# Patient Record
Sex: MALE | Employment: OTHER | URBAN - METROPOLITAN AREA
[De-identification: names, ages, dates, MRNs, and addresses within clinical notes are randomized per-mention and may not be internally consistent; named-entity substitution may affect disease eponyms.]

---

## 2018-01-30 PROBLEM — S00.83XA FOREHEAD CONTUSION: Status: ACTIVE | Noted: 2018-01-30

## 2018-01-30 PROBLEM — S63.502A WRIST SPRAIN, LEFT, INITIAL ENCOUNTER: Status: ACTIVE | Noted: 2018-01-30

## 2018-01-30 PROBLEM — R55 SYNCOPE AND COLLAPSE: Status: ACTIVE | Noted: 2018-01-30

## 2018-01-30 PROBLEM — S60.812A: Status: ACTIVE | Noted: 2018-01-30

## 2018-01-30 PROBLEM — L08.9: Status: ACTIVE | Noted: 2018-01-30

## 2018-01-30 PROBLEM — S09.90XA HEAD INJURY: Status: ACTIVE | Noted: 2018-01-30

## 2018-08-22 ENCOUNTER — APPOINTMENT (OUTPATIENT)
Dept: GENERAL RADIOLOGY | Age: 83
End: 2018-08-22
Attending: EMERGENCY MEDICINE
Payer: MEDICARE

## 2018-08-22 ENCOUNTER — HOSPITAL ENCOUNTER (EMERGENCY)
Age: 83
Discharge: HOME | End: 2018-08-22
Attending: EMERGENCY MEDICINE
Payer: MEDICARE

## 2018-08-22 VITALS
TEMPERATURE: 97.5 F | SYSTOLIC BLOOD PRESSURE: 163 MMHG | RESPIRATION RATE: 16 BRPM | OXYGEN SATURATION: 96 % | DIASTOLIC BLOOD PRESSURE: 91 MMHG | HEART RATE: 79 BPM

## 2018-08-22 DIAGNOSIS — S76.312A LEFT HAMSTRING MUSCLE STRAIN, INITIAL ENCOUNTER: Primary | ICD-10-CM

## 2018-08-22 LAB — D DIMER PPP FEU-MCNC: 0.58 UG/ML(FEU)

## 2018-08-22 PROCEDURE — 85379 FIBRIN DEGRADATION QUANT: CPT | Performed by: EMERGENCY MEDICINE

## 2018-08-22 PROCEDURE — 73502 X-RAY EXAM HIP UNI 2-3 VIEWS: CPT

## 2018-08-22 PROCEDURE — 99284 EMERGENCY DEPT VISIT MOD MDM: CPT

## 2018-08-22 RX ORDER — IBUPROFEN 800 MG/1
800 TABLET ORAL EVERY 8 HOURS
Qty: 15 TAB | Refills: 0 | Status: SHIPPED | OUTPATIENT
Start: 2018-08-22 | End: 2018-08-27

## 2018-08-22 NOTE — DISCHARGE INSTRUCTIONS
SPECIFIC PATIENT INSTRUCTIONS FROM THE PHYSICIAN WHO TREATED YOU IN THE ER TODAY:  1. Ibuprofen as prescribed until finished. 2. Return if any concerns or worsening condition(s). 3. FOLLOW UP APPOINTMENT:  Your primary doctor in 1-2 days. MyChart Activation    Thank you for requesting access to [facility]. Please follow the instructions below to securely access and download your online medical record. [facility] allows you to send messages to your doctor, view your test results, renew your prescriptions, schedule appointments, and more. How Do I Sign Up? 1. In your internet browser, go to [email or web address]. 2. Click on the First Time User? Click Here link in the Sign In box. You will see the New Member Sign Up page. 3. Enter your [facility] Access Code exactly as it appears below. You will not need to use this code after youve completed the sign-up process. If you do not sign up before the expiration date, you must request a new code. [facility] Access Code: [email or web address]  Expires: 10/14/2018  4:21 PM (This is the date your [facility] access code will )    4. Enter the last four digits of your Social Security Number (xxxx) and Date of Birth (mm/dd/yyyy) as indicated and click Submit. You will be taken to the next sign-up page. 5. Create a [facility] ID. This will be your [facility] login ID and cannot be changed, so think of one that is secure and easy to remember. 6. Create a [facility] password. You can change your password at any time. 7. Enter your Password Reset Question and Answer. This can be used at a later time if you forget your password. 8. Enter your e-mail address. You will receive e-mail notification when new information is available in [address] 9. Click Sign Up. You can now view and download portions of your medical record. 10. Click the Download Summary menu link to download a portable copy of your medical information.     Additional Information    If you have questions, please visit the Frequently Asked Questions section of the [facility] website at [email or web address] Remember, [facility] is NOT to be used for urgent needs. For medical emergencies, dial 911.

## 2018-08-22 NOTE — ED PROVIDER NOTES
[facility] EMERGENCY DEPT      4:30 PM    Date: 8/22/2018  Patient Name: Ketan Jalloh    History of Presenting Illness     Chief Complaint   Patient presents with    Leg Pain    Leg Swelling       History Provided By: Patient    Chief Complaint: Leg pain  Duration: 4 Hours  Timing:  Constant  Location: Left leg  Severity: 7 out of 10  Associated Symptoms: Left left swelling. Patient denies any other associated symptoms or complaints. 80 y.o. male with noted past medical history of HTN and hypercholesterolemia who presents to the emergency department c/o left leg pain and swelling onset 4 hours ago. Patient describes the pain and swelling as constant, located in the left leg, and moderate. He states that the pain suddenly occured while he was walking around with his walker. He never experienced this pain before. Patient has not taken any medications for this pain and swelling. Patient denies any other associated symptoms or complaints. No other complaints. Nursing notes regarding the HPI and triage nursing notes were reviewed. Prior medical records were reviewed. Current Outpatient Prescriptions   Medication Sig Dispense Refill    ibuprofen (MOTRIN) 800 mg tablet Take 1 Tab by mouth every eight (8) hours for 5 days. 15 Tab 0    cholecalciferol (VITAMIN D3) 1,000 unit tablet 2,000 Units.  simvastatin (ZOCOR) 40 mg tablet 40 mg.      lisinopril (PRINIVIL, ZESTRIL) 40 mg tablet Take 40 mg by mouth daily.   0    pantoprazole (PROTONIX) 40 mg tablet   0    tamsulosin (FLOMAX) 0.4 mg capsule take 1 capsule by mouth once daily AFTER DINNER 90 Cap 3       Past History     Past Medical History:  Past Medical History:   Diagnosis Date    Difficulty urinating     Elevated PSA     Hematuria, unspecified     Hypercholesterolemia     Hypertension     Incomplete bladder emptying     Urinary retention     UTI (urinary tract infection)        Past Surgical History:  Past Surgical History:   Procedure Laterality Date    APPENDECTOMY      EGD  2/7/2014         HX TURP  6/13/16    HX TURP         Family History:  Family History   Problem Relation Age of Onset    Cancer Father     Alzheimer Mother     Heart defect Brother        Social History:  Social History   Substance Use Topics    Smoking status: Never Smoker    Smokeless tobacco: Never Used    Alcohol use 1.2 oz/week     2 Cans of beer per week      Comment: Occasional       Allergies: Allergies   Allergen Reactions    Amlodipine Swelling    Bactrim [Sulfamethoprim Ds] Rash    Ciprofloxacin Rash    Lisinopril Other (comments)     Acute renal failure       Patient's primary care provider (as noted in EPIC):  Indio Thomas DO    Review of Systems   Constitutional: Negative for diaphoresis. HENT: Negative for congestion. Eyes: Negative for discharge. Respiratory: Negative for stridor. Cardiovascular: Negative for palpitations. Gastrointestinal: Negative for diarrhea. Genitourinary: Negative for flank pain. Musculoskeletal: Positive for myalgias. Negative for back pain. Left leg pain and swelling. Neurological: Negative for weakness. Psychiatric/Behavioral: Negative for hallucinations. All other systems reviewed and are negative. Visit Vitals    /60    Pulse 79    Temp 97.5 °F (36.4 °C)    Resp 16    SpO2 97%       PHYSICAL EXAM:    CONSTITUTIONAL: Alert, in no apparent distress; well-developed; well-nourished. HEAD:  Normocephalic, atraumatic. No Battles sign. No Raccoons eyes. EYES:  EOM's intact. Normal conjunctiva. Anicteric sclera. ENTM: Nose: no rhinorrhea; Oropharynx:  mucous membranes moist    Neck:  No JVD. No cervical vertebral bony point tenderness or step-off. RESP: Chest clear, equal breath sounds. CARDIOVASCULAR:  Regular rate and rhythm. No murmurs, rubs, or gallops. GI: Normal bowel sounds, abdomen soft and non-tender.  No masses or organomegaly. : No costo-vertebral angle tenderness. BACK:  No TLS vertebral bony point tenderness or step-off. UPPER EXT:  Normal inspection  LOWER EXT: No edema, no calf tenderness. Distal pulses intact. NEURO: Grossly normal motor and sensation. SKIN: No rashes; Normal for age and stage. PSYCH:  Alert and oriented, normal affect. Affected muscle group:  Left proximal hamstring has mild focal reproducible tenderness to palpation. No rash, lesions, bruising or bites. DIFFERENTIAL DIAGNOSES/ MEDICAL DECISION MAKING:  Contusion, sprain, hematoma, ligamentous tear/ disruption or a combination of the above. Diagnostic Study Results     Abnormal lab results from this emergency department encounter:  Labs Reviewed   D DIMER - Abnormal; Notable for the following:        Result Value    D DIMER 0.58 (*)     All other components within normal limits       Lab values for this patient within approximately the last 12 hours:  Recent Results (from the past 12 hour(s))   D DIMER    Collection Time: 08/22/18  6:15 PM   Result Value Ref Range    D DIMER 0.58 (H) <0.46 ug/ml(FEU)       Radiologist and cardiologist interpretations if available at time of this note:  No results found. Medication(s) ordered for patient during this emergency visit encounter:  Medications - No data to display    Medical Decision Making     I am the first provider for this patient. I reviewed the vital signs, available nursing notes, past medical history, past surgical history, family history and social history. Vital Signs:  Reviewed the patient's vital signs. ED COURSE:      IMPRESSION AND MEDICAL DECISION MAKING:  Based upon the patients presentation with noted HPI and PE, along with the work up done in the emergency department, I believe that the patient has muscle strain at the noted site(s). DIAGNOSIS:  Myofascial (muscle) strain.     SPECIFIC PATIENT INSTRUCTIONS FROM THE PHYSICIAN WHO TREATED YOU IN THE ER TODAY:  1. Ibuprofen as prescribed until finished. 2. Return if any concerns or worsening condition(s). 3. FOLLOW UP APPOINTMENT:  Your primary doctor in 1-2 days. Patient is improved, resting quietly and comfortably. The patient will be discharged home. The patient was reassured that these symptoms do not appear to represent a serious or life threatening condition at this time. Warning signs of worsening condition were discussed and understood by the patient. Based on patient's age, coexisting illness, exam, and the results of this ED evaluation, the decision to treat as an outpatient was made. Based on the information available at time of discharge, acute pathology requiring immediate intervention was deemed relative unlikely. While it is impossible to completely exclude the possibility of underlying serious disease or worsening of condition, I feel the relative likelihood is extremely low. I discussed this uncertainty with the patient, who understood ED evaluation and treatment and felt comfortable with the outpatient treatment plan. All questions regarding care, test results, and follow up were answered. The patient is stable and appropriate to discharge. They understand that they should return to the emergency department for any new or worsening symptoms. I stressed the importance of follow up for repeat assessment and possibly further evaluation/treatment. Coding Diagnoses     Clinical Impression:   1. Left hamstring muscle strain, initial encounter        Disposition     Disposition:  Home. Estela Guerrero M.D.   [facility] Board Certified Emergency Physician    Provider Attestation:  If a scribe was utilized in generation of this patient record, I personally performed the services described in the documentation, reviewed the documentation, as recorded by the scribe in my presence, and it accurately records the patient's history of presenting illness, review of systems, patient physical examination, and procedures performed by me as the attending physician. Denis James M.D. [facility] Board Certified Emergency Physician  8/22/2018.  4:34 PM    Scribe Attestation     Mp Rocha acting as a scribe for and in the presence of Kesha Matos MD      August 22, 2018 at 4:40 PM       Provider Attestation:      I personally performed the services described in the documentation, reviewed the documentation, as recorded by the scribe in my presence, and it accurately and completely records my words and actions.  August 22, 2018 at 4:40 PM - Kesha Matos MD

## 2018-08-22 NOTE — ED NOTES
Verbal shift change report given to Jamila Bullard RN (oncoming nurse) by Mando Soto RN (offgoing nurse). Report included the following information SBAR.

## 2018-08-27 ENCOUNTER — HOSPITAL ENCOUNTER (EMERGENCY)
Age: 83
Discharge: HOME | End: 2018-08-27
Attending: EMERGENCY MEDICINE
Payer: MEDICARE

## 2018-08-27 ENCOUNTER — APPOINTMENT (OUTPATIENT)
Dept: GENERAL RADIOLOGY | Age: 83
End: 2018-08-27
Attending: EMERGENCY MEDICINE
Payer: MEDICARE

## 2018-08-27 VITALS
OXYGEN SATURATION: 98 % | TEMPERATURE: 98.2 F | HEART RATE: 69 BPM | SYSTOLIC BLOOD PRESSURE: 169 MMHG | RESPIRATION RATE: 18 BRPM | DIASTOLIC BLOOD PRESSURE: 72 MMHG

## 2018-08-27 DIAGNOSIS — R21 RASH OF GROIN: ICD-10-CM

## 2018-08-27 DIAGNOSIS — M79.605 LEFT LEG PAIN: ICD-10-CM

## 2018-08-27 DIAGNOSIS — R53.1 GENERAL WEAKNESS: ICD-10-CM

## 2018-08-27 DIAGNOSIS — R52 TOTAL BODY PAIN: Primary | ICD-10-CM

## 2018-08-27 DIAGNOSIS — M25.551 RIGHT HIP PAIN: ICD-10-CM

## 2018-08-27 LAB
ALBUMIN SERPL-MCNC: 3 G/DL (ref 3.4–5)
ALBUMIN/GLOB SERPL: 0.7 {RATIO} (ref 0.8–1.7)
ALP SERPL-CCNC: 145 U/L (ref 45–117)
ALT SERPL-CCNC: 44 U/L (ref 16–61)
ANION GAP SERPL CALC-SCNC: 7 MMOL/L (ref 3–18)
APPEARANCE UR: CLEAR
AST SERPL-CCNC: 50 U/L (ref 15–37)
ATRIAL RATE: 61 BPM
ATRIAL RATE: 68 BPM
BILIRUB SERPL-MCNC: 0.5 MG/DL (ref 0.2–1)
BILIRUB UR QL: NEGATIVE
BUN SERPL-MCNC: 33 MG/DL (ref 7–18)
BUN/CREAT SERPL: 24 (ref 12–20)
CALCIUM SERPL-MCNC: 9 MG/DL (ref 8.5–10.1)
CALCULATED P AXIS, ECG09: 55 DEGREES
CALCULATED R AXIS, ECG10: -11 DEGREES
CALCULATED R AXIS, ECG10: 2 DEGREES
CALCULATED T AXIS, ECG11: 104 DEGREES
CALCULATED T AXIS, ECG11: 119 DEGREES
CHLORIDE SERPL-SCNC: 104 MMOL/L (ref 100–108)
CK MB CFR SERPL CALC: 2.3 % (ref 0–4)
CK MB CFR SERPL CALC: 2.5 % (ref 0–4)
CK MB SERPL-MCNC: 10.8 NG/ML (ref 5–25)
CK MB SERPL-MCNC: 9.6 NG/ML (ref 5–25)
CK SERPL-CCNC: 410 U/L (ref 39–308)
CK SERPL-CCNC: 440 U/L (ref 39–308)
CO2 SERPL-SCNC: 31 MMOL/L (ref 21–32)
COLOR UR: YELLOW
CREAT SERPL-MCNC: 1.38 MG/DL (ref 0.6–1.3)
DIAGNOSIS, 93000: NORMAL
DIAGNOSIS, 93000: NORMAL
ERYTHROCYTE [DISTWIDTH] IN BLOOD BY AUTOMATED COUNT: 14.4 % (ref 11.6–14.5)
GLOBULIN SER CALC-MCNC: 4.3 G/DL (ref 2–4)
GLUCOSE SERPL-MCNC: 102 MG/DL (ref 74–99)
GLUCOSE UR STRIP.AUTO-MCNC: NEGATIVE MG/DL
HCT VFR BLD AUTO: 40.2 % (ref 36–48)
HGB BLD-MCNC: 13.5 G/DL (ref 13–16)
HGB UR QL STRIP: NEGATIVE
KETONES UR QL STRIP.AUTO: NEGATIVE MG/DL
LEUKOCYTE ESTERASE UR QL STRIP.AUTO: NEGATIVE
MCH RBC QN AUTO: 32 PG (ref 24–34)
MCHC RBC AUTO-ENTMCNC: 33.6 G/DL (ref 31–37)
MCV RBC AUTO: 95.3 FL (ref 74–97)
NITRITE UR QL STRIP.AUTO: NEGATIVE
P-R INTERVAL, ECG05: 244 MS
PH UR STRIP: 6.5 [PH] (ref 5–8)
PLATELET # BLD AUTO: 214 K/UL (ref 135–420)
PMV BLD AUTO: 9.9 FL (ref 9.2–11.8)
POTASSIUM SERPL-SCNC: 4.2 MMOL/L (ref 3.5–5.5)
PROT SERPL-MCNC: 7.3 G/DL (ref 6.4–8.2)
PROT UR STRIP-MCNC: NEGATIVE MG/DL
Q-T INTERVAL, ECG07: 444 MS
Q-T INTERVAL, ECG07: 462 MS
QRS DURATION, ECG06: 142 MS
QRS DURATION, ECG06: 150 MS
QTC CALCULATION (BEZET), ECG08: 465 MS
QTC CALCULATION (BEZET), ECG08: 475 MS
RBC # BLD AUTO: 4.22 M/UL (ref 4.7–5.5)
SODIUM SERPL-SCNC: 142 MMOL/L (ref 136–145)
SP GR UR REFRACTOMETRY: 1.02 (ref 1–1.03)
TROPONIN I SERPL-MCNC: 0.06 NG/ML (ref 0–0.04)
TROPONIN I SERPL-MCNC: 0.07 NG/ML (ref 0–0.04)
TSH SERPL DL<=0.05 MIU/L-ACNC: 3.1 UIU/ML (ref 0.36–3.74)
UROBILINOGEN UR QL STRIP.AUTO: 1 EU/DL (ref 0.2–1)
VENTRICULAR RATE, ECG03: 61 BPM
VENTRICULAR RATE, ECG03: 69 BPM
WBC # BLD AUTO: 6.1 K/UL (ref 4.6–13.2)

## 2018-08-27 PROCEDURE — 80053 COMPREHEN METABOLIC PANEL: CPT | Performed by: EMERGENCY MEDICINE

## 2018-08-27 PROCEDURE — 74011636637 HC RX REV CODE- 636/637: Performed by: EMERGENCY MEDICINE

## 2018-08-27 PROCEDURE — 82550 ASSAY OF CK (CPK): CPT | Performed by: EMERGENCY MEDICINE

## 2018-08-27 PROCEDURE — 81003 URINALYSIS AUTO W/O SCOPE: CPT | Performed by: EMERGENCY MEDICINE

## 2018-08-27 PROCEDURE — 85027 COMPLETE CBC AUTOMATED: CPT | Performed by: EMERGENCY MEDICINE

## 2018-08-27 PROCEDURE — 74011250637 HC RX REV CODE- 250/637: Performed by: EMERGENCY MEDICINE

## 2018-08-27 PROCEDURE — 96360 HYDRATION IV INFUSION INIT: CPT

## 2018-08-27 PROCEDURE — 84443 ASSAY THYROID STIM HORMONE: CPT | Performed by: EMERGENCY MEDICINE

## 2018-08-27 PROCEDURE — 93005 ELECTROCARDIOGRAM TRACING: CPT

## 2018-08-27 PROCEDURE — 71045 X-RAY EXAM CHEST 1 VIEW: CPT

## 2018-08-27 PROCEDURE — 96361 HYDRATE IV INFUSION ADD-ON: CPT

## 2018-08-27 PROCEDURE — 74011250636 HC RX REV CODE- 250/636: Performed by: EMERGENCY MEDICINE

## 2018-08-27 PROCEDURE — 73521 X-RAY EXAM HIPS BI 2 VIEWS: CPT

## 2018-08-27 PROCEDURE — 99285 EMERGENCY DEPT VISIT HI MDM: CPT

## 2018-08-27 RX ORDER — ALLOPURINOL 300 MG/1
300 TABLET ORAL DAILY
COMMUNITY

## 2018-08-27 RX ORDER — SODIUM CHLORIDE 9 MG/ML
250 INJECTION, SOLUTION INTRAVENOUS ONCE
Status: COMPLETED | OUTPATIENT
Start: 2018-08-27 | End: 2018-08-27

## 2018-08-27 RX ORDER — ACETAMINOPHEN 325 MG/1
650 TABLET ORAL
Qty: 20 TAB | Refills: 0 | Status: SHIPPED | OUTPATIENT
Start: 2018-08-27 | End: 2019-12-16

## 2018-08-27 RX ORDER — PREDNISONE 20 MG/1
60 TABLET ORAL DAILY
Qty: 4 TAB | Refills: 0 | Status: SHIPPED | OUTPATIENT
Start: 2018-08-27 | End: 2018-08-27

## 2018-08-27 RX ORDER — PREDNISONE 20 MG/1
TABLET ORAL
Status: DISCONTINUED
Start: 2018-08-27 | End: 2018-08-27 | Stop reason: HOSPADM

## 2018-08-27 RX ORDER — CYCLOBENZAPRINE HCL 10 MG
5 TABLET ORAL
Qty: 15 TAB | Refills: 0 | Status: SHIPPED | OUTPATIENT
Start: 2018-08-27 | End: 2019-02-12

## 2018-08-27 RX ORDER — ZINC OXIDE 20 G/100G
OINTMENT TOPICAL AS NEEDED
Status: DISCONTINUED | OUTPATIENT
Start: 2018-08-27 | End: 2018-08-27 | Stop reason: HOSPADM

## 2018-08-27 RX ORDER — METOPROLOL TARTRATE 100 MG/1
100 TABLET ORAL 2 TIMES DAILY
COMMUNITY
End: 2019-02-12

## 2018-08-27 RX ORDER — ZINC OXIDE 20 G/100G
OINTMENT TOPICAL AS NEEDED
Qty: 30 G | Refills: 0 | Status: SHIPPED | OUTPATIENT
Start: 2018-08-27 | End: 2019-02-12

## 2018-08-27 RX ORDER — ACETAMINOPHEN 500 MG
1000 TABLET ORAL
Status: COMPLETED | OUTPATIENT
Start: 2018-08-27 | End: 2018-08-27

## 2018-08-27 RX ORDER — FUROSEMIDE 40 MG/1
40 TABLET ORAL DAILY
COMMUNITY
End: 2020-02-21

## 2018-08-27 RX ORDER — PREDNISONE 20 MG/1
60 TABLET ORAL
Status: COMPLETED | OUTPATIENT
Start: 2018-08-27 | End: 2018-08-27

## 2018-08-27 RX ORDER — PREDNISONE 20 MG/1
60 TABLET ORAL DAILY
Qty: 12 TAB | Refills: 0 | Status: SHIPPED | OUTPATIENT
Start: 2018-08-27 | End: 2018-08-31

## 2018-08-27 RX ORDER — CYCLOBENZAPRINE HCL 10 MG
10 TABLET ORAL
Status: COMPLETED | OUTPATIENT
Start: 2018-08-27 | End: 2018-08-27

## 2018-08-27 RX ADMIN — ACETAMINOPHEN 1000 MG: 500 TABLET, FILM COATED ORAL at 09:34

## 2018-08-27 RX ADMIN — SODIUM CHLORIDE 250 ML/HR: 900 INJECTION, SOLUTION INTRAVENOUS at 07:13

## 2018-08-27 RX ADMIN — CYCLOBENZAPRINE HYDROCHLORIDE 10 MG: 10 TABLET, FILM COATED ORAL at 09:34

## 2018-08-27 RX ADMIN — PREDNISONE 60 MG: 20 TABLET ORAL at 09:34

## 2018-08-27 RX ADMIN — ZINC OXIDE: 200 OINTMENT TOPICAL at 08:16

## 2018-08-27 NOTE — DISCHARGE INSTRUCTIONS
Hip Pain: Care Instructions  Your Care Instructions    Hip pain may be caused by many things, including overuse, a fall, or a twisting movement. Another cause of hip pain is arthritis. Your pain may increase when you stand up, walk, or squat. The pain may come and go or may be constant. Home treatment can help relieve hip pain, swelling, and stiffness. If your pain is ongoing, you may need more tests and treatment. Follow-up care is a key part of your treatment and safety. Be sure to make and go to all appointments, and call your doctor if you are having problems. It's also a good idea to know your test results and keep a list of the medicines you take. How can you care for yourself at home? · Take pain medicines exactly as directed. ¨ If the doctor gave you a prescription medicine for pain, take it as prescribed. ¨ If you are not taking a prescription pain medicine, ask your doctor if you can take an over-the-counter medicine. · Rest and protect your hip. Take a break from any activity, including standing or walking, that may cause pain. · Put ice or a cold pack against your hip for 10 to 20 minutes at a time. Try to do this every 1 to 2 hours for the next 3 days (when you are awake) or until the swelling goes down. Put a thin cloth between the ice and your skin. · Sleep on your healthy side with a pillow between your knees, or sleep on your back with pillows under your knees. · If there is no swelling, you can put moist heat, a heating pad, or a warm cloth on your hip. Do gentle stretching exercises to help keep your hip flexible. · Learn how to prevent falls. Have your vision and hearing checked regularly. Wear slippers or shoes with a nonskid sole. · Stay at a healthy weight. · Wear comfortable shoes. When should you call for help? Call 911 anytime you think you may need emergency care.  For example, call if:    · You have sudden chest pain and shortness of breath, or you cough up blood.     · You are not able to stand or walk or bear weight.     · Your buttocks, legs, or feet feel numb or tingly.     · Your leg or foot is cool or pale or changes color.     · You have severe pain.    Call your doctor now or seek immediate medical care if:    · You have signs of infection, such as:  ¨ Increased pain, swelling, warmth, or redness in the hip area. ¨ Red streaks leading from the hip area. ¨ Pus draining from the hip area. ¨ A fever.     · You have signs of a blood clot, such as:  ¨ Pain in your calf, back of the knee, thigh, or groin. ¨ Redness and swelling in your leg or groin.     · You are not able to bend, straighten, or move your leg normally.     · You have trouble urinating or having bowel movements.    Watch closely for changes in your health, and be sure to contact your doctor if:    · You do not get better as expected. Where can you learn more? Go to http://demetrius-bernard.info/. Enter C318 in the search box to learn more about \"Hip Pain: Care Instructions. \"  Current as of: November 20, 2017  Content Version: 11.7  © 8996-8865 [employer]. Care instructions adapted under license by [employer] (which disclaims liability or warranty for this information). If you have questions about a medical condition or this instruction, always ask your healthcare professional. [place] [address] any warranty or liability for your use of this information. Joint Pain: Care Instructions  Your Care Instructions    Many people have small aches and pains from overuse or injury to muscles and joints. Joint injuries often happen during sports or recreation, work tasks, or projects around the home. An overuse injury can happen when you put too much stress on a joint or when you do an activity that stresses the joint over and over, such as using the computer or rowing a boat. You can take action at home to help your muscles and joints get better.  You should feel better in 1 to 2 weeks, but it can take 3 months or more to heal completely. Follow-up care is a key part of your treatment and safety. Be sure to make and go to all appointments, and call your doctor if you are having problems. It's also a good idea to know your test results and keep a list of the medicines you take. How can you care for yourself at home? · Do not put weight on the injured joint for at least a day or two. · For the first day or two after an injury, do not take hot showers or baths, and do not use hot packs. The heat could make swelling worse. · Put ice or a cold pack on the sore joint for 10 to 20 minutes at a time. Try to do this every 1 to 2 hours for the next 3 days (when you are awake) or until the swelling goes down. Put a thin cloth between the ice and your skin. · Wrap the injury in an elastic bandage. Do not wrap it too tightly because this can cause more swelling. · Prop up the sore joint on a pillow when you ice it or anytime you sit or lie down during the next 3 days. Try to keep it above the level of your heart. This will help reduce swelling. · Take an over-the-counter pain medicine, such as acetaminophen (Tylenol), ibuprofen (Advil, Motrin), or naproxen (Aleve). Read and follow all instructions on the label. · After 1 or 2 days of rest, begin moving the joint gently. While the joint is still healing, you can begin to exercise using activities that do not strain or hurt the painful joint. When should you call for help? Call your doctor now or seek immediate medical care if:    · You have signs of infection, such as:  ¨ Increased pain, swelling, warmth, and redness. ¨ Red streaks leading from the joint. ¨ A fever.    Watch closely for changes in your health, and be sure to contact your doctor if:    · Your movement or symptoms are not getting better after 1 to 2 weeks of home treatment. Where can you learn more?   Go to http://demetrius-bernard.info/. Enter P205 in the search box to learn more about \"Joint Pain: Care Instructions. \"  Current as of: November 29, 2017  Content Version: 11.7  © 0090-5529 [employer]. Care instructions adapted under license by [employer] (which disclaims liability or warranty for this information). If you have questions about a medical condition or this instruction, always ask your healthcare professional. [address] any warranty or liability for your use of this information. Musculoskeletal Pain: Care Instructions  Your Care Instructions    Different problems with the bones, muscles, nerves, ligaments, and tendons in the body can cause pain. One or more areas of your body may ache or burn. Or they may feel tired, stiff, or sore. The medical term for this type of pain is musculoskeletal pain. It can have many different causes. Sometimes the pain is caused by an injury such as a strain or sprain. Or you might have pain from using one part of your body in the same way over and over again. This is called overuse. In some cases, the cause of the pain is another health problem such as arthritis or fibromyalgia. The doctor will examine you and ask you questions about your health to help find the cause of your pain. Blood tests or imaging tests like an X-ray may also be helpful. But sometimes doctors can't find a cause of the pain. Treatment depends on your symptoms and the cause of the pain, if known. The doctor has checked you carefully, but problems can develop later. If you notice any problems or new symptoms, get medical treatment right away. Follow-up care is a key part of your treatment and safety. Be sure to make and go to all appointments, and call your doctor if you are having problems. It's also a good idea to know your test results and keep a list of the medicines you take. How can you care for yourself at home?   · Rest until you feel better. · Do not do anything that makes the pain worse. Return to exercise gradually if you feel better and your doctor says it's okay. · Be safe with medicines. Read and follow all instructions on the label. ¨ If the doctor gave you a prescription medicine for pain, take it as prescribed. ¨ If you are not taking a prescription pain medicine, ask your doctor if you can take an over-the-counter medicine. · Put ice or a cold pack on the area for 10 to 20 minutes at a time to ease pain. Put a thin cloth between the ice and your skin. When should you call for help? Call your doctor now or seek immediate medical care if:    · You have new pain, or your pain gets worse.     · You have new symptoms such as a fever, a rash, or chills.    Watch closely for changes in your health, and be sure to contact your doctor if:    · You do not get better as expected. Where can you learn more? Go to http://demetrius-bernard.info/. Enter F562 in the search box to learn more about \"Musculoskeletal Pain: Care Instructions. \"  Current as of: October 9, 2017  Content Version: 11.7  © 1651-8672 [employer]. Care instructions adapted under license by [employer] (which disclaims liability or warranty for this information). If you have questions about a medical condition or this instruction, always ask your healthcare professional. [address] any warranty or liability for your use of this information.

## 2018-08-27 NOTE — ED PROVIDER NOTES
EMERGENCY DEPARTMENT HISTORY AND PHYSICAL EXAM    6:36 AM      Date: 8/27/2018  Patient Name: Franne Lefort    History of Presenting Illness     Chief Complaint   Patient presents with    Leg Pain    Hip Pain    Extremity Weakness         History Provided By: Patient    Chief Complaint: Pain   Duration: 4 Days  Timing:  Constant and Worsening  Location: L hip, LLE  Quality: Aching  Severity: 10 out of 10  Modifying Factors: worsened by weight bearing  Associated Symptoms: b/l LE weakness      Additional History (Context): Franne Lefort is a 80 y.o. male with hypertension, hyperlipidemia and difficulty urinating who presents per EMS with severe, worsening, aching, L hip and LLE pain x4 days. Pain is worsened by weight bearing and movement so pt has been non ambulatory for the past 3 days. Associated sx include generalized weakness in b/l LE and b/l LE myalgias. Denies HA, blurry vision, neck pain, sore throat, palpitations, recent falls. Pt recently retired, states his stamina is low. Pt walked significantly for his job. Pt with recent negative LE duplex. Pt not in PT or home health. PCP: Rob Muse DO    Current Facility-Administered Medications   Medication Dose Route Frequency Provider Last Rate Last Dose    zinc oxide 20 % ointment   Topical PRN Rex Spatz, MD        cyclobenzaprine (FLEXERIL) tablet 10 mg  10 mg Oral NOW Rex Spatz, MD        acetaminophen (TYLENOL) tablet 1,000 mg  1,000 mg Oral NOW Rex Spatz, MD        predniSONE (DELTASONE) tablet 60 mg  60 mg Oral NOW Rex Spatz, MD         Current Outpatient Prescriptions   Medication Sig Dispense Refill    furosemide (LASIX) 40 mg tablet Take 40 mg by mouth daily.  allopurinol (ZYLOPRIM) 300 mg tablet Take 300 mg by mouth daily.  metoprolol tartrate (LOPRESSOR) 100 mg IR tablet Take 100 mg by mouth two (2) times a day.       cyclobenzaprine (FLEXERIL) 10 mg tablet Take 0.5 Tabs by mouth three (3) times daily (with meals). 15 Tab 0    acetaminophen (TYLENOL) 325 mg tablet Take 2 Tabs by mouth every six (6) hours as needed for Pain. 20 Tab 0    predniSONE (DELTASONE) 20 mg tablet Take 3 Tabs by mouth daily. 4 Tab 0    ibuprofen (MOTRIN) 800 mg tablet Take 1 Tab by mouth every eight (8) hours for 5 days. 15 Tab 0    cholecalciferol (VITAMIN D3) 1,000 unit tablet 2,000 Units.  simvastatin (ZOCOR) 40 mg tablet 40 mg.  pantoprazole (PROTONIX) 40 mg tablet   0    tamsulosin (FLOMAX) 0.4 mg capsule take 1 capsule by mouth once daily AFTER DINNER 90 Cap 3    lisinopril (PRINIVIL, ZESTRIL) 40 mg tablet Take 40 mg by mouth daily. 0       Past History     Past Medical History:  Past Medical History:   Diagnosis Date    Difficulty urinating     Elevated PSA     Hematuria, unspecified     Hypercholesterolemia     Hypertension     Incomplete bladder emptying     Urinary retention     UTI (urinary tract infection)        Past Surgical History:  Past Surgical History:   Procedure Laterality Date    APPENDECTOMY      EGD  2/7/2014         HX TURP  6/13/16    HX TURP         Family History:  Family History   Problem Relation Age of Onset    Cancer Father     Alzheimer Mother     Heart defect Brother        Social History:  Social History   Substance Use Topics    Smoking status: Never Smoker    Smokeless tobacco: Never Used    Alcohol use 1.2 oz/week     2 Cans of beer per week      Comment: Occasional       Allergies: Allergies   Allergen Reactions    Amlodipine Swelling    Bactrim [Sulfamethoprim Ds] Rash    Ciprofloxacin Rash    Lisinopril Other (comments)     Acute renal failure         Review of Systems       Review of Systems   Constitutional: Negative for activity change, fatigue and fever. HENT: Negative for congestion, rhinorrhea and sore throat. Eyes: Negative for visual disturbance. Respiratory: Negative for shortness of breath.     Cardiovascular: Negative for chest pain and palpitations. Gastrointestinal: Negative for abdominal pain, blood in stool, diarrhea, nausea and vomiting. Genitourinary: Negative for dysuria and hematuria. Musculoskeletal: Positive for arthralgias (L hip) and myalgias (b/l LE). Negative for back pain. Skin: Negative for rash. Neurological: Positive for weakness (b/l LE). Negative for dizziness, light-headedness, numbness and headaches. Psychiatric/Behavioral: Negative for agitation. All other systems reviewed and are negative. Physical Exam     Visit Vitals    /72    Pulse 69    Temp 98.2 °F (36.8 °C)    Resp 18    SpO2 98%         Physical Exam   Constitutional: He is oriented to person, place, and time. He appears well-developed and well-nourished. HENT:   Head: Normocephalic and atraumatic. Eyes: Conjunctivae are normal. Pupils are equal, round, and reactive to light. Neck: Normal range of motion. Neck supple. Cardiovascular: Normal rate and regular rhythm. Pulses:       Dorsalis pedis pulses are 2+ on the right side, and 2+ on the left side. Pulmonary/Chest: Effort normal and breath sounds normal.   Lungs clear   Abdominal: Soft. Bowel sounds are normal.   Musculoskeletal: Normal range of motion. No L hip or L knee ttp  No T or L spine ttp   Lymphadenopathy:     He has no cervical adenopathy. Neurological: He is alert and oriented to person, place, and time. He has normal strength. No cranial nerve deficit or sensory deficit. Skin: Skin is warm. L forearm abrasion  Pt has fungal rash and irritation in groin but no bed sores   Psychiatric: He has a normal mood and affect. Nursing note and vitals reviewed.         Diagnostic Study Results     Labs -  Recent Results (from the past 12 hour(s))   URINALYSIS W/ RFLX MICROSCOPIC    Collection Time: 08/27/18  6:42 AM   Result Value Ref Range    Color YELLOW      Appearance CLEAR      Specific gravity 1.018 1.005 - 1.030      pH (UA) 6.5 5.0 - 8.0 Protein NEGATIVE  NEG mg/dL    Glucose NEGATIVE  NEG mg/dL    Ketone NEGATIVE  NEG mg/dL    Bilirubin NEGATIVE  NEG      Blood NEGATIVE  NEG      Urobilinogen 1.0 0.2 - 1.0 EU/dL    Nitrites NEGATIVE  NEG      Leukocyte Esterase NEGATIVE  NEG     CARDIAC PANEL,(CK, CKMB & TROPONIN)    Collection Time: 08/27/18  7:06 AM   Result Value Ref Range     (H) 39 - 308 U/L    CK - MB 10.8 (H) <3.6 ng/ml    CK-MB Index 2.5 0.0 - 4.0 %    Troponin-I, Qt. 0.06 (H) 0.0 - 0.045 NG/ML   CBC W/O DIFF    Collection Time: 08/27/18  7:06 AM   Result Value Ref Range    WBC 6.1 4.6 - 13.2 K/uL    RBC 4.22 (L) 4.70 - 5.50 M/uL    HGB 13.5 13.0 - 16.0 g/dL    HCT 40.2 36.0 - 48.0 %    MCV 95.3 74.0 - 97.0 FL    MCH 32.0 24.0 - 34.0 PG    MCHC 33.6 31.0 - 37.0 g/dL    RDW 14.4 11.6 - 14.5 %    PLATELET 998 832 - 978 K/uL    MPV 9.9 9.2 - 62.9 FL   METABOLIC PANEL, COMPREHENSIVE    Collection Time: 08/27/18  7:06 AM   Result Value Ref Range    Sodium 142 136 - 145 mmol/L    Potassium 4.2 3.5 - 5.5 mmol/L    Chloride 104 100 - 108 mmol/L    CO2 31 21 - 32 mmol/L    Anion gap 7 3.0 - 18 mmol/L    Glucose 102 (H) 74 - 99 mg/dL    BUN 33 (H) 7.0 - 18 MG/DL    Creatinine 1.38 (H) 0.6 - 1.3 MG/DL    BUN/Creatinine ratio 24 (H) 12 - 20      GFR est AA 60 (L) >60 ml/min/1.73m2    GFR est non-AA 49 (L) >60 ml/min/1.73m2    Calcium 9.0 8.5 - 10.1 MG/DL    Bilirubin, total 0.5 0.2 - 1.0 MG/DL    ALT (SGPT) 44 16 - 61 U/L    AST (SGOT) 50 (H) 15 - 37 U/L    Alk.  phosphatase 145 (H) 45 - 117 U/L    Protein, total 7.3 6.4 - 8.2 g/dL    Albumin 3.0 (L) 3.4 - 5.0 g/dL    Globulin 4.3 (H) 2.0 - 4.0 g/dL    A-G Ratio 0.7 (L) 0.8 - 1.7     TSH 3RD GENERATION    Collection Time: 08/27/18  7:06 AM   Result Value Ref Range    TSH 3.10 0.36 - 3.74 uIU/mL   EKG, 12 LEAD, INITIAL    Collection Time: 08/27/18  7:11 AM   Result Value Ref Range    Ventricular Rate 61 BPM    Atrial Rate 61 BPM    P-R Interval 244 ms    QRS Duration 142 ms    Q-T Interval 462 ms QTC Calculation (Bezet) 465 ms    Calculated P Axis 55 degrees    Calculated R Axis 2 degrees    Calculated T Axis 104 degrees    Diagnosis       Sinus rhythm with 1st degree AV block with occasional premature ventricular   complexes  Left bundle branch block  Abnormal ECG  When compared with ECG of 06-FEB-2014 20:09,  premature ventricular complexes are now present  Vent. rate has decreased BY  36 BPM  Confirmed by Lamin Russ ([id number]) on 8/27/2018 8:35:41 AM     CARDIAC PANEL,(CK, CKMB & TROPONIN)    Collection Time: 08/27/18  8:43 AM   Result Value Ref Range     (H) 39 - 308 U/L    CK - MB 9.6 (H) <3.6 ng/ml    CK-MB Index 2.3 0.0 - 4.0 %    Troponin-I, Qt. 0.07 (H) 0.0 - 0.045 NG/ML   EKG, 12 LEAD, SUBSEQUENT    Collection Time: 08/27/18  8:46 AM   Result Value Ref Range    Ventricular Rate 69 BPM    Atrial Rate 68 BPM    QRS Duration 150 ms    Q-T Interval 444 ms    QTC Calculation (Bezet) 475 ms    Calculated R Axis -11 degrees    Calculated T Axis 119 degrees    Diagnosis       Wide QRS rhythm  Left bundle branch block  Abnormal ECG  When compared with ECG of 27-AUG-2018 07:11,  Wide QRS rhythm has replaced Sinus rhythm         Radiologic Studies -   XR HIPS BI W AP PELV   Final Result      XR CHEST SNGL V   Final Result        Xr Chest Sngl V    Result Date: 8/27/2018  EXAM: Chest portable INDICATION: Sepsis COMPARISON: None. _______________ FINDINGS: AP portable chest film was performed. Lungs are clear. No effusion or pneumothorax. Heart and pulmonary vascularity are normal for AP technique. _______________     IMPRESSION: No acute cardiopulmonary disease. Xr Hips Bi W Ap Pelv    Result Date: 8/27/2018  EXAM: Bilateral hips INDICATION: Bilateral hip pain COMPARISON: Left hip series 8/22/2018 _______________ FINDINGS: AP pelvis and bilateral frog-leg views of the hips were performed. No acute fracture or dislocation. Joint spaces are well-maintained.  There are asymmetrically prominent hypertrophic changes of the lateral roof of the right acetabulum compared to the left. There are some cystic changes involving the lateral left acetabulum. Mild hypertrophic change of the right femoral head neck junction. Pelvis is intact. Mild degenerative changes lower lumbar spine. Left pelvic calcification probably represents phlebolith _______________     IMPRESSION: 1. No acute bony abnormality. Degenerative changes bilateral hips, right greater than left. Medical Decision Making   I am the first provider for this patient. I reviewed the vital signs, available nursing notes, past medical history, past surgical history, family history and social history. Vital Signs-Reviewed the patient's vital signs. Pulse Oximetry Analysis -  98% on room air (Interpretation) wnl    Cardiac Monitor:  Rate: 76    EKG: Interpreted by the EP. Time Interpreted: 5075   Rate: 61   Rhythm: Normal Sinus Rhythm     Interpretation: 1st degree AV block, QTc 465, QRS widened to 142ms, LBBB   Comparison: 1/30/2018, EKG morphology similar to January, 2nd EKG @ 08:46 LBBB, no acute ischemia or infarct other than that finding    Records Reviewed: Nursing Notes (Time of Review: 6:36 AM)    ED Course: Progress Notes, Reevaluation, and Consults:    08:22 Pt's labs reviewed, CPK is elevated, index is nml, troponin 0.06, will recheck troponin for trend, there was no abnormality in EKG. Pt does have elevated creatinine which is improved from prior visit. Slightly abnml LFTs although pt with no abd pain or ttp, urinalysis is clear with no infection, imaging is clear without acute pathology or fractures. 09:24 Re-evaluated pt, family at bedside who states pt has been evaluated by PCP for home health and they are in the process of arranging that. Starting some pain medications, muscle relaxers, and steroids. Call placed to PCP.    09:25 Consult:  Discussed care with MALISSA Cisneros (PCP).  Standard discussion; including history of patients chief complaint, available diagnostic results, and treatment course. Knows pt well, home health is arranged, pt can follow up outpatient, she is appreciative of care and pain management. Explained all findings including troponin and ok with outpatient follow up. Provider Notes (Medical Decision Making):    Pt has vitals with slightly high /75 otherwise nml. Pt with b/l hip and leg pain, acute on chronic, no DVT per pt and family recently. Pt with no focal deficits, I will check labs and UA for any obvious cause for pt's pain. Pt does need PCP follow up, PT and home health, I will communicate that with PCP once labs are resulted. Pt does not have any focal weakness, states weight bearing causes pain in hamstring, PCP knows about it, ordered duplex for LE but there was no blood clot. Pt's medications reviewed. Never been a smoker. Pt seen on 22nd for same pain, D-dimer on that date was elevated to 2.58. Do not see a duplex in [facility] but pt states it was done, no duplex in [facility]'s orders too, will call pt's PCP to confirm. Pt did have echo in January 2018 as well that shows EF 63% and no obvious abnormality on wall motion          Diagnosis     Clinical Impression:   1. Total body pain    2. Left leg pain    3. Right hip pain    4. General weakness    5. Rash of groin        Disposition: Discharge    Follow-up Information     Follow up With Details Comments [address], DO Call in 1 day If symptoms worsen [address] [address] [address]      Leora Pugh MD Call in 1 day Follow Up From Emergency Department [facility] [phone]  [phone]      [facility] EMERGENCY DEPT  If symptoms worsen [address]  [phone]           Patient's Medications   Start Taking    ACETAMINOPHEN (TYLENOL) 325 MG TABLET    Take 2 Tabs by mouth every six (6) hours as needed for Pain. CYCLOBENZAPRINE (FLEXERIL) 10 MG TABLET    Take 0.5 Tabs by mouth three (3) times daily (with meals). PREDNISONE (DELTASONE) 20 MG TABLET    Take 3 Tabs by mouth daily. Continue Taking    ALLOPURINOL (ZYLOPRIM) 300 MG TABLET    Take 300 mg by mouth daily. CHOLECALCIFEROL (VITAMIN D3) 1,000 UNIT TABLET    2,000 Units. FUROSEMIDE (LASIX) 40 MG TABLET    Take 40 mg by mouth daily. IBUPROFEN (MOTRIN) 800 MG TABLET    Take 1 Tab by mouth every eight (8) hours for 5 days. LISINOPRIL (PRINIVIL, ZESTRIL) 40 MG TABLET    Take 40 mg by mouth daily. METOPROLOL TARTRATE (LOPRESSOR) 100 MG IR TABLET    Take 100 mg by mouth two (2) times a day. PANTOPRAZOLE (PROTONIX) 40 MG TABLET        SIMVASTATIN (ZOCOR) 40 MG TABLET    40 mg.    TAMSULOSIN (FLOMAX) 0.4 MG CAPSULE    take 1 capsule by mouth once daily AFTER DINNER   These Medications have changed    No medications on file   Stop Taking    No medications on file     _______________________________    Attestations:  Scribe [address] [place] [address] acting as a scribe for and in the presence of Mary García MD      August 27, 2018 at 9:33 AM       Provider Attestation:      I personally performed the services described in the documentation, reviewed the documentation, as recorded by the scribe in my presence, and it accurately and completely records my words and actions.  August 27, 2018 at 9:33 AM - Mary García MD    _______________________________

## 2018-08-27 NOTE — PROGRESS NOTES
Spoke with patient and family at bedside patient has seen is PCP and is in process with his PCP in establishing home health services.  Information given on home health and personal care as requested

## 2018-08-27 NOTE — ED NOTES
Bedside shift change report given to Trudy Rivas RN (oncoming nurse) by Forrest Lang RN (offgoing nurse). Report included the following information SBAR.

## 2018-08-27 NOTE — ED NOTES
Bedside shift change report given to Salma Manrique (oncoming nurse) by Love Clarke (offgoing nurse). Report included the following information SBAR.

## 2018-09-10 ENCOUNTER — HOSPITAL ENCOUNTER (EMERGENCY)
Age: 83
Discharge: HOME | End: 2018-09-10
Attending: EMERGENCY MEDICINE
Payer: MEDICARE

## 2018-09-10 VITALS
HEART RATE: 79 BPM | BODY MASS INDEX: 27.28 KG/M2 | SYSTOLIC BLOOD PRESSURE: 161 MMHG | RESPIRATION RATE: 16 BRPM | WEIGHT: 180 LBS | OXYGEN SATURATION: 96 % | HEIGHT: 68 IN | DIASTOLIC BLOOD PRESSURE: 81 MMHG | TEMPERATURE: 97.8 F
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
## 2018-09-10 DIAGNOSIS — S01.312A LACERATION OF LEFT EAR, INITIAL ENCOUNTER: ICD-10-CM

## 2018-09-10 PROCEDURE — 74011250637 HC RX REV CODE- 250/637: Performed by: PHYSICIAN ASSISTANT

## 2018-09-10 PROCEDURE — 75810000293 HC SIMP/SUPERF WND  RPR

## 2018-09-10 PROCEDURE — 99283 EMERGENCY DEPT VISIT LOW MDM: CPT

## 2018-09-10 PROCEDURE — 74011000250 HC RX REV CODE- 250: Performed by: PHYSICIAN ASSISTANT

## 2018-09-10 PROCEDURE — 77030031132 HC SUT NYL COVD -A

## 2018-09-10 RX ORDER — BACITRACIN 500 UNIT/G
1 PACKET (EA) TOPICAL
Status: COMPLETED | OUTPATIENT
Start: 2018-09-10 | End: 2018-09-10

## 2018-09-10 RX ORDER — IBUPROFEN 600 MG/1
600 TABLET ORAL
Status: COMPLETED | OUTPATIENT
Start: 2018-09-10 | End: 2018-09-10

## 2018-09-10 RX ORDER — LIDOCAINE HYDROCHLORIDE AND EPINEPHRINE 10; 10 MG/ML; UG/ML
10 INJECTION, SOLUTION INFILTRATION; PERINEURAL ONCE
Status: COMPLETED | OUTPATIENT
Start: 2018-09-10 | End: 2018-09-10

## 2018-09-10 RX ORDER — HYDROCODONE BITARTRATE AND ACETAMINOPHEN 5; 325 MG/1; MG/1
1 TABLET ORAL
Qty: 12 TAB | Refills: 0 | Status: SHIPPED | OUTPATIENT
Start: 2018-09-10 | End: 2019-02-12

## 2018-09-10 RX ORDER — OXYCODONE AND ACETAMINOPHEN 5; 325 MG/1; MG/1
1 TABLET ORAL
Status: COMPLETED | OUTPATIENT
Start: 2018-09-10 | End: 2018-09-10

## 2018-09-10 RX ORDER — IBUPROFEN 800 MG/1
800 TABLET ORAL
Qty: 20 TAB | Refills: 0 | Status: SHIPPED | OUTPATIENT
Start: 2018-09-10 | End: 2018-09-17

## 2018-09-10 RX ADMIN — OXYCODONE HYDROCHLORIDE AND ACETAMINOPHEN 1 TABLET: 5; 325 TABLET ORAL at 22:17

## 2018-09-10 RX ADMIN — BACITRACIN 1 PACKET: 500 OINTMENT TOPICAL at 21:52

## 2018-09-10 RX ADMIN — LIDOCAINE HYDROCHLORIDE,EPINEPHRINE BITARTRATE 100 MG: 10; .01 INJECTION, SOLUTION INFILTRATION; PERINEURAL at 20:38

## 2018-09-10 RX ADMIN — IBUPROFEN 600 MG: 600 TABLET ORAL at 22:17

## 2018-09-11 NOTE — DISCHARGE INSTRUCTIONS
Cuts: Care Instructions  Your Care Instructions  A cut can happen anywhere on your body. Stitches, staples, skin adhesives, or pieces of tape called Steri-Strips are sometimes used to keep the edges of a cut together and help it heal. Steri-Strips can be used by themselves or with stitches or staples. Sometimes cuts are left open. If the cut went deep and through the skin, the doctor may have closed the cut in two layers. A deeper layer of stitches brings the deep part of the cut together. These stitches will dissolve and don't need to be removed. The upper layer closure, which could be stitches, staples, Steri-Strips, or adhesive, is what you see on the cut. A cut is often covered by a bandage. The doctor has checked you carefully, but problems can develop later. If you notice any problems or new symptoms, get medical treatment right away. Follow-up care is a key part of your treatment and safety. Be sure to make and go to all appointments, and call your doctor if you are having problems. It's also a good idea to know your test results and keep a list of the medicines you take. How can you care for yourself at home? If a cut is open or closed  · Prop up the sore area on a pillow anytime you sit or lie down during the next 3 days. Try to keep it above the level of your heart. This will help reduce swelling. · Keep the cut dry for the first 24 to 48 hours. After this, you can shower if your doctor okays it. Pat the cut dry. · Don't soak the cut, such as in a bathtub. Your doctor will tell you when it's safe to get the cut wet. · After the first 24 to 48 hours, clean the cut with soap and water 2 times a day unless your doctor gives you different instructions. ¨ Don't use hydrogen peroxide or alcohol, which can slow healing. ¨ You may cover the cut with a thin layer of petroleum jelly and a nonstick bandage.   ¨ If the doctor put a bandage over the cut, put on a new bandage after cleaning the cut or if the bandage gets wet or dirty. · Avoid any activity that could cause your cut to reopen. · Be safe with medicines. Read and follow all instructions on the label. ¨ If the doctor gave you a prescription medicine for pain, take it as prescribed. ¨ If you are not taking a prescription pain medicine, ask your doctor if you can take an over-the-counter medicine. If the cut is closed with stitches, staples, or Steri-Strips  · Follow the above instructions for open or closed cuts. · Do not remove the stitches or staples on your own. Your doctor will tell you when to come back to have the stitches or staples removed. · Leave Steri-Strips on until they fall off. If the cut is closed with a skin adhesive  · Follow the above instructions for open or closed cuts. · Leave the skin adhesive on your skin until it falls off on its own. This may take 5 to 10 days. · Do not scratch, rub, or pick at the adhesive. · Do not put the sticky part of a bandage directly on the adhesive. · Do not put any kind of ointment, cream, or lotion over the area. This can make the adhesive fall off too soon. Do not use hydrogen peroxide or alcohol, which can slow healing. When should you call for help? Call your doctor now or seek immediate medical care if:    · You have new pain, or your pain gets worse.     · The skin near the cut is cold or pale or changes color.     · You have tingling, weakness, or numbness near the cut.     · The cut starts to bleed, and blood soaks through the bandage. Oozing small amounts of blood is normal.     · You have trouble moving the area near the cut.     · You have symptoms of infection, such as:  ¨ Increased pain, swelling, warmth, or redness around the cut. ¨ Red streaks leading from the cut. ¨ Pus draining from the cut. ¨ A fever.    Watch closely for changes in your health, and be sure to contact your doctor if:    · The cut reopens.     · You do not get better as expected.    Where can you learn more? Go to http://demetrius-bernard.info/. Enter M735 in the search box to learn more about \"Cuts: Care Instructions. \"  Current as of: November 20, 2017  Content Version: 11.7  © 8095-4441 [employer]. Care instructions adapted under license by [employer] (which disclaims liability or warranty for this information). If you have questions about a medical condition or this instruction, always ask your healthcare professional. [address] any warranty or liability for your use of this information.

## 2018-09-11 NOTE — ED NOTES
Patient stated understanding of discharge instructions. Patient received two prescription(s) Patient told not to drive with medication. Patient was ambulatory upon discharge. Patient was in stable condition. Patient was accompanies with family member. Patient armband removed and shredded

## 2018-09-11 NOTE — ED PROVIDER NOTES
EMERGENCY DEPARTMENT HISTORY AND PHYSICAL EXAM 
 
Date: 9/10/2018 Patient Name: Brady Roque History of Presenting Illness Chief Complaint Patient presents with  Fall  Laceration History Provided By: Patient Chief Complaint: fall, ear laceration Duration: 2 Hours Timing:  Acute Location: left ear Quality: Aching Severity: 4 out of 10 Modifying Factors: none Associated Symptoms: denies any other associated signs or symptoms HPI: Brady Roque is a 80 y.o. male with a PMH of HTN, UTI, Hypercholesterolemia who presents to the ER c/o fall and left ear laceration. Patient states he was stepping backwards using his walker, when he tripped and fell down. Patient states he hit his head on the lower bed rail in his room. He denied any LOC and does not take any blood thinners. He reports his last tetanus was about 1 year prior. He denied all other symptoms or complaints. Patient states he was able to get up on his own after the fall. PCP: María Elena Adames DO 
 
Current Facility-Administered Medications Medication Dose Route Frequency Provider Last Rate Last Dose  oxyCODONE-acetaminophen (PERCOCET) 5-325 mg per tablet 1 Tab  1 Tab Oral NOW Tess Bridger, PA-C      
 ibuprofen (MOTRIN) tablet 600 mg  600 mg Oral NOW Tess Leaf, PA-C Current Outpatient Prescriptions Medication Sig Dispense Refill  
 HYDROcodone-acetaminophen (NORCO) 5-325 mg per tablet Take 1 Tab by mouth every six (6) hours as needed for Pain. Max Daily Amount: 4 Tabs. 12 Tab 0  ibuprofen (MOTRIN) 800 mg tablet Take 1 Tab by mouth every eight (8) hours as needed for Pain for up to 7 days. 20 Tab 0  
 furosemide (LASIX) 40 mg tablet Take 40 mg by mouth daily.  allopurinol (ZYLOPRIM) 300 mg tablet Take 300 mg by mouth daily.  metoprolol tartrate (LOPRESSOR) 100 mg IR tablet Take 100 mg by mouth two (2) times a day.  cyclobenzaprine (FLEXERIL) 10 mg tablet Take 0.5 Tabs by mouth three (3) times daily (with meals). 15 Tab 0  
 acetaminophen (TYLENOL) 325 mg tablet Take 2 Tabs by mouth every six (6) hours as needed for Pain. 20 Tab 0  
 zinc oxide 20 % ointment Apply  to affected area as needed for Skin Irritation. APPLY TO groin Nursing, document site in comments 30 g 0  cholecalciferol (VITAMIN D3) 1,000 unit tablet 2,000 Units.  simvastatin (ZOCOR) 40 mg tablet 40 mg.    
 lisinopril (PRINIVIL, ZESTRIL) 40 mg tablet Take 40 mg by mouth daily. 0  
 pantoprazole (PROTONIX) 40 mg tablet   0  
 tamsulosin (FLOMAX) 0.4 mg capsule take 1 capsule by mouth once daily AFTER DINNER 90 Cap 3 Past History Past Medical History: 
Past Medical History:  
Diagnosis Date  Difficulty urinating  Elevated PSA  Hematuria, unspecified  Hypercholesterolemia  Hypertension  Incomplete bladder emptying  Urinary retention  UTI (urinary tract infection) Past Surgical History: 
Past Surgical History:  
Procedure Laterality Date  APPENDECTOMY  EGD  2/7/2014  HX TURP  6/13/16  HX TURP Family History: 
Family History Problem Relation Age of Onset  Cancer Father  Alzheimer Mother  Heart defect Brother Social History: 
Social History Substance Use Topics  Smoking status: Never Smoker  Smokeless tobacco: Never Used  Alcohol use 1.2 oz/week 2 Cans of beer per week Comment: Occasional  
 
 
Allergies: Allergies Allergen Reactions  Amlodipine Swelling  Bactrim [Sulfamethoprim Ds] Rash  Ciprofloxacin Rash  Lisinopril Other (comments) Acute renal failure Review of Systems Review of Systems Constitutional: Negative for chills, fatigue and fever. HENT: Negative for sore throat. Left ear laceration Eyes: Negative. Respiratory: Negative for cough and shortness of breath. Cardiovascular: Negative for chest pain and palpitations. Gastrointestinal: Negative for abdominal pain, nausea and vomiting. Genitourinary: Negative for dysuria. Musculoskeletal: Negative. Skin: Negative. Neurological: Negative for dizziness, weakness, light-headedness and headaches. Psychiatric/Behavioral: Negative. All other systems reviewed and are negative. Physical Exam  
 
Vitals:  
 09/10/18 1921 09/10/18 2153 BP: 122/76 161/81 Pulse: 73 79 Resp: 18 16 Temp: 97.8 °F (36.6 °C) SpO2: 96% Weight: 81.6 kg (180 lb) Height: 5' 8\" (1.727 m) Physical Exam  
Constitutional: He is oriented to person, place, and time. He appears well-developed and well-nourished. No distress. HENT:  
Head: Normocephalic and atraumatic. Head is without raccoon's eyes and without Venegas's sign. Right Ear: Tympanic membrane, external ear and ear canal normal.  
Left Ear: Tympanic membrane and ear canal normal. Left ear exhibits lacerations. Ears: 
 
Nose: Nose normal.  
Mouth/Throat: Oropharynx is clear and moist.  
Eyes: Conjunctivae and EOM are normal. Pupils are equal, round, and reactive to light. No scleral icterus. Neck: Normal range of motion. Neck supple. No JVD present. No spinous process tenderness present. No tracheal deviation present. Cardiovascular: Normal rate, regular rhythm and normal heart sounds. Pulmonary/Chest: Effort normal and breath sounds normal. No respiratory distress. He has no wheezes. He has no rales. Abdominal: Soft. There is no tenderness. Musculoskeletal: Normal range of motion. Neurological: He is alert and oriented to person, place, and time. He has normal strength. Gait normal. GCS eye subscore is 4. GCS verbal subscore is 5. GCS motor subscore is 6. Skin: Skin is warm and dry. He is not diaphoretic. Psychiatric: He has a normal mood and affect. Nursing note and vitals reviewed. Diagnostic Study Results Labs - 
 No results found for this or any previous visit (from the past 12 hour(s)). Radiologic Studies - No orders to display CT Results  (Last 48 hours) None CXR Results  (Last 48 hours) None Medical Decision Making I am the first provider for this patient. I reviewed the vital signs, available nursing notes, past medical history, past surgical history, family history and social history. Vital Signs-Reviewed the patient's vital signs. Records Reviewed: Nursing Notes and Old Medical Records ED Course:  
8:50 PM 
79 y/o male brought in by his family c/o ear laceration; mechanical fall at home tonight. No LOC. UTD on tetanus. Not on any blood thinners. Discussed pt with Dr. Marca Dakin. Will plan on wound cleansing, closure and ENT follow up as needed. Bam De La Cruz PA-C 
 
10:13 PM 
Wound cleansed and closed. Discussed wound care with pt and family at bedside. Given return precautions if swelling, pain increases. All questions answered and patient in agreement with plan of care. Will plan for discharge. Bam De La Cruz PA-C Disposition: 
Discharged DISCHARGE NOTE:  
 
  Care plan outlined and precautions discussed. Patient has no new complaints, changes, or physical findings. Results of n/a were reviewed with the patient. All medications were reviewed with the patient; will d/c home with norco and motrin. All of pt's questions and concerns were addressed. Patient was instructed and agrees to follow up with pcp or ER, as well as to return to the ED upon further deterioration. Patient is ready to go home. Follow-up Information Follow up With Details Comments Contact Info [facility] EMERGENCY DEPT  If symptoms worsen [address] 
[phone] [facility] EMERGENCY DEPT In 10 days For suture removal [id number] 
[phone] Current Discharge Medication List  
  
START taking these medications Details HYDROcodone-acetaminophen (NORCO) 5-325 mg per tablet Take 1 Tab by mouth every six (6) hours as needed for Pain. Max Daily Amount: 4 Tabs. Qty: 12 Tab, Refills: 0 Associated Diagnoses: Laceration of left ear, initial encounter  
  
ibuprofen (MOTRIN) 800 mg tablet Take 1 Tab by mouth every eight (8) hours as needed for Pain for up to 7 days. Qty: 20 Tab, Refills: 0 CONTINUE these medications which have NOT CHANGED Details  
furosemide (LASIX) 40 mg tablet Take 40 mg by mouth daily. allopurinol (ZYLOPRIM) 300 mg tablet Take 300 mg by mouth daily. metoprolol tartrate (LOPRESSOR) 100 mg IR tablet Take 100 mg by mouth two (2) times a day. cyclobenzaprine (FLEXERIL) 10 mg tablet Take 0.5 Tabs by mouth three (3) times daily (with meals). Qty: 15 Tab, Refills: 0  
  
acetaminophen (TYLENOL) 325 mg tablet Take 2 Tabs by mouth every six (6) hours as needed for Pain. Qty: 20 Tab, Refills: 0  
  
zinc oxide 20 % ointment Apply  to affected area as needed for Skin Irritation. APPLY TO groin Nursing, document site in comments Qty: 30 g, Refills: 0  
  
cholecalciferol (VITAMIN D3) 1,000 unit tablet 2,000 Units. simvastatin (ZOCOR) 40 mg tablet 40 mg.  
  
lisinopril (PRINIVIL, ZESTRIL) 40 mg tablet Take 40 mg by mouth daily. Refills: 0  
  
pantoprazole (PROTONIX) 40 mg tablet Refills: 0  
  
tamsulosin (FLOMAX) 0.4 mg capsule take 1 capsule by mouth once daily AFTER DINNER Qty: 90 Cap, Refills: 3 Provider Notes (Medical Decision Making):  
 
Procedures: 
Wound Repair 
Date/Time: 9/10/2018 9:59 PM 
Performed by: PAPreparation: skin prepped with Shur-Clens Pre-procedure re-eval: Immediately prior to the procedure, the patient was reevaluated and found suitable for the planned procedure and any planned medications.  
Time out: Immediately prior to the procedure a time out was called to verify the correct patient, procedure, equipment, staff and marking as appropriate. Sandy Phillips Location details: left ear Wound length:2.6 - 7.5 cm (3 cm) Anesthesia: local infiltration Anesthesia: 
Local Anesthetic: lidocaine 1% with epinephrine Anesthetic total: 6 mL Foreign bodies: no foreign bodies Debridement: none Skin closure: 5-0 nylon Number of sutures: 8 Technique: simple Approximation: close Dressing: antibiotic ointment, gauze roll and pressure dressing Patient tolerance: Patient tolerated the procedure well with no immediate complications My total time at bedside, performing this procedure was 16-30 minutes (30). Diagnosis Clinical Impression: 1. Fall, initial encounter 2. Laceration of left ear, initial encounter

## 2018-09-23 ENCOUNTER — HOSPITAL ENCOUNTER (EMERGENCY)
Age: 83
Discharge: HOME | End: 2018-09-23
Attending: EMERGENCY MEDICINE
Payer: MEDICARE

## 2018-09-23 VITALS
SYSTOLIC BLOOD PRESSURE: 123 MMHG | DIASTOLIC BLOOD PRESSURE: 68 MMHG | HEART RATE: 59 BPM | RESPIRATION RATE: 16 BRPM | TEMPERATURE: 97.8 F | OXYGEN SATURATION: 99 %

## 2018-09-23 DIAGNOSIS — Z48.02 ENCOUNTER FOR REMOVAL OF SUTURES: Primary | ICD-10-CM

## 2018-09-23 PROCEDURE — 75810000275 HC EMERGENCY DEPT VISIT NO LEVEL OF CARE

## 2018-09-23 NOTE — DISCHARGE INSTRUCTIONS
Learning About Stitches and Staples Removal  When are stitches and staples removed? Your doctor will tell you when to have your stitches or staples removed, usually in 7 to 14 days. How long you'll be told to wait will depend on things like where the wound is located, how big and how deep the wound is, and what your general health is like. Do not remove the stitches on your own. Stitches on the face are usually removed within a week. But stitches and staples on other areas of the body, such as on the back or belly or over a joint, may need to stay in place longer, often a week or two. Be sure to follow your doctor's instructions. How are stitches and staples removed? It usually doesn't hurt when the doctor removes the stitches or staples. You may feel a tug as each stitch or staple is removed. · You will either be seated or lying down. · To remove stitches, the doctor will use scissors to cut each of the knots and then pull the threads out. · To remove staples, the doctor will use a tool to take out the staples one at a time. · The area may still feel tender after the stitches or staples are gone. But it should feel better within a few minutes or up to a few hours. What can you expect after stitches and staples are removed? Depending on the type and location of the cut, you will have a scar. Scars usually fade over time. Keep the area clean, but you won't need a bandage. When should you call for help? Call your doctor now or seek immediate medical care if :  · You have new pain, or your pain gets worse. · You have trouble moving the area near the scar. · You have symptoms of infection, such as:  ¨ Increased pain, swelling, warmth, or redness around the scar. ¨ Red streaks leading from the scar. ¨ Pus draining from the scar. ¨ A fever. Watch closely for changes in your health, and be sure to contact your doctor if:  · The scar opens. · You do not get better as expected.   Follow-up care is a key part of your treatment and safety. Be sure to make and go to all appointments, and call your doctor if you do not get better as expected. It's also a good idea to keep a list of the medicines you take. Where can you learn more? Go to http://demetrius-bernard.info/. Enter D253 in the search box to learn more about \"Learning About Stitches and Staples Removal.\"  Current as of: November 20, 2017  Content Version: 11.7  © 7413-4398 [employer]. Care instructions adapted under license by [employer] (which disclaims liability or warranty for this information). If you have questions about a medical condition or this instruction, always ask your healthcare professional. [address] any warranty or liability for your use of this information.

## 2018-09-23 NOTE — ED PROVIDER NOTES
EMERGENCY DEPARTMENT HISTORY AND PHYSICAL EXAM 
 
5:26 PM 
 
 
Date: 9/23/2018 Patient Name: Inga Maldonado History of Presenting Illness Chief Complaint Patient presents with  Suture Removal  
 
 
 
History Provided By: Patient Chief Complaint: Suture removal 
Duration:  1 week ago Timing:  Improved Location: Left ear Quality: 8 sutures Severity: N/A Modifying Factors: N/A Associated Symptoms: Denies any drainage, fever, chills, and diarrhea. denies any other associated signs or symptoms Additional History (Context):Alexander hPillips is a 80 y.o. male who presents to the emergency department with a suture removal. The patient had 8 sutures placed on his left ear after having a fall last week and hitting his head on the bed. The patient's laceration is much improved. Denies any other associated signs or symptoms. Denies any drainage, fever, chills, and diarrhea. Patient has no other complaints at this time. PCP:  Hardik Castañeda, DO 
 
 
 
Current Outpatient Prescriptions Medication Sig Dispense Refill  
 HYDROcodone-acetaminophen (NORCO) 5-325 mg per tablet Take 1 Tab by mouth every six (6) hours as needed for Pain. Max Daily Amount: 4 Tabs. 12 Tab 0  
 furosemide (LASIX) 40 mg tablet Take 40 mg by mouth daily.  allopurinol (ZYLOPRIM) 300 mg tablet Take 300 mg by mouth daily.  metoprolol tartrate (LOPRESSOR) 100 mg IR tablet Take 100 mg by mouth two (2) times a day.  cyclobenzaprine (FLEXERIL) 10 mg tablet Take 0.5 Tabs by mouth three (3) times daily (with meals). 15 Tab 0  
 acetaminophen (TYLENOL) 325 mg tablet Take 2 Tabs by mouth every six (6) hours as needed for Pain. 20 Tab 0  
 zinc oxide 20 % ointment Apply  to affected area as needed for Skin Irritation. APPLY TO groin Nursing, document site in comments 30 g 0  cholecalciferol (VITAMIN D3) 1,000 unit tablet 2,000 Units.  simvastatin (ZOCOR) 40 mg tablet 40 mg.  lisinopril (PRINIVIL, ZESTRIL) 40 mg tablet Take 40 mg by mouth daily. 0  
 pantoprazole (PROTONIX) 40 mg tablet   0  
 tamsulosin (FLOMAX) 0.4 mg capsule take 1 capsule by mouth once daily AFTER DINNER 90 Cap 3 Past History Past Medical History: 
Past Medical History:  
Diagnosis Date  Difficulty urinating  Elevated PSA  Hematuria, unspecified  Hypercholesterolemia  Hypertension  Incomplete bladder emptying  Urinary retention  UTI (urinary tract infection) Past Surgical History: 
Past Surgical History:  
Procedure Laterality Date  APPENDECTOMY  EGD  2/7/2014  HX TURP  6/13/16  HX TURP Family History: 
Family History Problem Relation Age of Onset  Cancer Father  Alzheimer Mother  Heart defect Brother Social History: 
Social History Substance Use Topics  Smoking status: Never Smoker  Smokeless tobacco: Never Used  Alcohol use 1.2 oz/week 2 Cans of beer per week Comment: Occasional  
 
 
Allergies: Allergies Allergen Reactions  Amlodipine Swelling  Bactrim [Sulfamethoprim Ds] Rash  Ciprofloxacin Rash  Lisinopril Other (comments) Acute renal failure Review of Systems Review of Systems Constitutional: Negative for chills and fever. HENT: Negative for congestion, rhinorrhea and sore throat. Eyes: Negative for visual disturbance. Respiratory: Negative for cough and shortness of breath. Cardiovascular: Negative for chest pain and palpitations. Gastrointestinal: Negative for abdominal pain, nausea and vomiting. Musculoskeletal: Negative for back pain and myalgias. Skin: Negative. Allergic/Immunologic: Negative. Neurological: Negative for headaches. All other systems reviewed and are negative. Physical Exam  
 
Visit Vitals  /68 (BP 1 Location: Right arm, BP Patient Position: At rest)  Pulse (!) 59  Temp 97.8 °F (36.6 °C)  Resp 16  SpO2 99% Physical Exam  
Constitutional: He is oriented to person, place, and time. He appears well-developed and well-nourished. No distress. HENT:  
Head: Normocephalic and atraumatic.  
8 sutures in place to healed left auricle laceration without erythema, edema, or exudate. Eyes: Conjunctivae are normal.  
Neck: Normal range of motion. Neck supple. Cardiovascular: Normal rate, regular rhythm and normal heart sounds. Pulmonary/Chest: Effort normal and breath sounds normal. No respiratory distress. He has no wheezes. He has no rales. He exhibits no tenderness. Musculoskeletal: Normal range of motion. He exhibits no edema or deformity. Neurological: He is alert and oriented to person, place, and time. Skin: Skin is warm and dry. He is not diaphoretic. Psychiatric: He has a normal mood and affect. Nursing note and vitals reviewed. Diagnostic Study Results Labs - No results found for this or any previous visit (from the past 12 hour(s)). Radiologic Studies - No results found. Medical Decision Making I am the first provider for this patient. I reviewed the vital signs, available nursing notes, past medical history, past surgical history, family history and social history. Vital Signs-Reviewed the patient's vital signs. Pulse Oximetry Analysis -  99% on room air (Interpretation) wnl Records Reviewed: Nursing Notes and Old Medical Records (Time of Review: 5:26 PM) 
 
ED Course: Progress Notes, Reevaluation, and Consults: 
 
 
Provider Notes (Medical Decision Making): Encounter for suture removal without complaints. No s/sx of infection. Sutures removed without difficulty. Will DC home with PCP follow-up as needed. At this time, patient is stable and appropriate for discharge home. Patient demonstrates understanding of current diagnoses and is in agreement with the treatment plan.   They are advised that while the likelihood of serious underlying condition is low at this point given the evaluation performed today, we cannot fully rule it out. They are advised to immediately return with any new symptoms or worsening of current condition. All questions have been answered. Patient is given educational material regarding their diagnoses, including danger symptoms and when to return to the ED. Suture/Staple Removal 
Date/Time: 9/23/2018 5:32 PM 
Performed by: Yovanny Casanova Authorized by: Soumya Hawk Consent:  
  Consent obtained:  Verbal 
  Consent given by:  Patient Alternatives discussed:  Delayed treatment Location: Location:  Head/neck Head/neck location:  Ear Ear location:  L ear Procedure details:  
  Wound appearance:  No signs of infection and good wound healing Number of sutures removed:  8 Post-procedure details:  
  Patient tolerance of procedure: Tolerated well, no immediate complications Diagnosis Clinical Impression: 1. Encounter for removal of sutures Disposition: home Follow-up Information Follow up With Details Comments Contact Info Brittany Blevins, DO Call in 2 days As needed [phone] [facility] EMERGENCY DEPT Go to As needed, If symptoms worsen [address] 
[phone] Discharge Medication List as of 9/23/2018  5:32 PM  
  
CONTINUE these medications which have NOT CHANGED Details HYDROcodone-acetaminophen (NORCO) 5-325 mg per tablet Take 1 Tab by mouth every six (6) hours as needed for Pain. Max Daily Amount: 4 Tabs., Print, Disp-12 Tab, R-0  
  
furosemide (LASIX) 40 mg tablet Take 40 mg by mouth daily. , Historical Med  
  
allopurinol (ZYLOPRIM) 300 mg tablet Take 300 mg by mouth daily. , Historical Med  
  
metoprolol tartrate (LOPRESSOR) 100 mg IR tablet Take 100 mg by mouth two (2) times a day., Historical Med  
  
 cyclobenzaprine (FLEXERIL) 10 mg tablet Take 0.5 Tabs by mouth three (3) times daily (with meals). , Print, Disp-15 Tab, R-0  
  
acetaminophen (TYLENOL) 325 mg tablet Take 2 Tabs by mouth every six (6) hours as needed for Pain., Print, Disp-20 Tab, R-0  
  
zinc oxide 20 % ointment Apply  to affected area as needed for Skin Irritation. APPLY TO groin Nursing, document site in comments, Print, Disp-30 g, R-0  
  
cholecalciferol (VITAMIN D3) 1,000 unit tablet 2,000 Units. , Historical Med  
  
simvastatin (ZOCOR) 40 mg tablet 40 mg., Historical Med  
  
lisinopril (PRINIVIL, ZESTRIL) 40 mg tablet Take 40 mg by mouth daily. , Historical Med, R-0  
  
pantoprazole (PROTONIX) 40 mg tablet Historical Med, R-0  
  
tamsulosin (FLOMAX) 0.4 mg capsule take 1 capsule by mouth once daily AFTER DINNER, Normal, Disp-90 Cap, R-3  
  
  
 
_______________________________ Scribe Attestation Darling Mast acting as a scribe for and in the presence of Lionel Charles September 23, 2018 at 6:29 PM 
    
Provider Attestation:     
I personally performed the services described in the documentation, reviewed the documentation, as recorded by the scribe in my presence, and it accurately and completely records my words and actions.  September 23, 2018 at 6:29 PM - Jimmy Brown PA-C

## 2019-12-16 ENCOUNTER — APPOINTMENT (OUTPATIENT)
Dept: GENERAL RADIOLOGY | Age: 84
End: 2019-12-16
Attending: PHYSICIAN ASSISTANT
Payer: MEDICARE

## 2019-12-16 ENCOUNTER — HOSPITAL ENCOUNTER (EMERGENCY)
Age: 84
Discharge: HOME | End: 2019-12-17
Attending: EMERGENCY MEDICINE | Admitting: EMERGENCY MEDICINE
Payer: MEDICARE

## 2019-12-16 ENCOUNTER — APPOINTMENT (OUTPATIENT)
Dept: CT IMAGING | Age: 84
End: 2019-12-16
Attending: EMERGENCY MEDICINE
Payer: MEDICARE

## 2019-12-16 VITALS
DIASTOLIC BLOOD PRESSURE: 70 MMHG | RESPIRATION RATE: 16 BRPM | BODY MASS INDEX: 22.73 KG/M2 | HEART RATE: 72 BPM | HEIGHT: 68 IN | WEIGHT: 150 LBS | SYSTOLIC BLOOD PRESSURE: 114 MMHG | TEMPERATURE: 97.3 F | OXYGEN SATURATION: 97 %

## 2019-12-16 DIAGNOSIS — K80.20 GALLSTONES: ICD-10-CM

## 2019-12-16 DIAGNOSIS — E86.0 DEHYDRATION: ICD-10-CM

## 2019-12-16 DIAGNOSIS — R79.89 ABNORMAL LIVER FUNCTION TEST: ICD-10-CM

## 2019-12-16 DIAGNOSIS — K29.00 ACUTE GASTRITIS WITHOUT HEMORRHAGE, UNSPECIFIED GASTRITIS TYPE: Primary | ICD-10-CM

## 2019-12-16 DIAGNOSIS — R63.0 DECREASED APPETITE: ICD-10-CM

## 2019-12-16 LAB
ALBUMIN SERPL-MCNC: 3.5 G/DL (ref 3.4–5)
ALBUMIN/GLOB SERPL: 1 {RATIO} (ref 0.8–1.7)
ALP SERPL-CCNC: 292 U/L (ref 45–117)
ALT SERPL-CCNC: 332 U/L (ref 16–61)
ANION GAP SERPL CALC-SCNC: 4 MMOL/L (ref 3–18)
AST SERPL-CCNC: 386 U/L (ref 10–38)
BASOPHILS # BLD: 0 K/UL (ref 0–0.1)
BASOPHILS NFR BLD: 0 % (ref 0–2)
BILIRUB SERPL-MCNC: 0.4 MG/DL (ref 0.2–1)
BUN SERPL-MCNC: 39 MG/DL (ref 7–18)
BUN/CREAT SERPL: 25 (ref 12–20)
CALCIUM SERPL-MCNC: 9.7 MG/DL (ref 8.5–10.1)
CHLORIDE SERPL-SCNC: 106 MMOL/L (ref 100–111)
CO2 SERPL-SCNC: 33 MMOL/L (ref 21–32)
CREAT SERPL-MCNC: 1.58 MG/DL (ref 0.6–1.3)
DIFFERENTIAL METHOD BLD: ABNORMAL
EOSINOPHIL # BLD: 0 K/UL (ref 0–0.4)
EOSINOPHIL NFR BLD: 0 % (ref 0–5)
ERYTHROCYTE [DISTWIDTH] IN BLOOD BY AUTOMATED COUNT: 15.1 % (ref 11.6–14.5)
GLOBULIN SER CALC-MCNC: 3.6 G/DL (ref 2–4)
GLUCOSE SERPL-MCNC: 81 MG/DL (ref 74–99)
HCT VFR BLD AUTO: 46.4 % (ref 36–48)
HGB BLD-MCNC: 15.7 G/DL (ref 13–16)
LYMPHOCYTES # BLD: 0.5 K/UL (ref 0.9–3.6)
LYMPHOCYTES NFR BLD: 12 % (ref 21–52)
MAGNESIUM SERPL-MCNC: 2.2 MG/DL (ref 1.6–2.6)
MCH RBC QN AUTO: 32.8 PG (ref 24–34)
MCHC RBC AUTO-ENTMCNC: 33.8 G/DL (ref 31–37)
MCV RBC AUTO: 96.9 FL (ref 74–97)
MONOCYTES # BLD: 0.2 K/UL (ref 0.05–1.2)
MONOCYTES NFR BLD: 4 % (ref 3–10)
NEUTS SEG # BLD: 3.5 K/UL (ref 1.8–8)
NEUTS SEG NFR BLD: 84 % (ref 40–73)
PLATELET # BLD AUTO: 125 K/UL (ref 135–420)
PMV BLD AUTO: 11.9 FL (ref 9.2–11.8)
POTASSIUM SERPL-SCNC: 4.4 MMOL/L (ref 3.5–5.5)
PROT SERPL-MCNC: 7.1 G/DL (ref 6.4–8.2)
RBC # BLD AUTO: 4.79 M/UL (ref 4.7–5.5)
SODIUM SERPL-SCNC: 143 MMOL/L (ref 136–145)
TROPONIN I SERPL-MCNC: 0.03 NG/ML (ref 0–0.04)
WBC # BLD AUTO: 4.3 K/UL (ref 4.6–13.2)

## 2019-12-16 PROCEDURE — 93005 ELECTROCARDIOGRAM TRACING: CPT

## 2019-12-16 PROCEDURE — 74011636320 HC RX REV CODE- 636/320: Performed by: EMERGENCY MEDICINE

## 2019-12-16 PROCEDURE — 74177 CT ABD & PELVIS W/CONTRAST: CPT

## 2019-12-16 PROCEDURE — 96361 HYDRATE IV INFUSION ADD-ON: CPT

## 2019-12-16 PROCEDURE — 85025 COMPLETE CBC W/AUTO DIFF WBC: CPT

## 2019-12-16 PROCEDURE — 71045 X-RAY EXAM CHEST 1 VIEW: CPT

## 2019-12-16 PROCEDURE — 80053 COMPREHEN METABOLIC PANEL: CPT

## 2019-12-16 PROCEDURE — 83735 ASSAY OF MAGNESIUM: CPT

## 2019-12-16 PROCEDURE — 74011250636 HC RX REV CODE- 250/636: Performed by: EMERGENCY MEDICINE

## 2019-12-16 PROCEDURE — C9113 INJ PANTOPRAZOLE SODIUM, VIA: HCPCS | Performed by: EMERGENCY MEDICINE

## 2019-12-16 PROCEDURE — 84484 ASSAY OF TROPONIN QUANT: CPT

## 2019-12-16 PROCEDURE — 99282 EMERGENCY DEPT VISIT SF MDM: CPT

## 2019-12-16 PROCEDURE — 74011250637 HC RX REV CODE- 250/637: Performed by: EMERGENCY MEDICINE

## 2019-12-16 PROCEDURE — 96374 THER/PROPH/DIAG INJ IV PUSH: CPT

## 2019-12-16 RX ORDER — OMEPRAZOLE 20 MG/1
20 CAPSULE, DELAYED RELEASE ORAL DAILY
Qty: 30 CAP | Refills: 3 | Status: SHIPPED | OUTPATIENT
Start: 2019-12-16 | End: 2020-05-11

## 2019-12-16 RX ORDER — ACETAMINOPHEN 500 MG
1000 TABLET ORAL
Qty: 50 TAB | Refills: 0 | Status: SHIPPED | OUTPATIENT
Start: 2019-12-16

## 2019-12-16 RX ORDER — SUCRALFATE 1 G/1
1 TABLET ORAL 4 TIMES DAILY
Qty: 20 TAB | Refills: 0 | Status: SHIPPED | OUTPATIENT
Start: 2019-12-16 | End: 2020-05-11

## 2019-12-16 RX ORDER — PANTOPRAZOLE SODIUM 40 MG/10ML
40 INJECTION, POWDER, LYOPHILIZED, FOR SOLUTION INTRAVENOUS
Status: COMPLETED | OUTPATIENT
Start: 2019-12-16 | End: 2019-12-16

## 2019-12-16 RX ORDER — SUCRALFATE 1 G/1
1 TABLET ORAL
Status: COMPLETED | OUTPATIENT
Start: 2019-12-16 | End: 2019-12-16

## 2019-12-16 RX ADMIN — SODIUM CHLORIDE 500 ML: 900 INJECTION, SOLUTION INTRAVENOUS at 18:30

## 2019-12-16 RX ADMIN — SODIUM CHLORIDE 500 ML: 900 INJECTION, SOLUTION INTRAVENOUS at 21:57

## 2019-12-16 RX ADMIN — SUCRALFATE 1 G: 1 TABLET ORAL at 21:56

## 2019-12-16 RX ADMIN — IOPAMIDOL 75 ML: 612 INJECTION, SOLUTION INTRAVENOUS at 20:14

## 2019-12-16 RX ADMIN — PANTOPRAZOLE SODIUM 40 MG: 40 INJECTION, POWDER, FOR SOLUTION INTRAVENOUS at 21:56

## 2019-12-16 NOTE — ED TRIAGE NOTES
Family states the patient's appetite has been decreased for 2 weeks, patient has generalized weakness, patient states his stomach feels full

## 2019-12-17 LAB
ATRIAL RATE: 75 BPM
CALCULATED P AXIS, ECG09: 85 DEGREES
CALCULATED R AXIS, ECG10: 36 DEGREES
CALCULATED T AXIS, ECG11: 161 DEGREES
DIAGNOSIS, 93000: NORMAL
P-R INTERVAL, ECG05: 282 MS
Q-T INTERVAL, ECG07: 444 MS
QRS DURATION, ECG06: 170 MS
QTC CALCULATION (BEZET), ECG08: 495 MS
VENTRICULAR RATE, ECG03: 75 BPM

## 2019-12-17 NOTE — ED PROVIDER NOTES
Khalida Portillo is a 80 y.o. male history of prostate cancer with complaints of decreased appetite for the last couple of weeks with early satiety when trying to eat. Patient able to tolerate fluids okay. Patient has any nausea or vomiting, diarrhea, blood in stool, urinary issues. He said no fever, sweats, chills. There is no cough. Symptoms are worse with eating. He has never had any prior issues with his abdomen before to include gastritis, peptic ulcer disease biliary disease. Is also felt more lightheaded and more fatigued walking around as well. The history is provided by the patient and a relative. Past Medical History:   Diagnosis Date    Difficulty urinating     Elevated PSA     Hematuria, unspecified     Hypercholesterolemia     Hypertension     Incomplete bladder emptying     Urinary retention     UTI (urinary tract infection)        Past Surgical History:   Procedure Laterality Date    APPENDECTOMY      EGD  2/7/2014         HX TURP  6/13/16    HX TURP           Family History:   Problem Relation Age of Onset    Cancer Father     Alzheimer Mother     Heart defect Brother        Social History     Socioeconomic History    Marital status:      Spouse name: Not on file    Number of children: Not on file    Years of education: Not on file    Highest education level: Not on file   Occupational History    Occupation:  thirty years   Social Needs    Financial resource strain: Not on file    Food insecurity:     Worry: Not on file     Inability: Not on file   [employer] needs:     Medical: Not on file     Non-medical: Not on file   Tobacco Use    Smoking status: Never Smoker    Smokeless tobacco: Never Used   Substance and Sexual Activity    Alcohol use:  Yes     Alcohol/week: 2.0 standard drinks     Types: 2 Cans of beer per week     Comment: Occasional    Drug use: No    Sexual activity: Not on file   Lifestyle    Physical activity: Days per week: Not on file     Minutes per session: Not on file    Stress: Not on file   Relationships    Social connections:     Talks on phone: Not on file     Gets together: Not on file     Attends [employer]ism service: Not on file     Active member of club or organization: Not on file     Attends meetings of clubs or organizations: Not on file     Relationship status: Not on file    Intimate partner violence:     Fear of current or ex partner: Not on file     Emotionally abused: Not on file     Physically abused: Not on file     Forced sexual activity: Not on file   Other Topics Concern    Not on file   Social History Narrative    Not on file         ALLERGIES: Amlodipine; Bactrim [sulfamethoprim ds]; Lisinopril; and Ciprofloxacin    Review of Systems   Constitutional: Positive for appetite change and fatigue. Negative for fever. HENT: Negative for sore throat and trouble swallowing. Eyes: Negative for visual disturbance. Respiratory: Negative for shortness of breath. Cardiovascular: Negative for chest pain. Gastrointestinal: Negative for abdominal pain. Genitourinary: Negative for difficulty urinating. Musculoskeletal: Positive for gait problem. Skin: Negative for rash. Allergic/Immunologic: Negative for immunocompromised state. Neurological: Positive for light-headedness. Negative for syncope. Psychiatric/Behavioral: Positive for sleep disturbance. Vitals:    12/16/19 1650   BP: 114/70   Pulse: 72   Resp: 16   Temp: 97.3 °F (36.3 °C)   SpO2: 97%   Weight: 68 kg (150 lb)   Height: 5' 8\" (1.727 m)            Physical Exam  Vitals signs and nursing note reviewed. Constitutional:       General: He is not in acute distress. Appearance: Normal appearance. He is not ill-appearing, toxic-appearing or diaphoretic. HENT:      Head: Normocephalic and atraumatic.       Right Ear: External ear normal.      Left Ear: External ear normal.      Nose: Nose normal.      Mouth/Throat: Mouth: Mucous membranes are dry. Pharynx: No oropharyngeal exudate. Eyes:      Conjunctiva/sclera: Conjunctivae normal.   Neck:      Musculoskeletal: Normal range of motion. Cardiovascular:      Rate and Rhythm: Normal rate and regular rhythm. Heart sounds: Normal heart sounds. Pulmonary:      Effort: Pulmonary effort is normal. No respiratory distress. Breath sounds: Normal breath sounds. Abdominal:      Palpations: Abdomen is soft. Tenderness: There is no tenderness. Musculoskeletal: Normal range of motion. Skin:     General: Skin is warm and dry. Capillary Refill: Capillary refill takes less than 2 seconds. Neurological:      General: No focal deficit present. Mental Status: He is alert and oriented to person, place, and time.    Psychiatric:         Behavior: Behavior normal.          MDM       Procedures  Vitals:  Patient Vitals for the past 12 hrs:   Temp Pulse Resp BP SpO2   12/16/19 1650 97.3 °F (36.3 °C) 72 16 114/70 97 %         Medications ordered:   Medications   sodium chloride 0.9 % bolus infusion 500 mL (has no administration in time range)   pantoprazole (PROTONIX) injection 40 mg (has no administration in time range)   sucralfate (CARAFATE) tablet 1 g (has no administration in time range)   sodium chloride 0.9 % bolus infusion 500 mL (0 mL IntraVENous IV Completed 12/16/19 1959)   iopamidol (ISOVUE 300) 61 % contrast injection 100 mL (75 mL IntraVENous Given 12/16/19 2014)         Lab findings:  Recent Results (from the past 12 hour(s))   CBC WITH AUTOMATED DIFF    Collection Time: 12/16/19  5:06 PM   Result Value Ref Range    WBC 4.3 (L) 4.6 - 13.2 K/uL    RBC 4.79 4.70 - 5.50 M/uL    HGB 15.7 13.0 - 16.0 g/dL    HCT 46.4 36.0 - 48.0 %    MCV 96.9 74.0 - 97.0 FL    MCH 32.8 24.0 - 34.0 PG    MCHC 33.8 31.0 - 37.0 g/dL    RDW 15.1 (H) 11.6 - 14.5 %    PLATELET 244 (L) 282 - 420 K/uL    MPV 11.9 (H) 9.2 - 11.8 FL    NEUTROPHILS 84 (H) 40 - 73 % LYMPHOCYTES 12 (L) 21 - 52 %    MONOCYTES 4 3 - 10 %    EOSINOPHILS 0 0 - 5 %    BASOPHILS 0 0 - 2 %    ABS. NEUTROPHILS 3.5 1.8 - 8.0 K/UL    ABS. LYMPHOCYTES 0.5 (L) 0.9 - 3.6 K/UL    ABS. MONOCYTES 0.2 0.05 - 1.2 K/UL    ABS. EOSINOPHILS 0.0 0.0 - 0.4 K/UL    ABS. BASOPHILS 0.0 0.0 - 0.1 K/UL    DF AUTOMATED     METABOLIC PANEL, COMPREHENSIVE    Collection Time: 12/16/19  5:06 PM   Result Value Ref Range    Sodium 143 136 - 145 mmol/L    Potassium 4.4 3.5 - 5.5 mmol/L    Chloride 106 100 - 111 mmol/L    CO2 33 (H) 21 - 32 mmol/L    Anion gap 4 3.0 - 18 mmol/L    Glucose 81 74 - 99 mg/dL    BUN 39 (H) 7.0 - 18 MG/DL    Creatinine 1.58 (H) 0.6 - 1.3 MG/DL    BUN/Creatinine ratio 25 (H) 12 - 20      GFR est AA 51 (L) >60 ml/min/1.73m2    GFR est non-AA 42 (L) >60 ml/min/1.73m2    Calcium 9.7 8.5 - 10.1 MG/DL    Bilirubin, total 0.4 0.2 - 1.0 MG/DL    ALT (SGPT) 332 (H) 16 - 61 U/L    AST (SGOT) 386 (H) 10 - 38 U/L    Alk.  phosphatase 292 (H) 45 - 117 U/L    Protein, total 7.1 6.4 - 8.2 g/dL    Albumin 3.5 3.4 - 5.0 g/dL    Globulin 3.6 2.0 - 4.0 g/dL    A-G Ratio 1.0 0.8 - 1.7     MAGNESIUM    Collection Time: 12/16/19  5:06 PM   Result Value Ref Range    Magnesium 2.2 1.6 - 2.6 mg/dL   TROPONIN I    Collection Time: 12/16/19  5:06 PM   Result Value Ref Range    Troponin-I, QT 0.03 0.0 - 0.045 NG/ML   EKG, 12 LEAD, INITIAL    Collection Time: 12/16/19  5:09 PM   Result Value Ref Range    Ventricular Rate 75 BPM    Atrial Rate 75 BPM    P-R Interval 282 ms    QRS Duration 170 ms    Q-T Interval 444 ms    QTC Calculation (Bezet) 495 ms    Calculated P Axis 85 degrees    Calculated R Axis 36 degrees    Calculated T Axis 161 degrees    Diagnosis       Sinus rhythm with 1st degree AV block  Left bundle branch block  Abnormal ECG  When compared with ECG of 27-AUG-2018 08:46,  Previous ECG has undetermined rhythm, needs review  T wave inversion now evident in Inferior leads         EKG interpretation by ED Physician:  Sinus rhythm with normal rate and left bundle branch block. There is no acute ST changes. Rate 75  QTc 495  Not significantly different    X-Ray, CT or other radiology findings or impressions:  XR CHEST PORT    (Results Pending)   CT ABD PELV W CONT    (Results Pending)   Chest x-ray with no acute process per my interpretation  CT abdomen pelvis: Gastritis/duodenitis. Chronic findings include gallstones with enlarged prostate and fatty right inguinal hernia. Diverticulosis. Progress notes, Consult notes or additional Procedure notes:   Patient with no abdominal pain on exam here. Suspect his symptoms more related to the gastritis. Will place on PPI and Carafate and recommend GI follow-up    I have discussed with patient and/or family/sig other the results, interpretation of any imaging if performed, suspected diagnosis and treatment plan to include instructions regarding the diagnoses listed to which understanding was expressed with all questions answered      Reevaluation of patient:   stable    Disposition:  Diagnosis:   1. Acute gastritis without hemorrhage, unspecified gastritis type    2. Decreased appetite    3. Dehydration    4. Gallstones    5. Abnormal liver function test        Disposition: home      Follow-up Information     Follow up With Specialties Details Why Contact Info    Osmany Li DO Family Practice Schedule an appointment as soon as possible for a visit  [address] [address] [address] Viviana Krueger MD Gastroenterology Schedule an appointment as soon as possible for a visit  [address] [place] [address]       [facility] EMERGENCY DEPT Emergency Medicine  If symptoms worsen [address]  [phone]            Patient's Medications   Start Taking    ACETAMINOPHEN (TYLENOL EXTRA STRENGTH) 500 MG TABLET    Take 2 Tabs by mouth every six (6) hours as needed for Pain. OMEPRAZOLE (PRILOSEC) 20 MG CAPSULE    Take 1 Cap by mouth daily. SUCRALFATE (CARAFATE) 1 GRAM TABLET    Take 1 Tab by mouth four (4) times daily. Continue Taking    ALLOPURINOL (ZYLOPRIM) 300 MG TABLET    Take 300 mg by mouth daily. CHOLECALCIFEROL (VITAMIN D3) 1,000 UNIT TABLET    2,000 Units. FUROSEMIDE (LASIX) 40 MG TABLET    Take 40 mg by mouth daily. PANTOPRAZOLE (PROTONIX) 40 MG TABLET        SIMVASTATIN (ZOCOR) 40 MG TABLET    40 mg.    TAMSULOSIN (FLOMAX) 0.4 MG CAPSULE    take 1 capsule by mouth once daily AFTER DINNER   These Medications have changed    No medications on file   Stop Taking    ACETAMINOPHEN (TYLENOL) 325 MG TABLET    Take 2 Tabs by mouth every six (6) hours as needed for Pain.

## 2019-12-17 NOTE — ED NOTES
Assumed care of patient who is to ED with c/o fullness in stomach and decreased appetite x 2 weeks. MD aware. PIV established in triage. 500 ml normal saline bolus complete at this time. Pt made aware of urine sample that's needed per MD orders, states he is unable to void at this time. Will continue to monitor pt.

## 2019-12-17 NOTE — DISCHARGE INSTRUCTIONS
Patient Education        Dehydration: Care Instructions  Your Care Instructions  Dehydration happens when your body loses too much fluid. This might happen when you do not drink enough water or you lose large amounts of fluids from your body because of diarrhea, vomiting, or sweating. Severe dehydration can be life-threatening. Water and minerals called electrolytes help put your body fluids back in balance. Learn the early signs of fluid loss, and drink more fluids to prevent dehydration. Follow-up care is a key part of your treatment and safety. Be sure to make and go to all appointments, and call your doctor if you are having problems. It's also a good idea to know your test results and keep a list of the medicines you take. How can you care for yourself at home? · To prevent dehydration, drink plenty of fluids, enough so that your urine is light yellow or clear like water. Choose water and other caffeine-free clear liquids until you feel better. If you have kidney, heart, or liver disease and have to limit fluids, talk with your doctor before you increase the amount of fluids you drink. · If you do not feel like eating or drinking, try taking small sips of water, sports drinks, or other rehydration drinks. · Get plenty of rest.  To prevent dehydration  · Add more fluids to your diet and daily routine, unless your doctor has told you not to. · During hot weather, drink more fluids. Drink even more fluids if you exercise a lot. Stay away from drinks with alcohol or caffeine. · Watch for the symptoms of dehydration. These include:  ? A dry, sticky mouth. ? Dark yellow urine, and not much of it. ? Dry and sunken eyes. ? Feeling very tired. · Learn what problems can lead to dehydration. These include:  ? Diarrhea, fever, and vomiting. ? Any illness with a fever, such as pneumonia or the flu. ?  Activities that cause heavy sweating, such as endurance races and heavy outdoor work in hot or humid weather. ? Alcohol or drug use or problems caused by quitting their use (withdrawal). ? Certain medicines, such as cold and allergy pills (antihistamines), diet pills (diuretics), and laxatives. ? Certain diseases, such as diabetes, cancer, and heart or kidney disease. When should you call for help? Call 911 anytime you think you may need emergency care. For example, call if:    · You passed out (lost consciousness).    Call your doctor now or seek immediate medical care if:    · You are confused and cannot think clearly.     · You are dizzy or lightheaded, or you feel like you may faint.     · You have signs of needing more fluids. You have sunken eyes and a dry mouth, and you pass only a little dark urine.     · You cannot keep fluids down.    Watch closely for changes in your health, and be sure to contact your doctor if:    · You are not making tears.     · Your skin is very dry and sags slowly back into place after you pinch it.     · Your mouth and eyes are very dry. Where can you learn more? Go to http://demetrius-bernard.info/. Enter G305 in the search box to learn more about \"Dehydration: Care Instructions. \"  Current as of: June 26, 2019  Content Version: 12.2  © 2146-1190 [employer]. Care instructions adapted under license by [employer] (which disclaims liability or warranty for this information). If you have questions about a medical condition or this instruction, always ask your healthcare professional. [place] [address] any warranty or liability for your use of this information. Patient Education        Gastritis: Care Instructions  Your Care Instructions    Gastritis is a sore and upset stomach. It happens when something irritates the stomach lining. Many things can cause it. These include an infection such as the flu or something you ate or drank. Medicines or a sore on the lining of the stomach (ulcer) also can cause it.  Your belly may bloat and ache. You may belch, vomit, and feel sick to your stomach. You should be able to relieve the problem by taking medicine. And it may help to change your diet. If gastritis lasts, your doctor may prescribe medicine. Follow-up care is a key part of your treatment and safety. Be sure to make and go to all appointments, and call your doctor if you are having problems. It's also a good idea to know your test results and keep a list of the medicines you take. How can you care for yourself at home? · If your doctor prescribed antibiotics, take them as directed. Do not stop taking them just because you feel better. You need to take the full course of antibiotics. · Be safe with medicines. If your doctor prescribed medicine to decrease stomach acid, take it as directed. Call your doctor if you think you are having a problem with your medicine. · Do not take any other medicine, including over-the-counter pain relievers, without talking to your doctor first.  · If your doctor recommends over-the-counter medicine to reduce stomach acid, such as Pepcid AC, Prilosec, Tagamet HB, or Zantac 75, follow the directions on the label. · Drink plenty of fluids (enough so that your urine is light yellow or clear like water) to prevent dehydration. Choose water and other caffeine-free clear liquids. If you have kidney, heart, or liver disease and have to limit fluids, talk with your doctor before you increase the amount of fluids you drink. · Limit how much alcohol you drink. · Avoid coffee, tea, cola drinks, chocolate, and other foods with caffeine. They increase stomach acid. When should you call for help? Call 911 anytime you think you may need emergency care.  For example, call if:    · You vomit blood or what looks like coffee grounds.     · You pass maroon or very bloody stools.    Call your doctor now or seek immediate medical care if:    · You start breathing fast and have not produced urine in the last 8 hours.     · You cannot keep fluids down.    Watch closely for changes in your health, and be sure to contact your doctor if:    · You do not get better as expected. Where can you learn more? Go to http://demetrius-bernard.info/. Enter [id number] in the search box to learn more about \"Gastritis: Care Instructions. \"  Current as of: November 7, 2018  Content Version: 12.2  © 2535-9021 [employer]. Care instructions adapted under license by [employer] (which disclaims liability or warranty for this information). If you have questions about a medical condition or this instruction, always ask your healthcare professional. [place] [address] any warranty or liability for your use of this information.

## 2019-12-17 NOTE — ED NOTES
D/C instructions provided to family.  Patient escorted out to family's car via W/C and assisted in car

## 2019-12-23 ENCOUNTER — APPOINTMENT (OUTPATIENT)
Dept: GENERAL RADIOLOGY | Age: 84
DRG: 392 | End: 2019-12-23
Attending: EMERGENCY MEDICINE
Payer: MEDICARE

## 2019-12-23 ENCOUNTER — HOSPITAL ENCOUNTER (INPATIENT)
Age: 84
LOS: 9 days | Discharge: NURSING, REHAB OR LONG-TERM CARE | DRG: 392 | End: 2020-01-02
Attending: EMERGENCY MEDICINE | Admitting: HOSPITALIST
Payer: MEDICARE

## 2019-12-23 ENCOUNTER — APPOINTMENT (OUTPATIENT)
Dept: GENERAL RADIOLOGY | Age: 84
DRG: 392 | End: 2019-12-23
Attending: HOSPITALIST
Payer: MEDICARE

## 2019-12-23 ENCOUNTER — APPOINTMENT (OUTPATIENT)
Dept: ULTRASOUND IMAGING | Age: 84
DRG: 392 | End: 2019-12-23
Attending: EMERGENCY MEDICINE
Payer: MEDICARE

## 2019-12-23 DIAGNOSIS — D49.9 NEOPLASTIC DISEASE: ICD-10-CM

## 2019-12-23 DIAGNOSIS — K83.09 CHOLANGITIS: ICD-10-CM

## 2019-12-23 DIAGNOSIS — A41.9 SEPTIC SHOCK (HCC): Primary | ICD-10-CM

## 2019-12-23 DIAGNOSIS — R65.21 SEPTIC SHOCK (HCC): Primary | ICD-10-CM

## 2019-12-23 PROBLEM — R10.9 ABDOMINAL PAIN: Status: ACTIVE | Noted: 2019-12-23

## 2019-12-23 PROBLEM — N18.30 CHRONIC RENAL FAILURE, STAGE 3 (MODERATE) (HCC): Status: ACTIVE | Noted: 2019-12-23

## 2019-12-23 PROBLEM — R74.01 TRANSAMINITIS: Status: ACTIVE | Noted: 2019-12-23

## 2019-12-23 PROBLEM — T68.XXXA HYPOTHERMIA: Status: ACTIVE | Noted: 2019-12-23

## 2019-12-23 PROBLEM — K29.70 GASTRITIS: Status: ACTIVE | Noted: 2019-12-23

## 2019-12-23 PROBLEM — R65.10 SIRS (SYSTEMIC INFLAMMATORY RESPONSE SYNDROME) (HCC): Status: ACTIVE | Noted: 2019-12-23

## 2019-12-23 LAB
ALBUMIN SERPL-MCNC: 3.1 G/DL (ref 3.4–5)
ALBUMIN/GLOB SERPL: 1 {RATIO} (ref 0.8–1.7)
ALP SERPL-CCNC: 218 U/L (ref 45–117)
ALT SERPL-CCNC: 149 U/L (ref 16–61)
ANION GAP SERPL CALC-SCNC: 7 MMOL/L (ref 3–18)
APPEARANCE UR: CLEAR
AST SERPL-CCNC: 121 U/L (ref 10–38)
ATRIAL RATE: 62 BPM
BACTERIA URNS QL MICRO: NEGATIVE /HPF
BASOPHILS # BLD: 0 K/UL (ref 0–0.1)
BASOPHILS NFR BLD: 0 % (ref 0–2)
BILIRUB SERPL-MCNC: 0.5 MG/DL (ref 0.2–1)
BILIRUB UR QL: NEGATIVE
BUN SERPL-MCNC: 45 MG/DL (ref 7–18)
BUN/CREAT SERPL: 27 (ref 12–20)
CA-I SERPL-SCNC: 1.2 MMOL/L (ref 1.12–1.32)
CALCIUM SERPL-MCNC: 9.5 MG/DL (ref 8.5–10.1)
CALCULATED P AXIS, ECG09: 82 DEGREES
CALCULATED R AXIS, ECG10: -29 DEGREES
CALCULATED T AXIS, ECG11: 109 DEGREES
CHLORIDE SERPL-SCNC: 108 MMOL/L (ref 100–111)
CO2 SERPL-SCNC: 31 MMOL/L (ref 21–32)
COLOR UR: YELLOW
CREAT SERPL-MCNC: 1.67 MG/DL (ref 0.6–1.3)
DIAGNOSIS, 93000: NORMAL
DIFFERENTIAL METHOD BLD: ABNORMAL
EOSINOPHIL # BLD: 0 K/UL (ref 0–0.4)
EOSINOPHIL NFR BLD: 0 % (ref 0–5)
EPITH CASTS URNS QL MICRO: NORMAL /LPF (ref 0–5)
ERYTHROCYTE [DISTWIDTH] IN BLOOD BY AUTOMATED COUNT: 15.2 % (ref 11.6–14.5)
GLOBULIN SER CALC-MCNC: 3.2 G/DL (ref 2–4)
GLUCOSE SERPL-MCNC: 78 MG/DL (ref 74–99)
GLUCOSE UR STRIP.AUTO-MCNC: NEGATIVE MG/DL
HCT VFR BLD AUTO: 43 % (ref 36–48)
HGB BLD-MCNC: 14.6 G/DL (ref 13–16)
HGB UR QL STRIP: ABNORMAL
HYALINE CASTS URNS QL MICRO: NORMAL /LPF (ref 0–2)
KETONES UR QL STRIP.AUTO: NEGATIVE MG/DL
LACTATE BLD-SCNC: 1.01 MMOL/L (ref 0.4–2)
LACTATE BLD-SCNC: 2.32 MMOL/L (ref 0.4–2)
LEUKOCYTE ESTERASE UR QL STRIP.AUTO: NEGATIVE
LYMPHOCYTES # BLD: 0.7 K/UL (ref 0.9–3.6)
LYMPHOCYTES NFR BLD: 16 % (ref 21–52)
MAGNESIUM SERPL-MCNC: 1.8 MG/DL (ref 1.6–2.6)
MAGNESIUM SERPL-MCNC: 1.9 MG/DL (ref 1.6–2.6)
MCH RBC QN AUTO: 32.5 PG (ref 24–34)
MCHC RBC AUTO-ENTMCNC: 34 G/DL (ref 31–37)
MCV RBC AUTO: 95.8 FL (ref 74–97)
MONOCYTES # BLD: 0.2 K/UL (ref 0.05–1.2)
MONOCYTES NFR BLD: 4 % (ref 3–10)
NEUTS SEG # BLD: 3.6 K/UL (ref 1.8–8)
NEUTS SEG NFR BLD: 80 % (ref 40–73)
NITRITE UR QL STRIP.AUTO: NEGATIVE
P-R INTERVAL, ECG05: 296 MS
PH UR STRIP: 5 [PH] (ref 5–8)
PHOSPHATE SERPL-MCNC: 3.4 MG/DL (ref 2.5–4.9)
PLATELET # BLD AUTO: 89 K/UL (ref 135–420)
PMV BLD AUTO: 12.1 FL (ref 9.2–11.8)
POTASSIUM SERPL-SCNC: 3.4 MMOL/L (ref 3.5–5.5)
POTASSIUM SERPL-SCNC: 3.4 MMOL/L (ref 3.5–5.5)
POTASSIUM SERPL-SCNC: 3.6 MMOL/L (ref 3.5–5.5)
PROT SERPL-MCNC: 6.3 G/DL (ref 6.4–8.2)
PROT UR STRIP-MCNC: NEGATIVE MG/DL
Q-T INTERVAL, ECG07: 534 MS
QRS DURATION, ECG06: 194 MS
QTC CALCULATION (BEZET), ECG08: 542 MS
RBC # BLD AUTO: 4.49 M/UL (ref 4.7–5.5)
RBC #/AREA URNS HPF: NORMAL /HPF (ref 0–5)
SODIUM SERPL-SCNC: 146 MMOL/L (ref 136–145)
SP GR UR REFRACTOMETRY: 1.01 (ref 1–1.03)
TSH SERPL DL<=0.05 MIU/L-ACNC: 5.79 UIU/ML (ref 0.36–3.74)
UROBILINOGEN UR QL STRIP.AUTO: 0.2 EU/DL (ref 0.2–1)
VENTRICULAR RATE, ECG03: 62 BPM
WBC # BLD AUTO: 4.5 K/UL (ref 4.6–13.2)
WBC URNS QL MICRO: NORMAL /HPF (ref 0–4)

## 2019-12-23 PROCEDURE — 84132 ASSAY OF SERUM POTASSIUM: CPT

## 2019-12-23 PROCEDURE — 83735 ASSAY OF MAGNESIUM: CPT

## 2019-12-23 PROCEDURE — 74011000258 HC RX REV CODE- 258: Performed by: HOSPITALIST

## 2019-12-23 PROCEDURE — 96372 THER/PROPH/DIAG INJ SC/IM: CPT

## 2019-12-23 PROCEDURE — 74011000250 HC RX REV CODE- 250: Performed by: EMERGENCY MEDICINE

## 2019-12-23 PROCEDURE — 74011250636 HC RX REV CODE- 250/636: Performed by: EMERGENCY MEDICINE

## 2019-12-23 PROCEDURE — 87086 URINE CULTURE/COLONY COUNT: CPT

## 2019-12-23 PROCEDURE — 96366 THER/PROPH/DIAG IV INF ADDON: CPT

## 2019-12-23 PROCEDURE — 99218 HC RM OBSERVATION: CPT

## 2019-12-23 PROCEDURE — 76705 ECHO EXAM OF ABDOMEN: CPT

## 2019-12-23 PROCEDURE — 77030005513 HC CATH URETH FOL11 MDII -B

## 2019-12-23 PROCEDURE — 77030031643

## 2019-12-23 PROCEDURE — 36573 INSJ PICC RS&I 5 YR+: CPT | Performed by: EMERGENCY MEDICINE

## 2019-12-23 PROCEDURE — 96365 THER/PROPH/DIAG IV INF INIT: CPT

## 2019-12-23 PROCEDURE — 74011250636 HC RX REV CODE- 250/636: Performed by: HOSPITALIST

## 2019-12-23 PROCEDURE — 74011250636 HC RX REV CODE- 250/636: Performed by: INTERNAL MEDICINE

## 2019-12-23 PROCEDURE — 83605 ASSAY OF LACTIC ACID: CPT

## 2019-12-23 PROCEDURE — 77030037878 HC DRSG MEPILEX >48IN BORD MOLN -B

## 2019-12-23 PROCEDURE — 02HV33Z INSERTION OF INFUSION DEVICE INTO SUPERIOR VENA CAVA, PERCUTANEOUS APPROACH: ICD-10-PCS | Performed by: EMERGENCY MEDICINE

## 2019-12-23 PROCEDURE — 71045 X-RAY EXAM CHEST 1 VIEW: CPT

## 2019-12-23 PROCEDURE — 96375 TX/PRO/DX INJ NEW DRUG ADDON: CPT

## 2019-12-23 PROCEDURE — 85025 COMPLETE CBC W/AUTO DIFF WBC: CPT

## 2019-12-23 PROCEDURE — 81001 URINALYSIS AUTO W/SCOPE: CPT

## 2019-12-23 PROCEDURE — 93005 ELECTROCARDIOGRAM TRACING: CPT

## 2019-12-23 PROCEDURE — 82330 ASSAY OF CALCIUM: CPT

## 2019-12-23 PROCEDURE — 77030018786 HC NDL GD F/USND BARD -B

## 2019-12-23 PROCEDURE — 51702 INSERT TEMP BLADDER CATH: CPT

## 2019-12-23 PROCEDURE — 74011250637 HC RX REV CODE- 250/637: Performed by: HOSPITALIST

## 2019-12-23 PROCEDURE — C1751 CATH, INF, PER/CENT/MIDLINE: HCPCS

## 2019-12-23 PROCEDURE — 99285 EMERGENCY DEPT VISIT HI MDM: CPT

## 2019-12-23 PROCEDURE — 77030040361 HC SLV COMPR DVT MDII -B

## 2019-12-23 PROCEDURE — 77030040922 HC BLNKT HYPOTHRM STRY -A

## 2019-12-23 PROCEDURE — 84443 ASSAY THYROID STIM HORMONE: CPT

## 2019-12-23 PROCEDURE — 77030041247 HC PROTECTOR HEEL HEELMEDIX MDII -B

## 2019-12-23 PROCEDURE — 74011000258 HC RX REV CODE- 258: Performed by: EMERGENCY MEDICINE

## 2019-12-23 PROCEDURE — 87040 BLOOD CULTURE FOR BACTERIA: CPT

## 2019-12-23 PROCEDURE — 84100 ASSAY OF PHOSPHORUS: CPT

## 2019-12-23 PROCEDURE — 96367 TX/PROPH/DG ADDL SEQ IV INF: CPT

## 2019-12-23 PROCEDURE — 77030037877 HC DRSG MEPILEX >48IN BORD MOLN -A

## 2019-12-23 RX ORDER — MAGNESIUM SULFATE 1 G/100ML
1 INJECTION INTRAVENOUS ONCE
Status: COMPLETED | OUTPATIENT
Start: 2019-12-23 | End: 2019-12-23

## 2019-12-23 RX ORDER — OXYCODONE AND ACETAMINOPHEN 5; 325 MG/1; MG/1
1 TABLET ORAL
Status: DISCONTINUED | OUTPATIENT
Start: 2019-12-23 | End: 2020-01-02 | Stop reason: HOSPADM

## 2019-12-23 RX ORDER — POTASSIUM CHLORIDE 7.45 MG/ML
10 INJECTION INTRAVENOUS
Status: COMPLETED | OUTPATIENT
Start: 2019-12-24 | End: 2019-12-24

## 2019-12-23 RX ORDER — POTASSIUM CHLORIDE 750 MG/1
40 TABLET, FILM COATED, EXTENDED RELEASE ORAL
Status: COMPLETED | OUTPATIENT
Start: 2019-12-23 | End: 2019-12-23

## 2019-12-23 RX ORDER — NOREPINEPHRINE BIT/0.9 % NACL 8 MG/250ML
.5-16 INFUSION BOTTLE (ML) INTRAVENOUS
Status: DISCONTINUED | OUTPATIENT
Start: 2019-12-23 | End: 2019-12-25

## 2019-12-23 RX ORDER — ONDANSETRON 2 MG/ML
4 INJECTION INTRAMUSCULAR; INTRAVENOUS
Status: DISCONTINUED | OUTPATIENT
Start: 2019-12-23 | End: 2020-01-02 | Stop reason: HOSPADM

## 2019-12-23 RX ORDER — PANTOPRAZOLE SODIUM 40 MG/1
40 TABLET, DELAYED RELEASE ORAL
Status: DISCONTINUED | OUTPATIENT
Start: 2019-12-23 | End: 2019-12-28

## 2019-12-23 RX ORDER — SODIUM CHLORIDE 9 MG/ML
1000 INJECTION, SOLUTION INTRAVENOUS ONCE
Status: COMPLETED | OUTPATIENT
Start: 2019-12-23 | End: 2019-12-23

## 2019-12-23 RX ORDER — SODIUM CHLORIDE 0.9 % (FLUSH) 0.9 %
10-30 SYRINGE (ML) INJECTION AS NEEDED
Status: DISCONTINUED | OUTPATIENT
Start: 2019-12-23 | End: 2020-01-02 | Stop reason: HOSPADM

## 2019-12-23 RX ORDER — SODIUM CHLORIDE 0.9 % (FLUSH) 0.9 %
20 SYRINGE (ML) INJECTION EVERY 24 HOURS
Status: DISCONTINUED | OUTPATIENT
Start: 2019-12-23 | End: 2020-01-02 | Stop reason: HOSPADM

## 2019-12-23 RX ORDER — SODIUM CHLORIDE 0.9 % (FLUSH) 0.9 %
10 SYRINGE (ML) INJECTION AS NEEDED
Status: DISCONTINUED | OUTPATIENT
Start: 2019-12-23 | End: 2020-01-02 | Stop reason: HOSPADM

## 2019-12-23 RX ORDER — DEXTROSE MONOHYDRATE AND SODIUM CHLORIDE 5; .9 G/100ML; G/100ML
75 INJECTION, SOLUTION INTRAVENOUS CONTINUOUS
Status: DISCONTINUED | OUTPATIENT
Start: 2019-12-23 | End: 2019-12-24

## 2019-12-23 RX ORDER — SODIUM CHLORIDE 0.9 % (FLUSH) 0.9 %
5-10 SYRINGE (ML) INJECTION AS NEEDED
Status: DISCONTINUED | OUTPATIENT
Start: 2019-12-23 | End: 2020-01-02 | Stop reason: HOSPADM

## 2019-12-23 RX ORDER — SODIUM CHLORIDE 0.9 % (FLUSH) 0.9 %
10-40 SYRINGE (ML) INJECTION EVERY 8 HOURS
Status: DISCONTINUED | OUTPATIENT
Start: 2019-12-23 | End: 2020-01-02 | Stop reason: HOSPADM

## 2019-12-23 RX ORDER — SODIUM CHLORIDE 0.9 % (FLUSH) 0.9 %
10 SYRINGE (ML) INJECTION EVERY 24 HOURS
Status: DISCONTINUED | OUTPATIENT
Start: 2019-12-23 | End: 2020-01-02 | Stop reason: HOSPADM

## 2019-12-23 RX ORDER — BACITRACIN 500 UNIT/G
1 PACKET (EA) TOPICAL AS NEEDED
Status: DISCONTINUED | OUTPATIENT
Start: 2019-12-23 | End: 2020-01-02 | Stop reason: HOSPADM

## 2019-12-23 RX ORDER — HEPARIN SODIUM 5000 [USP'U]/ML
5000 INJECTION, SOLUTION INTRAVENOUS; SUBCUTANEOUS EVERY 8 HOURS
Status: DISCONTINUED | OUTPATIENT
Start: 2019-12-23 | End: 2020-01-02 | Stop reason: HOSPADM

## 2019-12-23 RX ORDER — ACETAMINOPHEN 325 MG/1
650 TABLET ORAL
Status: DISCONTINUED | OUTPATIENT
Start: 2019-12-23 | End: 2020-01-02 | Stop reason: HOSPADM

## 2019-12-23 RX ADMIN — PIPERACILLIN AND TAZOBACTAM 3.38 G: 3; .375 INJECTION, POWDER, LYOPHILIZED, FOR SOLUTION INTRAVENOUS at 15:45

## 2019-12-23 RX ADMIN — DEXTROSE MONOHYDRATE AND SODIUM CHLORIDE 75 ML/HR: 5; .9 INJECTION, SOLUTION INTRAVENOUS at 15:56

## 2019-12-23 RX ADMIN — SODIUM CHLORIDE 1000 ML: 900 INJECTION, SOLUTION INTRAVENOUS at 09:01

## 2019-12-23 RX ADMIN — Medication 20 ML: at 14:10

## 2019-12-23 RX ADMIN — PIPERACILLIN AND TAZOBACTAM 3.38 G: 3; .375 INJECTION, POWDER, LYOPHILIZED, FOR SOLUTION INTRAVENOUS at 04:29

## 2019-12-23 RX ADMIN — NOREPINEPHRINE BITARTRATE 0.5 MCG/MIN: 1 INJECTION INTRAVENOUS at 09:49

## 2019-12-23 RX ADMIN — HEPARIN SODIUM 5000 UNITS: 5000 INJECTION INTRAVENOUS; SUBCUTANEOUS at 15:48

## 2019-12-23 RX ADMIN — Medication 10 ML: at 14:10

## 2019-12-23 RX ADMIN — PANTOPRAZOLE SODIUM 40 MG: 40 TABLET, DELAYED RELEASE ORAL at 15:45

## 2019-12-23 RX ADMIN — SODIUM CHLORIDE, SODIUM LACTATE, POTASSIUM CHLORIDE, AND CALCIUM CHLORIDE 1000 ML: 600; 310; 30; 20 INJECTION, SOLUTION INTRAVENOUS at 05:14

## 2019-12-23 RX ADMIN — MAGNESIUM SULFATE HEPTAHYDRATE 1 G: 1 INJECTION, SOLUTION INTRAVENOUS at 15:48

## 2019-12-23 RX ADMIN — PIPERACILLIN AND TAZOBACTAM 3.38 G: 3; .375 INJECTION, POWDER, LYOPHILIZED, FOR SOLUTION INTRAVENOUS at 22:15

## 2019-12-23 RX ADMIN — Medication 10 ML: at 22:00

## 2019-12-23 RX ADMIN — SODIUM CHLORIDE 100 MG: 900 INJECTION, SOLUTION INTRAVENOUS at 05:22

## 2019-12-23 RX ADMIN — SODIUM CHLORIDE 1000 ML: 900 INJECTION, SOLUTION INTRAVENOUS at 11:26

## 2019-12-23 RX ADMIN — DEXTROSE MONOHYDRATE AND SODIUM CHLORIDE 75 ML/HR: 5; .9 INJECTION, SOLUTION INTRAVENOUS at 08:40

## 2019-12-23 RX ADMIN — HEPARIN SODIUM 5000 UNITS: 5000 INJECTION INTRAVENOUS; SUBCUTANEOUS at 08:44

## 2019-12-23 RX ADMIN — PIPERACILLIN AND TAZOBACTAM 3.38 G: 3; .375 INJECTION, POWDER, LYOPHILIZED, FOR SOLUTION INTRAVENOUS at 10:00

## 2019-12-23 RX ADMIN — SODIUM CHLORIDE, SODIUM LACTATE, POTASSIUM CHLORIDE, AND CALCIUM CHLORIDE 1000 ML: 600; 310; 30; 20 INJECTION, SOLUTION INTRAVENOUS at 04:34

## 2019-12-23 RX ADMIN — POTASSIUM CHLORIDE 40 MEQ: 750 TABLET, FILM COATED, EXTENDED RELEASE ORAL at 15:45

## 2019-12-23 NOTE — PROGRESS NOTES
Problem: Falls - Risk of  Goal: *Absence of Falls  Description  Document Ulises Varela Fall Risk and appropriate interventions in the flowsheet. Outcome: Progressing Towards Goal  Note: Fall Risk Interventions:       Mentation Interventions: Bed/chair exit alarm, Door open when patient unattended    Medication Interventions: Evaluate medications/consider consulting pharmacy, Bed/chair exit alarm    Elimination Interventions: Bed/chair exit alarm, Call light in reach              Problem: Patient Education: Go to Patient Education Activity  Goal: Patient/Family Education  Outcome: Progressing Towards Goal     Problem: Pressure Injury - Risk of  Goal: *Prevention of pressure injury  Description  Document Alex Scale and appropriate interventions in the flowsheet.   Outcome: Progressing Towards Goal  Note: Pressure Injury Interventions:  Sensory Interventions: Assess changes in LOC, Avoid rigorous massage over bony prominences    Moisture Interventions: Apply protective barrier, creams and emollients, Absorbent underpads    Activity Interventions: Pressure redistribution bed/mattress(bed type)    Mobility Interventions: HOB 30 degrees or less, Pressure redistribution bed/mattress (bed type)    Nutrition Interventions: Document food/fluid/supplement intake    Friction and Shear Interventions: HOB 30 degrees or less, Foam dressings/transparent film/skin sealants                Problem: Patient Education: Go to Patient Education Activity  Goal: Patient/Family Education  Outcome: Progressing Towards Goal     Problem: General Medical Care Plan  Goal: *Vital signs within specified parameters  Outcome: Progressing Towards Goal  Goal: *Labs within defined limits  Outcome: Progressing Towards Goal  Goal: *Absence of infection signs and symptoms  Outcome: Progressing Towards Goal  Goal: *Optimal pain control at patient's stated goal  Outcome: Progressing Towards Goal  Goal: *Skin integrity maintained  Outcome: Progressing Towards Goal  Goal: *Fluid volume balance  Outcome: Progressing Towards Goal  Goal: *Optimize nutritional status  Outcome: Progressing Towards Goal  Goal: *Anxiety reduced or absent  Outcome: Progressing Towards Goal  Goal: *Progressive mobility and function (eg: ADL's)  Outcome: Progressing Towards Goal     Problem: Pain  Goal: *Control of Pain  Outcome: Progressing Towards Goal     Problem: Patient Education: Go to Patient Education Activity  Goal: Patient/Family Education  Outcome: Progressing Towards Goal     Problem:  Body Temperature -  Risk of, Imbalanced  Goal: *Absence of heat stress or hyperthermia signs and symptoms  Outcome: Progressing Towards Goal  Goal: *Absence of cold stress or hypothermia signs and symptoms  Outcome: Progressing Towards Goal     Problem: Patient Education: Go to Patient Education Activity  Goal: Patient/Family Education  Outcome: Progressing Towards Goal     Problem: Hypotension  Goal: *Blood pressure within specified parameters  Outcome: Progressing Towards Goal  Goal: *Fluid volume balance  Outcome: Progressing Towards Goal  Goal: *Labs within defined limits  Outcome: Progressing Towards Goal     Problem: Patient Education: Go to Patient Education Activity  Goal: Patient/Family Education  Outcome: Progressing Towards Goal

## 2019-12-23 NOTE — PROGRESS NOTES
Problem: Discharge Planning  Goal: *Discharge to safe environment  Outcome: Progressing Towards Goal   Plan is Khai Ramsey    Reason for Admission:  Hypothermia                    RRAT Score:  8                   Plan for utilizing home health:  no                        Current Advanced Directive/Advance Care Plan: none                         Transition of Care Plan:  Khai Ramsey  Met with pt and son and son's wife at bedside. Pt gave permission to speak with son. Son and wife live with pt. Ambulates with walker prior to admission. Pt needs assistance with ADLs. DMEs-walker, shower chair, toilet seat riser. Son asking about getting assistance caring pt at home. Pt only have [employer] Medicare and explained to son it will be private pay for personal care. Son states that he never applied for Medicaid. Pt income is $1700/month from [employer]. No bank savings, no other income. Spoke with Romelia Stinson from [employer] and will assist with Medicaid application. Son provided with list of personal aid agencies. Care Management Interventions  PCP Verified by CM: Yes(seen PCP in June, 2019)  Palliative Care Criteria Met (RRAT>21 & CHF Dx)?: No  Mode of Transport at Discharge: [facility]  Transition of Care Consult (CM Consult): Discharge Planning  Physical Therapy Consult: Yes  Occupational Therapy Consult: No  Speech Therapy Consult: No  Current Support Network: Other(son and wife lives with pt)  Confirm Follow Up Transport: Family  Discharge Location  Discharge Placement: Skilled nursing facility      Patient has designated __son___ to participate in his/her discharge plan and to receive any needed information. Name: Buck Olmstead  Address:  Phone number:[phone]    Patient and/or next of kin has been given and has signed the [facility] Outpatient Observation  Notification letter and all questions answered. Copy of this notice given to patient and copy placed on chart.     Patient and/or next of kin has been given the Outpatient Observation Information and Notification letter and all questions answered.

## 2019-12-23 NOTE — H&P
Internal Medicine History and Physical          Subjective     HPI: Julio Patel is a 80 y.o. male with a PMHx listed below who presented to the ED from home w/ apparent c/o generalized weakness and reported confusion. He has been to ED several times in past week for issues of post-prandial abd pain. He had CT several days ago which showed evidence of gastritis and was discharged. The sx did not resolve and he came back. He was found to be hypotensive and hypothermic which resolved with IV fluid boluses, small dosing of levophed and martell hugger. On my evaluation, he was alert and oriented and no longer having any complaints. He was no longer hypotensive or hypothermic. US of abd showed gallstones but no evidence of infection. He was admitted initially to unit bc of hypotension. PMHx:  Past Medical History:   Diagnosis Date    Difficulty urinating     Elevated PSA     Hematuria, unspecified     Hypercholesterolemia     Hypertension     Incomplete bladder emptying     Urinary retention     UTI (urinary tract infection)        PSurgHx:  Past Surgical History:   Procedure Laterality Date    APPENDECTOMY      EGD  2/7/2014         HX TURP  6/13/16    HX TURP         SocialHx:  Social History     Socioeconomic History    Marital status:      Spouse name: Not on file    Number of children: Not on file    Years of education: Not on file    Highest education level: Not on file   Occupational History    Occupation:  thirty years   Social Needs    Financial resource strain: Not on file    Food insecurity:     Worry: Not on file     Inability: Not on file   [employer] needs:     Medical: Not on file     Non-medical: Not on file   Tobacco Use    Smoking status: Never Smoker    Smokeless tobacco: Never Used   Substance and Sexual Activity    Alcohol use:  Yes     Alcohol/week: 2.0 standard drinks     Types: 2 Cans of beer per week     Comment: Occasional    Drug use: No    Sexual activity: Not on file   Lifestyle    Physical activity:     Days per week: Not on file     Minutes per session: Not on file    Stress: Not on file   Relationships    Social connections:     Talks on phone: Not on file     Gets together: Not on file     Attends Sabianist service: Not on file     Active member of club or organization: Not on file     Attends meetings of clubs or organizations: Not on file     Relationship status: Not on file    Intimate partner violence:     Fear of current or ex partner: Not on file     Emotionally abused: Not on file     Physically abused: Not on file     Forced sexual activity: Not on file   Other Topics Concern    Not on file   Social History Narrative    Not on file       Allergies: Allergies   Allergen Reactions    Amlodipine Swelling and Itching    Bactrim [Sulfamethoprim Ds] Rash    Lisinopril Other (comments)     Acute renal failure    Ciprofloxacin Rash and Itching       FamilyHx:  Family History   Problem Relation Age of Onset    Cancer Father     Alzheimer Mother     Heart defect Brother        Prior to Admission Medications   Prescriptions Last Dose Informant Patient Reported? Taking?   acetaminophen (TYLENOL EXTRA STRENGTH) 500 mg tablet 2019 at Unknown time  No Yes   Sig: Take 2 Tabs by mouth every six (6) hours as needed for Pain. allopurinol (ZYLOPRIM) 300 mg tablet 2019 at Unknown time  Yes Yes   Sig: Take 300 mg by mouth daily. cholecalciferol (VITAMIN D3) 1,000 unit tablet 2019 at Unknown time  Yes Yes   Si,000 Units. furosemide (LASIX) 40 mg tablet 2019 at Unknown time  Yes Yes   Sig: Take 40 mg by mouth daily. omeprazole (PRILOSEC) 20 mg capsule 2019 at Unknown time  No Yes   Sig: Take 1 Cap by mouth daily.    pantoprazole (PROTONIX) 40 mg tablet 2019 at Unknown time  Yes Yes   simvastatin (ZOCOR) 40 mg tablet 2019 at Unknown time  Yes Yes   Si mg.   sucralfate (CARAFATE) 1 gram tablet Not Taking at Unknown time  No No   Sig: Take 1 Tab by mouth four (4) times daily.    tamsulosin (FLOMAX) 0.4 mg capsule 12/22/2019 at Unknown time  No Yes   Sig: take 1 capsule by mouth once daily AFTER DINNER      Facility-Administered Medications: None       Review of Systems:  CONST: Fever, weight loss, fatigue or chills, decreased appetite  HEENT: Recent changes in vision, vertigo, epistaxis, dysphagia and hoarseness  CV: Chest pain, palpitations, edema and varicosities  RESP: Cough, shortness of breath, wheezing, hemoptysis, snoring and reactive airway disease  GI: Nausea, vomiting, abdominal pain, change in bowel habits, hematochezia, melena, and GERD   : Hematuria, dysuria, frequency, urgency, nocturia and stress urinary incontinence   MS: Weakness, joint pain and arthritis  ENDO: Polyuria, polydipsia, polyphagia, poor wound healing, heat intolerance, cold intolerance  LYMPH/HEME: Anemia, bruising and history of blood transfusions  INTEG: Dermatitis, abnormal moles  NEURO: Dizziness, headache, fainting, seizures and stroke  PSYCH: Anxiety and depression      Objective      Visit Vitals  /50   Pulse 79   Temp 97.1 °F (36.2 °C)   Resp 20   Ht 5' 8\" (1.727 m)   Wt 68.7 kg (151 lb 7.3 oz)   SpO2 100%   BMI 23.03 kg/m²       Physical Exam:  General Appearance: NAD, conversant  HENT: normocephalic/atraumatic, moist mucus membranes  Lungs: CTA with normal respiratory effort  Cardiovascular: RRR, no m/r/g, capillary refill < 2 sec, B/L DP/PT pulses +3/4  Abdomen: soft, non-tender, normal bowel sounds  Extremities: no cyanosis, no peripheral edema  Neuro: moves all extremities, no focal deficits  Psych: appropriate affect, alert and oriented to person, place and time    Laboratory Studies:  CMP:   Lab Results   Component Value Date/Time     (H) 12/23/2019 04:09 AM    K 3.4 (L) 12/23/2019 04:09 AM     12/23/2019 04:09 AM    CO2 31 12/23/2019 04:09 AM    AGAP 7 12/23/2019 04:09 AM    GLU 78 12/23/2019 04:09 AM    BUN 45 (H) 12/23/2019 04:09 AM    CREA 1.67 (H) 12/23/2019 04:09 AM    GFRAA 48 (L) 12/23/2019 04:09 AM    GFRNA 39 (L) 12/23/2019 04:09 AM    CA 9.5 12/23/2019 04:09 AM    ALB 3.1 (L) 12/23/2019 04:09 AM    TP 6.3 (L) 12/23/2019 04:09 AM    GLOB 3.2 12/23/2019 04:09 AM    AGRAT 1.0 12/23/2019 04:09 AM    SGOT 121 (H) 12/23/2019 04:09 AM     (H) 12/23/2019 04:09 AM     CBC:   Lab Results   Component Value Date/Time    WBC 4.5 (L) 12/23/2019 04:09 AM    HGB 14.6 12/23/2019 04:09 AM    HCT 43.0 12/23/2019 04:09 AM    PLT 89 (L) 12/23/2019 04:09 AM     All Cardiac Markers in the last 24 hours: No results found for: CPK, CK, CKMMB, CKMB, RCK3, CKMBT, CKNDX, CKND1, TELLY, TROPT, TROIQ, JOSE, TROPT, TNIPOC, BNP, BNPP    Imaging Reviewed:  [address] [place] [address]    Result Date: 12/23/2019  EXAM: Limited right upper quadrant abdominal ultrasound INDICATION: Right upper quadrant pain. Patient diagnosis of hepatitis. Altered mental status. COMPARISON: None. TECHNIQUE: Real-time sonography in multiple planes of the right upper quadrant was performed with image documentation. Grayscale, color flow Doppler imaging, and velocity spectral waveform analysis of the portal vein was performed (duplex imaging). _______________ FINDINGS: LIVER: The liver is heterogeneously increased in course in echotexture, without focal mass. Liver is normal in size, measuring 14 cm. Color flow Doppler and velocity spectral waveform analysis of the portal vein (diameter 8 mm) shows normal (hepatopedal) direction of flow. BILIARY SYSTEM: No intrahepatic biliary dilatation. Common bile duct is normal in caliber, measuring 3 mm. GALLBLADDER: Multiple gallstones within the gallbladder. No wall thickening or pericholecystic fluid. Negative sonographic Richey's sign per technologist's report. RIGHT KIDNEY: Right kidney is normal in size, measuring 9.7 cm in length. No hydronephrosis.  Simple appearing cyst in the lower pole measuring 5 mm. No visible calculi. Mild increase in cortical echogenicity. PANCREAS: Head and body are unremarkable in appearance though the tail is obscured by overlying bowel gas. IVC: Visualized portions are normal in caliber and patent. OTHER: No free intraperitoneal fluid. _______________     IMPRESSION: 1. Cholelithiasis without sonographic findings of acute cholecystitis. 2. Heterogeneously increased and coarse hepatic echotexture, in keeping with patient's given history of hepatocellular disease. No focal hepatic lesion identified. 3. Subcentimeter lower pole right renal cyst (5 mm). Preliminary report provided by on-call radiology resident. Xr Chest Port    Result Date: 12/23/2019  EXAM: CHEST RADIOGRAPH, SINGLE VIEW CLINICAL INDICATION/HISTORY: meets SIRS criteria      <Additional:  Patient states he was told he has a stomach ulcer, gallstone, dehydration and high liver function tests. Tuesday and Wednesday he became confused weakness. COMPARISON: 12/16/2019 TECHNIQUE: Portable frontal view of the chest was obtained. _______________ FINDINGS: SUPPORT DEVICES: None. HEART AND MEDIASTINUM: Cardiac silhouette is top normal in size. LUNGS AND PLEURAL SPACES: Patient is rotated to the left. No pneumothorax or definite consolidation. No pleural effusion or CHF. BONY THORAX AND SOFT TISSUES: No acute osseous abnormality. No acute change. _______________     IMPRESSION: No active cardiopulmonary disease.         Assessment/Plan     Active Hospital Problems    Diagnosis Date Noted    Abdominal pain 12/23/2019    Hypothermia 12/23/2019    Transaminitis 12/23/2019    Chronic renal failure, stage 3 (moderate) (HCC) 12/23/2019    SIRS (systemic inflammatory response syndrome) (HCC) 12/23/2019    Gastritis 12/23/2019     - At this time, no clear source for infection other than possible abd  - Will cont zosyn but likely de-escalate july if afebrile and improved  - Follow cultures  - Cont IVFs for now  - Cr at baseline  - Trend BMP  - LFTs improved from prev visit  - Trend for now  - PPI for gastritis  - Cont acceptable home medications for chronic conditions   - DVT protocol    I have personally reviewed all pertinent labs, films and EKGs that have officially resulted. I reviewed available electronic documentation outlining the initial presentation as well as the emergency room physician's encounter.     Manjinder Rob DO  Internal Medicine, Hospitalist  Pager: [phone] [facility]

## 2019-12-23 NOTE — ED NOTES
Assumed care of pt who is to ED with c/o confusion and weakness, was seen in this ED recently and given diagnosis of gallstones, liver  Dysfunction, and stomach ulcers. Pt awake alert and intermittently confused, VS obtained, hypotension and hypothermia noted, vitals recorded on flowsheet. 18 gauge PIV established in bilateral AC, blood samples including blood cultures obtained and sent to lab as ordered. Oral temp of less than 93 F noted, rectal temp of less 93 F, temp mojica placed with temp of 90 F noted upon insertion. Clear lucio urine output noted, specimen sent to lab as ordered . Pt started on 1 L of IV LR and IV ABT Zosyn at this time, will continue to monitor pt.

## 2019-12-23 NOTE — ED PROVIDER NOTES
EMERGENCY DEPARTMENT HISTORY AND PHYSICAL EXAM      Date: 12/23/2019  Patient Name: Shaq Calvillo    History of Presenting Illness     Chief Complaint   Patient presents with    Abdominal Pain       History (Context): Shaq Calvillo is a 80 y.o. gentleman with hypertension, hypercholesterolemia, urinary retention secondary to prostate cancer, who presents with subacute onset, persistent, severe generalized weakness and confusion. The patient was seen on Monday for early satiety and abdominal pain after eating. The patient was found to have a significant transaminitis and a cholestatic pattern on labs. The patient had a CT scan that demonstrated multiple choleliths but without evidence of choledocholithiasis. The patient was discharged home. Since then, the patient's symptoms have progressed. The patient does not feel well and is poorly attentive to exam.  Thus history taking is limited. On review of systems, the patient denies rashes, cough, dysuria, hematuria, neck stiffness, headache. PCP: Carly Bauer DO    Current Facility-Administered Medications   Medication Dose Route Frequency Provider Last Rate Last Dose    sodium chloride (NS) flush 5-10 mL  5-10 mL IntraVENous PRN Kayden Haddad MD        piperacillin-tazobactam (ZOSYN) 3.375 g in 0.9% sodium chloride (MBP/ADV) 100 mL MBP  3.375 g IntraVENous Q6H Kayden Haddad  mL/hr at 12/23/19 0429 3.375 g at 12/23/19 0429    lactated ringers bolus infusion 1,000 mL  1,000 mL IntraVENous ONCE Kayden Haddad MD        hydrocortisone sod succ (PF) (SOLU-CORTEF) 100 mg in 0.9% sodium chloride 50 mL IVPB  100 mg IntraVENous ONCE Kayden Haddad MD         Current Outpatient Medications   Medication Sig Dispense Refill    acetaminophen (TYLENOL EXTRA STRENGTH) 500 mg tablet Take 2 Tabs by mouth every six (6) hours as needed for Pain. 50 Tab 0    sucralfate (CARAFATE) 1 gram tablet Take 1 Tab by mouth four (4) times daily.  20 Tab 0    omeprazole (PRILOSEC) 20 mg capsule Take 1 Cap by mouth daily. 30 Cap 3    furosemide (LASIX) 40 mg tablet Take 40 mg by mouth daily.  allopurinol (ZYLOPRIM) 300 mg tablet Take 300 mg by mouth daily.  cholecalciferol (VITAMIN D3) 1,000 unit tablet 2,000 Units.  simvastatin (ZOCOR) 40 mg tablet 40 mg.  pantoprazole (PROTONIX) 40 mg tablet   0    tamsulosin (FLOMAX) 0.4 mg capsule take 1 capsule by mouth once daily AFTER DINNER 90 Cap 3       Past History     Past Medical History:  Past Medical History:   Diagnosis Date    Difficulty urinating     Elevated PSA     Hematuria, unspecified     Hypercholesterolemia     Hypertension     Incomplete bladder emptying     Urinary retention     UTI (urinary tract infection)        Past Surgical History:  Past Surgical History:   Procedure Laterality Date    APPENDECTOMY      EGD  2/7/2014         HX TURP  6/13/16    HX TURP         Family History:  Family History   Problem Relation Age of Onset    Cancer Father     Alzheimer Mother     Heart defect Brother        Social History:  Social History     Tobacco Use    Smoking status: Never Smoker    Smokeless tobacco: Never Used   Substance Use Topics    Alcohol use: Yes     Alcohol/week: 2.0 standard drinks     Types: 2 Cans of beer per week     Comment: Occasional    Drug use: No       Allergies: Allergies   Allergen Reactions    Amlodipine Swelling and Itching    Bactrim [Sulfamethoprim Ds] Rash    Lisinopril Other (comments)     Acute renal failure    Ciprofloxacin Rash and Itching       PMH, PSH, family history, social history, allergies reviewed with the patient with significant items noted above. Review of Systems   As per HPI, otherwise reviewed and negative.      Physical Exam     Vitals:    12/23/19 0415 12/23/19 0420 12/23/19 0430 12/23/19 0437   BP: 100/55  100/49    Pulse: 68 74 (!) 59 (!) 59   Resp: 10 15 9 10   Temp: (!) 90.7 °F (32.6 °C) (!) 90.9 °F (32.7 °C) (!) 90.8 °F (32.7 °C) (!) 90.7 °F (32.6 °C)   SpO2: 100% 100% 98% 98%   Weight:           Gen: Ill-appearing  HEENT: Normocephalic, sclera anicteric  Cardiovascular: Normal rate, regular rhythm, no murmurs, rubs, gallops. Pulses intact and equal distally. Pulmonary: No respiratory distress. No stridor. Clear lungs. ABD: Soft, nontender, nondistended. Neuro: Alert. Verbal but confused. Slurred speech. Full strength and sensation throughout. Psych: Difficult to assess, given the patient's mental state  : No CVA tenderness  EXT: No cyanosis or clubbing. Skin: Warm and well-perfused. Diagnostic Study Results     Labs -     Recent Results (from the past 12 hour(s))   METABOLIC PANEL, COMPREHENSIVE    Collection Time: 12/23/19  4:09 AM   Result Value Ref Range    Sodium 146 (H) 136 - 145 mmol/L    Potassium 3.4 (L) 3.5 - 5.5 mmol/L    Chloride 108 100 - 111 mmol/L    CO2 31 21 - 32 mmol/L    Anion gap 7 3.0 - 18 mmol/L    Glucose 78 74 - 99 mg/dL    BUN 45 (H) 7.0 - 18 MG/DL    Creatinine 1.67 (H) 0.6 - 1.3 MG/DL    BUN/Creatinine ratio 27 (H) 12 - 20      GFR est AA 48 (L) >60 ml/min/1.73m2    GFR est non-AA 39 (L) >60 ml/min/1.73m2    Calcium 9.5 8.5 - 10.1 MG/DL    Bilirubin, total 0.5 0.2 - 1.0 MG/DL    ALT (SGPT) 149 (H) 16 - 61 U/L    AST (SGOT) 121 (H) 10 - 38 U/L    Alk.  phosphatase 218 (H) 45 - 117 U/L    Protein, total 6.3 (L) 6.4 - 8.2 g/dL    Albumin 3.1 (L) 3.4 - 5.0 g/dL    Globulin 3.2 2.0 - 4.0 g/dL    A-G Ratio 1.0 0.8 - 1.7     CBC WITH AUTOMATED DIFF    Collection Time: 12/23/19  4:09 AM   Result Value Ref Range    WBC 4.5 (L) 4.6 - 13.2 K/uL    RBC 4.49 (L) 4.70 - 5.50 M/uL    HGB 14.6 13.0 - 16.0 g/dL    HCT 43.0 36.0 - 48.0 %    MCV 95.8 74.0 - 97.0 FL    MCH 32.5 24.0 - 34.0 PG    MCHC 34.0 31.0 - 37.0 g/dL    RDW 15.2 (H) 11.6 - 14.5 %    PLATELET 89 (L) 036 - 420 K/uL    MPV 12.1 (H) 9.2 - 11.8 FL    NEUTROPHILS 80 (H) 40 - 73 %    LYMPHOCYTES 16 (L) 21 - 52 %    MONOCYTES 4 3 - 10 %    EOSINOPHILS 0 0 - 5 %    BASOPHILS 0 0 - 2 %    ABS. NEUTROPHILS 3.6 1.8 - 8.0 K/UL    ABS. LYMPHOCYTES 0.7 (L) 0.9 - 3.6 K/UL    ABS. MONOCYTES 0.2 0.05 - 1.2 K/UL    ABS. EOSINOPHILS 0.0 0.0 - 0.4 K/UL    ABS. BASOPHILS 0.0 0.0 - 0.1 K/UL    DF AUTOMATED     URINALYSIS W/ RFLX MICROSCOPIC    Collection Time: 12/23/19  4:21 AM   Result Value Ref Range    Color YELLOW      Appearance CLEAR      Specific gravity 1.013 1.005 - 1.030      pH (UA) 5.0 5.0 - 8.0      Protein NEGATIVE  NEG mg/dL    Glucose NEGATIVE  NEG mg/dL    Ketone NEGATIVE  NEG mg/dL    Bilirubin NEGATIVE  NEG      Blood SMALL (A) NEG      Urobilinogen 0.2 0.2 - 1.0 EU/dL    Nitrites NEGATIVE  NEG      Leukocyte Esterase NEGATIVE  NEG     URINE MICROSCOPIC ONLY    Collection Time: 12/23/19  4:21 AM   Result Value Ref Range    WBC 0 to 3 0 - 4 /hpf    RBC 4 to 10 0 - 5 /hpf    Epithelial cells FEW 0 - 5 /lpf    Bacteria NEGATIVE  NEG /hpf    Hyaline cast TOO NUMEROUS TO COUNT 0 - 2 /lpf   POC LACTIC ACID    Collection Time: 12/23/19  4:22 AM   Result Value Ref Range    Lactic Acid (POC) 2.32 (HH) 0.40 - 2.00 mmol/L       Radiologic Studies -   XR CHEST PORT    (Results Pending)   US ABD LTD    (Results Pending)     CT Results  (Last 48 hours)    None        CXR Results  (Last 48 hours)    None            Medical Decision Making   I am the first provider for this patient. I reviewed the vital signs, available nursing notes, past medical history, past surgical history, family history and social history. Vital Signs-Reviewed the patient's vital signs. EKG: Interpreted by myself. Records Reviewed: Personally, on initial evaluation    MDM:   Patient presents with altered mental status, generalized weakness, and shock. Exam significant for hypotensive, hypothermic.    DDX considered: Sepsis, hypothyroidism, hypoadrenalism  DDX thought to be less likely but also considered due to high risk condition: PE, meningitis, stroke    Patient condition on initial evaluation: Severely ill     Plan:   Fluid resuscitation  Close Observation  Initiation of sepsis bundle    Orders as below:  Orders Placed This Encounter    SEPSIS BUNDLE INITIATED IN ED (REQUIRED)    CULTURE, BLOOD    CULTURE, BLOOD    CULTURE, URINE    XR CHEST PORT    US ABD LTD    LACTIC ACID    URINALYSIS W/ RFLX MICROSCOPIC    METABOLIC PANEL, COMPREHENSIVE    CBC WITH AUTOMATED DIFF    URINE MICROSCOPIC ONLY    TSH 3RD GENERATION    POC LACTIC ACID    VITAL SIGNS - PER UNIT ROUTINE    STRICT I & O    NEUROLOGIC STATUS ASSESSMENT - PER UNIT ROUTINE    SOLIS CATH, TO LEG BAG    POC LACTIC ACID    EKG, 12 LEAD, INITIAL    SALINE LOCK IV ONE TIME STAT    sodium chloride (NS) flush 5-10 mL    piperacillin-tazobactam (ZOSYN) 3.375 g in 0.9% sodium chloride (MBP/ADV) 100 mL MBP    FOLLOWED BY Linked Order Group     lactated ringers bolus infusion 1,000 mL     lactated ringers bolus infusion 1,000 mL    hydrocortisone sod succ (PF) (SOLU-CORTEF) 100 mg in 0.9% sodium chloride 50 mL IVPB       ED Course:   ED Course as of Dec 23 0647   Mon Dec 23, 2019   0522 Ultrasound being performed at this time. [DT]   6114 Reassessed the patient, the patient has a mean arterial pressure 63, with inappropriate bradycardia at 52. Patient IVC is plethoric. Continuing to fluid resuscitate considering starting pressors with additional chronotropy    [DT]   0645 Signed out to Dr. Fernanda Gaona. Patient blood pressure now normotensive with a mean arterial pressure of 70. It appears that the Solu-Cortef improved the patient's hemodynamics. [DT]      ED Course User Index  [DT] Domenico Worthy MD         Patient condition at time of disposition: Severely ill    Patient admission pending. Diagnosis     Clinical Impression:   1. Septic shock ([address]    2.  Cholangitis        Signed,  Urmila Scott MD  Emergency Physician  [facility]    As a voice dictation software was utilized to dictate this note, minor word transpositions can occur. I apologize for confusing wording and typographic errors. Please feel free to contact me for clarification.

## 2019-12-23 NOTE — PROGRESS NOTES
PT orders received and chart reviewed. Per JER Sanchez, not appropriate for functional mobility with PT evaluation at this time d/t cardiac issues. Will follow up as appropriate.

## 2019-12-23 NOTE — ROUTINE PROCESS
5 Fr triple lumen Power PICC Solo inserted in RUE basilic vein with lower  SVC tip confirmation via PCXR. Released for use, JER Coates notified. 2 ml 1% lidocaine injected SC at insertion site for local anesthetic.

## 2019-12-23 NOTE — ED NOTES
6 AM :Pt care assumed from Dr. Paulo Gillespie , ED provider. Pt complaint(s), current treatment plan, progression and available diagnostic results have been discussed thoroughly. Rounding occurred: yes  Intended Disposition: ADMIT   Pending diagnostic reports and/or labs (please list): Ultrasound result, plan for admission      US showing Cholelithiasis but no signs of cholecystitis.     7:15 AM  Consult with Dr. Rasta Perez, hospitalist, will come and evaluate the patient

## 2019-12-23 NOTE — PROGRESS NOTES
12/23/19 1158   Dysphagia Screening   Vocal Quality/Secretions (!) 1   History of Dysphagia 0   O2 Saturation 0   Alertness 0   Pre-Swallow Assessment Score 1

## 2019-12-23 NOTE — PROGRESS NOTES
12/23/19 1544   Dysphagia Screening   Vocal Quality/Secretions 0   History of Dysphagia 0   O2 Saturation 0   Alertness 0   Pre-Swallow Assessment Score 0   Purees 0   Water by Cup 0   Water by Straw 0

## 2019-12-23 NOTE — PROGRESS NOTES
completed the initial Spiritual Assessment of the patient in bed 3 of the emergency room , and offered Pastoral Care support. Patient does not have any Taoist/cultural needs that will affect patients preferences in health care. Chaplains will continue to follow and will provide pastoral care on an as needed/requested basis.     [place]  [facility] Care Department  [phone]

## 2019-12-23 NOTE — ED TRIAGE NOTES
\"here Monday and was told that he has a stomach ulcer, gallstones, dehydration and high liver function levels.  Tuesday and Wednesday he became confused weakness\"

## 2019-12-23 NOTE — CONSULTS
[facility]  Pulmonary, Critical Care, and Sleep Medicine    Name: Anita Bazan MRN: [id number]  : 1934 Hospital: [address] [place] [address]  Date: 2019       [facility] Consult                                              Consult requesting physician: Xochilt Ng DO    Reason for Consult: Sepsis    I have reviewed the flowsheet and notes. Events, vitals, medications and notes from last 24 hours reviewed. 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 XXXA     Sepsis -likely source is abdominal.  CT scan of abdomen on 2019 showed infectious/inflammatory duodenitis and gastritis, cholelithiasis without evidence of cholecystitis, enlarged prostate gland. Repeat blood cultures have been ordered today. Patient has been empirically started on Zosyn and I will continue with it. Hypotension -this has now resolved. Patient's blood pressure is stable in the ICU. He is received a liter fluid bolus so far and I will give another liter after this. Lactic acidosis -this has improved with IV fluids. Repeat lactic acid is 1  Cholelithiasis -patient's abdominal test on today shows cholelithiasis without sonographic evidence of acute cholecystitis. Continue with Zosyn  Acute kidney injury -possibly secondary to dehydration. Hydrate with IV fluids and monitor  Abnormal LFTs -these have improved from his previous numbers.   Continue to monitor  Thrombocytopenia -no overt bleeding at this time monitor    OTHER:  Glycemic Control. Glucose stabilizer per ICU protocol when on insulin drip. Maintain blood glucose 140-180. Replace electrolytes per ICU electrolyte replacement protocol  HOB >=30 degree elevation all the time. Aggressive pulmonary toileting. Incentive spirometry when appropriate. Aspiration precautions. Sepsis bundle and protocol followed. Follow serial lactic acid when >2, fluid resuscitation. Draw cultures before antibiotic. Follow cultures. Antibiotic choice. Administer antibiotic within 1 hr ICU admission and 3 hours of ED arrival. Deescalate antibiotic when appropriate. Vasopressor when appropriate with MAP goal >65 mmHg. Skin & Wound care. PT/OT eval and treat. OOB/IS when appropriate. Further recommendations will be based on the patient's response to recommended treatment and results of the investigation ordered. Quality Care: Stress ulcer prophylaxis, DVT prophylaxis, HOB elevated, Infection control all reviewed and addressed. Events and notes from last 24 hours reviewed. Care plan discussed with nursing. See my orders for detail. CC TIME: >35 min   Further management depending on test results and work up as outlined above. History of Present Illness:    Ramsey Shah has been seen and evaluated in ICU. Patient is a 80 y.o. male with PMHx significant for hypertension, hypercholesterolemia, prostate cancer, who comes to the hospital with complaints of generalized weakness and confusion. Patient has been in the ER about 3 times over the past week or so. Patient was last seen on Monday with complaints of early satiety and abdominal pain after eating. Patient had a CT scan done at that time which showed evidence of gastritis and gallstones but no cholecystitis. Patient reports these symptoms are progressed since then. Patient was found to be hypotensive and hypothermic in the ER.   Patient was given IV fluid boluses and is being admitted to the ICU because of this.  On my assessment patient complains that he cannot eat. He otherwise denies any complaints of abdominal pain nausea or vomiting. Denies any complaints of chest pains or shortness of breath. Denies any complains of dizziness lightheadedness or headaches. Review of Systems:   HEENT: No epistaxis, no nasal drainage  Respiratory: as above  Cardiovascular: no chest pain, no palpitations  Gastrointestinal: no abd pain, no vomiting, no diarrhea  Genitourinary: No urinary symptoms  Musculoskeletal: No Joint pain  Neurological: No focal weakness, no seizures, no headaches  Constitutional: No fever, no chills. Patient reports he cannot eat  Skin: no rash    Medication Reviewed      Allergies   Allergen Reactions    Amlodipine Swelling and Itching    Bactrim [Sulfamethoprim Ds] Rash    Lisinopril Other (comments)     Acute renal failure    Ciprofloxacin Rash and Itching      Past Medical History:   Diagnosis Date    Difficulty urinating     Elevated PSA     Hematuria, unspecified     Hypercholesterolemia     Hypertension     Incomplete bladder emptying     Urinary retention     UTI (urinary tract infection)       Past Surgical History:   Procedure Laterality Date    APPENDECTOMY      EGD  2/7/2014         HX TURP  6/13/16    HX TURP        Social History     Tobacco Use    Smoking status: Never Smoker    Smokeless tobacco: Never Used   Substance Use Topics    Alcohol use: Yes     Alcohol/week: 2.0 standard drinks     Types: 2 Cans of beer per week     Comment: Occasional      Family History   Problem Relation Age of Onset    Cancer Father     Alzheimer Mother     Heart defect Brother       Prior to Admission medications    Medication Sig Start Date End Date Taking? Authorizing Provider   acetaminophen (TYLENOL EXTRA STRENGTH) 500 mg tablet Take 2 Tabs by mouth every six (6) hours as needed for Pain.  12/16/19   Eligah Sicard, MD   sucralfate (CARAFATE) 1 gram tablet Take 1 Tab by mouth four (4) times daily. 19   J Luis Whitmore MD   omeprazole (PRILOSEC) 20 mg capsule Take 1 Cap by mouth daily. 19   J Luis Whitmore MD   furosemide (LASIX) 40 mg tablet Take 40 mg by mouth daily. Griselda Rangel MD   allopurinol (ZYLOPRIM) 300 mg tablet Take 300 mg by mouth daily. Griselda Rangel MD   cholecalciferol (VITAMIN D3) 1,000 unit tablet 2,000 Units.     Provider, Historical   simvastatin (ZOCOR) 40 mg tablet 40 mg.    Provider, Historical   pantoprazole (PROTONIX) 40 mg tablet  16   Provider, Historical   tamsulosin (FLOMAX) 0.4 mg capsule take 1 capsule by mouth once daily AFTER DINNER 3/12/15   Bradly Archer MD     Current Facility-Administered Medications   Medication Dose Route Frequency    piperacillin-tazobactam (ZOSYN) 3.375 g in 0.9% sodium chloride (MBP/ADV) 100 mL MBP  3.375 g IntraVENous Q6H    dextrose 5% and 0.9% NaCl infusion  75 mL/hr IntraVENous CONTINUOUS    heparin (porcine) injection 5,000 Units  5,000 Units SubCUTAneous Q8H    NOREPINephrine (LEVOPHED) 8 mg in 0.9% NS 250ml infusion  0.5-16 mcg/min IntraVENous TITRATE      Peripheral Intravenous Line:  Peripheral IV 19 Right Antecubital (Active)   Site Assessment Clean, dry, & intact 2019  4:30 AM   Phlebitis Assessment 0 2019  4:30 AM   Infiltration Assessment 0 2019  4:30 AM   Dressing Status Clean, dry, & intact 2019  4:30 AM   Dressing Type Transparent 2019  4:30 AM   Hub Color/Line Status Green 2019  4:30 AM       Peripheral IV 19 Right Hand (Active)   Site Assessment Clean, dry, & intact 2019  9:48 AM   Phlebitis Assessment 0 2019  9:48 AM   Infiltration Assessment 0 2019  9:48 AM     Objective:  Vital Signs:    Visit Vitals  /54   Pulse 80   Temp 97.1 °F (36.2 °C)   Resp 13   Wt 68 kg (150 lb)   SpO2 96%   BMI 22.81 kg/m²      O2 Device: Nasal cannula  O2 Flow Rate (L/min): 2 l/min  Temp (24hrs), Av.7 °F (34.8 °C), Min:90.7 °F (32.6 °C), Max:97.6 °F (36.4 °C)      Intake/Output:   Last shift:      No intake/output data recorded. Intake/Output Summary (Last 24 hours) at 12/23/2019 1048  Last data filed at 12/23/2019 0615  Gross per 24 hour   Intake 1100 ml   Output    Net 1100 ml     Physical Exam:   General/Neurology: Alert, Awake,   Head:   Normocephalic, without obvious abnormality  Eye:   PERRL, EOM intact, no scleral icterus, no pallor  Oral:   Mucus membranes moist  Neck:   Supple  Lung:   B/l air entry is fair  Heart:   Regular rate & rhythm. S1 S2 present. Abdomen: Soft, non tender, BS+nt  Extremities:  No pedal edema  Skin:   Dry, intact    Data:      Recent Results (from the past 24 hour(s))   METABOLIC PANEL, COMPREHENSIVE    Collection Time: 12/23/19  4:09 AM   Result Value Ref Range    Sodium 146 (H) 136 - 145 mmol/L    Potassium 3.4 (L) 3.5 - 5.5 mmol/L    Chloride 108 100 - 111 mmol/L    CO2 31 21 - 32 mmol/L    Anion gap 7 3.0 - 18 mmol/L    Glucose 78 74 - 99 mg/dL    BUN 45 (H) 7.0 - 18 MG/DL    Creatinine 1.67 (H) 0.6 - 1.3 MG/DL    BUN/Creatinine ratio 27 (H) 12 - 20      GFR est AA 48 (L) >60 ml/min/1.73m2    GFR est non-AA 39 (L) >60 ml/min/1.73m2    Calcium 9.5 8.5 - 10.1 MG/DL    Bilirubin, total 0.5 0.2 - 1.0 MG/DL    ALT (SGPT) 149 (H) 16 - 61 U/L    AST (SGOT) 121 (H) 10 - 38 U/L    Alk.  phosphatase 218 (H) 45 - 117 U/L    Protein, total 6.3 (L) 6.4 - 8.2 g/dL    Albumin 3.1 (L) 3.4 - 5.0 g/dL    Globulin 3.2 2.0 - 4.0 g/dL    A-G Ratio 1.0 0.8 - 1.7     CBC WITH AUTOMATED DIFF    Collection Time: 12/23/19  4:09 AM   Result Value Ref Range    WBC 4.5 (L) 4.6 - 13.2 K/uL    RBC 4.49 (L) 4.70 - 5.50 M/uL    HGB 14.6 13.0 - 16.0 g/dL    HCT 43.0 36.0 - 48.0 %    MCV 95.8 74.0 - 97.0 FL    MCH 32.5 24.0 - 34.0 PG    MCHC 34.0 31.0 - 37.0 g/dL    RDW 15.2 (H) 11.6 - 14.5 %    PLATELET 89 (L) 624 - 420 K/uL    MPV 12.1 (H) 9.2 - 11.8 FL    NEUTROPHILS 80 (H) 40 - 73 %    LYMPHOCYTES 16 (L) 21 - 52 %    MONOCYTES 4 3 - 10 %    EOSINOPHILS 0 0 - 5 %    BASOPHILS 0 0 - 2 %    ABS. NEUTROPHILS 3.6 1.8 - 8.0 K/UL    ABS. LYMPHOCYTES 0.7 (L) 0.9 - 3.6 K/UL    ABS. MONOCYTES 0.2 0.05 - 1.2 K/UL    ABS. EOSINOPHILS 0.0 0.0 - 0.4 K/UL    ABS.  BASOPHILS 0.0 0.0 - 0.1 K/UL    DF AUTOMATED     TSH 3RD GENERATION    Collection Time: 12/23/19  4:09 AM   Result Value Ref Range    TSH 5.79 (H) 0.36 - 3.74 uIU/mL   EKG, 12 LEAD, INITIAL    Collection Time: 12/23/19  4:17 AM   Result Value Ref Range    Ventricular Rate 62 BPM    Atrial Rate 62 BPM    P-R Interval 296 ms    QRS Duration 194 ms    Q-T Interval 534 ms    QTC Calculation (Bezet) 542 ms    Calculated P Axis 82 degrees    Calculated R Axis -29 degrees    Calculated T Axis 109 degrees    Diagnosis       Sinus rhythm with 1st degree AV block  Left bundle branch block  Abnormal ECG  When compared with ECG of 16-DEC-2019 17:09,  QRS duration has increased  T wave inversion no longer evident in Inferior leads     URINALYSIS W/ RFLX MICROSCOPIC    Collection Time: 12/23/19  4:21 AM   Result Value Ref Range    Color YELLOW      Appearance CLEAR      Specific gravity 1.013 1.005 - 1.030      pH (UA) 5.0 5.0 - 8.0      Protein NEGATIVE  NEG mg/dL    Glucose NEGATIVE  NEG mg/dL    Ketone NEGATIVE  NEG mg/dL    Bilirubin NEGATIVE  NEG      Blood SMALL (A) NEG      Urobilinogen 0.2 0.2 - 1.0 EU/dL    Nitrites NEGATIVE  NEG      Leukocyte Esterase NEGATIVE  NEG     URINE MICROSCOPIC ONLY    Collection Time: 12/23/19  4:21 AM   Result Value Ref Range    WBC 0 to 3 0 - 4 /hpf    RBC 4 to 10 0 - 5 /hpf    Epithelial cells FEW 0 - 5 /lpf    Bacteria NEGATIVE  NEG /hpf    Hyaline cast TOO NUMEROUS TO COUNT 0 - 2 /lpf   POC LACTIC ACID    Collection Time: 12/23/19  4:22 AM   Result Value Ref Range    Lactic Acid (POC) 2.32 (HH) 0.40 - 2.00 mmol/L   CULTURE, BLOOD    Collection Time: 12/23/19  4:23 AM   Result Value Ref Range    Special Requests: NO SPECIAL REQUESTS      Culture result: NO GROWTH AFTER 3 HOURS     CULTURE, BLOOD    Collection Time: 12/23/19  4:23 AM   Result Value Ref Range    Special Requests: NO SPECIAL REQUESTS      Culture result: NO GROWTH AFTER 3 HOURS     POC LACTIC ACID    Collection Time: 12/23/19  6:50 AM   Result Value Ref Range    Lactic Acid (POC) 1.01 0.40 - 2.00 mmol/L         Chemistry   Recent Labs     12/23/19  0409   GLU 78   *   K 3.4*      CO2 31   BUN 45*   CREA 1.67*   CA 9.5   AGAP 7   BUCR 27*   *   TP 6.3*   ALB 3.1*   GLOB 3.2   AGRAT 1.0       CBC w/Diff   Recent Labs     12/23/19  0409   WBC 4.5*   RBC 4.49*   HGB 14.6   HCT 43.0   PLT 89*   GRANS 80*   LYMPH 16*   EOS 0     Micro     Recent Labs     12/23/19  0423   CULT NO GROWTH AFTER 3 HOURS  NO GROWTH AFTER 3 HOURS     Recent Labs     12/23/19  0423   CULT NO GROWTH AFTER 3 HOURS  NO GROWTH AFTER 3 HOURS       CT (Most Recent)    Results from [place] encounter on 12/16/19   CT ABD PELV W CONT    Narrative EXAM: CT of the abdomen and pelvis    INDICATION: Abdominal pain, abnormal LFTs    COMPARISON: None. TECHNIQUE: Axial CT imaging of the abdomen and pelvis was performed with  intravenous contrast. Multiplanar reformats were generated. One or more dose  reduction techniques were used on this CT: automated exposure control,  adjustment of the mAs and/or kVp according to patient size, and iterative  reconstruction techniques. The specific techniques used on this CT exam have  been documented in the patient's electronic medical record. Digital Imaging and  Communications in Medicine (DICOM) format image data are available to  nonaffiliated external healthcare facilities or entities on a secure, media  free, reciprocally searchable basis with patient authorization for at least a  12-month period after this study. _______________    FINDINGS:    LOWER CHEST: Motion artifact degraded with no focal consolidation. No  significant pericardial effusion.     LIVER, BILIARY: Degraded evaluation secondary to motion. No discrete hepatic  mass or lesion. No biliary dilation. Multiple intraluminal gallstones without  discrete pericholecystic induration or fluid in this mild motion degraded study. PANCREAS: No acute process    SPLEEN: Normal.    ADRENALS: Unremarkable    KIDNEYS, URETERS, BLADDER: Partial left renal malrotation with left posterior  interpolar minimal perinephric stranding and crescent cortical low attenuation,  potentially chronic etiology. Otherwise symmetric enhancing bilateral kidneys  without hydronephrosis or suspicious enhancing renal mass. No hydroureter. Unremarkable bladder wall    GASTROINTESTINAL TRACT:     > Edematous thick-walled duodenum with adjacent periduodenal stranding along  the right lateral aspect. Edematous thick-walled stomach which is  underdistended. Small moderate-sized diverticulum projecting from the superior  aspect of the mid third segment containing air. The remaining small bowel is  nondilated with a few prominent nondilated loops in the upper pelvis, with  air-fluid levels. The colon is nondilated with scattered diverticula. No  evidence of diverticulitis    LYMPH NODES: No enlarged lymph nodes. PELVIC ORGANS: Enlarged prostate measuring 5.7 x 5.5 cm with median lobe  hypertrophy impressing upon the bladder and central defect in keeping with prior  TURP. VASCULATURE: Moderately severe diffuse aortic atherosclerotic calcification  without abdominal aortic aneurysm. Celiac artery partial rim calcified  aneurysmal dilatation measuring 1.3 cm (axial 18/coronal 42). OSSEOUS: No acute or aggressive osseous abnormalities identified. Multilevel  degenerative disc disease, spondylosis with advanced facet osteoarthropathy in  the mid to lower lumbar spine    OTHER: No free fluid or pneumoperitoneum.  Fat-containing right inguinal hernia.    _______________      Impression IMPRESSION:    1.  CT findings suggestive of infectious/inflammatory duodenitis and gastritis. Additional findings and may suggest enteritis component. 2. Cholelithiasis without evidence of cholecystitis or bile duct dilatation. 3. Enlarged prostate gland with median lobe hypertrophy impressing upon the base  of the bladder findings of prior TURP. 4. Diverticulosis without diverticulitis. 5. Small-to-moderate sized Right inguinal fat-containing hernia. Note: Preliminary report was provided by the on-call radiology resident. XR (Most Recent). CXR reviewed by me and compared with previous CXR   Results from Hospital Encounter encounter on 12/23/19   XR CHEST PORT    Narrative EXAM: CHEST RADIOGRAPH, SINGLE VIEW    CLINICAL INDICATION/HISTORY: meets SIRS criteria       <Additional:  Patient states he was told he has a stomach ulcer, gallstone,  dehydration and high liver function tests. Tuesday and Wednesday he became  confused weakness. COMPARISON: 12/16/2019    TECHNIQUE: Portable frontal view of the chest was obtained.     _______________    FINDINGS:    SUPPORT DEVICES: None. HEART AND MEDIASTINUM: Cardiac silhouette is top normal in size. LUNGS AND PLEURAL SPACES: Patient is rotated to the left. No pneumothorax or  definite consolidation. No pleural effusion or CHF. BONY THORAX AND SOFT TISSUES: No acute osseous abnormality. No acute change.    _______________      Impression IMPRESSION:    No active cardiopulmonary disease. High complexity decision making was performed during the evaluation of this patient at high risk for decompensation with multiple organ involvement     Above mentioned total time spent on reviewing the case/medical record/data/notes/EMR/patient examination/documentation/coordinating care with nurse/consultants, exclusive of procedures with complex decision making performed and > 50% time spent in face to face evaluation.     This note has been dictated using the dragon dictation system    Nai Black MD  12/23/2019

## 2019-12-23 NOTE — PROGRESS NOTES
Physical Exam  Skin:     General: Skin is cool and moist.      Capillary Refill: Capillary refill takes less than 2 seconds. Coloration: Skin is pale. Findings: Ecchymosis and erythema present. Primary Nurse Juan Manuel Valente RN and Dyllan Callahan RN performed a dual skin assessment on this patient Impairment noted- see wound doc flow sheet Alex score is 11.

## 2019-12-23 NOTE — ED NOTES
Report received from [facility]. Pt resting, alert to voice, oriented to self and place only. VSS. Pt denies pain. Family at bedside.  No needs at this time

## 2019-12-23 NOTE — PROGRESS NOTES
(1100) TRANSFER - IN REPORT:    Verbal report received from [facility] (name) on Shahram Turner  being received from ED (unit) for routine progression of care      Report consisted of patients Situation, Background, Assessment and   Recommendations(SBAR). Information from the following report(s) SBAR, Intake/Output, MAR and Recent Results was reviewed with the receiving nurse. Opportunity for questions and clarification was provided. Assessment completed upon patients arrival to unit and care assumed. Levophed was stopped at this time because patient's MAP is >65. Dr. Galindo Pulse at bedside. (1250) Patient had a rhythm change during PICC insertion. EKG completed. Patient is in 1st degree HB with LBBB. Dr. Bren Bragg paged. ([phone]) MD stated this is patient's norm. Telephone order received for PO potassium. VSS. Will continue to monitor. Bedside and Verbal shift change report given to Binta Olivares RN (oncoming nurse) by Roshni Goldsmith RN (offgoing nurse). Report included the following information SBAR, Intake/Output, MAR, Recent Results, Cardiac Rhythm 1st degree HB with LBBB and Alarm Parameters .

## 2019-12-23 NOTE — ED NOTES
TRANSFER - ED to INPATIENT REPORT:    SBAR report made available to receiving floor on this patient being transferred to [facility] ICU [phone])  for routine progression of care       Admitting diagnosis Hypothermia [T68. XXXA]    Information from the following report(s) SBAR, ED Summary, Intake/Output and Recent Results was made available to receiving floor. Lines:   Peripheral IV 12/23/19 Right Antecubital (Active)   Site Assessment Clean, dry, & intact 12/23/2019  4:30 AM   Phlebitis Assessment 0 12/23/2019  4:30 AM   Infiltration Assessment 0 12/23/2019  4:30 AM   Dressing Status Clean, dry, & intact 12/23/2019  4:30 AM   Dressing Type Transparent 12/23/2019  4:30 AM   Hub Color/Line Status Green 12/23/2019  4:30 AM        Medication list updated today    Opportunity for questions and clarification was provided.       Patient is only aware of  place and person Sepsis summary complete*  Patient is  incontinent and non-ambulatory     Valuables transported with patient     Patient transported with:   Monitor  O2 @ 2 liters  Registered Nurse  Tech    MAP (Monitor): (!) 61 =Monitored (most recent)  Vitals w/ MEWS Score (last day)     Date/Time MEWS Score Pulse Resp Temp BP Level of Consciousness SpO2    12/23/19 0905        97.6 °F (36.4 °C)          12/23/19 0904        97.6 °F (36.4 °C)          12/23/19 0903        97.6 °F (36.4 °C)          12/23/19 0902        97.6 °F (36.4 °C)          12/23/19 0901        97.6 °F (36.4 °C)          12/23/19 0900    77  14  97.6 °F (36.4 °C)  101/48    100 %    12/23/19 0845    74  14  97.3 °F (36.3 °C)  100/41    95 %    12/23/19 0830    76  13  96.8 °F (36 °C)  104/45        12/23/19 0815    72  15  96.2 °F (35.7 °C)  94/50        12/23/19 0800    71  16  95.6 °F (35.3 °C)  99/59        12/23/19 0730    72  12  (!) 94.4 °F (34.7 °C)  124/63        12/23/19 0715    64  11  (!) 93.7 °F (34.3 °C)  118/63    99 %    12/23/19 0618    68 10  (!) 91.4 °F (33 °C)      99 %    12/23/19 0617    67  9  (!) 91.4 °F (33 °C)      99 %    12/23/19 0616    68  13  (!) 91.3 °F (32.9 °C)      96 %    12/23/19 0615    66  12  (!) 91.3 °F (32.9 °C)  114/57    (!) 84 %    12/23/19 0614    66  10  (!) 91.3 °F (32.9 °C)      (!) 82 %    12/23/19 0600          104/51        12/23/19 0530    (!) 56      116/49        12/23/19 0500    (!) 53      111/51    98 %    12/23/19 0437    (!) 59  10  (!) 90.7 °F (32.6 °C)      98 %    12/23/19 0430    (!) 59  9  (!) 90.8 °F (32.7 °C)  100/49    98 %    12/23/19 0420    74  15  (!) 90.9 °F (32.7 °C)      100 %    12/23/19 0415    68  10  (!) 90.7 °F (32.6 °C)  100/55    100 %    12/23/19 04:13:25        (!) 93.5 °F (34.2 °C)    Alert      12/23/19 0411    61  11    121/59    100 %    12/23/19 0402    65  18    (!) 59/37  Alert  98 %              Septic Patients:     Lactic Acid  Lab Results   Component [facility] Date    [facility]    L[facility] 2[facility]32 ([facility] 12/23/2019    (Most recent on top)  Repeat drawn: YES Time: 0650     ALL LACTIC ACIDS GREATER THAN 2 MUST BE REPEATED POC WITHIN 4 HOURS OR PRIOR TO ADMISSION               Total Fluid Bolus initiated and documented on MAR: YES    All ordered antibiotics initiated within first 3 hours of TIME ZERO?   YES

## 2019-12-24 PROBLEM — K29.80 DUODENITIS: Status: ACTIVE | Noted: 2019-12-24

## 2019-12-24 LAB
ALBUMIN SERPL-MCNC: 2.5 G/DL (ref 3.4–5)
ALBUMIN/GLOB SERPL: 1 {RATIO} (ref 0.8–1.7)
ALP SERPL-CCNC: 179 U/L (ref 45–117)
ALT SERPL-CCNC: 102 U/L (ref 16–61)
ANION GAP SERPL CALC-SCNC: 4 MMOL/L (ref 3–18)
AST SERPL-CCNC: 88 U/L (ref 10–38)
ATRIAL RATE: 63 BPM
BACTERIA SPEC CULT: NORMAL
BASOPHILS # BLD: 0 K/UL (ref 0–0.1)
BASOPHILS NFR BLD: 0 % (ref 0–2)
BILIRUB SERPL-MCNC: 0.4 MG/DL (ref 0.2–1)
BUN SERPL-MCNC: 39 MG/DL (ref 7–18)
BUN/CREAT SERPL: 23 (ref 12–20)
CALCIUM SERPL-MCNC: 8.3 MG/DL (ref 8.5–10.1)
CALCULATED R AXIS, ECG10: -10 DEGREES
CALCULATED T AXIS, ECG11: 139 DEGREES
CHLORIDE SERPL-SCNC: 117 MMOL/L (ref 100–111)
CO2 SERPL-SCNC: 28 MMOL/L (ref 21–32)
CREAT SERPL-MCNC: 1.7 MG/DL (ref 0.6–1.3)
DIAGNOSIS, 93000: NORMAL
DIFFERENTIAL METHOD BLD: ABNORMAL
EOSINOPHIL # BLD: 0 K/UL (ref 0–0.4)
EOSINOPHIL NFR BLD: 0 % (ref 0–5)
ERYTHROCYTE [DISTWIDTH] IN BLOOD BY AUTOMATED COUNT: 15.2 % (ref 11.6–14.5)
GLOBULIN SER CALC-MCNC: 2.6 G/DL (ref 2–4)
GLUCOSE SERPL-MCNC: 83 MG/DL (ref 74–99)
HCT VFR BLD AUTO: 36.2 % (ref 36–48)
HGB BLD-MCNC: 12.3 G/DL (ref 13–16)
LYMPHOCYTES # BLD: 0.5 K/UL (ref 0.9–3.6)
LYMPHOCYTES NFR BLD: 9 % (ref 21–52)
MCH RBC QN AUTO: 31.8 PG (ref 24–34)
MCHC RBC AUTO-ENTMCNC: 34 G/DL (ref 31–37)
MCV RBC AUTO: 93.5 FL (ref 74–97)
MONOCYTES # BLD: 0.3 K/UL (ref 0.05–1.2)
MONOCYTES NFR BLD: 5 % (ref 3–10)
NEUTS SEG # BLD: 5.1 K/UL (ref 1.8–8)
NEUTS SEG NFR BLD: 86 % (ref 40–73)
P-R INTERVAL, ECG05: 212 MS
PLATELET # BLD AUTO: 90 K/UL (ref 135–420)
PMV BLD AUTO: 13.2 FL (ref 9.2–11.8)
POTASSIUM SERPL-SCNC: 3.7 MMOL/L (ref 3.5–5.5)
POTASSIUM SERPL-SCNC: 3.8 MMOL/L (ref 3.5–5.5)
POTASSIUM SERPL-SCNC: 4.1 MMOL/L (ref 3.5–5.5)
PROT SERPL-MCNC: 5.1 G/DL (ref 6.4–8.2)
Q-T INTERVAL, ECG07: 490 MS
QRS DURATION, ECG06: 180 MS
QTC CALCULATION (BEZET), ECG08: 497 MS
RBC # BLD AUTO: 3.87 M/UL (ref 4.7–5.5)
SERVICE CMNT-IMP: NORMAL
SODIUM SERPL-SCNC: 149 MMOL/L (ref 136–145)
VENTRICULAR RATE, ECG03: 62 BPM
WBC # BLD AUTO: 5.9 K/UL (ref 4.6–13.2)

## 2019-12-24 PROCEDURE — 85025 COMPLETE CBC W/AUTO DIFF WBC: CPT

## 2019-12-24 PROCEDURE — 74011000258 HC RX REV CODE- 258: Performed by: HOSPITALIST

## 2019-12-24 PROCEDURE — 65660000000 HC RM CCU STEPDOWN

## 2019-12-24 PROCEDURE — 74011250636 HC RX REV CODE- 250/636: Performed by: HOSPITALIST

## 2019-12-24 PROCEDURE — 74011250636 HC RX REV CODE- 250/636: Performed by: INTERNAL MEDICINE

## 2019-12-24 PROCEDURE — 77030040831 HC BAG URINE DRNG MDII -A

## 2019-12-24 PROCEDURE — 74011000258 HC RX REV CODE- 258: Performed by: INTERNAL MEDICINE

## 2019-12-24 PROCEDURE — 96372 THER/PROPH/DIAG INJ SC/IM: CPT

## 2019-12-24 PROCEDURE — 99218 HC RM OBSERVATION: CPT

## 2019-12-24 PROCEDURE — 84132 ASSAY OF SERUM POTASSIUM: CPT

## 2019-12-24 PROCEDURE — 77010033678 HC OXYGEN DAILY

## 2019-12-24 PROCEDURE — 96366 THER/PROPH/DIAG IV INF ADDON: CPT

## 2019-12-24 RX ORDER — DEXTROSE MONOHYDRATE AND SODIUM CHLORIDE 5; .45 G/100ML; G/100ML
75 INJECTION, SOLUTION INTRAVENOUS CONTINUOUS
Status: DISCONTINUED | OUTPATIENT
Start: 2019-12-24 | End: 2019-12-26

## 2019-12-24 RX ORDER — POTASSIUM CHLORIDE 7.45 MG/ML
10 INJECTION INTRAVENOUS ONCE
Status: COMPLETED | OUTPATIENT
Start: 2019-12-24 | End: 2019-12-24

## 2019-12-24 RX ADMIN — DEXTROSE MONOHYDRATE AND SODIUM CHLORIDE 75 ML/HR: 5; .9 INJECTION, SOLUTION INTRAVENOUS at 06:00

## 2019-12-24 RX ADMIN — Medication 10 ML: at 15:51

## 2019-12-24 RX ADMIN — POTASSIUM CHLORIDE 10 MEQ: 7.46 INJECTION, SOLUTION INTRAVENOUS at 09:53

## 2019-12-24 RX ADMIN — HEPARIN SODIUM 5000 UNITS: 5000 INJECTION INTRAVENOUS; SUBCUTANEOUS at 18:42

## 2019-12-24 RX ADMIN — PIPERACILLIN AND TAZOBACTAM 3.38 G: 3; .375 INJECTION, POWDER, LYOPHILIZED, FOR SOLUTION INTRAVENOUS at 07:52

## 2019-12-24 RX ADMIN — DEXTROSE MONOHYDRATE AND SODIUM CHLORIDE 75 ML/HR: 5; .45 INJECTION, SOLUTION INTRAVENOUS at 10:45

## 2019-12-24 RX ADMIN — POTASSIUM CHLORIDE 10 MEQ: 7.46 INJECTION, SOLUTION INTRAVENOUS at 02:30

## 2019-12-24 RX ADMIN — HEPARIN SODIUM 5000 UNITS: 5000 INJECTION INTRAVENOUS; SUBCUTANEOUS at 00:30

## 2019-12-24 RX ADMIN — Medication 20 ML: at 12:59

## 2019-12-24 RX ADMIN — SODIUM CHLORIDE, SODIUM LACTATE, POTASSIUM CHLORIDE, AND CALCIUM CHLORIDE 250 ML: 600; 310; 30; 20 INJECTION, SOLUTION INTRAVENOUS at 09:55

## 2019-12-24 RX ADMIN — HEPARIN SODIUM 5000 UNITS: 5000 INJECTION INTRAVENOUS; SUBCUTANEOUS at 09:24

## 2019-12-24 RX ADMIN — Medication 10 ML: at 21:37

## 2019-12-24 RX ADMIN — POTASSIUM CHLORIDE 10 MEQ: 7.46 INJECTION, SOLUTION INTRAVENOUS at 00:30

## 2019-12-24 RX ADMIN — Medication 10 ML: at 09:33

## 2019-12-24 RX ADMIN — POTASSIUM CHLORIDE 10 MEQ: 7.46 INJECTION, SOLUTION INTRAVENOUS at 01:30

## 2019-12-24 RX ADMIN — PIPERACILLIN AND TAZOBACTAM 3.38 G: 3; .375 INJECTION, POWDER, LYOPHILIZED, FOR SOLUTION INTRAVENOUS at 21:35

## 2019-12-24 RX ADMIN — Medication 10 ML: at 12:59

## 2019-12-24 RX ADMIN — PIPERACILLIN AND TAZOBACTAM 3.38 G: 3; .375 INJECTION, POWDER, LYOPHILIZED, FOR SOLUTION INTRAVENOUS at 15:50

## 2019-12-24 NOTE — PROGRESS NOTES
0840: Day 2 of attempting PT evaluation. Per JER Landers, not appropriate for PT evaluation at bed level or EOB at this time. Will follow up as appropriate.

## 2019-12-24 NOTE — PROGRESS NOTES
Physical Exam  Skin:     General: Skin is cool and moist.      Capillary Refill: Capillary refill takes less than 2 seconds. Coloration: Skin is pale. Findings: Ecchymosis and erythema present. Bedside shift change report was given to Alec garcia RN  ( ICU night shift nurse), barrie RN ( ICU day shift nurse). Report included the following information:  SBAR, kardex, consultations, hemodynamic monitoring, intake/output, MAR, lab results, VS trends, cardiac rhythm noted First degree AVB w/ PAC's & LBBB. Skin, neuro, respiratory status, order verification, and critical IV gtt rate verification has been dually noted and verified at the bedside with:  Corbin De Guzman RN                                       2600 ICU Nurse's Note:    2000: Pt is drowsy, arouses briefly to verbal command. He is confused and requires verbal redirection and reinforcement. Pt independently moves all extremities with noted generalized weakness. He requires assist with all ADLs and repositioning. Family members are present and attentive at the bedside. Call bell and bedside table are within reach. Interventions are ongoing. Neurological: as noted in assessment   Cardiovascular:  First degree AVB w/ PAC's & LBBB on the monitor. Pulmonary: breath sounds coarse, diminished, saturations maintained at 97% on 2 LPM via NC.  GI/: Abdomen is obese, soft, non-distended. Last BM noted 12/22/2019. Farrell catheter intact with  Kinsey colored urine output. IVF/Critical gtts: IV abx, IV maintenance fluid, Levophed gtt  Skin: as noted above, GERALDO score: 3, BERNY score: 11    0730: Bedside change of shift report given to:  Arben Ludwig RN

## 2019-12-24 NOTE — DIABETES MGMT
NUTRITIONAL ASSESSMENT GLYCEMIC CONTROL/ PLAN OF CARE     Brian Ayala           80 y.o.           12/23/2019                 1. Septic shock ([address]    2. Cholangitis       INTERVENTIONS/PLAN:   Monitor po intake, labs and weights. Will try Ensure, one bottle daily and check acceptance. ASSESSMENT:   Pt is 104% ideal weight;  BMI (calculated): 23.0 kg/m2 (normal weight classification). Although BMI is in normal range and pt denies any recent weight loss, he is at nutrition risk due to poor po intake and documented unintentional weight loss as below. Nutrition Diagnoses:   Inadequate oral food and beverage intake due to poor appetite as evidenced by zero po intake at breakfast 12/24/19. Unintentional weight loss due to inadequate energy intake as evidenced by documented 33 lb weight loss in past 10 months. SUBJECTIVE/OBJECTIVE:   Information obtained from: chart review, pt  Pt admitted with sepsis due to intraabdominal source with duodenitis, hypotension, cholelithiasis without clear evidence of cholecystitis. MARICRUZ due to dehydration and hypotension, abnormal LFT's, hypernatremia, hypokalemia. Nursing notes indicate pt with skin tears to right hand, wrists and  forearm, excoriation to bilateral groin region  12/24:  Pt reports his appetite has been poor but his weight has been stable. He denies having food allergies and does not have issues with chewing or swallowing (noted multiple missing teeth but pt still reports he can eat everything). Diet: regular - zero po intake at breakfast with pt stating he does not want to eat anything. No data found.     Medications: [x]                Reviewed   IVF:  D5 NS at 75 ml/hr (providing 306 calories/24 hours)    Most Recent POC Glucose:   Recent Labs     12/24/19  0330 12/23/19  0409   GLU 83 78         Labs:   No results found for: HBA1C, HGBE8, JDQ5MPRV  Lab Results   Component Value Date/Time    Sodium 149 (H) 12/24/2019 03:30 AM    Potassium 3.7 12/24/2019 08:24 AM    Chloride 117 (H) 12/24/2019 03:30 AM    CO2 28 12/24/2019 03:30 AM    Anion gap 4 12/24/2019 03:30 AM    Glucose 83 12/24/2019 03:30 AM    BUN 39 (H) 12/24/2019 03:30 AM    Creatinine 1.70 (H) 12/24/2019 03:30 AM    Calcium 8.3 (L) 12/24/2019 03:30 AM    Magnesium 1.9 12/23/2019 09:42 PM    Phosphorus 3.4 12/23/2019 12:52 PM    Albumin 2.5 (L) 12/24/2019 03:30 AM       Anthropometrics: IBW : 66.2 kg (146 lb), % IBW (Calculated): 103.74 %, BMI (calculated): 23  Wt Readings from Last 1 Encounters:   12/23/19 68.7 kg (151 lb 7.3 oz)      Ht Readings from Last 1 Encounters:   12/23/19 5' 8\" (1.727 m)     Last Weight Metrics:  Weight Loss Metrics 12/23/2019 12/16/2019 2/12/2019 9/10/2018 2/13/2018 1/30/2018 9/15/2017   Today's Wt 151 lb 7.3 oz 150 lb 184 lb 180 lb 185 lb 185 lb 190 lb   BMI 23.03 kg/m2 22.81 kg/m2 27.98 kg/m2 27.37 kg/m2 28.13 kg/m2 28.13 kg/m2 28.89 kg/m2       Estimated Nutrition Needs:  9267 Kcals/day, Protein (g): 82 g Fluid (ml): 1800 ml  Based on:   [x]          Actual BW    []          ABW   []            Adjusted BW         Nutrition Interventions:  None at this time  Goal:   Pt will consume > 75% meals by 12/29/19. Weight maintenance (+/- 1-2 kg) or weight gain of 1-2 lbs/week by 1/6/20.         Nutrition Monitoring and Evaluation      [x]     Monitor po intake on meal rounds  [x]     Continue inpatient monitoring and intervention  []     Other:      Nutrition Risk:  []   High     [x]  Moderate    []  Minimal/Uncompromised    Sugey Charles RD, CDE   Office:  [address] [place] [address] Pager:  [phone]

## 2019-12-24 NOTE — PROGRESS NOTES
0700am- Bedside report received from [address] [place] [address], RN  0800am- Pt is stable but confused. 1000am- Wrapped keflix onto Left AC PIV r/t pt is confused and picking at the PIV.  1200pm- Pt threw the kerlix and was attempting to pull the left PIV and bleeding noted from the site. D/C PIV. 1530pm- Called the son, Rick Hernandez, r/t pt is more confused and picking at his right nostril and bleeding everywhere noted. Attempt the clean his nares but pt is trying to swing his arm. Called Rick Hernandez and notify of his father's change condition. 1600pm-The son Rick Hernandez stayed in the room with pt.  1715pm- The son wasn't able to stay with pt. Called Dr. Melody Almonte. Ok to apply bilateral mitts on pt. The son Rick Hernandez is aware of Mitts application. 1840pm- d/c Farrell Catheter and Condom Catheter has been applied. 1900pm Bedside shift change report given to Reuben Kauffman by Trino Bond RN. Report included the following information SBAR, Kardex, Intake/Output, MAR, Accordion, Recent Results, Med Rec Status, Cardiac Rhythm NSR with BBB and Alarm Parameters .

## 2019-12-24 NOTE — PROGRESS NOTES
Spoke with pt and family at bedside. Discussed probably need for SNF at discharge. They are agreeable for pt to be matched out to [facility] area facilities and will choose from accepting facilities. State pt would not have needed funds for 6 months of long term care out of pocket if needed. Posted to [facility] facilities in [facility]. Family given star rating snf list.  Referral to [employer] given yesterday, however, son Narendra Neumann states his phone  and did not receive call for assistance with Medicaid christina. Will contact [employer] and ask to see in room today.

## 2019-12-24 NOTE — PROGRESS NOTES
Progress Note      Patient: Patricio Carrear               Sex: male          DOA: 12/23/2019       YOB: 1934      Age:  80 y.o.        LOS:  LOS: 0 days             CHIEF COMPLAINT:  Abdominal Pain      Assessment/Plan:     Principal Problem:    Abdominal pain (12/23/2019)    Active Problems:    Hypothermia (12/23/2019)      Transaminitis (12/23/2019)      Chronic renal failure, stage 3 (moderate) ([address] (12/23/2019)      SIRS (systemic inflammatory response syndrome) ([facility] (12/23/2019)      Gastritis (12/23/2019)        PLAN:  - At this time, no clear source for infection other than possible abd. CT shows duodenitis and gastritis. - Will cont zosyn but likely de-escalate july if afebrile and improved  - Wean norepi  - Follow cultures  - Cont IVFs for now  - Cr at baseline  - Trend BMP  - LFTs improved from prev visit  - Trend for now  - PPI for gastritis  - Cont acceptable home medications for chronic conditions   - DVT protocol      Subjective:     Pt said he is doing OK. He denies specific complaints. Objective:     Visit Vitals  /57   Pulse 87   Temp 97.7 °F (36.5 °C)   Resp 14   Ht 5' 8\" (1.727 m)   Wt 68.7 kg (151 lb 7.3 oz)   SpO2 99%   BMI 23.03 kg/m²        Physical Exam:  Ears: hearing is intact  Eyes: Icterus is not present  Lungs: clear to auscultation bilaterally  Heart: regular rate and rhythm, S1, S2 normal, no murmur, click, rub or gallop  Gastrointestinal: soft, non-tender.  Bowel sounds normal. No masses,  no organomegaly  Neurological:  New Focal Deficits are not present  Psychiatric:  Mood is stable    Lab/Data Reviewed:  CMP:   Lab Results   Component Value Date/Time     (H) 12/24/2019 03:30 AM    K 3.7 12/24/2019 08:24 AM     (H) 12/24/2019 03:30 AM    CO2 28 12/24/2019 03:30 AM    AGAP 4 12/24/2019 03:30 AM    GLU 83 12/24/2019 03:30 AM    BUN 39 (H) 12/24/2019 03:30 AM    CREA 1.70 (H) 12/24/2019 03:30 AM    GFRAA 47 (L) 12/24/2019 03:30 AM    GFRNA 39 (L) 12/24/2019 03:30 AM    CA 8.3 (L) 12/24/2019 03:30 AM    MG 1.9 12/23/2019 09:42 PM    ALB 2.5 (L) 12/24/2019 03:30 AM    TP 5.1 (L) 12/24/2019 03:30 AM    GLOB 2.6 12/24/2019 03:30 AM    AGRAT 1.0 12/24/2019 03:30 AM    SGOT 88 (H) 12/24/2019 03:30 AM     (H) 12/24/2019 03:30 AM     CBC:   Lab Results   Component Value Date/Time    WBC 5.9 12/24/2019 03:30 AM    HGB 12.3 (L) 12/24/2019 03:30 AM    HCT 36.2 12/24/2019 03:30 AM    PLT 90 (L) 12/24/2019 03:30 AM       Imaging Reviewed:  No results found.

## 2019-12-24 NOTE — PROGRESS NOTES
Progress Note  Pulmonary, Critical Care, and Sleep Medicine    Name: Ketan Jalloh MRN: [id number]   : 1934 Hospital: [address] [place] [address]   Date: 2019        IMPRESSION:   · Sepsis due to intraabdominal source with duodenitis, gastritis  · Hypotension due to septic and possibly hypovolemic shock, responsive to fluid resuscitation  · Cholelithiasis without clear evidence of cholecystitis  · Lactic acidosis resolved  · MARICRUZ due to dehydration and hypotension  · Abnormal LFT's with continued improvement  · Thrombocytopenia stable  · Hypernatremia   · Hypokalemia improved      PLAN:   · Continue Zosyn empirically. Good clinical response noted  · Maintenance IV fluids. Bolus balanced sloution if hypotension recurs. May use Norepinephrine for refractory hypotension  · Monitor renal indices  · Change IVF to D5 0.45  · Monitor electrolytes and replace as needed  · Trend platelets, no indication for transfusion at this time  · Prophylaxis issues addressed  · Aspiration precautions     Subjective/Interval History:   I have reviewed the flowsheet and previous days notes. Reviewed interval history. Discussed management with nursing staff. 85/F presenting to the ED with generalized weakness and confusion after frequent ED visits over the past week. Found to be hypotensive and hypothermic in the ED with lactic acidosis and brief need for Norepinephrine. 19  Pt still refusing po feeds but without specific complaints. No chest pain or shortness of breath. Mental status is not altered  No diarrhea      ROS:Pertinent items are noted in HPI. Orders reviewed including medications. Changes made if indicated. Telemetry monitor reviewed at the bedside.   Objective:   Vital Signs:    Visit Vitals  /54   Pulse 85   Temp 98.4 °F (36.9 °C)   Resp 17   Ht 5' 8\" (1.727 m)   Wt 68.7 kg (151 lb 7.3 oz)   SpO2 100%   BMI 23.03 kg/m²       O2 Device: Nasal cannula   O2 Flow Rate (L/min): 2 l/min   Temp (24hrs), Av.5 °F (36.4 °C), Min:95.7 °F (35.4 °C), Max:99 °F (37.2 °C)       Intake/Output:   Last shift:       07 - 1900  In: 100 [I.V.:100]  Out: 30 [Urine:30]  Last 3 shifts: 1901 -  07  In: 5472.8 [I.V.:5472.8]  Out: 1660 [Urine:1660]    Intake/Output Summary (Last 24 hours) at 2019 1738  Last data filed at 2019 0800  Gross per 24 hour   Intake 4472.78 ml   Output 1690 ml   Net 2782.78 ml        Physical Exam:    General:  Alert, cooperative, in no distress, appears weak   Head:  Normocephalic, without obvious abnormality, atraumatic. Eyes:  ANicteric, PERRL,   Nose: Nares normal. Mucosa normal. No drainage or sinus tenderness. Throat: Lips, mucosa, and tongue normal. NO Thrush   Neck: Supple, symmetrical, trachea midline, no adenopathy, thyroid: no enlargment/tenderness/nodules    Back:   Symmetric    Lungs:   Bilateral auscultation no rales or wheezes anteriorly   Chest wall:  No tenderness or deformity. NO intercostal retractions   Heart:  Regular rate and rhythm, S1, S2 normal, no murmur, click, rub or gallop. Abdomen:   Soft, non-tender. Bowel sounds normal. No masses,  No organomegaly. NO paradoxical motion   Extremities: normal, atraumatic, no cyanosis, trace edema   Pulses: 2+ and symmetric all extremities. Skin: Skin color, texture, turgor normal. No rashes or lesions.  NO clubbing   Lymph nodes: Cervical, supraclavicular  nodes normal.   Neurologic: Grossly nonfocal      :        Devices:             Drips:    DATA:  Labs:  Recent Labs     19  0330 19  0409   WBC 5.9 4.5*   HGB 12.3* 14.6   HCT 36.2 43.0   PLT 90* 89*     Recent Labs     19  0824 19  0330 19  2142 19  1252 19  0409   NA  --  149*  --   --  146*   K 3.7 3.8 3.4* 3.6 3.4*   CL  --  117*  --   --  108   CO2  --  28  --   --  31   GLU  --  83  --   --  78   BUN  --  39*  --   --  45*   CREA  --  1.70*  --   --  1.67*   CA  --  8.3*  --   --  9.5 MG  --   --  1.9 1.8  --    PHOS  --   --   --  3.4  --    ALB  --  2.5*  --   --  3.1*   SGOT  --  88*  --   --  121*   ALT  --  102*  --   --  149*     No results for input(s): PH, PCO2, PO2, HCO3, FIO2 in the last 72 hours. Imaging:  []        I have personally reviewed the patients radiographs and reports  []         []        No change from prior, tubes and lines in adequate position  []        Improved   []        Worsening  High complexity decision making was performed during this consultation and evaluation.  Critical care time exclusive of procedures spent managing the patient: 34     minutes    Wilner Amos MD

## 2019-12-24 NOTE — PROGRESS NOTES
Physical Exam  Skin:     General: Skin is cool and moist.      Capillary Refill: Capillary refill takes less than 2 seconds. Coloration: Skin is pale. Findings: Ecchymosis and erythema present.

## 2019-12-24 NOTE — PROGRESS NOTES
Problem: Discharge Planning  Goal: *Discharge to safe environment  Outcome: Progressing Towards Goal     Problem: Falls - Risk of  Goal: *Absence of Falls  Description  Document Carlita Flores Fall Risk and appropriate interventions in the flowsheet. Outcome: Progressing Towards Goal  Note: Fall Risk Interventions:       Mentation Interventions: Evaluate medications/consider consulting pharmacy, More frequent rounding, Room close to nurse's station, Toileting rounds    Medication Interventions: Evaluate medications/consider consulting pharmacy, Bed/chair exit alarm    Elimination Interventions: Call light in reach, Toileting schedule/hourly rounds              Problem: Patient Education: Go to Patient Education Activity  Goal: Patient/Family Education  Outcome: Progressing Towards Goal     Problem: Pressure Injury - Risk of  Goal: *Prevention of pressure injury  Description  Document Alex Scale and appropriate interventions in the flowsheet. Outcome: Progressing Towards Goal  Note: Pressure Injury Interventions:  Sensory Interventions: Assess need for specialty bed, Keep linens dry and wrinkle-free, Minimize linen layers, Pressure redistribution bed/mattress (bed type), Turn and reposition approx. every two hours (pillows and wedges if needed)    Moisture Interventions: Check for incontinence Q2 hours and as needed, Internal/External urinary devices, Moisture barrier, Minimize layers    Activity Interventions: Assess need for specialty bed    Mobility Interventions: Assess need for specialty bed, Pressure redistribution bed/mattress (bed type), HOB 30 degrees or less, Turn and reposition approx.  every two hours(pillow and wedges)    Nutrition Interventions: Document food/fluid/supplement intake    Friction and Shear Interventions: HOB 30 degrees or less, Apply protective barrier, creams and emollients, Minimize layers                Problem: Patient Education: Go to Patient Education Activity  Goal: Patient/Family Education  Outcome: Progressing Towards Goal     Problem: General Medical Care Plan  Goal: *Vital signs within specified parameters  Outcome: Progressing Towards Goal  Goal: *Labs within defined limits  Outcome: Progressing Towards Goal  Goal: *Absence of infection signs and symptoms  Outcome: Progressing Towards Goal  Goal: *Optimal pain control at patient's stated goal  Outcome: Progressing Towards Goal  Goal: *Skin integrity maintained  Outcome: Progressing Towards Goal  Goal: *Fluid volume balance  Outcome: Progressing Towards Goal  Goal: *Optimize nutritional status  Outcome: Progressing Towards Goal  Goal: *Anxiety reduced or absent  Outcome: Progressing Towards Goal  Goal: *Progressive mobility and function (eg: ADL's)  Outcome: Progressing Towards Goal     Problem: Pain  Goal: *Control of Pain  Outcome: Progressing Towards Goal     Problem: Patient Education: Go to Patient Education Activity  Goal: Patient/Family Education  Outcome: Progressing Towards Goal     Problem:  Body Temperature -  Risk of, Imbalanced  Goal: *Absence of heat stress or hyperthermia signs and symptoms  Outcome: Progressing Towards Goal  Goal: *Absence of cold stress or hypothermia signs and symptoms  Outcome: Progressing Towards Goal     Problem: Patient Education: Go to Patient Education Activity  Goal: Patient/Family Education  Outcome: Progressing Towards Goal     Problem: Hypotension  Goal: *Blood pressure within specified parameters  Outcome: Progressing Towards Goal  Goal: *Fluid volume balance  Outcome: Progressing Towards Goal  Goal: *Labs within defined limits  Outcome: Progressing Towards Goal     Problem: Patient Education: Go to Patient Education Activity  Goal: Patient/Family Education  Outcome: Progressing Towards Goal     Problem: Delirium Treatment  Goal: *Level of consciousness restored to baseline  Outcome: Progressing Towards Goal  Goal: *Level of environmental perceptions restored to baseline  Outcome: Progressing Towards Goal  Goal: *Sensory perception restored to baseline  Outcome: Progressing Towards Goal  Goal: *Emotional stability restored to baseline  Outcome: Progressing Towards Goal  Goal: *Functional assessment restored to baseline  Outcome: Progressing Towards Goal  Goal: *Absence of falls  Outcome: Progressing Towards Goal  Goal: *Will remain free of delirium, CAM Score negative  Outcome: Progressing Towards Goal  Goal: *Cognitive status will be restored to baseline  Outcome: Progressing Towards Goal  Goal: Interventions  Outcome: Progressing Towards Goal     Problem: Patient Education: Go to Patient Education Activity  Goal: Patient/Family Education  Outcome: Progressing Towards Goal

## 2019-12-25 PROCEDURE — 74011000258 HC RX REV CODE- 258: Performed by: INTERNAL MEDICINE

## 2019-12-25 PROCEDURE — 74011250637 HC RX REV CODE- 250/637: Performed by: HOSPITALIST

## 2019-12-25 PROCEDURE — 74011000258 HC RX REV CODE- 258: Performed by: HOSPITALIST

## 2019-12-25 PROCEDURE — 65660000000 HC RM CCU STEPDOWN

## 2019-12-25 PROCEDURE — 74011250636 HC RX REV CODE- 250/636: Performed by: HOSPITALIST

## 2019-12-25 RX ADMIN — PANTOPRAZOLE SODIUM 40 MG: 40 TABLET, DELAYED RELEASE ORAL at 17:41

## 2019-12-25 RX ADMIN — Medication 10 ML: at 05:28

## 2019-12-25 RX ADMIN — DEXTROSE MONOHYDRATE AND SODIUM CHLORIDE 75 ML/HR: 5; .45 INJECTION, SOLUTION INTRAVENOUS at 20:28

## 2019-12-25 RX ADMIN — Medication 10 ML: at 13:00

## 2019-12-25 RX ADMIN — Medication 20 ML: at 17:41

## 2019-12-25 RX ADMIN — HEPARIN SODIUM 5000 UNITS: 5000 INJECTION INTRAVENOUS; SUBCUTANEOUS at 17:42

## 2019-12-25 RX ADMIN — PIPERACILLIN AND TAZOBACTAM 3.38 G: 3; .375 INJECTION, POWDER, LYOPHILIZED, FOR SOLUTION INTRAVENOUS at 21:40

## 2019-12-25 RX ADMIN — HEPARIN SODIUM 5000 UNITS: 5000 INJECTION INTRAVENOUS; SUBCUTANEOUS at 11:05

## 2019-12-25 RX ADMIN — PANTOPRAZOLE SODIUM 40 MG: 40 TABLET, DELAYED RELEASE ORAL at 11:05

## 2019-12-25 RX ADMIN — PIPERACILLIN AND TAZOBACTAM 3.38 G: 3; .375 INJECTION, POWDER, LYOPHILIZED, FOR SOLUTION INTRAVENOUS at 14:11

## 2019-12-25 RX ADMIN — Medication 10 ML: at 21:41

## 2019-12-25 RX ADMIN — PIPERACILLIN AND TAZOBACTAM 3.38 G: 3; .375 INJECTION, POWDER, LYOPHILIZED, FOR SOLUTION INTRAVENOUS at 05:28

## 2019-12-25 RX ADMIN — HEPARIN SODIUM 5000 UNITS: 5000 INJECTION INTRAVENOUS; SUBCUTANEOUS at 00:55

## 2019-12-25 RX ADMIN — Medication 10 ML: at 17:41

## 2019-12-25 NOTE — PROGRESS NOTES
Progress Note      Patient: Maryjo Banks               Sex: male          DOA: 12/23/2019       YOB: 1934      Age:  80 y.o.        LOS:  LOS: 1 day             CHIEF COMPLAINT:  Abdominal Pain      Assessment/Plan:     Principal Problem:    Abdominal pain (12/23/2019)    Active Problems:    Hypothermia (12/23/2019)      Transaminitis (12/23/2019)      Chronic renal failure, stage 3 (moderate) ([facility] [address] [address] (12/23/2019)      SIRS (systemic inflammatory response syndrome) ([facility] [address] (12/23/2019)      Gastritis (12/23/2019)      Duodenitis (12/24/2019)        PLAN:  - At this time, no clear source for infection other than possible abd. CT shows duodenitis and gastritis. - Will cont zosyn but likely de-escalate july if afebrile and improved. Can likely switch to PO tomorrow and possibly dc. - Pressors weaned off  - Follow cultures: Blood cx no growth  - Cont IVFs for now  - Cr at baseline  - Trend BMP  - LFTs improved from prev visit  - Trend for now  - PPI for gastritis  - Cont acceptable home medications for chronic conditions   - DVT protocol      Subjective:     Overnight pt was confused and picking at his nostril and bleeding everywhere noted. Bilateral mitts were obtained. This morning, pt said he is doing OK. No other complaints. Objective:     Visit Vitals  /61   Pulse (!) 58   Temp 97.6 °F (36.4 °C)   Resp 18   Ht 5' 8\" (1.727 m)   Wt 68.7 kg (151 lb 7.3 oz)   SpO2 99%   BMI 23.03 kg/m²        Physical Exam:  Ears: hearing is intact  Eyes: Icterus is not present  Lungs: clear to auscultation bilaterally  Heart: regular rate and rhythm, S1, S2 normal, no murmur, click, rub or gallop  Gastrointestinal: soft, non-tender.  Bowel sounds normal. No masses,  no organomegaly  Neurological:  New Focal Deficits are not present  Psychiatric:  Mood is stable    Lab/Data Reviewed:  CMP:   Lab Results   Component Value Date/Time    K 4.1 12/24/2019 04:31 PM     CBC:   No results found for: WBC, HGB, HGBEXT, HCT, HCTEXT, PLT, PLTEXT, HGBEXT, HCTEXT, PLTEXT    Imaging Reviewed:  No results found.

## 2019-12-25 NOTE — PROGRESS NOTES
7215  -- TRANSFER - IN REPORT:    PT being received from ICU (unit) for routine progression of care      Report consisted of patients Situation, Background, Assessment and   Recommendations(SBAR). Information from the following report(s) SBAR, Procedure Summary, MAR and Recent Results was reviewed with the receiving nurse. Opportunity for questions and clarification was provided. Assessment completed upon patients arrival to unit and care assumed. Cardiac monitor applied. Bilateral soft mitten restraints in place, new order for restraints due 12/25/19 at 1700. Pt due for urine output at 0900 on 12/25/19, bladder scan and straight cath performed at 0300 12/25/19.     0700 -- Bedside, Verbal and Written shift change report given to [address] [place] [address] [place] (oncoming nurse) by Vishal He nurse). Report included the following information SBAR, Kardex, Intake/Output, MAR and Recent Results. Skin assessment completed.

## 2019-12-25 NOTE — PROGRESS NOTES
PHYSICAL THERAPY NOTE    13:17PM  New PT Order noted. Chart reviewed. Attempted to see patient. Per nurse Krupa Ramesh, hold PT for another day. Son Jaylin Chacon at bedside requesting that he be called on what time patient will be seen for therapy tomorrow so he can be present. Son's cell # [phone]      Joana Dawn.  [place]ta Sever

## 2019-12-25 NOTE — ROUTINE PROCESS
1930 Bedside and Verbal shift change  Received from Og Dutton RN (outgoing nurse), to ONLEIA Alexandre (oncoming)  Pt. Is AOX 2. IV patent and infusing well, Pt. denies  pain at this time. Report included the following information SBAR, Kardex, Procedure Summary, Intake/Output, MAR, Recent Lab Results, and  Cardiac Rhythm @ afib. Will resume care and monitor Pt. Condition. Pt. Educated on call bell when in need of help and assistance. Pt. Krishan [place] to verbalized  understanding. Pt. Head to toe Assessment Done and documented. 2030  Pt. Given face wash. Pt. Family in room. 2130  Pt. Conversing with family calm at [facility] stime. 2230  Pt. Given water, able to sip and drink water well. 2345  Pt. Given incontinent care, nahomy care, change  Pads, gown, and tried to replace condom cath. Pt. Became combative and cursing. 0000  Bladder scan done, 229ml noted. 0030  Notified Dr. Carlos Grimaldo of Pt. Bladder scan MD order to bladder scan again in 3 hours. 0200  Pt. Resting in bed, calm at this time. 0308  Pt. Bladder scan done 325ml noted. Notified Dr Carlos Grimaldo MD ordered to do straight cath x1. 
 
6795  Straight cath done and 3ml of cyu noted. 0500  Pt. Resting in bed, eyes closed, calm. 0600  TRANSFER - OUT REPORT: 
 
 report given to [facility] (name) on [facility]  being transferred to (unit) for routine progression of care Report consisted of patients Situation, Background, Assessment and  
Recommendations(SBAR). Information from the following report(s) Kardex, MAR, Accordion, Recent Results and Cardiac Rhythm Afib was reviewed with the receiving nurse. Lines: PICC Triple Lumen 84/03/91 Right;Basilic (Active) Central Line Being Utilized Yes 12/24/2019  8:00 PM  
Criteria for Appropriate Use Irritant/vesicant medication 12/24/2019  8:00 PM  
Site Assessment Clean, dry, & intact 12/24/2019  8:00 PM  
Phlebitis Assessment 0 12/24/2019  8:00 PM  
 Infiltration Assessment 0 12/24/2019  8:00 PM  
Arm Circumference (cm) 27 cm 12/23/2019 12:45 PM  
Date of Last Dressing Change 12/23/19 12/24/2019  8:00 PM  
Dressing Status Clean, dry, & intact 12/24/2019  8:00 PM  
External Catheter Length (cm) 0 centimeters 12/23/2019  4:00 PM  
Dressing Type Transparent 12/24/2019  8:00 PM  
Action Taken Open ports on tubing capped 12/24/2019  8:00 PM  
Hub Color/Line Status Infusing;Flushed 12/24/2019  8:00 PM  
Positive Blood Return (Site #1) Yes 12/24/2019  4:00 PM  
Hub Color/Line Status Infusing 12/24/2019  8:00 PM  
Positive Blood Return (Site #2) Yes 12/24/2019  4:00 PM  
Hub Color/Line Status Infusing 12/24/2019  8:00 PM  
Positive Blood Return (Site #3) Yes 12/24/2019  4:00 PM  
Alcohol Cap Used Yes 12/24/2019  4:00 PM  
  
 
Opportunity for questions and clarification was provided. 0630 Patient transported with: 
 Monitor Registered Nurse RN and [facility]. Pt. Able to tolerate transportation well.

## 2019-12-25 NOTE — ROUTINE PROCESS
Received verbal bedside report from Erich, [facility], [facility]. Pt awake, alert and oriented x 1, displays no visual signs of pain. Mitten restraints released and skin assessed, no signs of skin breakdown or impaired circulation. No signs of distress noted at this time. Call bell within reach, bed in the lowest locked position. 1310 - Pt's son at bedside. Mitten restraints released and taken off while son at bedside. Pt is calm and engaging in conversation with son. Pt is still confused but knows son, speech is clearer and is speaking in full sentences. 12 - Pt's son left and pt returned to pulling at PICC line and could not be distracted from pulling. Mittens reapplied, pt very confused but calmer once mittens were reapplied.

## 2019-12-26 PROCEDURE — 74011000258 HC RX REV CODE- 258: Performed by: INTERNAL MEDICINE

## 2019-12-26 PROCEDURE — 97162 PT EVAL MOD COMPLEX 30 MIN: CPT

## 2019-12-26 PROCEDURE — 74011250637 HC RX REV CODE- 250/637: Performed by: HOSPITALIST

## 2019-12-26 PROCEDURE — 65660000000 HC RM CCU STEPDOWN

## 2019-12-26 PROCEDURE — 74011000258 HC RX REV CODE- 258: Performed by: HOSPITALIST

## 2019-12-26 PROCEDURE — 74011250636 HC RX REV CODE- 250/636: Performed by: HOSPITALIST

## 2019-12-26 RX ADMIN — Medication 10 ML: at 06:18

## 2019-12-26 RX ADMIN — Medication 20 ML: at 13:29

## 2019-12-26 RX ADMIN — Medication 10 ML: at 23:00

## 2019-12-26 RX ADMIN — PIPERACILLIN AND TAZOBACTAM 3.38 G: 3; .375 INJECTION, POWDER, LYOPHILIZED, FOR SOLUTION INTRAVENOUS at 13:30

## 2019-12-26 RX ADMIN — PIPERACILLIN AND TAZOBACTAM 3.38 G: 3; .375 INJECTION, POWDER, LYOPHILIZED, FOR SOLUTION INTRAVENOUS at 06:18

## 2019-12-26 RX ADMIN — Medication 10 ML: at 13:35

## 2019-12-26 RX ADMIN — PANTOPRAZOLE SODIUM 40 MG: 40 TABLET, DELAYED RELEASE ORAL at 10:06

## 2019-12-26 RX ADMIN — DEXTROSE MONOHYDRATE AND SODIUM CHLORIDE 75 ML/HR: 5; .45 INJECTION, SOLUTION INTRAVENOUS at 10:10

## 2019-12-26 NOTE — PROGRESS NOTES
Problem: Discharge Planning  Goal: *Discharge to safe environment  Outcome: Progressing Towards Goal     Problem: Falls - Risk of  Goal: *Absence of Falls  Description  Document Catherene Bunting Fall Risk and appropriate interventions in the flowsheet. Outcome: Progressing Towards Goal  Note: Fall Risk Interventions:       Mentation Interventions: Adequate sleep, hydration, pain control, Bed/chair exit alarm, Door open when patient unattended, Increase mobility, More frequent rounding, Reorient patient, Room close to nurse's station, Update white board, Toileting rounds    Medication Interventions: Evaluate medications/consider consulting pharmacy    Elimination Interventions: Call light in reach, Patient to call for help with toileting needs, Toileting schedule/hourly rounds              Problem: Patient Education: Go to Patient Education Activity  Goal: Patient/Family Education  Outcome: Progressing Towards Goal     Problem: Pressure Injury - Risk of  Goal: *Prevention of pressure injury  Description  Document Alex Scale and appropriate interventions in the flowsheet.   Outcome: Progressing Towards Goal  Note: Pressure Injury Interventions:  Sensory Interventions: Assess changes in LOC    Moisture Interventions: Absorbent underpads    Activity Interventions: Pressure redistribution bed/mattress(bed type)    Mobility Interventions: Pressure redistribution bed/mattress (bed type)    Nutrition Interventions: Document food/fluid/supplement intake    Friction and Shear Interventions: Lift sheet, Transferring/repositioning devices                Problem: Patient Education: Go to Patient Education Activity  Goal: Patient/Family Education  Outcome: Progressing Towards Goal     Problem: General Medical Care Plan  Goal: *Vital signs within specified parameters  Outcome: Progressing Towards Goal  Goal: *Labs within defined limits  Outcome: Progressing Towards Goal  Goal: *Absence of infection signs and symptoms  Outcome: Progressing Towards Goal  Goal: *Optimal pain control at patient's stated goal  Outcome: Progressing Towards Goal  Goal: *Skin integrity maintained  Outcome: Progressing Towards Goal  Goal: *Fluid volume balance  Outcome: Progressing Towards Goal  Goal: *Optimize nutritional status  Outcome: Progressing Towards Goal  Goal: *Anxiety reduced or absent  Outcome: Progressing Towards Goal  Goal: *Progressive mobility and function (eg: ADL's)  Outcome: Progressing Towards Goal     Problem: Pain  Goal: *Control of Pain  Outcome: Progressing Towards Goal     Problem:  Body Temperature -  Risk of, Imbalanced  Goal: *Absence of heat stress or hyperthermia signs and symptoms  Outcome: Progressing Towards Goal  Goal: *Absence of cold stress or hypothermia signs and symptoms  Outcome: Progressing Towards Goal     Problem: Patient Education: Go to Patient Education Activity  Goal: Patient/Family Education  Outcome: Progressing Towards Goal     Problem: Hypotension  Goal: *Blood pressure within specified parameters  Outcome: Progressing Towards Goal  Goal: *Fluid volume balance  Outcome: Progressing Towards Goal  Goal: *Labs within defined limits  Outcome: Progressing Towards Goal     Problem: Patient Education: Go to Patient Education Activity  Goal: Patient/Family Education  Outcome: Progressing Towards Goal     Problem: Delirium Treatment  Goal: *Level of consciousness restored to baseline  Outcome: Progressing Towards Goal  Goal: *Level of environmental perceptions restored to baseline  Outcome: Progressing Towards Goal  Goal: *Sensory perception restored to baseline  Outcome: Progressing Towards Goal  Goal: *Emotional stability restored to baseline  Outcome: Progressing Towards Goal  Goal: *Functional assessment restored to baseline  Outcome: Progressing Towards Goal  Goal: *Absence of falls  Outcome: Progressing Towards Goal  Goal: *Will remain free of delirium, CAM Score negative  Outcome: Progressing Towards Goal  Goal: *Cognitive status will be restored to baseline  Outcome: Progressing Towards Goal  Goal: Interventions  Outcome: Progressing Towards Goal     Problem: Non-Violent Restraints  Goal: *Removal from restraints as soon as assessed to be safe  Outcome: Progressing Towards Goal  Goal: *No harm/injury to patient while restraints in use  Outcome: Progressing Towards Goal  Goal: *Patient's dignity will be maintained  Outcome: Progressing Towards Goal  Goal: *Patient Specific Goal (EDIT GOAL, INSERT TEXT)  Outcome: Progressing Towards Goal  Goal: Non-violent Restaints:Standard Interventions  Outcome: Progressing Towards Goal  Goal: Non-violent Restraints:Patient Interventions  Outcome: Progressing Towards Goal  Goal: Patient/Family Education  Outcome: Progressing Towards Goal

## 2019-12-26 NOTE — PROGRESS NOTES
1900  -- Bedside, Verbal and Written shift change report given to [address] (oncoming nurse) by Sarah reynolds). Report included the following information SBAR, Kardex, Intake/Output, MAR and Recent Results. MD paged for STAT restraint order. 1 -- MD approved STAT restraint order.      2140 -- PM medications administered, pt tolerated with ease, will continue to monitor.     0030 -- Shift reassessment, pt condition unchanged, will continue to monitor.      0330 --  Shift reassessment, pt condition unchanged, will continue to monitor.      0530 -- Hygiene care provided, bath, gown and linen changed.       0700 -- Bedside, Verbal and Written shift change report given to [address] [facility] nurse) by KARINA (offgoing nurse). Report included the following information SBAR, Kardex, Intake/Output, MAR and Recent Results. Skin assessment completed.

## 2019-12-26 NOTE — PROGRESS NOTES
Progress Note      Patient: Faisal Ahn               Sex: male          DOA: 12/23/2019       YOB: 1934      Age:  80 y.o.        LOS:  LOS: 2 days             CHIEF COMPLAINT:  Abdominal Pain      Assessment/Plan:     Principal Problem:    Abdominal pain (12/23/2019)    Active Problems:    Hypothermia (12/23/2019)      Transaminitis (12/23/2019)      Chronic renal failure, stage 3 (moderate) ([facility] [address] (12/23/2019)      SIRS (systemic inflammatory response syndrome) ([facility] [address] (12/23/2019)      Gastritis (12/23/2019)      Duodenitis (12/24/2019)        PLAN:  - At this time, no clear source for infection other than possible abd. CT shows duodenitis and gastritis. - D/c zosyn and observe off antibx  - Cont diet  - D/c IVFs  - PT rec SNF  - PPI for gastritis  - Cont acceptable home medications for chronic conditions   - DVT protocol      Subjective:     No major events overnight other than tugging at lines. No other complaints. Objective:     Visit Vitals  /59 (BP 1 Location: Left arm, BP Patient Position: At rest)   Pulse (!) 50   Temp 97.2 °F (36.2 °C)   Resp 16   Ht 5' 8\" (1.727 m)   Wt 68.7 kg (151 lb 7.3 oz)   SpO2 99%   BMI 23.03 kg/m²        Physical Exam:  Ears: hearing is intact  Eyes: Icterus is not present  Lungs: clear to auscultation bilaterally  Heart: regular rate and rhythm, S1, S2 normal, no murmur, click, rub or gallop  Gastrointestinal: soft, non-tender. Bowel sounds normal. No masses,  no organomegaly  Neurological:  New Focal Deficits are not present  Psychiatric:  Mood is stable    Lab/Data Reviewed:  CMP:   No results found for: NA, K, CL, CO2, AGAP, GLU, BUN, CREA, GFRAA, GFRNA, CA, MG, PHOS, ALB, TBIL, TP, ALB, GLOB, AGRAT, SGOT, ALT, GPT  CBC:   No results found for: WBC, HGB, HGBEXT, HCT, HCTEXT, PLT, PLTEXT, HGBEXT, HCTEXT, PLTEXT    Imaging Reviewed:  No results found.

## 2019-12-26 NOTE — ROUTINE PROCESS
Received verbal bedside report from Harpreet Silva [facility], [facility] care. Pt asleep, displays no visual signs of pain, restraints checked, skin evaluated, no injury or breakdown noted. No signs of distress noted at this time. Call bell within reach, bed in the lowest locked position. 1850 - PICC line removed intact, site clean with no edema or erythema. Transparent occlusive dressing over gauze applied after site clotted, no breakthrough bleeding noted. 1945 - Bedside and Verbal shift change report given to Bandar Toney RN (oncoming nurse) by Rita Sanders RN (offgoing nurse). Report included the following information SBAR, Kardex, Intake/Output, MAR, Accordion and Recent Results.

## 2019-12-26 NOTE — PROGRESS NOTES
INTERIM UPDATE - 1921 EST on 12/25/2019    Nursing Staff calls to report that Patient has resumed pulling at his PICC line and is not currently able to be reoriented even momentarily. Plan:  Won, as this has worked for this Patient previously. Will continue until Patient is able to be reoriented or this poses a safety hazard to the Patient greater than the hazard that is avoided by employing these non-violent restraints.

## 2019-12-26 NOTE — PROGRESS NOTES
PT evaluation/treatment completed. Pt performed supine to sit moderate/maximum assistance x2. Sit to stands moderate assistance x2. Gait laterally along bed with RW moderate assistance/maximum assistance x2 for safety. Pt has a posterior lean in standing and decreased stepping. Pt is unsteady and has decreased safety. Pt ended therapy supine in bed with all needs met. Recommending SNF at d/c. Full note to follow.      Thank you for this referral,  Sarmad Amador, PT, DPT

## 2019-12-27 PROCEDURE — 77030021352 HC CBL LD SYS DISP COVD -B

## 2019-12-27 PROCEDURE — 92526 ORAL FUNCTION THERAPY: CPT

## 2019-12-27 PROCEDURE — 74011250636 HC RX REV CODE- 250/636: Performed by: HOSPITALIST

## 2019-12-27 PROCEDURE — 77030037877 HC DRSG MEPILEX >48IN BORD MOLN -A

## 2019-12-27 PROCEDURE — 92610 EVALUATE SWALLOWING FUNCTION: CPT

## 2019-12-27 PROCEDURE — 77030022017 HC DRSG HEMO QCLOT ZMED -A

## 2019-12-27 PROCEDURE — 74011250637 HC RX REV CODE- 250/637: Performed by: HOSPITALIST

## 2019-12-27 PROCEDURE — 65660000000 HC RM CCU STEPDOWN

## 2019-12-27 RX ADMIN — Medication 10 ML: at 23:09

## 2019-12-27 RX ADMIN — PANTOPRAZOLE SODIUM 40 MG: 40 TABLET, DELAYED RELEASE ORAL at 17:04

## 2019-12-27 RX ADMIN — Medication 10 ML: at 06:01

## 2019-12-27 RX ADMIN — HEPARIN SODIUM 5000 UNITS: 5000 INJECTION INTRAVENOUS; SUBCUTANEOUS at 01:22

## 2019-12-27 NOTE — ROUTINE PROCESS
1930: Assumed care. Awake. Alert. Confused. Quietly resting in bed. Denies any pain or discomfort at this time. Call light within reach. NO PIV line. 2300: Placed new PIV line to Rt upper arm gauge #20.     0122: Sleeping. Due med given. 0507: No change from previous assessment. 5071: Slept on & off thru night. Needs attended. Kept warm & dry.    0715: Bedside and Verbal shift change report given to [address] [place] [address] (oncoming nurse) by me (offgoing nurse). Report included the following information SBAR, Kardex, Intake/Output, MAR, Recent Results and Cardiac Rhythm SR w/ PVC's, BBB & 1AVB.

## 2019-12-27 NOTE — PROGRESS NOTES
NUTRITION FOLLOW-UP/PLAN OF CARE    RECOMMENDATIONS:   1. Speech consult  2. Encouraged supplement compliance - will change to ensure enlive chocolate  3. Tray set up/assist PRN    GOALS:   1. Ongoing: PO intake meets >75% of protein/calorie needs by 12/30/19  2. Ongoing: Maintain weight (+/- 1-2 lb) by 1/3/20    ASSESSMENT:   Wt status is classified as normal per Body mass index is 24.5 kg/m². Pt is at nutrition risk due to poor PO intake and weight loss PTA. Diagnosis: abd pain, hypothermia, transaminitis, renal failure, SIRS, gastritis, duodenitis. 12/24/19: Na 149. Nutrition recommendations listed. RD to follow. Previous Nutrition Diagnoses:   Remains appropriate: Inadequate oral food and beverage intake due to poor appetite as evidenced by zero po intake at breakfast 12/24/20. Remains appropriate: Unintentional weight loss due to inadequate energy intake as evidenced by documented 33 lb weight loss in past 10 months. SUBJECTIVE/OBJECTIVE:   (12/27/19) Pt sitting up in bed during time of assessment. Pt with some confusion during visit. Pt states he has a good appetite but per reports pt continues to have poor PO intakes at meals, 0-25%. Pt denies current n/v/d/c. Per I/Os BM 12/26. Pt reports he is not always drinking his supplement - encouragement increased intake, obtained flavor preference. Pt stating current issues swallowing, will place speech consult. Encouraged intake. Rec: tray set up, encourage/assist at meals PRN. Will continue to monitor intake, weight, labs. Per previous RD notes:  Pt admitted with Sepsis due to intraabdominal source with duodenitis, hypotension, cholelithiasis without clear evidence of cholecystitis. MARICRUZ due to dehydration and hypotension, abnormal LFT's, hypernatremia, hypokalemia.   Nursing noted indicate pt with skin tears to right hand, writs and  forearm, excoriation to bilateral groin region  12/24:  Pt reports his appetite has been poor but his weight has been stable. He denies having food allergies and does not have issues with chewing or swallowing (noted multiple missing teeth but pt still reports he can eat everything). Information Obtained From:   [x] Chart Review  [x] Patient  [] Family/Caregiver  [] Nurse/Physician   [] Patient Rounds/Interdisciplinary Meeting    Diet: regular ensure daily   Patient Vitals for the past 100 hrs:   % Diet Eaten   12/27/19 1012 25 %   12/26/19 1008 0 %     Medications: [x] Reviewed  Noted: heparin, protonix, PRN: zofran, oxycodone  Encounter Diagnoses     ICD-10-CM ICD-9-CM   1. Septic shock (HCC) A41.9 038.9    R65.21 785.52     995.92   2.  Cholangitis K83.09 576.1     Past Medical History:   Diagnosis Date    Difficulty urinating     Elevated PSA     Hematuria, unspecified     Hypercholesterolemia     Hypertension     Incomplete bladder emptying     Urinary retention     UTI (urinary tract infection)      Labs:    Lab Results   Component Value Date/Time    Sodium 149 (H) 12/24/2019 03:30 AM    Potassium 4.1 12/24/2019 04:31 PM    Chloride 117 (H) 12/24/2019 03:30 AM    CO2 28 12/24/2019 03:30 AM    Anion gap 4 12/24/2019 03:30 AM    Glucose 83 12/24/2019 03:30 AM    BUN 39 (H) 12/24/2019 03:30 AM    Creatinine 1.70 (H) 12/24/2019 03:30 AM    Calcium 8.3 (L) 12/24/2019 03:30 AM    Magnesium 1.9 12/23/2019 09:42 PM    Phosphorus 3.4 12/23/2019 12:52 PM    Albumin 2.5 (L) 12/24/2019 03:30 AM     Anthropometrics: BMI (calculated): 24.5   Last 3 Recorded Weights in this Encounter    12/23/19 0413 12/23/19 1100 12/27/19 0605   Weight: 68 kg (150 lb) 68.7 kg (151 lb 7.3 oz) 73.1 kg (161 lb 2.5 oz)      Ht Readings from Last 1 Encounters:   12/23/19 5' 8\" (1.727 m)     Documented Weight History:  Weight Metrics 12/27/2019 12/16/2019 2/12/2019 9/10/2018 2/13/2018 1/30/2018 9/15/2017   Weight 161 lb 2.5 oz 150 lb 184 lb 180 lb 185 lb 185 lb 190 lb   BMI 24.5 kg/m2 22.81 kg/m2 27.98 kg/m2 27.37 kg/m2 28.13 kg/m2 28.13 kg/m2 28.89 kg/m2     [x]  Weight Loss PTA  []  Weight Gain  []  Weight Stable   []  New wt n/a on record     Estimated Nutrition Needs:   2883 Kcals/day  Protein (g): 82 g    Nutrition Problems Identified:   [x] Suboptimal PO intake   [] Food Allergies  [x] Difficulty chewing/swallowing/poor dentition  [] Constipation/Diarrhea   [] Nausea/Vomiting   [] None  [] Other:     Plan:   [] Therapeutic Diet  []  Obtained/adjusted food preferences/tolerances and/or snacks options   [x]  Supplements added   [] Occupational therapy following for feeding techniques  []  HS snack added   []  Modify diet texture   []  Modify diet for food allergies   []  Educate patient   []  Assist with menu selection   [x]  Monitor PO intake on meal rounds   [x]  Continue inpatient monitoring and intervention   [x]  Participated in discharge planning/Interdisciplinary rounds/Team meetings   []  Other:     Education Needs:   [x] Not appropriate for teaching at this time due to: confusion   [] Identified and addressed    Nutrition Monitoring and Evaluation:   [x] Continue inpatient monitoring and interventions    [] Other:     Doylene Begun

## 2019-12-27 NOTE — PROGRESS NOTES
Problem: Dysphagia (Adult)  Goal: *Acute Goals and Plan of Care (Insert Text)  Description  Dysphagia Present:   yes     Aspiration:  possible    Recommendations:  Diet: puree, nectar thick liquids  Meds: crushed in puree  Aspiration Precautions  Oral Care TID    Goals:  Patient will:  1. Tolerate PO trials with 0 s/s overt distress in 4/5 trials  2. Utilize compensatory swallow strategies/maneuvers (decrease bite/sip, size/rate, alt. liq/sol) with min cues in 4/5 trials  3. Perform oral-motor/laryngeal exercises to increase oropharyngeal swallow function with min cues  4. Complete an objective swallow study (i.e., MBSS) to assess swallow integrity, r/o aspiration, and determine of safest LRD, min A     Outcome: Progressing Towards Goal      SPEECH LANGUAGE PATHOLOGY BEDSIDE SWALLOW   EVALUATION & TREATMENT     Patient: Kiara Harrington (89 y.o. male)  Date: 12/27/2019  Primary Diagnosis: Hypothermia [T68. XXXA]  Duodenitis [K29.80]  Gastritis [K29.70]        Precautions: aspiration  Fall  PLOF: per H&P    ASSESSMENT :  Based on the objective data described below, the patient presents with mild-moderate oropharyngeal dysphagia in the setting of AMS. Patient hallucinating per RN, nonsensical speech appreciated by SLP. Patient oriented to self only. Patient provided thin liquids with delayed swallow to palpation, however functional hyolaryngeal elevation to palpation. Patient with immediate coughing following thin liquids, likely due to untimely swallow and aspiration/penetration with premature spillage. Nectar thick liquids patient with improved oral control resulting in iproved timeliness of swallow; 0 s/sx of aspiration. Puree trials patient with posterior propulsion, swallow delay (note patient talking with bolus in pharyx prior to swallow initiated), eventual functional hyolaryngeal elevation with no s/x of aspiration. At this time, SLP recommending puree with nectar thick liquids, decrease distractions. TREATMENT :  Skilled therapy initiated; Educated pt on aspiration precautions and importance of compensatory swallow techniques to decrease aspiration risk (decrease rate of intake & sip/bite size, upright @HOB for all po intake and ~30 minutes after po); verbalized comprehension, however REQUIRES REINFORCEMENT. SLP to follow as indicated. Patient will benefit from skilled intervention to address the above impairments. Patient's rehabilitation potential is considered to be Fair  Factors which may influence rehabilitation potential include:   []            None noted  [x]            Mental ability/status  []            Medical condition  []            Home/family situation and support systems  []            Safety awareness  []            Pain tolerance/management  []            Other:      PLAN :  Recommendations and Planned Interventions:  Puree, nectar thick liquids  Frequency/Duration: Patient will be followed by speech-language pathology 1-2 times per day/4-7 days per week to address goals. Discharge Recommendations: Home Health     SUBJECTIVE:   Patient stated I want out of this office.     OBJECTIVE:     Past Medical History:   Diagnosis Date    Difficulty urinating     Elevated PSA     Hematuria, unspecified     Hypercholesterolemia     Hypertension     Incomplete bladder emptying     Urinary retention     UTI (urinary tract infection)      Past Surgical History:   Procedure Laterality Date    APPENDECTOMY      EGD  2/7/2014         HX TURP  6/13/16    HX TURP       Home Situation:   Home Situation  Home Environment: Private residence  One/Two Story Residence: One story  Living Alone: No  Support Systems: Family member(s), Friends \ neighbors  Patient Expects to be Discharged to[de-identified] Private residence  Current DME Used/Available at Home: Belita [place]al[place] rolling    Diet prior to admission: unknown  Current Diet:  puree, nectar thick liquids     Cognitive and Communication Status:  Neurologic State: Alert, Confused  Orientation Level: Oriented to person  Cognition: Decreased command following        Safety/Judgement: Decreased awareness of environment, Decreased awareness of need for assistance, Decreased insight into deficits  Oral Assessment:  Oral Assessment  Labial: Decreased rate;Decreased seal  Dentition: Limited  Oral Hygiene: fair  Lingual: Decreased rate;Decreased strength  Velum: No impairment  Mandible: No impairment  P.O. Trials:  Patient Position: HOB70  Vocal quality prior to P.O.: No impairment  Consistency Presented: Thin liquid;Pudding;Nectar thick liquid  How Presented: SLP-fed/presented;Straw;Successive swallows;Spoon  How Much: 16  Bolus Acceptance: No impairment  Bolus Formation/Control: Impaired  Type of Impairment: Premature spillage  Propulsion: Discoordination;Delayed (# of seconds)  Oral Residue: None  Initiation of Swallow: Delayed (# of seconds)  Laryngeal Elevation: Functional  Aspiration Signs/Symptoms: Weak cough  Pharyngeal Phase Characteristics: Suspected pharyngeal residue;Poor endurance  Effective Modifications: Small sips and bites  Cues for Modifications: Maximal       Oral Phase Severity: Mild-moderate  Pharyngeal Phase Severity : Mild-moderate    PAIN:  Pain level pre-treatment: 0/10   Pain level post-treatment: 0/10   Pain Intervention(s): Medication (see MAR); Rest, Ice, Repositioning  Response to intervention: Nurse notified, See doc flow    After treatment:   []            Patient left in no apparent distress sitting up in chair  [x]            Patient left in no apparent distress in bed  []            Call bell left within reach  [x]            Nursing notified  []            Family present  []            Caregiver present  []            Bed alarm activated    COMMUNICATION/EDUCATION:   [x]            Aspiration precautions; swallow safety; compensatory techniques. []            Patient/family have participated as able in goal setting and plan of care.   [] Patient/family agree to work toward stated goals and plan of care. []            Patient understands intent and goals of therapy; neutral about participation. [x]            Patient unable to participate in goal setting/plan of care; educ ongoing with interdisciplinary staff  [x]         Posted safety precautions in patient's room.     Thank you for this referral.  Verneice Leaver  Time Calculation: 23 mins  Evaluation Time: 15 minutes   Treatment Time: 8 minutes

## 2019-12-27 NOTE — PROGRESS NOTES
Spoke with pts son Akhil Contreras. He is not happy with accepting facilities in [place]. He wants me to post to "[employer]. He states he has done [facility]. Posted in edc to "[employer] [employer]. Told him likely will dc on Monday if auth obtained once have accepting facility. First choice is [facility].

## 2019-12-27 NOTE — CDMP QUERY
The patient was admitted with abdominal pain. After study, if known,  could you please clarify the etiology of patient's abd pain: 
 
=> abdominal pain due to gastritis 
=> abdominal pain due to duodenitis 
=> abdominal pain due to gastritis and duodenitis 
=> abdominal pain due to: please specify 
=> other explanation of clinical findings 
=> unable to determine The medical record reflects the following; 
 
----> Risk factors: 80 yr old male with c/o abdominal pain 
 
----> Clinical indicators:  
    * 12-23-19 H&P:  \". Loletta Nunnery Loletta Nunnery Active Hospital Problems: Abdominal pain. Loletta Nunnery Loletta Nunnery Loletta Nunnery Gastritis. Loletta Nunnery Loletta Nunnery Loletta Nunnery \" 
    * 12-24-19 Hospitalist PN:  \". Loletta Nunnery Loletta Nunnery Assessment/Plan: Abdominal pain. ...CT shows duodenitis and gastritis. Loletta Nunnery Loletta Nunnery \" 
 
----> Treatment: zosyn IV (now d/c'd); imaging; PPI for gastritis Thank you for your time, 
Cassidy Tran, [facility] 
[phone]

## 2019-12-27 NOTE — PROGRESS NOTES
conducted a Follow up consultation and Spiritual Assessment for Brissa Estrella, who is a 80 y. o.,male. The  provided the following Interventions:  Continued the relationship of care and support. Listened empathically. Offered prayer and assurance of continued prayer on patients behalf. Chart reviewed. The following outcomes were achieved:  Patient expressed gratitude for pastoral care visit. Assessment:  There are no further spiritual or Hindu issues which require Spiritual Care Services interventions at this time. Plan:  Chaplains will continue to follow and will provide pastoral care on an as needed/requested basis.  recommends bedside caregivers page  on duty if patient shows signs of acute spiritual or emotional distress.      [address] [facility]   Staff [address] [facility]   [phone]

## 2019-12-27 NOTE — ROUTINE PROCESS
Received verbal bedside report from [facility], RN, assumed care. Pt awake, alert and oriented x 2, denies pain,, No signs of distress noted at this time. Call bell within reach, bed in the lowest locked position.

## 2019-12-27 NOTE — PROGRESS NOTES
Attempted PT, pt adamantly refused. Attempted to educate pt on role of PT, pt states he is on the chain gang and they will not let him participate. Will continue to follow.  Brynn Stacy, PTA

## 2019-12-27 NOTE — PROGRESS NOTES
Progress Note      Patient: Chen Smyth               Sex: male          DOA: 12/23/2019       YOB: 1934      Age:  80 y.o.        LOS:  LOS: 3 days             CHIEF COMPLAINT:  Abdominal Pain      Assessment/Plan:     Principal Problem:    Hypothermia (12/23/2019)    Active Problems:    Abdominal pain (12/23/2019)      Transaminitis (12/23/2019)      Chronic renal failure, stage 3 (moderate) ([address] (12/23/2019)      SIRS (systemic inflammatory response syndrome) ([facility] [address] (12/23/2019)      Gastritis (12/23/2019)      Duodenitis (12/24/2019)    abdominal pain due to gastritis    PLAN:  - At this time, no clear source for infection other than possible abd. CT shows duodenitis and gastritis. - Cont to observe off antibx  - Cont diet  - PT rec SNF  - PPI for gastritis  - Clinically observe rash, may be drug related  - Cont acceptable home medications for chronic conditions   - DVT protocol      Subjective:     No major events overnight. No complaints by patient although pleasantly confused. Objective:     Visit Vitals  /70   Pulse (!) 101   Temp 97.9 °F (36.6 °C)   Resp 18   Ht 5' 8\" (1.727 m)   Wt 73.1 kg (161 lb 2.5 oz)   SpO2 96%   BMI 24.50 kg/m²        Physical Exam:  Ears: hearing is intact  Eyes: Icterus is not present  Lungs: clear to auscultation bilaterally  Heart: regular rate and rhythm, S1, S2 normal, no murmur, click, rub or gallop  Gastrointestinal: soft, non-tender. Bowel sounds normal. No masses,  no organomegaly  Skin: Morbilliform rash left torso/back    Lab/Data Reviewed:  CMP:   No results found for: NA, K, CL, CO2, AGAP, GLU, BUN, CREA, GFRAA, GFRNA, CA, MG, PHOS, ALB, TBIL, TP, ALB, GLOB, AGRAT, SGOT, ALT, GPT  CBC:   No results found for: WBC, HGB, HGBEXT, HCT, HCTEXT, PLT, PLTEXT, HGBEXT, HCTEXT, PLTEXT    Imaging Reviewed:  No results found.

## 2019-12-27 NOTE — PROGRESS NOTES
Problem: Mobility Impaired (Adult and Pediatric)  Goal: *Acute Goals and Plan of Care (Insert Text)  Description  Physical Therapy Goals  Initiated 12/26/2019 and to be accomplished within 7 day(s)  1. Patient will move from supine to sit and sit to supine , scoot up and down and roll side to side in bed with minimal assistance. 2.  Patient will transfer from bed to chair and chair to bed with minimal assistance using the least restrictive device. 3.  Patient will perform sit to stand with minimal assistance. 4.  Patient will ambulate with minimal assistance for 25-50 feet with the least restrictive device. Prior Level of Function:   Patient was modified independence for all mobility including gait using RW. Patient lives with family in a single story home. Pt is a poor historian. If able, son would like to be present during sessions per chart Aries Montgomery [phone]). Outcome: Progressing Towards Goal   PHYSICAL THERAPY EVALUATION    Patient: Enio Calderon (11 y.o. male)  Date: 12/26/2019  Primary Diagnosis: Hypothermia [T68. XXXA]  Duodenitis [K29.80]  Gastritis [K29.70]        Precautions:   Fall    ASSESSMENT :  PT orders received and patient cleared by EMR review to participate with therapy. Patient is a 80 y.o. male admitted to the hospital due to general weakness and confusion. Patient consents to PT evaluation and treatment. Pt is confused but cooperative with therapy today. Pt performed supine to sit moderate/maximum assistance x2. Sit to stands moderate assistance x2. Gait laterally along bed with RW moderate assistance/maximum assistance x2 for safety. Pt has a posterior lean in standing and decreased stepping. Pt is unsteady and has decreased safety. Pt ended therapy supine in bed with all needs met. Patient will benefit from skilled intervention to address the above impairments.   Patient's rehabilitation potential is considered to be Fair  Factors which may influence rehabilitation potential include:    []         None noted  []         Mental ability/status  [x]         Medical condition  []         Home/family situation and support systems  [x]         Safety awareness  []         Pain tolerance/management  []         Other:      PLAN :  Recommendations and Planned Interventions:    [x]           Bed Mobility Training             [x]    Neuromuscular Re-Education  [x]           Transfer Training                   []    Orthotic/Prosthetic Training  [x]           Gait Training                          [x]    Modalities  [x]           Therapeutic Exercises           [x]    Edema Management/Control  [x]           Therapeutic Activities            [x]    Family Training/Education  [x]           Patient Education  []           Other (comment):    Frequency/Duration: Patient will be followed by physical therapy 1-2 times per day/4-7 days per week to address goals. Discharge Recommendations: [facility]  Further Equipment Recommendations for Discharge: N/A     SUBJECTIVE:   Patient [place]talking about his family).     OBJECTIVE DATA SUMMARY:     Past Medical History:   Diagnosis Date    Difficulty urinating     Elevated PSA     Hematuria, unspecified     Hypercholesterolemia     Hypertension     Incomplete bladder emptying     Urinary retention     UTI (urinary tract infection)      Past Surgical History:   Procedure Laterality Date    APPENDECTOMY      EGD  2/7/2014         HX TURP  6/13/16    HX TURP       Barriers to Learning/Limitations: yes;  altered mental status (i.e.Sedation, Confusion)  Compensate with: Visual Cues, Verbal Cues, and Tactile Cues  Home Situation:  Home Situation  Home Environment: Private residence  One/Two Story Residence: One story  Living Alone: No  Support Systems: Family member(s), Friends \ neighbors  Patient Expects to be Discharged to[de-identified] Private residence  Current DME Used/Available at Home: Marilu Rivera rolling  Critical Behavior:  Neurologic State: Confused; Alert  Orientation Level: Oriented to person  Cognition: Follows commands;Decreased attention/concentration  Safety/Judgement: Fall prevention  Psychosocial  Patient Behaviors: Calm;Confused; Cooperative                 B LE Strength:    Strength: Generally decreased, functional              B LE Tone & Sensation:   Tone: Normal          Sensation: Intact           B LE Range Of Motion:  AROM: Generally decreased, functional                 Posture:  Posture (WDL): Exceptions to WDL  Posture Assessment: Forward head  Functional Mobility:  Bed Mobility:     Supine to Sit: Moderate assistance;Assist x2  Sit to Supine: Maximum assistance;Assist x2     Transfers:  Sit to Stand: Moderate assistance;Assist x2                           Balance:   Sitting: Impaired; With support  Sitting - Static: Fair (occasional)  Sitting - Dynamic: Fair (occasional); Poor (constant support)  Standing: Impaired; With support  Standing - Static: Fair  Standing - Dynamic : Fair;Poor     Ambulation/Gait Trainin feet  Ambulation - Level of Assistance: Moderate assistance;Maximum assistance;Assist x2  Gait Abnormalities: Decreased step clearance;Shuffling gait  Base of Support: Center of gravity altered  Speed/Rena: Slow  Step Length: Right shortened;Left shortened       Therapeutic Exercises:   Reviewed and performed ankle pumps to increase blood flow and circulation. Pain:  No pain noted before, during, or at end of session. Activity Tolerance:   Fair-  Please refer to the flowsheet for vital signs taken during this treatment.     After treatment:   []         Patient left in no apparent distress sitting up in chair  [x]         Patient left in no apparent distress in bed  [x]         Call bell left within reach  [x]   Personal items in reach   []         Nursing notified   []         Caregiver present  []         Bed/chair alarm activated  []         SCDs applied    COMMUNICATION/EDUCATION:   [x]         Role of Physical Therapy in the acute care setting. [x]         Fall prevention education was provided and the patient/caregiver indicated understanding. [x]         Patient/family have participated as able in goal setting and plan of care. [x]         Patient/family agree to work toward stated goals and plan of care. []         Patient understands intent and goals of therapy, but is neutral about his/her participation. []         Patient is unable to participate in goal setting/plan of care: ongoing with therapy staff. [x]         Out of bed with nursing assistance 3-5 times a day. []         Other:     Thank you for this referral.  Arben Amador, PT, DPT   Time Calculation: 14 mins      Eval Complexity: History: MEDIUM  Complexity : 1-2 comorbidities / personal factors will impact the outcome/ POC Exam:HIGH Complexity : 4+ Standardized tests and measures addressing body structure, function, activity limitation and / or participation in recreation  Presentation: MEDIUM Complexity : Evolving with changing characteristics  Clinical Decision Making:Medium Complexity    Overall Complexity:MEDIUM

## 2019-12-28 ENCOUNTER — APPOINTMENT (OUTPATIENT)
Dept: GENERAL RADIOLOGY | Age: 84
DRG: 392 | End: 2019-12-28
Attending: HOSPITALIST
Payer: MEDICARE

## 2019-12-28 ENCOUNTER — APPOINTMENT (OUTPATIENT)
Dept: NON INVASIVE DIAGNOSTICS | Age: 84
DRG: 392 | End: 2019-12-28
Attending: INTERNAL MEDICINE
Payer: MEDICARE

## 2019-12-28 PROBLEM — I47.20 SUSTAINED VENTRICULAR TACHYCARDIA: Status: ACTIVE | Noted: 2019-12-28

## 2019-12-28 LAB
ANION GAP SERPL CALC-SCNC: 7 MMOL/L (ref 3–18)
ATRIAL RATE: 300 BPM
ATRIAL RATE: 58 BPM
AV VELOCITY RATIO: 0.59
BUN SERPL-MCNC: 29 MG/DL (ref 7–18)
BUN/CREAT SERPL: 19 (ref 12–20)
CALCIUM SERPL-MCNC: 8.8 MG/DL (ref 8.5–10.1)
CALCULATED P AXIS, ECG09: 12 DEGREES
CALCULATED R AXIS, ECG10: -17 DEGREES
CALCULATED R AXIS, ECG10: -4 DEGREES
CALCULATED T AXIS, ECG11: 158 DEGREES
CALCULATED T AXIS, ECG11: 174 DEGREES
CHLORIDE SERPL-SCNC: 122 MMOL/L (ref 100–111)
CO2 SERPL-SCNC: 25 MMOL/L (ref 21–32)
CREAT SERPL-MCNC: 1.52 MG/DL (ref 0.6–1.3)
DIAGNOSIS, 93000: NORMAL
DIAGNOSIS, 93000: NORMAL
ECHO AO ROOT DIAM: 3.71 CM
ECHO AV PEAK GRADIENT: 10.9 MMHG
ECHO AV PEAK VELOCITY: 165.11 CM/S
ECHO EST RA PRESSURE: 8 MMHG
ECHO IVC SNIFF: 2.69 CM
ECHO LA MAJOR AXIS: 4.58 CM
ECHO LA TO AORTIC ROOT RATIO: 1.23
ECHO LV EDV A4C: 122.7 ML
ECHO LV EDV INDEX A4C: 65.8 ML/M2
ECHO LV EDV TEICHHOLZ: 0.61 ML
ECHO LV ESV A4C: 82.5 ML
ECHO LV ESV INDEX A4C: 44.3 ML/M2
ECHO LV ESV TEICHHOLZ: 0.5 ML
ECHO LV INTERNAL DIMENSION DIASTOLIC: 4.75 CM (ref 4.2–5.9)
ECHO LV INTERNAL DIMENSION SYSTOLIC: 4.37 CM
ECHO LV IVSD: 0.87 CM (ref 0.6–1)
ECHO LV MASS 2D: 191.6 G (ref 88–224)
ECHO LV MASS INDEX 2D: 102.8 G/M2 (ref 49–115)
ECHO LV POSTERIOR WALL DIASTOLIC: 1.11 CM (ref 0.6–1)
ECHO LVOT PEAK GRADIENT: 3.8 MMHG
ECHO LVOT PEAK VELOCITY: 97.73 CM/S
ECHO LVOT VTI: 22.24 CM
ECHO MV A VELOCITY: 99.18 CM/S
ECHO MV AREA PHT: 4 CM2
ECHO MV E DECELERATION TIME (DT): 188.9 MS
ECHO MV E VELOCITY: 88.75 CM/S
ECHO MV E/A RATIO: 0.89
ECHO MV PRESSURE HALF TIME (PHT): 54.8 MS
ECHO MV REGURGITANT RADIUS PISA: 0.91 CM
ECHO PULMONARY ARTERY SYSTOLIC PRESSURE (PASP): 46.2 MMHG
ECHO RA MINOR AXIS: 4.24 CM
ECHO RIGHT VENTRICULAR SYSTOLIC PRESSURE (RVSP): 46.2 MMHG
ECHO TV REGURGITANT MAX VELOCITY: 309.11 CM/S
ECHO TV REGURGITANT PEAK GRADIENT: 38.2 MMHG
GLUCOSE SERPL-MCNC: 126 MG/DL (ref 74–99)
LVFS 2D: 8.04 %
LVOT MG: 1.79 MMHG
LVOT MV: 0.6 CM/S
LVSV (MOD SINGLE 4C): 21.45 ML
LVSV (TEICH): 10.01 ML
MAGNESIUM SERPL-MCNC: 2.2 MG/DL (ref 1.6–2.6)
MV DEC SLOPE: 4.7
P-R INTERVAL, ECG05: 304 MS
POTASSIUM SERPL-SCNC: 3.3 MMOL/L (ref 3.5–5.5)
Q-T INTERVAL, ECG07: 398 MS
Q-T INTERVAL, ECG07: 418 MS
QRS DURATION, ECG06: 138 MS
QRS DURATION, ECG06: 150 MS
QTC CALCULATION (BEZET), ECG08: 390 MS
QTC CALCULATION (BEZET), ECG08: 410 MS
SODIUM SERPL-SCNC: 154 MMOL/L (ref 136–145)
VENTRICULAR RATE, ECG03: 58 BPM
VENTRICULAR RATE, ECG03: 58 BPM

## 2019-12-28 PROCEDURE — 77030040831 HC BAG URINE DRNG MDII -A

## 2019-12-28 PROCEDURE — 93005 ELECTROCARDIOGRAM TRACING: CPT

## 2019-12-28 PROCEDURE — 83735 ASSAY OF MAGNESIUM: CPT

## 2019-12-28 PROCEDURE — 93306 TTE W/DOPPLER COMPLETE: CPT

## 2019-12-28 PROCEDURE — 77030018798 HC PMP KT ENTRL FED COVD -A

## 2019-12-28 PROCEDURE — 74011000250 HC RX REV CODE- 250: Performed by: HOSPITALIST

## 2019-12-28 PROCEDURE — 65610000006 HC RM INTENSIVE CARE

## 2019-12-28 PROCEDURE — 71045 X-RAY EXAM CHEST 1 VIEW: CPT

## 2019-12-28 PROCEDURE — 80048 BASIC METABOLIC PNL TOTAL CA: CPT

## 2019-12-28 PROCEDURE — C9113 INJ PANTOPRAZOLE SODIUM, VIA: HCPCS | Performed by: HOSPITALIST

## 2019-12-28 PROCEDURE — 74011250636 HC RX REV CODE- 250/636: Performed by: PHYSICIAN ASSISTANT

## 2019-12-28 PROCEDURE — 74011250636 HC RX REV CODE- 250/636: Performed by: HOSPITALIST

## 2019-12-28 PROCEDURE — 36415 COLL VENOUS BLD VENIPUNCTURE: CPT

## 2019-12-28 RX ORDER — DEXTROSE, SODIUM CHLORIDE, AND POTASSIUM CHLORIDE 5; .45; .3 G/100ML; G/100ML; G/100ML
INJECTION INTRAVENOUS CONTINUOUS
Status: DISCONTINUED | OUTPATIENT
Start: 2019-12-28 | End: 2019-12-30

## 2019-12-28 RX ORDER — POTASSIUM CHLORIDE 7.45 MG/ML
10 INJECTION INTRAVENOUS
Status: COMPLETED | OUTPATIENT
Start: 2019-12-28 | End: 2019-12-28

## 2019-12-28 RX ORDER — SODIUM CHLORIDE 9 MG/ML
500 INJECTION, SOLUTION INTRAVENOUS ONCE
Status: COMPLETED | OUTPATIENT
Start: 2019-12-28 | End: 2019-12-28

## 2019-12-28 RX ORDER — AMIODARONE HYDROCHLORIDE 150 MG/3ML
300 INJECTION, SOLUTION INTRAVENOUS ONCE
Status: COMPLETED | OUTPATIENT
Start: 2019-12-28 | End: 2019-12-28

## 2019-12-28 RX ORDER — DIPHENHYDRAMINE HYDROCHLORIDE 50 MG/ML
12.5 INJECTION, SOLUTION INTRAMUSCULAR; INTRAVENOUS ONCE
Status: COMPLETED | OUTPATIENT
Start: 2019-12-28 | End: 2019-12-28

## 2019-12-28 RX ADMIN — Medication 10 ML: at 21:14

## 2019-12-28 RX ADMIN — HEPARIN SODIUM 5000 UNITS: 5000 INJECTION INTRAVENOUS; SUBCUTANEOUS at 00:28

## 2019-12-28 RX ADMIN — Medication 10 ML: at 06:37

## 2019-12-28 RX ADMIN — SODIUM CHLORIDE 40 MG: 9 INJECTION, SOLUTION INTRAMUSCULAR; INTRAVENOUS; SUBCUTANEOUS at 21:06

## 2019-12-28 RX ADMIN — DIPHENHYDRAMINE HYDROCHLORIDE 12.5 MG: 50 INJECTION, SOLUTION INTRAMUSCULAR; INTRAVENOUS at 19:26

## 2019-12-28 RX ADMIN — DEXTROSE MONOHYDRATE, SODIUM CHLORIDE, AND POTASSIUM CHLORIDE 75 ML/HR: 50; 4.5; 2.98 INJECTION, SOLUTION INTRAVENOUS at 09:47

## 2019-12-28 RX ADMIN — POTASSIUM CHLORIDE 10 MEQ: 7.46 INJECTION, SOLUTION INTRAVENOUS at 14:46

## 2019-12-28 RX ADMIN — Medication 20 ML: at 13:22

## 2019-12-28 RX ADMIN — SODIUM CHLORIDE 500 ML: 900 INJECTION, SOLUTION INTRAVENOUS at 09:28

## 2019-12-28 RX ADMIN — AMIODARONE HYDROCHLORIDE 300 MG: 50 INJECTION, SOLUTION INTRAVENOUS at 09:15

## 2019-12-28 RX ADMIN — POTASSIUM CHLORIDE 10 MEQ: 7.46 INJECTION, SOLUTION INTRAVENOUS at 16:15

## 2019-12-28 RX ADMIN — POTASSIUM CHLORIDE 10 MEQ: 7.46 INJECTION, SOLUTION INTRAVENOUS at 17:43

## 2019-12-28 RX ADMIN — Medication 10 ML: at 13:22

## 2019-12-28 RX ADMIN — AMIODARONE HYDROCHLORIDE 1 MG/MIN: 1.8 INJECTION, SOLUTION INTRAVENOUS at 09:28

## 2019-12-28 RX ADMIN — HEPARIN SODIUM 5000 UNITS: 5000 INJECTION INTRAVENOUS; SUBCUTANEOUS at 17:42

## 2019-12-28 NOTE — PROGRESS NOTES
Assumed care of patient . HOB elevated. No c/o pain at this time. Call light within reach. Patient is confused. 0230: Patient pulled his condom catheter off, incontinent episode of yellow urine of moderate amount was noted. Patient has some excoriation on his sac.     0024: Condom cathter replaced, and patient received incontinet care. 0615:  Condom cathter replaced, and patient received incontinet care.

## 2019-12-28 NOTE — PROGRESS NOTES
Holding PT today due to change in status and transfer to ICU. Will follow up as appropriate for re-evaluation.

## 2019-12-28 NOTE — PROGRESS NOTES
Progress Note      Patient: Huan Maciel               Sex: male          DOA: 12/23/2019       YOB: 1934      Age:  80 y.o.        LOS:  LOS: 4 days             CHIEF COMPLAINT:  Abdominal Pain      Assessment/Plan:     Principal Problem:    Hypothermia (12/23/2019)    Active Problems:    Abdominal pain (12/23/2019)      Transaminitis (12/23/2019)      Chronic renal failure, stage 3 (moderate) ([address] (12/23/2019)      SIRS (systemic inflammatory response syndrome) ([address] (12/23/2019)      Gastritis (12/23/2019)      Duodenitis (12/24/2019)      Sustained ventricular tachycardia ([address] (12/28/2019)    abdominal pain due to gastritis    PLAN:  - Sustained VTach, witnessed, became unresponsive but with pulse, HR in 190s. Converted back to sinus on his own although close to sync cardio given encephalopathy and hypotension. Amio bolus given and drip started but on hold 2/2 bradycardia  - Cardiology consulted  - Replete lytes  - EKG  - CXR  - Cont to observe off antibx  - Cont diet  - PPI for gastritis  - Rash improving  - Cont acceptable home medications for chronic conditions   - DVT protocol      Subjective:     No major events overnight. No complaints initially, after rounds, pt went into sustained VTach w/ pulse. Eventually converted on his own and offered no complaints afterwards    Objective:     Visit Vitals  /49   Pulse (!) 53   Temp 97.6 °F (36.4 °C)   Resp 12   Ht 5' 8\" (1.727 m)   Wt 73.1 kg (161 lb 2.5 oz)   SpO2 100%   BMI 24.50 kg/m²        Physical Exam:  Ears: hearing is intact  Eyes: Icterus is not present  Lungs: clear to auscultation bilaterally  Heart: regular rate and rhythm, S1, S2 normal, no murmur, click, rub or gallop  Gastrointestinal: soft, non-tender.  Bowel sounds normal. No masses,  no organomegaly  Skin: Morbilliform rash left torso/back    Lab/Data Reviewed:  CMP:   Lab Results   Component Value Date/Time     (H) 12/28/2019 09:05 AM    K 3.3 (L) 12/28/2019 09:05 AM     (H) 12/28/2019 09:05 AM    CO2 25 12/28/2019 09:05 AM    AGAP 7 12/28/2019 09:05 AM     (H) 12/28/2019 09:05 AM    BUN 29 (H) 12/28/2019 09:05 AM    CREA 1.52 (H) 12/28/2019 09:05 AM    GFRAA 53 (L) 12/28/2019 09:05 AM    GFRNA 44 (L) 12/28/2019 09:05 AM    CA 8.8 12/28/2019 09:05 AM    MG 2.2 12/28/2019 09:05 AM     CBC:   No results found for: WBC, HGB, HGBEXT, HCT, HCTEXT, PLT, PLTEXT, HGBEXT, HCTEXT, PLTEXT    Imaging Reviewed:  No results found.

## 2019-12-28 NOTE — CONSULTS
[facility] - Consult Note    Consultation request by Dr. Collette Jordan for VT  Date of  Admission: 12/23/2019  4:06 AM   Primary Care Physician:  Lay Desai MD     Assessment:     -Wide-complex tachycardia in setting of LBBB, possible aberrancy, no symptoms  -h/o chronic LBBB w/1st AV block  -Echo 12/28/19 with EF 35-40% declined from January 2018  -Recent hypotension/hypothermia  -Gastritis/duodenitis by CT 12/16/19  -h/o GERD on PPI as outpatient  -HLD on statin  -h/o HTN as outpatient  -Chronic renal disease, stage II/III  -Thrombocytopenia   -Decreased mobility    No primary cardiologist     Plan:     Patient resting quietly in ICU upon my review. EF noted to be 35-40%, no evidence CHF or ongoing ischemia. No associated symptoms with tachyarrhythmia by report. High concern for SVT with aberrancy with underlying LBBB. Plan amiodarone, hold if heart rate < 50 bpm.  Poor candidate for cath, patient admitted with hypothermia and hypotension of unclear etiology, workup ongoing. Will follow closely with you. History of Present Illness: This is a 80 y.o. male admitted for Hypothermia [T68. Francisco Horde; Duodenitis [K29.80]; Gastritis [K29.70]. Patient complains of:    Admitted 12/24/19 with generalized weakness and confusion. Came to ER multiple times prior to abdominal pain. Most recent presentation patient was hypotensive and hypothermic. Developed WCT this morning, asked to evaluate.     Cardiac risk factors: age, gender    Review of Symptoms:  Except as stated above include:  Constitutional:  Negative  Ears, nose, throat:  Negative  Respiratory:  negative  Cardiovascular:  negative  Gastrointestinal: negative  Genitourinary:  negative  Musculoskeletal:  Negative  Neurological:  Negative  Dermatological:  Negative  Hematological: Negative  Endocrinological: Negative  Allergy: Negative  Psychological:  Negative     Past Medical History:     Past Medical History:   Diagnosis Date  Difficulty urinating     Elevated PSA     Hematuria, unspecified     Hypercholesterolemia     Hypertension     Incomplete bladder emptying     Urinary retention     UTI (urinary tract infection)          Social History:     Social History     Socioeconomic History    Marital status:      Spouse name: Not on file    Number of children: Not on file    Years of education: Not on file    Highest education level: Not on file   Occupational History    Occupation:  thirty years   Tobacco Use    Smoking status: Never Smoker    Smokeless tobacco: Never Used   Substance and Sexual Activity    Alcohol use: Yes     Alcohol/week: 2.0 standard drinks     Types: 2 Cans of beer per week     Comment: Occasional    Drug use: No        Family History:     Family History   Problem Relation Age of Onset    Cancer Father     Alzheimer Mother     Heart defect Brother         Medications:      Allergies   Allergen Reactions    Amlodipine Swelling and Itching    Bactrim [Sulfamethoprim Ds] Rash    Lisinopril Other (comments)     Acute renal failure    Ciprofloxacin Rash and Itching        Current Facility-Administered Medications   Medication Dose Route Frequency    amiodarone (CORDARONE) injection 300 mg  300 mg IntraVENous ONCE    amiodarone (NEXTERONE) 360 mg in dextrose 200 mL (1.8 mg/mL) infusion  1 mg/min IntraVENous TITRATE    0.9% sodium chloride infusion 500 mL  500 mL IntraVENous ONCE    sodium chloride (NS) flush 5-10 mL  5-10 mL IntraVENous PRN    acetaminophen (TYLENOL) tablet 650 mg  650 mg Oral Q4H PRN    oxyCODONE-acetaminophen (PERCOCET) 5-325 mg per tablet 1 Tab  1 Tab Oral Q4H PRN    ondansetron (ZOFRAN) injection 4 mg  4 mg IntraVENous Q4H PRN    heparin (porcine) injection 5,000 Units  5,000 Units SubCUTAneous Q8H    pantoprazole (PROTONIX) tablet 40 mg  40 mg Oral ACB&D    influenza vaccine 2019-20 (6 mos+)(PF) (FLUARIX/FLULAVAL/FLUZONE QUAD) injection 0.5 mL  0.5 mL IntraMUSCular PRIOR TO DISCHARGE    bacitracin 500 unit/gram packet 1 Packet  1 Packet Topical PRN    sodium chloride (NS) flush 10-30 mL  10-30 mL InterCATHeter PRN    sodium chloride (NS) flush 10 mL  10 mL InterCATHeter Q24H    sodium chloride (NS) flush 10 mL  10 mL InterCATHeter PRN    sodium chloride (NS) flush 10-40 mL  10-40 mL InterCATHeter Q8H    sodium chloride (NS) flush 20 mL  20 mL InterCATHeter Q24H         Physical Exam:     Visit Vitals  /67 (BP 1 Location: Left arm, BP Patient Position: At rest)   Pulse 73   Temp 97.6 °F (36.4 °C)   Resp 18   Ht 5' 8\" (1.727 m)   Wt 73.1 kg (161 lb 2.5 oz)   SpO2 97%   BMI 24.50 kg/m²     BP Readings from Last 3 Encounters:   12/28/19 145/67   12/16/19 114/70   02/12/19 124/80     Pulse Readings from Last 3 Encounters:   12/28/19 73   12/16/19 72   09/23/18 (!) 59     Wt Readings from Last 3 Encounters:   12/27/19 73.1 kg (161 lb 2.5 oz)   12/16/19 68 kg (150 lb)   02/12/19 83.5 kg (184 lb)       General:  Alert, confused  Neck:  nontender  Lungs:  clear to auscultation bilaterally  Heart:  regular rate and rhythm, S1, S2 normal, no murmur, click, rub or gallop  Abdomen:  abdomen is soft without significant tenderness, masses, organomegaly or guarding  Extremities:  extremities normal, atraumatic, no cyanosis or edema  Skin: Warm and dry. no hyperpigmentation, vitiligo, or suspicious lesions  Neuro: alert, oriented x3, affect appropriate, no focal neurological deficits, moves all extremities well, no involuntary movements, reflexes at knee and ankle intact  Psych: non focal     Data Review:     No results for input(s): WBC, HGB, HCT, PLT, HGBEXT, HCTEXT, PLTEXT in the last 72 hours. No results for input(s): NA, K, CL, CO2, GLU, BUN, CREA, CA, MG, PHOS, ALB, TBIL, SGOT, ALT, INR, INREXT in the last 72 hours.     No lab exists for component:  BNP    Results for orders placed or performed during the hospital encounter of 12/23/19   EKG, 12 LEAD, INITIAL   Result Value Ref Range    Ventricular Rate 62 BPM    Atrial Rate 63 BPM    P-R Interval 212 ms    QRS Duration 180 ms    Q-T Interval 490 ms    QTC Calculation (Bezet) 497 ms    Calculated R Axis -10 degrees    Calculated T Axis 139 degrees    Diagnosis       Sinus rhythm with 1st degree AV block with premature atrial complexes with   aberrant conduction  Left bundle branch block  Abnormal ECG  When compared with ECG of 23-DEC-2019 04:17,  aberrant conduction is now present  Confirmed by Joselin Chavez ([id number]) on 12/24/2019 8:16:17 AM         All Cardiac Markers in the last 24 hours:  No results found for: CPK, CK, CKMMB, CKMB, RCK3, CKMBT, CKNDX, CKND1, TELLY, TROPT, TROIQ, JOSE, TROPT, TNIPOC, BNP, BNPP    Last Lipid:  No results found for: CHOL, CHOLX, CHLST, CHOLV, HDL, HDLP, LDL, LDLC, DLDLP, TGLX, TRIGL, TRIGP, CHHD, CHHDX    Signed By: Renata Recinos MD     December 28, 2019

## 2019-12-28 NOTE — PROGRESS NOTES
0932: Report received at bedside from [place], [address]. Pt transferred into [facility]. Pt sinus michoacano on the monitor, normotensive, on nasal canula. Dual skin check performed. 1900: Bedside and Verbal shift change report given to Wolf [address] [place] [address] (oncoming nurse) by Alpa Mireles   (offgoing nurse). Report included the following information SBAR, Kardex, Intake/Output, MAR and Cardiac Rhythm sinus michoacano, BBB.

## 2019-12-28 NOTE — ROUTINE PROCESS
1913: Assumed care. HOB elevated. NO sob on RA. Denies any pain or discomfort at this time. Call light within reach. 2100: Sleeping.     2309: Incontinence care provided. Needs attended. 0000:Bedside and Verbal shift change report given to [facility] RN (oncoming nurse) by me (offgoing nurse). Report included the following information SBAR, Kardex, Intake/Output, MAR, Recent Results and Cardiac Rhythm SR w/ 1AVB & PVC's.

## 2019-12-28 NOTE — PROGRESS NOTES
Problem: Discharge Planning  Goal: *Discharge to safe environment  Outcome: Progressing Towards Goal     Problem: Falls - Risk of  Goal: *Absence of Falls  Description  Document Fei Madden Fall Risk and appropriate interventions in the flowsheet. Outcome: Progressing Towards Goal  Note: Fall Risk Interventions:       Mentation Interventions: Adequate sleep, hydration, pain control, Bed/chair exit alarm, Door open when patient unattended, Room close to nurse's station    Medication Interventions: Bed/chair exit alarm, Patient to call before getting OOB    Elimination Interventions: Bed/chair exit alarm, Call light in reach    History of Falls Interventions: Bed/chair exit alarm, Door open when patient unattended, Room close to nurse's station         Problem: Patient Education: Go to Patient Education Activity  Goal: Patient/Family Education  Outcome: Progressing Towards Goal     Problem: Pressure Injury - Risk of  Goal: *Prevention of pressure injury  Description  Document Alex Scale and appropriate interventions in the flowsheet.   Outcome: Progressing Towards Goal  Note: Pressure Injury Interventions:  Sensory Interventions: Assess need for specialty bed, Keep linens dry and wrinkle-free, Minimize linen layers, Use 30-degree side-lying position    Moisture Interventions: Absorbent underpads, Internal/External urinary devices    Activity Interventions: Pressure redistribution bed/mattress(bed type), PT/OT evaluation    Mobility Interventions: HOB 30 degrees or less, Pressure redistribution bed/mattress (bed type), PT/OT evaluation    Nutrition Interventions: Document food/fluid/supplement intake, Offer support with meals,snacks and hydration    Friction and Shear Interventions: HOB 30 degrees or less, Minimize layers                Problem: Patient Education: Go to Patient Education Activity  Goal: Patient/Family Education  Outcome: Progressing Towards Goal     Problem: General Medical Care Plan  Goal: *Vital signs within specified parameters  Outcome: Progressing Towards Goal  Goal: *Labs within defined limits  Outcome: Progressing Towards Goal  Goal: *Absence of infection signs and symptoms  Outcome: Progressing Towards Goal  Goal: *Optimal pain control at patient's stated goal  Outcome: Progressing Towards Goal  Goal: *Skin integrity maintained  Outcome: Progressing Towards Goal  Goal: *Fluid volume balance  Outcome: Progressing Towards Goal  Goal: *Optimize nutritional status  Outcome: Progressing Towards Goal  Goal: *Anxiety reduced or absent  Outcome: Progressing Towards Goal  Goal: *Progressive mobility and function (eg: ADL's)  Outcome: Progressing Towards Goal     Problem: Pain  Goal: *Control of Pain  Outcome: Progressing Towards Goal     Problem: Patient Education: Go to Patient Education Activity  Goal: Patient/Family Education  Outcome: Progressing Towards Goal     Problem:  Body Temperature -  Risk of, Imbalanced  Goal: *Absence of heat stress or hyperthermia signs and symptoms  Outcome: Progressing Towards Goal  Goal: *Absence of cold stress or hypothermia signs and symptoms  Outcome: Progressing Towards Goal     Problem: Patient Education: Go to Patient Education Activity  Goal: Patient/Family Education  Outcome: Progressing Towards Goal     Problem: Hypotension  Goal: *Blood pressure within specified parameters  Outcome: Progressing Towards Goal  Goal: *Fluid volume balance  Outcome: Progressing Towards Goal  Goal: *Labs within defined limits  Outcome: Progressing Towards Goal     Problem: Patient Education: Go to Patient Education Activity  Goal: Patient/Family Education  Outcome: Progressing Towards Goal     Problem: Delirium Treatment  Goal: *Level of consciousness restored to baseline  Outcome: Progressing Towards Goal  Goal: *Level of environmental perceptions restored to baseline  Outcome: Progressing Towards Goal  Goal: *Sensory perception restored to baseline  Outcome: Progressing Towards Goal  Goal: *Emotional stability restored to baseline  Outcome: Progressing Towards Goal  Goal: *Functional assessment restored to baseline  Outcome: Progressing Towards Goal  Goal: *Absence of falls  Outcome: Progressing Towards Goal  Goal: *Will remain free of delirium, CAM Score negative  Outcome: Progressing Towards Goal  Goal: *Cognitive status will be restored to baseline  Outcome: Progressing Towards Goal  Goal: Interventions  Outcome: Progressing Towards Goal     Problem: Patient Education: Go to Patient Education Activity  Goal: Patient/Family Education  Outcome: Progressing Towards Goal     Problem: Nutrition Deficit  Goal: *Adequate nutrition  Outcome: Progressing Towards Goal     Problem: Non-Violent Restraints  Goal: *Removal from restraints as soon as assessed to be safe  Outcome: Progressing Towards Goal  Goal: *No harm/injury to patient while restraints in use  Outcome: Progressing Towards Goal  Goal: *Patient's dignity will be maintained  Outcome: Progressing Towards Goal  Goal: *Patient Specific Goal (EDIT GOAL, INSERT TEXT)  Outcome: Progressing Towards Goal  Goal: Non-violent Restaints:Standard Interventions  Outcome: Progressing Towards Goal  Goal: Non-violent Restraints:Patient Interventions  Outcome: Progressing Towards Goal  Goal: Patient/Family Education  Outcome: Progressing Towards Goal     Problem: Patient Education: Go to Patient Education Activity  Goal: Patient/Family Education  Outcome: Progressing Towards Goal     Problem: Patient Education: Go to Patient Education Activity  Goal: Patient/Family Education  Outcome: Progressing Towards Goal

## 2019-12-28 NOTE — PROGRESS NOTES
UAI/95/96/97 completed in [facility] website for nursing facility.  Pt would need active Medicaid to utilize personal care or LTC through [facility]

## 2019-12-29 LAB
ANION GAP SERPL CALC-SCNC: 3 MMOL/L (ref 3–18)
BACTERIA SPEC CULT: NORMAL
BACTERIA SPEC CULT: NORMAL
BASOPHILS # BLD: 0 K/UL (ref 0–0.1)
BASOPHILS NFR BLD: 0 % (ref 0–2)
BUN SERPL-MCNC: 26 MG/DL (ref 7–18)
BUN/CREAT SERPL: 19 (ref 12–20)
CALCIUM SERPL-MCNC: 8.6 MG/DL (ref 8.5–10.1)
CHLORIDE SERPL-SCNC: 123 MMOL/L (ref 100–111)
CO2 SERPL-SCNC: 25 MMOL/L (ref 21–32)
CREAT SERPL-MCNC: 1.37 MG/DL (ref 0.6–1.3)
DIFFERENTIAL METHOD BLD: ABNORMAL
EOSINOPHIL # BLD: 0.3 K/UL (ref 0–0.4)
EOSINOPHIL NFR BLD: 3 % (ref 0–5)
ERYTHROCYTE [DISTWIDTH] IN BLOOD BY AUTOMATED COUNT: 16.3 % (ref 11.6–14.5)
GLUCOSE SERPL-MCNC: 102 MG/DL (ref 74–99)
HCT VFR BLD AUTO: 41.9 % (ref 36–48)
HGB BLD-MCNC: 13.8 G/DL (ref 13–16)
LYMPHOCYTES # BLD: 0.7 K/UL (ref 0.9–3.6)
LYMPHOCYTES NFR BLD: 7 % (ref 21–52)
MCH RBC QN AUTO: 32.3 PG (ref 24–34)
MCHC RBC AUTO-ENTMCNC: 32.9 G/DL (ref 31–37)
MCV RBC AUTO: 98.1 FL (ref 74–97)
MONOCYTES # BLD: 0.3 K/UL (ref 0.05–1.2)
MONOCYTES NFR BLD: 3 % (ref 3–10)
NEUTS SEG # BLD: 8.8 K/UL (ref 1.8–8)
NEUTS SEG NFR BLD: 87 % (ref 40–73)
PLATELET # BLD AUTO: 90 K/UL (ref 135–420)
PMV BLD AUTO: 11.7 FL (ref 9.2–11.8)
POTASSIUM SERPL-SCNC: 4.3 MMOL/L (ref 3.5–5.5)
RBC # BLD AUTO: 4.27 M/UL (ref 4.7–5.5)
SERVICE CMNT-IMP: NORMAL
SERVICE CMNT-IMP: NORMAL
SODIUM SERPL-SCNC: 151 MMOL/L (ref 136–145)
WBC # BLD AUTO: 10.1 K/UL (ref 4.6–13.2)

## 2019-12-29 PROCEDURE — C9113 INJ PANTOPRAZOLE SODIUM, VIA: HCPCS | Performed by: HOSPITALIST

## 2019-12-29 PROCEDURE — 77010033678 HC OXYGEN DAILY

## 2019-12-29 PROCEDURE — 80048 BASIC METABOLIC PNL TOTAL CA: CPT

## 2019-12-29 PROCEDURE — 97164 PT RE-EVAL EST PLAN CARE: CPT | Performed by: PHYSICAL THERAPIST

## 2019-12-29 PROCEDURE — 97110 THERAPEUTIC EXERCISES: CPT | Performed by: PHYSICAL THERAPIST

## 2019-12-29 PROCEDURE — 36415 COLL VENOUS BLD VENIPUNCTURE: CPT

## 2019-12-29 PROCEDURE — 74011250637 HC RX REV CODE- 250/637: Performed by: INTERNAL MEDICINE

## 2019-12-29 PROCEDURE — 85025 COMPLETE CBC W/AUTO DIFF WBC: CPT

## 2019-12-29 PROCEDURE — 74011250636 HC RX REV CODE- 250/636: Performed by: HOSPITALIST

## 2019-12-29 PROCEDURE — 65660000000 HC RM CCU STEPDOWN

## 2019-12-29 PROCEDURE — 77030037877 HC DRSG MEPILEX >48IN BORD MOLN -A

## 2019-12-29 RX ORDER — SODIUM CHLORIDE 9 MG/ML
250 INJECTION, SOLUTION INTRAVENOUS ONCE
Status: COMPLETED | OUTPATIENT
Start: 2019-12-29 | End: 2019-12-29

## 2019-12-29 RX ORDER — AMIODARONE HYDROCHLORIDE 200 MG/1
200 TABLET ORAL 2 TIMES DAILY
Status: DISCONTINUED | OUTPATIENT
Start: 2019-12-29 | End: 2020-01-02 | Stop reason: HOSPADM

## 2019-12-29 RX ADMIN — DEXTROSE MONOHYDRATE, SODIUM CHLORIDE, AND POTASSIUM CHLORIDE: 50; 4.5; 2.98 INJECTION, SOLUTION INTRAVENOUS at 03:23

## 2019-12-29 RX ADMIN — SODIUM CHLORIDE 40 MG: 9 INJECTION, SOLUTION INTRAMUSCULAR; INTRAVENOUS; SUBCUTANEOUS at 08:05

## 2019-12-29 RX ADMIN — Medication 10 ML: at 17:27

## 2019-12-29 RX ADMIN — SODIUM CHLORIDE 250 ML: 900 INJECTION, SOLUTION INTRAVENOUS at 16:00

## 2019-12-29 RX ADMIN — Medication 10 ML: at 22:00

## 2019-12-29 RX ADMIN — AMIODARONE HYDROCHLORIDE 200 MG: 200 TABLET ORAL at 17:29

## 2019-12-29 RX ADMIN — HEPARIN SODIUM 5000 UNITS: 5000 INJECTION INTRAVENOUS; SUBCUTANEOUS at 17:27

## 2019-12-29 RX ADMIN — SODIUM CHLORIDE 250 ML: 900 INJECTION, SOLUTION INTRAVENOUS at 15:00

## 2019-12-29 RX ADMIN — HEPARIN SODIUM 5000 UNITS: 5000 INJECTION INTRAVENOUS; SUBCUTANEOUS at 10:31

## 2019-12-29 RX ADMIN — Medication 10 ML: at 06:36

## 2019-12-29 RX ADMIN — SODIUM CHLORIDE 40 MG: 9 INJECTION, SOLUTION INTRAMUSCULAR; INTRAVENOUS; SUBCUTANEOUS at 21:48

## 2019-12-29 RX ADMIN — Medication 10 ML: at 17:28

## 2019-12-29 RX ADMIN — HEPARIN SODIUM 5000 UNITS: 5000 INJECTION INTRAVENOUS; SUBCUTANEOUS at 00:34

## 2019-12-29 RX ADMIN — DEXTROSE MONOHYDRATE, SODIUM CHLORIDE, AND POTASSIUM CHLORIDE: 50; 4.5; 2.98 INJECTION, SOLUTION INTRAVENOUS at 17:26

## 2019-12-29 NOTE — PROGRESS NOTES
1915: Pt report received from [facility]. Pt in bed, nasal cannual 4L, sinus michoacano with BBB. Amio gtt 0.5. Condom cath present. Pt is resting calmly and quietly at this time. 2000: Pt assessment complete. 2009: Paged cardiology for sustained heart rate less than 50.  2011: Updated ROBERT Banks on pt heart rate below 50. New orders received to stop Amio gtt at this time until pt can be seen by cardiology in the morning. May turn back on if pt has reoccurrence of tachy dysrhythmia's. 2018: Amio gtt stopped. 0000: Assessment complete. Pt is more confused and paranoid. Pt does not believe this is [facility]. Pt is demanding proof that this is a hospital and to speak with the doctor. 0015: Pt is refusing to have blood drawn for daily labs as pt is confused and paranoid, does not believe he is at a hospital.  0400: Pt assessment complete. 0500: Pt still confused and paranoid. Continues to refuse labs drawn. 3607: Bedside and Verbal shift change report given to [facility] (oncoming nurse) by Gen Ballard RN   (offgoing nurse). Report included the following information SBAR, Kardex, Intake/Output, MAR, Recent Results, Cardiac Rhythm 1st Degree AV with BBB and Alarm Parameters .

## 2019-12-29 NOTE — PROGRESS NOTES
Problem: Discharge Planning  Goal: *Discharge to safe environment  Outcome: Progressing Towards Goal     Problem: Falls - Risk of  Goal: *Absence of Falls  Description  Document Jose Ro Fall Risk and appropriate interventions in the flowsheet. Outcome: Progressing Towards Goal  Note: Fall Risk Interventions:       Mentation Interventions: Adequate sleep, hydration, pain control, Bed/chair exit alarm, Door open when patient unattended, Evaluate medications/consider consulting pharmacy, Reorient patient, Room close to nurse's station, Toileting rounds, Update white board    Medication Interventions: Bed/chair exit alarm, Evaluate medications/consider consulting pharmacy    Elimination Interventions: Bed/chair exit alarm, Call light in reach, Patient to call for help with toileting needs, Toileting schedule/hourly rounds    History of Falls Interventions: Bed/chair exit alarm, Door open when patient unattended, Evaluate medications/consider consulting pharmacy, Room close to nurse's station         Problem: Patient Education: Go to Patient Education Activity  Goal: Patient/Family Education  Outcome: Progressing Towards Goal     Problem: Pressure Injury - Risk of  Goal: *Prevention of pressure injury  Description  Document Alex Scale and appropriate interventions in the flowsheet. Outcome: Progressing Towards Goal  Note: Pressure Injury Interventions:  Sensory Interventions: Assess need for specialty bed, Float heels, Keep linens dry and wrinkle-free, Minimize linen layers, Pressure redistribution bed/mattress (bed type), Turn and reposition approx.  every two hours (pillows and wedges if needed)    Moisture Interventions: Absorbent underpads, Apply protective barrier, creams and emollients, Check for incontinence Q2 hours and as needed, Internal/External urinary devices, Minimize layers    Activity Interventions: Pressure redistribution bed/mattress(bed type)    Mobility Interventions: Pressure redistribution bed/mattress (bed type), PT/OT evaluation, Turn and reposition approx. every two hours(pillow and wedges)    Nutrition Interventions: Document food/fluid/supplement intake, Discuss nutritional consult with provider    Friction and Shear Interventions: Foam dressings/transparent film/skin sealants, Lift sheet, Minimize layers                Problem: Patient Education: Go to Patient Education Activity  Goal: Patient/Family Education  Outcome: Progressing Towards Goal     Problem: General Medical Care Plan  Goal: *Vital signs within specified parameters  Outcome: Progressing Towards Goal  Goal: *Labs within defined limits  Outcome: Progressing Towards Goal  Goal: *Absence of infection signs and symptoms  Outcome: Progressing Towards Goal  Goal: *Optimal pain control at patient's stated goal  Outcome: Progressing Towards Goal  Goal: *Skin integrity maintained  Outcome: Progressing Towards Goal  Goal: *Fluid volume balance  Outcome: Progressing Towards Goal  Goal: *Optimize nutritional status  Outcome: Progressing Towards Goal  Goal: *Anxiety reduced or absent  Outcome: Progressing Towards Goal  Goal: *Progressive mobility and function (eg: ADL's)  Outcome: Progressing Towards Goal     Problem: Pain  Goal: *Control of Pain  Outcome: Progressing Towards Goal     Problem: Patient Education: Go to Patient Education Activity  Goal: Patient/Family Education  Outcome: Progressing Towards Goal     Problem:  Body Temperature -  Risk of, Imbalanced  Goal: *Absence of heat stress or hyperthermia signs and symptoms  Outcome: Progressing Towards Goal  Goal: *Absence of cold stress or hypothermia signs and symptoms  Outcome: Progressing Towards Goal     Problem: Patient Education: Go to Patient Education Activity  Goal: Patient/Family Education  Outcome: Progressing Towards Goal     Problem: Hypotension  Goal: *Blood pressure within specified parameters  Outcome: Progressing Towards Goal  Goal: *Fluid volume balance  Outcome: Progressing Towards Goal  Goal: *Labs within defined limits  Outcome: Progressing Towards Goal     Problem: Patient Education: Go to Patient Education Activity  Goal: Patient/Family Education  Outcome: Progressing Towards Goal     Problem: Delirium Treatment  Goal: *Level of consciousness restored to baseline  Outcome: Progressing Towards Goal  Goal: *Level of environmental perceptions restored to baseline  Outcome: Progressing Towards Goal  Goal: *Sensory perception restored to baseline  Outcome: Progressing Towards Goal  Goal: *Emotional stability restored to baseline  Outcome: Progressing Towards Goal  Goal: *Functional assessment restored to baseline  Outcome: Progressing Towards Goal  Goal: *Absence of falls  Outcome: Progressing Towards Goal  Goal: *Will remain free of delirium, CAM Score negative  Outcome: Progressing Towards Goal  Goal: *Cognitive status will be restored to baseline  Outcome: Progressing Towards Goal  Goal: Interventions  Outcome: Progressing Towards Goal     Problem: Patient Education: Go to Patient Education Activity  Goal: Patient/Family Education  Outcome: Progressing Towards Goal     Problem: Nutrition Deficit  Goal: *Adequate nutrition  Outcome: Progressing Towards Goal     Problem: Non-Violent Restraints  Goal: *Removal from restraints as soon as assessed to be safe  Outcome: Progressing Towards Goal  Goal: *No harm/injury to patient while restraints in use  Outcome: Progressing Towards Goal  Goal: *Patient's dignity will be maintained  Outcome: Progressing Towards Goal  Goal: *Patient Specific Goal (EDIT GOAL, INSERT TEXT)  Outcome: Progressing Towards Goal  Goal: Non-violent Restaints:Standard Interventions  Outcome: Progressing Towards Goal  Goal: Non-violent Restraints:Patient Interventions  Outcome: Progressing Towards Goal  Goal: Patient/Family Education  Outcome: Progressing Towards Goal     Problem: Patient Education: Go to Patient Education Activity  Goal: Patient/Family Education  Outcome: Progressing Towards Goal     Problem: Patient Education: Go to Patient Education Activity  Goal: Patient/Family Education  Outcome: Progressing Towards Goal

## 2019-12-29 NOTE — PROGRESS NOTES
Rapid response called by Tele-monitor Sherren Laud, due to sustained VTach. Pt became unresponsive but with a pulse upon palpation. -190. Responding to the rapid was Dr Jerson Barragan, ICU RN Deyanira Gray, 3S RNs Yoly Chatterjee, and Fidel. Nursing supervisor Brian Lubin. Defibrillator pads attached with pt converting to sinus and back to VTach twice. EKG completed. Non-rebreather at 15 liters applied with SpO2s at 100%    BP @ 0902 - 90/49  BP @ 0906 - 92/53  BP @ 0912 - 94/50     Amiodarone bolus given by Brianda Pritchard RN.  Sustained Sinus rhythm enough to allow monitored transfer to ICU.      0930 - Bedside Verbal Report given to JER Servin

## 2019-12-29 NOTE — ROUTINE PROCESS
Received verbal bedside report from [place], [facility], [facility] care. Pt asleep, displays no visual signs of pain, No signs of distress noted at this time. Call bell within reach, bed in the lowest locked position. 0965 - Pt more awake, alert and oriented today. Oriented to self and situation. Actively eating breakfast, requesting particular foods, speaking appropriately.

## 2019-12-29 NOTE — PROGRESS NOTES
Problem: Discharge Planning  Goal: *Discharge to safe environment  Outcome: Progressing Towards Goal     Problem: Falls - Risk of  Goal: *Absence of Falls  Description  Document Efren Quezada Fall Risk and appropriate interventions in the flowsheet. Outcome: Progressing Towards Goal  Note: Fall Risk Interventions:       Mentation Interventions: Adequate sleep, hydration, pain control, Bed/chair exit alarm, Door open when patient unattended    Medication Interventions: Bed/chair exit alarm    Elimination Interventions: Call light in reach, Bed/chair exit alarm    History of Falls Interventions: Door open when patient unattended, Bed/chair exit alarm         Problem: Pressure Injury - Risk of  Goal: *Prevention of pressure injury  Description  Document Alex Scale and appropriate interventions in the flowsheet.   Outcome: Progressing Towards Goal  Note: Pressure Injury Interventions:  Sensory Interventions: Assess changes in LOC, Float heels, Keep linens dry and wrinkle-free    Moisture Interventions: Check for incontinence Q2 hours and as needed    Activity Interventions: Pressure redistribution bed/mattress(bed type)    Mobility Interventions: HOB 30 degrees or less    Nutrition Interventions: Document food/fluid/supplement intake    Friction and Shear Interventions: HOB 30 degrees or less, Minimize layers                Problem: General Medical Care Plan  Goal: *Vital signs within specified parameters  Outcome: Progressing Towards Goal  Goal: *Labs within defined limits  Outcome: Progressing Towards Goal  Goal: *Absence of infection signs and symptoms  Outcome: Progressing Towards Goal  Goal: *Optimal pain control at patient's stated goal  Outcome: Progressing Towards Goal  Goal: *Skin integrity maintained  Outcome: Progressing Towards Goal  Goal: *Fluid volume balance  Outcome: Progressing Towards Goal  Goal: *Optimize nutritional status  Outcome: Progressing Towards Goal  Goal: *Anxiety reduced or absent  Outcome: Progressing Towards Goal  Goal: *Progressive mobility and function (eg: ADL's)  Outcome: Progressing Towards Goal     Problem: Pain  Goal: *Control of Pain  Outcome: Progressing Towards Goal     Problem:  Body Temperature -  Risk of, Imbalanced  Goal: *Absence of heat stress or hyperthermia signs and symptoms  Outcome: Progressing Towards Goal  Goal: *Absence of cold stress or hypothermia signs and symptoms  Outcome: Progressing Towards Goal     Problem: Hypotension  Goal: *Blood pressure within specified parameters  Outcome: Progressing Towards Goal  Goal: *Fluid volume balance  Outcome: Progressing Towards Goal  Goal: *Labs within defined limits  Outcome: Progressing Towards Goal     Problem: Delirium Treatment  Goal: *Level of consciousness restored to baseline  Outcome: Progressing Towards Goal  Goal: *Level of environmental perceptions restored to baseline  Outcome: Progressing Towards Goal  Goal: *Sensory perception restored to baseline  Outcome: Progressing Towards Goal  Goal: *Emotional stability restored to baseline  Outcome: Progressing Towards Goal  Goal: *Functional assessment restored to baseline  Outcome: Progressing Towards Goal  Goal: *Absence of falls  Outcome: Progressing Towards Goal  Goal: *Will remain free of delirium, CAM Score negative  Outcome: Progressing Towards Goal  Goal: *Cognitive status will be restored to baseline  Outcome: Progressing Towards Goal  Goal: Interventions  Outcome: Progressing Towards Goal     Problem: Non-Violent Restraints  Goal: *Removal from restraints as soon as assessed to be safe  Outcome: Progressing Towards Goal  Goal: *No harm/injury to patient while restraints in use  Outcome: Progressing Towards Goal  Goal: *Patient's dignity will be maintained  Outcome: Progressing Towards Goal  Goal: *Patient Specific Goal (EDIT GOAL, INSERT TEXT)  Outcome: Progressing Towards Goal  Goal: Non-violent Restaints:Standard Interventions  Outcome: Progressing Towards Goal  Goal: Non-violent Restraints:Patient Interventions  Outcome: Progressing Towards Goal  Goal: Patient/Family Education  Outcome: Progressing Towards Goal     Problem: Nutrition Deficit  Goal: *Adequate nutrition  Outcome: Progressing Towards Goal

## 2019-12-29 NOTE — PROGRESS NOTES
[facility] - Progress Note  Admit Date: 12/23/2019    Assessment:     -Wide-complex tachycardia in setting of LBBB, possible aberrancy, no symptoms, PVC's  -h/o chronic LBBB w/1st AV block  -Bradycardia limiting treatment with amiodarone/AV blockers  -Echo 12/28/19 with EF 35-40% declined from January 2018  -Recent hypotension/hypothermia  -Gastritis/duodenitis by CT 12/16/19  -h/o GERD on PPI as outpatient  -HLD on statin  -h/o HTN as outpatient  -Chronic renal disease, stage II/III  -Thrombocytopenia   -Decreased mobility     No primary cardiologist    Plan:     Amiodarone stopped due to bradycardia last night, rhythm stable. No AV blockers due to bradycardia. Decline in EF noted, however, given comorbidities I would treat conservatively. Start oral amiodarone today. Try to keep K > 4.0, replacing. Subjective:     Bradycardia overnight, amiodarone stopped.     Objective:      Patient Vitals for the past 8 hrs:   Temp Pulse Resp BP SpO2   12/29/19 0814 97.9 °F (36.6 °C)       12/29/19 0800 97.9 °F (36.6 °C) 64 16 128/57 100 %   12/29/19 0700  (!) 51 12 111/43 100 %   12/29/19 0600  66 16 121/58 100 %   12/29/19 0500  66 13 122/63 100 %   12/29/19 0400 98.8 °F (37.1 °C) 61 15 121/83 100 %   12/29/19 0300  (!) 58 17 109/52 100 %   12/29/19 0200  (!) 54 15 120/43 100 %   12/29/19 0100  66 15 118/82 100 %         Patient Vitals for the past 96 hrs:   Weight   12/28/19 2000 76 kg (167 lb 8.8 oz)   12/28/19 1034 73 kg (161 lb)   12/27/19 0605 73.1 kg (161 lb 2.5 oz)                    Intake/Output Summary (Last 24 hours) at 12/29/2019 0851  Last data filed at 12/29/2019 0800  Gross per 24 hour   Intake 2440.37 ml   Output 150 ml   Net 2290.37 ml       Physical Exam:  General:  confused  Neck:  nontender  Lungs:  decreased  Heart:  regular rate and rhythm, S1, S2 normal, no murmur, click, rub or gallop  Abdomen:  abdomen is soft without significant tenderness, masses, organomegaly or guarding  Extremities:  extremities normal, atraumatic, no cyanosis or edema    Data Review:     Labs: Results:       Chemistry Recent Labs     12/28/19  0905   *   *   K 3.3*   *   CO2 25   BUN 29*   CREA 1.52*   CA 8.8   MG 2.2   AGAP 7   BUCR 19      CBC w/Diff Recent Labs     12/29/19  0800   WBC 10.1   RBC 4.27*   HGB 13.8   HCT 41.9   PLT 90*   GRANS 87*   LYMPH 7*   EOS 3      Cardiac Enzymes No results found for: CPK, CK, CKMMB, CKMB, RCK3, CKMBT, CKNDX, CKND1, TELLY, TROPT, TROIQ, JOSE, TROPT, TNIPOC, BNP, BNPP   Coagulation No results for input(s): PTP, INR, APTT, INREXT in the last 72 hours. Lipid Panel No results found for: CHOL, CHOLPOCT, CHOLX, CHLST, CHOLV, [id number], HDL, HDLP, LDL, LDLC, DLDLP, 118563, VLDLC, VLDL, TGLX, TRIGL, TRIGP, TGLPOCT, CHHD, CHHDX   BNP No results found for: BNP, BNPP, XBNPT   Liver Enzymes No results for input(s): TP, ALB, TBIL, AP, SGOT, GPT in the last 72 hours.     No lab exists for component: DBIL   Digoxin    Thyroid Studies Lab Results   Component Value Date/Time    TSH 5.79 (H) 12/23/2019 04:09 AM          Signed By: Sandy Ron MD     December 29, 2019

## 2019-12-29 NOTE — PROGRESS NOTES
0700am- Bedside shift report received from [facility]  0900am Pt is stable. 1137am- Pt is resting. 1245pm-SB in the high 40's. Pt is asleep. Dr. Anita Cantor is aware and notified. 1317pm-  is aware and notified of Amio IV gtt has been turned off since 2400am. Will start Amio po dose. Ok to transfer pt to [facility] as per Dr. Anita Cantor. 1448pm- MAP has been in the 50's. Pt is asymptomatic. Admin 250cc of . 9NS IV bolus and then rpt 250cc.0f.9NS IV bolus in 30 min as per MD.   1530pm- Rechk BP-after . 9ns IV bolus- 105/43.  1600pm- Rpt .9 NS IV 250cc IV bolus. 1630pm-MAP has improved to 58-59.  1815pm- Clarification of PO Amio dose. Ok to give Amio dose as long as HR is above 50 as per ROBERT Belcher.  1900pm Bedside shift change report given to Ernestina De Los Santos RN by David Braga RN. MAP has been 57-59. Called Dr. Jesse Gomez. MD aware and he will come and see the pt. Report included the following information SBAR, Kardex, Intake/Output, MAR, Recent Results, Med Rec Status, Cardiac Rhythm First Degree AV Block with BBB and Alarm Parameters .

## 2019-12-29 NOTE — PROGRESS NOTES
Problem: Mobility Impaired (Adult and Pediatric)  Goal: *Acute Goals and Plan of Care (Insert Text)  Description  Physical Therapy Goals:  Goals reviewed and remain appropriate 12/29/2019- to be accomplished in 7 days:  1. Patient will move from supine to sit and sit to supine , scoot up and down and roll side to side in bed with minimal assistance. 2.  Patient will transfer from bed to chair and chair to bed with minimal assistance using the least restrictive device. 3.  Patient will perform sit to stand with minimal assistance. 4.  Patient will ambulate with minimal assistance for 25-50 feet with the least restrictive device. Prior Level of Function:   Patient was modified independence for all mobility including gait using RW. Patient lives with family in a single story home. Pt is a poor historian. If able, son would like to be present during sessions per chart Marck King [phone]). Outcome: Progressing Towards Goal     PHYSICAL THERAPY RE-EVALUATION    Patient: Leonela Talley (78 y.o. male)  Date: 12/29/2019  Primary Diagnosis: Hypothermia [T68. XXXA]  Duodenitis [K29.80]  Gastritis [K29.70]        Precautions:  Fall      ASSESSMENT :  Based on the objective data described below, the patient presents with recent transfer to ICU on 12/28/19 due to [facility] and becoming unresponsive. Upon re-evaluation he was found with reduced respiration rate 53 bpm and decreased /33 therefore, bed level re-evaluation per nursing only. He was mod A for B rolling. He tolerated supine therex of B LE with increased time 1 set of 10 to include supine heel slides, quad/glut sets, ankle pumps, and hip abduction. As patients medical condition improves and he is able to tolerate sitting and supine functional mobility and gait will progress appropriately. Patient will benefit from skilled intervention to address the above impairments.   Patient's rehabilitation potential is considered to be Good  Factors which may influence rehabilitation potential include:   []         None noted  []         Mental ability/status  [x]         Medical condition  []         Home/family situation and support systems  [x]         Safety awareness  []         Pain tolerance/management  []         Other:      PLAN :  Recommendations and Planned Interventions:   [x]           Bed Mobility Training             [x]    Neuromuscular Re-Education  [x]           Transfer Training                   []    Orthotic/Prosthetic Training  [x]           Gait Training                          []    Modalities  [x]           Therapeutic Exercises           []    Edema Management/Control  [x]           Therapeutic Activities            [x]    Family Training/Education  [x]           Patient Education  []           Other (comment):    Frequency/Duration: Patient will be followed by physical therapy 3-5 times a week to address goals. Discharge Recommendations: Khai Ramsey  Further Equipment Recommendations for Discharge: N/A     SUBJECTIVE:   Patient stated I am feeling better. Can I have some ice my mouth is a little dry?     OBJECTIVE DATA SUMMARY:   Hospital course since last seen and reason for re-evaluation: Pt due for re-evaluation due to change in medical status as he was transferred to ICU on 12/28/19 due to [place] and becoming unresponsive.   Past Medical History:   Diagnosis Date    Difficulty urinating     Elevated PSA     Hematuria, unspecified     Hypercholesterolemia     Hypertension     Incomplete bladder emptying     Urinary retention     UTI (urinary tract infection)      Past Surgical History:   Procedure Laterality Date    APPENDECTOMY      EGD  2/7/2014         HX TURP  6/13/16    HX TURP       Barriers to Learning/Limitations: None  Compensate with: N/A  Home Situation:   Home Situation  Home Environment: Private residence  One/Two Story Residence: One story  Living Alone: No  Support Systems: Family member(s), Friends \ neighbors  Patient Expects to be Discharged to[de-identified] Private residence  Current DME Used/Available at Home: Connee Simmering, rolling  Critical Behavior:  Neurologic State: Alert;Drowsy;Sleeping  Orientation Level: Oriented X4  Cognition: Follows commands     Psychosocial  Patient Behaviors: Calm; Cooperative;Lethargic  Family  Behaviors: Calm; Cooperative  Visitor Behaviors: Calm; Cooperative  Needs Expressed: Emotional  Purposeful Interaction: Yes  Pt Identified Daily Priority: Clinical issues (comment)  [facility] Process: Attend basic human needs;Create healing environment  Caring Interventions: Reassure; Therapeutic modalities  Reassure: Informing; Therapeutic listening;Quiet presence  Therapeutic Modalities: Intentional therapeutic touch  Other Caring Modalities: hourly rounds  Skin Condition/Temp: Warm  Family  Behaviors: Calm; Cooperative  Skin Integrity: Intact  Skin Integumentary  Skin Color: Appropriate for ethnicity  Skin Condition/Temp: Warm  Skin Integrity: Intact  Turgor: Non-tenting  Hair Growth: Absent     Strength:    Reduced but functional     Tone & Sensation:   Tone: Normal   Sensation: Intact   Range Of Motion:  AROM: Generally decreased, functional    Functional Mobility:  Bed Mobility:  Rolling: Moderate assistance     Transfers: unable to assess due to bed level assessment secondary to low respiration rate 53 bpm per nursing   Balance:  unable to assess due to bed level assessment secondary to low respiration rate 53 bpm per nursing     Wheelchair Mobility:  unable to assess due to bed level assessment secondary to low respiration rate 53 bpm per nursing     Ambulation/Gait Training: unable to assess due to bed level assessment secondary to low respiration rate 53 bpm per nursing     Therapeutic Exercises: Ankle pumps, heel slides, glut/quad sets, supine hip abduction 1 set of 10 with increased time.      Pain:  Pain level pre-treatment: 0/10   Pain level post-treatment: 0/10       Activity Tolerance:   Fair, limited by current medical condition  Please refer to the flowsheet for vital signs taken during this treatment. After treatment:   []         Patient left in no apparent distress sitting up in chair  [x]         Patient left in no apparent distress in bed  [x]         Call bell left within reach  []         Nursing notified  []         Caregiver present  []         Bed alarm activated  []         SCDs applied    COMMUNICATION/EDUCATION:   [x]         Role of Physical Therapy in the acute care setting. []         Fall prevention education was provided and the patient/caregiver indicated understanding. [x]         Patient/family have participated as able in goal setting and plan of care. [x]         Patient/family agree to work toward stated goals and plan of care. []         Patient understands intent and goals of therapy, but is neutral about his/her participation. []         Patient is unable to participate in goal setting/plan of care: ongoing with therapy staff.  []         Other:     Thank you for this referral.  Cielo Adams, DPT   Time Calculation: 26 mins

## 2019-12-29 NOTE — PROGRESS NOTES
Progress Note      Patient: Lana Crocker               Sex: male          DOA: 12/23/2019       YOB: 1934      Age:  80 y.o.        LOS:  LOS: 5 days             CHIEF COMPLAINT:  Abdominal Pain      Assessment/Plan:     Principal Problem:    Hypothermia (12/23/2019)    Active Problems:    Abdominal pain (12/23/2019)      Transaminitis (12/23/2019)      Chronic renal failure, stage 3 (moderate) ([address] (12/23/2019)      SIRS (systemic inflammatory response syndrome) ([address] (12/23/2019)      Gastritis (12/23/2019)      Duodenitis (12/24/2019)      Sustained ventricular tachycardia ([address] (12/28/2019)    abdominal pain due to gastritis    PLAN:  - Cont amio drip  - Unlikely to transition to oral amio per cardio  - Appreciate cardio  - Replete lytes  - Cont to observe off antibx  - Cont diet  - PPI for gastritis  - Cont 1/2 NS for hypernatremia  - OK to downgrade  - Cont acceptable home medications for chronic conditions   - DVT protocol      Subjective:     No major events overnight. No further cardiac events. On amio drip. No complaints    Objective:     Visit Vitals  /59   Pulse 63   Temp 97.5 °F (36.4 °C)   Resp 18   Ht 5' 8\" (1.727 m)   Wt 76 kg (167 lb 8.8 oz)   SpO2 100%   BMI 25.48 kg/m²        Physical Exam:  Ears: hearing is intact  Eyes: Icterus is not present  Lungs: clear to auscultation bilaterally  Heart: regular rate and rhythm, S1, S2 normal, no murmur, click, rub or gallop  Gastrointestinal: soft, non-tender.  Bowel sounds normal. No masses,  no organomegaly  Skin: Morbilliform rash left torso/back    Lab/Data Reviewed:  CMP:   Lab Results   Component Value Date/Time     (H) 12/29/2019 08:00 AM    K 4.3 12/29/2019 08:00 AM     (H) 12/29/2019 08:00 AM    CO2 25 12/29/2019 08:00 AM    AGAP 3 12/29/2019 08:00 AM     (H) 12/29/2019 08:00 AM    BUN 26 (H) 12/29/2019 08:00 AM    CREA 1.37 (H) 12/29/2019 08:00 AM    GFRAA 60 (L) 12/29/2019 08:00 AM    GFRNA 49 (L) 12/29/2019 08:00 AM    CA 8.6 12/29/2019 08:00 AM     CBC:   Lab Results   Component Value Date/Time    WBC 10.1 12/29/2019 08:00 AM    HGB 13.8 12/29/2019 08:00 AM    HCT 41.9 12/29/2019 08:00 AM    PLT 90 (L) 12/29/2019 08:00 AM       Imaging Reviewed:  No results found.

## 2019-12-30 ENCOUNTER — APPOINTMENT (OUTPATIENT)
Dept: VASCULAR SURGERY | Age: 84
DRG: 392 | End: 2019-12-30
Attending: HOSPITALIST
Payer: MEDICARE

## 2019-12-30 LAB
ANION GAP SERPL CALC-SCNC: 5 MMOL/L (ref 3–18)
BUN SERPL-MCNC: 27 MG/DL (ref 7–18)
BUN/CREAT SERPL: 23 (ref 12–20)
CALCIUM SERPL-MCNC: 7.9 MG/DL (ref 8.5–10.1)
CHLORIDE SERPL-SCNC: 126 MMOL/L (ref 100–111)
CO2 SERPL-SCNC: 21 MMOL/L (ref 21–32)
CREAT SERPL-MCNC: 1.2 MG/DL (ref 0.6–1.3)
GLUCOSE SERPL-MCNC: 95 MG/DL (ref 74–99)
POTASSIUM SERPL-SCNC: 4.7 MMOL/L (ref 3.5–5.5)
SODIUM SERPL-SCNC: 152 MMOL/L (ref 136–145)

## 2019-12-30 PROCEDURE — 77030040361 HC SLV COMPR DVT MDII -B

## 2019-12-30 PROCEDURE — C9113 INJ PANTOPRAZOLE SODIUM, VIA: HCPCS | Performed by: HOSPITALIST

## 2019-12-30 PROCEDURE — 74011000250 HC RX REV CODE- 250: Performed by: HOSPITALIST

## 2019-12-30 PROCEDURE — 93971 EXTREMITY STUDY: CPT

## 2019-12-30 PROCEDURE — 36415 COLL VENOUS BLD VENIPUNCTURE: CPT

## 2019-12-30 PROCEDURE — 65660000000 HC RM CCU STEPDOWN

## 2019-12-30 PROCEDURE — 92526 ORAL FUNCTION THERAPY: CPT

## 2019-12-30 PROCEDURE — 97530 THERAPEUTIC ACTIVITIES: CPT

## 2019-12-30 PROCEDURE — 97110 THERAPEUTIC EXERCISES: CPT

## 2019-12-30 PROCEDURE — 74011250636 HC RX REV CODE- 250/636: Performed by: HOSPITALIST

## 2019-12-30 PROCEDURE — 74011250637 HC RX REV CODE- 250/637: Performed by: PHYSICIAN ASSISTANT

## 2019-12-30 PROCEDURE — 80048 BASIC METABOLIC PNL TOTAL CA: CPT

## 2019-12-30 RX ORDER — DEXTROSE MONOHYDRATE 50 MG/ML
75 INJECTION, SOLUTION INTRAVENOUS CONTINUOUS
Status: DISPENSED | OUTPATIENT
Start: 2019-12-30 | End: 2019-12-31

## 2019-12-30 RX ADMIN — AMIODARONE HYDROCHLORIDE 200 MG: 200 TABLET ORAL at 09:35

## 2019-12-30 RX ADMIN — AMIODARONE HYDROCHLORIDE 200 MG: 200 TABLET ORAL at 17:05

## 2019-12-30 RX ADMIN — Medication 10 ML: at 14:00

## 2019-12-30 RX ADMIN — Medication 10 ML: at 06:35

## 2019-12-30 RX ADMIN — Medication 10 ML: at 13:00

## 2019-12-30 RX ADMIN — HEPARIN SODIUM 5000 UNITS: 5000 INJECTION INTRAVENOUS; SUBCUTANEOUS at 17:05

## 2019-12-30 RX ADMIN — DEXTROSE MONOHYDRATE 75 ML/HR: 5 INJECTION, SOLUTION INTRAVENOUS at 17:04

## 2019-12-30 RX ADMIN — SODIUM CHLORIDE 40 MG: 9 INJECTION, SOLUTION INTRAMUSCULAR; INTRAVENOUS; SUBCUTANEOUS at 09:35

## 2019-12-30 RX ADMIN — Medication 20 ML: at 13:00

## 2019-12-30 RX ADMIN — HEPARIN SODIUM 5000 UNITS: 5000 INJECTION INTRAVENOUS; SUBCUTANEOUS at 09:35

## 2019-12-30 RX ADMIN — SODIUM CHLORIDE 40 MG: 9 INJECTION, SOLUTION INTRAMUSCULAR; INTRAVENOUS; SUBCUTANEOUS at 21:28

## 2019-12-30 RX ADMIN — DEXTROSE MONOHYDRATE, SODIUM CHLORIDE, AND POTASSIUM CHLORIDE: 50; 4.5; 2.98 INJECTION, SOLUTION INTRAVENOUS at 06:52

## 2019-12-30 RX ADMIN — Medication 10 ML: at 23:46

## 2019-12-30 NOTE — PROGRESS NOTES
Problem: Dysphagia (Adult)  Goal: *Acute Goals and Plan of Care (Insert Text)  Description  Dysphagia Present:   yes     Aspiration:  possible    Recommendations:  Diet: mech soft ground, nectar thick liquids  Meds: crushed in puree  Aspiration Precautions  Oral Care TID    Goals:  Patient will:  1. Tolerate PO trials with 0 s/s overt distress in 4/5 trials  2. Utilize compensatory swallow strategies/maneuvers (decrease bite/sip, size/rate, alt. liq/sol) with min cues in 4/5 trials  3. Perform oral-motor/laryngeal exercises to increase oropharyngeal swallow function with min cues  4. Complete an objective swallow study (i.e., MBSS) to assess swallow integrity, r/o aspiration, and determine of safest LRD, min A      Outcome: Progressing Towards Goal     SPEECH LANGUAGE PATHOLOGY DYSPHAGIA TREATMENT    Patient: Enio Calderon (89 y.o. male)  Date: 12/30/2019  Diagnosis: Hypothermia [T68. XXXA]  Duodenitis [K29.80]  Gastritis [K29.70]   Hypothermia       Precautions:  aspiration, Fall  PLOF: per H&P     ASSESSMENT:  Patient seen for swallowing therapy this day per request of RN as patient is upset about his diet of puree and nectar thick liquids. SLP trialed thin liquids with immediate s/sx of aspiration of coughing and throat clearing in 5/5 trials. Patient with nectar thick lqiuids with coughing in 1/2 trials, suspect first cough was due to thin trial vs aspiration of nectar. Patient with solid trial with mildly incoordinated mastication d/t dentition, however tolerated with no s/sx of aspiration and full oral clearance. At this time, SLP recommends mech soft ground, nectar thick liquids. Patient educated on importance of safe swallowing guidelines (small bites/sips, decreased rate, alt solids/liquids, HOB >60 for all PO intake); verbalized and demonstrated comprehension.       Progression toward goals:  []         Improving appropriately and progressing toward goals  [x]         Improving slowly and progressing toward goals  []         Not making progress toward goals and plan of care will be adjusted     PLAN:  Recommendations and Planned Interventions:  Mech soft ground, nectar thick liquids  Patient continues to benefit from skilled intervention to address the above impairments. Continue treatment per established plan of care. Discharge Recommendations:  Skilled Nursing Facility     SUBJECTIVE:   Patient stated I want to try it again. OBJECTIVE:   Cognitive and Communication Status:  Neurologic State: Alert  Orientation Level: Oriented to person, Oriented to place  Cognition: Follows commands  Perception: Appears intact  Perseveration: No perseveration noted  Safety/Judgement: Decreased awareness of environment, Decreased awareness of need for assistance, Decreased insight into deficits  Dysphagia Treatment:  Oral Assessment:  Oral Assessment  Labial: No impairment  Dentition: Limited  Oral Hygiene: fair  Lingual: Decreased rate  Velum: No impairment  Mandible: No impairment  P.O. Trials:   Patient Position: HOB60   Vocal quality prior to P.O.: No impairment   Consistency Presented: Thin liquid, Nectar thick liquid, Solid   How Presented: SLP-fed/presented, Self-fed/presented, Cup/sip, Straw   How Much: 14   Bolus Acceptance: No impairment   Bolus Formation/Control: Impaired   Type of Impairment: Delayed   Propulsion: Discoordination   Oral Residue: None   Initiation of Swallow: Delayed (# of seconds)   Laryngeal Elevation: Decreased   Aspiration Signs/Symptoms: Weak cough, Strong cough   Pharyngeal Phase Characteristics: Poor endurance   Effective Modifications: None   Cues for Modifications: Maximal         Oral Phase Severity: Mild   Pharyngeal Phase Severity : Mild               PAIN:  Pain level pre-treatment: 0/10   Pain level post-treatment: 0/10   Pain Intervention(s): Medication (see MAR);  Rest, Ice, Repositioning   Response to intervention: Nurse notified, See doc flow    After treatment:   [] Patient left in no apparent distress sitting up in chair  [x]              Patient left in no apparent distress in bed  []              Call bell left within reach  [x]              Nursing notified  [x]              Family present  []              Caregiver present  []              Bed alarm activated      COMMUNICATION/EDUCATION:   [x] Aspiration precautions; swallow safety; compensatory techniques  []        Patient unable to participate in education; education ongoing with staff  []  Posted safety precautions in patient's room.   [] Oral-motor/laryngeal strengthening exercises      Evelina Hall [facility] SLP  Time Calculation: 20 mins

## 2019-12-30 NOTE — PROGRESS NOTES
Problem: Mobility Impaired (Adult and Pediatric)  Goal: *Acute Goals and Plan of Care (Insert Text)  Description  Physical Therapy Goals:  Goals reviewed and remain appropriate 12/29/2019- to be accomplished in 7 days:  1. Patient will move from supine to sit and sit to supine , scoot up and down and roll side to side in bed with minimal assistance. 2.  Patient will transfer from bed to chair and chair to bed with minimal assistance using the least restrictive device. 3.  Patient will perform sit to stand with minimal assistance. 4.  Patient will ambulate with minimal assistance for 25-50 feet with the least restrictive device. Prior Level of Function:   Patient was modified independence for all mobility including gait using RW. Patient lives with family in a single story home. Pt is a poor historian. If able, son would like to be present during sessions per chart Galen Handler [phone]). Outcome: Progressing Towards Goal   PHYSICAL THERAPY TREATMENT    Patient: Anita Bazan (14 y.o. male)  Date: 12/30/2019  Diagnosis: Hypothermia [T68. XXXA]  Duodenitis [K29.80]  Gastritis [K29.70]   Hypothermia       Precautions: Fall  PLOF: see above    ASSESSMENT:  Pt demonstrated increased functional mobility and was cleared by nursing to begin out of bed activity. Pt performed supine LE exercises with assistance prior to initiating mobility. Pt transferred supine to sit with mod/max A and demonstrated fair static sitting balance at the edge of the bed. Pt stood with mod A and sidestepped 1 foot along the edge of the bed with mod A. Pt returned to supine in bed with mod A to raise LEs into the bed. Pt was left with needs in reach and family present.   Progression toward goals:   []      Improving appropriately and progressing toward goals  [x]      Improving slowly and progressing toward goals  []      Not making progress toward goals and plan of care will be adjusted     PLAN:  Patient continues to benefit from skilled intervention to address the above impairments. Continue treatment per established plan of care. Discharge Recommendations:  Khai Ramsey  Further Equipment Recommendations for Discharge:  N/A     SUBJECTIVE:   Patient stated Did I do good today?     OBJECTIVE DATA SUMMARY:   Critical Behavior:  Neurologic State: Alert  Orientation Level: Oriented to person, Oriented to place  Cognition: Follows commands  Safety/Judgement: Decreased awareness of environment, Decreased awareness of need for assistance, Decreased insight into deficits  Functional Mobility Training:  Bed Mobility:  Rolling: Minimum assistance; Moderate assistance  Supine to Sit: Moderate assistance;Maximum assistance  Sit to Supine: Moderate assistance      Transfers:  Sit to Stand: Moderate assistance  Stand to Sit: Moderate assistance(to control descent)     Balance:  Sitting - Static: Fair (occasional)  Standing: With support  Standing - Static: Poor  Standing - Dynamic : Poor   Ambulation/Gait Training:  Distance (ft): 1 Feet (ft)(sidestepping along the edge of the bed)  Assistive Device: (PT assisting from the front)  Ambulation - Level of Assistance: Moderate assistance  Gait Abnormalities: Shuffling gait     Therapeutic Exercises:         EXERCISE   Sets   Reps   Active Active Assist   Passive Self ROM   Comments   Ankle Pumps 1 10  [x] [] [] []    Quad Sets/Glut Sets 1 10  [x] [] [] [] Hold for 5 secs   Hamstring Sets   [] [] [] []    Short Arc Quads   [] [] [] []    Heel Slides 1 10 [] [x] [] []    Straight Leg Raises   [] [] [] []    Hip ABD/Add 1 10 [] [x] [] []    Long Arc Quads   [] [] [] []    Seated Marching   [] [] [] []    Standing Marching   [] [] [] []       [] [] [] []        Pain:  Pain level pre-treatment: 0/10  Pain level post-treatment: 0/10   Pain Intervention(s): N/A    Activity Tolerance:   Poor+  Please refer to the flowsheet for vital signs taken during this treatment.   After treatment: [] Patient left in no apparent distress sitting up in chair  [x] Patient left in no apparent distress in bed  [x] Call bell left within reach  [x] Nursing notified  [x] Caregiver present  [] Bed alarm activated  [x] SCDs applied      COMMUNICATION/EDUCATION:   [x]         Role of Physical Therapy in the acute care setting. [x]         Fall prevention education was provided and the patient/caregiver indicated understanding. [x]         Patient/family have participated as able in working toward goals and plan of care. [x]         Patient/family agree to work toward stated goals and plan of care. []         Patient understands intent and goals of therapy, but is neutral about his/her participation.   []         Patient is unable to participate in stated goals/plan of care: ongoing with therapy staff.  []         Other:        Priyank Saravia, PT   Time Calculation: 23 mins

## 2019-12-30 NOTE — PROGRESS NOTES
Problem: Discharge Planning  Goal: *Discharge to safe environment  Outcome: Progressing Towards Goal     Problem: Falls - Risk of  Goal: *Absence of Falls  Description  Document Catherene Bunting Fall Risk and appropriate interventions in the flowsheet. Outcome: Progressing Towards Goal  Note: Fall Risk Interventions:       Mentation Interventions: Update white board, Adequate sleep, hydration, pain control    Medication Interventions: Evaluate medications/consider consulting pharmacy    Elimination Interventions: Toileting schedule/hourly rounds    History of Falls Interventions: Evaluate medications/consider consulting pharmacy         Problem: Patient Education: Go to Patient Education Activity  Goal: Patient/Family Education  Outcome: Progressing Towards Goal     Problem: Pressure Injury - Risk of  Goal: *Prevention of pressure injury  Description  Document Alex Scale and appropriate interventions in the flowsheet. Outcome: Progressing Towards Goal  Note:   Pressure Injury Interventions:  Sensory Interventions: Assess need for specialty bed, Assess changes in LOC. Moisture Interventions: Apply protective barrier, creams and emollients, Check for incontinence Q2 hours and as needed    Activity Interventions: Assess need for specialty bed    Mobility Interventions: HOB 30 degrees or less, Pressure redistribution bed/mattress (bed type), Turn and reposition approx.  every two hours(pillow and wedges)    Nutrition Interventions: Discuss nutritional consult with provider    Friction and Shear Interventions: Apply protective barrier, creams and emollients, Lift sheet                Problem: Patient Education: Go to Patient Education Activity  Goal: Patient/Family Education  Outcome: Progressing Towards Goal     Problem: General Medical Care Plan  Goal: *Vital signs within specified parameters  Outcome: Progressing Towards Goal  Goal: *Labs within defined limits  Outcome: Progressing Towards Goal  Goal: *Absence of infection signs and symptoms  Outcome: Progressing Towards Goal  Goal: *Optimal pain control at patient's stated goal  Outcome: Progressing Towards Goal  Goal: *Skin integrity maintained  Outcome: Progressing Towards Goal  Goal: *Fluid volume balance  Outcome: Progressing Towards Goal  Goal: *Optimize nutritional status  Outcome: Progressing Towards Goal  Goal: *Anxiety reduced or absent  Outcome: Progressing Towards Goal  Goal: *Progressive mobility and function (eg: ADL's)  Outcome: Progressing Towards Goal     Problem: Pain  Goal: *Control of Pain  Outcome: Progressing Towards Goal     Problem: Patient Education: Go to Patient Education Activity  Goal: Patient/Family Education  Outcome: Progressing Towards Goal     Problem:  Body Temperature -  Risk of, Imbalanced  Goal: *Absence of heat stress or hyperthermia signs and symptoms  Outcome: Progressing Towards Goal  Goal: *Absence of cold stress or hypothermia signs and symptoms  Outcome: Progressing Towards Goal     Problem: Patient Education: Go to Patient Education Activity  Goal: Patient/Family Education  Outcome: Progressing Towards Goal     Problem: Hypotension  Goal: *Blood pressure within specified parameters  Outcome: Progressing Towards Goal  Goal: *Fluid volume balance  Outcome: Progressing Towards Goal  Goal: *Labs within defined limits  Outcome: Progressing Towards Goal     Problem: Patient Education: Go to Patient Education Activity  Goal: Patient/Family Education  Outcome: Progressing Towards Goal     Problem: Delirium Treatment  Goal: *Level of consciousness restored to baseline  Outcome: Progressing Towards Goal  Goal: *Level of environmental perceptions restored to baseline  Outcome: Progressing Towards Goal  Goal: *Sensory perception restored to baseline  Outcome: Progressing Towards Goal  Goal: *Emotional stability restored to baseline  Outcome: Progressing Towards Goal  Goal: *Functional assessment restored to baseline  Outcome: Progressing Towards Goal  Goal: *Absence of falls  Outcome: Progressing Towards Goal  Goal: *Will remain free of delirium, CAM Score negative  Outcome: Progressing Towards Goal  Goal: *Cognitive status will be restored to baseline  Outcome: Progressing Towards Goal  Goal: Interventions  Outcome: Progressing Towards Goal     Problem: Patient Education: Go to Patient Education Activity  Goal: Patient/Family Education  Outcome: Progressing Towards Goal     Problem: Nutrition Deficit  Goal: *Adequate nutrition  Outcome: Progressing Towards Goal     Problem: Patient Education: Go to Patient Education Activity  Goal: Patient/Family Education  Outcome: Progressing Towards Goal     Problem: Patient Education: Go to Patient Education Activity  Goal: Patient/Family Education  Outcome: Progressing Towards Goal

## 2019-12-30 NOTE — DIABETES MGMT
NUTRITION AND  GLYCEMIC CONTROL FOLLOW UP / PLAN OF CARE     Leonela Talley           80 y.o.           12/23/2019                 1. Septic shock ([address]    2. Cholangitis       INTERVENTIONS/PLAN:   1. Monitor po intake, labs and weights. 2. Ensure Enlive, one bottle daily. 3. Tray set up/assist prn    ASSESSMENT:   Pt is 104% ideal weight;  BMI (calculated): 23.0 kg/m2 (normal weight classification). Although BMI is in normal range and pt denies any recent weight loss, he is at nutrition risk due to poor po intake and documented unintentional weight loss as below. Na - 152. Nutrition Diagnoses:   Inadequate oral food and beverage intake due to poor appetite as evidenced by po intake at breakfast < 100 calories  12/30/19. Unintentional weight loss due to inadequate energy intake as evidenced by documented 33 lb weight loss in past 10 months. SUBJECTIVE/OBJECTIVE:   Information obtained from: chart review, pt  Pt admitted with Sepsis due to intraabdominal source with duodenitis, hypotension, cholelithiasis without clear evidence of cholecystitis. MARICRUZ due to dehydration and hypotension, abnormal LFT's, hypernatremia, hypokalemia. Nursing notes indicate pt with skin tears to right hand, wrists and  forearm, excoriation to bilateral groin region  12/24:  Pt reports his appetite has been poor but his weight has been stable. He denies having food allergies and does not have issues with chewing or swallowing (noted multiple missing teeth but pt still reports he can eat everything). 12/30:  Pt states his mouth is dry, he does not like either pureed foods or thickened liquids and did not eat breakfast this morning.     Diet: orders written for mechanical soft, ground meats (received pureed diet/NTL at breakfast today)  Patient Vitals for the past 100 hrs:   % Diet Eaten   12/28/19 0846 75 %   12/27/19 1012 25 %       Medications: [x]                Reviewed   IVF:  D5 at 75 ml/hr (providing 306 calories/24 hours)    Most Recent POC Glucose:   Recent Labs     12/30/19  0130 12/29/19  0800 12/28/19  0905   GLU 95 102* 126*         Labs:   No results found for: HBA1C, HGBE8, FAD0OLLE, MEI2AVET  Lab Results   Component Value Date/Time    Sodium 152 (H) 12/30/2019 01:30 AM    Potassium 4.7 12/30/2019 01:30 AM    Chloride 126 (H) 12/30/2019 01:30 AM    CO2 21 12/30/2019 01:30 AM    Anion gap 5 12/30/2019 01:30 AM    Glucose 95 12/30/2019 01:30 AM    BUN 27 (H) 12/30/2019 01:30 AM    Creatinine 1.20 12/30/2019 01:30 AM    Calcium 7.9 (L) 12/30/2019 01:30 AM    Magnesium 2.2 12/28/2019 09:05 AM    Phosphorus 3.4 12/23/2019 12:52 PM    Albumin 2.5 (L) 12/24/2019 03:30 AM       Anthropometrics: IBW : 66.2 kg (146 lb), % IBW (Calculated): 103.74 %, BMI (calculated): 25.5  Wt Readings from Last 1 Encounters:   12/28/19 76 kg (167 lb 8.8 oz)     Last 3 Recorded Weights in this Encounter    12/27/19 0605 12/28/19 1034 12/28/19 2000   Weight: 73.1 kg (161 lb 2.5 oz) 73 kg (161 lb) 76 kg (167 lb 8.8 oz)      Ht Readings from Last 1 Encounters:   12/28/19 5' 8\" (1.727 m)     Last Weight Metrics:  Weight Loss Metrics 12/28/2019 12/16/2019 2/12/2019 9/10/2018 2/13/2018 1/30/2018 9/15/2017   Today's Wt 167 lb 8.8 oz 150 lb 184 lb 180 lb 185 lb 185 lb 190 lb   BMI 25.48 kg/m2 22.81 kg/m2 27.98 kg/m2 27.37 kg/m2 28.13 kg/m2 28.13 kg/m2 28.89 kg/m2       Estimated Nutrition Needs:  5983 Kcals/day, Protein (g): 82 g Fluid (ml): 1800 ml  Based on:   [x]          Actual BW    []          ABW   []            Adjusted BW         Nutrition Interventions:  Ensure Enlive every dinner  Goal:   Pt will consume > 75% meals by 12/29/19.  12/30/19- not met  Weight maintenance (+/- 1-2 kg) or weight gain of 1-2 lbs/week by 1/6/20.         Nutrition Monitoring and Evaluation      [x]     Monitor po intake on meal rounds  [x]     Continue inpatient monitoring and intervention  []     Other:      Nutrition Risk:  [x]   High     [x]  Moderate    [] Minimal/Uncompromised    Kobe Solis, [address] [place] [address], E   Office:  [facility] Pager:  [phone]

## 2019-12-30 NOTE — PROGRESS NOTES
Problem: Discharge Planning  Goal: *Discharge to safe environment  Outcome: Progressing Towards Goal     Problem: Patient Education: Go to Patient Education Activity  Goal: Patient/Family Education  Outcome: Progressing Towards Goal     Problem: Pressure Injury - Risk of  Goal: *Prevention of pressure injury  Description  Document Alex Scale and appropriate interventions in the flowsheet. Outcome: Progressing Towards Goal  Note: Pressure Injury Interventions:  Sensory Interventions: Keep linens dry and wrinkle-free, Minimize linen layers, Pressure redistribution bed/mattress (bed type), Turn and reposition approx. every two hours (pillows and wedges if needed)    Moisture Interventions: Internal/External urinary devices    Activity Interventions: Pressure redistribution bed/mattress(bed type)    Mobility Interventions: Float heels, HOB 30 degrees or less, Turn and reposition approx.  every two hours(pillow and wedges)    Nutrition Interventions: Document food/fluid/supplement intake    Friction and Shear Interventions: Foam dressings/transparent film/skin sealants, Apply protective barrier, creams and emollients, Minimize layers, Transferring/repositioning devices                Problem: Patient Education: Go to Patient Education Activity  Goal: Patient/Family Education  Outcome: Progressing Towards Goal     Problem: General Medical Care Plan  Goal: *Vital signs within specified parameters  Outcome: Progressing Towards Goal  Goal: *Labs within defined limits  Outcome: Progressing Towards Goal  Goal: *Absence of infection signs and symptoms  Outcome: Progressing Towards Goal  Goal: *Optimal pain control at patient's stated goal  Outcome: Progressing Towards Goal  Goal: *Skin integrity maintained  Outcome: Progressing Towards Goal  Goal: *Fluid volume balance  Outcome: Progressing Towards Goal  Goal: *Optimize nutritional status  Outcome: Progressing Towards Goal  Goal: *Anxiety reduced or absent  Outcome: Progressing Towards Goal  Goal: *Progressive mobility and function (eg: ADL's)  Outcome: Progressing Towards Goal     Problem: Pain  Goal: *Control of Pain  Outcome: Progressing Towards Goal     Problem: Patient Education: Go to Patient Education Activity  Goal: Patient/Family Education  Outcome: Progressing Towards Goal     Problem:  Body Temperature -  Risk of, Imbalanced  Goal: *Absence of heat stress or hyperthermia signs and symptoms  Outcome: Progressing Towards Goal  Goal: *Absence of cold stress or hypothermia signs and symptoms  Outcome: Progressing Towards Goal     Problem: Patient Education: Go to Patient Education Activity  Goal: Patient/Family Education  Outcome: Progressing Towards Goal     Problem: Hypotension  Goal: *Blood pressure within specified parameters  Outcome: Progressing Towards Goal  Goal: *Fluid volume balance  Outcome: Progressing Towards Goal  Goal: *Labs within defined limits  Outcome: Progressing Towards Goal     Problem: Patient Education: Go to Patient Education Activity  Goal: Patient/Family Education  Outcome: Progressing Towards Goal     Problem: Delirium Treatment  Goal: *Level of consciousness restored to baseline  Outcome: Progressing Towards Goal  Goal: *Level of environmental perceptions restored to baseline  Outcome: Progressing Towards Goal  Goal: *Sensory perception restored to baseline  Outcome: Progressing Towards Goal  Goal: *Emotional stability restored to baseline  Outcome: Progressing Towards Goal  Goal: *Functional assessment restored to baseline  Outcome: Progressing Towards Goal  Goal: *Absence of falls  Outcome: Progressing Towards Goal  Goal: *Will remain free of delirium, CAM Score negative  Outcome: Progressing Towards Goal  Goal: *Cognitive status will be restored to baseline  Outcome: Progressing Towards Goal  Goal: Interventions  Outcome: Progressing Towards Goal     Problem: Patient Education: Go to Patient Education Activity  Goal: Patient/Family Education  Outcome: Progressing Towards Goal     Problem: Non-Violent Restraints  Goal: *Removal from restraints as soon as assessed to be safe  Outcome: Resolved/Met  Goal: *No harm/injury to patient while restraints in use  Outcome: Resolved/Met  Goal: *Patient's dignity will be maintained  Outcome: Resolved/Met  Goal: *Patient Specific Goal (EDIT GOAL, INSERT TEXT)  Outcome: Resolved/Met  Goal: Non-violent Restaints:Standard Interventions  Outcome: Resolved/Met  Goal: Non-violent Restraints:Patient Interventions  Outcome: Resolved/Met  Goal: Patient/Family Education  Outcome: Resolved/Met     Problem: Nutrition Deficit  Goal: *Adequate nutrition  Outcome: Progressing Towards Goal

## 2019-12-30 NOTE — PROGRESS NOTES
[facility] - Progress Note  Admit Date: 12/23/2019  Patient seen and examined independently. Further treatment of cardiomyopathy presently limited by patient's marginal blood pressure. He is in sinus rhythm today. His main complaint is that of the food. Agree with assessment and plan as noted below. Kisha Day MD  Assessment:     Hospital Problems  Date Reviewed: 2/12/2019          Codes Class Noted POA    Sustained ventricular tachycardia ([facility] ICD-10-CM: I47.2  ICD-9-CM: 427.1  12/28/2019 Unknown        Duodenitis ICD-10-CM: K29.80  ICD-9-CM: 535.60  12/24/2019 Unknown        Abdominal pain ICD-10-CM: R10.9  ICD-9-CM: 789.00  12/23/2019 Unknown        * (Principal) Hypothermia ICD-10-CM: T68. XXXA  ICD-9-CM: 991.6  12/23/2019 Unknown        Transaminitis ICD-10-CM: R74.0  ICD-9-CM: 790.4  12/23/2019 Unknown        Chronic renal failure, stage 3 (moderate) (HCC) ICD-10-CM: N18.3  ICD-9-CM: 585.3  12/23/2019 Unknown        SIRS (systemic inflammatory response syndrome) ([facility] ICD-10-CM: R65.10  ICD-9-CM: 995.90  12/23/2019 Unknown        Gastritis ICD-10-CM: K29.70  ICD-9-CM: 535.50  12/23/2019 Unknown            -Wide-complex tachycardia in setting of LBBB, possible aberrancy, no symptoms  -h/o chronic LBBB w/1st AV block  -Echo 12/28/19 with EF 35-40% declined from January 2018  -Recent hypotension/hypothermia  -Gastritis/duodenitis by CT 12/16/19  -h/o GERD on PPI as outpatient  -HLD on statin  -h/o HTN as outpatient  -Chronic renal disease, stage II/III  -Thrombocytopenia   -Decreased mobility  -Dysphagia     No primary cardiologist      Plan:      - Patient is in sinus rhythm today, PO Amiodarone started yesterday  - Conservative management for cardiomyopathy given co-morbidities, will consider starting ace-I/ARB if BP allows. Hypotensive readings noted over the last 24 hours    Subjective:     Discontent over diet of thick liquids and pureed foods.     Objective:      Patient Vitals for the past 8 hrs:   Temp Pulse Resp BP SpO2   12/30/19 0900  68 18  100 %   12/30/19 0800 98.8 °F (37.1 °C) (!) 56 12 116/47 100 %   12/30/19 0700  62 10  99 %   12/30/19 0400 98.1 °F (36.7 °C) (!) 56 15 117/48 100 %         Patient Vitals for the past 96 hrs:   Weight   12/28/19 2000 167 lb 8.8 oz (76 kg)   12/28/19 1034 161 lb (73 kg)   12/27/19 0605 161 lb 2.5 oz (73.1 kg)                    Intake/Output Summary (Last 24 hours) at 12/30/2019 1040  Last data filed at 12/30/2019 1741  Gross per 24 hour   Intake 2225 ml   Output    Net 2225 ml       Physical Exam:  General:  alert, cooperative, no distress  Neck:  nontender, no JVD  Lungs:  clear to auscultation bilaterally  Heart:  regular rate and rhythm, S1, S2 normal, no murmur, click, rub or gallop  Abdomen:  abdomen is soft without significant tenderness, masses, organomegaly or guarding  Extremities:  extremities normal, atraumatic, no cyanosis or edema    Data Review:     Labs: Results:       Chemistry Recent Labs     12/30/19  0130 12/29/19  0800 12/28/19  0905   GLU 95 102* 126*   * 151* 154*   K 4.7 4.3 3.3*   * 123* 122*   CO2 21 25 25   BUN 27* 26* 29*   CREA 1.20 1.37* 1.52*   CA 7.9* 8.6 8.8   MG  --   --  2.2   AGAP 5 3 7   BUCR 23* 19 19      CBC w/Diff Recent Labs     12/29/19  0800   WBC 10.1   RBC 4.27*   HGB 13.8   HCT 41.9   PLT 90*   GRANS 87*   LYMPH 7*   EOS 3      Cardiac Enzymes No results found for: CPK, CK, CKMMB, CKMB, RCK3, CKMBT, CKNDX, CKND1, TELLY, TROPT, TROIQ, JOSE, TROPT, TNIPOC, BNP, BNPP   Coagulation No results for input(s): PTP, INR, APTT, INREXT in the last 72 hours. Lipid Panel No results found for: CHOL, CHOLPOCT, CHOLX, CHLST, CHOLV, 552360, HDL, HDLP, LDL, LDLC, DLDLP, 554406, VLDLC, VLDL, TGLX, TRIGL, TRIGP, TGLPOCT, CHHD, CHHDX   BNP No results found for: BNP, BNPP, XBNPT   Liver Enzymes No results for input(s): TP, ALB, TBIL, AP, SGOT, GPT in the last 72 hours.     No lab exists for component: DBIL Digoxin    Thyroid Studies Lab Results   Component Value Date/Time    TSH 5.79 (H) 12/23/2019 04:09 AM          Signed By: Inga Ricci     December 30, 2019

## 2019-12-30 NOTE — PROGRESS NOTES
1819 -- Transfer report from [facility], ICU RN given to [facility], [facility].

## 2019-12-30 NOTE — PROGRESS NOTES
Notified ROBERT Card of swollen right arm where potassium IVF had been infusing and d/c PIV and elevated arm. Encouraging PO intake today.  Family at bedside

## 2019-12-30 NOTE — PROGRESS NOTES
Problem: Discharge Planning  Goal: *Discharge to safe environment  Outcome: Progressing Towards Goal   Plan is Khai Ramsey    Chart reviewed. Pt matched out to facilities in [place]. Has two accepting facilities. Andreina Barclay RN to speak with pt this evening re: facility choice. Case management following.

## 2019-12-30 NOTE — PROGRESS NOTES
1930- assumed care of pt in bed sleeping, easily arousable. Day shift RN concerned about MAP<60, spoke with MD, orders received. 46- MD here to see pt  2000- assessment done. Denies pain. MD wrote orders about placed pt in trendelenburg and checking BP. Pt had visitor at that time so Dr. Pablo Franoc said to hold off until visitor left. 2030- visitor left, MAP>60. MD made aware and said to hold off on placing pt in trendelenburg until MAP<60.  2210- MAP>60. Pt incont of urine. Partial bath given and bed pad changed. 4615- reassessment done. Pt has heparin SQ due but platelets 90. Dr Pablo Franco informed and order given to hold heparin. 0200- pt incont of urine, partial bath given and zinc cream applied to excoriated nahomy area. Bed pad changed. 0400- reassessment done. IVF changed to IV site in rt upper arm. Rt lower arm IV site leaking but has good blood return. 0600- pt cleaned for episode of bowel and bladder incont. Zinc cream applied and bed pad changed. 0730- Bedside and Verbal shift change report given to [address] [place] [address] (oncoming nurse) by Carlito Iniguez RN   (offgoing nurse). Report included the following information SBAR, Kardex, Intake/Output, MAR, Recent Results and Cardiac Rhythm NSR with 1st degree AVB and BBB.

## 2019-12-30 NOTE — PROGRESS NOTES
12/30/19 I talked with pt in his room and let him know [facility] and [facility], both in [employer] as he had requested, tenatively accepted pt. He was tired. I asked if [facility] could speak [facility] to let them know in case they wanted to check them out, he said I could call his son Clarke Cage. I did, had to leave a voice message for him, letting him know the two facilities. I also left copy of snf list with the two facilities highlighted on the pt's window. Sonja Perez.  Siomara Garcia, BSN  [facility]  [phone], Pager [phone]  [email or web address]

## 2019-12-30 NOTE — PROGRESS NOTES
Progress Note      Patient: Ame Bhatti               Sex: male          DOA: 12/23/2019       YOB: 1934      Age:  80 y.o.        LOS:  LOS: 6 days             CHIEF COMPLAINT:  Abdominal Pain      Assessment/Plan:     Principal Problem:    Hypothermia (12/23/2019)    Active Problems:    Abdominal pain (12/23/2019)      Transaminitis (12/23/2019)      Chronic renal failure, stage 3 (moderate) ([address] (12/23/2019)      SIRS (systemic inflammatory response syndrome) ([address] (12/23/2019)      Gastritis (12/23/2019)      Duodenitis (12/24/2019)      Sustained ventricular tachycardia ([address] (12/28/2019)    abdominal pain due to gastritis    PLAN:  - Cont PO amio  - Appreciate cardio  - Replete lytes PRN  - Cont to observe off antibx  - Cont diet  - PPI for gastritis  - Switch to D5W NS for hypernatremia  - Trend BMP  - Check duplex RUE r/o DVT  - OK to downgrade to tele  - Cont acceptable home medications for chronic conditions   - DVT protocol      Subjective:     No major events overnight. Remains in sinus rhythm. No complaints today by patient    Objective:     Visit Vitals  /47 (BP Patient Position: At rest)   Pulse 68   Temp 98.8 °F (37.1 °C)   Resp 18   Ht 5' 8\" (1.727 m)   Wt 76 kg (167 lb 8.8 oz)   SpO2 100%   BMI 25.48 kg/m²        Physical Exam:  Ears: hearing is intact  Eyes: Icterus is not present  Lungs: clear to auscultation bilaterally  Heart: regular rate and rhythm, S1, S2 normal, no murmur, click, rub or gallop  Gastrointestinal: soft, non-tender.  Bowel sounds normal. No masses,  no organomegaly  Neuro: No focal deficits, moves all ext  Ext: Edema RUE w/ mild erythema    Lab/Data Reviewed:  CMP:   Lab Results   Component Value Date/Time     (H) 12/30/2019 01:30 AM    K 4.7 12/30/2019 01:30 AM     (H) 12/30/2019 01:30 AM    CO2 21 12/30/2019 01:30 AM    AGAP 5 12/30/2019 01:30 AM    GLU 95 12/30/2019 01:30 AM    BUN 27 (H) 12/30/2019 01:30 AM    CREA 1.20 12/30/2019 01:30 AM    GFRAA >60 12/30/2019 01:30 AM    GFRNA 58 (L) 12/30/2019 01:30 AM    CA 7.9 (L) 12/30/2019 01:30 AM     CBC:   No results found for: WBC, HGB, HGBEXT, HCT, HCTEXT, PLT, PLTEXT, HGBEXT, HCTEXT, PLTEXT    Imaging Reviewed:  No results found.

## 2019-12-31 LAB
ANION GAP SERPL CALC-SCNC: 2 MMOL/L (ref 3–18)
BUN SERPL-MCNC: 25 MG/DL (ref 7–18)
BUN/CREAT SERPL: 21 (ref 12–20)
CALCIUM SERPL-MCNC: 8.5 MG/DL (ref 8.5–10.1)
CHLORIDE SERPL-SCNC: 121 MMOL/L (ref 100–111)
CO2 SERPL-SCNC: 26 MMOL/L (ref 21–32)
CREAT SERPL-MCNC: 1.17 MG/DL (ref 0.6–1.3)
GLUCOSE SERPL-MCNC: 86 MG/DL (ref 74–99)
POTASSIUM SERPL-SCNC: 4.2 MMOL/L (ref 3.5–5.5)
SODIUM SERPL-SCNC: 149 MMOL/L (ref 136–145)

## 2019-12-31 PROCEDURE — 97535 SELF CARE MNGMENT TRAINING: CPT

## 2019-12-31 PROCEDURE — 74011250637 HC RX REV CODE- 250/637: Performed by: PHYSICIAN ASSISTANT

## 2019-12-31 PROCEDURE — 77030037877 HC DRSG MEPILEX >48IN BORD MOLN -A

## 2019-12-31 PROCEDURE — 97168 OT RE-EVAL EST PLAN CARE: CPT

## 2019-12-31 PROCEDURE — 74011250636 HC RX REV CODE- 250/636: Performed by: HOSPITALIST

## 2019-12-31 PROCEDURE — 36415 COLL VENOUS BLD VENIPUNCTURE: CPT

## 2019-12-31 PROCEDURE — 80048 BASIC METABOLIC PNL TOTAL CA: CPT

## 2019-12-31 PROCEDURE — C9113 INJ PANTOPRAZOLE SODIUM, VIA: HCPCS | Performed by: HOSPITALIST

## 2019-12-31 PROCEDURE — 97166 OT EVAL MOD COMPLEX 45 MIN: CPT

## 2019-12-31 PROCEDURE — 65660000000 HC RM CCU STEPDOWN

## 2019-12-31 PROCEDURE — 74011000250 HC RX REV CODE- 250: Performed by: HOSPITALIST

## 2019-12-31 RX ADMIN — Medication 10 ML: at 06:00

## 2019-12-31 RX ADMIN — AMIODARONE HYDROCHLORIDE 200 MG: 200 TABLET ORAL at 17:29

## 2019-12-31 RX ADMIN — Medication 10 ML: at 13:00

## 2019-12-31 RX ADMIN — HEPARIN SODIUM 5000 UNITS: 5000 INJECTION INTRAVENOUS; SUBCUTANEOUS at 17:28

## 2019-12-31 RX ADMIN — Medication 10 ML: at 21:28

## 2019-12-31 RX ADMIN — SODIUM CHLORIDE 40 MG: 9 INJECTION, SOLUTION INTRAMUSCULAR; INTRAVENOUS; SUBCUTANEOUS at 09:06

## 2019-12-31 RX ADMIN — HEPARIN SODIUM 5000 UNITS: 5000 INJECTION INTRAVENOUS; SUBCUTANEOUS at 09:05

## 2019-12-31 RX ADMIN — SODIUM CHLORIDE 40 MG: 9 INJECTION, SOLUTION INTRAMUSCULAR; INTRAVENOUS; SUBCUTANEOUS at 21:27

## 2019-12-31 RX ADMIN — Medication 10 ML: at 14:00

## 2019-12-31 RX ADMIN — Medication 20 ML: at 17:30

## 2019-12-31 RX ADMIN — AMIODARONE HYDROCHLORIDE 200 MG: 200 TABLET ORAL at 09:05

## 2019-12-31 NOTE — PROGRESS NOTES
Problem: Self Care Deficits Care Plan (Adult)  Goal: *Acute Goals and Plan of Care (Insert Text)  Description  Occupational Therapy Goals  Initiated 12/31/2019 within 7 day(s). 1.  Patient will perform self-feeding with supervision/set-up utilizing adaptive equipment and compensatory techniques as needed. 2.  Patient will perform upper body dressing with minimal assistance/contact guard assist.  3.  Patient will perform bathing with minimal assistance/contact guard assist.  4.  Patient will perform bedside commode transfers with moderate assistance x 2 using LRAD. 5.  Patient will perform all aspects of toileting with moderate assistance using bed side commode and RW . 6.  Patient will participate in upper extremity therapeutic exercise/activities with supervision/set-up for 10 minutes. 7.  Patient will utilize energy conservation techniques during functional activities with minimal verbal cues. Outcome: Progressing Towards Goal   OCCUPATIONAL THERAPY EVALUATION    Patient: Anita Bazan (35 y.o. male)  Date: 12/31/2019  Primary Diagnosis: Hypothermia [T68. XXXA]  Duodenitis [K29.80]  Gastritis [K29.70]        Precautions:  Fall, Skin  PLOF: Patient reports Mod I w/ ADLs, walk in shower with shower seat and grab bars, mod I w/ IADLs I.e. med mgmt, Mod I functional mobility w RW, Dep & provided assist from family for meal prep and home making tasks. ASSESSMENT :  Telemetry reports HR WNL in 60s & nursing approval to proceed with OT evaluation, Based on the objective data described below, the patient presents with impaired strength, decreased AROM of BUEs, LUE edema with FM & GM deficits, Max A roll R <-> L for toilet hygiene d/t incontinence B & B, nursing applied condom catheter. Set up and SBA thickened liquid cup - nursing notified of pt need for assist w/ self feeding and beverage retrieval and dry erase communication board updated, nursing notified pt requesting ice chips.  Max A doff and don hospital [place] bardales face for thoroughness. Patient will benefit from skilled intervention to address the above impairments. Patient's rehabilitation potential is considered to be Good  Factors which may influence rehabilitation potential include:   []             None noted  []             Mental ability/status  [x]             Medical condition  []             Home/family situation and support systems  []             Safety awareness  []             Pain tolerance/management  []             Other:      PLAN :  Recommendations and Planned Interventions:   [x]               Self Care Training                  [x]      Therapeutic Activities  [x]               Functional Mobility Training   []      Cognitive Retraining  [x]               Therapeutic Exercises           []      Endurance Activities  [x]               Balance Training                    [x]      Neuromuscular Re-Education  [x]               Visual/Perceptual Training     [x]      Home Safety Training  [x]               Patient Education                   [x]      Family Training/Education  []               Other (comment):    Frequency/Duration: Patient will be followed by occupational therapy 3 - 5 times a week to address goals. Discharge Recommendations: [facility]  Further Equipment Recommendations for Discharge: bedside commode, rolling walker, and wheelchair 18 inch     SUBJECTIVE:   Patient stated I'm seeing double in my right eye.    Nursing notified and gross visual assessment WFL  OBJECTIVE DATA SUMMARY:     Past Medical History:   Diagnosis Date    Difficulty urinating     Elevated PSA     Hematuria, unspecified     Hypercholesterolemia     Hypertension     Incomplete bladder emptying     Urinary retention     UTI (urinary tract infection)      Past Surgical History:   Procedure Laterality Date    APPENDECTOMY      EGD  2/7/2014         HX TURP  6/13/16    HX TURP       Barriers to Learning/Limitations: yes;  sensory deficits-vision/hearing/speech  Compensate with: visual, verbal, tactile, kinesthetic cues/model    Home Situation:   Home Situation  Home Environment: Private residence  One/Two Story Residence: One story  Living Alone: No  Support Systems: Family member(s)(son & dtr in law)  Patient Expects to be Discharged to[de-identified] Private residence  Current DME Used/Available at Home: Walker, rolling, [address] chair, Raised toilet seat, Grab bars  Tub or Shower Type: Shower  [x]  Right hand dominant   []  Left hand dominant    Cognitive/Behavioral Status:  Neurologic State: Alert  Orientation Level: Oriented to person;Oriented to place;Oriented to time  Cognition: Follows commands  Safety/Judgement: Fall prevention    Skin: BUEs bruising - refer to nursing notes for complete details  Edema: BUEs L > R    Vision/Perceptual:      Diplopia: Yes(pt reports (R) eye - nursing notified )         Corrective Lenses: Reading glasses    Coordination: BUE  Coordination: Generally decreased, functional(R WFL, L impaired)  Fine Motor Skills-Upper: Left Impaired;Right Intact    Gross Motor Skills-Upper: Left Impaired;Right Intact    Strength: BUE  Strength: Generally decreased, functional(3+/5 at end range)    Tone & Sensation: BUE  Tone: Normal  Sensation: Intact    Range of Motion: BUE  AROM: Generally decreased, functional(shoulder flex R 90*,L 85*, elbow flex R intact, L impaired )  PROM: Generally decreased, functional(shoulder flex R WFL, L impaired 110* pt reports chronic)    Functional Mobility and Transfers for ADLs:  Bed Mobility: Max A rolling R ,-> L     Transfers: Toilet Transfer : Other (comment)(Not assessed d/t incontinence B/B and pt report of fatigue)  ADL Assessment:   Feeding: Setup;Stand-by assistance    Oral Facial Hygiene/Grooming: Stand-by assistance;Setup    Bathing: Maximum assistance    Upper Body Dressing: Maximum assistance    Lower Body Dressing: Total assistance    Toileting:  Total assistance    Cognitive Retraining  Safety/Judgement: Fall prevention    Pain:  Pain level pre-treatment: 0/10   Pain level post-treatment: 0/10   Activity Tolerance:   poor  Please refer to the flowsheet for vital signs taken during this treatment. After treatment:   [] Patient left in no apparent distress sitting up in chair  [x] Patient left in no apparent distress in bed  [x] Call bell left within reach  [x] Nursing notified  [x] Caregiver present  [] Bed alarm activated    COMMUNICATION/EDUCATION:   [x] Role of Occupational Therapy in the acute care setting  [] Home safety education was provided and the patient/caregiver indicated understanding. [x] Patient/family have participated as able in goal setting and plan of care. [x] Patient/family agree to work toward stated goals and plan of care. [] Patient understands intent and goals of therapy, but is neutral about his/her participation. [] Patient is unable to participate in goal setting and plan of care. Thank you for this referral.  Shasha Osullivan  Time Calculation: 36 mins    Eval Complexity: History: MEDIUM Complexity : Expanded review of history including physical, cognitive and psychosocial  history ; Examination: MEDIUM Complexity : 3-5 performance deficits relating to physical, cognitive , or psychosocial skils that result in activity limitations and / or participation restrictions; Decision Making:MEDIUM Complexity : Patient may present with comorbidities that affect occupational performnce.  Miniml to moderate modification of tasks or assistance (eg, physical or verbal ) with assesment(s) is necessary to enable patient to complete evaluation

## 2019-12-31 NOTE — PROGRESS NOTES
Progress Note      Patient: Franne Lefort               Sex: male          DOA: 12/23/2019       YOB: 1934      Age:  80 y.o.        LOS:  LOS: 7 days             CHIEF COMPLAINT:  Abdominal Pain      Assessment/Plan:     Principal Problem:    Hypothermia (12/23/2019)    Active Problems:    Abdominal pain (12/23/2019)      Transaminitis (12/23/2019)      Chronic renal failure, stage 3 (moderate) ([address] (12/23/2019)      SIRS (systemic inflammatory response syndrome) ([address] (12/23/2019)      Gastritis (12/23/2019)      Duodenitis (12/24/2019)      Sustained ventricular tachycardia ([address] (12/28/2019)    abdominal pain due to gastritis    PLAN:  - Cont PO amio  - Appreciate cardio, officially signed off  - Replete lytes PRN  - Cont to observe off antibx  - Cont diet  - PPI for gastritis  - Switch to D5W NS for hypernatremia  - Trend BMP  - Duplex showing superficial thrombophlebitis R cephalic/basilic veins  - Warm compress/elevate  - Case mgmt working on dispo  - Cont acceptable home medications for chronic conditions   - DVT protocol      Subjective:     No major events overnight. Doing well w/o complaints    Objective:     Visit Vitals  /75   Pulse 80   Temp 97.9 °F (36.6 °C)   Resp 16   Ht 5' 8\" (1.727 m)   Wt 76 kg (167 lb 8.8 oz)   SpO2 99%   BMI 25.48 kg/m²        Physical Exam:  Ears: hearing is intact  Eyes: Icterus is not present  Lungs: clear to auscultation bilaterally  Heart: regular rate and rhythm, S1, S2 normal, no murmur, click, rub or gallop  Gastrointestinal: soft, non-tender.  Bowel sounds normal. No masses,  no organomegaly  Neuro: No focal deficits, moves all ext  Ext: Edema RUE w/ mild erythema (improved)    Lab/Data Reviewed:  CMP:   Lab Results   Component Value Date/Time     (H) 12/31/2019 05:20 AM    K 4.2 12/31/2019 05:20 AM     (H) 12/31/2019 05:20 AM    CO2 26 12/31/2019 05:20 AM    AGAP 2 (L) 12/31/2019 05:20 AM    GLU 86 12/31/2019 05:20 AM    BUN 25 (H) 12/31/2019 05:20 AM    CREA 1.17 12/31/2019 05:20 AM    GFRAA >60 12/31/2019 05:20 AM    GFRNA 59 (L) 12/31/2019 05:20 AM    CA 8.5 12/31/2019 05:20 AM     CBC:   No results found for: WBC, HGB, HGBEXT, HCT, HCTEXT, PLT, PLTEXT, HGBEXT, HCTEXT, PLTEXT    Imaging Reviewed:  No results found.

## 2019-12-31 NOTE — PROGRESS NOTES
Bedside shift change report given to this RN (oncoming nurse) by Joelle Spear RN (offgoing nurse). Report included the following information SBAR, Kardex and Cardiac Rhythm SR,SB.

## 2019-12-31 NOTE — PROGRESS NOTES
Bedside report received from [address] [place] [address]    3068: pt resting in bed, alert and oriented to self, place and time, disoriented to situation, On room air, denies pain, shift assessment completed, bed locked and low position, call bell within reach    2100: incontinence care provided, reposition pt in bed comfortably    2128: protonix 40 mg IV administered    0050: sleeping, no sign of distress, no change from previous assessment    0450[de-identified] pt resting in bed, voiced no complain,  No change from previous assessment    0600: incontinence care provided, call bell within reach    Bedside and Verbal shift change report given to Doctor Kendrick Be (oncoming nurse) by Ebony Torres RN (offgoing nurse). Report included the following information SBAR, Kardex, ED Summary, Intake/Output, MAR, Recent Results and Cardiac Rhythm TRINA ANDERSON on tele # [phone].

## 2019-12-31 NOTE — PROGRESS NOTES
Problem: Discharge Planning  Goal: *Discharge to safe environment  Outcome: Progressing Towards Goal   Plan is [facility]    Has tentative accepting facilities, [facility], [facility], [facility] and [facility]. patient will need Joyce Hernandez, will also need OT notes to submit for auth once facility decision is made. Patient needs to continue to work with PT as well. Pascual Green called to say patient's family has toured the facility and will come in today and let us know decision. They have one private room. Spoke with family and they would like [address] [place] [address] SNF and would be taking him home post rehab. Joyce Hernandez started reference # [id number].  Clinical faxed still need OT note     Faxed OT note to [facility]

## 2019-12-31 NOTE — PROGRESS NOTES
Patient maintaining sinus rhythm. He is on Amiodarone 200 mg twice daily, this will need to be transitioned to Amiodarone 200 mg once daily on 01/05/2020. Please call cardiology with any questions as needed. Agree with above plan. No further cardiac evaluation planned at this time. Will sign off.   Carlos Fontana MD

## 2019-12-31 NOTE — PROGRESS NOTES
Problem: Falls - Risk of  Goal: *Absence of Falls  Description  Document Peggy Celeste Fall Risk and appropriate interventions in the flowsheet. Outcome: Progressing Towards Goal  Note: Fall Risk Interventions:       Mentation Interventions: Update white board, Adequate sleep, hydration, pain control    Medication Interventions: Evaluate medications/consider consulting pharmacy    Elimination Interventions: Toileting schedule/hourly rounds    History of Falls Interventions: Evaluate medications/consider consulting pharmacy         Problem: Patient Education: Go to Patient Education Activity  Goal: Patient/Family Education  Outcome: Progressing Towards Goal     Problem: Pressure Injury - Risk of  Goal: *Prevention of pressure injury  Description  Document Alex Scale and appropriate interventions in the flowsheet. Outcome: Progressing Towards Goal  Note: Pressure Injury Interventions:  Sensory Interventions: Assess need for specialty bed, Assess changes in LOC    Moisture Interventions: Apply protective barrier, creams and emollients, Check for incontinence Q2 hours and as needed    Activity Interventions: Assess need for specialty bed    Mobility Interventions: HOB 30 degrees or less, Pressure redistribution bed/mattress (bed type), Turn and reposition approx.  every two hours(pillow and wedges)    Nutrition Interventions: Discuss nutritional consult with provider    Friction and Shear Interventions: Apply protective barrier, creams and emollients, Lift sheet                Problem: Patient Education: Go to Patient Education Activity  Goal: Patient/Family Education  Outcome: Progressing Towards Goal     Problem: General Medical Care Plan  Goal: *Vital signs within specified parameters  Outcome: Progressing Towards Goal  Goal: *Labs within defined limits  Outcome: Progressing Towards Goal  Goal: *Absence of infection signs and symptoms  Outcome: Progressing Towards Goal  Goal: *Optimal pain control at patient's stated goal  Outcome: Progressing Towards Goal  Goal: *Skin integrity maintained  Outcome: Progressing Towards Goal  Goal: *Fluid volume balance  Outcome: Progressing Towards Goal  Goal: *Optimize nutritional status  Outcome: Progressing Towards Goal  Goal: *Anxiety reduced or absent  Outcome: Progressing Towards Goal  Goal: *Progressive mobility and function (eg: ADL's)  Outcome: Progressing Towards Goal

## 2019-12-31 NOTE — PROGRESS NOTES
Transferred patient to [facility] on telemetry to room 3032 to RN, Marilu Naranjo. Son notified of patient transfer. Patient stable and resting comfortably in new room.

## 2019-12-31 NOTE — PROGRESS NOTES
attempted to complete a follow up visit with patient  and a Spiritual assessment of patient but found the patient resting peacefully at this time and unable to communicate. . Chaplains will continue to follow and will provide pastoral care on an as needed/requested basis    Chaplain Hernandez Hoffmann   Board Certified [address] [place] [address]   [phone]

## 2020-01-01 LAB
ERYTHROCYTE [DISTWIDTH] IN BLOOD BY AUTOMATED COUNT: 15.8 % (ref 11.6–14.5)
HCT VFR BLD AUTO: 38.3 % (ref 36–48)
HGB BLD-MCNC: 13 G/DL (ref 13–16)
MCH RBC QN AUTO: 32.6 PG (ref 24–34)
MCHC RBC AUTO-ENTMCNC: 33.9 G/DL (ref 31–37)
MCV RBC AUTO: 96 FL (ref 74–97)
PLATELET # BLD AUTO: 168 K/UL (ref 135–420)
PMV BLD AUTO: 11.3 FL (ref 9.2–11.8)
RBC # BLD AUTO: 3.99 M/UL (ref 4.7–5.5)
WBC # BLD AUTO: 7.7 K/UL (ref 4.6–13.2)

## 2020-01-01 PROCEDURE — 85027 COMPLETE CBC AUTOMATED: CPT

## 2020-01-01 PROCEDURE — C9113 INJ PANTOPRAZOLE SODIUM, VIA: HCPCS | Performed by: HOSPITALIST

## 2020-01-01 PROCEDURE — 74011250636 HC RX REV CODE- 250/636: Performed by: HOSPITALIST

## 2020-01-01 PROCEDURE — 65660000000 HC RM CCU STEPDOWN

## 2020-01-01 PROCEDURE — 74011250637 HC RX REV CODE- 250/637: Performed by: PHYSICIAN ASSISTANT

## 2020-01-01 PROCEDURE — 97530 THERAPEUTIC ACTIVITIES: CPT

## 2020-01-01 PROCEDURE — 36415 COLL VENOUS BLD VENIPUNCTURE: CPT

## 2020-01-01 PROCEDURE — 74011000250 HC RX REV CODE- 250: Performed by: HOSPITALIST

## 2020-01-01 RX ADMIN — Medication 10 ML: at 16:36

## 2020-01-01 RX ADMIN — SODIUM CHLORIDE 40 MG: 9 INJECTION, SOLUTION INTRAMUSCULAR; INTRAVENOUS; SUBCUTANEOUS at 22:05

## 2020-01-01 RX ADMIN — AMIODARONE HYDROCHLORIDE 200 MG: 200 TABLET ORAL at 08:40

## 2020-01-01 RX ADMIN — AMIODARONE HYDROCHLORIDE 200 MG: 200 TABLET ORAL at 17:54

## 2020-01-01 RX ADMIN — Medication 10 ML: at 22:05

## 2020-01-01 RX ADMIN — HEPARIN SODIUM 5000 UNITS: 5000 INJECTION INTRAVENOUS; SUBCUTANEOUS at 08:44

## 2020-01-01 RX ADMIN — HEPARIN SODIUM 5000 UNITS: 5000 INJECTION INTRAVENOUS; SUBCUTANEOUS at 17:53

## 2020-01-01 RX ADMIN — HEPARIN SODIUM 5000 UNITS: 5000 INJECTION INTRAVENOUS; SUBCUTANEOUS at 00:24

## 2020-01-01 RX ADMIN — SODIUM CHLORIDE 40 MG: 9 INJECTION, SOLUTION INTRAMUSCULAR; INTRAVENOUS; SUBCUTANEOUS at 08:36

## 2020-01-01 RX ADMIN — Medication 20 ML: at 16:36

## 2020-01-01 RX ADMIN — Medication 10 ML: at 06:16

## 2020-01-01 NOTE — PROGRESS NOTES
Problem: Mobility Impaired (Adult and Pediatric)  Goal: *Acute Goals and Plan of Care (Insert Text)  Description  Physical Therapy Goals:  Goals reviewed and remain appropriate 12/29/2019- to be accomplished in 7 days:  1. Patient will move from supine to sit and sit to supine , scoot up and down and roll side to side in bed with minimal assistance. 2.  Patient will transfer from bed to chair and chair to bed with minimal assistance using the least restrictive device. 3.  Patient will perform sit to stand with minimal assistance. 4.  Patient will ambulate with minimal assistance for 25-50 feet with the least restrictive device. Prior Level of Function:   Patient was modified independence for all mobility including gait using RW. Patient lives with family in a single story home. Pt is a poor historian. If able, son would like to be present during sessions per chart Stephan Akins [phone]). Outcome: Progressing Towards Goal   PHYSICAL THERAPY TREATMENT    Patient: Colin Garduno (29 y.o. male)  Date: 1/1/2020  Diagnosis: Hypothermia [T68. XXXA]  Duodenitis [K29.80]  Gastritis [K29.70]   Hypothermia       Precautions: Fall, Skin  PLOF: mod I    ASSESSMENT:  Pt agreeable to PT, pt pushing posteriorly in seated, required mod a to maintain seated balance with frequent verbal and tactile cues for posture correction and weight shift. Stood X 2 with max a to achieve and maintain standing balance, verbal and tactile cues to weight shift over feet, heavily leaning posteriorly. Progression toward goals:   [x]      Improving appropriately and progressing toward goals  []      Improving slowly and progressing toward goals  []      Not making progress toward goals and plan of care will be adjusted     PLAN:  Patient continues to benefit from skilled intervention to address the above impairments. Continue treatment per established plan of care.   Discharge Recommendations:  Khai Mason Equipment Recommendations for Discharge:  TBD at next level of care     SUBJECTIVE:   Patient stated I guess its work to get back like before.     OBJECTIVE DATA SUMMARY:   Critical Behavior:  Neurologic State: Alert  Orientation Level: Disoriented to situation, Disoriented to time, Oriented to person, Oriented to place  Cognition: Follows commands  Safety/Judgement: Fall prevention  Functional Mobility Training:  Bed Mobility:  Rolling: Moderate assistance  Supine to Sit: Maximum assistance  Sit to Supine: Maximum assistance  Transfers:  Sit to Stand: Maximum assistance  Stand to Sit: Maximum assistance  Balance:  Sitting: Impaired  Sitting - Static: Fair (occasional)  Sitting - Dynamic: Poor (constant support)  Standing: With support  Standing - Static: Poor  Standing - Dynamic : Poor     Pain:  Pain level pre-treatment: 0/10  Pain level post-treatment: 0/10   Pain Intervention(s): n/a      Activity Tolerance:   fair  Please refer to the flowsheet for vital signs taken during this treatment. After treatment:   [] Patient left in no apparent distress sitting up in chair  [x] Patient left in no apparent distress in bed  [x] Call bell left within reach  [] Nursing notified  [] Caregiver present  [] Bed alarm activated  [] SCDs applied      COMMUNICATION/EDUCATION:   []           []         Fall prevention education was provided and the patient/caregiver indicated understanding. []         Patient/family have participated as able in working toward goals and plan of care. []         Patient/family agree to work toward stated goals and plan of care. []         Patient understands intent and goals of therapy, but is neutral about his/her participation. []         Patient is unable to participate in stated goals/plan of care: ongoing with therapy staff. [x]         Role of Physical Therapy in the acute care setting.         Trinh Mariscal PTA   Time Calculation: 25 mins

## 2020-01-01 NOTE — PROGRESS NOTES
0719: Bedside shift change report given to Brian RANDLE  (oncoming nurse) by Tyrell Cole RN  (offgoing nurse). Report included the following information SBAR, Kardex, Procedure Summary, Intake/Output, MAR and Cardiac Rhythm SR.     0758: Spoke with MD about the patient's platelet count. He stated okay to give heparin, but place a repeat CBC on patient. 0830: Assessment and medications administered. 1045: Hourly Rounding Patient resting quietly in bed. Call light in reach and bed in lowest position. 1130:  Hourly Rounding Patient resting quietly in bed. Call light in reach and bed in lowest position. Patient requesting for more to drink. Called down to dietary to get more fluids for patient. 1210: Assisted patient with water intake. Oral Care provided for patient. [id number]: Patient is in bed eating lunch with family present. Reassessment complete. 1330: 5 P's met with patient. 1415: Patient resting quietly. 5 P's met with patient. 1545: Incontinence Care done on patient. Call light in reach and bed locked. Patient sat up 45 degrees due to patient wanting to drink liquids. 1630: Reassessment done. 1755: Medications Given. 1845: Incontinence Care done for patient, new gown applied. Call light in reach and bed in lowest position. 1920: Bedside shift change report given to [address] [place] [address] (oncoming nurse) by Serena Bain RN (offgoing nurse).  Report included the following information SBAR, Kardex, Procedure Summary, Intake/Output, MAR and Cardiac Rhythm SR.

## 2020-01-01 NOTE — PROGRESS NOTES
Progress Note      Patient: Shaq Calvillo               Sex: male          DOA: 12/23/2019       YOB: 1934      Age:  80 y.o.        LOS:  LOS: 8 days             CHIEF COMPLAINT:  Hypothermia, weakness    Subjective:     Patient awake and happy  No distress    Objective:      Visit Vitals  /70 (BP 1 Location: Left arm, BP Patient Position: At rest)   Pulse 63   Temp 97.8 °F (36.6 °C)   Resp 15   Ht 5' 8\" (1.727 m)   Wt 76 kg (167 lb 8.8 oz)   SpO2 97%   BMI 25.48 kg/m²       Physical Exam:  Gen:  No distress, no complaint  Lungs:  Clear bilaterally, no wheeze or rhonchi  Heart:  Regular rate and rhythm, no murmurs or gallops  Abdomen:  Soft, non-tender, normal bowel sounds        Lab/Data Reviewed:    CBC:   Lab Results   Component Value Date/Time    WBC 7.7 01/01/2020 08:10 AM    HGB 13.0 01/01/2020 08:10 AM    HCT 38.3 01/01/2020 08:10 AM     01/01/2020 08:10 AM           Assessment/Plan     Principal Problem:    Hypothermia (12/23/2019)    Active Problems:    Abdominal pain (12/23/2019)      Transaminitis (12/23/2019)      Chronic renal failure, stage 3 (moderate) (HCC) (12/23/2019)      SIRS (systemic inflammatory response syndrome) ([address] (12/23/2019)      Gastritis (12/23/2019)      Duodenitis (12/24/2019)      Sustained ventricular tachycardia ([address] (12/28/2019)        Plan: Mobilize  Monitor abdominal pain  Follow renal function  Working toward disposition.

## 2020-01-01 NOTE — PROGRESS NOTES
Problem: Discharge Planning  Goal: *Discharge to safe environment  Outcome: Progressing Towards Goal     Problem: Falls - Risk of  Goal: *Absence of Falls  Description  Document Nikole Velazquez Fall Risk and appropriate interventions in the flowsheet. Outcome: Progressing Towards Goal  Note: Fall Risk Interventions:       Mentation Interventions: Bed/chair exit alarm, Door open when patient unattended, Room close to nurse's station, More frequent rounding    Medication Interventions: Bed/chair exit alarm, Patient to call before getting OOB, Teach patient to arise slowly    Elimination Interventions: Bed/chair exit alarm, Call light in reach    History of Falls Interventions: Bed/chair exit alarm, Room close to nurse's station         Problem: Patient Education: Go to Patient Education Activity  Goal: Patient/Family Education  Outcome: Progressing Towards Goal     Problem: Pressure Injury - Risk of  Goal: *Prevention of pressure injury  Description  Document Alex Scale and appropriate interventions in the flowsheet. Outcome: Progressing Towards Goal  Note: Pressure Injury Interventions:  Sensory Interventions: Assess changes in LOC, Avoid rigorous massage over bony prominences, Keep linens dry and wrinkle-free, Pad between skin to skin    Moisture Interventions: Absorbent underpads, Check for incontinence Q2 hours and as needed, Contain wound drainage    Activity Interventions: Pressure redistribution bed/mattress(bed type)    Mobility Interventions: HOB 30 degrees or less, Pressure redistribution bed/mattress (bed type), Turn and reposition approx.  every two hours(pillow and wedges)    Nutrition Interventions: Document food/fluid/supplement intake    Friction and Shear Interventions: Apply protective barrier, creams and emollients, Foam dressings/transparent film/skin sealants                Problem: Patient Education: Go to Patient Education Activity  Goal: Patient/Family Education  Outcome: Progressing Towards Goal Problem: General Medical Care Plan  Goal: *Vital signs within specified parameters  Outcome: Progressing Towards Goal  Goal: *Labs within defined limits  Outcome: Progressing Towards Goal  Goal: *Absence of infection signs and symptoms  Outcome: Progressing Towards Goal  Goal: *Optimal pain control at patient's stated goal  Outcome: Progressing Towards Goal  Goal: *Skin integrity maintained  Outcome: Progressing Towards Goal  Goal: *Fluid volume balance  Outcome: Progressing Towards Goal  Goal: *Optimize nutritional status  Outcome: Progressing Towards Goal  Goal: *Anxiety reduced or absent  Outcome: Progressing Towards Goal  Goal: *Progressive mobility and function (eg: ADL's)  Outcome: Progressing Towards Goal     Problem: Pain  Goal: *Control of Pain  Outcome: Progressing Towards Goal     Problem: Patient Education: Go to Patient Education Activity  Goal: Patient/Family Education  Outcome: Progressing Towards Goal     Problem:  Body Temperature -  Risk of, Imbalanced  Goal: *Absence of heat stress or hyperthermia signs and symptoms  Outcome: Progressing Towards Goal  Goal: *Absence of cold stress or hypothermia signs and symptoms  Outcome: Progressing Towards Goal     Problem: Patient Education: Go to Patient Education Activity  Goal: Patient/Family Education  Outcome: Progressing Towards Goal     Problem: Hypotension  Goal: *Blood pressure within specified parameters  Outcome: Progressing Towards Goal  Goal: *Fluid volume balance  Outcome: Progressing Towards Goal  Goal: *Labs within defined limits  Outcome: Progressing Towards Goal     Problem: Patient Education: Go to Patient Education Activity  Goal: Patient/Family Education  Outcome: Progressing Towards Goal     Problem: Delirium Treatment  Goal: *Level of consciousness restored to baseline  Outcome: Progressing Towards Goal  Goal: *Level of environmental perceptions restored to baseline  Outcome: Progressing Towards Goal  Goal: *Sensory perception restored to baseline  Outcome: Progressing Towards Goal  Goal: *Emotional stability restored to baseline  Outcome: Progressing Towards Goal  Goal: *Functional assessment restored to baseline  Outcome: Progressing Towards Goal  Goal: *Absence of falls  Outcome: Progressing Towards Goal  Goal: *Will remain free of delirium, CAM Score negative  Outcome: Progressing Towards Goal  Goal: *Cognitive status will be restored to baseline  Outcome: Progressing Towards Goal  Goal: Interventions  Outcome: Progressing Towards Goal     Problem: Patient Education: Go to Patient Education Activity  Goal: Patient/Family Education  Outcome: Progressing Towards Goal     Problem: Nutrition Deficit  Goal: *Adequate nutrition  Outcome: Progressing Towards Goal     Problem: Patient Education: Go to Patient Education Activity  Goal: Patient/Family Education  Outcome: Progressing Towards Goal     Problem: Patient Education: Go to Patient Education Activity  Goal: Patient/Family Education  Outcome: Progressing Towards Goal     Problem: Patient Education: Go to Patient Education Activity  Goal: Patient/Family Education  Outcome: Progressing Towards Goal

## 2020-01-01 NOTE — PROGRESS NOTES
2000 Assumed care of pt after bedside report with pt lying in bed with HOB elevated 30 degrees. Monitor denotes NSR-ST. AAO x2 Pt oriented to person, place. COLUNGA x 4 but slow and very weak. Lungs diminished in bases. Pt on RA. ABd distended, obese, soft. Pt incontinent of urine and stool. Skin is fragile, reddened, weeping with gauze dressings on bilateral forearms past elbows. Pt also noted to have reddened skin on back, and upper arms. 2100 Pt incontinent o medium soft, loose brownish stool. Pt cleansed, bed pads changed. Mepilex to sacrum, coccyx area for protection. 01/01/2020 0030 Heparin SQ given as ordered. Pt noted to have weeping through bilateral arm gauze dressings. Gown changed. Pt not incontinent of urine or stool at present. 0130 Bilateral arm dressing changed. Pt' s skin is very friable and reddened under dressings. Pt noted to have 1 1/2 in skin tear under mepilex on rt FA. Mepilex changed on bilateral FA. And ABD pads and gauze dressings rewrapped. Pt tolerated without difficulty. Pt incontinent of urine. Pt cleansed and bed pads changed. 0600 Pt status unchanged. Resting at intervals. Denies complaints.

## 2020-01-02 VITALS
TEMPERATURE: 98 F | BODY MASS INDEX: 25.39 KG/M2 | DIASTOLIC BLOOD PRESSURE: 66 MMHG | SYSTOLIC BLOOD PRESSURE: 122 MMHG | WEIGHT: 167.55 LBS | RESPIRATION RATE: 15 BRPM | HEIGHT: 68 IN | OXYGEN SATURATION: 96 % | HEART RATE: 75 BPM

## 2020-01-02 PROCEDURE — 74011000250 HC RX REV CODE- 250: Performed by: HOSPITALIST

## 2020-01-02 PROCEDURE — 92526 ORAL FUNCTION THERAPY: CPT

## 2020-01-02 PROCEDURE — 97530 THERAPEUTIC ACTIVITIES: CPT

## 2020-01-02 PROCEDURE — 74011250637 HC RX REV CODE- 250/637: Performed by: PHYSICIAN ASSISTANT

## 2020-01-02 PROCEDURE — 97535 SELF CARE MNGMENT TRAINING: CPT

## 2020-01-02 PROCEDURE — 74011250636 HC RX REV CODE- 250/636: Performed by: HOSPITALIST

## 2020-01-02 PROCEDURE — C9113 INJ PANTOPRAZOLE SODIUM, VIA: HCPCS | Performed by: HOSPITALIST

## 2020-01-02 RX ORDER — AMIODARONE HYDROCHLORIDE 200 MG/1
200 TABLET ORAL 2 TIMES DAILY
Qty: 6 TAB | Refills: 0 | Status: SHIPPED | OUTPATIENT
Start: 2020-01-02 | End: 2020-01-04

## 2020-01-02 RX ORDER — OXYCODONE AND ACETAMINOPHEN 5; 325 MG/1; MG/1
1 TABLET ORAL
Qty: 5 TAB | Refills: 0 | Status: SHIPPED | OUTPATIENT
Start: 2020-01-02 | End: 2020-01-05

## 2020-01-02 RX ORDER — AMIODARONE HYDROCHLORIDE 200 MG/1
200 TABLET ORAL 2 TIMES DAILY
Qty: 30 TAB | Refills: 0 | Status: SHIPPED
Start: 2020-01-02 | End: 2020-01-02

## 2020-01-02 RX ORDER — AMIODARONE HYDROCHLORIDE 200 MG/1
200 TABLET ORAL DAILY
Qty: 30 TAB | Refills: 0 | Status: SHIPPED
Start: 2020-01-05 | End: 2020-02-21

## 2020-01-02 RX ADMIN — AMIODARONE HYDROCHLORIDE 200 MG: 200 TABLET ORAL at 08:41

## 2020-01-02 RX ADMIN — Medication 10 ML: at 05:58

## 2020-01-02 RX ADMIN — Medication 10 ML: at 14:54

## 2020-01-02 RX ADMIN — SODIUM CHLORIDE 40 MG: 9 INJECTION, SOLUTION INTRAMUSCULAR; INTRAVENOUS; SUBCUTANEOUS at 08:41

## 2020-01-02 RX ADMIN — HEPARIN SODIUM 5000 UNITS: 5000 INJECTION INTRAVENOUS; SUBCUTANEOUS at 14:53

## 2020-01-02 RX ADMIN — HEPARIN SODIUM 5000 UNITS: 5000 INJECTION INTRAVENOUS; SUBCUTANEOUS at 05:57

## 2020-01-02 RX ADMIN — Medication 20 ML: at 14:54

## 2020-01-02 NOTE — PROGRESS NOTES
Problem: Discharge Planning  Goal: *Discharge to safe environment  Outcome: Resolved/Met     Problem: Falls - Risk of  Goal: *Absence of Falls  Description  Document Peggy Celeste Fall Risk and appropriate interventions in the flowsheet. Outcome: Resolved/Met  Note: Fall Risk Interventions:       Mentation Interventions: Bed/chair exit alarm, Door open when patient unattended, Family/sitter at bedside, More frequent rounding, Room close to nurse's station    Medication Interventions: Patient to call before getting OOB, Teach patient to arise slowly    Elimination Interventions: Call light in reach, Toileting schedule/hourly rounds, Toilet paper/wipes in reach    History of Falls Interventions: Door open when patient unattended, Room close to nurse's station         Problem: Patient Education: Go to Patient Education Activity  Goal: Patient/Family Education  Outcome: Resolved/Met     Problem: Pressure Injury - Risk of  Goal: *Prevention of pressure injury  Description  Document Alex Scale and appropriate interventions in the flowsheet. Outcome: Resolved/Met  Note: Pressure Injury Interventions:  Sensory Interventions: Assess changes in LOC, Keep linens dry and wrinkle-free, Pad between skin to skin    Moisture Interventions: Absorbent underpads, Check for incontinence Q2 hours and as needed, Moisture barrier, Contain wound drainage    Activity Interventions: Pressure redistribution bed/mattress(bed type)    Mobility Interventions: HOB 30 degrees or less, Turn and reposition approx.  every two hours(pillow and wedges), Pressure redistribution bed/mattress (bed type)    Nutrition Interventions: Document food/fluid/supplement intake    Friction and Shear Interventions: Foam dressings/transparent film/skin sealants, Lift team/patient mobility team, Transferring/repositioning devices, Apply protective barrier, creams and emollients                Problem: Patient Education: Go to Patient Education Activity  Goal: Patient/Family Education  Outcome: Resolved/Met     Problem: General Medical Care Plan  Goal: *Vital signs within specified parameters  Outcome: Resolved/Met  Goal: *Labs within defined limits  Outcome: Resolved/Met  Goal: *Absence of infection signs and symptoms  Outcome: Resolved/Met  Goal: *Optimal pain control at patient's stated goal  Outcome: Resolved/Met  Goal: *Skin integrity maintained  Outcome: Resolved/Met  Goal: *Fluid volume balance  Outcome: Resolved/Met  Goal: *Optimize nutritional status  Outcome: Resolved/Met  Goal: *Anxiety reduced or absent  Outcome: Resolved/Met  Goal: *Progressive mobility and function (eg: ADL's)  Outcome: Resolved/Met     Problem: Pain  Goal: *Control of Pain  Outcome: Resolved/Met     Problem: Patient Education: Go to Patient Education Activity  Goal: Patient/Family Education  Outcome: Resolved/Met     Problem: Nutrition Deficit  Goal: *Adequate nutrition  Outcome: Resolved/Met     Problem: Patient Education: Go to Patient Education Activity  Goal: Patient/Family Education  Outcome: Resolved/Met     Problem: Patient Education: Go to Patient Education Activity  Goal: Patient/Family Education  Outcome: Resolved/Met     Problem: Patient Education: Go to Patient Education Activity  Goal: Patient/Family Education  Outcome: Resolved/Met

## 2020-01-02 NOTE — PROGRESS NOTES
Problem: Dysphagia (Adult)  Goal: *Acute Goals and Plan of Care (Insert Text)  Description  Dysphagia Present:   yes     Aspiration:  possible    Recommendations:  Diet: mech soft ground, nectar thick liquids  Meds: crushed in puree  Aspiration Precautions  Oral Care TID    Goals:  Patient will:  1. Tolerate PO trials with 0 s/s overt distress in 4/5 trials  2. Utilize compensatory swallow strategies/maneuvers (decrease bite/sip, size/rate, alt. liq/sol) with min cues in 4/5 trials  3. Perform oral-motor/laryngeal exercises to increase oropharyngeal swallow function with min cues  4. Complete an objective swallow study (i.e., MBSS) to assess swallow integrity, r/o aspiration, and determine of safest LRD, min A      Outcome: Progressing Towards Goal     SPEECH LANGUAGE PATHOLOGY DYSPHAGIA TREATMENT & DISCHARGE    Patient: Huan Maciel (89 y.o. male)  Date: 1/2/2020  Diagnosis: Hypothermia [T68. XXXA]  Duodenitis [K29.80]  Gastritis [K29.70]   Hypothermia       Precautions: aspiration Fall, Skin    ASSESSMENT:  Patient seen for follow up this am with soft solid/nectar-thick liquid therapeutic snack. No aspiration s/s with current diet. SLP trialed thin liquids with immediate s/sx of aspiration of coughing and throat clearing. SLP recommends mech soft ground, nectar thick liquids. Patient re-educated on importance of safe swallowing guidelines (small bites/sips, decreased rate, alt solids/liquids, HOB >60 for all PO intake); verbalized and demonstrated comprehension. Pt to be d/c'd to SNF today; will d/c pt from [address] [place] [address] with recommendations for follow up upon admit to SNF. Progression toward goals:  [x]         Improving appropriately - goals met/approximated  []         Not making progress/Not appropriate - will d/c POC     PLAN:  Recommendations and Planned Interventions:  Maximum therapeutic gains met; safest, least restrictive diet achieved. D/C ST intervention at this time.    Discharge Recommendations:  Skilled Nursing Facility     SUBJECTIVE:   Patient stated [place]. OBJECTIVE:   Cognitive and Communication Status:  Neurologic State: Alert  Orientation Level: Disoriented to time  Cognition: Follows commands  Perception: Appears intact  Perseveration: No perseveration noted  Safety/Judgement: Fall prevention  Dysphagia Treatment:  Oral Assessment:  Oral Assessment  Labial: No impairment  Dentition: Limited  Oral Hygiene: fair  Lingual: Decreased rate  Velum: No impairment  Mandible: No impairment  P.O. Trials:   Patient Position: HOB60   Vocal quality prior to P.O.: No impairment   Consistency Presented: Thin liquid, Nectar thick liquid, Solid   How Presented: SLP-fed/presented, Self-fed/presented, Cup/sip, Straw   How Much: 14   Bolus Acceptance: No impairment   Bolus Formation/Control: Impaired   Type of Impairment: Delayed   Propulsion: Discoordination   Oral Residue: None   Initiation of Swallow: Delayed (# of seconds)   Laryngeal Elevation: Decreased   Aspiration Signs/Symptoms: Weak cough, Strong cough   Pharyngeal Phase Characteristics: Poor endurance   Effective Modifications: None   Cues for Modifications: Maximal         Oral Phase Severity: Mild   Pharyngeal Phase Severity : Mild   PAIN:  Pain level pre-treatment: 0/10   Pain level post-treatment: 0/10     After treatment:   []              Patient left in no apparent distress sitting up in chair  [x]              Patient left in no apparent distress in bed  [x]              Call bell left within reach  [x]              Nursing notified  []              Caregiver present  []              Bed alarm activated      COMMUNICATION/EDUCATION:   [x] Aspiration precautions; swallow safety; compensatory techniques  []        Patient unable to participate in education; education ongoing with staff  []  Posted safety precautions in patient's room.   [] Oral-motor/laryngeal strengthening exercises    Thank you for this referral,  Papa Ann Melissa Felton, SLP  Time Calculation: 16 mins

## 2020-01-02 NOTE — PROGRESS NOTES
NUTRITION FOLLOW-UP/PLAN OF CARE    RECOMMENDATIONS:   1. Mech altered, ground meats  2. Encouraged supplement compliance - will change to ensure enlive chocolate TID  3. Tray set up/assist     GOALS:   1. Ongoing/not met: PO intake meets >75% of protein/calorie needs by 1/6/19  2. Ongoing: Maintain weight (+/- 1-2 lb) by 1/9/20    ASSESSMENT:   Wt status is classified as normal per Body mass index is 25.48 kg/m². Pt is at nutrition risk due to continued poor PO intake and weight loss PTA. Diagnosis: abd pain, hypothermia, transaminitis, renal failure, SIRS, gastritis, duodenitis. 12/31/19: Na 149. Pending authorization for SNF per CM. Nutrition recommendations listed. RD to follow. Previous Nutrition Diagnoses:   Remains appropriate: Inadequate oral food and beverage intake due to poor appetite as evidenced by zero po intake at breakfast 12/24/20. Remains appropriate: Unintentional weight loss due to inadequate energy intake as evidenced by documented 33 lb weight loss in past 10 months. SUBJECTIVE/OBJECTIVE:   (1/2/20) Pt resting in bed during time of assessment. Pt states appetite is \"fine\", per reports pt with poor PO intake. Per Brian RN, ate 0% of dinner last night and 10% of breakfast today. Brian also states pt dislikes the food, pt denied further food prefences at this time. Family is bringing some outside items with good acceptance. Pt states he really enjoys ensure - will sent TID due to now with NTL. Denies n/v at this time. Pt with BM today. Per wound LDA pt with hand/wrist/arm wounds. CBW: 12/28/19 ~167.5 lb, showing gain? Placed order for weight to be obtained today. Encouraged intake of food/supplements/increased fluid intake. Rec: tray set up, encourage/assist at meals. Will continue to monitor intake, weight, labs, skin integrity.      Below in bold per previous RD notes:  12/31: Diet:  mechanical soft, ground meats, nectar thick liquids and Ensure Enlive once/day  D/w SLP  Poor po intake noted. Pt is not fond of thickened liquids but thinks he may like thickened po supplements  Will adjust current po supplement to [place] thick [place] [employer] Breakfast TID (noted pt likes chocolate) and check acceptance. 12/30:  Pt states his mouth is dry, he does not like either pureed foods or thickened liquids and did not eat breakfast this morning.    (12/27/19) Pt sitting up in bed during time of assessment. Pt with some confusion during visit. Pt states he has a good appetite but per reports pt continues to have poor PO intakes at meals, 0-25%. Pt denies current n/v/d/c. Per I/Os BM 12/26. Pt reports he is not always drinking his supplement - encouragement increased intake, obtained flavor preference. Pt stating current issues swallowing, will place speech consult. Encouraged intake. Rec: tray set up, encourage/assist at meals PRN. Will continue to monitor intake, weight, labs. Pt admitted with Sepsis due to intraabdominal source with duodenitis, hypotension, cholelithiasis without clear evidence of cholecystitis. MARICRUZ due to dehydration and hypotension, abnormal LFT's, hypernatremia, hypokalemia. Nursing noted indicate pt with skin tears to right hand, writs and  forearm, excoriation to bilateral groin region  12/24:  Pt reports his appetite has been poor but his weight has been stable. He denies having food allergies and does not have issues with chewing or swallowing (noted multiple missing teeth but pt still reports he can eat everything). Information Obtained From:   [x] Chart Review  [x] Patient  [] Family/Caregiver  [x] Nurse/Physician   [] Patient Rounds/Interdisciplinary Meeting    Diet: Mech altered, ground meats (seen by SLP 12/30), CIB at dinner  Patient Vitals for the past 100 hrs:   % Diet Eaten   01/02/20 0911 10 %   01/01/20 1745 25 %     Medications: [x] Reviewed  Noted: cordarone, heparin, protonix, bacitracin  Encounter Diagnoses     ICD-10-CM ICD-9-CM   1.  Septic shock ([facility] A41.9 038.9    R65.21 785.52     995.92   2. Cholangitis K83.09 576.1     Past Medical History:   Diagnosis Date    Difficulty urinating     Elevated PSA     Hematuria, unspecified     Hypercholesterolemia     Hypertension     Incomplete bladder emptying     Urinary retention     UTI (urinary tract infection)      Labs:    Lab Results   Component Value Date/Time    Sodium 149 (H) 12/31/2019 05:20 AM    Potassium 4.2 12/31/2019 05:20 AM    Chloride 121 (H) 12/31/2019 05:20 AM    CO2 26 12/31/2019 05:20 AM    Anion gap 2 (L) 12/31/2019 05:20 AM    Glucose 86 12/31/2019 05:20 AM    BUN 25 (H) 12/31/2019 05:20 AM    Creatinine 1.17 12/31/2019 05:20 AM    Calcium 8.5 12/31/2019 05:20 AM    Magnesium 2.2 12/28/2019 09:05 AM    Phosphorus 3.4 12/23/2019 12:52 PM    Albumin 2.5 (L) 12/24/2019 03:30 AM     Anthropometrics: BMI (calculated): 25.5   Last 3 Recorded Weights in this Encounter    12/27/19 0605 12/28/19 1034 12/28/19 2000   Weight: 73.1 kg (161 lb 2.5 oz) 73 kg (161 lb) 76 kg (167 lb 8.8 oz)      Ht Readings from Last 1 Encounters:   12/28/19 5' 8\" (1.727 m)     Documented Weight History:  Weight Metrics 12/28/2019 12/16/2019 2/12/2019 9/10/2018 2/13/2018 1/30/2018 9/15/2017   Weight 167 lb 8.8 oz 150 lb 184 lb 180 lb 185 lb 185 lb 190 lb   BMI 25.48 kg/m2 22.81 kg/m2 27.98 kg/m2 27.37 kg/m2 28.13 kg/m2 28.13 kg/m2 28.89 kg/m2     [x]  Weight Loss PTA  [x]  Weight Gain ?   []  Weight Stable   []  New wt n/a on record     Estimated Nutrition Needs:   9946 Kcals/day  Protein (g): 82 g    Nutrition Problems Identified:   [x] Suboptimal PO intake   [] Food Allergies  [x] Difficulty chewing/swallowing/poor dentition  [] Constipation/Diarrhea   [] Nausea/Vomiting   [] None  [] Other:     Plan:   [] Therapeutic Diet  []  Obtained/adjusted food preferences/tolerances and/or snacks options   [x]  Supplements added   [] Occupational therapy following for feeding techniques  []  HS snack added   [x]  Modify diet texture   []  Modify diet for food allergies   []  Educate patient   []  Assist with menu selection   [x]  Monitor PO intake on meal rounds   [x]  Continue inpatient monitoring and intervention   [x]  Participated in discharge planning/Interdisciplinary rounds/Team meetings   []  Other:     Education Needs:   [x] Not appropriate for teaching at this time due to: confusion   [] Identified and addressed    Nutrition Monitoring and Evaluation:   [x] Continue inpatient monitoring and interventions    [] Other:     Mahnaz Cordero

## 2020-01-02 NOTE — PROGRESS NOTES
1/1/2020  20:00- Assessment completed- see flow sheet. Patient has no c/o pain/discomfort at this time. Gauze dressings to both arms clean, dry and intact. Skin is reddened to both arms. 22:30- Patient incontinent of urine- patient reported his bed was \"wet\" and needed to be changed- incontinence care given and bed linen changed. Call light in reach-continue to monitor. Hourly rounding. 1/2/2020 00:10=  No change in condition-continue to monitor. Call light in reach. Hourly rounding. 04:00- NO change in condition-continue to monitor. Call light in reach. Hourly rounding. 07:15- Report given to oncoming nurse, JER Torres.

## 2020-01-02 NOTE — PROGRESS NOTES
Problem: Discharge Planning  Goal: *Discharge to safe environment  Outcome: Resolved /met   Plan is [address] still pending per [facility] at this time for [facility] carlos manuel    Have obtained auth from [facility], [place] # [phone] will call facility with the rest of auth information. [facility] [address] [facility], [address] Phone [phone] Fax [phone]       Care Management Interventions  PCP Verified by CM: Yes  Palliative Care Criteria Met (RRAT>21 & CHF Dx)?: No  Mode of Transport at Discharge: BLS  Transition of Care Consult (CM Consult): SNF  Partner SNF: Yes  Physical Therapy Consult: Yes  Occupational Therapy Consult: No  Speech Therapy Consult: No  Current Support Network: Other(son and wife lives with pt)  Confirm Follow Up Transport: Family  The Plan for Transition of Care is Related to the Following Treatment Goals : SNF([facility])  The Patient and/or Patient Representative was Provided with a Choice of Provider and Agrees with the Discharge Plan?: Yes  Name of the Patient Representative Who was Provided with a Choice of Provider and Agrees with the Discharge Plan: Ramses Gilliland [phone]   Freedom of Choice List was Provided with Basic Dialogue that Supports the Patient's Individualized Plan of Care/Goals, Treatment Preferences and Shares the Quality Data Associated with the Providers?: Yes  Discharge Location  Discharge Placement: Skilled nursing facility([facility])     . Communication to Patient/Family:  Met with patient and family and they are agreeable to the transition plan. The Plan for Transition of Care is related to the following treatment goals: SNF    The Patient and/or patient representative was provided with a choice of provider and agrees  with the discharge plan.       Yes [x] No []    A Freedom of choice list was provided with basic dialogue that supports the patient's individualized plan of care/goals and shares the quality data associated with the providers. Yes [x] No []    SNF/Rehab Transition:  [facility] and meets criteria for admission. Patient will transported by Pablo Mckenna and expected to leave at today. Communication to SNF/Rehab:  Bedside RN,, has been notified to update the transition plan to the facility and call report (phone number). Discharge information has been updated on the AVS. And communicated to facility via [employer], or CC link. Discharge instructions to be fax'd to facility at [facility] #). uploaded [address] Please include all hard scripts for controlled substances, med rec and dc summary, and AVS in packet. Reviewed and confirmed with facility, It[place] and facility representative. , can manage the patient care needs for the following:     Venice Bryant with (X) only those applicable:  Medication:  []Medications are available at the facility  []IV Antibiotics    []Controlled Substance  hard copies available sent. []Weekly Labs    Equipment:  []CPAP/BiPAP  []Wound Vacuum  []Farrell or Urinary Device  []PICC/Central Line  []Nebulizer  []Ventilator    Treatment:  []Isolation (for MRSA, VRE, etc.)  []Surgical Drain Management  []Tracheostomy Care  []Dressing Changes  []Dialysis with transportation  []PEG Care  []Oxygen  []Daily Weights for Heart Failure    Dietary:  []Any diet limitations  []Tube Feedings   []Total Parenteral Management (TPN)    Financial Resources:  []Medicaid Application Completed    []UAI Completed and copy given to pt/family  and copy given to pt/family  []A screening has previously been completed. []Level II Completed    [] Private pay individual who will not become   financially eligible for Medicaid within 6 months from admission to a [address] [place] [address] facility. [] Individual refused to have screening conducted.      []Medicaid Application Completed    []The screening denied because it was determined individual did not need/did not qualify for nursing facility level of care. [] Out of state residents seeking direct admission to a [address] facility. [] Individuals who are inpatients of an out of state hospital, or in state or out of state veterans/ hospital and seek direct admission to a [address] facility  [] Individuals who are pateints or residents of a state owned/operated facility that is licensed by [employer] of [employer] ([facility]) and seek direct admission to [address] [place] [address]  [] A screening not required for enrollment in [facility] services as set out in [address] [place] [address] 30-  [] [facility] staff shall perform screenings of the [facility] clients. Advanced Care Plan:  []Surrogate Decision Maker of Care  []POA  []Communicated Code Status and copy sent. Other:       PCS completed, to upload d/c summary in [place] [address]. Packet at nurses station. Will arrange transport.  at  4 pm need D/c summary    Spoke with Son and he will come to hospital or met patient at facility. D/C Summary uploaded.

## 2020-01-02 NOTE — PROGRESS NOTES
Problem: Discharge Planning  Goal: *Discharge to safe environment  1/2/2020 0808 by Lyubov Gomez RN  Outcome: Progressing Towards Goal  1/2/2020 0808 by Lyubov Gomez RN  Outcome: Progressing Towards Goal     Problem: Falls - Risk of  Goal: *Absence of Falls  Description  Document Marivel Bernal Fall Risk and appropriate interventions in the flowsheet. 1/2/2020 0808 by Lyubov Gomez RN  Outcome: Progressing Towards Goal  Note: Fall Risk Interventions:       Mentation Interventions: Bed/chair exit alarm, Door open when patient unattended, Family/sitter at bedside, More frequent rounding, Room close to nurse's station    Medication Interventions: Patient to call before getting OOB, Teach patient to arise slowly    Elimination Interventions: Call light in reach, Toileting schedule/hourly rounds, Toilet paper/wipes in reach    History of Falls Interventions: Door open when patient unattended, Room close to nurse's station      1/2/2020 0808 by Lyubov Gomez RN  Outcome: Progressing Towards Goal  Note: Fall Risk Interventions:       Mentation Interventions: Bed/chair exit alarm, Door open when patient unattended, Family/sitter at bedside, More frequent rounding, Room close to nurse's station    Medication Interventions: Patient to call before getting OOB, Teach patient to arise slowly    Elimination Interventions: Call light in reach, Toileting schedule/hourly rounds, Toilet paper/wipes in reach    History of Falls Interventions: Door open when patient unattended, Room close to nurse's station         Problem: Patient Education: Go to Patient Education Activity  Goal: Patient/Family Education  1/2/2020 0808 by Lyubov Gomez RN  Outcome: Progressing Towards Goal  1/2/2020 0808 by Lyubov Gomez RN  Outcome: Progressing Towards Goal     Problem: Pressure Injury - Risk of  Goal: *Prevention of pressure injury  Description  Document Alex Scale and appropriate interventions in the flowsheet.   1/2/2020 6068 by Silvio Peña Yg Watson RN  Outcome: Progressing Towards Goal  Note: Pressure Injury Interventions:  Sensory Interventions: Assess changes in LOC, Keep linens dry and wrinkle-free, Pad between skin to skin    Moisture Interventions: Absorbent underpads, Check for incontinence Q2 hours and as needed, Moisture barrier, Contain wound drainage    Activity Interventions: Pressure redistribution bed/mattress(bed type)    Mobility Interventions: HOB 30 degrees or less, Turn and reposition approx. every two hours(pillow and wedges), Pressure redistribution bed/mattress (bed type)    Nutrition Interventions: Document food/fluid/supplement intake    Friction and Shear Interventions: Foam dressings/transparent film/skin sealants, Lift team/patient mobility team, Transferring/repositioning devices, Apply protective barrier, creams and emollients             1/2/2020 0808 by Pily Hansen RN  Outcome: Progressing Towards Goal  Note: Pressure Injury Interventions:  Sensory Interventions: Assess changes in LOC, Keep linens dry and wrinkle-free, Pad between skin to skin    Moisture Interventions: Absorbent underpads, Check for incontinence Q2 hours and as needed, Moisture barrier, Contain wound drainage    Activity Interventions: Pressure redistribution bed/mattress(bed type)    Mobility Interventions: HOB 30 degrees or less, Turn and reposition approx.  every two hours(pillow and wedges), Pressure redistribution bed/mattress (bed type)    Nutrition Interventions: Document food/fluid/supplement intake    Friction and Shear Interventions: Foam dressings/transparent film/skin sealants, Lift team/patient mobility team, Transferring/repositioning devices, Apply protective barrier, creams and emollients                Problem: Patient Education: Go to Patient Education Activity  Goal: Patient/Family Education  1/2/2020 0808 by Pily Hansen RN  Outcome: Progressing Towards Goal  1/2/2020 0808 by iPly Hansen RN  Outcome: Progressing Towards Goal     Problem: General Medical Care Plan  Goal: *Vital signs within specified parameters  1/2/2020 0808 by Clayton Bustos, RN  Outcome: Progressing Towards Goal  1/2/2020 0808 by Clayton Bustos, RN  Outcome: Progressing Towards Goal  Goal: *Labs within defined limits  1/2/2020 0808 by Clayton Bustos, RN  Outcome: Progressing Towards Goal  1/2/2020 0808 by Clayton Bustos, RN  Outcome: Progressing Towards Goal  Goal: *Absence of infection signs and symptoms  1/2/2020 0808 by Clayton Bustos, RN  Outcome: Progressing Towards Goal  1/2/2020 0808 by Clayton Bustos, RN  Outcome: Progressing Towards Goal  Goal: *Optimal pain control at patient's stated goal  1/2/2020 0808 by Clayton Bustos, RN  Outcome: Progressing Towards Goal  1/2/2020 0808 by Clayton Bustos RN  Outcome: Progressing Towards Goal  Goal: *Skin integrity maintained  1/2/2020 0808 by Clayton Bustos, RN  Outcome: Progressing Towards Goal  1/2/2020 0808 by Clayton Bustos, RN  Outcome: Progressing Towards Goal  Goal: *Fluid volume balance  1/2/2020 0808 by Clayton Bustos, RN  Outcome: Progressing Towards Goal  1/2/2020 0808 by Clayton Bustos RN  Outcome: Progressing Towards Goal  Goal: *Optimize nutritional status  1/2/2020 0808 by Clayton Bustos, RN  Outcome: Progressing Towards Goal  1/2/2020 0808 by Clayton Bustos RN  Outcome: Progressing Towards Goal  Goal: *Anxiety reduced or absent  1/2/2020 0808 by Clayton Bustos, RN  Outcome: Progressing Towards Goal  1/2/2020 0808 by Clayton Bustos, RN  Outcome: Progressing Towards Goal  Goal: *Progressive mobility and function (eg: ADL's)  1/2/2020 0808 by Clayton Bustos, RN  Outcome: Progressing Towards Goal  1/2/2020 0808 by Clayton Bustos, RN  Outcome: Progressing Towards Goal     Problem: Pain  Goal: *Control of Pain  1/2/2020 0808 by Clayton Bustos, RN  Outcome: Progressing Towards Goal  1/2/2020 0808 by Clayton Bustos RN  Outcome: Progressing Towards Goal     Problem: Patient Education: Go to Patient Education Activity  Goal: Patient/Family Education  1/2/2020 0343 by Tita Rock RN  Outcome: Progressing Towards Goal  1/2/2020 9954 by Tita Rock RN  Outcome: Progressing Towards Goal     Problem:  Body Temperature -  Risk of, Imbalanced  Goal: *Absence of heat stress or hyperthermia signs and symptoms  1/2/2020 0808 by Tita Rock RN  Outcome: Resolved/Met  1/2/2020 0808 by Tita Rock RN  Outcome: Progressing Towards Goal  Goal: *Absence of cold stress or hypothermia signs and symptoms  1/2/2020 0808 by Tita Rock RN  Outcome: Resolved/Met  1/2/2020 0808 by Tita Rock RN  Outcome: Progressing Towards Goal     Problem: Patient Education: Go to Patient Education Activity  Goal: Patient/Family Education  1/2/2020 8387 by Tita Rock RN  Outcome: Resolved/Met  1/2/2020 0808 by Tita Rock RN  Outcome: Progressing Towards Goal     Problem: Hypotension  Goal: *Blood pressure within specified parameters  Outcome: Resolved/Met  Goal: *Fluid volume balance  Outcome: Resolved/Met  Goal: *Labs within defined limits  Outcome: Resolved/Met     Problem: Patient Education: Go to Patient Education Activity  Goal: Patient/Family Education  Outcome: Resolved/Met     Problem: Delirium Treatment  Goal: *Level of consciousness restored to baseline  Outcome: Resolved/Met  Goal: *Level of environmental perceptions restored to baseline  Outcome: Resolved/Met  Goal: *Sensory perception restored to baseline  Outcome: Resolved/Met  Goal: *Emotional stability restored to baseline  Outcome: Resolved/Met  Goal: *Functional assessment restored to baseline  Outcome: Resolved/Met  Goal: *Absence of falls  Outcome: Resolved/Met  Goal: *Will remain free of delirium, CAM Score negative  Outcome: Resolved/Met  Goal: *Cognitive status will be restored to baseline  Outcome: Resolved/Met  Goal: Interventions  Outcome: Resolved/Met     Problem: Patient Education: Go to Patient Education Activity  Goal: Patient/Family Education  Outcome: Resolved/Met     Problem: Patient Education: Go to Patient Education Activity  Goal: Patient/Family Education  Outcome: Progressing Towards Goal     Problem: Patient Education: Go to Patient Education Activity  Goal: Patient/Family Education  Outcome: Progressing Towards Goal

## 2020-01-02 NOTE — PROGRESS NOTES
0720: Bedside shift change report given to Brian RANDLE  (oncoming nurse) by Negin Henning  (offgoing nurse). Report included the following information SBAR, Kardex, Procedure Summary, Intake/Output, MAR and Cardiac Rhythm SR. Patient resting in bed quietly. Patient states no complaints of pain. 6044: Assessment done on patient. .    4074: Medications given to patient. 0940: Patient resting ink bed quietly. 1010: Incontinence Care provided to patient. 1150: MD at bedside. 1215: Reassessment Completed. 1305: New dressings applied to patient's arms. Noted two skin tears and reddened and edematous. 1320: Spoke with son about the patient, with patient's permission. Clarified patient's Flu Vaccine. 1345: Spoke with MD about patients arms, he stated to elevate and wrap patient's arms. 1500: Bandages reapplied to patient. Medications given. 1515: Called Report to Haylee Frye LPN.     1934: Incontinence Care provided to patient. Family at bedside. 1645: Transport to  patient. IV taken out and Telemetry discontinued. Discharge paperwork with transport care.

## 2020-01-02 NOTE — PROGRESS NOTES
Problem: Self Care Deficits Care Plan (Adult)  Goal: *Acute Goals and Plan of Care (Insert Text)  Description  Occupational Therapy Goals  Initiated 12/31/2019 within 7 day(s). 1.  Patient will perform self-feeding with supervision/set-up utilizing adaptive equipment and compensatory techniques as needed. 2.  Patient will perform upper body dressing with minimal assistance/contact guard assist.  3.  Patient will perform bathing with minimal assistance/contact guard assist.  4.  Patient will perform bedside commode transfers with moderate assistance x 2 using LRAD. 5.  Patient will perform all aspects of toileting with moderate assistance using bed side commode and RW . 6.  Patient will participate in upper extremity therapeutic exercise/activities with supervision/set-up for 10 minutes. 7.  Patient will utilize energy conservation techniques during functional activities with minimal verbal cues. Outcome: Progressing Towards Goal   OCCUPATIONAL THERAPY TREATMENT    Patient: Chen Smyth (89 y.o. male)  Date: 1/2/2020  Diagnosis: Hypothermia [T68. XXXA]  Duodenitis [K29.80]  Gastritis [K29.70]   Hypothermia       Precautions: Fall, Skin  PLOF: Independent w/BADLs    Chart, occupational therapy assessment, plan of care, and goals were reviewed. ASSESSMENT:  Pt soiled upon entry. Attempted functional transfer training to standing for nahomy-care. Pt unable to maintain static stand for hygiene. Pt returned to supine for toileting ADL at bed level, requiring Max Assist. Pt requires assist for thoroughness w/simple ADL grooming tasks at bed level. Pt requires increase time and tactile cues for motor processing w/all tasks.  Pt left w/all needs within reach  Progression toward goals:  []          Improving appropriately and progressing toward goals  [x]          Improving slowly and progressing toward goals  []          Not making progress toward goals and plan of care will be adjusted     PLAN:  Patient continues to benefit from skilled intervention to address the above impairments. Continue treatment per established plan of care. Discharge Recommendations:  Skilled Nursing Facility/LTC  Further Equipment Recommendations for Discharge:  TBD by next level of care     SUBJECTIVE:   Patient stated The girl said that she was going to give me a good bath before I left.     OBJECTIVE DATA SUMMARY:   Cognitive/Behavioral Status:  Neurologic State: Alert  Orientation Level: Disoriented to time  Cognition: Follows commands  Safety/Judgement: Fall prevention    Functional Mobility and Transfers for ADLs:   Bed Mobility:  Rolling: Maximum assistance  Supine to Sit: Additional time;Maximum assistance  Sit to Supine: Maximum assistance   Transfers:  Sit to Stand: Maximum assistance(w/RW from elevated surface)  Balance:  Sitting: Impaired  Sitting - Static: Fair (occasional)  Sitting - Dynamic: Poor (constant support)  Standing: Impaired; With support  Standing - Static: Poor  Standing - Dynamic : Poor    ADL Intervention:  Grooming  Washing Face: Maximum assistance  Washing Hands: Maximum assistance    Toileting  Toileting Assistance: Maximum assistance(at bed level)  Bowel Hygiene: Maximum assistance  Clothing Management: Maximum assistance    Pain:  Pain level pre-treatment: 0/10   Pain level post-treatment: 0/10    Activity Tolerance:    Poor    Please refer to the flowsheet for vital signs taken during this treatment. After treatment:   []  Patient left in no apparent distress sitting up in chair  [x]  Patient left in no apparent distress in bed  [x]  Call bell left within reach  [x]  Nursing notified  []  Caregiver present  []  Bed alarm activated    COMMUNICATION/EDUCATION:   [] Role of Occupational Therapy in the acute care setting  [] Home safety education was provided and the patient/caregiver indicated understanding. [] Patient/family have participated as able in working towards goals and plan of care.   [x] Patient/family agree to work toward stated goals and plan of care. [] Patient understands intent and goals of therapy, but is neutral about his/her participation. [] Patient is unable to participate in goal setting and plan of care.       Thank you for this referral.  WILLIAM Pang  Time Calculation: 27 mins

## 2020-01-02 NOTE — DISCHARGE INSTRUCTIONS
Patient Education        Gastritis: Care Instructions  Your Care Instructions    Gastritis is a sore and upset stomach. It happens when something irritates the stomach lining. Many things can cause it. These include an infection such as the flu or something you ate or drank. Medicines or a sore on the lining of the stomach (ulcer) also can cause it. Your belly may bloat and ache. You may belch, vomit, and feel sick to your stomach. You should be able to relieve the problem by taking medicine. And it may help to change your diet. If gastritis lasts, your doctor may prescribe medicine. Follow-up care is a key part of your treatment and safety. Be sure to make and go to all appointments, and call your doctor if you are having problems. It's also a good idea to know your test results and keep a list of the medicines you take. How can you care for yourself at home? · If your doctor prescribed antibiotics, take them as directed. Do not stop taking them just because you feel better. You need to take the full course of antibiotics. · Be safe with medicines. If your doctor prescribed medicine to decrease stomach acid, take it as directed. Call your doctor if you think you are having a problem with your medicine. · Do not take any other medicine, including over-the-counter pain relievers, without talking to your doctor first.  · If your doctor recommends over-the-counter medicine to reduce stomach acid, such as Pepcid AC, Prilosec, Tagamet HB, or Zantac 75, follow the directions on the label. · Drink plenty of fluids (enough so that your urine is light yellow or clear like water) to prevent dehydration. Choose water and other caffeine-free clear liquids. If you have kidney, heart, or liver disease and have to limit fluids, talk with your doctor before you increase the amount of fluids you drink. · Limit how much alcohol you drink. · Avoid coffee, tea, cola drinks, chocolate, and other foods with caffeine.  They increase stomach acid. When should you call for help? Call 911 anytime you think you may need emergency care. For example, call if:    · You vomit blood or what looks like coffee grounds.     · You pass maroon or very bloody stools.    Call your doctor now or seek immediate medical care if:    · You start breathing fast and have not produced urine in the last 8 hours.     · You cannot keep fluids down.    Watch closely for changes in your health, and be sure to contact your doctor if:    · You do not get better as expected. Where can you learn more? Go to http://demetriusInCrowdbernard.info/. Enter [id number] in the search box to learn more about \"Gastritis: Care Instructions. \"  Current as of: November 7, 2018  Content Version: 12.2  © 6805-7126 [employer]. Care instructions adapted under license by [employer] (which disclaims liability or warranty for this information). If you have questions about a medical condition or this instruction, always ask your healthcare professional. [place] [address] any warranty or liability for your use of this information. Patient Education        Hypothermia: Care Instructions  Your Care Instructions  Hypothermia means that your body loses heat faster than it can make heat. You can get it if you spend time in cold air, water, wind, or rain. Most healthy people with mild to moderate hypothermia fully recover. And they don't have lasting problems. But babies and older or sick adults may be more at risk for hypothermia. This is because their bodies do not control temperature as well. Make sure to follow your doctor's instructions for how to get better. It's also important to learn how to protect yourself from hypothermia in the future. Follow-up care is a key part of your treatment and safety. Be sure to make and go to all appointments, and call your doctor if you are having problems.  It's also a good idea to know your test results and keep a list of the medicines you take. How can you care for yourself at home? · Take your medicines exactly as prescribed. Call your doctor if you think you are having a problem with your medicine. · To prevent dehydration, drink plenty of fluids, enough so that your urine is light yellow or clear like water. Choose water and other caffeine-free clear liquids until you feel better. If you have kidney, heart, or liver disease and have to limit fluids, talk with your doctor before you increase the amount of fluids you drink. · Get a lot of rest at home, and stay warm. To prevent hypothermia  · Avoid illegal drugs and too much alcohol. They can make you more likely to get hypothermia. · Cover your head, hands, and feet in cold or wet weather. · Try not to sweat a lot if you are out in the cold. · Stay as dry as possible. · Wear layers of loose-fitting clothing. · Pack a kit in your car that has items you will need to stay warm. It may include fire-starting kits and a cigarette lighter, extra clothing, drinking water, and food. You also can bring a sleeping bag. Two people can warm up more easily by sharing the bag. If you see symptoms in someone who has been in cold weather, keep the person warm and dry and get help quickly. Symptoms include shivering, cold and pale skin, and slurred speech. When should you call for help? Call your doctor now or seek immediate medical care if:    · You are confused or have trouble thinking.     · You are shivering and cannot stop.     · You have signs of needing more fluids. You have sunken eyes and a dry mouth, and you pass only a little dark urine.    Watch closely for changes in your health, and be sure to contact your doctor if:    · You do not get better as expected. Where can you learn more? Go to http://demetrius-bernard.info/. Enter W455 in the search box to learn more about \"Hypothermia: Care Instructions. \"  Current as of: June 26, 2019  Content Version: 12.2  © 3903-3362 [employer]. Care instructions adapted under license by [employer] (which disclaims liability or warranty for this information). If you have questions about a medical condition or this instruction, always ask your healthcare professional. [address] any warranty or liability for your use of this information. Patient Education        Chronic Kidney Disease: Care Instructions  Your Care Instructions    Chronic kidney disease happens when your kidneys don't work as well as they should. Your kidneys have a few important jobs. They remove waste from your blood. This waste leaves your body in your urine. They also balance your body's fluids and chemicals. When your kidneys don't work well, extra waste and fluid can build up. This can poison the body and sometimes cause death. The most common causes of this disease are diabetes and high blood pressure. In some cases, the disease develops in 2 to 3 months. But it usually develops over many years. If you take medicine and make healthy changes to your lifestyle, you may be able to prevent the disease from getting worse. But if your kidney damage gets worse, you may need dialysis or a kidney transplant. Dialysis uses a machine to filter waste from the blood. A transplant is surgery to give you a healthy kidney from another person. Follow-up care is a key part of your treatment and safety. Be sure to make and go to all appointments, and call your doctor if you are having problems. It's also a good idea to know your test results and keep a list of the medicines you take. How can you care for yourself at home?   Treatments and appointments    · Be safe with medicines. Take your medicines exactly as prescribed. Call your doctor if you have any problems with your medicine. You also may take medicine to control your blood pressure or to treat diabetes.  Many people who have diabetes take blood pressure medicine.     · If you have diabetes, do your best to keep your blood sugar in your target range. You may do this by eating healthy food and exercising. You may also take medicines.     · Go to your dialysis appointments if you have this treatment.     · Do not take ibuprofen (Advil, Motrin), naproxen (Aleve), or similar medicines, unless your doctor tells you to. These may make the disease worse.     · Do not take any vitamins, over-the-counter medicines, or herbal products without talking to your doctor first.     · Do not smoke or use other tobacco products. Smoking can reduce blood flow to the kidneys. If you need help quitting, talk to your doctor about stop-smoking programs and medicines. These can increase your chances of quitting for good.     · Do not drink alcohol or use illegal drugs.     · Talk to your doctor about an exercise plan. Exercise helps lower your blood pressure. It also makes you feel better.     · If you have an advance directive, let your doctor know. It may include a living will and a durable power of  for health care. If you don't have one, you may want to prepare one. It lets your doctor and loved ones know your health care wishes if you become unable to speak for yourself. Diet    · Talk to a registered dietitian. He or she can help you make a meal plan that is right for you. Most people with kidney disease need to limit salt (sodium), fluids, and protein. Some also have to limit potassium and phosphorus.     · You may have to give up many foods you like. But try to focus on the fact that this will help you stay healthy for as long as possible.     · If you have a hard time eating enough, talk to your doctor or dietitian about ways to add calories to your diet.     · Your diet may change as your disease changes. See your doctor for regular testing. And work with a dietitian to change your diet as needed. When should you call for help?   Call 911 anytime you think you may need emergency care. For example, call if:    · You passed out (lost consciousness).    Call your doctor now or seek immediate medical care if:    · You have less urine than normal or no urine.     · You have trouble urinating or can urinate only very small amounts.     · You are confused or have trouble thinking clearly.     · You feel weaker or more tired than usual.     · You are very thirsty, lightheaded, or dizzy.     · You have nausea and vomiting.     · You have new swelling of your arms or feet, or your swelling is worse.     · You have blood in your urine.     · You have new or worse trouble breathing.    Watch closely for changes in your health, and be sure to contact your doctor if:    · You have any problems with your medicine or other treatment. Where can you learn more? Go to http://demetrius-bernard.info/. Enter N276 in the search box to learn more about \"Chronic Kidney Disease: Care Instructions. \"  Current as of: October 31, 2018  Content Version: 12.2  © 4848-4241 [employer]. Care instructions adapted under license by [employer] (which disclaims liability or warranty for this information). If you have questions about a medical condition or this instruction, always ask your healthcare professional. [address] any warranty or liability for your use of this information. Patient Education        Learning About Sepsis  What is sepsis? Sepsis is an intense reaction to an infection. It can cause deadly damage to the body and lead to dangerously low blood pressure. You may have inflammation across large areas of your body. It can damage tissue and even go deep into your organs. Sepsis can develop very quickly. It requires immediate care in a hospital.  Infections that can lead to sepsis include:  · A skin infection such as from a cut. · A lung infection like pneumonia. · A kidney infection.   · A gut infection such as E. coli. Symptoms can include low blood pressure, breathing problems, fast heartbeat, and confusion. Other symptoms include fever or low body temperature, chills, cool clammy skin, skin rashes, and shaking. Sepsis can cause problems in many organs. People with sepsis might need to be treated in an intensive care unit (ICU) for several days or weeks. An ICU is a part of the hospital where very sick people get care. Septic shock is sepsis that causes extremely low blood pressure, which limits blood flow to the body. It can cause organ failure and death. How is sepsis treated? Doctors will treat the person with antibiotics. They will try to find the infection that led to sepsis. Machines will track the person's vital signs, including temperature, blood pressure, breathing rate, and pulse rate. The person will get fluids through an IV. He or she may also get strong medicine. This can help raise the blood pressure. If a person with sepsis is very sick, equipment in the ICU can support many body systems. That includes breathing, circulation, fluids, and help for organs like the kidneys and heart. If the person needs help breathing, a ventilator may be used. What can you expect when someone has sepsis? The person may start new treatments while still in the hospital. Different doctors may help with different symptoms. If a person needs to be treated in the intensive care unit (ICU), the ICU staff will do everything they can to treat all of the problems sepsis causes, including the infection. The ICU can be scary and confusing for patients and their families, friends, and supporters. But it's designed to keep your loved one comfortable and safe and to provide the best medical care. Expect a long recovery after the person leaves the ICU. If you need it, ask for support from friends and family. Where can you learn more? Go to http://demetrius-bernard.info/.   Enter X652 in the search box to learn more about \"Learning About Sepsis. \"  Current as of: June 26, 2019  Content Version: 12.2  © 6378-4052 [employer]. Care instructions adapted under license by [employer] (which disclaims liability or warranty for this information). If you have questions about a medical condition or this instruction, always ask your healthcare professional. [address] any warranty or liability for your use of this information. DISCHARGE SUMMARY from Nurse    PATIENT INSTRUCTIONS:    After general anesthesia or intravenous sedation, for 24 hours or while taking prescription Narcotics:  · Limit your activities  · Do not drive and operate hazardous machinery  · Do not make important personal or business decisions  · Do  not drink alcoholic beverages  · If you have not urinated within 8 hours after discharge, please contact your surgeon on call. Report the following to your surgeon:  · Excessive pain, swelling, redness or odor of or around the surgical area  · Temperature over 100.5  · Nausea and vomiting lasting longer than 4 hours or if unable to take medications  · Any signs of decreased circulation or nerve impairment to extremity: change in color, persistent  numbness, tingling, coldness or increase pain  · Any questions    What to do at Home:  Recommended activity: Activity as tolerated. If you experience any of the following symptoms Chest Pain, Shortness of Breath, Dizziness, Slurred Speech, nausea and vomiting , please follow up with Primary Care Provider or go to the nearest Emergency Department. *  Please give a list of your current medications to your Primary Care Provider. *  Please update this list whenever your medications are discontinued, doses are      changed, or new medications (including over-the-counter products) are added. *  Please carry medication information at all times in case of emergency situations.     These are general instructions for a healthy lifestyle:    No smoking/ No tobacco products/ Avoid exposure to second hand smoke  Surgeon General's Warning:  Quitting smoking now greatly reduces serious risk to your health. Obesity, smoking, and sedentary lifestyle greatly increases your risk for illness    A healthy diet, regular physical exercise & weight monitoring are important for maintaining a healthy lifestyle    You may be retaining fluid if you have a history of heart failure or if you experience any of the following symptoms:  Weight gain of 3 pounds or more overnight or 5 pounds in a week, increased swelling in our hands or feet or shortness of breath while lying flat in bed.   Please call your doctor as soon as you notice any of these symptoms; do not wait until your next office visit.      ___________________________________________________________________________________________________________________________________

## 2020-01-02 NOTE — DISCHARGE SUMMARY
Discharge Summary    Patient: Patricio Carrera               Sex: male          DOA: 12/23/2019         YOB: 1934      Age:  80 y.o.        LOS:  LOS: 9 days                Admit Date: 12/23/2019    Discharge Date: 1/2/2020    Admission Diagnoses: Hypothermia [T68. XXXA]  Duodenitis [K29.80]  Gastritis [K29.70]    Discharge Diagnoses:    Problem List as of 1/2/2020 Date Reviewed: 2/12/2019          Codes Class Noted - Resolved    Sustained ventricular tachycardia ([facility] [address] ICD-10-CM: I47.2  ICD-9-CM: 427.1  12/28/2019 - Present        Duodenitis ICD-10-CM: K29.80  ICD-9-CM: 535.60  12/24/2019 - Present        Abdominal pain ICD-10-CM: R10.9  ICD-9-CM: 789.00  12/23/2019 - Present        * (Principal) Hypothermia ICD-10-CM: T68. Crissie Pel  ICD-9-CM: 991.6  12/23/2019 - Present        Transaminitis ICD-10-CM: R74.0  ICD-9-CM: 790.4  12/23/2019 - Present        Chronic renal failure, stage 3 (moderate) (HCC) ICD-10-CM: N18.3  ICD-9-CM: 585.3  12/23/2019 - Present        SIRS (systemic inflammatory response syndrome) (HCC) ICD-10-CM: R65.10  ICD-9-CM: 995.90  12/23/2019 - Present        Gastritis ICD-10-CM: K29.70  ICD-9-CM: 535.50  12/23/2019 - Present        Syncope and collapse ICD-10-CM: R55  ICD-9-CM: 780.2  1/30/2018 - Present        Head injury ICD-10-CM: S09. 90XA  ICD-9-CM: 959.01  1/30/2018 - Present        Forehead contusion ICD-10-CM: E91.64UK  ICD-9-CM: 920  1/30/2018 - Present        Wrist abrasion, infected, left, initial encounter ICD-10-CM: S14.212M, L08.9  ICD-9-CM: 913.1  1/30/2018 - Present        Wrist sprain, left, initial encounter ICD-10-CM: S63.502A  ICD-9-CM: 842.00  1/30/2018 - Present        Acute renal failure (HCC) ICD-10-CM: N17.9  ICD-9-CM: 584.9  2/6/2014 - Present        Elevated PSA ICD-10-CM: R97.20  ICD-9-CM: 790.93  2/6/2014 - Present        Urinary retention ICD-10-CM: R33.9  ICD-9-CM: 788.20  2/6/2014 - Present        Guaiac + stool ICD-10-CM: R19.5  ICD-9-CM: 792.1 2/6/2014 - Present        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 He had hypothermia and weakness in the ER and he was admitted for ongoing management. Mr Vince Sanchez was treated empirically with antibiotics. At this point there is no convincing evidence for infection and his antibiotics have been discontinued. His temperature has normalized. He has continued with PT. He has a history of atrial fibrillation. Cardiology was consulted regarding his Amiodarone dose. He is currently in Sinus Rhythm. His Amiodarone should be decreased to 200 mg daily (from 200 mg BID) on January 5th. At this point he is appropriate to transfer back. Consults: Cardiology   Consulting Provider:  Antonio Lay MD    Significant Diagnostic Studies:     Discharge Medications:     Current Discharge Medication List      START taking these medications    Details   oxyCODONE-acetaminophen (PERCOCET) 5-325 mg per tablet Take 1 Tab by mouth every four (4) hours as needed for Pain for up to 3 days. Max Daily Amount: 6 Tabs. Qty: 5 Tab, Refills: 0    Associated Diagnoses: Neoplastic disease      !! amiodarone (CORDARONE) 200 mg tablet Take 1 Tab by mouth two (2) times a day for 2 days. Qty: 6 Tab, Refills: 0      !! amiodarone (CORDARONE) 200 mg tablet Take 1 Tab by mouth daily. Qty: 30 Tab, Refills: 0       !! - Potential duplicate medications found. Please discuss with provider. CONTINUE these medications which have NOT CHANGED    Details   acetaminophen (TYLENOL EXTRA STRENGTH) 500 mg tablet Take 2 Tabs by mouth every six (6) hours as needed for Pain. Qty: 50 Tab, Refills: 0      omeprazole (PRILOSEC) 20 mg capsule Take 1 Cap by mouth daily.   Qty: 30 Cap, Refills: 3      furosemide (LASIX) 40 mg tablet Take 40 mg by mouth daily. allopurinol (ZYLOPRIM) 300 mg tablet Take 300 mg by mouth daily. cholecalciferol (VITAMIN D3) 1,000 unit tablet 2,000 Units. simvastatin (ZOCOR) 40 mg tablet 40 mg.      pantoprazole (PROTONIX) 40 mg tablet Refills: 0      tamsulosin (FLOMAX) 0.4 mg capsule take 1 capsule by mouth once daily AFTER DINNER  Qty: 90 Cap, Refills: 3      sucralfate (CARAFATE) 1 gram tablet Take 1 Tab by mouth four (4) times daily.   Qty: 20 Tab, Refills: 0             Activity: Activity as tolerated    Diet: Regular Diet    Wound Care: None needed    Follow-up: Follow up with staff MD on arrival.    Total Discharge time 35 minutes

## 2020-01-06 NOTE — PROGRESS NOTES
1/6/2020 1155-- [id number] signed by MD. Patient discharged to [facility]. Called and spoke to Mya Sahu and made her aware that faxed [facility] to [phone] with confirmation receipt. Copies made for chart and CM File.     Amna Gil RN    Outcomes Manager  [phone]  [phone]

## 2020-02-19 ENCOUNTER — HOSPITAL ENCOUNTER (OUTPATIENT)
Age: 85
Setting detail: OBSERVATION
Discharge: NURSING, REHAB OR LONG-TERM CARE | End: 2020-02-21
Attending: EMERGENCY MEDICINE | Admitting: HOSPITALIST
Payer: MEDICARE

## 2020-02-19 DIAGNOSIS — N30.00 ACUTE CYSTITIS WITHOUT HEMATURIA: ICD-10-CM

## 2020-02-19 DIAGNOSIS — N17.9 ACUTE RENAL FAILURE, UNSPECIFIED ACUTE RENAL FAILURE TYPE (HCC): Primary | ICD-10-CM

## 2020-02-19 DIAGNOSIS — R53.83 FATIGUE, UNSPECIFIED TYPE: ICD-10-CM

## 2020-02-19 PROBLEM — N39.0 UTI (URINARY TRACT INFECTION): Status: ACTIVE | Noted: 2020-02-19

## 2020-02-19 PROBLEM — I95.9 HYPOTENSION: Status: ACTIVE | Noted: 2020-02-19

## 2020-02-19 PROBLEM — R00.1 BRADYCARDIA: Status: ACTIVE | Noted: 2020-02-19

## 2020-02-19 LAB
ALBUMIN SERPL-MCNC: 3 G/DL (ref 3.4–5)
ALBUMIN/GLOB SERPL: 0.7 {RATIO} (ref 0.8–1.7)
ALP SERPL-CCNC: 136 U/L (ref 45–117)
ALT SERPL-CCNC: 47 U/L (ref 16–61)
ANION GAP SERPL CALC-SCNC: 5 MMOL/L (ref 3–18)
ANION GAP SERPL CALC-SCNC: 7 MMOL/L (ref 3–18)
APPEARANCE UR: CLEAR
AST SERPL-CCNC: 45 U/L (ref 10–38)
BACTERIA URNS QL MICRO: ABNORMAL /HPF
BASOPHILS # BLD: 0 K/UL (ref 0–0.1)
BASOPHILS NFR BLD: 0 % (ref 0–2)
BILIRUB SERPL-MCNC: 0.4 MG/DL (ref 0.2–1)
BILIRUB UR QL: NEGATIVE
BUN SERPL-MCNC: 56 MG/DL (ref 7–18)
BUN SERPL-MCNC: 57 MG/DL (ref 7–18)
BUN/CREAT SERPL: 29 (ref 12–20)
BUN/CREAT SERPL: 34 (ref 12–20)
CALCIUM SERPL-MCNC: 8.7 MG/DL (ref 8.5–10.1)
CALCIUM SERPL-MCNC: 9.6 MG/DL (ref 8.5–10.1)
CHLORIDE SERPL-SCNC: 106 MMOL/L (ref 100–111)
CHLORIDE SERPL-SCNC: 112 MMOL/L (ref 100–111)
CK MB CFR SERPL CALC: 15.5 % (ref 0–4)
CK MB SERPL-MCNC: 9.9 NG/ML (ref 5–25)
CK SERPL-CCNC: 64 U/L (ref 39–308)
CO2 SERPL-SCNC: 29 MMOL/L (ref 21–32)
CO2 SERPL-SCNC: 33 MMOL/L (ref 21–32)
COLOR UR: YELLOW
CREAT SERPL-MCNC: 1.69 MG/DL (ref 0.6–1.3)
CREAT SERPL-MCNC: 1.9 MG/DL (ref 0.6–1.3)
DIFFERENTIAL METHOD BLD: ABNORMAL
EOSINOPHIL # BLD: 0 K/UL (ref 0–0.4)
EOSINOPHIL NFR BLD: 1 % (ref 0–5)
EPITH CASTS URNS QL MICRO: ABNORMAL /LPF (ref 0–5)
ERYTHROCYTE [DISTWIDTH] IN BLOOD BY AUTOMATED COUNT: 18.1 % (ref 11.6–14.5)
GLOBULIN SER CALC-MCNC: 4.2 G/DL (ref 2–4)
GLUCOSE SERPL-MCNC: 77 MG/DL (ref 74–99)
GLUCOSE SERPL-MCNC: 84 MG/DL (ref 74–99)
GLUCOSE UR STRIP.AUTO-MCNC: NEGATIVE MG/DL
HCT VFR BLD AUTO: 35.5 % (ref 36–48)
HGB BLD-MCNC: 11.8 G/DL (ref 13–16)
HGB UR QL STRIP: NEGATIVE
KETONES UR QL STRIP.AUTO: NEGATIVE MG/DL
LEUKOCYTE ESTERASE UR QL STRIP.AUTO: ABNORMAL
LYMPHOCYTES # BLD: 0.9 K/UL (ref 0.9–3.6)
LYMPHOCYTES NFR BLD: 17 % (ref 21–52)
MCH RBC QN AUTO: 32.9 PG (ref 24–34)
MCHC RBC AUTO-ENTMCNC: 33.2 G/DL (ref 31–37)
MCV RBC AUTO: 98.9 FL (ref 74–97)
MONOCYTES # BLD: 0.2 K/UL (ref 0.05–1.2)
MONOCYTES NFR BLD: 4 % (ref 3–10)
NEUTS SEG # BLD: 4 K/UL (ref 1.8–8)
NEUTS SEG NFR BLD: 78 % (ref 40–73)
NITRITE UR QL STRIP.AUTO: POSITIVE
PH UR STRIP: 6 [PH] (ref 5–8)
PLATELET # BLD AUTO: 113 K/UL (ref 135–420)
PMV BLD AUTO: 11.7 FL (ref 9.2–11.8)
POTASSIUM SERPL-SCNC: 4 MMOL/L (ref 3.5–5.5)
POTASSIUM SERPL-SCNC: 4.1 MMOL/L (ref 3.5–5.5)
PROT SERPL-MCNC: 7.2 G/DL (ref 6.4–8.2)
PROT UR STRIP-MCNC: NEGATIVE MG/DL
RBC # BLD AUTO: 3.59 M/UL (ref 4.7–5.5)
RBC #/AREA URNS HPF: ABNORMAL /HPF (ref 0–5)
SODIUM SERPL-SCNC: 144 MMOL/L (ref 136–145)
SODIUM SERPL-SCNC: 148 MMOL/L (ref 136–145)
SP GR UR REFRACTOMETRY: 1.01 (ref 1–1.03)
T3FREE SERPL-MCNC: 1.6 PG/ML (ref 2.18–3.98)
TROPONIN I SERPL-MCNC: 0.03 NG/ML (ref 0–0.04)
TSH SERPL DL<=0.05 MIU/L-ACNC: 8.16 UIU/ML (ref 0.36–3.74)
UROBILINOGEN UR QL STRIP.AUTO: 0.2 EU/DL (ref 0.2–1)
WBC # BLD AUTO: 5 K/UL (ref 4.6–13.2)
WBC URNS QL MICRO: ABNORMAL /HPF (ref 0–4)

## 2020-02-19 PROCEDURE — 93005 ELECTROCARDIOGRAM TRACING: CPT

## 2020-02-19 PROCEDURE — 74011250636 HC RX REV CODE- 250/636: Performed by: EMERGENCY MEDICINE

## 2020-02-19 PROCEDURE — 82550 ASSAY OF CK (CPK): CPT

## 2020-02-19 PROCEDURE — 74011000258 HC RX REV CODE- 258: Performed by: EMERGENCY MEDICINE

## 2020-02-19 PROCEDURE — 96365 THER/PROPH/DIAG IV INF INIT: CPT

## 2020-02-19 PROCEDURE — 74011250636 HC RX REV CODE- 250/636: Performed by: INTERNAL MEDICINE

## 2020-02-19 PROCEDURE — 99218 HC RM OBSERVATION: CPT

## 2020-02-19 PROCEDURE — 87077 CULTURE AEROBIC IDENTIFY: CPT

## 2020-02-19 PROCEDURE — 87186 SC STD MICRODIL/AGAR DIL: CPT

## 2020-02-19 PROCEDURE — 84443 ASSAY THYROID STIM HORMONE: CPT

## 2020-02-19 PROCEDURE — 80053 COMPREHEN METABOLIC PANEL: CPT

## 2020-02-19 PROCEDURE — 84481 FREE ASSAY (FT-3): CPT

## 2020-02-19 PROCEDURE — 99285 EMERGENCY DEPT VISIT HI MDM: CPT

## 2020-02-19 PROCEDURE — 74011000258 HC RX REV CODE- 258: Performed by: HOSPITALIST

## 2020-02-19 PROCEDURE — 96375 TX/PRO/DX INJ NEW DRUG ADDON: CPT

## 2020-02-19 PROCEDURE — 85025 COMPLETE CBC W/AUTO DIFF WBC: CPT

## 2020-02-19 PROCEDURE — 87086 URINE CULTURE/COLONY COUNT: CPT

## 2020-02-19 PROCEDURE — 81001 URINALYSIS AUTO W/SCOPE: CPT

## 2020-02-19 PROCEDURE — 84436 ASSAY OF TOTAL THYROXINE: CPT

## 2020-02-19 RX ORDER — AMIODARONE HYDROCHLORIDE 200 MG/1
100 TABLET ORAL DAILY
Status: DISCONTINUED | OUTPATIENT
Start: 2020-02-20 | End: 2020-02-19

## 2020-02-19 RX ORDER — ATROPINE SULFATE 0.1 MG/ML
1 INJECTION INTRAVENOUS
Status: DISCONTINUED | OUTPATIENT
Start: 2020-02-19 | End: 2020-02-21 | Stop reason: HOSPADM

## 2020-02-19 RX ORDER — DEXTROSE MONOHYDRATE AND SODIUM CHLORIDE 5; .45 G/100ML; G/100ML
75 INJECTION, SOLUTION INTRAVENOUS CONTINUOUS
Status: DISPENSED | OUTPATIENT
Start: 2020-02-19 | End: 2020-02-20

## 2020-02-19 RX ORDER — SODIUM CHLORIDE 9 MG/ML
75 INJECTION, SOLUTION INTRAVENOUS CONTINUOUS
Status: DISCONTINUED | OUTPATIENT
Start: 2020-02-19 | End: 2020-02-19

## 2020-02-19 RX ADMIN — SODIUM CHLORIDE 500 ML: 900 INJECTION, SOLUTION INTRAVENOUS at 07:46

## 2020-02-19 RX ADMIN — DEXTROSE MONOHYDRATE AND SODIUM CHLORIDE 75 ML/HR: 5; .45 INJECTION, SOLUTION INTRAVENOUS at 12:55

## 2020-02-19 RX ADMIN — CEFTRIAXONE 1 G: 1 INJECTION, POWDER, FOR SOLUTION INTRAMUSCULAR; INTRAVENOUS at 09:29

## 2020-02-19 RX ADMIN — SODIUM CHLORIDE 500 ML: 900 INJECTION, SOLUTION INTRAVENOUS at 23:32

## 2020-02-19 RX ADMIN — SODIUM CHLORIDE 500 ML: 900 INJECTION, SOLUTION INTRAVENOUS at 06:40

## 2020-02-19 NOTE — PROGRESS NOTES
Problem: Discharge Planning  Goal: *Discharge to safe environment  Outcome: Progressing Towards Goal   [facility] vs [facility]

## 2020-02-19 NOTE — H&P
Internal Medicine History and Physical          Subjective     HPI: Tony Hines is a 80 y.o. male with a PMHx of SVT, Urinary retention who presented to the ED with acute onset weakness and fatigue. The patient was discharged early Jan to rehab from here after admission for SVT. He completed rehab and doing well at home until last week when he started declining. His symptoms included fatigue and loss of appetite. He admits to globus sensation as well. He states he's been very thirsty and increase urinary frequency. He denies any fever or chills. He denies CP or SOB. In the ED, he was found to be in [place] and hypotensive. He was given IV fluids but still very weak. UA consistent with UTI and he was given IV rocephin. On telemetry, he was bradycardic into low 40s. He is on amiodarone for wide complex tachycardia in setting of LBBB. The patient's baseline BP is marginal and usually runs low 100s per EMR.      PMHx:  Past Medical History:   Diagnosis Date    Difficulty urinating     Elevated PSA     Hematuria, unspecified     Hypercholesterolemia     Hypertension     Incomplete bladder emptying     Urinary retention     UTI (urinary tract infection)        PSurgHx:  Past Surgical History:   Procedure Laterality Date    APPENDECTOMY      EGD  2/7/2014         HX TURP  6/13/16    HX TURP         SocialHx:  Social History     Socioeconomic History    Marital status:      Spouse name: Not on file    Number of children: Not on file    Years of education: Not on file    Highest education level: Not on file   Occupational History    Occupation:  thirty years   Social Needs    Financial resource strain: Not on file    Food insecurity:     Worry: Not on file     Inability: Not on file   [employer] needs:     Medical: Not on file     Non-medical: Not on file   Tobacco Use    Smoking status: Never Smoker    Smokeless tobacco: Never Used   Substance and Sexual Activity    Alcohol use: Yes     Alcohol/week: 2.0 standard drinks     Types: 2 Cans of beer per week     Comment: Occasional    Drug use: No    Sexual activity: Not on file   Lifestyle    Physical activity:     Days per week: Not on file     Minutes per session: Not on file    Stress: Not on file   Relationships    Social connections:     Talks on phone: Not on file     Gets together: Not on file     Attends Baptism service: Not on file     Active member of club or organization: Not on file     Attends meetings of clubs or organizations: Not on file     Relationship status: Not on file    Intimate partner violence:     Fear of current or ex partner: Not on file     Emotionally abused: Not on file     Physically abused: Not on file     Forced sexual activity: Not on file   Other Topics Concern    Not on file   Social History Narrative    Not on file       Allergies: Allergies   Allergen Reactions    Amlodipine Swelling and Itching    Bactrim [Sulfamethoprim Ds] Rash    Lisinopril Other (comments)     Acute renal failure    Ciprofloxacin Rash and Itching       FamilyHx:  Family History   Problem Relation Age of Onset    Cancer Father     Alzheimer Mother     Heart defect Brother        Prior to Admission Medications   Prescriptions Last Dose Informant Patient Reported? Taking?   acetaminophen (TYLENOL EXTRA STRENGTH) 500 mg tablet   No No   Sig: Take 2 Tabs by mouth every six (6) hours as needed for Pain. allopurinol (ZYLOPRIM) 300 mg tablet   Yes No   Sig: Take 300 mg by mouth daily. amiodarone (CORDARONE) 200 mg tablet   No No   Sig: Take 1 Tab by mouth daily. cholecalciferol (VITAMIN D3) 1,000 unit tablet   Yes No   Si,000 Units. furosemide (LASIX) 40 mg tablet   Yes No   Sig: Take 40 mg by mouth daily. omeprazole (PRILOSEC) 20 mg capsule   No No   Sig: Take 1 Cap by mouth daily. pantoprazole (PROTONIX) 40 mg tablet   Yes No   simvastatin (ZOCOR) 40 mg tablet   Yes No   Si mg. sucralfate (CARAFATE) 1 gram tablet   No No   Sig: Take 1 Tab by mouth four (4) times daily.    tamsulosin (FLOMAX) 0.4 mg capsule   No No   Sig: take 1 capsule by mouth once daily AFTER DINNER      Facility-Administered Medications: None       Review of Systems:  CONST: Fever, weight loss, fatigue or chills  HEENT: Recent changes in vision, vertigo, epistaxis, dysphagia and hoarseness  CV: Chest pain, palpitations, edema and varicosities  RESP: Cough, shortness of breath, wheezing, hemoptysis, snoring and reactive airway disease  GI: Nausea, vomiting, abdominal pain, change in bowel habits, hematochezia, melena, and GERD   : Hematuria, dysuria, frequency, urgency, nocturia and stress urinary incontinence   MS: Weakness, joint pain and arthritis  ENDO: Polyuria, polydipsia, polyphagia, poor wound healing, heat intolerance, cold intolerance  LYMPH/HEME: Anemia, bruising and history of blood transfusions  INTEG: Dermatitis, abnormal moles  NEURO: Dizziness, headache, fainting, seizures and stroke  PSYCH: Anxiety and depression      Objective      Visit Vitals  BP (!) 87/37   Pulse (!) 46   Temp 97.5 °F (36.4 °C)   Resp 9   Ht 5' 7\" (1.702 m)   Wt 72.6 kg (160 lb)   SpO2 99%   BMI 25.06 kg/m²       Physical Exam:  General Appearance: NAD, conversant  HENT: normocephalic/atraumatic, moist mucus membranes  Lungs: CTA with normal respiratory effort  Cardiovascular: Bradycardia w/ RR, no m/r/g  Abdomen: soft, non-tender, normal bowel sounds  Extremities: no cyanosis, no peripheral edema  Neuro: moves all extremities, no focal deficits  Psych: appropriate affect, alert and oriented to person, place and time    Laboratory Studies:  CMP:   Lab Results   Component Value Date/Time     (H) 02/19/2020 09:28 AM    K 4.1 02/19/2020 09:28 AM     (H) 02/19/2020 09:28 AM    CO2 29 02/19/2020 09:28 AM    AGAP 7 02/19/2020 09:28 AM    GLU 84 02/19/2020 09:28 AM    BUN 57 (H) 02/19/2020 09:28 AM    CREA 1.69 (H) 02/19/2020 09:28 AM    GFRAA 47 (L) 02/19/2020 09:28 AM    GFRNA 39 (L) 02/19/2020 09:28 AM    CA 8.7 02/19/2020 09:28 AM    ALB 3.0 (L) 02/19/2020 05:18 AM    TP 7.2 02/19/2020 05:18 AM    GLOB 4.2 (H) 02/19/2020 05:18 AM    AGRAT 0.7 (L) 02/19/2020 05:18 AM    SGOT 45 (H) 02/19/2020 05:18 AM    ALT 47 02/19/2020 05:18 AM     CBC:   Lab Results   Component Value Date/Time    WBC 5.0 02/19/2020 05:18 AM    HGB 11.8 (L) 02/19/2020 05:18 AM    HCT 35.5 (L) 02/19/2020 05:18 AM     (L) 02/19/2020 05:18 AM     All Cardiac Markers in the last 24 hours:   Lab Results   Component Value Date/Time    CPK 64 02/19/2020 05:18 AM    CKMB 9.9 (H) 02/19/2020 05:18 AM    CKND1 15.5 (H) 02/19/2020 05:18 AM    TROIQ 0.03 02/19/2020 05:18 AM       Imaging Reviewed:  No results found. Assessment/Plan     Active Hospital Problems    Diagnosis Date Noted    UTI (urinary tract infection) 02/19/2020    Acute renal failure (ARF) (HCC) 02/19/2020    Bradycardia 02/19/2020     - Cont rocephin  - Urine cult  - Trend CBC  - Tele monitor for bradycardia  - On amiodarone at home 200mg, will half dose for now as wonder if some of this weakness could be as a result of [place] in low 40s/hypotension as a result of amio  - Consult cardiology  - Cont IVFs  - Hold outpatient lasix  - Trend BMP   - Cont acceptable home medications for chronic conditions   - DVT protocol    I have personally reviewed all pertinent labs, films and EKGs that have officially resulted. I reviewed available electronic documentation outlining the initial presentation as well as the emergency room physician's encounter.     Jana Blank DO  Internal Medicine, Hospitalist  Pager: [phone] [facility]

## 2020-02-19 NOTE — ED NOTES
Pt and son are aware of pending admit. Pt's son is leaving to get something to eat and do some errands. Will return later. Pt was provided extra meal tray from breakfast, and will order lunch tray as well. Pt instructed on use of call bell for reiteration. Pt v/u.

## 2020-02-19 NOTE — ED NOTES
Discussed with provider that pt's BP is borderline hypotensive. IVF still infusing. Will complete hanging liter of NS, then repeat BMP.

## 2020-02-19 NOTE — ED NOTES
Pt given po fluids and updated on current POC. Will repeat labs once second bolus complete. Pt and family v/u.

## 2020-02-19 NOTE — ED PROVIDER NOTES
EMERGENCY DEPARTMENT HISTORY AND PHYSICAL EXAM    10:28 AM      Date: 2/19/2020  Patient Name: Jeaneth Dinh    History of Presenting Illness     Chief Complaint   Patient presents with    Fatigue         History Provided By: Patient      Additional History (Context): Jeaneth Dinh is a 80 y.o. male who presents with fatigue. Patient is accompanied by his son who provides most the history. He states that the past week the patient has been declining and having increasing fatigue where he is requiring assistance. The patient has not been eating or drinking very much per the son. The patient does live with him and his wife, the wife does monitor him on a daily basis. The patient states that food looks good but had he has no appetite. He denies any fevers, chills, chest pain, shortness of breath, abdominal pain, nausea, vomiting. He denies any dysuria hematuria. Patient was admitted to the hospital and then went through rehab for 1 month, he was doing significantly better. The patient has been home from rehab for approximately 3 weeks. The son states that the first 2 weeks he was doing very well and now has been declining. They have not had in-home rehab because the patient was doing so well that they had canceled it. No new medications. PCP: Justin Salamanca MD      Current Outpatient Medications   Medication Sig Dispense Refill    amiodarone (CORDARONE) 200 mg tablet Take 1 Tab by mouth daily. 30 Tab 0    acetaminophen (TYLENOL EXTRA STRENGTH) 500 mg tablet Take 2 Tabs by mouth every six (6) hours as needed for Pain. 50 Tab 0    sucralfate (CARAFATE) 1 gram tablet Take 1 Tab by mouth four (4) times daily. 20 Tab 0    omeprazole (PRILOSEC) 20 mg capsule Take 1 Cap by mouth daily. 30 Cap 3    furosemide (LASIX) 40 mg tablet Take 40 mg by mouth daily.  allopurinol (ZYLOPRIM) 300 mg tablet Take 300 mg by mouth daily.       cholecalciferol (VITAMIN D3) 1,000 unit tablet 2,000 Units.      simvastatin (ZOCOR) 40 mg tablet 40 mg.  pantoprazole (PROTONIX) 40 mg tablet   0    tamsulosin (FLOMAX) 0.4 mg capsule take 1 capsule by mouth once daily AFTER DINNER 90 Cap 3       Past History     Past Medical History:  Past Medical History:   Diagnosis Date    Difficulty urinating     Elevated PSA     Hematuria, unspecified     Hypercholesterolemia     Hypertension     Incomplete bladder emptying     Urinary retention     UTI (urinary tract infection)        Past Surgical History:  Past Surgical History:   Procedure Laterality Date    APPENDECTOMY      EGD  2/7/2014         HX TURP  6/13/16    HX TURP         Family History:  Family History   Problem Relation Age of Onset    Cancer Father     Alzheimer Mother     Heart defect Brother        Social History:  Social History     Tobacco Use    Smoking status: Never Smoker    Smokeless tobacco: Never Used   Substance Use Topics    Alcohol use: Yes     Alcohol/week: 2.0 standard drinks     Types: 2 Cans of beer per week     Comment: Occasional    Drug use: No       Allergies: Allergies   Allergen Reactions    Amlodipine Swelling and Itching    Bactrim [Sulfamethoprim Ds] Rash    Lisinopril Other (comments)     Acute renal failure    Ciprofloxacin Rash and Itching         Review of Systems       Review of Systems   Constitutional: Positive for appetite change and fatigue. Negative for chills and fever. HENT: Negative for congestion, rhinorrhea, sinus pressure, sinus pain, sneezing and sore throat. Eyes: Negative for photophobia and visual disturbance. Respiratory: Negative for cough, shortness of breath and wheezing. Cardiovascular: Negative for chest pain and palpitations. Gastrointestinal: Negative for abdominal pain, constipation, diarrhea, nausea and vomiting. Genitourinary: Negative for dysuria, frequency and hematuria. Musculoskeletal: Negative for back pain, neck pain and neck stiffness.    Skin: Negative for rash and wound. Neurological: Negative for dizziness, light-headedness and headaches. Psychiatric/Behavioral: Negative for agitation, behavioral problems and confusion. Physical Exam     Visit Vitals  /40   Pulse (!) 51   Temp 97.5 °F (36.4 °C)   Resp 12   Ht 5' 7\" (1.702 m)   Wt 72.6 kg (160 lb)   SpO2 100%   BMI 25.06 kg/m²       Physical Exam  Constitutional:       General: He is not in acute distress. Appearance: He is not toxic-appearing. HENT:      Head: Normocephalic and atraumatic. Nose: No congestion or rhinorrhea. Mouth/Throat:      Mouth: Mucous membranes are moist.   Eyes:      General: No scleral icterus. Neck:      Musculoskeletal: Normal range of motion. No neck rigidity. Cardiovascular:      Rate and Rhythm: Bradycardia present. Heart sounds: No murmur. No gallop. Pulmonary:      Effort: Pulmonary effort is normal. No respiratory distress. Breath sounds: No wheezing. Comments: Clear breath sounds in all lung fields. No acute respiratory distress. Pulse ox 100% on room air. Abdominal:      General: There is no distension. Palpations: Abdomen is soft. Tenderness: There is no abdominal tenderness. Musculoskeletal: Normal range of motion. General: No swelling or deformity. Lymphadenopathy:      Cervical: No cervical adenopathy. Skin:     General: Skin is warm. Capillary Refill: Capillary refill takes less than 2 seconds. Coloration: Skin is not jaundiced. Findings: Abrasion present. No bruising. Comments: Patient has abrasions that are superficial to the left elbow and hand. Normal range of motion of the extremity. Neurological:      Mental Status: He is alert and oriented to person, place, and time. Cranial Nerves: No cranial nerve deficit. Motor: No weakness. Psychiatric:         Mood and Affect: Mood normal.         Thought Content:  Thought content normal. Diagnostic Study Results     Labs -  Recent Results (from the past 12 hour(s))   CBC WITH AUTOMATED DIFF    Collection Time: 02/19/20  5:18 AM   Result Value Ref Range    WBC 5.0 4.6 - 13.2 K/uL    RBC 3.59 (L) 4.70 - 5.50 M/uL    HGB 11.8 (L) 13.0 - 16.0 g/dL    HCT 35.5 (L) 36.0 - 48.0 %    MCV 98.9 (H) 74.0 - 97.0 FL    MCH 32.9 24.0 - 34.0 PG    MCHC 33.2 31.0 - 37.0 g/dL    RDW 18.1 (H) 11.6 - 14.5 %    PLATELET 993 (L) 870 - 420 K/uL    MPV 11.7 9.2 - 11.8 FL    NEUTROPHILS 78 (H) 40 - 73 %    LYMPHOCYTES 17 (L) 21 - 52 %    MONOCYTES 4 3 - 10 %    EOSINOPHILS 1 0 - 5 %    BASOPHILS 0 0 - 2 %    ABS. NEUTROPHILS 4.0 1.8 - 8.0 K/UL    ABS. LYMPHOCYTES 0.9 0.9 - 3.6 K/UL    ABS. MONOCYTES 0.2 0.05 - 1.2 K/UL    ABS. EOSINOPHILS 0.0 0.0 - 0.4 K/UL    ABS. BASOPHILS 0.0 0.0 - 0.1 K/UL    DF AUTOMATED     METABOLIC PANEL, COMPREHENSIVE    Collection Time: 02/19/20  5:18 AM   Result Value Ref Range    Sodium 144 136 - 145 mmol/L    Potassium 4.0 3.5 - 5.5 mmol/L    Chloride 106 100 - 111 mmol/L    CO2 33 (H) 21 - 32 mmol/L    Anion gap 5 3.0 - 18 mmol/L    Glucose 77 74 - 99 mg/dL    BUN 56 (H) 7.0 - 18 MG/DL    Creatinine 1.90 (H) 0.6 - 1.3 MG/DL    BUN/Creatinine ratio 29 (H) 12 - 20      GFR est AA 41 (L) >60 ml/min/1.73m2    GFR est non-AA 34 (L) >60 ml/min/1.73m2    Calcium 9.6 8.5 - 10.1 MG/DL    Bilirubin, total 0.4 0.2 - 1.0 MG/DL    ALT (SGPT) 47 16 - 61 U/L    AST (SGOT) 45 (H) 10 - 38 U/L    Alk.  phosphatase 136 (H) 45 - 117 U/L    Protein, total 7.2 6.4 - 8.2 g/dL    Albumin 3.0 (L) 3.4 - 5.0 g/dL    Globulin 4.2 (H) 2.0 - 4.0 g/dL    A-G Ratio 0.7 (L) 0.8 - 1.7     TSH 3RD GENERATION    Collection Time: 02/19/20  5:18 AM   Result Value Ref Range    TSH 8.16 (H) 0.36 - 3.74 uIU/mL   CARDIAC PANEL,(CK, CKMB & TROPONIN)    Collection Time: 02/19/20  5:18 AM   Result Value Ref Range    CK 64 39 - 308 U/L    CK - MB 9.9 (H) <3.6 ng/ml    CK-MB Index 15.5 (H) 0.0 - 4.0 %    Troponin-I, QT 0.03 0.0 - 0.045 NG/ML   EKG, 12 LEAD, INITIAL    Collection Time: 02/19/20  5:23 AM   Result Value Ref Range    Ventricular Rate 52 BPM    Atrial Rate 52 BPM    QRS Duration 194 ms    Q-T Interval 568 ms    QTC Calculation (Bezet) 528 ms    Calculated R Axis -14 degrees    Calculated T Axis 100 degrees    Diagnosis       Undetermined rhythm  Left bundle branch block  Abnormal ECG  When compared with ECG of 28-DEC-2019 09:02,  Current undetermined rhythm precludes rhythm comparison, needs review  QRS duration has increased  T wave inversion less evident in Anterolateral leads  QT has lengthened     URINALYSIS W/ RFLX MICROSCOPIC    Collection Time: 02/19/20  7:00 AM   Result Value Ref Range    Color YELLOW      Appearance CLEAR      Specific gravity 1.012 1.005 - 1.030      pH (UA) 6.0 5.0 - 8.0      Protein NEGATIVE  NEG mg/dL    Glucose NEGATIVE  NEG mg/dL    Ketone NEGATIVE  NEG mg/dL    Bilirubin NEGATIVE  NEG      Blood NEGATIVE  NEG      Urobilinogen 0.2 0.2 - 1.0 EU/dL    Nitrites POSITIVE (A) NEG      Leukocyte Esterase MODERATE (A) NEG     URINE MICROSCOPIC ONLY    Collection Time: 02/19/20  7:00 AM   Result Value Ref Range    WBC TOO NUMEROUS TO COUNT 0 - 4 /hpf    RBC 0 to 3 0 - 5 /hpf    Epithelial cells FEW 0 - 5 /lpf    Bacteria 4+ (A) NEG /hpf   METABOLIC PANEL, BASIC    Collection Time: 02/19/20  9:28 AM   Result Value Ref Range    Sodium 148 (H) 136 - 145 mmol/L    Potassium 4.1 3.5 - 5.5 mmol/L    Chloride 112 (H) 100 - 111 mmol/L    CO2 29 21 - 32 mmol/L    Anion gap 7 3.0 - 18 mmol/L    Glucose 84 74 - 99 mg/dL    BUN 57 (H) 7.0 - 18 MG/DL    Creatinine 1.69 (H) 0.6 - 1.3 MG/DL    BUN/Creatinine ratio 34 (H) 12 - 20      GFR est AA 47 (L) >60 ml/min/1.73m2    GFR est non-AA 39 (L) >60 ml/min/1.73m2    Calcium 8.7 8.5 - 10.1 MG/DL       Radiologic Studies -   No orders to display         Medical Decision Making   I am the first provider for this patient.     I reviewed the vital signs, available nursing notes, past medical history, past surgical history, family history and social history. Vital Signs-Reviewed the patient's vital signs. EKG: Sinus rhythm, left bundle branch block. Heart rate 52. No acute ischemic changes from comparison EKG on 12/28/2019    Records Reviewed: Nursing Notes (Time of Review: 10:28 AM)    ED Course: Progress Notes, Reevaluation, and Consults:  Patient was reevaluated, laboratory results are pending at this time. When I arrived the patient already was seen by the nighttime physician and had placed orders. The patient will get IV fluids and then we will repeat a BMP. The son states that the patient does live at home with him and he is under constant observation. The patient has not had much of an appetite and is getting more weak. Patient was given IV fluids and a repeat BMP shows mildly decreasing creatinine but elevated BUN. He does have a urinary tract infection he was given Rocephin. I had a discussion with the son, the patient is very weak to try and attempt to get out of bed today. I will call for potential admission. Time: 10:53. Spoke with , hospitalist.  Discussed case. He will admit the patient. Provider Notes (Medical Decision Making):   MDM  Number of Diagnoses or Management Options  Acute cystitis without hematuria:   Acute renal failure, unspecified acute renal failure type [facility]): Fatigue, unspecified type:   Diagnosis management comments: Patient is an 25-year-old male who presented with a chief complaint of fatigue and decreased appetite. The patient has been slowly declining for the past week. He was found to have a urinary tract infection and acute renal failure. Despite fluid administration he had only minimal improvement in his creatinine. The patient will be admitted for further evaluation and possible rehab placement. The patient normally runs lower blood pressures, he is afebrile.   He does not meet any criteria at this time for sepsis. Critical Care Time: 0      Diagnosis     Clinical Impression:   1. Acute renal failure, unspecified acute renal failure type ([address]    2. Acute cystitis without hematuria    3. Fatigue, unspecified type        Disposition: Admit    Follow-up Information    None          Patient's Medications   Start Taking    No medications on file   Continue Taking    ACETAMINOPHEN (TYLENOL EXTRA STRENGTH) 500 MG TABLET    Take 2 Tabs by mouth every six (6) hours as needed for Pain. ALLOPURINOL (ZYLOPRIM) 300 MG TABLET    Take 300 mg by mouth daily. AMIODARONE (CORDARONE) 200 MG TABLET    Take 1 Tab by mouth daily. CHOLECALCIFEROL (VITAMIN D3) 1,000 UNIT TABLET    2,000 Units. FUROSEMIDE (LASIX) 40 MG TABLET    Take 40 mg by mouth daily. OMEPRAZOLE (PRILOSEC) 20 MG CAPSULE    Take 1 Cap by mouth daily. PANTOPRAZOLE (PROTONIX) 40 MG TABLET        SIMVASTATIN (ZOCOR) 40 MG TABLET    40 mg. SUCRALFATE (CARAFATE) 1 GRAM TABLET    Take 1 Tab by mouth four (4) times daily. TAMSULOSIN (FLOMAX) 0.4 MG CAPSULE    take 1 capsule by mouth once daily AFTER DINNER   These Medications have changed    No medications on file   Stop Taking    No medications on file     _______________________________    Please note that this dictation was completed with [employer], the computer voice recognition software. Quite often unanticipated grammatical, syntax, homophones, and other interpretive errors are inadvertently transcribed by the computer software. Please disregard these errors. Please excuse any errors that have escaped final proofreading.

## 2020-02-19 NOTE — ED NOTES
Pt resting comfortably on stretcher. Pt's clothing noted to have quite a bit of food debris, so gown was changed. Pt provided war blankets and placed in a position of comfort. SB-40's, but no acute changes to VS. Call bell within reach.

## 2020-02-19 NOTE — ED NOTES
Pt finished eating, and was able to consume aprox 75 percent of meal. Pt has no c/o at this time. Still awaiting bed assignment.

## 2020-02-19 NOTE — ED NOTES
Spoke with Dr. Blake Pac. 12 lead EKG is in progress, and will update if anything other than what was discussed. He says that cardiology has been consulted for pt. Verbal order given to d/c amiodarone dose for tomorrow.

## 2020-02-19 NOTE — ED NOTES
Pt resting quietly with eyes closed, resp even and unlabored. VSS. Pt's son stopped by to check in on pt. Gave update on pt status, still awaiting room assignment. He says he will come back tomorrow to check in. Call if any needs arise in mean time.

## 2020-02-19 NOTE — PROGRESS NOTES
completed the initial Spiritual Assessment of the patient in bed 9 of the emergency room , and offered Pastoral Care support to him. Found patient enjoying a meal.   Patient does not have any Muslim/cultural needs that will affect patients preferences in health care. Chaplains will continue to follow and will provide pastoral care on an as needed/requested basis.     [facility] Care Department  [phone]

## 2020-02-19 NOTE — ED NOTES
Pt appears to have change in heart rhythm. Possible SB with marked first degree AVB vs second degree mobitz 1. HR 39-47, and BP remains stable. Pt has no c/o at this time. Will repeat EKG and page attending to update.

## 2020-02-19 NOTE — PROGRESS NOTES
Discharge/Transition Planning    Problem: Discharge Planning  Goal: *Discharge to safe environment  Outcome: Progressing Towards Goal   SNF vs [facility]        Patient and/or next of kin has been given and has signed the [facility] Outpatient Observation  Notification letter and all questions answered. Copy of this notice given to patient and copy placed on chart. Patient and/or next of kin has been given the Outpatient Observation Information and Notification letter and all questions answered. Patient has been provided a SNF list with CMS star ratings to discuss with family    Reason for Admission:   UTI (urinary tract infection;Acute renal failure ; Bradycardia; Hypotension                   RUR Score:          Not listed           Plan for utilizing home health:      Vs SNF                    Current Advanced Directive/Advance Care Plan: has but on file                         Transition of Care Plan:                    Interviewed patient. Verified demographics listed on face sheet with patient; all information correct. Pt with [facility] Medicare Patient stated their PCP is  Dr Kathy Oconnor and last appt 6-7 mo ago. Educated on better follow up. Patient lives with son and daughter in law. Patient's NOK is. Patient needs assist with ADLs prior to admission. Recent fall. DME prior to admission: walker, shower chair, some sort of ramp on front that pulls away. Discharge plan : pt states he is willing to go to SNF and will talk to son and daughter in law when they come back. Left list. Samara [facility] to [facility] last time as they do not like [address] [place] [address]      Patient has designated _son and daughter in law_______________________ to participate in his/her discharge plan and to receive any needed information.    [facility] [address] [place] [address],[address], [address] Other Relative [phone]            Care Management Interventions  PCP Verified by CM: Yes(Dr Thomas Figueroa)  Mode of Transport at Discharge: Self(family)  Transition of Care Consult (CM Consult): Discharge Planning  Current Support Network: Relative's Home  Confirm Follow Up Transport: Family  Discharge Location  Discharge Placement: Home(vs SNF)

## 2020-02-19 NOTE — ED NOTES
Pt given meal tray. Assisted to upright position, and is feeding himself at this time. VSS. Will cont to monitor.

## 2020-02-19 NOTE — CONSULTS
[facility] - Consult Note    Date of  Admission: 2/19/2020  5:04 AM   Primary Care Physician:  Yamini Nesbitt MD      I saw, evaluated, interviewed and examined the patient personally. I agree with the findings and plan of care as documented below with PA-C note  Presented with weakness and lethargy. Finding of bradycardia noted. EKG confirmed sinus bradycardia with first-degree AV block and left bundle branch block  Would discontinue amiodarone. Avoid any AV adele blocking agent  Continue telemetry monitoring  Patient with likely hypothyroidism with TSH of 8.16. Will defer to primary team to consider thyroid supplement if needed  Hemodynamically stable for now. Katrina Crisostomo MD        Assessment:     - Presented with weakness, lethargy, bradycardia  - Recent hospitalization and discharge 01/03/20 to rehab facility.   - UTI  - h/o chronic LBBB w/1st AV block  - Hypothyroidism TSH 8.16   -Echo 12/28/19 with EF 35-40% declined from January 2018  -h/o GERD on PPI as outpatient  -HLD  -Chronic renal disease     Plan:     - Will discontinue Amiodarone with bradycardia, lethargy and elevated TSH. Check free T3/T4  - Agree with holding Lasix  - Urine culture pending, treatment of UTI per primary team  - No need to repeat echo as it was completed last admission     History of Present Illness: This is a 80 y.o. male admitted for UTI (urinary tract infection) [N39.0]  Acute renal failure (ARF) ([facility] [address] [N17.9]. Patient complains of:  Weakness     This is an 80year old with the above outlined past medical history seen in consult for bradycardia. He reports weakness and lethargy x2 weeks after discharge from rehab facility. No chest pain. He fell at home yesterday using his walker and felt lightheaded but denies syncope. No chest pain. No shortness of breath. He is taking Amiodarone after his last hospitalization.        Cardiac risk factors: dyslipidemia, male gender, hypertension      Review of Symptoms:  Except as stated above include:  Constitutional:  negative  Respiratory:  negative  Cardiovascular:  Per HPI  Gastrointestinal: negative  Genitourinary:  negative  Musculoskeletal:  Negative  Neurological:  Negative  Dermatological:  Negative  Endocrinological: Negative  Psychological:  Negative    A comprehensive review of systems was negative except for that written in the HPI. Past Medical History:     Past Medical History:   Diagnosis Date    Difficulty urinating     Elevated PSA     Hematuria, unspecified     Hypercholesterolemia     Hypertension     Incomplete bladder emptying     Urinary retention     UTI (urinary tract infection)          Social History:     Social History     Socioeconomic History    Marital status:      Spouse name: Not on file    Number of children: Not on file    Years of education: Not on file    Highest education level: Not on file   Occupational History    Occupation:  thirty years   Tobacco Use    Smoking status: Never Smoker    Smokeless tobacco: Never Used   Substance and Sexual Activity    Alcohol use: Yes     Alcohol/week: 2.0 standard drinks     Types: 2 Cans of beer per week     Comment: Occasional    Drug use: No        Family History:     Family History   Problem Relation Age of Onset    Cancer Father     Alzheimer Mother     Heart defect Brother         Medications:      Allergies   Allergen Reactions    Amlodipine Swelling and Itching    Bactrim [Sulfamethoprim Ds] Rash    Lisinopril Other (comments)     Acute renal failure    Ciprofloxacin Rash and Itching        Current Facility-Administered Medications   Medication Dose Route Frequency    dextrose 5 % - 0.45% NaCl infusion  75 mL/hr IntraVENous CONTINUOUS    [START ON 2/20/2020] amiodarone (CORDARONE) tablet 100 mg  100 mg Oral DAILY     Current Outpatient Medications   Medication Sig    amiodarone (CORDARONE) 200 mg tablet Take 1 Tab by mouth daily.  acetaminophen (TYLENOL EXTRA STRENGTH) 500 mg tablet Take 2 Tabs by mouth every six (6) hours as needed for Pain.  sucralfate (CARAFATE) 1 gram tablet Take 1 Tab by mouth four (4) times daily.  omeprazole (PRILOSEC) 20 mg capsule Take 1 Cap by mouth daily.  furosemide (LASIX) 40 mg tablet Take 40 mg by mouth daily.  allopurinol (ZYLOPRIM) 300 mg tablet Take 300 mg by mouth daily.  cholecalciferol (VITAMIN D3) 1,000 unit tablet 2,000 Units.  simvastatin (ZOCOR) 40 mg tablet 40 mg.  pantoprazole (PROTONIX) 40 mg tablet     tamsulosin (FLOMAX) 0.4 mg capsule take 1 capsule by mouth once daily AFTER DINNER         Physical Exam:     Visit Vitals  BP 93/44   Pulse (!) 46   Temp 97.5 °F (36.4 °C)   Resp 9   Ht 5' 7\" (1.702 m)   Wt 160 lb (72.6 kg)   SpO2 100%   BMI 25.06 kg/m²     BP Readings from Last 3 Encounters:   02/19/20 93/44   01/02/20 122/66   12/16/19 114/70     Pulse Readings from Last 3 Encounters:   02/19/20 (!) 46   01/02/20 75   12/16/19 72     Wt Readings from Last 3 Encounters:   02/19/20 160 lb (72.6 kg)   12/28/19 167 lb 8.8 oz (76 kg)   12/16/19 150 lb (68 kg)       General:  alert, cooperative, no distress, skin is dry and pale  Neck:  nontender, no JVD  Lungs:  clear to auscultation bilaterally  Heart:  bradycardic rate and regular rhythm, S1, S2 normal, no murmur, click, rub or gallop  Abdomen:  abdomen is soft without significant tenderness, masses, organomegaly or guarding  Extremities:  extremities normal, atraumatic, no cyanosis or edema  Skin: Warm and dry.  no hyperpigmentation, vitiligo, or suspicious lesions  Neuro: alert, oriented x3, affect appropriate  Psych: non focal     Data Review:     Recent Labs     02/19/20 0518   WBC 5.0   HGB 11.8*   HCT 35.5*   *     Recent Labs     02/19/20  0928 02/19/20 0518   * 144   K 4.1 4.0   * 106   CO2 29 33*   GLU 84 77   BUN 57* 56*   CREA 1.69* 1.90*   CA 8.7 9.6   ALB  --  3.0* SGOT  --  45*   ALT  --  47       Results for orders placed or performed during the hospital encounter of 02/19/20   EKG, 12 LEAD, INITIAL   Result Value Ref Range    Ventricular Rate 52 BPM    Atrial Rate 52 BPM    QRS Duration 194 ms    Q-T Interval 568 ms    QTC Calculation (Bezet) 528 ms    Calculated R Axis -14 degrees    Calculated T Axis 100 degrees    Diagnosis       Undetermined rhythm  Left bundle branch block  Abnormal ECG  When compared with ECG of 28-DEC-2019 09:02,  Current undetermined rhythm precludes rhythm comparison, needs review  QRS duration has increased  T wave inversion less evident in Anterolateral leads  QT has lengthened         All Cardiac Markers in the last 24 hours:    Lab Results   Component Value Date/Time    CPK 64 02/19/2020 05:18 AM    CKMB 9.9 (H) 02/19/2020 05:18 AM    CKND1 15.5 (H) 02/19/2020 05:18 AM    TROIQ 0.03 02/19/2020 05:18 AM       Last Lipid:  No results found for: CHOL, CHOLX, CHLST, CHOLV, HDL, HDLP, LDL, LDLC, DLDLP, TGLX, TRIGL, TRIGP, CHHD, CHHDX    Signed By: Yair Sharpe     February 19, 2020

## 2020-02-19 NOTE — ED TRIAGE NOTES
Patient here by EMS. Patient states generalized malaise for the last two weeks, increasingly worse since being discharged from nursing home three weeks ago.   Patient denies anything specific at this time, states his son woke him up to go to work and he told him to call 911 \"because I just can't be alone anymore\",

## 2020-02-19 NOTE — ED NOTES
Son at bedside at this time, states they have had difficulty making follow up appointments and that the home health nurse and care got canceled and he is not sure why.

## 2020-02-19 NOTE — ED NOTES
Pt ate aprox 75% of meal. Tray was cleared. Pt placed in a position of comfort, and he says he will try to nap now. Call bell within reach. Will cont to monitor.

## 2020-02-20 PROBLEM — I42.9 CARDIOMYOPATHY (HCC): Status: ACTIVE | Noted: 2020-02-20

## 2020-02-20 PROBLEM — E03.9 HYPOTHYROIDISM: Status: ACTIVE | Noted: 2020-02-20

## 2020-02-20 PROBLEM — I51.89 GRADE II DIASTOLIC DYSFUNCTION: Status: ACTIVE | Noted: 2020-02-20

## 2020-02-20 LAB
ANION GAP SERPL CALC-SCNC: 5 MMOL/L (ref 3–18)
ATRIAL RATE: 44 BPM
ATRIAL RATE: 46 BPM
ATRIAL RATE: 52 BPM
BUN SERPL-MCNC: 57 MG/DL (ref 7–18)
BUN/CREAT SERPL: 37 (ref 12–20)
CALCIUM SERPL-MCNC: 8.4 MG/DL (ref 8.5–10.1)
CALCULATED P AXIS, ECG09: 63 DEGREES
CALCULATED R AXIS, ECG10: -14 DEGREES
CALCULATED R AXIS, ECG10: 10 DEGREES
CALCULATED R AXIS, ECG10: 22 DEGREES
CALCULATED T AXIS, ECG11: 100 DEGREES
CALCULATED T AXIS, ECG11: 134 DEGREES
CALCULATED T AXIS, ECG11: 140 DEGREES
CHLORIDE SERPL-SCNC: 112 MMOL/L (ref 100–111)
CO2 SERPL-SCNC: 27 MMOL/L (ref 21–32)
CREAT SERPL-MCNC: 1.53 MG/DL (ref 0.6–1.3)
DIAGNOSIS, 93000: NORMAL
GLUCOSE SERPL-MCNC: 73 MG/DL (ref 74–99)
LACTATE SERPL-SCNC: 0.9 MMOL/L (ref 0.4–2)
P-R INTERVAL, ECG05: 384 MS
P-R INTERVAL, ECG05: 392 MS
POTASSIUM SERPL-SCNC: 3.9 MMOL/L (ref 3.5–5.5)
Q-T INTERVAL, ECG07: 552 MS
Q-T INTERVAL, ECG07: 562 MS
Q-T INTERVAL, ECG07: 568 MS
QRS DURATION, ECG06: 186 MS
QRS DURATION, ECG06: 188 MS
QRS DURATION, ECG06: 194 MS
QTC CALCULATION (BEZET), ECG08: 480 MS
QTC CALCULATION (BEZET), ECG08: 483 MS
QTC CALCULATION (BEZET), ECG08: 528 MS
SODIUM SERPL-SCNC: 144 MMOL/L (ref 136–145)
T4 FREE SERPL-MCNC: 1 NG/DL (ref 0.7–1.5)
T4 SERPL-MCNC: 11.5 UG/DL (ref 4.5–12.1)
VENTRICULAR RATE, ECG03: 44 BPM
VENTRICULAR RATE, ECG03: 46 BPM
VENTRICULAR RATE, ECG03: 52 BPM

## 2020-02-20 PROCEDURE — 96366 THER/PROPH/DIAG IV INF ADDON: CPT

## 2020-02-20 PROCEDURE — 74011250637 HC RX REV CODE- 250/637: Performed by: HOSPITALIST

## 2020-02-20 PROCEDURE — 84439 ASSAY OF FREE THYROXINE: CPT

## 2020-02-20 PROCEDURE — 92526 ORAL FUNCTION THERAPY: CPT

## 2020-02-20 PROCEDURE — 92610 EVALUATE SWALLOWING FUNCTION: CPT

## 2020-02-20 PROCEDURE — 80048 BASIC METABOLIC PNL TOTAL CA: CPT

## 2020-02-20 PROCEDURE — 99218 HC RM OBSERVATION: CPT

## 2020-02-20 PROCEDURE — 97162 PT EVAL MOD COMPLEX 30 MIN: CPT

## 2020-02-20 PROCEDURE — 97166 OT EVAL MOD COMPLEX 45 MIN: CPT

## 2020-02-20 PROCEDURE — 74011250636 HC RX REV CODE- 250/636: Performed by: HOSPITALIST

## 2020-02-20 PROCEDURE — 87040 BLOOD CULTURE FOR BACTERIA: CPT

## 2020-02-20 PROCEDURE — 36415 COLL VENOUS BLD VENIPUNCTURE: CPT

## 2020-02-20 PROCEDURE — 97530 THERAPEUTIC ACTIVITIES: CPT

## 2020-02-20 PROCEDURE — 83605 ASSAY OF LACTIC ACID: CPT

## 2020-02-20 PROCEDURE — 74011000258 HC RX REV CODE- 258: Performed by: HOSPITALIST

## 2020-02-20 RX ORDER — LEVOTHYROXINE SODIUM 25 UG/1
25 TABLET ORAL
Status: DISCONTINUED | OUTPATIENT
Start: 2020-02-21 | End: 2020-02-21 | Stop reason: HOSPADM

## 2020-02-20 RX ORDER — ALLOPURINOL 100 MG/1
300 TABLET ORAL DAILY
Status: DISCONTINUED | OUTPATIENT
Start: 2020-02-21 | End: 2020-02-21 | Stop reason: HOSPADM

## 2020-02-20 RX ORDER — TAMSULOSIN HYDROCHLORIDE 0.4 MG/1
0.4 CAPSULE ORAL DAILY
Status: DISCONTINUED | OUTPATIENT
Start: 2020-02-21 | End: 2020-02-21 | Stop reason: HOSPADM

## 2020-02-20 RX ORDER — SUCRALFATE 1 G/1
1 TABLET ORAL 4 TIMES DAILY
Status: DISCONTINUED | OUTPATIENT
Start: 2020-02-20 | End: 2020-02-21 | Stop reason: HOSPADM

## 2020-02-20 RX ORDER — ATORVASTATIN CALCIUM 10 MG/1
20 TABLET, FILM COATED ORAL
Status: DISCONTINUED | OUTPATIENT
Start: 2020-02-20 | End: 2020-02-21 | Stop reason: HOSPADM

## 2020-02-20 RX ADMIN — SUCRALFATE 1 G: 1 TABLET ORAL at 22:20

## 2020-02-20 RX ADMIN — CEFTRIAXONE 1 G: 1 INJECTION, POWDER, FOR SOLUTION INTRAMUSCULAR; INTRAVENOUS at 09:38

## 2020-02-20 RX ADMIN — ATORVASTATIN CALCIUM 20 MG: 10 TABLET, FILM COATED ORAL at 22:20

## 2020-02-20 NOTE — PROGRESS NOTES
[facility] -progress note    Date of  Admission: 2/19/2020  5:04 AM   Primary Care Physician:  Jose Antonio Caraballo MD    Subjective; feeling better, no specific complaints            Assessment:     - Presented with weakness, lethargy, bradycardia  - Recent hospitalization and discharge 01/03/20 to rehab facility.   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 male admitted for UTI (urinary tract infection) [N39.0]  Acute renal failure (ARF) ([facility] [address] [N17.9]. Patient complains of:  Weakness     This is an 80year old with the above outlined past medical history seen in consult for bradycardia. He reports weakness and lethargy x2 weeks after discharge from rehab facility. No chest pain. He fell at home yesterday using his walker and felt lightheaded but denies syncope. No chest pain. No shortness of breath. He is taking Amiodarone after his last hospitalization. Cardiac risk factors: dyslipidemia, male gender, hypertension      Review of Symptoms:  Except as stated above include:  Constitutional:  negative  Respiratory:  negative  Cardiovascular:  Per HPI  Gastrointestinal: negative  Genitourinary:  negative  Musculoskeletal:  Negative  Neurological:  Negative  Dermatological:  Negative  Endocrinological: Negative  Psychological:  Negative    A comprehensive review of systems was negative except for that written in the HPI.      Past Medical History:     Past Medical History:   Diagnosis Date    Difficulty urinating     Elevated PSA     Hematuria, unspecified  Hypercholesterolemia     Hypertension     Incomplete bladder emptying     Urinary retention     UTI (urinary tract infection)           Medications: Allergies   Allergen Reactions    Amlodipine Swelling and Itching    Bactrim [Sulfamethoprim Ds] Rash    Lisinopril Other (comments)     Acute renal failure    Ciprofloxacin Rash and Itching        Current Facility-Administered Medications   Medication Dose Route Frequency    cefTRIAXone (ROCEPHIN) 1 g in 0.9% sodium chloride (MBP/ADV) 50 mL MBP  1 g IntraVENous Q24H    atropine injection 1 mg  1 mg IntraVENous Q10MIN PRN         Physical Exam:     Visit Vitals  /56 (BP 1 Location: Left arm, BP Patient Position: At rest)   Pulse (!) 52   Temp 97.8 °F (36.6 °C)   Resp 14   Ht 5' 7\" (1.702 m)   Wt 72.6 kg (160 lb)   SpO2 98%   BMI 25.06 kg/m²     BP Readings from Last 3 Encounters:   02/20/20 101/56   01/02/20 122/66   12/16/19 114/70     Pulse Readings from Last 3 Encounters:   02/20/20 (!) 52   01/02/20 75   12/16/19 72     Wt Readings from Last 3 Encounters:   02/19/20 72.6 kg (160 lb)   12/28/19 76 kg (167 lb 8.8 oz)   12/16/19 68 kg (150 lb)       General:  alert, cooperative, no distress, skin is dry and pale  Neck:  nontender, no JVD  Lungs:  clear to auscultation bilaterally  Heart:  bradycardic rate and regular rhythm, S1, S2 normal, no murmur, click, rub or gallop  Abdomen:  abdomen is soft without significant tenderness, masses, organomegaly or guarding  Extremities:  extremities normal, atraumatic, no cyanosis or edema  Skin: Warm and dry.  no hyperpigmentation, vitiligo, or suspicious lesions  Neuro: alert, oriented x3, affect appropriate  Psych: non focal     Data Review:     Recent Labs     02/19/20  0518   WBC 5.0   HGB 11.8*   HCT 35.5*   *     Recent Labs     02/20/20  0355 02/19/20  0928 02/19/20  0518    148* 144   K 3.9 4.1 4.0   * 112* 106   CO2 27 29 33*   GLU 73* 84 77   BUN 57* 57* 56*   CREA 1.53* 1.69* 1.90*   CA 8.4* 8.7 9.6   ALB  --   --  3.0*   SGOT  --   --  45*   ALT  --   --  47       Results for orders placed or performed during the hospital encounter of 02/19/20   EKG, 12 LEAD, INITIAL   Result Value Ref Range    Ventricular Rate 52 BPM    Atrial Rate 52 BPM    QRS Duration 194 ms    Q-T Interval 568 ms    QTC Calculation (Bezet) 528 ms    Calculated R Axis -14 degrees    Calculated T Axis 100 degrees    Diagnosis       Likely sinus bradycardia with first degree AV block  Left bundle branch block  Abnormal ECG  When compared with ECG of 28-DEC-2019 09:02,  Current undetermined rhythm precludes rhythm comparison, needs review  QRS duration has increased  T wave inversion less evident in Anterolateral leads  QT has lengthened  Confirmed by Arianna Tapia ([id number]) on 2/20/2020 8:16:08 AM         All Cardiac Markers in the last 24 hours:    No results found for: CPK, CK, CKMMB, CKMB, RCK3, CKMBT, CKNDX, CKND1, TELLY, TROPT, TROIQ, JOSE, TROPT, TNIPOC, BNP, BNPP    Last Lipid:  No results found for: CHOL, CHOLX, CHLST, CHOLV, HDL, HDLP, LDL, LDLC, DLDLP, TGLX, TRIGL, TRIGP, CHHD, CHHDX    Signed By: Anita Milligan MD     February 20, 2020

## 2020-02-20 NOTE — PROGRESS NOTES
Dr Hinojosa Patella informed of pauses of 2.11 seconds. Patient still bradycardic, but asymptomatic. Patient old bandages taken off of skin tears on left wrist and elbow, replaced with mepalex. Condom cath placed due to incontinence and groin skin redness. Barrier cream also applied to groin area.

## 2020-02-20 NOTE — PROGRESS NOTES
Problem: Mobility Impaired (Adult and Pediatric)  Goal: *Acute Goals and Plan of Care (Insert Text)  Description  Physical Therapy Goals  Initiated 2/20/2020 and to be accomplished within 7 day(s)  1. Patient will move from supine to sit and sit to supine  in bed with modified independence. 2.  Patient will transfer from bed to chair and chair to bed with modified independence using the least restrictive device. 3.  Patient will perform sit to stand with modified independence. 4.  Patient will ambulate with supervision/set-up for 50 feet with the least restrictive device. Outcome: Progressing Towards Goal   PHYSICAL THERAPY EVALUATION    Patient: Enio Calderon (89 y.o. male)  Date: 2/20/2020  Primary Diagnosis: UTI (urinary tract infection) [N39.0]  Acute renal failure (ARF) ([facility] [address] [N17.9]  Precautions: Fall  PLOF: Mod I with ww and wheelchair. ASSESSMENT :  Pt was supine in bed at the start of the session and willing to participate in therapy. Max assist x2 for supine to sit at EOB. Sitting balance at EOB. 2 trials of max assist x2 for sit to stand transfer with walker. Cued to use UE to push up from bed. Required max assist x2 for standing balance for 20 seconds each trial. Max assist x2 for stand to sit at EOB. Max assist x2 for sit to supine in bed. Pt remained supine in bed at the end of the session. Pt had decreased command following and poor safety awareness. Education provided on bed mobility, transfers, ADLs, balance, amb, safety, exercise, role of PT, plan of care, cognition, skin integrity, vitals as indicated. Educated on need for RN assistance with mobility; verbalized understanding. Call bell in reach. Patient will benefit from skilled intervention to address the above impairments.   Patient's rehabilitation potential is considered to be Good  Factors which may influence rehabilitation potential include:   []         None noted  []         Mental ability/status  [x] Medical condition  [x]         Home/family situation and support systems  [x]         Safety awareness  []         Pain tolerance/management  []         Other:      PLAN :  Recommendations and Planned Interventions:   [x]           Bed Mobility Training             [x]    Neuromuscular Re-Education  [x]           Transfer Training                   []    Orthotic/Prosthetic Training  [x]           Gait Training                          []    Modalities  [x]           Therapeutic Exercises           []    Edema Management/Control  [x]           Therapeutic Activities            [x]    Family Training/Education  [x]           Patient Education  []           Other (comment):    Frequency/Duration: Patient will be followed by physical therapy 3-5 times a week to address goals. Discharge Recommendations: [facility]  Further Equipment Recommendations for Discharge: rolling walker (has)      SUBJECTIVE:   Patient stated Nahomy Bradford I couldn't do more, I'm weak.     OBJECTIVE DATA SUMMARY:     Past Medical History:   Diagnosis Date    Difficulty urinating     Elevated PSA     Hematuria, unspecified     Hypercholesterolemia     Hypertension     Incomplete bladder emptying     Urinary retention     UTI (urinary tract infection)      Past Surgical History:   Procedure Laterality Date    APPENDECTOMY      EGD  2/7/2014         HX TURP  6/13/16    HX TURP       Barriers to Learning/Limitations: yes;  sensory deficits-vision/hearing/speech  Compensate with: Visual Cues, Verbal Cues, Tactile Cues and Kinesthetic Cues    Home Situation:  Home Situation  Home Environment: Private residence  # Steps to Enter: 0  Wheelchair Ramp: Yes  One/Two Story Residence: One story  Living Alone: No  Support Systems: Family member(s)  Patient Expects to be Discharged to[de-identified] Private residence  Current DME Used/Available at Home: Wheelchair, Walker, rolling    Critical Behavior:  Neurologic State: Alert  Orientation Level: Oriented to person  Cognition: Follows commands  Safety/Judgement: Fall prevention  Psychosocial  Patient Behaviors: Cooperative    Strength:    Manual Muscle Testing (LE)         R     L    Hip Flexion:   4/5  4/5  Knee EXT:   4/5  3/5  Knee FLEX:   4/5  3+/5  Ankle DF:   4/5  4/5  _________________________________________________   Range Of Motion:  RLE and LLE   WFL Bilaterally   Functional Mobility:  Bed Mobility:  Rolling: Moderate assistance  Supine to Sit: Maximum assistance;Assist x2  Sit to Supine: Maximum assistance;Assist x2  Scooting: Maximum assistance;Assist x2  Transfers:  Sit to Stand: Maximum assistance;Assist x2  Stand to Sit: Maximum assistance;Assist x2  Balance:   Sitting: Impaired  Sitting - Static: Fair (occasional)  Sitting - Dynamic: Fair (occasional)  Standing: Impaired  Standing - Static: Poor;Constant support  Neuro Re-education:  Sitting balance at EOB  Pain:  Pain level pre-treatment: 0/10   Pain level post-treatment: 0/10     Activity Tolerance:   Fair    After treatment:   []         Patient left in no apparent distress sitting up in chair  [x]         Patient left in no apparent distress in bed  [x]         Call bell left within reach  [x]         Nursing notified  []         Caregiver present  []         Bed alarm activated  []         SCDs applied    COMMUNICATION/EDUCATION:   [x]         Role of physical therapy and plan of care in the acute care setting. [x]         Fall prevention education was provided and the patient/caregiver indicated understanding. [x]         Patient/family have participated as able in goal setting and plan of care. []         Patient/family agree to work toward stated goals and plan of care. []         Patient understands intent and goals of therapy, but is neutral about his/her participation. []         Patient is unable to participate in goal setting/plan of care: ongoing with therapy staff.     Thank you for this referral.  Nael Clark, SPT    Time Calculation: 28 mins    Eval Complexity: History: MEDIUM  Complexity : 1-2 comorbidities / personal factors will impact the outcome/ POC Exam:MEDIUM Complexity : 3 Standardized tests and measures addressing body structure, function, activity limitation and / or participation in recreation  Presentation: MEDIUM Complexity : Evolving with changing characteristics  Clinical Decision Making:Medium Complexity    Clinical judgement; ROM, MMT, functional mobility Overall Complexity:MEDIUM

## 2020-02-20 NOTE — PROGRESS NOTES
Spoke with pt, he agrees to snf he wants to go to [facility] in [place]. Posted in edc,will need ins auth. Pt still in observation, he does not need inpt stay with [facility]. [facility] accepted pt we will need to start auth when ready. [facility] ([facility]) Provider list has been given to the patient and/or patient representative. Patient and/or patient representative has signed the [place] of Choice selecting _________________________ as their preference facility and a copy given. Both SNF Provider list and Freedom of Choice have been placed on the chart.

## 2020-02-20 NOTE — PROGRESS NOTES
Internal Medicine Progress Note    Patient's Name: David Wisdom  Admit Date: 2/19/2020  Length of Stay: 0      Assessment/Plan     Active Hospital Problems    Diagnosis Date Noted    Cardiomyopathy ([address] 02/20/2020     LVEF 35-40% by previous TTE.  Grade II diastolic dysfunction 55/75/3803     Per previous TTE.  Hypothyroidism 02/20/2020    UTI (urinary tract infection) 02/19/2020    Acute renal failure (ARF) (HCC) 02/19/2020    Bradycardia 02/19/2020    Hypotension 02/19/2020     - Afebrile, no leukocytosis  - Cont IV rocephin  - Cultures pending  - Amiodarone stopped  - Appreciate cardio  - Cr improving slowly  - Cont IV fluids  - Trend BMP  - Will start low dose synthroid for hypothyroidism  - PT/OT  - Cont acceptable home medications for chronic conditions   - DVT protocol    I have personally reviewed all pertinent labs and films that have officially resulted over the last 24 hours. I have personally checked for all pending labs that are awaiting final results.     Subjective     Pt s/e @ bedside  No major events overnight  Doing OK  Still quite weak  Denies CP or SOB    Objective     Visit Vitals  /56 (BP 1 Location: Left arm, BP Patient Position: At rest)   Pulse (!) 52   Temp 97.8 °F (36.6 °C)   Resp 14   Ht 5' 7\" (1.702 m)   Wt 72.6 kg (160 lb)   SpO2 98%   BMI 25.06 kg/m²       Physical Exam:  General Appearance: NAD, conversant  Lungs: CTA with normal respiratory effort  CV: RRR, no m/r/g  Abdomen: soft, non-tender, normal bowel sounds  Extremities: no cyanosis, no peripheral edema  Neuro: No focal deficits, motor/sensory intact    Lab/Data Reviewed:  CMP:   Lab Results   Component Value Date/Time     02/20/2020 03:55 AM    K 3.9 02/20/2020 03:55 AM     (H) 02/20/2020 03:55 AM    CO2 27 02/20/2020 03:55 AM    AGAP 5 02/20/2020 03:55 AM    GLU 73 (L) 02/20/2020 03:55 AM    BUN 57 (H) 02/20/2020 03:55 AM    CREA 1.53 (H) 02/20/2020 03:55 AM    GFRAA 53 (L) 02/20/2020 03:55 AM    GFRNA 43 (L) 02/20/2020 03:55 AM    CA 8.4 (L) 02/20/2020 03:55 AM     CBC: No results found for: WBC, HGB, HGBEXT, HCT, HCTEXT, PLT, PLTEXT, HGBEXT, HCTEXT, PLTEXT    Imaging Reviewed:  No results found.     Medications Reviewed:  Current Facility-Administered Medications   Medication Dose Route Frequency    cefTRIAXone (ROCEPHIN) 1 g in 0.9% sodium chloride (MBP/ADV) 50 mL MBP  1 g IntraVENous Q24H    atropine injection 1 mg  1 mg IntraVENous Q10MIN PRN           Derl Kory, DO  Internal Medicine, Hospitalist  Pager: [facility]

## 2020-02-20 NOTE — PROGRESS NOTES
Problem: Self Care Deficits Care Plan (Adult)  Goal: *Acute Goals and Plan of Care (Insert Text)  Description  Occupational Therapy Goals  Initiated 2/20/2020 within 7 day(s). 1.  Patient will perform grooming tasks at EOB with supervision for balance. 2.  Patient will perform lower body dressing at EOB with minimal assistance and minimal verbal cues for safety. 3.  Patient will perform functional task at EOB for 8 minutes with fair sitting balance and supervision for safety in prep for ADLs at EOB. 4.  Patient will perform toilet transfers with moderate assistance . 5. Patient will perform all aspects of toileting with maximal assistance. 6.  Patient will participate in upper extremity therapeutic exercise/activities with supervision/set-up for 8 minutes to maintain/increase BUE strength for functional transfers & ADLs. 7.  Patient will utilize energy conservation techniques during functional activities with minimal verbal cues. Outcome: Progressing Towards Goal     OCCUPATIONAL THERAPY EVALUATION    Patient: Tony Hines (72 y.o. male)  Date: 2/20/2020  Primary Diagnosis: UTI (urinary tract infection) [N39.0]  Acute renal failure (ARF) ([facility] [N17.9]        Precautions:  Fall  PLOF: Pt reports independence with ADLs. Has RW for mobility. ASSESSMENT :  Based on the objective data described below, the patient presents with impairments with regard to bed mobility, functional transfers & ADLs. Pt reports no pain pre/post session. Full AROM of BUEs, generally decreased strength. Max A x1 to roll L & R with additional time. Attempted to sit at EOB with max A x1, unable to advance pt to EOB. Pt significantly weak & unable to provide much effort despite rest breaks & encouragement. Pt educated on importance of completing BUE/BLE TherEx at bed level t/o the day; pt needs reinforcement & encouragement. Recommend SNF upon d/c.  Pt educated on role of OT and POC; recommend continued therapy during acute care stay. All needs within reach. Patient will benefit from skilled intervention to address the above impairments. Patient's rehabilitation potential is considered to be Fair  Factors which may influence rehabilitation potential include:   []             None noted  []             Mental ability/status  [x]             Medical condition  []             Home/family situation and support systems  []             Safety awareness  []             Pain tolerance/management  []             Other:      PLAN :  Recommendations and Planned Interventions:   [x]               Self Care Training                  [x]      Therapeutic Activities  [x]               Functional Mobility Training   []      Cognitive Retraining  [x]               Therapeutic Exercises           [x]      Endurance Activities  [x]               Balance Training                    []      Neuromuscular Re-Education  []               Visual/Perceptual Training     [x]      Home Safety Training  [x]               Patient Education                   [x]      Family Training/Education  []               Other (comment):    Frequency/Duration: Patient will be followed by occupational therapy 3-5 times a week to address goals. Discharge Recommendations: Khai Ramsey  Further Equipment Recommendations for Discharge: TBD at next level of care     SUBJECTIVE:   Patient stated I'm very weak.     OBJECTIVE DATA SUMMARY:     Past Medical History:   Diagnosis Date    Difficulty urinating     Elevated PSA     Hematuria, unspecified     Hypercholesterolemia     Hypertension     Incomplete bladder emptying     Urinary retention     UTI (urinary tract infection)      Past Surgical History:   Procedure Laterality Date    APPENDECTOMY      EGD  2/7/2014         HX TURP  6/13/16    HX TURP       Barriers to Learning/Limitations: None  Compensate with: visual, verbal, tactile, kinesthetic cues/model    Home Situation:   Home Situation  Home Environment: Private residence  # Steps to Enter: 0  Wheelchair Ramp: Yes  One/Two Story Residence: One story  Living Alone: No  Support Systems: Family member(s)  Patient Expects to be Discharged to[de-identified] Private residence  Current DME Used/Available at Home: Wheelchair, Walker, rolling, Grab bars, Shower chair  Tub or Shower Type: Shower(has grab bars & shower chair)  [x]  Right hand dominant   []  Left hand dominant    Cognitive/Behavioral Status:  Neurologic State: Alert  Orientation Level: Oriented to place;Oriented to person  Cognition: Follows commands  Safety/Judgement: Insight into deficits    Skin: Intact (BUEs)  Edema: None noted (BUEs)    Vision/Perceptual:    Acuity: Within Defined Limits      Coordination: BUE  Coordination: Within functional limits  Fine Motor Skills-Upper: Left Intact; Right Intact    Gross Motor Skills-Upper: Right Intact; Left Intact    Balance:  Sitting: (unable to assess due to 1 person assist)  Sitting - Static: Fair (occasional)  Sitting - Dynamic: Fair (occasional)  Standing: Impaired  Standing - Static: Poor;Constant support    Strength: BUE  Strength: Generally decreased, functional    Range of Motion: BUE  AROM: Within functional limits    Functional Mobility and Transfers for ADLs:  Bed Mobility:  Rolling: Maximum assistance;Assist x1  Supine to Sit: Maximum assistance;Assist x1(unable to achieve full sitting at EOB)  Sit to Supine: Maximum assistance;Assist x2  Scooting: Maximum assistance;Assist x2    Transfers:  Sit to Stand: (unable to assess due to 1 person assist)  Stand to Sit: Maximum assistance;Assist x2    ADL Assessment:   Feeding: Setup  Oral Facial Hygiene/Grooming: Setup  Bathing: Maximum assistance  Upper Body Dressing: Moderate assistance  Lower Body Dressing: Maximum assistance  Toileting:  Total assistance(condom catheter)    Cognitive Retraining  Safety/Judgement: Insight into deficits    Pain:  Pain level pre-treatment: 0/10   Pain level post-treatment: 0/10     Activity Tolerance: Fair  Please refer to the flowsheet for vital signs taken during this treatment. After treatment:   [] Patient left in no apparent distress sitting up in chair  [x] Patient left in no apparent distress in bed  [x] Call bell left within reach  [x] Nursing notified  [] Caregiver present  [] Bed alarm activated    COMMUNICATION/EDUCATION:   [x] Role of Occupational Therapy in the acute care setting  [x] Home safety education was provided and the patient/caregiver indicated understanding. [x] Patient/family have participated as able in goal setting and plan of care. [] Patient/family agree to work toward stated goals and plan of care. [] Patient understands intent and goals of therapy, but is neutral about his/her participation. [] Patient is unable to participate in goal setting and plan of care. Thank you for this referral.  Lesia Batista MS OTR/L  Time Calculation: 10 mins    Eval Complexity: History: MEDIUM Complexity : Expanded review of history including physical, cognitive and psychosocial  history ; Examination: MEDIUM Complexity : 3-5 performance deficits relating to physical, cognitive , or psychosocial skils that result in activity limitations and / or participation restrictions; Decision Making:MEDIUM Complexity : Patient may present with comorbidities that affect occupational performnce.  Miniml to moderate modification of tasks or assistance (eg, physical or verbal ) with assesment(s) is necessary to enable patient to complete evaluation

## 2020-02-20 NOTE — ED NOTES
TRANSFER - OUT REPORT:    Verbal report given to Kellen Ortega RN (name) on Allyssa Stager  being transferred to 2020(unit) for routine progression of care       Report consisted of patients Situation, Background, Assessment and   Recommendations(SBAR). Information from the following report(s) SBAR was reviewed with the receiving nurse. Lines:   Peripheral IV 02/19/20 Right Antecubital (Active)   Site Assessment Clean, dry, & intact 2/19/2020  5:21 AM   Phlebitis Assessment 0 2/19/2020  5:21 AM   Infiltration Assessment 0 2/19/2020  5:21 AM   Dressing Status Clean, dry, & intact 2/19/2020  5:21 AM        Opportunity for questions and clarification was provided.       Patient transported with:   Monitor

## 2020-02-20 NOTE — PROGRESS NOTES
INTERIM UPDATE - 6145 EST on 2/19/2020    Nursing Staff called to report that Patient's Heart Rate was in the 30s and his Blood Pressure was measured at 95/36 mm Hg (MAP ~56). Review of Echocardiogram shows Cardiomyopathy with LVEF 35-40%, Grade II Diastolic Dysfunction, and Moderate Mitral Valve Regurgitation (per TTE on 12/28/2019). Physical Examination:  Patient is conversant and alert  Oropharynx somewhat dry  Lungs Clear to Auscultation Bilaterally without rales or rhonchi  Minimal pitting edema to Bilateral Mid-tibia    Plan:  IV bolus 500 mL NS to improve Blood Pressure. PRN IV Atropine for Symptomatic Bradycardia or Heart Rate sustaining in Sinus Bradycardia rate <35 bpm.        INTERIM UPDATE - 0038 EST on 2/20/2020    Nursing Staff reports that last Blood Pressure was approximately 101/50 mm Hg (per my calculation MAP 67). Heart Rate has not sustained in Low 30s, per Nursing Staff. Plan:  No intervention at this time. INTERIM UPDATE - 0251 EST on 2/20/2020    Nursing Staff reports that Pauses are being seen on Telemetry. Plan:  Printed Telemetry strips with pauses and placed in chart. Consider consulting Cardiological services in AM; however, Patient is noted to have low T3. Checked FT4, as Patient's Bradycardia may be due to New Onset Hypothyroidism.

## 2020-02-20 NOTE — PROGRESS NOTES
EPAS Search    Assessment Tracking Number: [id number] Status: Successfully Processed    Assessment Date: 12/28/2019    Yenny Chandler Information:   Screener's Name: Tee Gibobns  NPI: [id number] Provider Name: [facility] Tee Gibbons      [facility]  indicates that patient had applied for Medicaid but was not anticipated within 180 days. Medicaid services were authorized if patient should qualify for Medicaid. [employer] is in media section of the chart.     Zeinab Saavedra RN    Outcomes Manager  [phone]  [phone]

## 2020-02-20 NOTE — PROGRESS NOTES
Problem: Falls - Risk of  Goal: *Absence of Falls  Description  Document Carlita Flores Fall Risk and appropriate interventions in the flowsheet. Outcome: Progressing Towards Goal  Note: Fall Risk Interventions:  Mobility Interventions: Communicate number of staff needed for ambulation/transfer, Patient to call before getting OOB    Mentation Interventions: Door open when patient unattended    Medication Interventions: Patient to call before getting OOB, Teach patient to arise slowly    Elimination Interventions: Call light in reach    History of Falls Interventions: Door open when patient unattended         Problem: Pressure Injury - Risk of  Goal: *Prevention of pressure injury  Description  Document Alex Scale and appropriate interventions in the flowsheet.   Outcome: Progressing Towards Goal  Note: Pressure Injury Interventions:  Sensory Interventions: Assess changes in LOC    Moisture Interventions: Absorbent underpads    Activity Interventions: Increase time out of bed    Mobility Interventions: Pressure redistribution bed/mattress (bed type)    Nutrition Interventions: Document food/fluid/supplement intake

## 2020-02-21 ENCOUNTER — APPOINTMENT (OUTPATIENT)
Dept: GENERAL RADIOLOGY | Age: 85
End: 2020-02-21
Attending: HOSPITALIST
Payer: MEDICARE

## 2020-02-21 VITALS
SYSTOLIC BLOOD PRESSURE: 110 MMHG | OXYGEN SATURATION: 92 % | HEART RATE: 66 BPM | BODY MASS INDEX: 25.11 KG/M2 | TEMPERATURE: 98.5 F | WEIGHT: 160 LBS | DIASTOLIC BLOOD PRESSURE: 52 MMHG | HEIGHT: 67 IN | RESPIRATION RATE: 16 BRPM

## 2020-02-21 PROCEDURE — 97535 SELF CARE MNGMENT TRAINING: CPT

## 2020-02-21 PROCEDURE — 74011250637 HC RX REV CODE- 250/637: Performed by: HOSPITALIST

## 2020-02-21 PROCEDURE — 92611 MOTION FLUOROSCOPY/SWALLOW: CPT

## 2020-02-21 PROCEDURE — 74230 X-RAY XM SWLNG FUNCJ C+: CPT

## 2020-02-21 PROCEDURE — 77030040829 HC CATH EXT URINE MDII -B

## 2020-02-21 PROCEDURE — 74011250636 HC RX REV CODE- 250/636: Performed by: HOSPITALIST

## 2020-02-21 PROCEDURE — 92526 ORAL FUNCTION THERAPY: CPT

## 2020-02-21 PROCEDURE — 74011000258 HC RX REV CODE- 258: Performed by: HOSPITALIST

## 2020-02-21 PROCEDURE — 99218 HC RM OBSERVATION: CPT

## 2020-02-21 PROCEDURE — 74011000255 HC RX REV CODE- 255: Performed by: HOSPITALIST

## 2020-02-21 PROCEDURE — 96366 THER/PROPH/DIAG IV INF ADDON: CPT

## 2020-02-21 RX ORDER — CEPHALEXIN 500 MG/1
500 CAPSULE ORAL 4 TIMES DAILY
Qty: 12 CAP | Refills: 0 | Status: SHIPPED
Start: 2020-02-21 | End: 2020-02-24

## 2020-02-21 RX ORDER — SIMVASTATIN 40 MG/1
40 TABLET, FILM COATED ORAL
Qty: 30 TAB | Refills: 0 | Status: SHIPPED
Start: 2020-02-21

## 2020-02-21 RX ORDER — ASPIRIN 81 MG/1
81 TABLET ORAL DAILY
Qty: 30 TAB | Refills: 0 | Status: SHIPPED
Start: 2020-02-21

## 2020-02-21 RX ORDER — LEVOTHYROXINE SODIUM 25 UG/1
25 TABLET ORAL
Qty: 30 TAB | Refills: 0 | Status: SHIPPED
Start: 2020-02-22 | End: 2020-04-09

## 2020-02-21 RX ORDER — GUAIFENESIN 100 MG/5ML
81 LIQUID (ML) ORAL DAILY
Status: DISCONTINUED | OUTPATIENT
Start: 2020-02-22 | End: 2020-02-21

## 2020-02-21 RX ORDER — MELATONIN
2000 DAILY
Qty: 30 TAB | Refills: 0 | Status: SHIPPED
Start: 2020-02-21

## 2020-02-21 RX ADMIN — SUCRALFATE 1 G: 1 TABLET ORAL at 13:57

## 2020-02-21 RX ADMIN — BARIUM SULFATE: 0.81 POWDER, FOR SUSPENSION ORAL at 11:58

## 2020-02-21 RX ADMIN — SUCRALFATE 1 G: 1 TABLET ORAL at 17:26

## 2020-02-21 RX ADMIN — TAMSULOSIN HYDROCHLORIDE 0.4 MG: 0.4 CAPSULE ORAL at 10:28

## 2020-02-21 RX ADMIN — LEVOTHYROXINE SODIUM 25 MCG: 0.03 TABLET ORAL at 06:40

## 2020-02-21 RX ADMIN — SUCRALFATE 1 G: 1 TABLET ORAL at 10:28

## 2020-02-21 RX ADMIN — ALLOPURINOL 300 MG: 100 TABLET ORAL at 10:27

## 2020-02-21 RX ADMIN — BARIUM SULFATE 15 ML: 400 PASTE ORAL at 11:58

## 2020-02-21 RX ADMIN — CEFTRIAXONE 1 G: 1 INJECTION, POWDER, FOR SOLUTION INTRAMUSCULAR; INTRAVENOUS at 10:27

## 2020-02-21 RX ADMIN — BARIUM SULFATE 15 ML: 400 SUSPENSION ORAL at 11:58

## 2020-02-21 NOTE — DISCHARGE SUMMARY
Internal Medicine Discharge Summary        Patient: Brissa Estrella    YOB: 1934    Age:  80 y.o. Admit Date: 2/19/2020    Discharge Date: 2/21/2020    LOS:  LOS: 0 days     Discharge To: SNF    Consults: Cardiology    Admission Diagnoses: UTI (urinary tract infection) [N39.0]  Acute renal failure (ARF) ([facility] [address] [N17.9]    Discharge Diagnoses:    Problem List as of 2/21/2020 Date Reviewed: 2/12/2019          Codes Class Noted - Resolved    Cardiomyopathy ([facility] [address] ICD-10-CM: I42.9  ICD-9-CM: 425.4  2/20/2020 - Present    Overview Signed 2/20/2020 12:40 AM by Ailyn KRAUSE DO     LVEF 35-40% by previous TTE. Grade II diastolic dysfunction [id number]: I51.9  ICD-9-CM: 429.9  2/20/2020 - Present    Overview Signed 2/20/2020 12:41 AM by Johanna Gutierres DO     Per previous TTE. Hypothyroidism ICD-10-CM: E03.9  ICD-9-CM: 244.9  2/20/2020 - Present        * (Principal) UTI (urinary tract infection) ICD-10-CM: N39.0  ICD-9-CM: 599.0  2/19/2020 - Present        Acute renal failure (ARF) (HCC) ICD-10-CM: N17.9  ICD-9-CM: 584.9  2/19/2020 - Present        Bradycardia ICD-10-CM: R00.1  ICD-9-CM: 427.89  2/19/2020 - Present        Hypotension ICD-10-CM: I95.9  ICD-9-CM: 458.9  2/19/2020 - Present        Sustained ventricular tachycardia ([facility] [address] ICD-10-CM: I47.2  ICD-9-CM: 427.1  12/28/2019 - Present        Duodenitis ICD-10-CM: K29.80  ICD-9-CM: 535.60  12/24/2019 - Present        Abdominal pain ICD-10-CM: R10.9  ICD-9-CM: 789.00  12/23/2019 - Present        Hypothermia ICD-10-CM: T68. iMsbah Egan  ICD-9-CM: 991.6  12/23/2019 - Present        Transaminitis ICD-10-CM: R74.0  ICD-9-CM: 790.4  12/23/2019 - Present        Chronic renal failure, stage 3 (moderate) (HCC) ICD-10-CM: N18.3  ICD-9-CM: 585.3  12/23/2019 - Present        SIRS (systemic inflammatory response syndrome) (HCC) ICD-10-CM: R65.10  ICD-9-CM: 995.90  12/23/2019 - Present        Gastritis ICD-10-CM: K29.70  ICD-9-CM: 535.50  12/23/2019 - Present        Syncope and collapse ICD-10-CM: R55  ICD-9-CM: 780.2  1/30/2018 - Present        Head injury ICD-10-CM: S09. 90XA  ICD-9-CM: 959.01  1/30/2018 - Present        Forehead contusion ICD-10-CM: V50.90DL  ICD-9-CM: 920  1/30/2018 - Present        Wrist abrasion, infected, left, initial encounter ICD-10-CM: Z87.659O, L08.9  ICD-9-CM: 913.1  1/30/2018 - Present        Wrist sprain, left, initial encounter ICD-10-CM: S63.502A  ICD-9-CM: 842.00  1/30/2018 - Present        Acute renal failure (HCC) ICD-10-CM: N17.9  ICD-9-CM: 584.9  2/6/2014 - Present        Elevated PSA ICD-10-CM: R97.20  ICD-9-CM: 790.93  2/6/2014 - Present        Urinary retention ICD-10-CM: R33.9  ICD-9-CM: 788.20  2/6/2014 - Present        Guaiac + stool ICD-10-CM: R19.5  ICD-9-CM: 792.1  2/6/2014 - Present        Neoplastic disease ICD-10-CM: D49.9  ICD-9-CM: 239.9  1/2/2013 - Present              Discharge Condition:  Improved    Procedures: None         HPI: Faisal Ahn is a 80 y.o. male with a PMHx of SVT, Urinary retention who presented to the ED with acute onset weakness and fatigue. The patient was discharged early Jan to rehab from here after admission for SVT. He completed rehab and doing well at home until last week when he started declining. His symptoms included fatigue and loss of appetite. He admits to globus sensation as well. He states he's been very thirsty and increase urinary frequency. He denies any fever or chills. He denies CP or SOB. In the ED, he was found to be in [place] and hypotensive. He was given IV fluids but still very weak. UA consistent with UTI and he was given IV rocephin. On telemetry, he was bradycardic into low 40s. He is on amiodarone for wide complex tachycardia in setting of LBBB. The patient's baseline BP is marginal and usually runs low 100s per [facility] Course:    UTI - No sepsis.  Treated with IV rocephin with transition to keflex at discharge to complete 6 days of therapy. Urine culture with GNR, suspect ecoli. Bradycardia - Dropped to as low as the 30s during admission. Amiodarone stopped after consultation with cardiology. No more AV blocking agents recommended. Recommended ASA daily for stroke PPx. Hypothyroidism - TSH 8.16 with low T3 1.6. Low dose 25mcg synthroid started. Should have TSH rechecked in outpatient setting in 3 months. Acute on chronic renal failure stage III - Improved with IV fluids. Trending down at time of discharge. Held lasix at discharge. Recheck BMP in week. The rest of the patient's chronic conditions were managed appropriately during their admission. They were medically stable at the time of discharge.     Visit Vitals  /50 (BP 1 Location: Left arm, BP Patient Position: At rest)   Pulse 64   Temp 97.9 °F (36.6 °C)   Resp 16   Ht 5' 7\" (1.702 m)   Wt 72.6 kg (160 lb)   SpO2 98%   BMI 25.06 kg/m²       Physical Exam at Discharge:  General Appearance: NAD, conversant  HENT: normocephalic/atraumatic, moist mucus membranes  Lungs: CTA with normal respiratory effort  CV: RRR, no m/r/g  Abdomen: soft, non-tender, normal bowel sounds  Extremities: no cyanosis, no peripheral edema  Neuro: moves all extremities, no focal deficits  Psych: appropriate affect, alert and oriented to person, place and time    Labs Prior to Discharge:  Labs: Results:       Chemistry Recent Labs     02/20/20  0355 02/19/20  0928 02/19/20  0518   GLU 73* 84 77    148* 144   K 3.9 4.1 4.0   * 112* 106   CO2 27 29 33*   BUN 57* 57* 56*   CREA 1.53* 1.69* 1.90*   CA 8.4* 8.7 9.6   AGAP 5 7 5   BUCR 37* 34* 29*   AP  --   --  136*   TP  --   --  7.2   ALB  --   --  3.0*   GLOB  --   --  4.2*   AGRAT  --   --  0.7*      CBC w/Diff Recent Labs     02/19/20  0518   WBC 5.0   RBC 3.59*   HGB 11.8*   HCT 35.5*   *   GRANS 78*   LYMPH 17*   EOS 1      Cardiac Enzymes Recent Labs     02/19/20  0518   CPK 64   CKND1 15.5*      Coagulation No results for input(s): PTP, INR, APTT, INREXT in the last 72 hours. Lipid Panel No results found for: CHOL, CHOLPOCT, CHOLX, CHLST, CHOLV, 190482, HDL, HDLP, LDL, LDLC, DLDLP, 050200, VLDLC, VLDL, TGLX, TRIGL, TRIGP, TGLPOCT, CHHD, CHHDX   BNP No results for input(s): BNPP in the last 72 hours. Liver Enzymes Recent Labs     02/19/20 0518   TP 7.2   ALB 3.0*   *   SGOT 45*      Thyroid Studies Lab Results   Component Value Date/Time    T4, Total 11.5 02/19/2020 09:28 AM    TSH 8.16 (H) 02/19/2020 05:18 AM            Significant Imaging:  Xr Swallow Func Video    Result Date: 2/21/2020  Modified barium swallow HISTORY: Cough. Dysphagia. Feeding difficulties COMPARISON: None. FINDINGS: With the patient in the seated lateral position, fluoroscopy was performed. Thin, nectar, pudding and solid consistency substances were given to the patient. There is aspiration with delayed cough with thin consistency taken with straw. With all substances there is swallowing delay with premature spillage. Thin consistency with cup head residual the vallecula as stated nectar consistency  . Fluoro time: 2.3 minutes Fluoro images: 0 images] Radiation dose (reference air kerma): 31.9 mGy     Impression: Aspiration with delayed cough. Please see speech pathology notes for complete details. Discharge Medications:     Current Discharge Medication List      START taking these medications    Details   levothyroxine (SYNTHROID) 25 mcg tablet Take 1 Tab by mouth every morning. Qty: 30 Tab, Refills: 0      cephALEXin (KEFLEX) 500 mg capsule Take 1 Cap by mouth four (4) times daily for 3 days. Qty: 12 Cap, Refills: 0         CONTINUE these medications which have CHANGED    Details   cholecalciferol (VITAMIN D3) (1000 Units /25 mcg) tablet Take 2 Tabs by mouth daily. Qty: 30 Tab, Refills: 0      simvastatin (ZOCOR) 40 mg tablet Take 1 Tab by mouth nightly.   Qty: 30 Tab, Refills: 0         CONTINUE these medications which have NOT CHANGED    Details   acetaminophen (TYLENOL EXTRA STRENGTH) 500 mg tablet Take 2 Tabs by mouth every six (6) hours as needed for Pain. Qty: 50 Tab, Refills: 0      sucralfate (CARAFATE) 1 gram tablet Take 1 Tab by mouth four (4) times daily. Qty: 20 Tab, Refills: 0      omeprazole (PRILOSEC) 20 mg capsule Take 1 Cap by mouth daily. Qty: 30 Cap, Refills: 3      allopurinol (ZYLOPRIM) 300 mg tablet Take 300 mg by mouth daily. pantoprazole (PROTONIX) 40 mg tablet Refills: 0      tamsulosin (FLOMAX) 0.4 mg capsule take 1 capsule by mouth once daily AFTER DINNER  Qty: 90 Cap, Refills: 3         STOP taking these medications       amiodarone (CORDARONE) 200 mg tablet Comments:   Reason for Stopping:         furosemide (LASIX) 40 mg tablet Comments:   Reason for Stopping:               Activity: PT/OT Eval and Treat    Diet: Resume previous diet    Wound Care: None needed    Follow-up:   Please follow up with your PCP within 7 days to discuss your recent hospitalization. Patient to arrange.   TSH in 3 months  BMP next week         Total time spent including time spent on final examination and discharge discussion, discharge documentation and records reviewed and medication reconciliation: > 30 minutes    Jake Benitez DO  Internal Medicine, Hospitalist  Pager: [phone]

## 2020-02-21 NOTE — PROGRESS NOTES
Speech Therapy Note:    MBS completed with aspiration on thin liquids. Pt safest for mech-soft/nectar-thick liquid diet. Full report to follow.     Ofilia Moritz, M.S., [address] [facility]   Ph: [phone]

## 2020-02-21 NOTE — PROGRESS NOTES
Problem: Dysphagia (Adult)  Goal: *Acute Goals and Plan of Care (Insert Text)  Description  Recommendations:  Diet: mech-soft/nectar  Meds: per pt preferene  Aspiration Precautions  Oral Care TID  Other: MBS next am    Goals:  Patient will:  1. Tolerate diet and/or diet upgrade without overt s/sx of aspiration under SLP supervision  2. Utilize compensatory swallow strategies of small bite/sip, alternate liquid/solid with min cues   3. Perform oral-motor and laryngeal elevation exercises 10 reps/each to increase oropharyngeal swallow function with min cues  4. Complete an objective swallow study (i.e., MBSS) to assess swallow integrity, r/o aspiration, and determine of safest LRD, min A - completed 2/21/20          Outcome: Progressing Towards Goal    SPEECH PATHOLOGY MODIFIED BARIUM SWALLOW STUDY & TREATMENT    Patient: Faisal Ahn (34 y.o. male)  Date: 2/21/2020  Primary Diagnosis: UTI (urinary tract infection) [N39.0]  Acute renal failure (ARF) (HCC) [N17.9]        Precautions: aspiration  Fall  PLOF: regular/thin    ASSESSMENT :  MBS completed with aspiration (delayed cough) with thin liquids via cup and straw; pt unable to effectively perform chin tuck for adequate airway protection. No aspiration on nectar, pudding, and cracker. Deficits exhibited: decreased lingual strength as evidenced by ineffective bolus prep and transit with solid; and decreased hyolaryngeal excursion resulting in airway compromise and residuals in valleculae. Pt able to clear residuals with cues for multiple swallows. Pt presents with moderate oropharyngeal dysphagia, as evidenced above, which places pt at risk for aspiration. At this time, pt safest for mechanical-soft/nectar-thick liquid diet; meds one at a time with nectar-thick wash. SLP utilized video of study for visual feedback, education, and recommendations for pt; verbalized comprehension. Results d/w RD.       Treatment:  Skilled therapy initiated: Educated pt on MBS results, aspiration precautions, and importance of compensatory swallow techniques to decrease aspiration risk (decrease rate of intake & sip/bite size, upright @HOB for all po intake and ~30 minutes after po); verbalized comprehension. SLP to follow as indicated. Patient will benefit from skilled intervention to address the above impairments. Patient's rehabilitation potential is considered to be Fair  Factors which may influence rehabilitation potential include:   []              None noted  []              Mental ability/status  [x]              Medical condition  []              Home/family situation and support systems  []              Safety awareness  []              Pain tolerance/management  []              Other:      PLAN :  Recommendations and Planned Interventions:  mechanical-soft/nectar-thick liquid diet; meds one at a time with nectar-thick wash  Frequency/Duration: Patient will be followed by speech-language pathology 1-2 times per day/4-7 days per week to address goals. Discharge Recommendations: Skilled Nursing Facility     SUBJECTIVE:   Patient stated Laura Henning, thank you.     OBJECTIVE:     Past Medical History:   Diagnosis Date    Difficulty urinating     Elevated PSA     Hematuria, unspecified     Hypercholesterolemia     Hypertension     Incomplete bladder emptying     Urinary retention     UTI (urinary tract infection)      Past Surgical History:   Procedure Laterality Date    APPENDECTOMY      EGD  2/7/2014         HX TURP  6/13/16    HX TURP       Home Situation:   Home Situation  Home Environment: Private residence  # Steps to Enter: 0  Wheelchair Ramp: Yes  One/Two Story Residence: One story  Living Alone: No  Support Systems: Family member(s)  Patient Expects to be Discharged to[de-identified] Private residence  Current DME Used/Available at Home: Wheelchair, Walker, rolling, Grab bars, Shower chair  Tub or Shower Type: Shower(has grab bars & shower chair)  Diet prior to admission: regular/thin  Current Diet:  mechanical-soft/nectar-thick liquid diet; meds one at a time with nectar-thick wash   Radiologist:     HRRA  Seated 90*     8-point Penetration-Aspiration Scale: Score 7    PAIN:  Pain level pre-treatment: 0/10   Pain level post-treatment: 0/10       COMMUNICATION/EDUCATION:   [x]  Patient educated regarding MBS results and diet recommendations. [x]  Patient/family have participated as able in goal setting and plan of care. [x]  Patient/family agree to work toward stated goals and plan of care. []  Patient understands intent and goals of therapy, but is neutral about his/her participation. []  Patient is unable to participate in goal setting and plan of care.     Thank you for this referral.  Fernie Sierra, SLP  Time Calculation: 29 mins  MBS Time: 20 minutes  Treatment Time: 9 minutes

## 2020-02-21 NOTE — PROGRESS NOTES
Assumed care of patient from \[facility]\"". Patient awake in bed. A & O x2, denies pain. Call bell and frequently used items within reach. HOB elevated and locked at lowest position. 1012-Telemetry called patient was having 1st AV block, BBB, PVC, and PAC, HR 38. Assisted patient he was asleep. Dr. Dulce Maria Jimenez paged. [address] with Dr. Dulce Maria Jimenez made aware of patient's condition. No intervention and will continue to monitor.

## 2020-02-21 NOTE — PROGRESS NOTES
Problem: Dysphagia (Adult)  Goal: *Acute Goals and Plan of Care (Insert Text)  Description  Recommendations:  Diet: regular/nectar  Meds: per pt preferene  Aspiration Precautions  Oral Care TID  Other: MBS next am    Goals:  Patient will:  1. Tolerate diet and/or diet upgrade without overt s/sx of aspiration under SLP supervision  2. Utilize compensatory swallow strategies of small bite/sip, alternate liquid/solid with min cues   3. Perform oral-motor and laryngeal elevation exercises 10 reps/each to increase oropharyngeal swallow function with min cues  4. Complete an objective swallow study (i.e., MBSS) to assess swallow integrity, r/o aspiration, and determine of safest LRD, min A         Outcome: Progressing Towards Goal       SPEECH LANGUAGE PATHOLOGY BEDSIDE SWALLOW   EVALUATION & TREATMENT     Patient: Brian Ayala (19 y.o. male)  Date: 2/20/2020  Primary Diagnosis: UTI (urinary tract infection) [N39.0]  Acute renal failure (ARF) (HCC) [N17.9]        Precautions: aspiration   Fall  PLOF: regular/thin    ASSESSMENT :  Based on the objective data described below, the patient presents with mild-moderate oropharyngeal dysphagia in the setting of general debility. Pt reports intermittent coughing with po intake. Oral-motor exam revealed structures grossly intact for mastication and deglutition. Pt accepted thin liquid trials via cup, straw, and spoon with immediate and delayed cough, throat clears, and wet vocal quality. Aspiration s/s resolved with nectar-thick liquids. Pudding and solids accepted without incident. At this time, pt safe for regular solid/nectar-tihck liquid diet. MBS recommended next am to objectively assess oropharyngeal swallow integrity, rule-out aspiration, and determine safest, least restrictive diet. D/w RN.     TREATMENT :  Skilled therapy initiated; Educated pt on aspiration precautions and importance of compensatory swallow techniques to decrease aspiration risk (decrease rate of intake & sip/bite size, upright @HOB for all po intake and ~30 minutes after po); verbalized comprehension. SLP to follow as indicated. Patient will benefit from skilled intervention to address the above impairments. Patient's rehabilitation potential is considered to be Fair  Factors which may influence rehabilitation potential include:   []            None noted  [x]            Mental ability/status  [x]            Medical condition  []            Home/family situation and support systems  []            Safety awareness  []            Pain tolerance/management  []            Other:      PLAN :  Recommendations and Planned Interventions:  Regular/nectar-thick   Frequency/Duration: Patient will be followed by speech-language pathology 1-2 times per day/4-7 days per week to address goals. Discharge Recommendations: Skilled Nursing Facility     SUBJECTIVE:   Patient stated Zen austin .     OBJECTIVE:     Past Medical History:   Diagnosis Date    Difficulty urinating     Elevated PSA     Hematuria, unspecified     Hypercholesterolemia     Hypertension     Incomplete bladder emptying     Urinary retention     UTI (urinary tract infection)      Past Surgical History:   Procedure Laterality Date    APPENDECTOMY      EGD  2/7/2014         HX TURP  6/13/16    HX TURP       Home Situation:   Home Situation  Home Environment: Private residence  # Steps to Enter: 0  Wheelchair Ramp: Yes  One/Two Story Residence: One story  Living Alone: No  Support Systems: Family member(s)  Patient Expects to be Discharged to[de-identified] Private residence  Current DME Used/Available at Home: Wheelchair, Walker, rolling, Grab bars, Shower chair  Tub or Shower Type: Shower(has grab bars & shower chair)    Diet prior to admission: regular/thin  Current Diet:  regular/nectar      Cognitive and Communication Status:  Neurologic State: Alert  Orientation Level: Oriented to person, Oriented to place, Disoriented to situation, Disoriented to time  Cognition: Follows commands  Perception: Appears intact  Perseveration: No perseveration noted  Safety/Judgement: Insight into deficits  Oral Assessment:  Oral Assessment  Labial: No impairment  Dentition: Limited  Oral Hygiene: Fair  Lingual: Decreased rate  Velum: No impairment  Mandible: No impairment  P.O. Trials:  Patient Position: HOB 60   Vocal quality prior to P.O.: Low volume  Consistency Presented: Thin liquid; Nectar thick liquid;Puree; Solid  How Presented: SLP-fed/presented;Self-fed/presented;Straw     Bolus Acceptance: No impairment  Bolus Formation/Control: No impairment     Propulsion: No impairment  Oral Residue: None  Initiation of Swallow: Delayed (# of seconds)  Laryngeal Elevation: Decreased  Aspiration Signs/Symptoms: Weak cough;Clear throat; Change vocal quality  Pharyngeal Phase Characteristics: Altered vocal quality;Poor endurance  Effective Modifications: None  Cues for Modifications: Minimal-moderate       Oral Phase Severity: Mild  Pharyngeal Phase Severity : Moderate    PAIN:  Pain level pre-treatment: 0/10   Pain level post-treatment: 0/10     After treatment:   []            Patient left in no apparent distress sitting up in chair  [x]            Patient left in no apparent distress in bed  [x]            Call bell left within reach  [x]            Nursing notified  []            Family present  []            Caregiver present  []            Bed alarm activated    COMMUNICATION/EDUCATION:   [x]            Aspiration precautions; swallow safety; compensatory techniques. [x]            Patient/family have participated as able in goal setting and plan of care. [x]            Patient/family agree to work toward stated goals and plan of care. []            Patient understands intent and goals of therapy; neutral about participation.   []            Patient unable to participate in goal setting/plan of care; educ ongoing with interdisciplinary staff  []         Posted safety precautions in patient's room.     Thank you for this referral.  CAMRON García  Time Calculation: 27 mins  Evaluation Time: 18 minutes   Treatment Time: 9 minutes

## 2020-02-21 NOTE — PROGRESS NOTES
Problem: Falls - Risk of  Goal: *Absence of Falls  Description  Document Blaire Hassan Fall Risk and appropriate interventions in the flowsheet. Outcome: Progressing Towards Goal  Note: Fall Risk Interventions:  Mobility Interventions: Communicate number of staff needed for ambulation/transfer    Mentation Interventions: Door open when patient unattended, Reorient patient    Medication Interventions: Patient to call before getting OOB, Teach patient to arise slowly    Elimination Interventions: Toileting schedule/hourly rounds, Patient to call for help with toileting needs, Call light in reach    History of Falls Interventions: Door open when patient unattended         Problem: Patient Education: Go to Patient Education Activity  Goal: Patient/Family Education  Outcome: Progressing Towards Goal     Problem: Discharge Planning  Goal: *Discharge to safe environment  Outcome: Progressing Towards Goal     Problem: Pressure Injury - Risk of  Goal: *Prevention of pressure injury  Description  Document Alex Scale and appropriate interventions in the flowsheet.   Outcome: Progressing Towards Goal  Note: Pressure Injury Interventions:  Sensory Interventions: Assess changes in LOC, Minimize linen layers    Moisture Interventions: Internal/External urinary devices, Absorbent underpads    Activity Interventions: Pressure redistribution bed/mattress(bed type)    Mobility Interventions: HOB 30 degrees or less, Pressure redistribution bed/mattress (bed type)    Nutrition Interventions: Document food/fluid/supplement intake                     Problem: Patient Education: Go to Patient Education Activity  Goal: Patient/Family Education  Outcome: Progressing Towards Goal     Problem: Patient Education: Go to Patient Education Activity  Goal: Patient/Family Education  Outcome: Progressing Towards Goal

## 2020-02-21 NOTE — PROGRESS NOTES
NUTRITION INITIAL ASSESSMENT/PLAN OF CARE      RECOMMENDATIONS:   1. Cardiac Mech Soft Diet with NTL (2)  2. Added Ensure Enlive at dinner (chocolate and cold)  3. Monitor labs, weight and PO intake  4. Tray Set Up/ Feeding Assist and document PO intake     GOALS:   1. PO intake meets >75% of protein/calorie needs by 2/26      ASSESSMENT:   Wt status is classified as overweight per Body mass index is 25.06 kg/m². Variable PO intake. Labs noted. Pt w/ elevated BUN/Cr; GFR (43). Nutrition recommendations listed. RD to follow. Nutrition Diagnoses:   Problems chewing/swallowing related to moderate oropharyngeal dysphagia  as evidenced by MBS results and SLP ordered texture modified diet. SUBJECTIVE/OBJECTIVE:    Pt admitted for UTI with ARF. SLP following: s/p MBS on (2/21) patient presents with moderate oropharyngeal dysphagia with recommendations for mech soft solids and nectar thick liquids. Pt seen in room this afternoon at lunch with CNA assisting with tray set up. Pt reports appetite mcrae been good overall and that he consumes 3 meals per day at home with an Ensure sometimes. NKFA and weight has been stable since last admission in Dec 2019. Encouraged good PO intake to meet nutritional needs and to implement preferences with dietary. Will continue to monitor. Information Obtained from:    [x] Chart Review   [x] Patient   [] Family/Caregiver   [x] Nurse/Physician/CNA   [] Interdisciplinary Meeting/Rounds      Diet: Cardiac Diet with NTL (2)  Medications: [x] Reviewed  Zyloprim, Lipitor, Carafate, Synthroid    Allergies: [x] Reviewed   Encounter Diagnoses     ICD-10-CM ICD-9-CM   1. Acute renal failure, unspecified acute renal failure type ([facility] [address] N17.9 584.9   2. Acute cystitis without hematuria N30.00 595.0   3.  Fatigue, unspecified type R53.83 780.79     Past Medical History:   Diagnosis Date    Difficulty urinating     Elevated PSA     Hematuria, unspecified     Hypercholesterolemia     Hypertension     Incomplete bladder emptying     Urinary retention     UTI (urinary tract infection)       Labs:    Lab Results   Component Value Date/Time    Sodium 144 02/20/2020 03:55 AM    Potassium 3.9 02/20/2020 03:55 AM    Chloride 112 (H) 02/20/2020 03:55 AM    CO2 27 02/20/2020 03:55 AM    Anion gap 5 02/20/2020 03:55 AM    Glucose 73 (L) 02/20/2020 03:55 AM    BUN 57 (H) 02/20/2020 03:55 AM    Creatinine 1.53 (H) 02/20/2020 03:55 AM    Calcium 8.4 (L) 02/20/2020 03:55 AM    Magnesium 2.2 12/28/2019 09:05 AM    Phosphorus 3.4 12/23/2019 12:52 PM    Albumin 3.0 (L) 02/19/2020 05:18 AM     No results found for: GLU, GLUPOC, GLUCPOC    Anthropometrics: BMI (calculated): 25.1  Last 3 Recorded Weights in this Encounter    02/19/20 0506   Weight: 72.6 kg (160 lb)      Ht Readings from Last 1 Encounters:   02/19/20 5' 7\" (1.702 m)       Documented Weight History:  Weight Metrics 2/19/2020 12/28/2019 12/16/2019 2/12/2019 9/10/2018 2/13/2018 1/30/2018   Weight 160 lb 167 lb 8.8 oz 150 lb 184 lb 180 lb 185 lb 185 lb   BMI 25.06 kg/m2 25.48 kg/m2 22.81 kg/m2 27.98 kg/m2 27.37 kg/m2 28.13 kg/m2 28.13 kg/m2     During last admission:        No data found. IBW: 148 lb %IBW: 108% UBW: 180-185 lb last year  [] Weight Loss [] Weight Gain [] Weight Stable    Estimated Nutrition Needs: [x] MSJ x 1.2-1.3  [] Other:  Calories: 6704-1235 kcal Based on:   [x] Actual BW    Protein:   73-87 g Based on:   [x] Actual BW x 1.0-1.2 gm/kg   Fluid:       1 mL/kcal      [x] No Cultural, Shinto or ethnic dietary need identified.     [] Cultural, Shinto and ethnic food preferences identified and addressed     Wt Status:  [] Normal (18.6 - 24.9) [] Underweight (<18.5) [x] Overweight (25 - 29.9) [] Mild Obesity (30 - 34.9)  [] Moderate Obesity (35 - 39.9) [] Morbid Obesity (40+)       Nutrition Problems Identified:   [] Suboptimal PO intake   [] Food Allergies  [x] Difficulty chewing/swallowing/poor dentition  [] Constipation/Diarrhea   [] Nausea/Vomiting   [] None  [] Other:     Plan:   [x] Therapeutic Diet  []  Obtained/adjusted food preferences/tolerances and/or snacks options   [x]  Supplements added   [] Occupational therapy following for feeding techniques  []  HS snack added   [x]  Modify diet texture   []  Modify diet for food allergies   []  Educate patient   []  Assist with menu selection   [x]  Monitor PO intake on meal rounds   [x]  Continue inpatient monitoring and intervention   [x]  Participated in discharge planning/Interdisciplinary rounds/Team meetings   []  Other:     Education Needs:   [] Not appropriate for teaching at this time due to:   [x] Identified and addressed    Nutrition Monitoring and Evaluation:  [x] Continue ongoing monitoring and intervention  [] Other    Micheal Humphreys

## 2020-02-21 NOTE — PROGRESS NOTES
Bedside report received from [address] [place] [address]    2107: pt resting in bed, alert and oriented to self and place, disoriented to situation and time    2220: Due medication given, tolerated well    0105: V/S checked, no distress noted, no changed from previous assessment    0432: bathed, bed pad, gown and linen changed    0500: sleeping, no changed from previous assessment    Bedside and Verbal shift change report given to [address] [place] [address] Road (oncoming nurse) by Donna Richey RN (offgoing nurse). Report included the following information SBAR, Kardex, ED Summary, Intake/Output, MAR, Recent Results and Cardiac Rhythm Vidya Holding, 1' degree AVB with BBB.

## 2020-02-21 NOTE — PROGRESS NOTES
Bedside shift report received from [facility] with sbar  Dr. Kinsey Loera in to see patient   in to see patient  Suction placed at bedside

## 2020-02-21 NOTE — PROGRESS NOTES
Problem: Self Care Deficits Care Plan (Adult)  Goal: *Acute Goals and Plan of Care (Insert Text)  Description  Occupational Therapy Goals  Initiated 2/20/2020 within 7 day(s). 1.  Patient will perform grooming tasks at EOB with supervision for balance. 2.  Patient will perform lower body dressing at EOB with minimal assistance and minimal verbal cues for safety. 3.  Patient will perform functional task at EOB for 8 minutes with fair sitting balance and supervision for safety in prep for ADLs at EOB. 4.  Patient will perform toilet transfers with moderate assistance . 5. Patient will perform all aspects of toileting with maximal assistance. 6.  Patient will participate in upper extremity therapeutic exercise/activities with supervision/set-up for 8 minutes to maintain/increase BUE strength for functional transfers & ADLs. 7.  Patient will utilize energy conservation techniques during functional activities with minimal verbal cues. Outcome: Progressing Towards Goal   OCCUPATIONAL THERAPY TREATMENT    Patient: Maryjo Banks (41 y.o. male)  Date: 2/21/2020  Diagnosis: UTI (urinary tract infection) [N39.0]  Acute renal failure (ARF) (HCC) [N17.9]   UTI (urinary tract infection)       Precautions: Fall  PLOF: (I) w/ ADls, uses rollator    Chart, occupational therapy assessment, plan of care, and goals were reviewed. ASSESSMENT:  Patient presents lying in bed, head of bed elevated, noted R lateral lean with pt report of difficulty for po & fluid intake d/t poor positioning. Patient agreeable to self feeding and grooming tasks seated edge of bed, Max A supine to sit edge of bed, Fair-sitting balance, occasional assist to correct R lateral lean to sitting upright and midline, Max A scooting to edge of bed. Patient requiring additional time for self feeding and beverage retrieval w/ SBA, occasional LOB towards R side, pt able to self correct with RUE support.  Improvement with balance noted during oral hygiene and washing face w/ less episodes of LOB. Max A x 2 sit to supine and Max A x 2 scoot up towards head of bed for optimal bed positioning. Patient reports good comfort, call bell within reach. Progression toward goals:  []          Improving appropriately and progressing toward goals  [x]          Improving slowly and progressing toward goals  []          Not making progress toward goals and plan of care will be adjusted     PLAN:  Patient continues to benefit from skilled intervention to address the above impairments. Continue treatment per established plan of care. [address] Recommendations:  [address]  Further Equipment Recommendations for Discharge:  bedside commode, hospital bed, rolling walker, and wheelchair 18 inch     SUBJECTIVE:   Patient stated I feel pretty good today.     OBJECTIVE DATA SUMMARY:   Cognitive/Behavioral Status:  Neurologic State: Alert  Orientation Level: Oriented to person, Oriented to place  Cognition: Follows commands  Safety/Judgement: Insight into deficits    Functional Mobility and Transfers for ADLs:   Bed Mobility:     Supine to Sit: Maximum assistance;Assist x1  Sit to Supine: Maximum assistance;Assist x2  Scooting: Maximum assistance;Assist x1    Balance:  Sitting: Impaired;High guard  Sitting - Static: Fair (occasional)(-)  Sitting - Dynamic: Not tested    Pain:  Pain level pre-treatment: 0/10     Activity Tolerance:    poor  Please refer to the flowsheet for vital signs taken during this treatment. After treatment:   []  Patient left in no apparent distress sitting up in chair  [x]  Patient left in no apparent distress in bed  [x]  Call bell left within reach  [x]  Nursing notified  []  Caregiver present  []  Bed alarm activated    COMMUNICATION/EDUCATION:   [x] Role of Occupational Therapy in the acute care setting  [x] Home safety education was provided and the patient/caregiver indicated understanding.   [x] Patient/family have participated as able in working towards goals and plan of care. [x] Patient/family agree to work toward stated goals and plan of care. [] Patient understands intent and goals of therapy, but is neutral about his/her participation. [] Patient is unable to participate in goal setting and plan of care.       Thank you for this referral.  Samanta Sanchez  Time Calculation: 37 mins

## 2020-02-21 NOTE — PROGRESS NOTES
[facility]  -  Progress Note      Patient: Juan Jose Barrett MRN: [id number]  SSN: [id number]    YOB: 1934  Age: 80 y.o. Sex: male      Admit Date: 2/19/2020      I saw, evaluated, interviewed and examined the patient personally. I agree with the findings and plan of care as documented below with PARICARDA note  Bradycardia and weakness in a patient with possible hypothyroidism. Currently on Synthroid supplement  Asymptomatic and hemodynamically stable  Would recommend aspirin for stroke prophylaxis unless contraindicated  Avoid any AV adele blocking agent  No further cardiac work-up is planned at this time. We will be available as needed. Please call with question    Morena Ramos MD       Assessment:     - Presented with weakness, lethargy, bradycardia  - Recent hospitalization and discharge 01/03/20 to rehab facility.   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  -Synthroid per primary team.  -Antibiotics/treatment of UTI per primary team.  -No further recommendations from cardiac standpoint. Subjective:     No new complaints.       Objective:      Patient Vitals for the past 8 hrs:   Temp Pulse Resp BP SpO2   02/21/20 1134 97.9 °F (36.6 °C) 64 16 103/50 98 %   02/21/20 0825 98.4 °F (36.9 °C) (!) 59 16 98/48 98 %         Patient Vitals for the past 96 hrs:   Weight   02/19/20 0506 160 lb (72.6 kg)         Intake/Output Summary (Last 24 hours) at 2/21/2020 1255  Last data filed at 2/20/2020 1800  Gross per 24 hour   Intake    Output 500 ml   Net -500 ml       Physical Exam:  General:  alert, cooperative, no distress, appears stated age  Neck:  supple  Lungs:  clear to auscultation to anterolateral lung fields  Heart:  Regular rate and rhythm  Abdomen:  abdomen is soft without significant tenderness, masses, organomegaly or guarding  Extremities:  Atraumatic, no edema     Data Review:     Labs: Results:       Chemistry Recent Labs     02/20/20  0355 02/19/20 0928 02/19/20 0518   GLU 73* 84 77    148* 144   K 3.9 4.1 4.0   * 112* 106   CO2 27 29 33*   BUN 57* 57* 56*   CREA 1.53* 1.69* 1.90*   CA 8.4* 8.7 9.6   AGAP 5 7 5   BUCR 37* 34* 29*   AP  --   --  136*   TP  --   --  7.2   ALB  --   --  3.0*   GLOB  --   --  4.2*   AGRAT  --   --  0.7*      CBC w/Diff Recent Labs     02/19/20 0518   WBC 5.0   RBC 3.59*   HGB 11.8*   HCT 35.5*   *   GRANS 78*   LYMPH 17*   EOS 1      Liver Enzymes Recent Labs     02/19/20 0518   TP 7.2   ALB 3.0*   *   SGOT 45*      Thyroid Studies Lab Results   Component Value Date/Time    T4, Total 11.5 02/19/2020 09:28 AM    TSH 8.16 (H) 02/19/2020 05:18 AM

## 2020-02-21 NOTE — PROGRESS NOTES
Discharge/Transition Planning    0900: Spoke with Dr Angela Cervantes and pt ready for discharge. Left message in [place] for Itzel at [facility] to confirm   0920: No response in [place]; [facility] states no male bed  2360: Messaged other facilities in [place] to see which ones have beds available  0935: [facility] and [address] [place] [address] and spoke with Kosta Montgomery, they will review referral  [phone]: Received call from [facility] and CM informed they can accept and CM to start auth  1050: Called pt son, Nabil Garcia and notified [facility] cannot accept and there is bed at [facility] andpt wanted CM to talk to son to decide. Son agreeable. 1100: Completed auth referral in [place] and faxed in clinicals to Savanna Rodas to start auth process. Referance # [id number]  6266: AIN[facility] with son and daughter in law in room. Daughter in law stays at home and cares for pt when home. [facility] (SNF) Provider list has been given to the patient and/or patient representative. Patient and/or patient representative has signed the [place] of Choice selecting ____[facility]_____________________ as their preference facility and a copy given. Both SNF Provider list and [place] of Choice have been placed on the chart. 1400: [facility] and they did not receive the clinicals. Refaxed  1430: Auth obtained. #[id number]. Notified Dr Angela Cervantes. [facility] would like 4pm transport. Completed PCS form and faxed to [facility] with facesheet. Called [facility] and 1st available is 1830. Called [facility] and they said as long as he is there before 9pm , they are okay. RN to call report to   [facility] [phone] ext [phone]  Called son and received voicemail and left voicemail.    Uploaded [facility]AI from previous admission to [place] and copy to transport envelope  Notified JER Friend and packet on unit  Care Management Interventions  PCP Verified by CM: Yes(Dr Ganga Vital)  Mode of Transport at Discharge: Self(family)  Transition of Care Consult (CM Consult): Discharge Planning, SNF  Physical Therapy Consult: Yes  Occupational Therapy Consult: Yes  Current Support Network: Relative's Home  Confirm Follow Up Transport: Family  The Plan for Transition of Care is Related to the Following Treatment Goals :  Increase strength and Mobility  The Patient and/or Patient Representative was Provided with a Choice of Provider and Agrees with the Discharge Plan?: Yes  Name of the Patient Representative Who was Provided with a Choice of Provider and Agrees with the Discharge Plan: Patient; son, Buck [place] and Daughter in law, [place] of Choice List was Provided with Basic Dialogue that Supports the Patient's Individualized Plan of Care/Goals, Treatment Preferences and Shares the Quality Data Associated with the Providers?: Yes  The [employer] Information Provided?: No  Discharge Location  Discharge Placement: Skilled nursing facility([facility])

## 2020-02-21 NOTE — PROGRESS NOTES
Bedside and Verbal shift change report given to Mark Aguirre RN (oncoming nurse) by Tricia Lobato RN (offgoing nurse). Report included the following information SBAR, Kardex, Intake/Output, MAR and Recent Results.

## 2020-02-22 LAB
BACTERIA SPEC CULT: ABNORMAL
SERVICE CMNT-IMP: ABNORMAL

## 2020-02-22 NOTE — PROGRESS NOTES
2/21/2020 patient just now taken by transport to go to [facility]. Sonja Perez.  HEAVENLY PrinceN  [facility]  [phone], Pager [phone]  [email or web address]

## 2020-02-26 LAB
BACTERIA SPEC CULT: NORMAL
SERVICE CMNT-IMP: NORMAL

## 2020-04-06 ENCOUNTER — APPOINTMENT (OUTPATIENT)
Dept: CT IMAGING | Age: 85
DRG: 194 | End: 2020-04-06
Attending: EMERGENCY MEDICINE
Payer: MEDICARE

## 2020-04-06 ENCOUNTER — HOSPITAL ENCOUNTER (INPATIENT)
Age: 85
LOS: 3 days | Discharge: HOME HEALTH | DRG: 194 | End: 2020-04-09
Attending: EMERGENCY MEDICINE | Admitting: HOSPITALIST
Payer: MEDICARE

## 2020-04-06 ENCOUNTER — APPOINTMENT (OUTPATIENT)
Dept: GENERAL RADIOLOGY | Age: 85
DRG: 194 | End: 2020-04-06
Attending: EMERGENCY MEDICINE
Payer: MEDICARE

## 2020-04-06 DIAGNOSIS — R06.02 SHORTNESS OF BREATH: ICD-10-CM

## 2020-04-06 DIAGNOSIS — R53.83 FATIGUE, UNSPECIFIED TYPE: ICD-10-CM

## 2020-04-06 DIAGNOSIS — R91.8 LUNG INFILTRATE: ICD-10-CM

## 2020-04-06 DIAGNOSIS — J90 PLEURAL EFFUSION: ICD-10-CM

## 2020-04-06 DIAGNOSIS — J18.9 PNEUMONIA OF RIGHT LOWER LOBE DUE TO INFECTIOUS ORGANISM: ICD-10-CM

## 2020-04-06 DIAGNOSIS — R07.9 ACUTE CHEST PAIN: Primary | ICD-10-CM

## 2020-04-06 PROBLEM — I50.32 CHRONIC DIASTOLIC CHF (CONGESTIVE HEART FAILURE) (HCC): Status: ACTIVE | Noted: 2020-04-06

## 2020-04-06 PROBLEM — I50.22 SYSTOLIC CHF, CHRONIC (HCC): Status: ACTIVE | Noted: 2020-04-06

## 2020-04-06 LAB
ALBUMIN SERPL-MCNC: 3.1 G/DL (ref 3.4–5)
ALBUMIN/GLOB SERPL: 0.9 {RATIO} (ref 0.8–1.7)
ALP SERPL-CCNC: 167 U/L (ref 45–117)
ALT SERPL-CCNC: 58 U/L (ref 16–61)
ANION GAP SERPL CALC-SCNC: 5 MMOL/L (ref 3–18)
APPEARANCE UR: CLEAR
AST SERPL-CCNC: 60 U/L (ref 10–38)
BACTERIA URNS QL MICRO: ABNORMAL /HPF
BILIRUB SERPL-MCNC: 0.3 MG/DL (ref 0.2–1)
BILIRUB UR QL: NEGATIVE
BNP SERPL-MCNC: 915 PG/ML (ref 0–1800)
BUN SERPL-MCNC: 44 MG/DL (ref 7–18)
BUN/CREAT SERPL: 33 (ref 12–20)
CALCIUM SERPL-MCNC: 9.3 MG/DL (ref 8.5–10.1)
CHLORIDE SERPL-SCNC: 113 MMOL/L (ref 100–111)
CK MB CFR SERPL CALC: 16.8 % (ref 0–4)
CK MB CFR SERPL CALC: 17.2 % (ref 0–4)
CK MB SERPL-MCNC: 7.9 NG/ML (ref 5–25)
CK MB SERPL-MCNC: 9.1 NG/ML (ref 5–25)
CK SERPL-CCNC: 47 U/L (ref 39–308)
CK SERPL-CCNC: 53 U/L (ref 39–308)
CO2 SERPL-SCNC: 29 MMOL/L (ref 21–32)
COLOR UR: YELLOW
CREAT SERPL-MCNC: 1.33 MG/DL (ref 0.6–1.3)
EPITH CASTS URNS QL MICRO: ABNORMAL /LPF (ref 0–5)
ERYTHROCYTE [DISTWIDTH] IN BLOOD BY AUTOMATED COUNT: 15.9 % (ref 11.6–14.5)
GLOBULIN SER CALC-MCNC: 3.5 G/DL (ref 2–4)
GLUCOSE SERPL-MCNC: 69 MG/DL (ref 74–99)
GLUCOSE UR STRIP.AUTO-MCNC: NEGATIVE MG/DL
HCT VFR BLD AUTO: 34.5 % (ref 36–48)
HGB BLD-MCNC: 11.7 G/DL (ref 13–16)
HGB UR QL STRIP: NEGATIVE
KETONES UR QL STRIP.AUTO: NEGATIVE MG/DL
LEUKOCYTE ESTERASE UR QL STRIP.AUTO: ABNORMAL
MAGNESIUM SERPL-MCNC: 1.8 MG/DL (ref 1.6–2.6)
MCH RBC QN AUTO: 33.5 PG (ref 24–34)
MCHC RBC AUTO-ENTMCNC: 33.9 G/DL (ref 31–37)
MCV RBC AUTO: 98.9 FL (ref 74–97)
NITRITE UR QL STRIP.AUTO: NEGATIVE
PH UR STRIP: 5.5 [PH] (ref 5–8)
PLATELET # BLD AUTO: 145 K/UL (ref 135–420)
PMV BLD AUTO: 11.3 FL (ref 9.2–11.8)
POTASSIUM SERPL-SCNC: 4.2 MMOL/L (ref 3.5–5.5)
PROT SERPL-MCNC: 6.6 G/DL (ref 6.4–8.2)
PROT UR STRIP-MCNC: NEGATIVE MG/DL
RBC # BLD AUTO: 3.49 M/UL (ref 4.7–5.5)
RBC #/AREA URNS HPF: ABNORMAL /HPF (ref 0–5)
SODIUM SERPL-SCNC: 147 MMOL/L (ref 136–145)
SP GR UR REFRACTOMETRY: 1.01 (ref 1–1.03)
TROPONIN I SERPL-MCNC: 0.02 NG/ML (ref 0–0.04)
TROPONIN I SERPL-MCNC: 0.02 NG/ML (ref 0–0.04)
TSH SERPL DL<=0.05 MIU/L-ACNC: 7.65 UIU/ML (ref 0.36–3.74)
UROBILINOGEN UR QL STRIP.AUTO: 0.2 EU/DL (ref 0.2–1)
WBC # BLD AUTO: 5.7 K/UL (ref 4.6–13.2)
WBC URNS QL MICRO: ABNORMAL /HPF (ref 0–4)

## 2020-04-06 PROCEDURE — 87040 BLOOD CULTURE FOR BACTERIA: CPT

## 2020-04-06 PROCEDURE — 85027 COMPLETE CBC AUTOMATED: CPT

## 2020-04-06 PROCEDURE — 84443 ASSAY THYROID STIM HORMONE: CPT

## 2020-04-06 PROCEDURE — 81001 URINALYSIS AUTO W/SCOPE: CPT

## 2020-04-06 PROCEDURE — 87086 URINE CULTURE/COLONY COUNT: CPT

## 2020-04-06 PROCEDURE — 93005 ELECTROCARDIOGRAM TRACING: CPT

## 2020-04-06 PROCEDURE — 74011000258 HC RX REV CODE- 258: Performed by: EMERGENCY MEDICINE

## 2020-04-06 PROCEDURE — 71275 CT ANGIOGRAPHY CHEST: CPT

## 2020-04-06 PROCEDURE — 77010033678 HC OXYGEN DAILY

## 2020-04-06 PROCEDURE — 82550 ASSAY OF CK (CPK): CPT

## 2020-04-06 PROCEDURE — 83735 ASSAY OF MAGNESIUM: CPT

## 2020-04-06 PROCEDURE — 99285 EMERGENCY DEPT VISIT HI MDM: CPT

## 2020-04-06 PROCEDURE — 71045 X-RAY EXAM CHEST 1 VIEW: CPT

## 2020-04-06 PROCEDURE — 80053 COMPREHEN METABOLIC PANEL: CPT

## 2020-04-06 PROCEDURE — 65660000000 HC RM CCU STEPDOWN

## 2020-04-06 PROCEDURE — 83880 ASSAY OF NATRIURETIC PEPTIDE: CPT

## 2020-04-06 PROCEDURE — 74011636320 HC RX REV CODE- 636/320: Performed by: EMERGENCY MEDICINE

## 2020-04-06 PROCEDURE — 74011250636 HC RX REV CODE- 250/636: Performed by: EMERGENCY MEDICINE

## 2020-04-06 RX ORDER — SODIUM CHLORIDE 9 MG/ML
100 INJECTION, SOLUTION INTRAVENOUS ONCE
Status: COMPLETED | OUTPATIENT
Start: 2020-04-06 | End: 2020-04-06

## 2020-04-06 RX ADMIN — CEFTRIAXONE 2 G: 2 INJECTION, POWDER, FOR SOLUTION INTRAMUSCULAR; INTRAVENOUS at 17:03

## 2020-04-06 RX ADMIN — SODIUM CHLORIDE 500 ML: 900 INJECTION, SOLUTION INTRAVENOUS at 12:42

## 2020-04-06 RX ADMIN — SODIUM CHLORIDE 97 ML: 900 INJECTION, SOLUTION INTRAVENOUS at 13:17

## 2020-04-06 RX ADMIN — AZITHROMYCIN MONOHYDRATE 500 MG: 500 INJECTION, POWDER, LYOPHILIZED, FOR SOLUTION INTRAVENOUS at 16:47

## 2020-04-06 RX ADMIN — IOPAMIDOL 80 ML: 755 INJECTION, SOLUTION INTRAVENOUS at 13:20

## 2020-04-06 RX ADMIN — SODIUM CHLORIDE 500 ML: 900 INJECTION, SOLUTION INTRAVENOUS at 07:25

## 2020-04-06 NOTE — ED NOTES
Pt transported off of floor by Dyllan FELDER-EMT for admission to [facility] in no distress

## 2020-04-06 NOTE — PROGRESS NOTES
Pt arrived to the unit. Appears to be in stable condition. NO acute distress noted. Pt admitted to tele Running SR with 1 degree av block. Had a 2.5 second pause while resting. Page attending Dr. Dianna Sawant to notify him. Waiting on return phone call. Will continue to monitor.

## 2020-04-06 NOTE — ED PROVIDER NOTES
Date: 4/6/2020  Patient Name: Soila Doshi    History of Presenting Illness     Chief Complaint   Patient presents with    Chest Pain    Fatigue     History Provided By: Patient    HPI/Chief Complaint: (Context):who presents with chief complaint of chest pain that is been going on for several weeks on and off lasting all day no chest pain currently  Patient said he has had enough to get checked out. There is no current exertional symptoms no shortness of breath with it  Patient said he feels very fatigued and weak he cannot eat much and does not drink much water either and feels dehydrated there is no focal neurological deficit whole body is weak  There is no headache no blurry vision no fever no cough congestion upper respiratory symptoms  Patient lives with his son and daughter-in-law who takes care of him  Patient has been taking his medications regularly  Patient is no abdominal pain no back pain no black or bloody stool no bowel bladder incontinence. Patient symptoms of chest pain is substernal intermittent lasting all day no acute alleviating or exacerbating factors going on for several weeks.   -  Patient MAXIMUS arrival to  Chief complaint chest pain and fatigue  Patient vitals are stable in the emergency department pulse ox 100% room air  Patient has complaint of chest pain, atrial fibrillation history has been feeling fatigue no appetite  Patient's pain scale is 3 out of 10  Patient is allergies to amlodipine Bactrim lisinopril ciprofloxacin is appreciated  Home medication include Tylenol haloperidol Synthroid Prilosec Carafate  Patient's medical history includes hypercholesterolemia hypertension UTI elevated PSA urinary retention  Surgical history is for appendectomy TURP procedure  Social history is no smoking occasional alcohol use  Patient's chart review shows patient has history of admission in February for acute renal failure and septic shock prior to that  Patient's prior visit reviewed patient had history of duodenitis history of ventricular tachycardia, no abdominal pain and hypothermia acute renal failure urinary retention  Patient's visit on February 2020, admission was for UTI and acute renal failure    PCP: Linnette Mendez MD    Current Facility-Administered Medications   Medication Dose Route Frequency Provider Last Rate Last Dose    cefTRIAXone (ROCEPHIN) 2 g in 0.9% sodium chloride (MBP/ADV) 50 mL MBP  2 g IntraVENous Q24H Rhea Castorena MD        azithromycin (ZITHROMAX) 500 mg in 0.9% sodium chloride 250 mL IVPB  500 mg IntraVENous Q24H Rhea Castorena MD         Current Outpatient Medications   Medication Sig Dispense Refill    cholecalciferol (VITAMIN D3) (1000 Units /25 mcg) tablet Take 2 Tabs by mouth daily. 30 Tab 0    levothyroxine (SYNTHROID) 25 mcg tablet Take 1 Tab by mouth every morning. 30 Tab 0    simvastatin (ZOCOR) 40 mg tablet Take 1 Tab by mouth nightly. 30 Tab 0    aspirin delayed-release 81 mg tablet Take 1 Tab by mouth daily. 30 Tab 0    acetaminophen (TYLENOL EXTRA STRENGTH) 500 mg tablet Take 2 Tabs by mouth every six (6) hours as needed for Pain. 50 Tab 0    sucralfate (CARAFATE) 1 gram tablet Take 1 Tab by mouth four (4) times daily. 20 Tab 0    omeprazole (PRILOSEC) 20 mg capsule Take 1 Cap by mouth daily. 30 Cap 3    allopurinol (ZYLOPRIM) 300 mg tablet Take 300 mg by mouth daily.       pantoprazole (PROTONIX) 40 mg tablet   0    tamsulosin (FLOMAX) 0.4 mg capsule take 1 capsule by mouth once daily AFTER DINNER 90 Cap 3       Past History     Past Medical History:  Past Medical History:   Diagnosis Date    Difficulty urinating     Elevated PSA     Hematuria, unspecified     Hypercholesterolemia     Hypertension     Incomplete bladder emptying     Urinary retention     UTI (urinary tract infection)        Past Surgical History:  Past Surgical History:   Procedure Laterality Date    APPENDECTOMY      EGD  2/7/2014         HX TURP 6/13/16    HX TURP         Family History:  Family History   Problem Relation Age of Onset    Cancer Father     Alzheimer Mother     Heart defect Brother        Social History:  Social History     Tobacco Use    Smoking status: Never Smoker    Smokeless tobacco: Never Used   Substance Use Topics    Alcohol use: Yes     Alcohol/week: 2.0 standard drinks     Types: 2 Cans of beer per week     Comment: Occasional    Drug use: No       Allergies: Allergies   Allergen Reactions    Amlodipine Swelling and Itching    Bactrim [Sulfamethoprim Ds] Rash    Lisinopril Other (comments)     Acute renal failure    Ciprofloxacin Rash and Itching         Review of Systems   Review of Systems   Constitutional: Positive for fatigue. Negative for activity change and fever. HENT: Negative for congestion and rhinorrhea. Eyes: Negative for visual disturbance. Respiratory: Positive for chest tightness. Negative for shortness of breath. Cardiovascular: Negative for chest pain and palpitations. Gastrointestinal: Negative for abdominal pain, diarrhea, nausea and vomiting. Genitourinary: Negative for dysuria and hematuria. Musculoskeletal: Positive for myalgias. Negative for back pain. Skin: Negative for rash. Neurological: Negative for dizziness, weakness and light-headedness. Psychiatric/Behavioral: Negative for agitation. All other systems reviewed and are negative. Physical Exam     Physical Exam  Vitals signs and nursing note reviewed. Constitutional:       Appearance: He is well-developed. HENT:      Head: Normocephalic and atraumatic. Eyes:      Extraocular Movements: Extraocular movements intact. Conjunctiva/sclera: Conjunctivae normal.      Pupils: Pupils are equal, round, and reactive to light. Neck:      Musculoskeletal: Normal range of motion and neck supple. Cardiovascular:      Rate and Rhythm: Normal rate and regular rhythm. Heart sounds: Normal heart sounds. Pulmonary:      Effort: Pulmonary effort is normal.      Breath sounds: Normal breath sounds. Chest:      Chest wall: No mass, deformity, tenderness, crepitus or edema. There is no dullness to percussion. Abdominal:      General: Bowel sounds are normal.      Palpations: Abdomen is soft. Musculoskeletal: Normal range of motion. Lymphadenopathy:      Cervical: No cervical adenopathy. Skin:     General: Skin is warm. Capillary Refill: Capillary refill takes less than 2 seconds. Neurological:      General: No focal deficit present. Mental Status: He is alert. Psychiatric:         Mood and Affect: Mood normal.         Medical Decision Making   I am the first provider for this patient. I reviewed the vital signs, available nursing notes, past medical history, past surgical history, family history and social history. Provider Notes (Medical Decision Making): Patient presents emergency department chest pain constant intermittent lasting all day when it occurs for several weeks  No inciting events no exertional symptoms  No shortness of breath no history of PE or DVT patient does not have chest pain today currently  Patient also has fatigue denies weakness no vomiting or diarrhea no fever no upper respiratory symptoms  Rule out pneumonia and UTI  Also check electrolytes including magnesium to rule out any imbalance caused by it or any acute renal failure as patient has history of renal insufficiency. I will hydrate patient very gently    Vital Signs-Reviewed the patient's vital signs. Pulse Oximetry Analysis -pulse ox 100%, room air    Cardiac Monitor:  Rate/Rhythm: 62, sinus tach, normal    EKG: Interpreted by the EP.     Patient's EKG today done at 6:21 AM, 62 heart rate, patient's rate first-degree AV block, QRS is widened to 184 ms, QTC is wide, leftward axis appreciated  Left bundle branch block is appreciated on this EKG  This EKG is compared with done by the EMS and similar morphology is present on that EKG  Today's EKG is compared to February 19, 2020 has similar morphology and blood per breast block, first-degree AV block, QTC widening is appreciated on that EKG as well. No significant changes appreciated on today's EKG      Vitals:    04/06/20 1337 04/06/20 1338 04/06/20 1400 04/06/20 1500   BP: 118/61  111/59 123/61   Pulse:       Resp:       Temp:       SpO2:  99% 99% 98%   Weight:       Height:           Records Reviewed: Nursing Notes    ED Course:   Patient reevaluated now, 11:57 AM  Patient continues to have left-sided chest pain  I added a CT chest and a second troponin for the patient  Patient's EKG was nonspecific in the first evaluation  Discussed this information with the CT tech as well  --------  1600 patient remained stable in the emergency department  My concern for this patient is a patient has left-sided effusion  Patient also has right-sided basal infiltrate and right-sided pleural effusion  Patient is coming in for chest pain  Patient will get antibiotics in the ER but my concern with a 80year-old with fatigue and worsening of symptoms is the patient will need IV antibiotics and concern for failed outpatient therapy exist on this patient. For Hospitalized Patients:    1. Hospitalization Decision Time:  The decision to hospitalize the patient was made by Dr. Lázaro Hemphill  He will continue to manage physician [facility] Yuval [facility] as well.   I explained to them the patient is with pneumonia at the right base and having chest pain and 2 troponins have been negative my concern is outpatient for therapy agrees with the plan and disposition  4:16 PM  ----------    Diagnostic Study Results     Orders Placed This Encounter    CULTURE, URINE     Standing Status:   Standing     Number of Occurrences:   1     Order Specific Question:   Reason for Culture     Answer:   Pelvic pain    CULTURE, BLOOD     Standing Status:   Standing     Number of Occurrences:   1    CULTURE, BLOOD     Standing Status:   Standing     Number of Occurrences:   1    XR CHEST PORT     Standing Status:   Standing     Number of Occurrences:   1     Order Specific Question:   Reason for Exam     Answer:   chest pain    CTA CHEST W OR W WO CONT     Standing Status:   Standing     Number of Occurrences:   1     Order Specific Question:   Transport     Answer:   Stretcher [5]     Order Specific Question:   Reason for Exam     Answer:   cp constant, PE evaluation     Order Specific Question:   Does patient have history of Renal Disease? Answer:   No    CBC W/O DIFF     Standing Status:   Standing     Number of Occurrences:   1    METABOLIC PANEL, COMPREHENSIVE     Standing Status:   Standing     Number of Occurrences:   1    NT-PRO BNP     Standing Status:   Standing     Number of Occurrences:   1    CARDIAC PANEL,(CK, CKMB & TROPONIN)     Standing Status:   Standing     Number of Occurrences:   1    TSH 3RD GENERATION     Standing Status:   Standing     Number of Occurrences:   1    URINALYSIS W/ RFLX MICROSCOPIC     Standing Status:   Standing     Number of Occurrences:   1    MAGNESIUM     Standing Status:   Standing     Number of Occurrences:   1    URINE MICROSCOPIC ONLY     Standing Status:   Standing     Number of Occurrences:   1    CARDIAC PANEL,(CK, CKMB & TROPONIN)     Standing Status:   Standing     Number of Occurrences:   1    CARDIAC MONITORING     Standing Status:   Standing     Number of Occurrences:   1     Order Specific Question:   Type:      Answer:   Bedside    EKG, 12 LEAD, INITIAL     Standing Status:   Standing     Number of Occurrences:   1     Order Specific Question:   Reason for Exam:     Answer:   cp    EKG, 12 LEAD, SUBSEQUENT     Standing Status:   Standing     Number of Occurrences:   1     Order Specific Question:   Reason for Exam:     Answer:   cp    sodium chloride 0.9 % bolus infusion 500 mL    sodium chloride 0.9 % bolus infusion 500 mL    iopamidoL (ISOVUE-370) 76 % injection 100 mL    0.9% sodium chloride infusion 100 mL    cefTRIAXone (ROCEPHIN) 2 g in 0.9% sodium chloride (MBP/ADV) 50 mL MBP     First dose Stat     Order Specific Question:   Antibiotic Indications     Answer:   Pneumonia (CAP)    azithromycin (ZITHROMAX) 500 mg in 0.9% sodium chloride 250 mL IVPB     Order Specific Question:   Antibiotic Indications     Answer:   Pneumonia (CAP)    IP CONSULT TO HOSPITALIST     Standing Status:   Standing     Number of Occurrences:   1     Order Specific Question:   Reason for Consult: Answer:   PNEUMONIA     Order Specific Question:   Did you call or speak to the consulting provider? Answer:   No     Order Specific Question:   Consult To     Answer:   manage pt with pneumonia/pleural effusion     Order Specific Question:   Schedule When? Answer:   TODAY       Labs -     Recent Results (from the past 12 hour(s))   CBC W/O DIFF    Collection Time: 04/06/20  6:32 AM   Result Value Ref Range    WBC 5.7 4.6 - 13.2 K/uL    RBC 3.49 (L) 4.70 - 5.50 M/uL    HGB 11.7 (L) 13.0 - 16.0 g/dL    HCT 34.5 (L) 36.0 - 48.0 %    MCV 98.9 (H) 74.0 - 97.0 FL    MCH 33.5 24.0 - 34.0 PG    MCHC 33.9 31.0 - 37.0 g/dL    RDW 15.9 (H) 11.6 - 14.5 %    PLATELET 457 100 - 465 K/uL    MPV 11.3 9.2 - 80.1 FL   METABOLIC PANEL, COMPREHENSIVE    Collection Time: 04/06/20  6:32 AM   Result Value Ref Range    Sodium 147 (H) 136 - 145 mmol/L    Potassium 4.2 3.5 - 5.5 mmol/L    Chloride 113 (H) 100 - 111 mmol/L    CO2 29 21 - 32 mmol/L    Anion gap 5 3.0 - 18 mmol/L    Glucose 69 (L) 74 - 99 mg/dL    BUN 44 (H) 7.0 - 18 MG/DL    Creatinine 1.33 (H) 0.6 - 1.3 MG/DL    BUN/Creatinine ratio 33 (H) 12 - 20      GFR est AA >60 >60 ml/min/1.73m2    GFR est non-AA 51 (L) >60 ml/min/1.73m2    Calcium 9.3 8.5 - 10.1 MG/DL    Bilirubin, total 0.3 0.2 - 1.0 MG/DL    ALT (SGPT) 58 16 - 61 U/L    AST (SGOT) 60 (H) 10 - 38 U/L    Alk.  phosphatase 167 (H) 45 - 117 U/L    Protein, total 6.6 6.4 - 8.2 g/dL    Albumin 3.1 (L) 3.4 - 5.0 g/dL    Globulin 3.5 2.0 - 4.0 g/dL    A-G Ratio 0.9 0.8 - 1.7     NT-PRO BNP    Collection Time: 04/06/20  6:32 AM   Result Value Ref Range    NT pro- 0 - 1,800 PG/ML   CARDIAC PANEL,(CK, CKMB & TROPONIN)    Collection Time: 04/06/20  6:32 AM   Result Value Ref Range    CK 53 39 - 308 U/L    CK - MB 9.1 (H) <3.6 ng/ml    CK-MB Index 17.2 (H) 0.0 - 4.0 %    Troponin-I, QT 0.02 0.0 - 0.045 NG/ML   TSH 3RD GENERATION    Collection Time: 04/06/20  6:32 AM   Result Value Ref Range    TSH 7.65 (H) 0.36 - 3.74 uIU/mL   MAGNESIUM    Collection Time: 04/06/20  6:32 AM   Result Value Ref Range    Magnesium 1.8 1.6 - 2.6 mg/dL   URINALYSIS W/ RFLX MICROSCOPIC    Collection Time: 04/06/20  7:30 AM   Result Value Ref Range    Color YELLOW      Appearance CLEAR      Specific gravity 1.014 1.005 - 1.030      pH (UA) 5.5 5.0 - 8.0      Protein NEGATIVE  NEG mg/dL    Glucose NEGATIVE  NEG mg/dL    Ketone NEGATIVE  NEG mg/dL    Bilirubin NEGATIVE  NEG      Blood NEGATIVE  NEG      Urobilinogen 0.2 0.2 - 1.0 EU/dL    Nitrites NEGATIVE  NEG      Leukocyte Esterase TRACE (A) NEG     URINE MICROSCOPIC ONLY    Collection Time: 04/06/20  7:30 AM   Result Value Ref Range    WBC 3 to 5 0 - 4 /hpf    RBC 0 to 1 0 - 5 /hpf    Epithelial cells FEW 0 - 5 /lpf    Bacteria FEW (A) NEG /hpf   CARDIAC PANEL,(CK, CKMB & TROPONIN)    Collection Time: 04/06/20 12:20 PM   Result Value Ref Range    CK 47 39 - 308 U/L    CK - MB 7.9 (H) <3.6 ng/ml    CK-MB Index 16.8 (H) 0.0 - 4.0 %    Troponin-I, QT 0.02 0.0 - 0.045 NG/ML   EKG, 12 LEAD, SUBSEQUENT    Collection Time: 04/06/20 12:27 PM   Result Value Ref Range    Ventricular Rate 55 BPM    Atrial Rate 55 BPM    P-R Interval 304 ms    QRS Duration 176 ms    Q-T Interval 522 ms    QTC Calculation (Bezet) 499 ms    Calculated R Axis 10 degrees    Calculated T Axis 113 degrees    Diagnosis       Sinus bradycardia with 1st degree AV block with occasional premature   ventricular complexes  Left bundle branch block  Abnormal ECG  When compared with ECG of 06-APR-2020 06:12,  No significant change was found         Radiologic Studies -   CTA CHEST W OR W WO CONT   Final Result   IMPRESSION:      1. No evidence of pulmonary embolism. 2.  Patchy right lung base consolidation. Small right and trace left pleural   effusions. XR CHEST PORT   Final Result   IMPRESSION:      No acute cardiopulmonary abnormality. CT Results  (Last 48 hours)               04/06/20 1321  CTA CHEST W OR W WO CONT Final result    Impression:  IMPRESSION:       1. No evidence of pulmonary embolism. 2.  Patchy right lung base consolidation. Small right and trace left pleural   effusions. Narrative:  EXAM: CTA Chest       INDICATION: Shortness of breath. Possible PE. Chest pain. COMPARISON: 12/16/2019       TECHNIQUE: Axial CT imaging from the thoracic inlet through the diaphragm with   intravenous contrast utilizing CTA study for pulmonary artery evaluation. Coronal and sagittal MIP reformations were generated at a separate workstation. _______________       FINDINGS:       EXAM QUALITY: Overall exam quality is adequate. Pulmonary arterial enhancement   is adequate. Respiratory motion artifact is present, limiting evaluation. PULMONARY ARTERIES: No convincing evidence of pulmonary embolism. MEDIASTINUM: Normal heart size. No evidence of right heart strain. Aorta is   unremarkable. No pericardial effusion. LYMPH NODES: No enlarged mediastinal or hilar nodes by size criteria. AIRWAY: Unremarkable. LUNGS: Right lung base patchy consolidation. PLEURA: Small right and trace left pleural effusions. Trevor Ra UPPER ABDOMEN: Visualized upper abdomen is unremarkable. .       OTHER: No acute or aggressive osseous abnormalities identified.        _______________               CXR Results  (Last 50 hours)               04/06/20 0632  XR CHEST PORT Final result    Impression:  IMPRESSION:       No acute cardiopulmonary abnormality. Narrative:  EXAM: XR CHEST PORT       CLINICAL INDICATION/HISTORY: chest pain   -Additional: None       COMPARISON: 12/28/2019       TECHNIQUE: Portable frontal view of the chest       _______________       FINDINGS:       SUPPORT DEVICES: None. HEART AND MEDIASTINUM: Cardiomediastinal silhouette within normal limits. LUNGS AND PLEURAL SPACES: No dense consolidation, large effusion or   pneumothorax.       _______________               Discharge     Clinical Impression:   1. Acute chest pain    2. Lung infiltrate    3. Pleural effusion    4. Shortness of breath    5.  Fatigue, unspecified type      Disposition:  admit    It should be noted that I will be the provider of record for this patient  Swapna Davidson MD      Follow-up Information    None         Current Discharge Medication List

## 2020-04-06 NOTE — ED NOTES
Jessie Bello, daughter in law [phone]. Son jazz's work # [phone]  Pt lives with son and daughter in law.

## 2020-04-06 NOTE — ED TRIAGE NOTES
Pt is complaining of chest pain. Hx of a fib. He states he has been feeling fatigued with no appetite.

## 2020-04-06 NOTE — ED NOTES
TRANSFER - ED to INPATIENT REPORT:      SBAR report made available to receiving floor on this patient being transferred to [address] [place] [address] ([facility])  for routine progression of care       Admitting diagnosis Pneumonia [J18.9]    Information from the following report(s) SBAR, ED Summary and MAR was made available to receiving floor. Lines:   Peripheral IV 04/06/20 Left Antecubital (Active)   Site Assessment Clean, dry, & intact 4/6/2020  6:34 AM   Phlebitis Assessment 0 4/6/2020  6:34 AM   Infiltration Assessment 0 4/6/2020  6:34 AM   Dressing Status Clean, dry, & intact 4/6/2020  6:34 AM       Peripheral IV 04/06/20 Right Antecubital (Active)   Site Assessment Clean, dry, & intact 4/6/2020  4:27 PM   Phlebitis Assessment 0 4/6/2020  4:27 PM   Infiltration Assessment 0 4/6/2020  4:27 PM   Dressing Status Clean, dry, & intact 4/6/2020  4:27 PM   Dressing Type Transparent 4/6/2020  4:27 PM   Hub Color/Line Status Flushed 4/6/2020  4:27 PM       Peripheral IV 04/06/20 Right Hand (Active)   Site Assessment Clean, dry, & intact 4/6/2020  4:41 PM   Phlebitis Assessment 0 4/6/2020  4:41 PM   Infiltration Assessment 0 4/6/2020  4:41 PM   Dressing Status Clean, dry, & intact 4/6/2020  4:41 PM   Dressing Type Transparent 4/6/2020  4:41 PM   Hub Color/Line Status Pink;Patent; Flushed 4/6/2020  4:41 PM        Patient is oriented to time, place, person and situation      Valuables transported with patient       MAP (Monitor): 69 =Monitored (most recent)  Vitals w/ MEWS Score (last day)     Date/Time MEWS Score Pulse Resp Temp BP Level of Consciousness SpO2    04/06/20 16:53:47  0  (!) 58  13  98 °F (36.7 °C)  126/52  Alert  100 %    04/06/20 1630          126/52    99 %    04/06/20 1600          127/63    99 %    04/06/20 1500          123/61    98 %    04/06/20 1400          111/59    99 %    04/06/20 1338              99 %    04/06/20 1337          118/61        04/06/20 1245    (!) 56  11        99 % 04/06/20 1230    (!) 54  10    133/62    99 %    04/06/20 1200    (!) 59  12    132/66    98 %    04/06/20 1145    (!) 57          99 %    04/06/20 1130          126/61    99 %    04/06/20 1100          125/63    99 %    04/06/20 1030          120/63    99 %    04/06/20 1000    (!) 53  12    128/63    99 %    04/06/20 0930          127/68    99 %    04/06/20 0900          123/64    99 %    04/06/20 0830    (!) 54  14    125/71    99 %    04/06/20 0800          123/59    99 %    04/06/20 0730    66  15    101/62    99 %    04/06/20 07:19:10              100 %    04/06/20 0700    (!) 55      116/64    99 %    04/06/20 0628  1  61  18  98.4 °F (36.9 °C)  103/59  Alert  100 %

## 2020-04-07 ENCOUNTER — APPOINTMENT (OUTPATIENT)
Dept: GENERAL RADIOLOGY | Age: 85
DRG: 194 | End: 2020-04-07
Attending: INTERNAL MEDICINE
Payer: MEDICARE

## 2020-04-07 PROBLEM — R07.89 OTHER CHEST PAIN: Status: ACTIVE | Noted: 2020-04-07

## 2020-04-07 PROBLEM — I50.42 CHRONIC COMBINED SYSTOLIC AND DIASTOLIC CONGESTIVE HEART FAILURE (HCC): Status: ACTIVE | Noted: 2020-04-07

## 2020-04-07 LAB
ANION GAP SERPL CALC-SCNC: 4 MMOL/L (ref 3–18)
ATRIAL RATE: 53 BPM
ATRIAL RATE: 55 BPM
ATRIAL RATE: 56 BPM
ATRIAL RATE: 62 BPM
BACTERIA SPEC CULT: NORMAL
BUN SERPL-MCNC: 37 MG/DL (ref 7–18)
BUN/CREAT SERPL: 28 (ref 12–20)
CALCIUM SERPL-MCNC: 8.9 MG/DL (ref 8.5–10.1)
CALCULATED P AXIS, ECG09: 25 DEGREES
CALCULATED R AXIS, ECG10: -18 DEGREES
CALCULATED R AXIS, ECG10: 10 DEGREES
CALCULATED R AXIS, ECG10: 26 DEGREES
CALCULATED R AXIS, ECG10: 32 DEGREES
CALCULATED T AXIS, ECG11: 113 DEGREES
CALCULATED T AXIS, ECG11: 115 DEGREES
CALCULATED T AXIS, ECG11: 136 DEGREES
CALCULATED T AXIS, ECG11: 137 DEGREES
CHLORIDE SERPL-SCNC: 115 MMOL/L (ref 100–111)
CK MB CFR SERPL CALC: 9.3 % (ref 0–4)
CK MB SERPL-MCNC: 11.2 NG/ML (ref 5–25)
CK SERPL-CCNC: 121 U/L (ref 39–308)
CO2 SERPL-SCNC: 28 MMOL/L (ref 21–32)
CREAT SERPL-MCNC: 1.32 MG/DL (ref 0.6–1.3)
DIAGNOSIS, 93000: NORMAL
GLUCOSE SERPL-MCNC: 102 MG/DL (ref 74–99)
MAGNESIUM SERPL-MCNC: 1.7 MG/DL (ref 1.6–2.6)
P-R INTERVAL, ECG05: 248 MS
P-R INTERVAL, ECG05: 274 MS
P-R INTERVAL, ECG05: 304 MS
POTASSIUM SERPL-SCNC: 4.3 MMOL/L (ref 3.5–5.5)
Q-T INTERVAL, ECG07: 500 MS
Q-T INTERVAL, ECG07: 504 MS
Q-T INTERVAL, ECG07: 522 MS
Q-T INTERVAL, ECG07: 522 MS
QRS DURATION, ECG06: 146 MS
QRS DURATION, ECG06: 176 MS
QRS DURATION, ECG06: 180 MS
QRS DURATION, ECG06: 184 MS
QTC CALCULATION (BEZET), ECG08: 472 MS
QTC CALCULATION (BEZET), ECG08: 499 MS
QTC CALCULATION (BEZET), ECG08: 499 MS
QTC CALCULATION (BEZET), ECG08: 507 MS
SERVICE CMNT-IMP: NORMAL
SODIUM SERPL-SCNC: 147 MMOL/L (ref 136–145)
T3FREE SERPL-MCNC: 1.6 PG/ML (ref 2.18–3.98)
T4 FREE SERPL-MCNC: 0.9 NG/DL (ref 0.7–1.5)
TROPONIN I SERPL-MCNC: 0.03 NG/ML (ref 0–0.04)
VENTRICULAR RATE, ECG03: 53 BPM
VENTRICULAR RATE, ECG03: 55 BPM
VENTRICULAR RATE, ECG03: 55 BPM
VENTRICULAR RATE, ECG03: 62 BPM

## 2020-04-07 PROCEDURE — 36415 COLL VENOUS BLD VENIPUNCTURE: CPT

## 2020-04-07 PROCEDURE — 74011250636 HC RX REV CODE- 250/636: Performed by: HOSPITALIST

## 2020-04-07 PROCEDURE — 84439 ASSAY OF FREE THYROXINE: CPT

## 2020-04-07 PROCEDURE — 77030040829 HC CATH EXT URINE MDII -B

## 2020-04-07 PROCEDURE — 93005 ELECTROCARDIOGRAM TRACING: CPT

## 2020-04-07 PROCEDURE — 77010033678 HC OXYGEN DAILY

## 2020-04-07 PROCEDURE — 82550 ASSAY OF CK (CPK): CPT

## 2020-04-07 PROCEDURE — 83735 ASSAY OF MAGNESIUM: CPT

## 2020-04-07 PROCEDURE — 92610 EVALUATE SWALLOWING FUNCTION: CPT

## 2020-04-07 PROCEDURE — 92526 ORAL FUNCTION THERAPY: CPT

## 2020-04-07 PROCEDURE — 74011000258 HC RX REV CODE- 258: Performed by: HOSPITALIST

## 2020-04-07 PROCEDURE — 65660000000 HC RM CCU STEPDOWN

## 2020-04-07 PROCEDURE — 84481 FREE ASSAY (FT-3): CPT

## 2020-04-07 PROCEDURE — 77030040831 HC BAG URINE DRNG MDII -A

## 2020-04-07 PROCEDURE — 77030040922 HC BLNKT HYPOTHRM STRY -A

## 2020-04-07 PROCEDURE — 74011250637 HC RX REV CODE- 250/637: Performed by: HOSPITALIST

## 2020-04-07 PROCEDURE — 80048 BASIC METABOLIC PNL TOTAL CA: CPT

## 2020-04-07 RX ORDER — PANTOPRAZOLE SODIUM 40 MG/1
40 TABLET, DELAYED RELEASE ORAL
Status: DISCONTINUED | OUTPATIENT
Start: 2020-04-07 | End: 2020-04-09 | Stop reason: HOSPADM

## 2020-04-07 RX ORDER — LEVOTHYROXINE SODIUM 25 UG/1
50 TABLET ORAL
Status: DISCONTINUED | OUTPATIENT
Start: 2020-04-08 | End: 2020-04-09 | Stop reason: HOSPADM

## 2020-04-07 RX ORDER — NALOXONE HYDROCHLORIDE 0.4 MG/ML
0.4 INJECTION, SOLUTION INTRAMUSCULAR; INTRAVENOUS; SUBCUTANEOUS AS NEEDED
Status: DISCONTINUED | OUTPATIENT
Start: 2020-04-07 | End: 2020-04-09 | Stop reason: HOSPADM

## 2020-04-07 RX ORDER — ATORVASTATIN CALCIUM 20 MG/1
20 TABLET, FILM COATED ORAL DAILY
Status: DISCONTINUED | OUTPATIENT
Start: 2020-04-07 | End: 2020-04-09 | Stop reason: HOSPADM

## 2020-04-07 RX ORDER — MORPHINE SULFATE 2 MG/ML
1 INJECTION, SOLUTION INTRAMUSCULAR; INTRAVENOUS ONCE
Status: COMPLETED | OUTPATIENT
Start: 2020-04-07 | End: 2020-04-07

## 2020-04-07 RX ORDER — ALLOPURINOL 100 MG/1
300 TABLET ORAL DAILY
Status: DISCONTINUED | OUTPATIENT
Start: 2020-04-07 | End: 2020-04-09 | Stop reason: HOSPADM

## 2020-04-07 RX ORDER — MORPHINE SULFATE 2 MG/ML
1 INJECTION, SOLUTION INTRAMUSCULAR; INTRAVENOUS
Status: DISCONTINUED | OUTPATIENT
Start: 2020-04-07 | End: 2020-04-09 | Stop reason: HOSPADM

## 2020-04-07 RX ORDER — TAMSULOSIN HYDROCHLORIDE 0.4 MG/1
0.4 CAPSULE ORAL DAILY
Status: DISCONTINUED | OUTPATIENT
Start: 2020-04-07 | End: 2020-04-09 | Stop reason: HOSPADM

## 2020-04-07 RX ORDER — LEVOTHYROXINE SODIUM 25 UG/1
25 TABLET ORAL
Status: DISCONTINUED | OUTPATIENT
Start: 2020-04-07 | End: 2020-04-07

## 2020-04-07 RX ORDER — ASPIRIN 81 MG/1
81 TABLET ORAL DAILY
Status: DISCONTINUED | OUTPATIENT
Start: 2020-04-07 | End: 2020-04-09 | Stop reason: HOSPADM

## 2020-04-07 RX ORDER — PANTOPRAZOLE SODIUM 40 MG/1
40 TABLET, DELAYED RELEASE ORAL
Status: DISCONTINUED | OUTPATIENT
Start: 2020-04-07 | End: 2020-04-07

## 2020-04-07 RX ADMIN — LEVOTHYROXINE SODIUM 25 MCG: 0.03 TABLET ORAL at 05:46

## 2020-04-07 RX ADMIN — CEFTRIAXONE 2 G: 2 INJECTION, POWDER, FOR SOLUTION INTRAMUSCULAR; INTRAVENOUS at 16:54

## 2020-04-07 RX ADMIN — ASPIRIN 81 MG: 81 TABLET, COATED ORAL at 08:51

## 2020-04-07 RX ADMIN — ATORVASTATIN CALCIUM 20 MG: 20 TABLET, FILM COATED ORAL at 08:51

## 2020-04-07 RX ADMIN — ALLOPURINOL 300 MG: 100 TABLET ORAL at 08:51

## 2020-04-07 RX ADMIN — AZITHROMYCIN MONOHYDRATE 500 MG: 500 INJECTION, POWDER, LYOPHILIZED, FOR SOLUTION INTRAVENOUS at 17:02

## 2020-04-07 RX ADMIN — PANTOPRAZOLE SODIUM 40 MG: 40 TABLET, DELAYED RELEASE ORAL at 08:51

## 2020-04-07 RX ADMIN — MORPHINE SULFATE 1 MG: 2 INJECTION, SOLUTION INTRAMUSCULAR; INTRAVENOUS at 01:07

## 2020-04-07 RX ADMIN — TAMSULOSIN HYDROCHLORIDE 0.4 MG: 0.4 CAPSULE ORAL at 08:51

## 2020-04-07 NOTE — PROGRESS NOTES
0725= Bedside and Verbal shift change report given to Song Ulrich (oncoming nurse) by Daryn Bland RN (offgoing nurse). Report included the following information Kardex, Intake/Output, MAR and Recent Results.

## 2020-04-07 NOTE — PROGRESS NOTES
2000= Received report and assumed care from  Pt AAOX4, Pt awake watching TV. Denies pain , No resp distress noted. PIV all intact and flushing,  Mews score low (3) .   B/C hrt  rate in the 40's, Hrt rate drops occasionally to the 30's,  Pt's in 1st degree AVB . Will bring to on call Dr's attention . 2100= Pt watching T.V  2200= Dr. Rashi Lane made of pt's condition and that the pt is asmyptomic.    0000= Pt C/O pain states that he feels pressure to his  chest , Pt states his pain comes and goes , when asked again , he says he's had pain all day. VS taken and 2 liters oxygen applied,  is on the floor and was notified. 0040= Rapid response called. Ekg done and labs taken. 0100= Morphine1 mg IVP given. .  0130=Pt pain level went from a 7 and now a 3. Pt states he feels better. VS done . 0136= Pt in the bed resting quietly easily awakened. Will continue to monitor. 0300= No change noted , Pt in bed with eyes closed easily awakened. 0500= No change noted. Will continue to monitor .

## 2020-04-07 NOTE — H&P
History and Physical    Patient: Nahomy Domínguez               Sex: male          DOA: 4/6/2020       YOB: 1934      Age:  80 y.o.        LOS:  LOS: 0 days        HPI:     Nahomy Domínguez is a 80 y.o. male who has been having SOB at home. Overall his symptoms have been present for about 10 days. He does not have fever. He does not have productive cough. He does not have chest pain. In the ER he is found to have pneumonia. He also has bradycardia. He will be admitted for ongoing management. 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 GI:  No nausea or vomitting. No diarrhea  :  No hematuria or dysuria  Skin:  No rashes or moles  Neuro:  No seizures or syncope  Hematological:  No bruising or bleeding  Endocrine:  No diabetes or thyroid disease    Physical Exam:      Visit Vitals  /57 (BP 1 Location: Left arm, BP Patient Position: At rest)   Pulse (!) 47   Temp (!) 94.5 °F (34.7 °C)   Resp 14   Ht 5' 7\" (1.702 m)   Wt 72.6 kg (160 lb)   SpO2 99%   BMI 25.06 kg/m²       Physical Exam:    Gen:  No distress, alert  HEENT:  Normal cephalic atraumatic, extra-occular movements are intact. Neck:  Supple, No JVD  Lungs:  Clear bilaterally, no wheeze, no rales, normal effort  Heart:  Regular Rate and Rhythm, normal S1 and S2, no edema  Abdomen:  Soft, non tender, normal bowel sounds, no guarding.   Extremities: Well perfused, no cyanosis or edema  Neurological:  Awake and alert, CN's are intact, normal strength throughout extremities  Skin:  No rashes or moles  Mental Status:  Normal thought process, does not appear anxious    Laboratory Studies:    BMP:   Lab Results   Component Value Date/Time     (H) 04/06/2020 06:32 AM    K 4.2 04/06/2020 06:32 AM     (H) 04/06/2020 06:32 AM    CO2 29 04/06/2020 06:32 AM    AGAP 5 04/06/2020 06:32 AM    GLU 69 (L) 04/06/2020 06:32 AM    BUN 44 (H) 04/06/2020 06:32 AM    CREA 1.33 (H) 04/06/2020 06:32 AM    GFRAA >60 04/06/2020 06:32 AM    GFRNA 51 (L) 04/06/2020 06:32 AM     CBC:   Lab Results   Component Value Date/Time    WBC 5.7 04/06/2020 06:32 AM    HGB 11.7 (L) 04/06/2020 06:32 AM    HCT 34.5 (L) 04/06/2020 06:32 AM     04/06/2020 06:32 AM     All Cardiac Markers in the last 24 hours:   Lab Results   Component Value Date/Time    CPK 47 04/06/2020 12:20 PM    CPK 53 04/06/2020 06:32 AM    CKMB 7.9 (H) 04/06/2020 12:20 PM    CKMB 9.1 (H) 04/06/2020 06:32 AM    CKND1 16.8 (H) 04/06/2020 12:20 PM    CKND1 17.2 (H) 04/06/2020 06:32 AM    TROIQ 0.02 04/06/2020 12:20 PM    TROIQ 0.02 04/06/2020 06:32 AM       Assessment/Plan     Principal Problem:    Pneumonia (4/6/2020)    Active Problems:    Bradycardia (2/19/2020)      Hypothyroidism (9/69/6370)      Systolic CHF, chronic (HCC) (4/6/2020)      Chronic diastolic CHF (congestive heart failure) ([facility] [address] (4/6/2020)        PLAN:    Continue with antibiotics  Patient is not symptomatically in CHF currently  Follow HR, may need further workup  Check TSH  Overall, expect short hospitalization.

## 2020-04-07 NOTE — PROGRESS NOTES
SPEECH LANGUAGE PATHOLOGY BEDSIDE SWALLOW   EVALUATION & TREATMENT     Patient: Martha Clark (39 y.o. male)  Date: 4/7/2020  Primary Diagnosis: Pneumonia [J18.9]        Precautions: aspiration precautions     PLOF: Per H&P    ASSESSMENT :  Based on the objective data described below, the patient presents with mild oral and suspected moderate pharyngeal dysphagia. Clinical beside swallow eval/tx completed per MD orders. Pt A&Ox4 loosely. Pt reports some difficulty with swallowing over the past 2 months, but unable to further explain. Pt has hx of dysphagia (2/2020 aspirating thin liquids)     Per chart review, he has also had a decline with po intake. Speech/voice was clear, but low in vocal intensity. Oral mech examination revealed: poor/limited natural dentition, reduced labial seal, and decreased lingual strength. Pt observed with thin liquids +/- straw via single sips and successive swallows, which elicited slight change in VQ and delayed throat clearing; no improvement achieved with single cup sips. He accepted Nectar/mildly thick liquids via cup sip with delayed swallow initiation and per palpation/observation laryngeal elevation was assessed to be weak. Mr. Humberto Young warranted extended time for oral prep and exhibit tongue pumping prior to swallowing puree; however, he did achieve thorough oral clearance. Labored mastication and prolonged  oral prep observed with regular solids, however he displayed adequate bolus control with no overt s/sx aspiration. SLP recommends a dental soft solid diet with Nectar/mildly thick diet NO STRAWS with general safe swallow precautions. Meds whole, one at a time in pudding. Schedule MBS  Next day/when stable Pt educated with regard to s/sx aspiration, aspiration risk, diet recs, positioning, and role of SLP. Pt would benefit from a MBS to further assess pharyngeal swallow, and to r/o silent aspiration.    TREATMENT :  Skilled therapy initiated; Educated pt on aspiration precautions and importance of compensatory swallow techniques to decrease aspiration risk (decrease rate of intake & sip/bite size, upright @HOB for all po intake and ~30 minutes after po); verbalized comprehension. SLP to follow as indicated. Patient will benefit from skilled intervention to address the above impairments. Patient's rehabilitation potential is considered to be Good  Factors which may influence rehabilitation potential include:  []            None noted  [x]            Mental ability/status  [x]            Medical condition  [x]            Home/family situation and support systems  [x]            Safety awareness  []            Pain tolerance/management  []            Other:      PLAN :  Recommendations and Planned Interventions:  Dental soft solids with Nectar/ mildly thick liquids   NO STRAWS  Aspiration precautions  HOB >45 during po intake, remain >30 for 30-45 minutes after po   Small bites/sips; alternate liquid/solid with slow feeding rate   Oral care TID  Meds whole, 1 at a time   MBS to further assess oropharyngeal swallow fxn  Frequency/Duration: Patient will be followed by speech-language pathology 1-2 times per day/4-7 days per week to address goals. Discharge Recommendations: Home Health, Inpatient Rehab, and Skilled Nursing Facility     SUBJECTIVE:   Patient stated I've had that stuff before. I like it in my juices, but not my water. (referring to thickener).     OBJECTIVE:     Past Medical History:   Diagnosis Date    Difficulty urinating     Elevated PSA     Hematuria, unspecified     Hypercholesterolemia     Hypertension     Incomplete bladder emptying     Urinary retention     UTI (urinary tract infection)      Past Surgical History:   Procedure Laterality Date    APPENDECTOMY      EGD  2/7/2014         HX TURP  6/13/16    HX TURP       Home Situation:   Home Situation  Home Environment: Private residence  One/Two Story Residence: One story  Living Alone: No  Support Systems: Family member(s)  Patient Expects to be Discharged to[de-identified] Private residence  Current DME Used/Available at Home: None    Diet prior to admission: Unknown  Current Diet:  Dental soft solids with Nectar/ mildly thick liquids (NO STRAWS)    Cognitive and Communication Status:  Neurologic State: Alert     Cognition: Follows commands           Oral Assessment:  Oral Assessment  Labial: Decreased seal  Dentition: Limited;Poor  Oral Hygiene: fair  Lingual: Decreased strength  P.O. Trials:  Patient Position: 60*  Vocal quality prior to P.O.: Low volume; Wet  Consistency Presented: Nectar thick liquid;Puree; Solid; Thin liquid  How Presented: SLP-fed/presented;Cup/sip;Straw     Bolus Acceptance: Impaired  Bolus Formation/Control: Impaired  Type of Impairment: Delayed;Lip closure;Mastication  Propulsion: Delayed (# of seconds); Discoordination; Tongue pumping  Oral Residue: Less than 10% of bolus  Initiation of Swallow: Delayed (# of seconds)  Laryngeal Elevation: Weak  Aspiration Signs/Symptoms: Change vocal quality;Delayed cough/throat clear  Pharyngeal Phase Characteristics: Altered vocal quality; Easily fatigued ;Multiple swallows; Poor endurance  Effective Modifications: Alternate liquids/solids;Cup/sip;Small sips and bites; Other (comment)  Cues for Modifications: Minimal-moderate  Comments: Hx of aspiration on thin liquids    Oral Phase Severity: Mild  Pharyngeal Phase Severity : Moderate    PAIN:  Pain level pre-treatment: 0/10   Pain level post-treatment: 0/10   Pain Intervention(s): Medication (see MAR);  Rest, Ice, Repositioning  Response to intervention: Nurse notified, See doc flow; pt receptive and cooperative    After treatment:   []            Patient left in no apparent distress sitting up in chair  [x]            Patient left in no apparent distress in bed  [x]            Call bell left within reach  [x]            Nursing notified  []            Family present  []            Caregiver present  []            Bed alarm activated    COMMUNICATION/EDUCATION:   [x]            Aspiration precautions; swallow safety; compensatory techniques. [x]            Patient/family have participated as able in goal setting and plan of care. []            Patient/family agree to work toward stated goals and plan of care. []            Patient understands intent and goals of therapy; neutral about participation. []            Patient unable to participate in goal setting/plan of care; educ ongoing with interdisciplinary staff  [x]         Posted safety precautions in patient's room.     Thank you for this referral.  CAMRON Richardson  Time Calculation: 27 mins  Evaluation Time: 10 minutes   Treatment Time: 17 minutes

## 2020-04-07 NOTE — PROGRESS NOTES
HR now down in 30-40's  Looks likely alternating sinus michoacano with AIVR. Consult cardiology, Елена obrien- step down.

## 2020-04-07 NOTE — PROGRESS NOTES
Problem: Discharge Planning  Goal: *Discharge to safe environment  Outcome: Progressing Towards Goal  Plan is home     Reason for Admission:  Pneumonia, Bradycardia               RUR Score:      22%    PCP: First and Last name: Dr Barbie Beltre   Name of Practice:   Are you a current patient: Yes/No:   Approximate date of last visit:  2 weeks ago             Resources/supports as identified by patient/family:                   Top Challenges facing patient (as identified by patient/family and CM): Finances/Medication cost?   [place]? Son Rashel Contreras to drive              Support system or lack thereof? Lives with Son and daughter in law                     Living arrangements? Self-care/ADLs/Cognition? Patient is able to get around with walker and wc. Lives with son and daughter in law, they help care for patient. He has 4 wheeled walker, 2 wheeled walker, shower chair and ramp. Son says it is harder now to help pt get dressed  And go into and out of car to see MD.           Current Advanced Directive/Advance Care Plan:  Son remembers once when his dad was doing poorly, a MD discussed goals of care with son. But son and patient  unsure if has a written one. Patient does not have a code status on chart- discussed with Dr Jere Betancur. .  Plan for utilizing home health:    Patient was active with [facility] for PT. Will obtain order for PT, Skilled Nurse for disease management and medication education. [facility] [address] Provider list has been given to the patient and/or patient representative. Patient and/or patient representative has signed the [place] of Choice selecting ___[facility]______as their preference agency and a copy given. Both Home Health Provider list and Freedom of Choice have been placed on the chart.   Demographic sheet, H/P, Progress Note and Cardiolgy Consult faxed to [facility] at [phone]. Transition of Care Plan:   Chart reviewed and pt verified demographics. He lives with son and DIL and they help with ADL if needed. He had last been here Feb 2020 and went to rehab at that time. Pt said I could speak with his family, I called and spoke to his son. I discussed that pt may be discharged in a day or two, I was not sure. Son would like pt to continue with PT and to start with Skilled RN for home health. Plan is home with home health. Patient has designated _son _______________________ to participate in his/her discharge plan and to receive any needed information. Name: Ronald Ceballos  Address:  Phone number:[phone]/ work [phone]    Patient has designated __Daughter in law____________________ to participate in his/her discharge plan and to receive any needed information. Name: Lamine Faby  Address:  Phone number: [phone]    Care Management Interventions  PCP Verified by CM: Yes  Palliative Care Criteria Met (RRAT>21 & CHF Dx)?: No  Mode of Transport at Discharge:  Other (see comment)(Son Kristan Anguiano)  Transition of Care Consult (CM Consult): Discharge Planning, [address]: No  Reason Outside [place]: Patient already serviced by other home care/hospice agency  Discharge Durable Medical Equipment: No  Speech Therapy Consult: Yes  Current Support Network: Relative's Home(He lives with his son and daughter in law)  Confirm Follow Up Transport: Family  The Plan for Transition of Care is Related to the Following Treatment Goals : patient is stable to go home  The Patient and/or Patient Representative was Provided with a Choice of Provider and Agrees with the Discharge Plan?: Yes  Name of the Patient Representative Who was Provided with a Choice of Provider and Agrees with the Discharge Plan: Gricelda Colin  [place] of Choice List was Provided with Basic Dialogue that Supports the Patient's Individualized Plan of Care/Goals, Treatment Preferences and Shares the Quality Data Associated with the Providers?: Yes   Resource Information Provided?: No  Discharge Location  Discharge Placement: Home with home health     If pt is to be discharged tomorrow- 4/8/2020, son will not be abaleto pick him up until after 4 pm.     LEO Rashid  [facility]  [phone], Pager [phone]  [email or web address]

## 2020-04-07 NOTE — PROGRESS NOTES
0715-  Bedside and Verbal shift change report given to Rajiv Zaldivar RN (oncoming nurse) by Damien Kerr RN (offgoing nurse). Report included the following information SBAR, Kardex, Intake/Output, MAR and Recent Results. 1156-  Report called to Brianda James RN (ICU). Patient being transferred due to patient low temperature and low heart rate. 36- Report received from Brianda James [facility] (ICU) as patient being transferred back to this unit ([facility]). [id number]- Patient resting in bed. No complaints of chest pain or SOB. 1915-  Bedside and Verbal shift change report given to Maura De Dios RN (oncoming nurse) by Rajiv Zaldivar RN (offgoing nurse). Report included the following information SBAR, Kardex, Intake/Output, MAR and Recent Results.

## 2020-04-07 NOTE — PROGRESS NOTES
Pharmacy Monitoring Note: UALGB-22 Conservation Interventions    Pharmacy has completed the following interventions for the patient:  Azithromycin duration limitations per treatment guidelines: 500mg IV x 3 days OR 500mg IV ONCE then 250mg IV x 4 days    Please contact pharmacy with questions. Emi Portillo.  Shira Robertson, Karla  [facility] Clinical Pharmacy Coordinator  [phone]

## 2020-04-07 NOTE — PROGRESS NOTES
Responded to RRT for chest pain. Patient has had the same pain all day. He says it has been present overall for perhaps a couple of weeks. He does not have SOB  He appears comfortable. EKG unchanged. He has two normal troponins. Will check another. Pain control.   PPI

## 2020-04-07 NOTE — PROGRESS NOTES
Physical Exam  Skin:     General: Skin is warm and dry. Capillary Refill: Capillary refill takes less than 2 seconds. Primary Nurse Moe Ryder RN and Ruby Gonzalez RN performed a dual skin assessment on this patient Impairment noted- see wound doc flow sheet Alex score is 13.

## 2020-04-07 NOTE — PROGRESS NOTES
conducted an initial consultation and Spiritual Assessment for Olga Carranza, who is a 80 y. o.,male. Patients Primary Language is: [place]. According to the patients EMR [employer] Affiliation is: [facility].     The reason the Patient came to the hospital is:   Patient Active Problem List    Diagnosis Date Noted    Pneumonia 35/42/7322    Systolic CHF, chronic ([address] 04/06/2020    Chronic diastolic CHF (congestive heart failure) ([address] 04/06/2020    Cardiomyopathy ([address] 02/20/2020    Grade II diastolic dysfunction 40/35/4685    Hypothyroidism 02/20/2020    UTI (urinary tract infection) 02/19/2020    Acute renal failure (ARF) ([address] 02/19/2020    Bradycardia 02/19/2020    Hypotension 02/19/2020    Sustained ventricular tachycardia ([address] 12/28/2019    Duodenitis 12/24/2019    Abdominal pain 12/23/2019    Hypothermia 12/23/2019    Transaminitis 12/23/2019    Chronic renal failure, stage 3 (moderate) (HCC) 12/23/2019    SIRS (systemic inflammatory response syndrome) ([address] 12/23/2019    Gastritis 12/23/2019    Syncope and collapse 01/30/2018    Head injury 01/30/2018    Forehead contusion 01/30/2018    Wrist abrasion, infected, left, initial encounter 01/30/2018    Wrist sprain, left, initial encounter 01/30/2018    Acute renal failure ([address] 02/06/2014    Elevated PSA 02/06/2014    Urinary retention 02/06/2014    Guaiac + stool 02/06/2014    Neoplastic disease 01/02/2013        The  provided the following Interventions:  Initiated a relationship of care and support with patient in room 3006. Hien Elaine Listened empathically as patient talked about his breakfast coming late and how he was feeling now. Provided information about Spiritual Care Services. Offered prayer and assurance of continued prayers on patients behalf. The following outcomes were achieved:  Patient shared limited information about his medical narrative and spiritual journey/beliefs.   Patient processed feeling about current hospitalization. Patient expressed gratitude for pastoral care visit. Assessment:  Patient does not have any Sikh/cultural needs that will affect patients preferences in health care. There are no further spiritual or Sikh issues which require Spiritual Care Services interventions at this time. Plan:  Chaplains will continue to follow and will provide pastoral care on an as needed/requested basis    . [facility]   Spiritual Care   [phone]

## 2020-04-07 NOTE — PROGRESS NOTES
(1155) TRANSFER - IN REPORT:    Verbal report received from Deep Leal (name) on Harika Guzmán  being received from  (unit) for change in patient condition(bradycardic and hypothermia)      Report consisted of patients Situation, Background, Assessment and   Recommendations(SBAR). Information from the following report(s) SBAR, Intake/Output and Cardiac Rhythm sinus bradycardia with BBB was reviewed with the receiving nurse. Opportunity for questions and clarification was provided. Assessment completed upon patients arrival to unit and care assumed. (1210) Patient arrived to the unit. HR 60 and rectal temp 97.5.     (1230) Spoke with Dr. Paulino Hearn about patient's status. He is good with transferring patient back to telemetry once cardiology sees patient.     [phone] with Barium. Patient is on the schedule for 0730 tomorrow morning for scan.

## 2020-04-07 NOTE — CONSULTS
CARDIOLOGY CONSULT    Patient: Aren Tran  MR #: [id number]      Reason for consult: Bradycardia    Assessment/Plan:     Hospital Problems  Date Reviewed: 4/6/2020          Codes Class Noted POA    Chronic combined systolic and diastolic congestive heart failure ([address] stable at present. O2 saturation 98% on room air flat in bed. Echocardiogram done on 12/28/19 demonstrated ejection fraction 35 to 40% with grade 2 diastolic dysfunction and moderate mitral regurgitation. BNP this admission 915 ICD-10-CM: I50.42  ICD-9-CM: 428.42, 428.0  4/7/2020 Unknown        Other chest pain, atypical as it occurs at rest  and has been going on for at least 2 weeks with troponins of 0.02, 0.02, and 0.03 this admission ICD-10-CM: R07.89  ICD-9-CM: 786.59  4/7/2020 Unknown        * (Principal) Pneumonia ICD-10-CM: J18.9  ICD-9-CM: 930  4/6/2020 Unknown        Hypothyroidism, patient started on low-dose Synthroid, hopefully heart rate may respond ICD-10-CM: E03.9  ICD-9-CM: 244.9  2/20/2020 Yes        Bradycardia, patient previously seen for similar findings. He has a sinus mechanism with first-degree AV block and a left bundle branch block. He has had no  dizziness, near syncope or syncope, i.e. he is asymptomatic. Would continue telemetry another 24 to 48 hours. ICD-10-CM: R00.1  ICD-9-CM: 427.89  2/19/2020 Yes              History of Present Illness  Aren Tran is a 80 y.o. hypertensive man seen in mid February of this year for generalized weakness and MARICRUZ. He was admitted on this occasion on 4/6/2020 with a 2-week history of  chest discomfort. The discomfort was across the lower left and right precordium. It was described as a tightness. He had tried nothing for relief. He could not recall any provocating factors. There was no radiation of the discomfort. There were no associated symptoms including shortness of breath, nausea and vomiting or diaphoresis.   3 serum troponins were obtained all of which were normal.  Electrocardiogram which showed the previously noted left bundle branch block and first-degree AV block. He was on telemetry floor and because of his profound bradycardia was transferred to the intensive care unit. At the present time, the patient feels fine and is wondering when he will be going home. He has had heart rates in the 30s but appears to be asymptomatic. He has been noted to have a mildly elevated TSH and has been started on low-dose Synthroid. He  also had some hypothermia. After the mid February hospitalization, he went to a rehab facility at [facility]. He believes he did well with rehab. He is able to negotiate his house with a wheelchair and some type of 2 wheeled walker. Past Medical History  Past Medical History:   Diagnosis Date    Difficulty urinating     Elevated PSA     Hematuria, unspecified     Hypercholesterolemia     Hypertension     Incomplete bladder emptying     Urinary retention     UTI (urinary tract infection)        Past Surgical History  Social History     Socioeconomic History    Marital status:      Spouse name: Not on file    Number of children: Not on file    Years of education: Not on file    Highest education level: Not on file   Occupational History    Occupation:  thirty years   Social Needs    Financial resource strain: Not on file    Food insecurity     Worry: Not on file     Inability: Not on file   [employer] needs     Medical: Not on file     Non-medical: Not on file   Tobacco Use    Smoking status: Never Smoker    Smokeless tobacco: Never Used   Substance and Sexual Activity    Alcohol use:  Yes     Alcohol/week: 2.0 standard drinks     Types: 2 Cans of beer per week     Comment: Occasional    Drug use: No    Sexual activity: Not on file   Lifestyle    Physical activity     Days per week: Not on file     Minutes per session: Not on file    Stress: Not on file   Relationships    Social connections     Talks on phone: Not on file     Gets together: Not on file     Attends Yazidi service: Not on file     Active member of club or organization: Not on file     Attends meetings of clubs or organizations: Not on file     Relationship status: Not on file    Intimate partner violence     Fear of current or ex partner: Not on file     Emotionally abused: Not on file     Physically abused: Not on file     Forced sexual activity: Not on file   Other Topics Concern    Not on file   Social History Narrative    Not on file         Meds  Current Facility-Administered Medications   Medication Dose Route Frequency    allopurinoL (ZYLOPRIM) tablet 300 mg  300 mg Oral DAILY    aspirin delayed-release tablet 81 mg  81 mg Oral DAILY    atorvastatin (LIPITOR) tablet 20 mg  20 mg Oral DAILY    tamsulosin (FLOMAX) capsule 0.4 mg  0.4 mg Oral DAILY    morphine injection 1 mg  1 mg IntraVENous Q4H PRN    pantoprazole (PROTONIX) tablet 40 mg  40 mg Oral ACB    naloxone (NARCAN) injection 0.4 mg  0.4 mg IntraVENous PRN    [START ON 4/8/2020] levothyroxine (SYNTHROID) tablet 50 mcg  50 mcg Oral 6am    barium sulfate (VARIBAR PUDDING) 40 % (w/v), 30% (w/w) contrast oral paste 15 mL  15 mL Oral RAD ONCE    cefTRIAXone (ROCEPHIN) 2 g in 0.9% sodium chloride (MBP/ADV) 50 mL MBP  2 g IntraVENous Q24H    azithromycin (ZITHROMAX) 500 mg in 0.9% sodium chloride 250 mL IVPB  500 mg IntraVENous Q24H         Allergies  Allergies   Allergen Reactions    Amlodipine Swelling and Itching    Bactrim [Sulfamethoprim Ds] Rash    Lisinopril Other (comments)     Acute renal failure    Ciprofloxacin Rash and Itching       Social History  Social History     Socioeconomic History    Marital status:      Spouse name: Not on file    Number of children: Not on file    Years of education: Not on file    Highest education level: Not on file   Occupational History    Occupation:  thirty years   Social Needs    Financial resource strain: Not on file    Food insecurity     Worry: Not on file     Inability: Not on file    Transportation needs     Medical: Not on file     Non-medical: Not on file   Tobacco Use    Smoking status: Never Smoker    Smokeless tobacco: Never Used   Substance and Sexual Activity    Alcohol use: Yes     Alcohol/week: 2.0 standard drinks     Types: 2 Cans of beer per week     Comment: Occasional    Drug use: No    Sexual activity: Not on file   Lifestyle    Physical activity     Days per week: Not on file     Minutes per session: Not on file    Stress: Not on file   Relationships    Social connections     Talks on phone: Not on file     Gets together: Not on file     Attends Restoration service: Not on file     Active member of club or organization: Not on file     Attends meetings of clubs or organizations: Not on file     Relationship status: Not on file    Intimate partner violence     Fear of current or ex partner: Not on file     Emotionally abused: Not on file     Physically abused: Not on file     Forced sexual activity: Not on file   Other Topics Concern    Not on file   Social History Narrative    Not on file       Family History     Family History   Problem Relation Age of Onset    Cancer Father     Alzheimer Mother     Heart defect Brother          Review of systems    General: No fever, chills. HEENT: Denies frequent headaches, cataracts, sinus issues. Pulmonary: Denies cough, wheezing. Cardiac: See HPI  GI: No nausea, vomiting, frequent diarrhea or constipation. : Denies dysuria, hematuria, nocturia. Neuro: No seizures, tremor or notable weakness. Musculoskeletal: Has had some generalized weakness. Heme: Denies easy bruising or bleeding. Psych: Denies history of depression or anxiety issues.       Physical Exam  Visit Vitals  /51   Pulse (!) 54   Temp 97.5 °F (36.4 °C)   Resp 12   Ht 5' 7\" (1.702 m)   Wt 79.5 kg (175 lb 3.2 oz)   SpO2 98%   BMI 27.44 kg/m²       Nahomy Domínguez is who is alert, oriented and in no acute distress. Head is normocephalic and atraumatic. . Trachea appears midline with no noted thyromegaly. Carotids are full without definite bruits. There is no JVD. Chest is clear to auscultation bilaterally. Cardiac auscultation reveals regular rate and rhythm without significant murmur. Abdomen is soft and nontender. Extremities are without significant edema. Dorsalis pedis and posterior tibial pulses are  palpable. . Skin is warm and dry. Diagnostic Tests EKG: Sinus bradycardia. First-degree AV block. Left bundle branch block. Labs:   Recent Labs     04/06/20  0632   WBC 5.7   HGB 11.7*   HCT 34.5*        Recent Labs     04/07/20  1411 04/06/20  0632   * 147*   K 4.3 4.2   * 113*   CO2 28 29   * 69*   BUN 37* 44*   CREA 1.32* 1.33*   CA 8.9 9.3   MG 1.7 1.8   ALB  --  3.1*   SGOT  --  60*   ALT  --  58       Recent Labs     04/07/20  0045 04/06/20  1220 04/06/20  0632   TROIQ 0.03 0.02 0.02    47 53   CKMB 11.2* 7.9* 9.1*     Last Lipid:  No results found for: CHOL, CHOLX, CHLST, CHOLV, HDL, HDLP, LDL, LDLC, DLDLP, TGLX, TRIGL, TRIGP, CHHD, CHHDX        We appreciate the opportunity to see Nahomy Domínguez with you. We will follow along.     Lucho Wylie MD  4/7/2020

## 2020-04-07 NOTE — PROGRESS NOTES
Medicine Progress Note    Patient: Fernie Robles   Age:  80 y.o.  DOA: 4/6/2020   Admit Dx / CC: Pneumonia [J18.9]  LOS:  LOS: 1 day     Assessment/Plan   Principal Problem:    Pneumonia (4/6/2020)    Active Problems:    Bradycardia (2/19/2020)      Hypothyroidism (5/22/1968)      Systolic CHF, chronic ([facility] [address] (4/6/2020)      Chronic diastolic CHF (congestive heart failure) ([facility] [address] (4/6/2020)        Additional Plan notes     R sided CAP   - continue azithro and rocephin   - speech consult    Bradycardia, hypothermia    - likely from hypothyroid---start 50 mcg daily   - less likely infection----treat as above    - warm blankets, his hypothermia is not making him feel bad, I would not be too aggressive IE martell obrien unless his bradycardia worsens. I will increase the synthroid dose just a bit. Once it takes affect I suspect this will resolve. Chronic CHF      DISPO     Anticipated Date of Discharge: 1-2 days  Anticipated Disposition (home, SNF) : home    Subjective:   Patient seen and examined. NAD, want to eat. No Chills    Objective:     Visit Vitals  /72 (BP 1 Location: Left arm, BP Patient Position: At rest)   Pulse 60   Temp (!) 94.5 °F (34.7 °C)   Resp 16   Ht 5' 7\" (1.702 m)   Wt 72.6 kg (160 lb)   SpO2 98%   BMI 25.06 kg/m²       Physical Exam:  General appearance: alert, cooperative, no distress, appears stated age  Head: Normocephalic, without obvious abnormality, atraumatic  Neck: supple, trachea midline  Lungs: clear to auscultation bilaterally  Heart: regular rate and rhythm, S1, S2 normal, no murmur, click, rub or gallop  Abdomen: soft, non-tender. Bowel sounds normal. No masses,  no organomegaly  Extremities: extremities normal, atraumatic, no cyanosis or edema  Skin: Skin color, texture, turgor normal. No rashes or lesions  Neurologic: Grossly normal    Intake and Output:  Current Shift:  No intake/output data recorded.   Last three shifts:  04/05 1901 - 04/07 0700  In: 1000 [I.V.:1000]  Out: 1000 [Urine:1000]    Lab/Data Reviewed:  CMP: No results found for: NA, K, CL, CO2, AGAP, GLU, BUN, CREA, GFRAA, GFRNA, CA, MG, PHOS, ALB, TBIL, TP, ALB, GLOB, AGRAT, SGOT, ALT, GPT  CBC: No results found for: WBC, HGB, HGBEXT, HCT, HCTEXT, PLT, PLTEXT, HGBEXT, HCTEXT, PLTEXT  All Cardiac Markers in the last 24 hours:   Lab Results   Component Value Date/Time     04/07/2020 12:45 AM    CPK 47 04/06/2020 12:20 PM    CKMB 11.2 (H) 04/07/2020 12:45 AM    CKMB 7.9 (H) 04/06/2020 12:20 PM    CKND1 9.3 (H) 04/07/2020 12:45 AM    CKND1 16.8 (H) 04/06/2020 12:20 PM    TROIQ 0.03 04/07/2020 12:45 AM    TROIQ 0.02 04/06/2020 12:20 PM       Medications Reviewed:  Current Facility-Administered Medications   Medication Dose Route Frequency    allopurinoL (ZYLOPRIM) tablet 300 mg  300 mg Oral DAILY    aspirin delayed-release tablet 81 mg  81 mg Oral DAILY    atorvastatin (LIPITOR) tablet 20 mg  20 mg Oral DAILY    tamsulosin (FLOMAX) capsule 0.4 mg  0.4 mg Oral DAILY    morphine injection 1 mg  1 mg IntraVENous Q4H PRN    pantoprazole (PROTONIX) tablet 40 mg  40 mg Oral ACB    naloxone (NARCAN) injection 0.4 mg  0.4 mg IntraVENous PRN    [START ON 4/8/2020] levothyroxine (SYNTHROID) tablet 50 mcg  50 mcg Oral 6am    cefTRIAXone (ROCEPHIN) 2 g in 0.9% sodium chloride (MBP/ADV) 50 mL MBP  2 g IntraVENous Q24H    azithromycin (ZITHROMAX) 500 mg in 0.9% sodium chloride 250 mL IVPB  500 mg IntraVENous Q24H       Bernard Ball MD    April 7, 2020

## 2020-04-07 NOTE — ROUTINE PROCESS
In chart to assess MEWS. Saw pt for RRT earlier for chest pain. EKG unchanged and troponin neg. Will cont to monitor.

## 2020-04-08 ENCOUNTER — APPOINTMENT (OUTPATIENT)
Dept: GENERAL RADIOLOGY | Age: 85
DRG: 194 | End: 2020-04-08
Attending: INTERNAL MEDICINE
Payer: MEDICARE

## 2020-04-08 ENCOUNTER — APPOINTMENT (OUTPATIENT)
Dept: NON INVASIVE DIAGNOSTICS | Age: 85
DRG: 194 | End: 2020-04-08
Attending: INTERNAL MEDICINE
Payer: MEDICARE

## 2020-04-08 PROBLEM — R13.10 DYSPHAGIA: Status: ACTIVE | Noted: 2020-04-08

## 2020-04-08 LAB
ECHO AO ROOT DIAM: 3.07 CM
ECHO EST RA PRESSURE: 3 MMHG
ECHO LV EDV A2C: 115.4 ML
ECHO LV EDV A4C: 95.8 ML
ECHO LV EDV BP: 110.6 ML (ref 67–155)
ECHO LV EDV INDEX A4C: 50.1 ML/M2
ECHO LV EDV INDEX BP: 57.9 ML/M2
ECHO LV EDV NDEX A2C: 60.4 ML/M2
ECHO LV EDV TEICHHOLZ: 78.67 ML
ECHO LV EJECTION FRACTION A2C: 49 %
ECHO LV EJECTION FRACTION A4C: 42 %
ECHO LV EJECTION FRACTION BIPLANE: 43.3 % (ref 55–100)
ECHO LV ESV A2C: 59.3 ML
ECHO LV ESV A4C: 55.9 ML
ECHO LV ESV BP: 62.7 ML (ref 22–58)
ECHO LV ESV INDEX A2C: 31 ML/M2
ECHO LV ESV INDEX A4C: 29.3 ML/M2
ECHO LV ESV INDEX BP: 32.8 ML/M2
ECHO LV ESV TEICHHOLZ: 66.16 ML
ECHO LV INTERNAL DIMENSION DIASTOLIC: 5.58 CM (ref 4.2–5.9)
ECHO LV INTERNAL DIMENSION SYSTOLIC: 5.18 CM
ECHO LV IVSD: 1.13 CM (ref 0.6–1)
ECHO LV MASS 2D: 276.2 G (ref 88–224)
ECHO LV MASS INDEX 2D: 144.6 G/M2 (ref 49–115)
ECHO LV POSTERIOR WALL DIASTOLIC: 0.96 CM (ref 0.6–1)
ECHO PULMONARY ARTERY SYSTOLIC PRESSURE (PASP): 24.7 MMHG
ECHO RIGHT VENTRICULAR SYSTOLIC PRESSURE (RVSP): 24.7 MMHG
ECHO TV REGURGITANT MAX VELOCITY: 233.17 CM/S
ECHO TV REGURGITANT PEAK GRADIENT: 21.7 MMHG
LVFS 2D: 7.23 %
LVSV (MOD BI): 24.71 ML
LVSV (MOD SINGLE 4C): 20.6 ML
LVSV (MOD SINGLE): 28.99 ML
LVSV (TEICH): 12.51 ML

## 2020-04-08 PROCEDURE — 74011250637 HC RX REV CODE- 250/637: Performed by: HOSPITALIST

## 2020-04-08 PROCEDURE — C8923 2D TTE W OR W/O FOL W/CON,CO: HCPCS

## 2020-04-08 PROCEDURE — 74011250636 HC RX REV CODE- 250/636: Performed by: HOSPITALIST

## 2020-04-08 PROCEDURE — 92526 ORAL FUNCTION THERAPY: CPT

## 2020-04-08 PROCEDURE — 74011000255 HC RX REV CODE- 255: Performed by: HOSPITALIST

## 2020-04-08 PROCEDURE — 74011000250 HC RX REV CODE- 250: Performed by: HOSPITALIST

## 2020-04-08 PROCEDURE — 74011000258 HC RX REV CODE- 258: Performed by: HOSPITALIST

## 2020-04-08 PROCEDURE — 74230 X-RAY XM SWLNG FUNCJ C+: CPT

## 2020-04-08 PROCEDURE — 74011250637 HC RX REV CODE- 250/637: Performed by: PHYSICIAN ASSISTANT

## 2020-04-08 PROCEDURE — 92611 MOTION FLUOROSCOPY/SWALLOW: CPT

## 2020-04-08 PROCEDURE — 77030040829 HC CATH EXT URINE MDII -B

## 2020-04-08 PROCEDURE — 65660000000 HC RM CCU STEPDOWN

## 2020-04-08 RX ORDER — AZITHROMYCIN 250 MG/1
500 TABLET, FILM COATED ORAL EVERY 24 HOURS
Status: DISCONTINUED | OUTPATIENT
Start: 2020-04-08 | End: 2020-04-09 | Stop reason: HOSPADM

## 2020-04-08 RX ADMIN — ATORVASTATIN CALCIUM 20 MG: 20 TABLET, FILM COATED ORAL at 08:25

## 2020-04-08 RX ADMIN — PERFLUTREN 1 ML: 6.52 INJECTION, SUSPENSION INTRAVENOUS at 14:24

## 2020-04-08 RX ADMIN — LEVOTHYROXINE SODIUM 50 MCG: 0.03 TABLET ORAL at 05:53

## 2020-04-08 RX ADMIN — ASPIRIN 81 MG: 81 TABLET, COATED ORAL at 08:25

## 2020-04-08 RX ADMIN — AZITHROMYCIN 500 MG: 250 TABLET, FILM COATED ORAL at 16:12

## 2020-04-08 RX ADMIN — BARIUM SULFATE 15 ML: 400 SUSPENSION ORAL at 10:54

## 2020-04-08 RX ADMIN — CEFTRIAXONE 2 G: 2 INJECTION, POWDER, FOR SOLUTION INTRAMUSCULAR; INTRAVENOUS at 16:08

## 2020-04-08 RX ADMIN — BARIUM SULFATE 15 ML: 400 PASTE ORAL at 10:54

## 2020-04-08 RX ADMIN — ALLOPURINOL 300 MG: 100 TABLET ORAL at 08:24

## 2020-04-08 RX ADMIN — BARIUM SULFATE 30 ML: 0.81 POWDER, FOR SUSPENSION ORAL at 10:53

## 2020-04-08 RX ADMIN — PANTOPRAZOLE SODIUM 40 MG: 40 TABLET, DELAYED RELEASE ORAL at 08:25

## 2020-04-08 RX ADMIN — BARIUM SULFATE 700 MG: 700 TABLET ORAL at 10:58

## 2020-04-08 RX ADMIN — TAMSULOSIN HYDROCHLORIDE 0.4 MG: 0.4 CAPSULE ORAL at 08:25

## 2020-04-08 NOTE — PROGRESS NOTES
Speech Therapy Note:    MBS completed with SILENT aspiration on thin liquids. No improvement with chin tuck. At this time, pt safest for soft solid/nectar-thick liquid diet. Full report to follow.     Yehuda Natarajan M.S., [address] [facility]   Ph: [phone]

## 2020-04-08 NOTE — PROGRESS NOTES
Internal Medicine Progress Note    Patient's Name: Shanthi Velazco Date: 4/6/2020  Length of Stay: 2      Assessment/Plan     Principal Problem:    Right lower lobe pneumonia ([address] (4/6/2020)    Active Problems:    Bradycardia (2/19/2020)      Hypothyroidism (2/20/2020)      Chronic combined systolic and diastolic congestive heart failure ([address] (4/7/2020)      Other chest pain (4/7/2020)      Dysphagia (4/8/2020)      RLL PNA  - IV abx  - stable on RA  - speech therapy - MBS - aspiration with thin liquids    Bradycardia  - monitor on tele  - possible related to hypothyroid  - appreciate cards    Hypothyroid  - TSH elevated  - synthroid increased    CHF  - stable  - monitor for s/sx    - Cont acceptable home medications for chronic conditions   - DVT protocol    I have personally reviewed all pertinent labs and films that have officially resulted over the last 24 hours. I have personally checked for all pending labs that are awaiting final results. Anticipated discharge: 1 day    HPI     Per H&P, Jeanette Ballesteros is a 80 y.o. male who has been having SOB at home. Overall his symptoms have been present for about 10 days. He does not have fever. He does not have productive cough. He does not have chest pain. In the ER he is found to have pneumonia. He also has bradycardia. He will be admitted for ongoing management. [facility] Course     He was started on IV abx. Speech therapy consulted to monitor for aspiration. Cardiology consulted for bradycardia - recommended continued monitoring. TSH was found to be elevated which could explain bradycardia and borderline hypothermia. Synthroid increased. Subjective     Pt s/e @ bedside. No major events overnight. Pt offers no complaints this AM. Only reports coughing when drinking thin liquids. Denies CP or SOB. Denies abd pain, nvd.     Objective     Visit Vitals  /61 (BP 1 Location: Right arm, BP Patient Position: At rest)   Pulse 64   Temp 96.3 °F (35.7 °C)   Resp 16   Ht 5' 7\" (1.702 m)   Wt 79.5 kg (175 lb 3.2 oz)   SpO2 98%   BMI 27.44 kg/m²       Physical Exam:  General Appearance: NAD, conversant  HENT: normocephalic/atraumatic, moist mucus membranes  Neck: No JVD, supple  Lungs: CTA with normal respiratory effort  CV: RRR, no m/r/g  Abdomen: soft, non-tender, normal bowel sounds  Extremities: no cyanosis, no peripheral edema  Neuro: No focal deficits, motor/sensory intact  Skin: Normal color, intact      Intake and Output:  Current Shift:  No intake/output data recorded. Last three shifts:  04/06 1901 - 04/08 0700  In: 300 [I.V.:300]  Out: 925 [Urine:925]    Lab/Data Reviewed:  BMP:   Lab Results   Component Value Date/Time     (H) 04/07/2020 02:11 PM    K 4.3 04/07/2020 02:11 PM     (H) 04/07/2020 02:11 PM    CO2 28 04/07/2020 02:11 PM    AGAP 4 04/07/2020 02:11 PM     (H) 04/07/2020 02:11 PM    BUN 37 (H) 04/07/2020 02:11 PM    CREA 1.32 (H) 04/07/2020 02:11 PM    GFRAA >60 04/07/2020 02:11 PM    GFRNA 52 (L) 04/07/2020 02:11 PM     CBC: No results found for: WBC, HGB, HGBEXT, HCT, HCTEXT, PLT, PLTEXT, HGBEXT, HCTEXT, PLTEXT    Imaging Reviewed:  [facility] Video    Result Date: 4/8/2020  Modified Barium Swallow History: Feeding difficulties, Dysphagia Technique: The procedure was performed with the speech pathologist. Different consistencies of barium tinged products were given to swallow during real time fluoroscopic observation. Findings: With thin consistency via cup, pt demonstrated []swallow delay, premature spillage and silent aspiration. Via straw there was penetration to the cords without aspiration. . There was significant residua in the vallecula and pyriform sinus. With nectar consistency via cup, and pudding consistency, pt demonstrated [swallow delay and premature spillage without penetration or aspiration]. There was significant residua in the vallecula and pyriform sinus.   With []solids, pt demonstrated swallow delay and premature spillage without penetration or aspiration[]. No significant residua in the vallecula and pyriform sinus. With pill , pt demonstrated [normal swallowing]. No significant residua in the vallecula and pyriform sinus. Fluoroscopic time: 2.6 minutes Fluoroscopic dose (reference air kerma): 14.27 mGy     Impression: Abnormal modified swallow study as described. Please see full speech pathologist report to follow for discussion of findings and detailed recommendations. Videotape is available for review.       Medications Reviewed:  Current Facility-Administered Medications   Medication Dose Route Frequency    azithromycin (ZITHROMAX) 500 mg in 0.9% sodium chloride 250 mL IVPB  500 mg IntraVENous Q24H    allopurinoL (ZYLOPRIM) tablet 300 mg  300 mg Oral DAILY    aspirin delayed-release tablet 81 mg  81 mg Oral DAILY    atorvastatin (LIPITOR) tablet 20 mg  20 mg Oral DAILY    tamsulosin (FLOMAX) capsule 0.4 mg  0.4 mg Oral DAILY    morphine injection 1 mg  1 mg IntraVENous Q4H PRN    pantoprazole (PROTONIX) tablet 40 mg  40 mg Oral ACB    naloxone (NARCAN) injection 0.4 mg  0.4 mg IntraVENous PRN    levothyroxine (SYNTHROID) tablet 50 mcg  50 mcg Oral 6am    cefTRIAXone (ROCEPHIN) 2 g in 0.9% sodium chloride (MBP/ADV) 50 mL MBP  2 g IntraVENous Q24H         [facility]  Office:  [phone]  Pager: [phone]

## 2020-04-08 NOTE — PROGRESS NOTES
[facility]  -  Progress Note      Patient: Nahomy Domínguez MRN: [id number]  SSN: [id number]    YOB: 1934  Age: 80 y.o. Sex: male      Admit Date: 4/6/2020      I saw, evaluated, interviewed and examined the patient personally. I agree with the findings and plan of care as documented below with PAKelseyC note  No obvious evidence of heart failure on exam.  Appears compensated  Echo pending  Heart rate has improved. Continue telemetry monitoring    Jackie Braga MD       Assessment:     -RLL pneumonia  -Chronic HFrEF.  on admission. Echo 12/2019 with EF 69-52%, grade 2 diastolic dysfunction, moderate mitral regurgitation. -Asymptomatic sinus bradycardia with first degree AV block and LBBB. -Hypothyroidism, TSH 7.65 with FT3 1.6 this admission. Plan:     -Will check limited Echocaradiogram.  -No significant telemetry events overnight.  -Continue treatment of underlying hypothyroidism per primary team.    Subjective:     No new complaints. Denies shortness of breath, chest pain.     Objective:      Patient Vitals for the past 8 hrs:   Temp Pulse Resp BP SpO2   04/08/20 0834   16 145/61 98 %   04/08/20 0400 96.3 °F (35.7 °C) 64 16 117/64 99 %         Patient Vitals for the past 96 hrs:   Weight   04/07/20 1502 175 lb 3.2 oz (79.5 kg)   04/06/20 0628 160 lb (72.6 kg)         Intake/Output Summary (Last 24 hours) at 4/8/2020 1112  Last data filed at 4/8/2020 0555  Gross per 24 hour   Intake 300 ml   Output 175 ml   Net 125 ml       Physical Exam:  General:  alert, cooperative, no distress, appears stated age  Neck:  supple  Lungs:  clear to auscultation bilaterally to anterolateral lung fields  Heart:  Regular rate and rhythm  Abdomen:  abdomen is soft without significant tenderness, masses, organomegaly or guarding  Extremities:  Atraumatic, no edema     Data Review:     Labs: Results:       Chemistry Recent Labs     04/07/20  1411 04/06/20  0632   * 69*   NA 147* 147*   K 4.3 4.2   * 113*   CO2 28 29   BUN 37* 44*   CREA 1.32* 1.33*   CA 8.9 9.3   MG 1.7 1.8   AGAP 4 5   BUCR 28* 33*   AP  --  167*   TP  --  6.6   ALB  --  3.1*   GLOB  --  3.5   AGRAT  --  0.9      CBC w/Diff Recent Labs     04/06/20  0632   WBC 5.7   RBC 3.49*   HGB 11.7*   HCT 34.5*         Liver Enzymes Recent Labs     04/06/20  0632   TP 6.6   ALB 3.1*   *   SGOT 60*      Thyroid Studies Lab Results   Component Value Date/Time    T4, Total 11.5 02/19/2020 09:28 AM    TSH 7.65 (H) 04/06/2020 06:32 AM

## 2020-04-08 NOTE — PROGRESS NOTES
NUTRITION INITIAL ASSESSMENT/PLAN OF CARE      RECOMMENDATIONS:   1. Cardiac Soft Solids with NTL (2) added Ensure Enlive daily  (must be cold) to supplement energy needs  2. Monitor labs, weight and document all PO intake  3. Encourage PO intake     GOALS:   1. PO intake meets >75% of protein/calorie needs by 4/13  2. Weight Maintenance (+/- 1-2 lb) by 4/15      ASSESSMENT:   Wt status is classified as normal per Body mass index is 23.71 kg/m². Labs noted. Pt w/ hypernatremia, elevated BUN/Cr; GFR (52). Nutrition recommendations listed. RD to follow. Nutrition Diagnoses:   Unintentional weight loss related to decreased energy intake as evidenced by a ~8.6 lb or 5.4% loss over the past 1-2 months and Pt recall. Problems chewing/swallowing related to dysphagia as evidenced by MBS results and SLP orderd texture modified diet. SUBJECTIVE/OBJECTIVE:   Pt admitted for pneumonia. SLP following: s/p MBS on (4/8) with recommendations for soft solid/nectar-thick liquid diet; no straws. Pt seen in room resting after lunch; observed ~65% of meal consumed. Reports appetite has been getting better recently and he consumes 2 meals per day with a snack. NKFA and noted problems with chewing/swallowing in the past. Confirmed weight loss starting a couple of months ago but unsure as to how much. Per documented weight records Pt was 160 lb in Jan 2020 and current weight from bed scale during visit 151.4 lb; equating to ~8.6 lb or 5.4% loss over the past 1-2 months. Pt stated he was previously consuming Ensure from time to time and considering starting again. Placed orders for Ensure Enlive daily. Provided menu, encouraged to implement preferences with dietary and increase intake as tolerated. Will continue to monitor.      Information Obtained from:    [x] Chart Review   [x] Patient   [] Family/Caregiver   [] Nurse/Physician   [] Interdisciplinary Meeting/Rounds      Diet:Cardiac Soft Solids with NTL (2) Medications: [x] Reviewed  Zyloprim, Lipitor, Zithromax, Rocephin, Synthroid, Protonix,     Allergies: [x] Reviewed   Encounter Diagnoses     ICD-10-CM ICD-9-CM   1. Acute chest pain R07.9 786.50   2. Lung infiltrate R91.8 793.19   3. Pleural effusion J90 511.9   4. Shortness of breath R06.02 786.05   5. Fatigue, unspecified type R53.83 780.79     Past Medical History:   Diagnosis Date    Difficulty urinating     Elevated PSA     Hematuria, unspecified     Hypercholesterolemia     Hypertension     Incomplete bladder emptying     Urinary retention     UTI (urinary tract infection)       Labs:    Lab Results   Component Value Date/Time    Sodium 147 (H) 04/07/2020 02:11 PM    Potassium 4.3 04/07/2020 02:11 PM    Chloride 115 (H) 04/07/2020 02:11 PM    CO2 28 04/07/2020 02:11 PM    Anion gap 4 04/07/2020 02:11 PM    Glucose 102 (H) 04/07/2020 02:11 PM    BUN 37 (H) 04/07/2020 02:11 PM    Creatinine 1.32 (H) 04/07/2020 02:11 PM    Calcium 8.9 04/07/2020 02:11 PM    Magnesium 1.7 04/07/2020 02:11 PM    Phosphorus 3.4 12/23/2019 12:52 PM    Albumin 3.1 (L) 04/06/2020 06:32 AM     Lab Results   Component Value Date/Time     (H) 04/07/2020 02:11 PM     No results found for: CHOL, CHOLPOCT, CHOLX, CHLST, CHOLV, HDL, HDLPOC, HDLP, LDL, LDLCPOC, LDLC, DLDLP, VLDLC, VLDL, TGLX, TRIGL, TRIGP, TGLPOCT, CHHD, CHHDX  Anthropometrics: BMI (calculated): 23.7  Last 3 Recorded Weights in this Encounter    04/07/20 1502 04/08/20 1240 04/08/20 1615   Weight: 79.5 kg (175 lb 3.2 oz) 79.4 kg (175 lb) 68.7 kg (151 lb 6.4 oz)      Ht Readings from Last 1 Encounters:   04/08/20 5' 7\" (1.702 m)       Documented Weight History:  Weight Metrics 4/8/2020 2/19/2020 12/28/2019 12/16/2019 2/12/2019 9/10/2018 2/13/2018   Weight 151 lb 6.4 oz 160 lb 167 lb 8.8 oz 150 lb 184 lb 180 lb 185 lb   BMI 23.71 kg/m2 25.06 kg/m2 25.48 kg/m2 22.81 kg/m2 27.98 kg/m2 27.37 kg/m2 28.13 kg/m2       No data found.        IBW: 148 lb %IBW: 102% UBW: ~160 lb   [x] Weight Loss PTA  [] Weight Gain [] Weight Stable    Estimated Nutrition Needs: [x] MSJ x 1.3 [x] Other: 30 kcal/kg  Calories: 8815-3613 kcal Based on:   [x] Actual BW    Protein:   69-82 g Based on:   [x] Actual BW x 1.0-1.2 gm/kg   Fluid:       1 mL/kcal     [x] No Cultural, Christianity or ethnic dietary need identified.     [] Cultural, Christianity and ethnic food preferences identified and addressed     Wt Status:  [x] Normal (18.6 - 24.9) [] Underweight (<18.5) [] Overweight (25 - 29.9) [] Mild Obesity (30 - 34.9)  [] Moderate Obesity (35 - 39.9) [] Morbid Obesity (40+)       Nutrition Problems Identified:   [x] Suboptimal PO intake (variable)  [] Food Allergies  [x] Difficulty chewing/swallowing/poor dentition  [] Constipation/Diarrhea   [] Nausea/Vomiting   [] None  [x] Other: Weight loss PTA    Plan:   [x] Therapeutic Diet  [x]  Obtained/adjusted food preferences/tolerances and/or snacks options   [x]  Supplements added   [x] SLP following for feeding techniques  []  HS snack added   [x]  Modify diet texture   []  Modify diet for food allergies   []  Educate patient   []  Assist with menu selection   [x]  Monitor PO intake on meal rounds   [x]  Continue inpatient monitoring and intervention   [x]  Participated in discharge planning/Interdisciplinary rounds/Team meetings   []  Other:     Education Needs:   [] Not appropriate for teaching at this time due to:   [x] Identified and addressed    Nutrition Monitoring and Evaluation:  [x] Continue ongoing monitoring and intervention  [] Other    Bernabe Esteban

## 2020-04-08 NOTE — ROUTINE PROCESS
Received verbal bedside report from Ángela White RN, assumed care. Pt awake, alert and oriented x 4, denies pain,  No signs of distress noted at this time. Call bell within reach, bed in the lowest locked position. 1040 - Pt off unit for barium swallow test 
 
1130 - Pt returned from barium swallow. Orders to remain with soft/puree and nectar thick liquids due to silent aspirating. Bedside and Verbal shift change report given to Jame Bartlett RN (oncoming nurse) by Fatuma Basilio RN (offgoing nurse). Report included the following information SBAR, Kardex, MAR, Accordion and Recent Results.

## 2020-04-09 VITALS
BODY MASS INDEX: 23.76 KG/M2 | RESPIRATION RATE: 19 BRPM | HEIGHT: 67 IN | HEART RATE: 65 BPM | TEMPERATURE: 97.3 F | WEIGHT: 151.4 LBS | OXYGEN SATURATION: 98 % | DIASTOLIC BLOOD PRESSURE: 70 MMHG | SYSTOLIC BLOOD PRESSURE: 110 MMHG

## 2020-04-09 LAB
ANION GAP SERPL CALC-SCNC: 5 MMOL/L (ref 3–18)
BUN SERPL-MCNC: 36 MG/DL (ref 7–18)
BUN/CREAT SERPL: 23 (ref 12–20)
CALCIUM SERPL-MCNC: 8.8 MG/DL (ref 8.5–10.1)
CHLORIDE SERPL-SCNC: 112 MMOL/L (ref 100–111)
CO2 SERPL-SCNC: 26 MMOL/L (ref 21–32)
CREAT SERPL-MCNC: 1.58 MG/DL (ref 0.6–1.3)
ERYTHROCYTE [DISTWIDTH] IN BLOOD BY AUTOMATED COUNT: 16 % (ref 11.6–14.5)
GLUCOSE BLD STRIP.AUTO-MCNC: 101 MG/DL (ref 70–110)
GLUCOSE BLD STRIP.AUTO-MCNC: 173 MG/DL (ref 70–110)
GLUCOSE BLD STRIP.AUTO-MCNC: 70 MG/DL (ref 70–110)
GLUCOSE SERPL-MCNC: 64 MG/DL (ref 74–99)
HCT VFR BLD AUTO: 37.5 % (ref 36–48)
HGB BLD-MCNC: 12.5 G/DL (ref 13–16)
MCH RBC QN AUTO: 33.2 PG (ref 24–34)
MCHC RBC AUTO-ENTMCNC: 33.3 G/DL (ref 31–37)
MCV RBC AUTO: 99.5 FL (ref 74–97)
PLATELET # BLD AUTO: 147 K/UL (ref 135–420)
PMV BLD AUTO: 12.3 FL (ref 9.2–11.8)
POTASSIUM SERPL-SCNC: 5 MMOL/L (ref 3.5–5.5)
RBC # BLD AUTO: 3.77 M/UL (ref 4.7–5.5)
SODIUM SERPL-SCNC: 143 MMOL/L (ref 136–145)
WBC # BLD AUTO: 10.5 K/UL (ref 4.6–13.2)

## 2020-04-09 PROCEDURE — 97535 SELF CARE MNGMENT TRAINING: CPT

## 2020-04-09 PROCEDURE — 74011250637 HC RX REV CODE- 250/637: Performed by: HOSPITALIST

## 2020-04-09 PROCEDURE — 74011250636 HC RX REV CODE- 250/636: Performed by: HOSPITALIST

## 2020-04-09 PROCEDURE — 74011000258 HC RX REV CODE- 258: Performed by: HOSPITALIST

## 2020-04-09 PROCEDURE — 85027 COMPLETE CBC AUTOMATED: CPT

## 2020-04-09 PROCEDURE — 97166 OT EVAL MOD COMPLEX 45 MIN: CPT

## 2020-04-09 PROCEDURE — 82962 GLUCOSE BLOOD TEST: CPT

## 2020-04-09 PROCEDURE — 92526 ORAL FUNCTION THERAPY: CPT

## 2020-04-09 PROCEDURE — 80048 BASIC METABOLIC PNL TOTAL CA: CPT

## 2020-04-09 PROCEDURE — 97162 PT EVAL MOD COMPLEX 30 MIN: CPT

## 2020-04-09 PROCEDURE — 97530 THERAPEUTIC ACTIVITIES: CPT

## 2020-04-09 PROCEDURE — 36415 COLL VENOUS BLD VENIPUNCTURE: CPT

## 2020-04-09 RX ORDER — AZITHROMYCIN 500 MG/1
500 TABLET, FILM COATED ORAL DAILY
Qty: 5 TAB | Refills: 0 | Status: SHIPPED | OUTPATIENT
Start: 2020-04-09 | End: 2020-04-14

## 2020-04-09 RX ORDER — LEVOTHYROXINE SODIUM 50 UG/1
50 TABLET ORAL
Qty: 30 TAB | Refills: 0 | Status: ON HOLD | OUTPATIENT
Start: 2020-04-10 | End: 2020-11-19 | Stop reason: SDUPTHER

## 2020-04-09 RX ORDER — CEFPODOXIME PROXETIL 200 MG/1
200 TABLET, FILM COATED ORAL 2 TIMES DAILY
Qty: 10 TAB | Refills: 0 | Status: SHIPPED | OUTPATIENT
Start: 2020-04-09 | End: 2020-04-14

## 2020-04-09 RX ADMIN — CEFTRIAXONE 2 G: 2 INJECTION, POWDER, FOR SOLUTION INTRAMUSCULAR; INTRAVENOUS at 15:27

## 2020-04-09 RX ADMIN — AZITHROMYCIN 500 MG: 250 TABLET, FILM COATED ORAL at 15:28

## 2020-04-09 RX ADMIN — ASPIRIN 81 MG: 81 TABLET, COATED ORAL at 09:59

## 2020-04-09 RX ADMIN — TAMSULOSIN HYDROCHLORIDE 0.4 MG: 0.4 CAPSULE ORAL at 09:58

## 2020-04-09 RX ADMIN — ATORVASTATIN CALCIUM 20 MG: 20 TABLET, FILM COATED ORAL at 09:58

## 2020-04-09 RX ADMIN — LEVOTHYROXINE SODIUM 50 MCG: 0.03 TABLET ORAL at 06:09

## 2020-04-09 RX ADMIN — PANTOPRAZOLE SODIUM 40 MG: 40 TABLET, DELAYED RELEASE ORAL at 09:58

## 2020-04-09 RX ADMIN — ALLOPURINOL 300 MG: 100 TABLET ORAL at 09:59

## 2020-04-09 NOTE — PROGRESS NOTES
Transportation at Discharge:  4/9/2020    Transport Company/Representative:  [address] / [address]  Transportation Phone number: [phone]  Method of Transport: Stretcher / BLS    Estimated pick-up time:  4:00p  Destination:  Home Address  [address] [place] [id number]    Insurance Info: [facility]  Authorization: No auth required     Requesting Outcomes Manager:  Mariama Cifuentes, Care- ext [phone]

## 2020-04-09 NOTE — PROGRESS NOTES
Problem: Dysphagia (Adult)  Goal: *Acute Goals and Plan of Care (Insert Text)  Description: Patient will:  1. Tolerate PO trials with 0 s/s overt distress in 4/5 trials  2. Utilize compensatory swallow strategies/maneuvers (decrease bite/sip, size/rate, alt. liq/sol) with min cues in 4/5 trials  3. Perform oral-motor/laryngeal exercises to increase oropharyngeal swallow function with min cues  4. Complete an objective swallow study (i.e., MBSS) to assess swallow integrity, r/o aspiration, and determine of safest LRD, min A    Rec:     Soft solids/ Nectar thick liquids   NO STRAWS  Aspiration precautions  HOB >45 during po intake, remain >30 for 30-45 minutes after po   Small bites/sips; alternate liquid/solid with slow feeding rate   Oral care TID  Meds per whole in pudding        4/9/2020 1559 by Talia Jhaveri, SLP  Outcome: Progressing Towards Goal  4/9/2020 1529 by Talia Jhaveri, SLP  Outcome: Progressing Towards [facility]    Patient: Earline Mccabe (55 y.o. male)  Date: 4/9/2020  Diagnosis: Pneumonia [J18.9]   Right lower lobe pneumonia ([address]       Precautions: aspiration Fall     ASSESSMENT:  Follow up this afternoon with soft/nectar-thick liquid snack. Pt able to self feed. Exhibited: prolonged oral bolus prep and transit; delayed swallow initiation and per palpation/observation laryngeal elevation was assessed to be weak. Min cues for pt to increase rotary mastication and double effortful swallows. No overt s/sx aspiration. SLP recommending continue dental soft solid diet with Nectar-thick liquids. SLP will continue to follow as indicated.     Progression toward goals:  []         Improving appropriately and progressing toward goals  [x]         Improving slowly and progressing toward goals  []         Not making progress toward goals and plan of care will be adjusted     PLAN:  Recommendations and Planned Interventions:  Patient continues to benefit from skilled intervention to address the above impairments. Continue treatment per established plan of care. Discharge Recommendations:  Home Health     SUBJECTIVE:   Patient stated Kathy Wagner, thank you. OBJECTIVE:   Cognitive and Communication Status:  Neurologic State: Alert  Orientation Level: Oriented X4  Cognition: Follows commands  Perception: Appears intact  Perseveration: No perseveration noted  Safety/Judgement: Fall prevention  Dysphagia Treatment:  Oral Assessment:  Oral Assessment  Labial: Decreased seal  Dentition: Limited, Poor  Oral Hygiene: fair  Lingual: Decreased strength  P.O. Trials:    See above       PAIN:  Pain level pre-treatment: 0/10   Pain level post-treatment: 0/10     After treatment:   []              Patient left in no apparent distress sitting up in chair  [x]              Patient left in no apparent distress in bed  [x]              Call bell left within reach  [x]              Nursing notified  []              Family present  []              Caregiver present  []              Bed alarm activated      COMMUNICATION/EDUCATION:   [x] Aspiration precautions; swallow safety; compensatory techniques  []        Patient unable to participate in education; education ongoing with staff  []  Posted safety precautions in patient's room.   [] Oral-motor/laryngeal strengthening exercises      CAMRON Parkinson  Time Calculation: 14 mins

## 2020-04-09 NOTE — PROGRESS NOTES
Assumed pt care from Itzel Magallanes, [facility]. Pt in bed, alert and oriented x 4. Not in any form of distress. Denies pain. Frequent use items and call bell within reach. Verbalized understanding to call for assistance. Bed locked in lowest position.

## 2020-04-09 NOTE — PROGRESS NOTES
[facility]  -  Progress Note      Patient: Michael Mosley MRN: [id number]  SSN: [id number]    YOB: 1934  Age: 80 y.o. Sex: male      Admit Date: 4/6/2020  Patient seen and examined independently. He reports he is feeling better and is inquiring when he will go home. He has not ambulated as of yet. No chest pain since admission. Would like to see him back in the office next week to best decide on therapy for his cardiomyopathy. Blood pressures somewhat marginal at this point. Agree with assessment and plan as noted below. Shayy Oleary MD  Assessment:     -RLL pneumonia  -Chronic HFrEF.  on admission. Echo 12/2019 with EF 11-66%, grade 2 diastolic dysfunction, moderate mitral regurgitation. Repeat Echo this admission 4/8/2020 with EF 40%, dilated LA + RA, trace mitral regurgitation  -Asymptomatic sinus bradycardia with first degree AV block and LBBB, HR improved. -Hypothyroidism, TSH 7.65 with FT3 1.6 this admission. Plan:     -No BB due to presentation with bradycardia.  -Will hold off on ACEi/ARB/ARNI due to renal function, soft BP at times.    -Pt should follow-up with Dr. Renata Parish late next week. -Dispo planning per primary team.    Subjective:     Reports constipation. Denies cardiac complaints such as chest pain/discomfort, shortness of breath, palpitations.     Objective:      Patient Vitals for the past 8 hrs:   Temp Pulse Resp BP SpO2   04/09/20 0708 97.4 °F (36.3 °C) 75 18 117/66 97 %   04/09/20 0400 97.4 °F (36.3 °C) 68 17 108/57 (!) 83 %         Patient Vitals for the past 96 hrs:   Weight   04/08/20 1615 151 lb 6.4 oz (68.7 kg)   04/08/20 1240 175 lb (79.4 kg)   04/07/20 1502 175 lb 3.2 oz (79.5 kg)   04/06/20 0628 160 lb (72.6 kg)         Intake/Output Summary (Last 24 hours) at 4/9/2020 0808  Last data filed at 4/9/2020 7224  Gross per 24 hour   Intake 600 ml   Output 160 ml   Net 440 ml       Physical Exam:  General:  alert, cooperative, no distress, appears stated age  Neck:  supple  Lungs:  clear to auscultation bilaterally to anterolateral lung fields  Heart:  Regular rate and rhythm  Abdomen:  abdomen is soft without significant tenderness, masses, organomegaly or guarding  Extremities:  Atraumatic, no edema     Data Review:     Labs: Results:       Chemistry Recent Labs     04/09/20  0340 04/07/20  1411   GLU 64* 102*    147*   K 5.0 4.3   * 115*   CO2 26 28   BUN 36* 37*   CREA 1.58* 1.32*   CA 8.8 8.9   MG  --  1.7   AGAP 5 4   BUCR 23* 28*      CBC w/Diff Recent Labs     04/09/20  0340   WBC 10.5   RBC 3.77*   HGB 12.5*   HCT 37.5         Thyroid Studies Lab Results   Component Value Date/Time    T4, Total 11.5 02/19/2020 09:28 AM    TSH 7.65 (H) 04/06/2020 06:32 AM

## 2020-04-09 NOTE — PROGRESS NOTES
Problem: Pain  Goal: *Control of Pain  Outcome: Progressing Towards Goal     Problem: Patient Education: Go to Patient Education Activity  Goal: Patient/Family Education  Outcome: Progressing Towards Goal     Problem: Falls - Risk of  Goal: *Absence of Falls  Description: Document Walter Cedillo Fall Risk and appropriate interventions in the flowsheet.   Outcome: Progressing Towards Goal  Note: Fall Risk Interventions:  Mobility Interventions: Patient to call before getting OOB         Medication Interventions: Patient to call before getting OOB    Elimination Interventions: Call light in reach, Patient to call for help with toileting needs              Problem: Patient Education: Go to Patient Education Activity  Goal: Patient/Family Education  Outcome: Progressing Towards Goal     Problem: Patient Education: Go to Patient Education Activity  Goal: Patient/Family Education  Outcome: Progressing Towards Goal

## 2020-04-09 NOTE — CDMP QUERY
Pt admitted with Pneumonia. Pt noted to have elevated bun and creatinine with decreased GFR. If possible, please document in the progress notes and d/c summary if you are evaluating and / or treating any of the following: ? Acute kidney failure ? Acute kidney injury ? Chronic kidney disease (please specify stage) ? Other type of acute kidney failure  
? Other cause (please specify) ? Unable to determine ? Unknown The medical record reflects the following: 
  Risk Factors: 79 yo male with PMH acute renal failure Clinical Indicators: Per ED MD note  Patient's chart review shows patient has history of admission in February for acute renal failure and septic shock 4/7  Bun 34  Cr 1.32 GFR  52 
4/8  Bun 36  Cr 1.58 GFR  42 Treatment: NS Fluid bouses x 2, serial chemistry labs Below shows the five stages of CKD and GFR for each stage: 
? Stage 1 with normal or high GFR (GFR > 90 mL/min) ? Stage 2 Mild CKD (GFR = 60-89 mL/min) ? Stage 3A Moderate CKD (GFR = 45-59 mL/min) ? Stage 3B Moderate CKD (GFR = 30-44 mL/min) ? Stage 4 Severe CKD (GFR = 15-29 mL/min) Thank you for your time, 
Cale Li RN [facility] [phone]

## 2020-04-09 NOTE — DISCHARGE SUMMARY
2 [facility]  Hospitalist Division    Discharge Summary    Patient: Earline Mccabe MRN: [id number]  CSN: [id number]    YOB: 1934  Age: 80 y.o. Sex: male    DOA: 4/6/2020 LOS:  LOS: 3 days   Discharge Date:      Admission Diagnoses: Pneumonia [J18.9]    Discharge Diagnoses:  Principal Problem:    Right lower lobe pneumonia ([facility] [address] (4/6/2020)    Active Problems:    Bradycardia (2/19/2020)      Hypothyroidism (2/20/2020)      Chronic combined systolic and diastolic congestive heart failure ([facility] [address] (4/7/2020)      Other chest pain (4/7/2020)      Dysphagia (4/8/2020)        Discharge Condition: Stable    Discharge To: Home    Consults: Cardiology    HPI: Per H&P, \"Alexander Ann is a 80 y. o. male who has been having SOB at home. Overall his symptoms have been present for about 10 days.  He does not have fever.  He does not have productive cough.  He does not have chest pain.   In the ER he is found to have pneumonia.  He also has bradycardia.  He will be admitted for ongoing management. [facility] Course: He was started on IV abx. Speech therapy consulted to monitor for aspiration - MBS with silent aspiration with thin liquids. ST recs soft solid/nectar-thick liquid diet. Cardiology consulted for bradycardia - recommended continued monitoring. Echo done, results below. TSH was found to be elevated which could explain bradycardia and borderline hypothermia. Synthroid increased. VS have remained stable >24 hours. O2 stable on RA.        Physical Exam:  General appearance: alert, cooperative, no distress, appears stated age  Head: Normocephalic, without obvious abnormality, atraumatic  Lungs: clear to auscultation bilaterally  Heart: regular rate and rhythm  Extremities: no cyanosis or edema  Skin: Skin color, texture normal. No rashes or lesions  Neurologic: no focal deficits, motor/sensory intact    Significant Diagnostic Studies:     BMP:   Lab Results   Component Value Date/Time     04/09/2020 03:40 AM    K 5.0 04/09/2020 03:40 AM     (H) 04/09/2020 03:40 AM    CO2 26 04/09/2020 03:40 AM    AGAP 5 04/09/2020 03:40 AM    GLU 64 (L) 04/09/2020 03:40 AM    BUN 36 (H) 04/09/2020 03:40 AM    CREA 1.58 (H) 04/09/2020 03:40 AM    GFRAA 51 (L) 04/09/2020 03:40 AM    GFRNA 42 (L) 04/09/2020 03:40 AM       Echo 4/8/20  Interpretation Summary     Result status: Final result   · Image quality for this study was technically difficult. · Contrast used: DEFINITY. · Normal cavity size and wall thickness. Moderate systolic function. Calculated left ventricular ejection fraction is 40%. Visually measured ejection fraction. Abnormal left ventricular septal motion. · Dilated left atrium. · Dilated right atrium. · Trace mitral valve regurgitation is present. · Pulmonary arterial systolic pressure is 25 mmHg. · Trivial pericardial effusion adjacent to right ventricle. Comparison Study Information     Prior Study     There is a prior study available for comparison. Prior study date: 12/28/2019. As compared to the previous study, there are no significant changes. Echo Findings     Left Ventricle Normal cavity size and wall thickness. Moderate systolic dysfunction. The calculated ejection fraction is 40%. Visually measured ejection fraction. Abnormal left ventricular septal motion. Wall Scoring The left ventricular wall motion is globally hypokinetic. Left Atrium Dilated left atrium. Right Ventricle Normal cavity size and global systolic function. Right Atrium Dilated right atrium. Aortic Valve Aortic valve not assessed. Mitral Valve Trace regurgitation. Tricuspid Valve Trace regurgitation. Pulmonic Valve The pulmonic valve was not assessed. Aorta Normal aortic root. Pulmonary Artery Pulmonary arterial systolic pressure (PASP) is 25 mmHg. Pulmonary hypertension not suggested by Doppler findings.    IVC/Hepatic Veins Inferior vena cava not assessed. Pericardium Trivial pericardial effusion noted. Effusion is located adjacent to the right ventricle. Xr Swallow Func Video    Result Date: 4/8/2020  Modified Barium Swallow History: Feeding difficulties, Dysphagia Technique: The procedure was performed with the speech pathologist. Different consistencies of barium tinged products were given to swallow during real time fluoroscopic observation. Findings: With thin consistency via cup, pt demonstrated []swallow delay, premature spillage and silent aspiration. Via straw there was penetration to the cords without aspiration. . There was significant residua in the vallecula and pyriform sinus. With nectar consistency via cup, and pudding consistency, pt demonstrated [swallow delay and premature spillage without penetration or aspiration]. There was significant residua in the vallecula and pyriform sinus. With []solids, pt demonstrated swallow delay and premature spillage without penetration or aspiration[]. No significant residua in the vallecula and pyriform sinus. With pill , pt demonstrated [normal swallowing]. No significant residua in the vallecula and pyriform sinus. Fluoroscopic time: 2.6 minutes Fluoroscopic dose (reference air kerma): 14.27 mGy     Impression: Abnormal modified swallow study as described. Please see full speech pathologist report to follow for discussion of findings and detailed recommendations. Videotape is available for review. Cta Chest W Or W Wo Cont    Addendum Date: 4/6/2020    Addendum: One or more dose reduction techniques were used on this CT: automated exposure control, adjustment of the mAs and/or kVp according to patient size, and iterative reconstruction techniques. The specific techniques used on this CT exam have been documented in the patient's electronic medical record.  Digital Imaging and Communications in Medicine (DICOM) format image data are available to nonaffiliated external healthcare facilities or entities on a secure, media free, reciprocally searchable basis with patient authorization for at least a 12-month period after this study. Result Date: 4/6/2020  EXAM: CTA Chest INDICATION: Shortness of breath. Possible PE. Chest pain. COMPARISON: 12/16/2019 TECHNIQUE: Axial CT imaging from the thoracic inlet through the diaphragm with intravenous contrast utilizing CTA study for pulmonary artery evaluation. Coronal and sagittal MIP reformations were generated at a separate workstation. _______________ FINDINGS: EXAM QUALITY: Overall exam quality is adequate. Pulmonary arterial enhancement is adequate. Respiratory motion artifact is present, limiting evaluation. PULMONARY ARTERIES: No convincing evidence of pulmonary embolism. MEDIASTINUM: Normal heart size. No evidence of right heart strain. Aorta is unremarkable. No pericardial effusion. LYMPH NODES: No enlarged mediastinal or hilar nodes by size criteria. AIRWAY: Unremarkable. LUNGS: Right lung base patchy consolidation. PLEURA: Small right and trace left pleural effusions. Chris Salter UPPER ABDOMEN: Visualized upper abdomen is unremarkable. . OTHER: No acute or aggressive osseous abnormalities identified. _______________     IMPRESSION: 1. No evidence of pulmonary embolism. 2.  Patchy right lung base consolidation. Small right and trace left pleural effusions. Xr Chest Port    Result Date: 4/6/2020  EXAM: XR CHEST PORT CLINICAL INDICATION/HISTORY: chest pain -Additional: None COMPARISON: 12/28/2019 TECHNIQUE: Portable frontal view of the chest _______________ FINDINGS: SUPPORT DEVICES: None. HEART AND MEDIASTINUM: Cardiomediastinal silhouette within normal limits. LUNGS AND PLEURAL SPACES: No dense consolidation, large effusion or pneumothorax. _______________     IMPRESSION: No acute cardiopulmonary abnormality.       Procedures: None    Discharge Medications:     Current Discharge Medication List      START taking these medications    Details azithromycin (ZITHROMAX) 500 mg tab Take 1 Tab by mouth daily for 5 days. Qty: 5 Tab, Refills: 0    Associated Diagnoses: Pneumonia of right lower lobe due to infectious organism ([address]      cefpodoxime (VANTIN) 200 mg tablet Take 1 Tab by mouth two (2) times a day for 5 days. Qty: 10 Tab, Refills: 0         CONTINUE these medications which have CHANGED    Details   levothyroxine (SYNTHROID) 50 mcg tablet Take 1 Tab by mouth every morning. Qty: 30 Tab, Refills: 0         CONTINUE these medications which have NOT CHANGED    Details   cholecalciferol (VITAMIN D3) (1000 Units /25 mcg) tablet Take 2 Tabs by mouth daily. Qty: 30 Tab, Refills: 0      simvastatin (ZOCOR) 40 mg tablet Take 1 Tab by mouth nightly. Qty: 30 Tab, Refills: 0      aspirin delayed-release 81 mg tablet Take 1 Tab by mouth daily. Qty: 30 Tab, Refills: 0      acetaminophen (TYLENOL EXTRA STRENGTH) 500 mg tablet Take 2 Tabs by mouth every six (6) hours as needed for Pain. Qty: 50 Tab, Refills: 0      sucralfate (CARAFATE) 1 gram tablet Take 1 Tab by mouth four (4) times daily. Qty: 20 Tab, Refills: 0      omeprazole (PRILOSEC) 20 mg capsule Take 1 Cap by mouth daily. Qty: 30 Cap, Refills: 3      allopurinol (ZYLOPRIM) 300 mg tablet Take 300 mg by mouth daily.       pantoprazole (PROTONIX) 40 mg tablet Refills: 0      tamsulosin (FLOMAX) 0.4 mg capsule take 1 capsule by mouth once daily AFTER DINNER  Qty: 90 Cap, Refills: 3             Activity: PT/OT per Home Health    Diet: soft solid/nectar-thick liquid diet    Wound Care: None needed    Follow-up: 1 week with PCP and cardiology    Discharge time: >35 minutes    [facility]  Office:  [phone]  Pager: [phone]      4/9/2020, 11:52 AM

## 2020-04-09 NOTE — PROGRESS NOTES
Problem: Dysphagia (Adult)  Goal: *Acute Goals and Plan of Care (Insert Text)  Description: Patient will:  1. Tolerate PO trials with 0 s/s overt distress in 4/5 trials  2. Utilize compensatory swallow strategies/maneuvers (decrease bite/sip, size/rate, alt. liq/sol) with min cues in 4/5 trials  3. Perform oral-motor/laryngeal exercises to increase oropharyngeal swallow function with min cues  4. Complete an objective swallow study (i.e., MBSS) to assess swallow integrity, r/o aspiration, and determine of safest LRD, min A    Rec:     Soft solids/ Nectar thick liquids   NO STRAWS  Aspiration precautions  HOB >45 during po intake, remain >30 for 30-45 minutes after po   Small bites/sips; alternate liquid/solid with slow feeding rate   Oral care TID  Meds per whole in pudding        Outcome: Progressing Towards Goal     SPEECH PATHOLOGY MODIFIED BARIUM SWALLOW STUDY & TREATMENT    Patient: Chana Oleary (55 y.o. male)  Date: 4/9/2020  Primary Diagnosis: Pneumonia [J18.9]        Precautions: aspiration  Fall    ASSESSMENT :  MBS completed with SILENT aspiration on thin liquids via cup sips; VF penetration with thin liquids + straw. Chin tuck attempted for airway protection; unsuccessful. No aspiration on nectar-thick liquids via cup sips, pudding, cracker, or 13 mm Ba+ tablet with nectar-liquid wash. Across all study trials, pt exhibited labored oral bolus prep and transit with premature spillage into valleculae and pyriforms, with swallow delay ~2 seconds. Decreased hyolaryngeal excursion resulting in airway compromise and residuals in valleculae and pyriforms. Pt able to clear residuals with verbal cues for double, effortful swallows. Pt presents with moderately-severe oropharyngeal dysphagia, as evidenced above, which places pt at risk for aspiration. At this time, safest for soft solid, nectar-thick liquid diet.  SLP utilized video of study for visual feedback and recommendations for pt; verbalized comprehension. Treatment:  Skilled therapy initiated: Educated pt on MBS results, aspiration precautions, and importance of compensatory swallow techniques to decrease aspiration risk (decrease rate of intake & sip/bite size, upright @HOB for all po intake and ~30 minutes after po); verbalized comprehension. SLP to follow as indicated. Patient will benefit from skilled intervention to address the above impairments. Patient's rehabilitation potential is considered to be Fair  Factors which may influence rehabilitation potential include:   []              None noted  [x]              Mental ability/status  [x]              Medical condition  []              Home/family situation and support systems  []              Safety awareness  []              Pain tolerance/management  []              Other:      PLAN :  Recommendations and Planned Interventions:  Soft/nectar  Frequency/Duration: Patient will be followed by speech-language pathology 1-2 times per day/4-7 days per week to address goals. Discharge Recommendations: Home Health     SUBJECTIVE:   Patient stated Gary Zambrano.     OBJECTIVE:     Past Medical History:   Diagnosis Date    Difficulty urinating     Elevated PSA     Hematuria, unspecified     Hypercholesterolemia     Hypertension     Incomplete bladder emptying     Urinary retention     UTI (urinary tract infection)      Past Surgical History:   Procedure Laterality Date    APPENDECTOMY      EGD  2/7/2014         HX TURP  6/13/16    HX TURP       Home Situation:   Home Situation  Home Environment: Private residence  # Steps to Enter: 2  Rails to Enter: No  One/Two Story Residence: Two story, live on 1st floor  Living Alone: No  Support Systems: Child(darryl)  Patient Expects to be Discharged to[de-identified] Private residence  Current DME Used/Available at Home: Commode, bedside, Walker, rollator, Walker, rolling  Tub or Shower Type: Shower  Diet prior to admission: regular/thin  Current Diet:  soft/nectar Radiologist:  Marley Santos           8-point Penetration-Aspiration Scale: Score8    PAIN:  Pain level pre-treatment: 0/10   Pain level post-treatment: 0/10       COMMUNICATION/EDUCATION:   [x]  Patient educated regarding MBS results and diet recommendations. [x]  Patient/family have participated as able in goal setting and plan of care. [x]  Patient/family agree to work toward stated goals and plan of care. []  Patient understands intent and goals of therapy, but is neutral about his/her participation. []  Patient is unable to participate in goal setting and plan of care.     Thank you for this referral.  Valentin Chacon, SLP  Time Calculation: 30 mins  MBS Time: 20 minutes  Treatment Time: 10 minutes

## 2020-04-09 NOTE — PROGRESS NOTES
Problem: Mobility Impaired (Adult and Pediatric)  Goal: *Acute Goals and Plan of Care (Insert Text)  Description: Physical Therapy Goals  Initiated 4/9/2020 and to be accomplished within 7 day(s)  1. Patient will move from supine to sit and sit to supine in bed with minimal assistance/contact guard assist in order to facilitate eating meals in sitting. 2.  Patient will transfer from bed to chair and chair to bed with minimal assistance/contact guard assist using the least restrictive device in order to facilitate participation in sitting ADLs. 3.  Patient will perform sit to stand with moderate assistance  in order to prepare for standing ADLs. 4.  Patient will ambulate with moderate assistance for 10 feet with the least restrictive device in order to facilitate improved ease with ambulation to the restroom. 5.  Patient will ascend/descend 2 stairs with handrail(s) with moderate assistance in order to prepare patient to safely enter residence. Outcome: Progressing Towards Goal   PHYSICAL THERAPY EVALUATION    Patient: Chana Oleary (28 y.o. male)  Date: 4/9/2020  Primary Diagnosis: Pneumonia [J18.9]        Precautions:      PLOF: Mod I with Rollator    ASSESSMENT :  Patient is a 80 y.o. male who presents to Physical Therapy with diagnosis of Pneumonia [J18.9]. Patient is supine in bed and agrees to participate in PT services. Upon objective evaluation, patient demonstrated full MAYRA LE AROM, MAYRA LE strength WNLs and intact light touch sensation, and decreased sitting static and dynamic balance. Static/dynamic sitting balance is fair and requires CGA-Min A and cuing in order to maintain upright position. Patient sat EOB ~ 5 min during MMT/sensation testing and while don/doff clean gown and required max cuing for use of bed rail and MAYRA UEs/core to maintain upright position. Poor standing tolerance and safety and required Mod A x2 while standing.  Patient requires between moderate assistance  and minimal assistance/contact guard assist for bed mobility, transfers and ambulation. Patient can benefit from skilled PT interventions to decrease r/o falls and improve MAYRA LE strength, increase walking/standing tolerance, and improve body mechanics mechanics with bed mobility and transfers to facilitate ADL's & overall functional status/quality of life. Patient will benefit from skilled intervention to address the above impairments. Patient's rehabilitation potential is considered to be Fair  Factors which may influence rehabilitation potential include:   []         None noted  []         Mental ability/status  [x]         Medical condition  []         Home/family situation and support systems  []         Safety awareness  []         Pain tolerance/management  []         Other:      PLAN :  Recommendations and Planned Interventions:   [x]           Bed Mobility Training             [x]    Neuromuscular Re-Education  [x]           Transfer Training                   []    Orthotic/Prosthetic Training  [x]           Gait Training                          []    Modalities  [x]           Therapeutic Exercises           []    Edema Management/Control  [x]           Therapeutic Activities            [x]    Family Training/Education  [x]           Patient Education  []           Other (comment):    Frequency/Duration: Patient will be followed by physical therapy 3-5x per week to address goals. Discharge Recommendations: Home Health  Further Equipment Recommendations for Discharge: walker     SUBJECTIVE:   Patient stated If I lay here ill just go to sleep.     OBJECTIVE DATA SUMMARY:     Past Medical History:   Diagnosis Date    Difficulty urinating     Elevated PSA     Hematuria, unspecified     Hypercholesterolemia     Hypertension     Incomplete bladder emptying     Urinary retention     UTI (urinary tract infection)      Past Surgical History:   Procedure Laterality Date    APPENDECTOMY      EGD  2/7/2014 HX TURP  6/13/16    HX TURP       Barriers to Learning/Limitations: None  Compensate with: N/A  Home Situation:  Home Situation  Home Environment: Private residence  # Steps to Enter: 2  Rails to Enter: No  One/Two Story Residence: Two story, live on 1st floor  Living Alone: No  Support Systems: Child(darryl)  Patient Expects to be Discharged to[de-identified] Private residence  Current DME Used/Available at Home: Commode, bedside, Marisabel Bloch, rollator, Wheelchair  Tub or Shower Type: Shower  Critical Behavior:  Neurologic State: Alert; Appropriate for age  Orientation Level: Oriented X4  Cognition: Follows commands  Safety/Judgement: Fall prevention  Psychosocial  Purposeful Interaction: Yes  Pt Identified Daily Priority: Clinical issues (comment)    Strength:    Strength: Within functional limits(R/L Psoas 5-/5-, quad 4+/5-, ham 4/4+, ant tib 5-/5-)    Tone & Sensation:   Sensation: Intact    Range Of Motion:  AROM: Within functional limits    Functional Mobility:  Bed Mobility:   Supine to Sit: Moderate assistance  Sit to Supine: Minimum assistance;Assist x2  Scooting: Moderate assistance  Transfers:  Sit to Stand: Moderate assistance;Assist x2  Stand to Sit: Moderate assistance;Assist x2    Balance:   Sitting: Impaired  Sitting - Static: Fair (occasional)  Sitting - Dynamic: Poor (constant support)  Standing: Impaired  Standing - Static: Poor  Ambulation/Gait Training:    Pain:  Pain level pre-treatment: 0/10   Pain level post-treatment: 0/10   Pain Intervention(s) : Medication (see MAR); Rest, Ice, Repositioning  Response to intervention: Nurse notified, See doc flow    Activity Tolerance: Good - willingness to participate in functional mobility without reports of chest pain or SOB    Please refer to the flowsheet for vital signs taken during this treatment.   After treatment:   []         Patient left in no apparent distress sitting up in chair  [x]         Patient left in no apparent distress in bed  [x]         Call baxter left within reach  [x]         Nursing notified - Dyan Novoa   []         Caregiver present  []         Bed alarm activated  []         SCDs applied    COMMUNICATION/EDUCATION:   [x]         Role of Physical Therapy in the acute care setting. [x]         Fall prevention education was provided and the patient/caregiver indicated understanding. [x]         Patient/family have participated as able in goal setting and plan of care. [x]         Patient/family agree to work toward stated goals and plan of care. [x]         Patient understands intent and goals of therapy, but is neutral about his/her participation. []         Patient is unable to participate in goal setting/plan of care: ongoing with therapy staff.  []         Other:     Thank you for this referral.  Patricia Koehler   Time Calculation: 25 mins

## 2020-04-09 NOTE — DISCHARGE INSTRUCTIONS
DISCHARGE SUMMARY from Nurse    PATIENT INSTRUCTIONS:    After general anesthesia or intravenous sedation, for 24 hours or while taking prescription Narcotics:  · Limit your activities  · Do not drive and operate hazardous machinery  · Do not make important personal or business decisions  · Do  not drink alcoholic beverages  · If you have not urinated within 8 hours after discharge, please contact your surgeon on call. Report the following to your surgeon:  · Excessive pain, swelling, redness or odor of or around the surgical area  · Temperature over 100.5  · Nausea and vomiting lasting longer than 4 hours or if unable to take medications  · Any signs of decreased circulation or nerve impairment to extremity: change in color, persistent  numbness, tingling, coldness or increase pain  · Any questions    What to do at Home:    Diet: soft solids and nectar thick liquids    Recommended activity: Activity as tolerated    If you experience any of the following symptoms fever, chills, worsening shortness of breath, or palipitations, please follow up with primary care physician/cardiologist.    *  Please give a list of your current medications to your Primary Care Provider. *  Please update this list whenever your medications are discontinued, doses are      changed, or new medications (including over-the-counter products) are added. *  Please carry medication information at all times in case of emergency situations. These are general instructions for a healthy lifestyle:    No smoking/ No tobacco products/ Avoid exposure to second hand smoke  Surgeon General's Warning:  Quitting smoking now greatly reduces serious risk to your health.     Obesity, smoking, and sedentary lifestyle greatly increases your risk for illness    A healthy diet, regular physical exercise & weight monitoring are important for maintaining a healthy lifestyle    You may be retaining fluid if you have a history of heart failure or if you experience any of the following symptoms:  Weight gain of 3 pounds or more overnight or 5 pounds in a week, increased swelling in our hands or feet or shortness of breath while lying flat in bed. Please call your doctor as soon as you notice any of these symptoms; do not wait until your next office visit. The discharge information has been reviewed with the patient. The patient verbalized understanding. Discharge medications reviewed with the patient and appropriate educational materials and side effects teaching were provided. Patient armband removed and shredded.

## 2020-04-09 NOTE — ROUTINE PROCESS
0730  -- Bedside, Verbal and Written shift change report received by Penny Cardoso (oncoming nurse) by Trevin Padilla nurse). Allergy band placed on pt's wrist. Report included the following information SBAR, Kardex, Intake/Output, MAR and Recent Results. 1000  -- AM  medications administered, pt tolerated with ease, will continue to monitor. Assessment completed, call bell within reach. 1200-- Shift reassessment, pt condition unchanged, will continue to monitor. 1600 --  Shift reassessment, pt condition unchanged, will continue to monitor. Discharged home via ambulance.

## 2020-04-09 NOTE — PROGRESS NOTES
Discharge instructions provided to pt on behalf of primary nurse [facility]. Written prescriptions were faxed to pt preferred [address] per pt request. Telemetry monitor and peripheral iv removed. Plan for medical transport to home. ETA 1600.

## 2020-04-09 NOTE — PROGRESS NOTES
Problem: Self Care Deficits Care Plan (Adult)  Goal: *Acute Goals and Plan of Care (Insert Text)  Description: Occupational Therapy Goals  Initiated 4/9/2020 within 7 day(s). 1.  Patient will perform grooming with modified independence. 2.  Patient will perform bathing with modified independence. 3.  Patient will perform upper body dressing and lower body dressing with modified independence. 4.  Patient will perform bedside commode transfers with modified independence using RW. 5.  Patient will perform all aspects of toileting with modified independence. 6.  Patient will participate in upper extremity therapeutic exercise/activities with modified independence for 10 minutes. 7.  Patient will utilize energy conservation techniques during functional activities with verbal cues. Prior Level of Function: Mod I w/ ADLs and functional mobility using Rollator   Outcome: Progressing Towards Goal   OCCUPATIONAL THERAPY EVALUATION    Patient: Olga Carranza (52 y.o. male)  Date: 4/9/2020  Primary Diagnosis: Pneumonia [J18.9]        Precautions:   Fall  PLOF: see above    ASSESSMENT :  Nursing/Aidee cleared for pt to participate in OT evaluation and tx session. Based on the objective data described below, the patient presents with decreased strength RUE MMT 3+/5, RUE AROM WFL, LUE impaired chronic AROM shoulder flexion 85 degrees, PROM 90 degrees and elbow WFL, LUE MMT shoulder 2-/5, elbow 3+/5, poor functional activity tolerance , Balance: sitting static fair, sitting dynamic poor, standing static fair-, standing dynamic fair-, poor safety awareness and bowel incontinence, which is inhibiting ability to perform ADLs and functional transfers independently. Based upon clinical judgement, patient requires assist with functional mobility e.g. Mod A with bed mobility for supine to sit and Min A sit to supine, bedside commode transfer:  Mod A using RW with verbal cues for safety awareness, UB and LB bathing with Max A, UB and LB dress with Max A, toileting with dep, grooming with Min A and self feeding with set up. Patient educated on role of OT and POC; patient verbalized understanding. Pt. Instructed on safety awareness with importance to utilize call bell to request assist with functional transfers to prevent falls, patient verbalized understanding and provided accurate demonstration. Dry erase communication board updated for accuracy w/ carryover for bedside commode transfers: using RW and 1 person assist-nursing notified. Patient will benefit from skilled intervention to address the above impairments. Patient's rehabilitation potential is considered to be Good  Factors which may influence rehabilitation potential include:   []             None noted  []             Mental ability/status  []             Medical condition  []             Home/family situation and support systems  [x]             Safety awareness  []             Pain tolerance/management  []             Other:      PLAN :  Recommendations and Planned Interventions:   [x]               Self Care Training                  [x]      Therapeutic Activities  [x]               Functional Mobility Training   []      Cognitive Retraining  [x]               Therapeutic Exercises           []      Endurance Activities  [x]               Balance Training                    [x]      Neuromuscular Re-Education  []               Visual/Perceptual Training     [x]      Home Safety Training  [x]               Patient Education                   [x]      Family Training/Education  []               Other (comment):    Frequency/Duration: Patient will be followed by occupational therapy 3 - 5 times a week to address goals. Discharge Recommendations: Skilled Nursing Facility  Further Equipment Recommendations for Discharge: bedside commode, rolling walker, and wheelchair      SUBJECTIVE:   Patient stated My son helps me if I need it.     OBJECTIVE DATA SUMMARY: Past Medical History:   Diagnosis Date    Difficulty urinating     Elevated PSA     Hematuria, unspecified     Hypercholesterolemia     Hypertension     Incomplete bladder emptying     Urinary retention     UTI (urinary tract infection)      Past Surgical History:   Procedure Laterality Date    APPENDECTOMY      EGD  2/7/2014         HX TURP  6/13/16    HX TURP       Barriers to Learning/Limitations: None  Compensate with: visual, verbal, tactile, kinesthetic cues/model    Home Situation:   Home Situation  Home Environment: Private residence  # Steps to Enter: 2  Rails to Enter: No  One/Two Story Residence: Two story, live on 1st floor  Living Alone: No  Support Systems: Child(darryl)  Patient Expects to be Discharged to[de-identified] Private residence  Current DME Used/Available at Home: Commode, bedside, Walker, rollator, Walker, rolling  Tub or Shower Type: Shower  [x]  Right hand dominant   []  Left hand dominant    Cognitive/Behavioral Status:  Neurologic State: Alert  Orientation Level: Oriented X4  Cognition: Follows commands  Safety/Judgement: Fall prevention    Skin: appears intact    Edema: none noted      Vision/Perceptual:  appears intact    Coordination: BUE  Coordination: Within functional limits(BUEs)  Fine Motor Skills-Upper: Left Intact; Right Intact    Gross Motor Skills-Upper: Left Intact; Right Intact    Balance:  Sitting: Impaired  Sitting - Static: Fair (occasional)  Sitting - Dynamic: Poor (constant support)  Standing: Impaired  Standing - Static: Fair(-)  Standing - Dynamic : Fair(-)    Strength: BUE  Strength: (RUE 3+/5, LUE shoulder 2-/5, elbow 3+/5)    Tone & Sensation: BUE    Tone: Normal(BUEs)  Sensation: Intact    Range of Motion: BUE    AROM: (RUE intact, LUE impaired 85 degrees, elbow WFL)  PROM: (LUE shoulder flexion 90 degrees)    Functional Mobility and Transfers for ADLs:  Bed Mobility:     Supine to Sit: Moderate assistance  Sit to Supine: Minimum assistance  Scooting:  Moderate assistance  Transfers:  Sit to Stand: Moderate assistance  Stand to Sit: Moderate assistance     Toilet Transfer : Moderate assistance(bedside commode)     ADL Assessment:   Feeding: Setup    Oral Facial Hygiene/Grooming: Minimum assistance    Bathing: Maximum assistance    Upper Body Dressing: Maximum assistance    Lower Body Dressing: Total assistance    Toileting: Total assistance    Cognitive Retraining  Safety/Judgement: Fall prevention    Pain:  Pain level pre-treatment: 0/10   Pain level post-treatment: 0/10   Pain Intervention(s): Medication (see MAR); Rest, Ice, Repositioning   Response to intervention: Nurse notified, See doc flow    Activity Tolerance:   poor  Please refer to the flowsheet for vital signs taken during this treatment. After treatment:   [] Patient left in no apparent distress sitting up in chair  [x] Patient left in no apparent distress in bed  [x] Call bell left within reach  [x] Nursing notified  [] Caregiver present  [] Bed alarm activated    COMMUNICATION/EDUCATION:   [x] Role of Occupational Therapy in the acute care setting  [x] Home safety education was provided and the patient/caregiver indicated understanding. [x] Patient/family have participated as able in goal setting and plan of care. [x] Patient/family agree to work toward stated goals and plan of care. [] Patient understands intent and goals of therapy, but is neutral about his/her participation. [] Patient is unable to participate in goal setting and plan of care. Thank you for this referral.  Ky Llamas  Time Calculation: 36 mins    Eval Complexity: History: MEDIUM Complexity : Expanded review of history including physical, cognitive and psychosocial  history ; Examination: MEDIUM Complexity : 3-5 performance deficits relating to physical, cognitive , or psychosocial skils that result in activity limitations and / or participation restrictions;    Decision Making:MEDIUM Complexity : Patient may present with comorbidities that affect occupational performnce.  Miniml to moderate modification of tasks or assistance (eg, physical or verbal ) with assesment(s) is necessary to enable patient to complete evaluation

## 2020-04-10 ENCOUNTER — PATIENT OUTREACH (OUTPATIENT)
Dept: CASE MANAGEMENT | Age: 85
End: 2020-04-10

## 2020-04-12 LAB
BACTERIA SPEC CULT: NORMAL
BACTERIA SPEC CULT: NORMAL
SERVICE CMNT-IMP: NORMAL
SERVICE CMNT-IMP: NORMAL

## 2020-04-13 ENCOUNTER — PATIENT OUTREACH (OUTPATIENT)
Dept: CASE MANAGEMENT | Age: 85
End: 2020-04-13

## 2020-04-13 NOTE — PROGRESS NOTES
COVID-19 Screening Initial Follow-up Note    Patient contacted regarding COVID-19  risk. Care Transition Nurse/ Ambulatory Care Manager contacted the family by telephone to perform post discharge assessment. Verified name and  with family as identifiers. Provided introduction to self, and explanation of the CTN/ACM role, and reason for call due to risk factors for infection and/or exposure to COVID-19. Symptoms reviewed with family who verbalized the following symptoms: no new/worsening symptoms       Due to no new or worsening symptoms encounter was not routed to provider for escalation. Patient has following risk factors of: age, recent hospital admission, pneumonia. CTN/ACM reviewed discharge instructions, medical action plan and red flags such as increased shortness of breath, increasing fever and signs of decompensation with family who verbalized understanding. Discussed exposure protocols and quarantine with CDC Guidelines What to do if you are sick with coronavirus disease 2019 Family who was given an opportunity for questions and concerns. The family agrees to contact the [facility] exposure line [phone], [facility] department [facility]  ([phone]) and PCP office for questions related to their healthcare. CTN/ACM provided contact information for future reference. Reviewed and educated family on any new and changed medications related to discharge diagnosis. Patient/family/caregiver given information for [employer] and agrees to enroll yes  Patient's preferred e-mail:  [email or web address]  Based on Loop alert triggers, patient will be contacted by nurse care manager for worsening symptoms.     Plan for follow-up call in 14 days based on severity of symptoms and risk factors

## 2020-04-21 ENCOUNTER — HOSPITAL ENCOUNTER (INPATIENT)
Age: 85
LOS: 20 days | Discharge: NURSING, REHAB OR LONG-TERM CARE | DRG: 981 | End: 2020-05-11
Attending: EMERGENCY MEDICINE | Admitting: INTERNAL MEDICINE
Payer: MEDICARE

## 2020-04-21 ENCOUNTER — APPOINTMENT (OUTPATIENT)
Dept: GENERAL RADIOLOGY | Age: 85
DRG: 981 | End: 2020-04-21
Attending: EMERGENCY MEDICINE
Payer: MEDICARE

## 2020-04-21 ENCOUNTER — APPOINTMENT (OUTPATIENT)
Dept: ULTRASOUND IMAGING | Age: 85
DRG: 981 | End: 2020-04-21
Attending: EMERGENCY MEDICINE
Payer: MEDICARE

## 2020-04-21 DIAGNOSIS — T68.XXXA HYPOTHERMIA, INITIAL ENCOUNTER: Primary | ICD-10-CM

## 2020-04-21 DIAGNOSIS — N39.0 URINARY TRACT INFECTION WITH HEMATURIA, SITE UNSPECIFIED: ICD-10-CM

## 2020-04-21 DIAGNOSIS — R06.89 HYPERCARBIA: ICD-10-CM

## 2020-04-21 DIAGNOSIS — I45.9 HEART BLOCK: ICD-10-CM

## 2020-04-21 DIAGNOSIS — R31.9 URINARY TRACT INFECTION WITH HEMATURIA, SITE UNSPECIFIED: ICD-10-CM

## 2020-04-21 DIAGNOSIS — R07.9 ACUTE CHEST PAIN: ICD-10-CM

## 2020-04-21 DIAGNOSIS — Z87.09 HX OF ASPIRATION PNEUMONITIS: ICD-10-CM

## 2020-04-21 DIAGNOSIS — D64.9 ANEMIA, UNSPECIFIED TYPE: ICD-10-CM

## 2020-04-21 DIAGNOSIS — R53.1 GENERAL WEAKNESS: ICD-10-CM

## 2020-04-21 DIAGNOSIS — E16.2 HYPOGLYCEMIA: ICD-10-CM

## 2020-04-21 PROBLEM — I10 HTN (HYPERTENSION): Status: ACTIVE | Noted: 2020-04-21

## 2020-04-21 PROBLEM — I44.7 LEFT BUNDLE BRANCH BLOCK: Status: ACTIVE | Noted: 2020-04-21

## 2020-04-21 PROBLEM — I49.5 SINOATRIAL NODE DYSFUNCTION (HCC): Status: ACTIVE | Noted: 2020-04-21

## 2020-04-21 LAB
ALBUMIN SERPL-MCNC: 2.9 G/DL (ref 3.4–5)
ALBUMIN/GLOB SERPL: 0.8 {RATIO} (ref 0.8–1.7)
ALP SERPL-CCNC: 312 U/L (ref 45–117)
ALT SERPL-CCNC: 121 U/L (ref 16–61)
ANION GAP SERPL CALC-SCNC: 2 MMOL/L (ref 3–18)
ANION GAP SERPL CALC-SCNC: 5 MMOL/L (ref 3–18)
APPEARANCE UR: CLEAR
AST SERPL-CCNC: 113 U/L (ref 10–38)
ATRIAL RATE: 56 BPM
BACTERIA URNS QL MICRO: ABNORMAL /HPF
BASOPHILS # BLD: 0 K/UL (ref 0–0.1)
BASOPHILS NFR BLD: 0 % (ref 0–2)
BILIRUB SERPL-MCNC: 0.3 MG/DL (ref 0.2–1)
BILIRUB UR QL: NEGATIVE
BNP SERPL-MCNC: 1301 PG/ML (ref 0–1800)
BUN SERPL-MCNC: 38 MG/DL (ref 7–18)
BUN SERPL-MCNC: 45 MG/DL (ref 7–18)
BUN/CREAT SERPL: 26 (ref 12–20)
BUN/CREAT SERPL: 29 (ref 12–20)
CALCIUM SERPL-MCNC: 8.1 MG/DL (ref 8.5–10.1)
CALCIUM SERPL-MCNC: 9.3 MG/DL (ref 8.5–10.1)
CALCULATED P AXIS, ECG09: 32 DEGREES
CALCULATED R AXIS, ECG10: -13 DEGREES
CALCULATED T AXIS, ECG11: 121 DEGREES
CHLORIDE SERPL-SCNC: 109 MMOL/L (ref 100–111)
CHLORIDE SERPL-SCNC: 116 MMOL/L (ref 100–111)
CK MB CFR SERPL CALC: 12.2 % (ref 0–4)
CK MB SERPL-MCNC: 7.3 NG/ML (ref 5–25)
CK SERPL-CCNC: 60 U/L (ref 39–308)
CO2 SERPL-SCNC: 27 MMOL/L (ref 21–32)
CO2 SERPL-SCNC: 34 MMOL/L (ref 21–32)
COLOR UR: YELLOW
CREAT SERPL-MCNC: 1.49 MG/DL (ref 0.6–1.3)
CREAT SERPL-MCNC: 1.55 MG/DL (ref 0.6–1.3)
DIAGNOSIS, 93000: NORMAL
DIFFERENTIAL METHOD BLD: ABNORMAL
EOSINOPHIL # BLD: 0.1 K/UL (ref 0–0.4)
EOSINOPHIL NFR BLD: 2 % (ref 0–5)
EPITH CASTS URNS QL MICRO: ABNORMAL /LPF (ref 0–5)
ERYTHROCYTE [DISTWIDTH] IN BLOOD BY AUTOMATED COUNT: 15.2 % (ref 11.6–14.5)
GLOBULIN SER CALC-MCNC: 3.8 G/DL (ref 2–4)
GLUCOSE BLD STRIP.AUTO-MCNC: 167 MG/DL (ref 70–110)
GLUCOSE BLD STRIP.AUTO-MCNC: 97 MG/DL (ref 70–110)
GLUCOSE SERPL-MCNC: 54 MG/DL (ref 74–99)
GLUCOSE SERPL-MCNC: 92 MG/DL (ref 74–99)
GLUCOSE UR STRIP.AUTO-MCNC: NEGATIVE MG/DL
HCT VFR BLD AUTO: 31.4 % (ref 36–48)
HGB BLD-MCNC: 10.5 G/DL (ref 13–16)
HGB UR QL STRIP: ABNORMAL
HYALINE CASTS URNS QL MICRO: ABNORMAL /LPF (ref 0–2)
KETONES UR QL STRIP.AUTO: NEGATIVE MG/DL
LACTATE SERPL-SCNC: 1 MMOL/L (ref 0.4–2)
LEUKOCYTE ESTERASE UR QL STRIP.AUTO: ABNORMAL
LYMPHOCYTES # BLD: 1.2 K/UL (ref 0.9–3.6)
LYMPHOCYTES NFR BLD: 19 % (ref 21–52)
MAGNESIUM SERPL-MCNC: 2 MG/DL (ref 1.6–2.6)
MCH RBC QN AUTO: 33 PG (ref 24–34)
MCHC RBC AUTO-ENTMCNC: 33.4 G/DL (ref 31–37)
MCV RBC AUTO: 98.7 FL (ref 74–97)
MONOCYTES # BLD: 0.3 K/UL (ref 0.05–1.2)
MONOCYTES NFR BLD: 4 % (ref 3–10)
MUCOUS THREADS URNS QL MICRO: ABNORMAL /LPF
NEUTS SEG # BLD: 4.9 K/UL (ref 1.8–8)
NEUTS SEG NFR BLD: 75 % (ref 40–73)
NITRITE UR QL STRIP.AUTO: NEGATIVE
P-R INTERVAL, ECG05: 290 MS
PH UR STRIP: 5 [PH] (ref 5–8)
PLATELET # BLD AUTO: 160 K/UL (ref 135–420)
PMV BLD AUTO: 11.1 FL (ref 9.2–11.8)
POTASSIUM SERPL-SCNC: 3.4 MMOL/L (ref 3.5–5.5)
POTASSIUM SERPL-SCNC: 3.6 MMOL/L (ref 3.5–5.5)
PROT SERPL-MCNC: 6.7 G/DL (ref 6.4–8.2)
PROT UR STRIP-MCNC: NEGATIVE MG/DL
Q-T INTERVAL, ECG07: 556 MS
QRS DURATION, ECG06: 168 MS
QTC CALCULATION (BEZET), ECG08: 536 MS
RBC # BLD AUTO: 3.18 M/UL (ref 4.7–5.5)
RBC #/AREA URNS HPF: ABNORMAL /HPF (ref 0–5)
SODIUM SERPL-SCNC: 145 MMOL/L (ref 136–145)
SODIUM SERPL-SCNC: 148 MMOL/L (ref 136–145)
SP GR UR REFRACTOMETRY: 1.02 (ref 1–1.03)
T4 FREE SERPL-MCNC: 1.1 NG/DL (ref 0.7–1.5)
TROPONIN I SERPL-MCNC: 0.03 NG/ML (ref 0–0.04)
TROPONIN I SERPL-MCNC: 0.04 NG/ML (ref 0–0.04)
TROPONIN I SERPL-MCNC: 0.05 NG/ML (ref 0–0.04)
TSH SERPL DL<=0.05 MIU/L-ACNC: 4.64 UIU/ML (ref 0.36–3.74)
UROBILINOGEN UR QL STRIP.AUTO: 0.2 EU/DL (ref 0.2–1)
VENTRICULAR RATE, ECG03: 56 BPM
WBC # BLD AUTO: 6.5 K/UL (ref 4.6–13.2)
WBC URNS QL MICRO: ABNORMAL /HPF (ref 0–4)

## 2020-04-21 PROCEDURE — 74011250637 HC RX REV CODE- 250/637: Performed by: EMERGENCY MEDICINE

## 2020-04-21 PROCEDURE — 74011250636 HC RX REV CODE- 250/636: Performed by: INTERNAL MEDICINE

## 2020-04-21 PROCEDURE — 76705 ECHO EXAM OF ABDOMEN: CPT

## 2020-04-21 PROCEDURE — 74011000250 HC RX REV CODE- 250

## 2020-04-21 PROCEDURE — 74011000250 HC RX REV CODE- 250: Performed by: INTERNAL MEDICINE

## 2020-04-21 PROCEDURE — 83605 ASSAY OF LACTIC ACID: CPT

## 2020-04-21 PROCEDURE — 74011250637 HC RX REV CODE- 250/637: Performed by: HOSPITALIST

## 2020-04-21 PROCEDURE — 96374 THER/PROPH/DIAG INJ IV PUSH: CPT

## 2020-04-21 PROCEDURE — 82533 TOTAL CORTISOL: CPT

## 2020-04-21 PROCEDURE — 74011250636 HC RX REV CODE- 250/636: Performed by: EMERGENCY MEDICINE

## 2020-04-21 PROCEDURE — 71045 X-RAY EXAM CHEST 1 VIEW: CPT

## 2020-04-21 PROCEDURE — 02HV33Z INSERTION OF INFUSION DEVICE INTO SUPERIOR VENA CAVA, PERCUTANEOUS APPROACH: ICD-10-PCS | Performed by: EMERGENCY MEDICINE

## 2020-04-21 PROCEDURE — 65610000006 HC RM INTENSIVE CARE

## 2020-04-21 PROCEDURE — 99285 EMERGENCY DEPT VISIT HI MDM: CPT

## 2020-04-21 PROCEDURE — 75810000455 HC PLCMT CENT VENOUS CATH LVL 2 5182

## 2020-04-21 PROCEDURE — 74011000258 HC RX REV CODE- 258: Performed by: INTERNAL MEDICINE

## 2020-04-21 PROCEDURE — 51702 INSERT TEMP BLADDER CATH: CPT

## 2020-04-21 PROCEDURE — 93005 ELECTROCARDIOGRAM TRACING: CPT

## 2020-04-21 PROCEDURE — 82962 GLUCOSE BLOOD TEST: CPT

## 2020-04-21 PROCEDURE — 84443 ASSAY THYROID STIM HORMONE: CPT

## 2020-04-21 PROCEDURE — C9113 INJ PANTOPRAZOLE SODIUM, VIA: HCPCS | Performed by: INTERNAL MEDICINE

## 2020-04-21 PROCEDURE — 83880 ASSAY OF NATRIURETIC PEPTIDE: CPT

## 2020-04-21 PROCEDURE — 96365 THER/PROPH/DIAG IV INF INIT: CPT

## 2020-04-21 PROCEDURE — 80053 COMPREHEN METABOLIC PANEL: CPT

## 2020-04-21 PROCEDURE — 87040 BLOOD CULTURE FOR BACTERIA: CPT

## 2020-04-21 PROCEDURE — 74011000258 HC RX REV CODE- 258: Performed by: EMERGENCY MEDICINE

## 2020-04-21 PROCEDURE — 74011250637 HC RX REV CODE- 250/637: Performed by: INTERNAL MEDICINE

## 2020-04-21 PROCEDURE — 85025 COMPLETE CBC W/AUTO DIFF WBC: CPT

## 2020-04-21 PROCEDURE — 83735 ASSAY OF MAGNESIUM: CPT

## 2020-04-21 PROCEDURE — C9113 INJ PANTOPRAZOLE SODIUM, VIA: HCPCS | Performed by: EMERGENCY MEDICINE

## 2020-04-21 PROCEDURE — 96375 TX/PRO/DX INJ NEW DRUG ADDON: CPT

## 2020-04-21 PROCEDURE — 84439 ASSAY OF FREE THYROXINE: CPT

## 2020-04-21 PROCEDURE — 82550 ASSAY OF CK (CPK): CPT

## 2020-04-21 PROCEDURE — 74011000250 HC RX REV CODE- 250: Performed by: EMERGENCY MEDICINE

## 2020-04-21 PROCEDURE — 81001 URINALYSIS AUTO W/SCOPE: CPT

## 2020-04-21 RX ORDER — ASPIRIN 325 MG
325 TABLET ORAL
Status: COMPLETED | OUTPATIENT
Start: 2020-04-21 | End: 2020-04-21

## 2020-04-21 RX ORDER — LIDOCAINE 4 G/100G
1 PATCH TOPICAL EVERY 24 HOURS
Status: DISCONTINUED | OUTPATIENT
Start: 2020-04-21 | End: 2020-04-25

## 2020-04-21 RX ORDER — CEFTRIAXONE 1 G/1
1 INJECTION, POWDER, FOR SOLUTION INTRAMUSCULAR; INTRAVENOUS ONCE
Status: DISCONTINUED | OUTPATIENT
Start: 2020-04-21 | End: 2020-04-21

## 2020-04-21 RX ORDER — SODIUM CHLORIDE 0.9 % (FLUSH) 0.9 %
5-10 SYRINGE (ML) INJECTION AS NEEDED
Status: DISCONTINUED | OUTPATIENT
Start: 2020-04-21 | End: 2020-05-11 | Stop reason: HOSPADM

## 2020-04-21 RX ORDER — POTASSIUM CHLORIDE 20 MEQ/1
40 TABLET, EXTENDED RELEASE ORAL ONCE
Status: COMPLETED | OUTPATIENT
Start: 2020-04-21 | End: 2020-04-21

## 2020-04-21 RX ORDER — NOREPINEPHRINE BIT/0.9 % NACL 8 MG/250ML
INFUSION BOTTLE (ML) INTRAVENOUS
Status: COMPLETED
Start: 2020-04-21 | End: 2020-04-21

## 2020-04-21 RX ORDER — MAG HYDROX/ALUMINUM HYD/SIMETH 200-200-20
30 SUSPENSION, ORAL (FINAL DOSE FORM) ORAL
Status: COMPLETED | OUTPATIENT
Start: 2020-04-21 | End: 2020-04-21

## 2020-04-21 RX ORDER — ASPIRIN 81 MG/1
81 TABLET ORAL DAILY
Status: DISCONTINUED | OUTPATIENT
Start: 2020-04-22 | End: 2020-05-04

## 2020-04-21 RX ORDER — NOREPINEPHRINE BIT/0.9 % NACL 8 MG/250ML
.5-3 INFUSION BOTTLE (ML) INTRAVENOUS
Status: DISCONTINUED | OUTPATIENT
Start: 2020-04-21 | End: 2020-04-27

## 2020-04-21 RX ORDER — HEPARIN SODIUM 5000 [USP'U]/ML
5000 INJECTION, SOLUTION INTRAVENOUS; SUBCUTANEOUS EVERY 8 HOURS
Status: DISCONTINUED | OUTPATIENT
Start: 2020-04-21 | End: 2020-05-10

## 2020-04-21 RX ORDER — ACETAMINOPHEN 500 MG
1000 TABLET ORAL
Status: COMPLETED | OUTPATIENT
Start: 2020-04-21 | End: 2020-04-21

## 2020-04-21 RX ORDER — HYDROCORTISONE SODIUM SUCCINATE 100 MG/2ML
100 INJECTION, POWDER, FOR SOLUTION INTRAMUSCULAR; INTRAVENOUS ONCE
Status: COMPLETED | OUTPATIENT
Start: 2020-04-21 | End: 2020-04-21

## 2020-04-21 RX ORDER — LEVOTHYROXINE SODIUM 25 UG/1
50 TABLET ORAL
Status: DISCONTINUED | OUTPATIENT
Start: 2020-04-22 | End: 2020-04-22

## 2020-04-21 RX ORDER — SUCRALFATE 1 G/1
1 TABLET ORAL 4 TIMES DAILY
Status: DISCONTINUED | OUTPATIENT
Start: 2020-04-21 | End: 2020-05-06

## 2020-04-21 RX ORDER — MELATONIN
2000 DAILY
Status: DISCONTINUED | OUTPATIENT
Start: 2020-04-22 | End: 2020-05-11 | Stop reason: HOSPADM

## 2020-04-21 RX ORDER — TAMSULOSIN HYDROCHLORIDE 0.4 MG/1
0.4 CAPSULE ORAL DAILY
Status: DISCONTINUED | OUTPATIENT
Start: 2020-04-22 | End: 2020-05-11 | Stop reason: HOSPADM

## 2020-04-21 RX ORDER — SODIUM CHLORIDE 9 MG/ML
150 INJECTION, SOLUTION INTRAVENOUS CONTINUOUS
Status: DISPENSED | OUTPATIENT
Start: 2020-04-21 | End: 2020-04-21

## 2020-04-21 RX ORDER — MAGNESIUM SULFATE HEPTAHYDRATE 40 MG/ML
2 INJECTION, SOLUTION INTRAVENOUS
Status: COMPLETED | OUTPATIENT
Start: 2020-04-21 | End: 2020-04-21

## 2020-04-21 RX ORDER — POTASSIUM CHLORIDE 7.45 MG/ML
10 INJECTION INTRAVENOUS
Status: COMPLETED | OUTPATIENT
Start: 2020-04-21 | End: 2020-04-21

## 2020-04-21 RX ORDER — ATORVASTATIN CALCIUM 20 MG/1
40 TABLET, FILM COATED ORAL
Status: DISCONTINUED | OUTPATIENT
Start: 2020-04-21 | End: 2020-05-11 | Stop reason: HOSPADM

## 2020-04-21 RX ORDER — PANTOPRAZOLE SODIUM 40 MG/10ML
40 INJECTION, POWDER, LYOPHILIZED, FOR SOLUTION INTRAVENOUS
Status: COMPLETED | OUTPATIENT
Start: 2020-04-21 | End: 2020-04-21

## 2020-04-21 RX ADMIN — HYDROCORTISONE SODIUM SUCCINATE 100 MG: 100 INJECTION, POWDER, FOR SOLUTION INTRAMUSCULAR; INTRAVENOUS at 09:25

## 2020-04-21 RX ADMIN — POTASSIUM CHLORIDE 10 MEQ: 10 INJECTION, SOLUTION INTRAVENOUS at 10:19

## 2020-04-21 RX ADMIN — ASPIRIN 325 MG ORAL TABLET 325 MG: 325 PILL ORAL at 06:42

## 2020-04-21 RX ADMIN — PANTOPRAZOLE SODIUM 40 MG: 40 INJECTION, POWDER, FOR SOLUTION INTRAVENOUS at 07:26

## 2020-04-21 RX ADMIN — NOREPINEPHRINE BITARTRATE 4 MCG/MIN: 1 INJECTION INTRAVENOUS at 09:00

## 2020-04-21 RX ADMIN — HEPARIN SODIUM 5000 UNITS: 5000 INJECTION INTRAVENOUS; SUBCUTANEOUS at 16:44

## 2020-04-21 RX ADMIN — SODIUM CHLORIDE 500 ML: 900 INJECTION, SOLUTION INTRAVENOUS at 08:09

## 2020-04-21 RX ADMIN — ACETAMINOPHEN 1000 MG: 500 TABLET, FILM COATED ORAL at 07:31

## 2020-04-21 RX ADMIN — POTASSIUM CHLORIDE 40 MEQ: 1500 TABLET, EXTENDED RELEASE ORAL at 21:58

## 2020-04-21 RX ADMIN — ATORVASTATIN CALCIUM 40 MG: 20 TABLET, FILM COATED ORAL at 21:58

## 2020-04-21 RX ADMIN — ALUMINUM HYDROXIDE, MAGNESIUM HYDROXIDE, AND SIMETHICONE 30 ML: 200; 200; 20 SUSPENSION ORAL at 07:31

## 2020-04-21 RX ADMIN — DEXTROSE MONOHYDRATE 250 ML: 100 INJECTION, SOLUTION INTRAVENOUS at 07:22

## 2020-04-21 RX ADMIN — MAGNESIUM SULFATE IN WATER 2 G: 40 INJECTION, SOLUTION INTRAVENOUS at 11:03

## 2020-04-21 RX ADMIN — CEFTRIAXONE 1 G: 1 INJECTION, POWDER, FOR SOLUTION INTRAMUSCULAR; INTRAVENOUS at 10:19

## 2020-04-21 RX ADMIN — SODIUM CHLORIDE 150 ML/HR: 900 INJECTION, SOLUTION INTRAVENOUS at 08:08

## 2020-04-21 RX ADMIN — SUCRALFATE 1 G: 1 TABLET ORAL at 22:07

## 2020-04-21 RX ADMIN — SODIUM CHLORIDE 40 ML: 900 INJECTION, SOLUTION INTRAVENOUS at 08:09

## 2020-04-21 RX ADMIN — PIPERACILLIN SODIUM AND TAZOBACTAM SODIUM 4.5 G: 4; .5 INJECTION, POWDER, LYOPHILIZED, FOR SOLUTION INTRAVENOUS at 20:14

## 2020-04-21 RX ADMIN — SODIUM CHLORIDE 40 MG: 9 INJECTION, SOLUTION INTRAMUSCULAR; INTRAVENOUS; SUBCUTANEOUS at 20:16

## 2020-04-21 RX ADMIN — VANCOMYCIN HYDROCHLORIDE 1750 MG: 100 INJECTION, POWDER, LYOPHILIZED, FOR SOLUTION INTRAVENOUS at 17:00

## 2020-04-21 RX ADMIN — PIPERACILLIN SODIUM AND TAZOBACTAM SODIUM 4.5 G: 4; .5 INJECTION, POWDER, LYOPHILIZED, FOR SOLUTION INTRAVENOUS at 16:44

## 2020-04-21 RX ADMIN — SODIUM CHLORIDE 500 ML: 900 INJECTION, SOLUTION INTRAVENOUS at 08:08

## 2020-04-21 RX ADMIN — SODIUM CHLORIDE 1000 ML: 900 INJECTION, SOLUTION INTRAVENOUS at 08:08

## 2020-04-21 NOTE — ED TRIAGE NOTES
Patient to the ED via EMS with c/c of mid-sternal chest pain. Pt states that this started during the night, unsure of what time. States that it feels more like a pressure than a pain. Patient bradycardic on arrival.  EMS unable to obtain temp, on arrival patient with temp of 92.5. Patient placed on Елена hugger. MD aware at this time.

## 2020-04-21 NOTE — CONSULTS
[address]    Name:  Yolanda Membreno  MR#:   [id number]  :  1934  ACCOUNT #:  [de-identified]  DATE OF SERVICE:  2020    CRITICAL CARE MEDICINE CONSULTATION    REASON FOR CONSULTATION:  Chest pain with EKG changes and hypotension. HISTORY OF PRESENT ILLNESS:  The patient is an 31-year-old gentleman who presents complaining of chest pain for 3-4 hours prior to coming to the emergency room and also having a similar episode several weeks ago. He presents with hypotension and hypothermia. His EKG initially showed 60-cycle interference with what appeared to be a sinus rhythm, however, that changed over several hours to a wide-based, multifocal bradycardia. He is normally bradycardic at home. He was rewarmed and low-dose Levophed was started which was successful in bringing his blood pressure up. He also had several electrolytes replenished with potassium and magnesium. When I am seeing him, he is awake, alert, and oriented in the emergency room, warm, and normotension. He does have an unusual multifocal, wide-based, complex rhythm. Cardiology has seen this rhythm and will see him later in the day. He is currently stable. He denies cough or fever, night sweats, or rigors. He has had an echocardiogram which is essentially unchanged from earlier in the month showing a 35% to 40% ejection fraction. He has currently no complaints and would like to go home. His laboratory data shows a white blood cell count of 6.5, H and H of 10 and 31 with a platelet count 484. His electrolytes show mild hypokalemia and hypoglycemia for which he is being treated already. His BUN and creatinine are stable at 45 and 1.5. His magnesium was 2.   His cardiac enzymes show a CPK M-band index of 12.2 and an M-band of 7.2, and his troponin and BNP are normal.    MEDICATIONS:  His prior to admission medications include Synthroid 50 mcg daily, vitamin D3, Zocor 40 mg daily, 81 mg aspirin, Tylenol 2 tablets p.r.n., Carafate 1 g 4 tablets daily, Prilosec 20 mg daily, allopurinol 300 mg daily, Protonix 40 mg daily, and Flomax 0.4 mg daily. PHYSICAL EXAMINATION:  GENERAL:  He is awake, alert, and oriented x3, in no apparent distress. HEENT:  Normocephalic, atraumatic. His pupils are equal.  Sclerae are anicteric. Nose and throat are clear. He has a small, chronic-appearing, cystic lesion just below his right bottom lip. CHEST:  His chest is clear bilaterally. HEART:  Irregular rate and rhythm as described above. ABDOMEN:  Soft, nontender, without rebound or guarding or hernia. He has a well-healed appendectomy scar in his right lower quadrant. EXTREMITIES:  Warm and dry without edema. NEUROLOGIC:  Intact. His EKG is as above. His chest x-ray is without infiltrates. IMPRESSION:  The patient is a gentleman with chest pain and positive CK-MB labs with unusual wide-based rhythm, initially presenting hypotensive, all this would seem to indicate myocardial event of some form. I will leave it to Cardiology to comment on that, and I would recommend weaning his pressors at this point and will happily follow him with you.       Fatuma Rader MD [id number]  D:  04/21/2020 14:15  T:  04/21/2020 16:45  JOB #:  [id number]

## 2020-04-21 NOTE — ED PROVIDER NOTES
Date: 4/21/2020  Patient Name: Shalom Novoa    History of Presenting Illness     Chief Complaint   Patient presents with    Chest Pain     History Provided By: Patient    HPI/Chief Complaint: (Context):who presents with patient with substernal symptoms pressure tightness no radiation constant approximately 6 to 7 hours. Not exertional while at rest  No shortness of breath no cough congestion no fever no vomiting no diarrhea no abdominal pain no focal arm or leg weakness no black or bloody stool. Patient symptoms are similar to the symptoms 3 weeks ago the patient represented to the emergency department for. Patient has no exposure to coronavirus patient has not traveled. Patient has no calf pain no unilateral swelling in the leg that he reports.   Patient is awake alert oriented x3 and answering questions appropriately.  ===  Patient's triage note is reviewed  Patient's allergies to amlodipine Bactrim lisinopril ciprofloxacin appreciated  Home medication include Tylenol Synthroid Protonix Carafate Flomax  Patient's medical history includes hypertension urinary retention  Patient social history includes no smoking occasional alcohol no drugs  Patient surgical history is for EGD appendectomy and TURP  Patient's chart review was done patient's prior visit was on 4/6/2020 for chest pain diagnosis then was for hematuria hypertension incomplete bladder emptying  Heart failure as well patient's echocardiogram from 4/8/2020 with normal cavity EF of 04% moderate systolic function patient is 4/8/2020 barium swallow shows spillage and silent aspiration impression was moderate modified swallowing study and reviewing the notes from cardiology 4/9/2020 no beta-blocker due to bradycardia, hold off any ACE  I reviewed his initial consultation of 4/7/2020 no specific recommendation but appears that all the chief complaints including heart failure and chest pain and hypothyroidism and bradycardia were to be followed but no particular interventions were done at that point.  =====    PCP: Windy Burt MD    Current Facility-Administered Medications   Medication Dose Route Frequency Provider Last Rate Last Dose    lidocaine 4 % patch 1 Patch  1 Patch TransDERmal Q24H Shaggy Arreola MD   1 Patch at 04/21/20 0731    sodium chloride (NS) flush 5-10 mL  5-10 mL IntraVENous PRN Shaggy Arreola MD        NOREPINephrine (LEVOPHED) 8 mg in 0.9% NS 250ml infusion  0.5-16 mcg/min IntraVENous TITRATE Shaggy Arreola MD 15 mL/hr at 04/21/20 1054 8 mcg/min at 04/21/20 1054     Current Outpatient Medications   Medication Sig Dispense Refill    levothyroxine (SYNTHROID) 50 mcg tablet Take 1 Tab by mouth every morning. 30 Tab 0    cholecalciferol (VITAMIN D3) (1000 Units /25 mcg) tablet Take 2 Tabs by mouth daily. 30 Tab 0    simvastatin (ZOCOR) 40 mg tablet Take 1 Tab by mouth nightly. 30 Tab 0    aspirin delayed-release 81 mg tablet Take 1 Tab by mouth daily. 30 Tab 0    acetaminophen (TYLENOL EXTRA STRENGTH) 500 mg tablet Take 2 Tabs by mouth every six (6) hours as needed for Pain. 50 Tab 0    sucralfate (CARAFATE) 1 gram tablet Take 1 Tab by mouth four (4) times daily. 20 Tab 0    omeprazole (PRILOSEC) 20 mg capsule Take 1 Cap by mouth daily. 30 Cap 3    allopurinol (ZYLOPRIM) 300 mg tablet Take 300 mg by mouth daily.       pantoprazole (PROTONIX) 40 mg tablet   0    tamsulosin (FLOMAX) 0.4 mg capsule take 1 capsule by mouth once daily AFTER DINNER 90 Cap 3       Past History     Past Medical History:  Past Medical History:   Diagnosis Date    Difficulty urinating     Elevated PSA     Hematuria, unspecified     Hypercholesterolemia     Hypertension     Incomplete bladder emptying     Urinary retention     UTI (urinary tract infection)        Past Surgical History:  Past Surgical History:   Procedure Laterality Date    APPENDECTOMY      EGD  2/7/2014         HX TURP  6/13/16    HX TURP         Family History:  Family History   Problem Relation Age of Onset    Cancer Father     Alzheimer Mother     Heart defect Brother        Social History:  Social History     Tobacco Use    Smoking status: Never Smoker    Smokeless tobacco: Never Used   Substance Use Topics    Alcohol use: Yes     Alcohol/week: 2.0 standard drinks     Types: 2 Cans of beer per week     Comment: Occasional    Drug use: No       Allergies: Allergies   Allergen Reactions    Amlodipine Swelling and Itching    Bactrim [Sulfamethoprim Ds] Rash    Lisinopril Other (comments)     Acute renal failure    Ciprofloxacin Rash and Itching         Review of Systems   Review of Systems   Constitutional: Positive for fatigue. Negative for activity change and fever. HENT: Negative for congestion and rhinorrhea. Eyes: Negative for visual disturbance. Respiratory: Negative for shortness of breath. Cardiovascular: Positive for chest pain. Negative for palpitations. Gastrointestinal: Negative for abdominal pain, diarrhea, nausea and vomiting. Genitourinary: Negative for dysuria and hematuria. Musculoskeletal: Negative for back pain. Skin: Negative for rash. Neurological: Negative for dizziness, weakness and light-headedness. Psychiatric/Behavioral: Negative for agitation. All other systems reviewed and are negative. Physical Exam     Physical Exam  Constitutional:       Appearance: He is well-developed. HENT:      Head: Normocephalic and atraumatic. Mouth/Throat:      Mouth: Mucous membranes are moist.   Eyes:      Conjunctiva/sclera: Conjunctivae normal.      Pupils: Pupils are equal, round, and reactive to light. Neck:      Musculoskeletal: Normal range of motion and neck supple. Cardiovascular:      Rate and Rhythm: Regular rhythm. Bradycardia present. Pulmonary:      Effort: Pulmonary effort is normal.      Breath sounds: Normal breath sounds.    Abdominal:      General: Bowel sounds are normal. Palpations: Abdomen is soft. Musculoskeletal: Normal range of motion. Lymphadenopathy:      Cervical: No cervical adenopathy. Skin:     General: Skin is warm. Capillary Refill: Capillary refill takes less than 2 seconds. Neurological:      General: No focal deficit present. Mental Status: He is alert. Medical Decision Making   I am the first provider for this patient. I reviewed the vital signs, available nursing notes, past medical history, past surgical history, family history and social history. Provider Notes (Medical Decision Making): Patient with substernal chest pain 6 to 7 hours  History of silent aspiration, EF of 40%, cardiology note from 3 weeks ago for 2 outpatient monitoring  P I will check patient's EKG troponin BNP chest x-ray  Rule out any pneumonia any acute signs of heart failure myocardial infarction EKG is unchanged from prior  Patient remained stable in the emergency department I will start symptomatic relief in the emergency department as well for the patient there is no exposure to coronavirus no cough no significant fevers documented no hypoxia no shortness of breath    Vital Signs-Reviewed the patient's vital signs. Pulse Oximetry Analysis -100% room air, normal    Cardiac Monitor:  Rate/Rhythm:Sinus bradycardia heart rate 53. EKG: Interpreted by the EP. Time of the EKG 6:24 AM, 56, sinus bradycardia, first-degree AV block, QTC is widened to 536, leftward axis,    left bundle branch block is appreciated. PVC appreciated. Morning patient's EKG today is compared with April 7 of 2020 53 heart rate similar intervals first-degree AV block and also similar morphology. --  Patient is prior creatinine similar to today  Patient's bicarb is elevated to 34  Patient's hemoglobin 10.5 is slightly below prior labs that are documented here. I discussed this information with the patient's son Ian Robin who called as well  Patient lives at home with them.   No acute changes recently has been having some history of aspiration has speech therapist coming to the house as well. Patient is a poor compliance with sticking with his regimented diet  He expressed concern for pneumonia from aspiration as this happened last time as well. Vitals:    04/21/20 1058 04/21/20 1059 04/21/20 1100 04/21/20 1115   BP:   128/52 105/47   Pulse: 91 66 67 71   Resp:   14 20   Temp:   97.1 °F (36.2 °C) 97.2 °F (36.2 °C)   SpO2:   96% 97%   Weight:           Records Reviewed: Nursing Notes    ED Course:   ED Course as of Apr 21 1227   Tue Apr 21, 2020   0750 CO2(!): 34 [AS]   0750 Potassium(!): 3.4 [AS]   0750 Glucose(!!): 54 [AS]   0750 Creatinine(!): 1.55 [AS]   0750 AST(!): 113 [AS]   0750 Alk. phosphatase(!): 312 [AS]   0750 Patient's glucose of 54 is appreciated, slightly low potassium, elevated CO2, hemoglobin at 10.5 is appreciated which is different from prior. Patient's abnormal LFTs are appreciated   HGB(!): 10.5 [AS]   0751 XR CHEST PORT [AS]   3942 Patient's chest x-ray with no acute process    [AS]      ED Course User Index  [AS] Laury Poon MD     ---  For Hospitalized Patients:    1. Hospitalization Decision Time:  The decision to hospitalize the patient was made by Dr. James Galo  Date/Time: 4/21/2020 9:53 AM  Performed by: Laury Poon MD  Authorized by: Laury Poon MD     Consent:     Consent obtained:  Written    Consent given by:  Patient    Risks discussed:  Arterial puncture, bleeding, infection, incorrect placement, nerve damage and pneumothorax    Alternatives discussed:  Referral, observation, alternative treatment, delayed treatment and no treatment  Pre-procedure details:     Skin preparation:  2% chlorhexidine  Anesthesia (see MAR for exact dosages):      Anesthesia method:  Local infiltration    Local anesthetic:  Lidocaine 1% w/o epi  Procedure details:     Location:  R internal jugular    Site selection rationale:  Less complication from IJ with a subclavian. Patient position:  Trendelenburg    Procedural supplies:  Triple lumen    Catheter size:  7.5 Fr    Landmarks identified: yes      Ultrasound guidance: yes      Number of attempts:  1    Successful placement: yes    Post-procedure details:     Post-procedure:  Dressing applied and line sutured    Assessment:  Blood return through all ports, free fluid flow, no pneumothorax on x-ray and placement verified by x-ray    Patient tolerance of procedure: Tolerated well, no immediate complications            Diagnostic Study Results     Orders Placed This Encounter    SEPSIS BUNDLE INITIATED IN ED (REQUIRED)     Standing Status:   Standing     Number of Occurrences:   1    CENTRAL VENOUS LINE INSERTION     This order was created via procedure documentation     Standing Status:   Standing     Number of Occurrences:   1    CULTURE, BLOOD     Standing Status:   Standing     Number of Occurrences:   1    CULTURE, BLOOD     Standing Status:   Standing     Number of Occurrences:   1    XR CHEST PORT     Standing Status:   Standing     Number of Occurrences:   1     Order Specific Question:   Reason for Exam     Answer:   cp    US GALLBLADDER     Standing Status:   Standing     Number of Occurrences:   1     Order Specific Question:   Transport     Answer:   Stretcher [5]     Order Specific Question:   Reason for Exam     Answer:   pain and hx of gall stones.  today elevated LFT's    XR CHEST PORT     Standing Status:   Standing     Number of Occurrences:   1     Order Specific Question:   Reason for Exam     Answer:   post line placement    CBC WITH AUTOMATED DIFF     Standing Status:   Standing     Number of Occurrences:   1    METABOLIC PANEL, COMPREHENSIVE     Standing Status:   Standing     Number of Occurrences:   1    NT-PRO BNP     Standing Status:   Standing     Number of Occurrences:   1    T4, FREE     Standing Status:   Standing     Number of Occurrences:   1    TSH 3RD GENERATION Standing Status:   Standing     Number of Occurrences:   1    URINALYSIS W/ RFLX MICROSCOPIC     Standing Status:   Standing     Number of Occurrences:   1    LACTIC ACID, PLASMA     Standing Status:   Standing     Number of Occurrences:   1    LACTIC ACID     2 hours after initial If initial Lactate is > 1.7     Standing Status:   Standing     Number of Occurrences:   1    CORTISOL     Standing Status:   Standing     Number of Occurrences:   1    URINE MICROSCOPIC ONLY     Standing Status:   Standing     Number of Occurrences:   1    MAGNESIUM     Standing Status:   Standing     Number of Occurrences:   1    CARDIAC PANEL,(CK, CKMB & TROPONIN)     Standing Status:   Standing     Number of Occurrences:   1    CARDIAC MONITORING     Standing Status:   Standing     Number of Occurrences:   1     Order Specific Question:   Type: Answer:   Bedside    STRAIGHT CATHETER (NURSING) ONE TIME STAT     Standing Status:   Standing     Number of Occurrences:   1    VITAL SIGNS - PER UNIT ROUTINE     PER UNIT ROUTINE     Standing Status:   Standing     Number of Occurrences:   1    STRICT I & O     Standing Status:   Standing     Number of Occurrences:   1    NEUROLOGIC STATUS ASSESSMENT - PER UNIT ROUTINE     PER UNIT ROUTINE     Standing Status:   Standing     Number of Occurrences:   1    IP CONSULT TO INTENSIVIST     Standing Status:   Standing     Number of Occurrences:   1     Order Specific Question:   Reason for Consult: Answer:   hypotension/hypothermic     Order Specific Question:   Did you call or speak to the consulting provider? Answer:   No     Order Specific Question:   Consult To     Answer:   manage     Order Specific Question:   Schedule When?      Answer:   TODAY    GLUCOSE, POC     Standing Status:   Standing     Number of Occurrences:   1    EKG, 12 LEAD, INITIAL     Standing Status:   Standing     Number of Occurrences:   1     Order Specific Question:   Reason for Exam: Answer:   chest pain    EKG, 12 LEAD, INITIAL     Standing Status:   Standing     Number of Occurrences:   1     Order Specific Question:   Reason for Exam:     Answer:   Pain    SALINE LOCK IV ONE TIME STAT     Standing Status:   Standing     Number of Occurrences:   1    DEBBIE NOLBERTOER     Standing Status:   Standing     Number of Occurrences:   1    aspirin tablet 325 mg    alum-mag hydroxide-simeth (MYLANTA) oral suspension 30 mL    pantoprazole (PROTONIX) injection 40 mg     Order Specific Question:   PPI INDICATION     Answer:   Symptomatic GERD    acetaminophen (TYLENOL) tablet 1,000 mg    lidocaine 4 % patch 1 Patch    0.9% sodium chloride infusion    dextrose 10 % infusion     Puneet Webb   : cabinet override    dextrose 10 % infusion 250 mL    sodium chloride 0.9 % bolus infusion 500 mL    sodium chloride (NS) flush 5-10 mL    FOLLOWED BY Linked Order Group     sodium chloride 0.9 % bolus infusion 1,000 mL      This panel was generated from single order of a weight-based dose of 30 mL/kg, Target volume of 2040 mL (30 mL/kg × 68 kg (Weight as of Tue Apr 21, 2020 0803) = 2,040 mL), Rate of 999 mL/hr or as specified by the physician.  sodium chloride 0.9 % bolus infusion 500 mL      This panel was generated from single order of a weight-based dose of 30 mL/kg, Target volume of 2040 mL (30 mL/kg × 68 kg (Weight as of Tue Apr 21, 2020 0803) = 2,040 mL), Rate of 999 mL/hr or as specified by the physician.  sodium chloride 0.9 % bolus infusion 40 mL      This panel was generated from single order of a weight-based dose of 30 mL/kg, Target volume of 2040 mL (30 mL/kg × 68 kg (Weight as of Tue Apr 21, 2020 0803) = 2,040 mL), Rate of 999 mL/hr or as specified by the physician.      Norepinephrine Bitartrate (LEVOPHED) 8 mg/250 mL (32 mcg/mL) in NS 0.9% 250 ml infusion     Ruby Chin   : cabinet override    NOREPINephrine (LEVOPHED) 8 mg in 0.9% NS 250ml infusion     Order Specific Question:   Titrate Infusion? Answer:   Yes     Order Specific Question:   Initial Infusion Rate: Answer:   4 mcg/min     Order Specific Question:   Titrate Every 2-5 Minutes In Increments of: Answer:   1 mcg/min     Order Specific Question:   Goal of Therapy is: Answer:   MAP greater than 65 mmHg     Order Specific Question:   Contact Physician for: Answer:   Patient is not achieving goal and is at max rate    hydrocortisone Sod Succ (PF) (SOLU-CORTEF) injection 100 mg    DISCONTD: cefTRIAXone (ROCEPHIN) injection 1 g     Order Specific Question:   Antibiotic Indications     Answer:   Urinary Tract Infection    cefTRIAXone (ROCEPHIN) 1 g in 0.9% sodium chloride (MBP/ADV) 50 mL MBP     Order Specific Question:   Antibiotic Indications     Answer:   Urinary Tract Infection    potassium chloride 10 mEq in 100 ml IVPB    magnesium sulfate 2 g/50 ml IVPB (premix or compounded)    IP CONSULT TO CARDIOLOGY     Standing Status:   Standing     Number of Occurrences:   1     Order Specific Question:   Reason for Consult: Answer:   chest pain     Order Specific Question:   Did you call or speak to the consulting provider? Answer:   No     Order Specific Question:   Consult To     Answer:   cards    INITIAL PHYSICIAN ORDER: INPATIENT Intensive Care; 9. Other (further clarification in H&P documentation)     Standing Status:   Standing     Number of Occurrences:   1     Order Specific Question:   Status: Answer:   INPATIENT [101]     Order Specific Question:   Type of Bed     Answer:   Intensive Care [6]     Order Specific Question:   Inpatient Hospitalization Certified Necessary for the Following Reasons     Answer:   9.  Other (further clarification in H&P documentation)     Order Specific Question:   Admitting Diagnosis     Answer:   Hypothermia [204617]     Order Specific Question:   Admitting Physician     Answer:   Shubham Mueller     Order Specific Question:   Attending Physician     Answer:   Shalom Herrera     Order Specific Question:   Estimated Length of Stay     Answer:   3-4 Midnights     Order Specific Question:   Discharge Plan:     Answer:   Maur[facility] 64 -     Recent Results (from the past 12 hour(s))   EKG, 12 LEAD, INITIAL    Collection Time: 04/21/20  6:24 AM   Result Value Ref Range    Ventricular Rate 56 BPM    Atrial Rate 56 BPM    P-R Interval 290 ms    QRS Duration 168 ms    Q-T Interval 556 ms    QTC Calculation (Bezet) 536 ms    Calculated P Axis 32 degrees    Calculated R Axis -13 degrees    Calculated T Axis 121 degrees    Diagnosis       Sinus bradycardia with 1st degree AV block with occasional and consecutive   premature ventricular complexes and fusion complexes with ventricular  escape complexes  Left bundle branch block  Abnormal ECG  When compared with ECG of 07-APR-2020 12:00,  fusion complexes are now present  premature ventricular complexes are now present  QT has lengthened  Confirmed by Stew Cortes ([id number]) on 4/21/2020 9:02:23 AM     CBC WITH AUTOMATED DIFF    Collection Time: 04/21/20  6:36 AM   Result Value Ref Range    WBC 6.5 4.6 - 13.2 K/uL    RBC 3.18 (L) 4.70 - 5.50 M/uL    HGB 10.5 (L) 13.0 - 16.0 g/dL    HCT 31.4 (L) 36.0 - 48.0 %    MCV 98.7 (H) 74.0 - 97.0 FL    MCH 33.0 24.0 - 34.0 PG    MCHC 33.4 31.0 - 37.0 g/dL    RDW 15.2 (H) 11.6 - 14.5 %    PLATELET 576 788 - 463 K/uL    MPV 11.1 9.2 - 11.8 FL    NEUTROPHILS 75 (H) 40 - 73 %    LYMPHOCYTES 19 (L) 21 - 52 %    MONOCYTES 4 3 - 10 %    EOSINOPHILS 2 0 - 5 %    BASOPHILS 0 0 - 2 %    ABS. NEUTROPHILS 4.9 1.8 - 8.0 K/UL    ABS. LYMPHOCYTES 1.2 0.9 - 3.6 K/UL    ABS. MONOCYTES 0.3 0.05 - 1.2 K/UL    ABS. EOSINOPHILS 0.1 0.0 - 0.4 K/UL    ABS.  BASOPHILS 0.0 0.0 - 0.1 K/UL    DF AUTOMATED     METABOLIC PANEL, COMPREHENSIVE    Collection Time: 04/21/20  6:36 AM   Result Value Ref Range    Sodium 145 136 - 145 mmol/L    Potassium 3.4 (L) 3.5 - 5.5 mmol/L Chloride 109 100 - 111 mmol/L    CO2 34 (H) 21 - 32 mmol/L    Anion gap 2 (L) 3.0 - 18 mmol/L    Glucose 54 (LL) 74 - 99 mg/dL    BUN 45 (H) 7.0 - 18 MG/DL    Creatinine 1.55 (H) 0.6 - 1.3 MG/DL    BUN/Creatinine ratio 29 (H) 12 - 20      GFR est AA 52 (L) >60 ml/min/1.73m2    GFR est non-AA 43 (L) >60 ml/min/1.73m2    Calcium 9.3 8.5 - 10.1 MG/DL    Bilirubin, total 0.3 0.2 - 1.0 MG/DL    ALT (SGPT) 121 (H) 16 - 61 U/L    AST (SGOT) 113 (H) 10 - 38 U/L    Alk.  phosphatase 312 (H) 45 - 117 U/L    Protein, total 6.7 6.4 - 8.2 g/dL    Albumin 2.9 (L) 3.4 - 5.0 g/dL    Globulin 3.8 2.0 - 4.0 g/dL    A-G Ratio 0.8 0.8 - 1.7     NT-PRO BNP    Collection Time: 04/21/20  6:36 AM   Result Value Ref Range    NT pro-BNP 1,301 0 - 1,800 PG/ML   T4, FREE    Collection Time: 04/21/20  6:36 AM   Result Value Ref Range    T4, Free 1.1 0.7 - 1.5 NG/DL   TSH 3RD GENERATION    Collection Time: 04/21/20  6:36 AM   Result Value Ref Range    TSH 4.64 (H) 0.36 - 3.74 uIU/mL   GLUCOSE, POC    Collection Time: 04/21/20  8:07 AM   Result Value Ref Range    Glucose (POC) 167 (H) 70 - 110 mg/dL   LACTIC ACID    Collection Time: 04/21/20  8:15 AM   Result Value Ref Range    Lactic acid 1.0 0.4 - 2.0 MMOL/L   URINALYSIS W/ RFLX MICROSCOPIC    Collection Time: 04/21/20  9:00 AM   Result Value Ref Range    Color YELLOW      Appearance CLEAR      Specific gravity 1.017 1.005 - 1.030      pH (UA) 5.0 5.0 - 8.0      Protein Negative NEG mg/dL    Glucose Negative NEG mg/dL    Ketone Negative NEG mg/dL    Bilirubin Negative NEG      Blood LARGE (A) NEG      Urobilinogen 0.2 0.2 - 1.0 EU/dL    Nitrites Negative NEG      Leukocyte Esterase SMALL (A) NEG     URINE MICROSCOPIC ONLY    Collection Time: 04/21/20  9:00 AM   Result Value Ref Range    WBC 4 to 10 0 - 4 /hpf    RBC 36 to 50 0 - 5 /hpf    Epithelial cells 1+ 0 - 5 /lpf    Bacteria 1+ (A) NEG /hpf    Mucus 2+ (A) NEG /lpf    Hyaline cast 21 to 35 0 - 2 /lpf   EKG, 12 LEAD, INITIAL    Collection Time: 04/21/20 10:41 AM   Result Value Ref Range    Ventricular Rate 75 BPM    QRS Duration 164 ms    Q-T Interval 470 ms    QTC Calculation (Bezet) 524 ms    Calculated R Axis -64 degrees    Calculated T Axis 113 degrees    Diagnosis       Wide QRS rhythm with occasional premature ventricular complexes  Left axis deviation  Right bundle branch block  Inferior infarct , age undetermined  T wave abnormality, consider lateral ischemia  Abnormal ECG  When compared with ECG of 21-APR-2020 06:24,  Wide QRS rhythm has replaced Sinus rhythm     MAGNESIUM    Collection Time: 04/21/20 11:05 AM   Result Value Ref Range    Magnesium 2.0 1.6 - 2.6 mg/dL   CARDIAC PANEL,(CK, CKMB & TROPONIN)    Collection Time: 04/21/20 11:05 AM   Result Value Ref Range    CK 60 39 - 308 U/L    CK - MB 7.3 (H) <3.6 ng/ml    CK-MB Index 12.2 (H) 0.0 - 4.0 %    Troponin-I, QT 0.03 0.0 - 0.045 NG/ML       Radiologic Studies -   XR CHEST PORT   Final Result   IMPRESSION:      Interval placement right IJV central venous catheter with tip in the distal SVC. No pneumothorax. US GALLBLADDER   Final Result   IMPRESSION:      Limited study. Cholelithiasis. Mildly heterogeneous hepatic echotexture. XR CHEST PORT   Final Result   IMPRESSION:      No active cardiopulmonary disease. CT Results  (Last 48 hours)    None        CXR Results  (Last 48 hours)               04/21/20 1040  XR CHEST PORT Final result    Impression:  IMPRESSION:       Interval placement right IJV central venous catheter with tip in the distal SVC. No pneumothorax. Narrative:  EXAM: XR CHEST PORT       CLINICAL INDICATION/HISTORY: post line placement   -Additional: None       COMPARISON: Earlier the same day       TECHNIQUE: Portable frontal view of the chest       _______________       FINDINGS:       SUPPORT DEVICES: Right IJV central venous catheter with tip in the distal SVC.        HEART AND MEDIASTINUM: Cardiomediastinal silhouette within normal limits. LUNGS AND PLEURAL SPACES: Hypoinflated lungs. No dense consolidation, large   effusion or pneumothorax.       _______________           04/21/20 0703  XR CHEST PORT Final result    Impression:  IMPRESSION:       No active cardiopulmonary disease. Narrative:  EXAM: XR CHEST PORT       CLINICAL INDICATION/HISTORY: Chest pain     > Additional: None. COMPARISON: April 6, 2020       TECHNIQUE: Portable chest       _______________       FINDINGS:       SUPPORT DEVICES: None. HEART AND MEDIASTINUM: Normal size and contour. Normal pulmonary vasculature. LUNGS AND PLEURAL SPACES: The lungs are well expanded and clear. No focal   consolidation, effusion, or pneumothorax. BONY THORAX AND SOFT TISSUES: No acute osseous abnormality. _______________               Prior LFTs from April and February suggest patient has elevated number but today's labs are slightly worse than prior. Patient's bilirubin today is 0.3. Patient is denying any abdominal pain in the emergency department today. Patient with December 23, 2019 right upper quadrant ultrasound with no biliary dilatation multiple stones within the gallbladder impression cholelithiasis without cholecystitis    Patient tolerated the central line without any difficulty was placed without any complication  ---  2:87 AM  I will check patient's x-ray  Patient will be admitted for hypothermia and leukocytosis and generalized weakness and chest pain  All discussed information with the hospitalist.  Patient remained stable and blood pressure is improving on the pressors in the emergency department.  --------  Discussed patient's information with cardiology Dr. Emile Mejia as is a wide-complex tachycardia that started to occur intermittently  EKG showed 73 heart rate, wide-complex tachycardia.   I told the central line 1 cm sterilely as my concern was too close to the heart  That did not change the rhythm I started patient magnesium as it relates patient potassium is low and  Dr. Marga Hernandez will see the patient in the emergency department  We discussed considering amiodarone but he recommended this point did not start this patient is stable in the emergency department.  ---------      Discharge     Clinical Impression:   1. Hypothermia, initial encounter    2. Hypoglycemia    3. General weakness    4. Acute chest pain    5. Hx of aspiration pneumonitis    6. Hypercarbia    7. Anemia, unspecified type    8.  Urinary tract infection with hematuria, site unspecified        Disposition:  Admit    It should be noted that I will be the provider of record for this patient  Lizeth Gutierrez MD      Follow-up Information    None         Current Discharge Medication List

## 2020-04-21 NOTE — PROGRESS NOTES
Critical care medicine  Asked to see this 44-year-old gentleman who presents complaining of chest pain for 3 to 4 hours similar to episode 2 weeks ago. Presents with hypothermia and hypotension. His initial EKG was difficult to read secondary to 60 cycle interference but did appear to be sinus in nature. He did have a repeat EKG later in his emergency room stay that shows multifocal wide-complex bradycardia. He was treated for his hyportension with low-dose levophed. At the time I am seeing him he is awake, normotensive and warm. His heart rate is is in the high 60s to low 70s but shows multifocal wide-complex beats. He denies chest pain or shortness of breath. He denies cough, or fever. I have reviewed his admission from earlier in the month as well as his chest x-rays which are essentially unchanged. His echocardiogram global hypokinesis and ejection fraction of approximately 35 to 45% on both of his echoes. His laboratory data show a white blood cell count of 6.5 and H&H of 10 and 31 and a platelet count of 890. His electrolytes show a hypokalemia and hypoglycemia. His renal function is stable with a BUN and creatinine of 45 and 1.5. His magnesium is 2.0. His cardiac enzymes show an elevated CK-MB index at 12.2 and a CK MB of 7.2. His troponin is normal his BNP is normal as well. Physical exam is he is awake alert and oriented x3 and is conversive. HEENT is normocephalic atraumatic his pupils are equal sclerae anicteric his nose and throat are clear. His chest is clear bilaterally his heart has an irregular rate and rhythm. His abdomen is soft and nontender. He has a well healed appendectomy scar in his right lower quadrant. His extremities are warm and dry without edema neurologically seems intact. My impression of Mr. Dominic Johnston is that he is presenting in a similar manner as last month with chest pain without shortness of breath or cough and EKG changes with elevated CK-MB.   This would speak to a myocardial event. Cardiology is going to see him later in the day. I believe he can wean off of his Levophed and would defer cardiac treatment to cardiology.   Following

## 2020-04-21 NOTE — PROGRESS NOTES
Pharmacy Dosing Services: Vancomycin    Indication: Bloodstream Infection    Day of therapy: 0 / X    Other Antimicrobials (Include dose, start day & day of therapy):    Piperacillin/Tazobactam 4.5g x1 then 4.5g KVNG (start ; pharmacy consulted for Zosyn dosing as well)  Current Antimicrobial Therapy (168h ago, onward)      Ordered     Start Stop    20 1632  VANCOMYCIN INFORMATION NOTE  Other,   ONCE      20 1600 20 0359    20 1632  vancomycin (VANCOCIN) 1,000 mg in 0.9% sodium chloride (MBP/ADV) 250 mL adv  1,000 mg,   IntraVENous,   EVERY 24 HOURS      20 1700 --    20 1543  piperacillin-tazobactam (ZOSYN) 4.5 g in 0.9% sodium chloride (MBP/ADV) 100 mL MBP ###EXTENDED 4-HOUR INFUSION###  4.5 g,   IntraVENous,   EVERY 8 HOURS      20 2000 --    20 1632  VANCOMYCIN INFORMATION NOTE 1 Each  1 Each,   Other,   RX DOSING/MONITORING      20 1628 --    20 1524  vancomycin (VANCOCIN) 1,750 mg in 0.9% sodium chloride 500 mL IVPB  1,750 mg,   IntraVENous,   ONCE      20 1600 20 0359    20 1543  piperacillin-tazobactam (ZOSYN) 4.5 g in 0.9% sodium chloride (MBP/ADV) 100 mL MBP  4.5 g,   IntraVENous,   ONCE      20 1600 20 0359              Loading dose (date given): 1750 mg  @1700  Current Maintenance dose: 1000 mg IV every 24 hours    Goal Vancomycin Level: Vancomycin Trough: 15 - 20 mcg/mL (most infections)    Vancomycin Level (if drawn): No results for input(s): Nel Fleming in the last 72 hours. Pt Weight Weight: 68 kg (150 lb)   Temperature Temp: 97 °F (36.1 °C)   Temp (72hrs), Av.8 °F (33.2 °C), Min:34.7 °F (1.5 °C), Max:97.2 °F (36.2 °C)     HR Pulse (Heart Rate): 85   BP BP: 114/57     Recent Labs     20  0636   CREA 1.55*   BUN 45*   WBC 6.5     Estimated Creatinine Clearance Estimated Creatinine Clearance: 32.6 mL/min (A) (based on SCr of 1.55 mg/dL (H)).   Estimated Creatinine Clearance (using IBW): 32.6 mL/min      CAPD, Hemodialysis or Renal Replacement Therapy: N/A  Renal function stable? (unstable defined as SCr increase of 0.5 mg/dL or > 50% increase from baseline, whichever is greater) (Y/N): y    Significant Cultures:   Results       Procedure Component Value Units Date/Time    CULTURE, BLOOD [[id number]] Collected:  04/21/20 0815    Order Status:  Completed Specimen:  Blood Updated:  04/21/20 0836    CULTURE, BLOOD [[id number]] Collected:  04/21/20 0800    Order Status:  Completed Specimen:  Blood Updated:  04/21/20 0836              Regimen assessment: new start  Maintenance dose: 1000 mg IV every 24 hours  Next scheduled level: 4/23 at 1630 early trough prior to 3rd dose       Pharmacy will follow daily and adjust medications as appropriate for renal function and/or serum levels.     Thank you,  Mehul Mancera  Clinical Pharmacist  [phone]

## 2020-04-21 NOTE — H&P
Internal Medicine History and Physical  Note           NAME:  Aren Tran   :   1934   MRN:  [id number]     PCP:  Fawad Morales MD     Date/Time:  2020 10:02 AM      I hereby certify this patient for admission based upon medical necessity as noted below:        Assessment / plan :        Principal Problem:    Hypothermia (2019)    Active Problems:    Chronic renal failure, stage 3 (moderate) ([address] (2019)      SIRS (systemic inflammatory response syndrome) ([address] (2019)      Bradycardia (2020)      Hypotension (2020)      Cardiomyopathy ([address] (2020)      Overview: LVEF 35-40% by previous TTE. Grade II diastolic dysfunction ()      Overview: Per previous TTE. Hypothyroidism (2020)      Dysphagia (2020)      Hypoglycemia (2020)      HTN (hypertension) (2020)       Sepsis work-up started in the emergency room. Follow cultures. Supportive care. Pressors as needed. Central line placed. Serial labs as ordered. Intensivist, cardiologist consulted. Serial troponin. EKG as needed. Others per ICU protocol. Broad spectrum   Thyroid function test.   Monitor blood sugar and treat accordingly  Warming blankets. Check core temperature. Home medications when appropriate. Monitor Renal function and other labs as indicated. Avoid nephrotoxins , iv Contrast, NSAID. Renally dosing medications. Monitor urine out put. -DVT prophylaxis : Heparin.   - Code Status : Full    -Other chronic medical problems as per past history. Further management depend on the course of the case and expanded data base. DISPO - pt to be admitted at this time for reasons addressed above, continued hospitalization for ongoing assessment and treatment indicated        Subjective:     CHIEF COMPLAINT: Chest pain    HISTORY OF PRESENT ILLNESS:     Mr. Deepthi Aparicio is a 80 y.o.    male who is admitted for hypotension and hypothermia. Mr. Lala Hernandez with past medical history as noted below , presented to the Emergency Department today  complaining of above. Triage and ER notes noted. Patient recently discharged from this hospital for aspiration pneumonia . He report to me he had chest pain started at midnight. This morning his son called EMS because of the chest pain. On upon inquiring he stated the pain is achy and at my waist line and not the chest and all crossover. In the ER his temperature was 92 and blood pressure was very low, started on IV fluid then Levophed started with the placement of central line. Chest x-ray done and urine analysis was negative. He is given hydrocortisone dose and check his thyroid function was better than the last time as a started on replacement in the recent admission here. He normally weak and walks with a walker, he stops driving. He denied really any other symptoms no headache no cough no phlegm no fever no urinary or bowel movement problem no nausea no vomiting no tummy pains no other pains. Also in the ER developed wide-complex QRS on the monitor and given IV magnesium and cardiology consulted, Dr. Cher Rendon will see him. Past Medical History:   Diagnosis Date    Difficulty urinating     Elevated PSA     Hematuria, unspecified     Hypercholesterolemia     Hypertension     Incomplete bladder emptying     Urinary retention     UTI (urinary tract infection)         Past Surgical History:   Procedure Laterality Date    APPENDECTOMY      EGD  2/7/2014         HX TURP  6/13/16    HX TURP         Social History     Tobacco Use    Smoking status: Never Smoker    Smokeless tobacco: Never Used   Substance Use Topics    Alcohol use:  Yes     Alcohol/week: 2.0 standard drinks     Types: 2 Cans of beer per week     Comment: Occasional        Family History   Problem Relation Age of Onset    Cancer Father     Alzheimer Mother     Heart defect Brother Allergies   Allergen Reactions    Amlodipine Swelling and Itching    Bactrim [Sulfamethoprim Ds] Rash    Lisinopril Other (comments)     Acute renal failure    Ciprofloxacin Rash and Itching        Prior to Admission medications    Medication Sig Start Date End Date Taking? Authorizing Provider   levothyroxine (SYNTHROID) 50 mcg tablet Take 1 Tab by mouth every morning. 4/10/20   Candie Gonzalez PA   cholecalciferol (VITAMIN D3) (1000 Units /25 mcg) tablet Take 2 Tabs by mouth daily. 2/21/20   Rhetta , DO   simvastatin (ZOCOR) 40 mg tablet Take 1 Tab by mouth nightly. 2/21/20   Rhetta , DO   aspirin delayed-release 81 mg tablet Take 1 Tab by mouth daily. 2/21/20   Rhetta , DO   acetaminophen (TYLENOL EXTRA STRENGTH) 500 mg tablet Take 2 Tabs by mouth every six (6) hours as needed for Pain. 12/16/19   Roula Myrick MD   sucralfate (CARAFATE) 1 gram tablet Take 1 Tab by mouth four (4) times daily. 12/16/19   Roula Myrick MD   omeprazole (PRILOSEC) 20 mg capsule Take 1 Cap by mouth daily. 12/16/19   Roula Myrick MD   allopurinol (ZYLOPRIM) 300 mg tablet Take 300 mg by mouth daily.     Other, MD Griselda   pantoprazole (PROTONIX) 40 mg tablet  2/22/16   Provider, Historical   tamsulosin (FLOMAX) 0.4 mg capsule take 1 capsule by mouth once daily AFTER DINNER 3/12/15   Ximena Gr MD       Review of Systems:  (bold if positive, otherwise negative), apart from what mentioned in HPI  Apart from above patient deny any other significant complain  Gen:  Weight gain, weight loss, fever, chills, fatigue  Eyes:  Visual changes, pain, conjunctivitis  ENT:  Sore throat, rhinorrhea, decreased hearing  CVS:  Palpitations, chest pain, dizziness, syncope, edema, PND  Pulm:  Cough, dyspnea, sputum, hemoptysis, wheezing  GI:  Abdominal pain, nausea, emesis, diarrhea, constipation, GERD, melena  :  Hematuria, incontinence, nocturia, dysuria, discharge  MS:  Pain, weakness, swelling, arthritis  Skin:  Rash, erythema, abscess, wound, moles  Endo:  Heat intolerance, cold intolerance, weight gain, polyuria  Hem:  Enlarged nodes, bruising, bleeding, night sweats  Renal:  Edema, change in urine, flank pain  Neuro:  Numbness, tingling, weakness, seizure, headache, tremors       VITALS:    Vital signs reviewed; most recent are:    Visit Vitals  /58   Pulse 74   Temp 97.1 °F (36.2 °C)   Resp 23   Wt 68 kg (150 lb)   SpO2 94%   BMI 23.49 kg/m²     SpO2 Readings from Last 6 Encounters:   04/21/20 94%   04/09/20 98%   02/21/20 92%   01/02/20 96%   12/16/19 97%   09/23/18 99%        No intake or output data in the 24 hours ending 04/21/20 1259     Physical Exam:     Gen: Ill looking but looks very comfortable in no acute distress. Appear stated age, Well-developed   HEENT:  Head atraumatic, normocephalic ,  moist mucous membranes. Neck:  Trachea midline , No apparent JVD, Supple, without masses, thyroid non-tender  Resp:  No accessory muscle use,Bilateral BS present, clear breath sounds without wheezes rales or rhonchi  Card:  normal S1, S2 without Gallop . Mild lower leg peripheral edema. Abd:  Soft, non-tender, non-distended, bowel sounds are present . Shaaron Money Musc:  No cyanosis or clubbing. Skin:  No rashes or ulcers, skin turgor is good. Neuro:  Cranial nerves are grossly intact, no clear area of focal motor weakness, follows commands appropriately. Psych:  Good insight, oriented to person, place and time, alert. Labs:     All Cardiac Markers in the last 24 hours:   Lab Results   Component Value Date/Time    CPK 60 04/21/2020 11:05 AM    CKMB 7.3 (H) 04/21/2020 11:05 AM    CKND1 12.2 (H) 04/21/2020 11:05 AM    TROIQ 0.03 04/21/2020 11:05 AM       Recent Labs     04/21/20  0636   WBC 6.5   HGB 10.5*   HCT 31.4*        Recent Labs     04/21/20  1105 04/21/20  0636   NA  --  145   K  --  3.4*   CL  --  109   CO2  --  34*   GLU  --  54*   BUN  --  45*   CREA  --  1.55* CA  --  9.3   MG 2.0  --    ALB  --  2.9*   TBILI  --  0.3   SGOT  --  113*   ALT  --  121*     Lab Results   Component Value Date/Time    Glucose (POC) 167 (H) 04/21/2020 08:07 AM    Glucose (POC) 173 (H) 04/09/2020 11:38 AM    Glucose (POC) 96 10/10/2014 09:28 PM    Glucose (POC) 186 (H) 10/10/2014 03:48 PM     No results for input(s): PH, PCO2, PO2, HCO3, FIO2 in the last 72 hours. No results for input(s): INR, INREXT, INREXT in the last 72 hours. Xr Chest Port    Result Date: 4/21/2020  IMPRESSION: Interval placement right IJV central venous catheter with tip in the distal SVC. No pneumothorax. Xr Chest Port    Result Date: 4/21/2020  IMPRESSION: No active cardiopulmonary disease. Us Gallbladder    Result Date: 4/21/2020  IMPRESSION: Limited study. Cholelithiasis. Mildly heterogeneous hepatic echotexture. Please refer to radiology reports. Telemetry reviewed:   normal sinus rhythm    Risk of deterioration: high      Total time spent in the care of this patient: 79 [address] [facility] discussed with: Patient, Consultant/Specialist, >50% of time spent in counseling and coordination of care and ER MD/staff      Discussed:  Care Plan       I have personally reviewed all pertinent labs, films and EKGs that have officially resulted. I reviewed available pertaining electronic documentation outlining the initial presentation as well as the emergency room physician's encounter. This document in whole or part of it has been produced using voice recognition software. Unrecognized errors in transcription may be present.     Attending Physician: Jace Dumont MD

## 2020-04-21 NOTE — PROGRESS NOTES
Problem: Discharge Planning  Goal: *Discharge to safe environment  Outcome: Progressing Towards Goal    Home possible hh    Care Management Interventions  PCP Verified by CM: Yes(Iron Rodriguez)  Transition of Care Consult (CM Consult): Discharge Planning  Current Support Network: (lives with son)  Confirm Follow Up Transport: Family  Discharge Location  Discharge Placement: Home     Reason for Readmission:    Hypothermia, chest pain          RUR Score/Risk Level:  Patient in ED, score not available        PCP:  Becca Newman        Are you a current patient: Yes   Approximate date of last visit: patient stated that he saw him a month a ago    Recently hospital admission, 4/6/2020 to 4/9/2020    Is a Care Conference indicated:  Not at this time      Did you attend your follow up appointment (s): If not, why not: Patient stated that he saw his MD a month ago. Patient contacted by Patient Out reach 4/13 2020          Resources/supports as identified by patient/family:   Patient stated that he lives with his son       Top Challenges facing patient (as identified by patient/family and CM): Finances/Medication cost?    Has [employer]      Family  Support system or lack thereof? Son       Living arrangements?    lives with son  Self-care/ADLs/Cognition? Alert and oriented at the time of interview, stated that he uses a walker and has a w/c        Current Advanced Directive/Advance Care Plan:  Patient unsure if he has one and at the moment not interested in completing one. Further discussions about ACP can be had during admission if patient decides to complete one. Plan for utilizing home health: If indicated/ patient had [facility] prior to admit. Transition of Care Plan:    Based on readmission, the patient's previous Plan of Care   has been evaluated and/or modified.  The current Transition of Care Plan is:   He verified his address, insurance [facility] Medicare) and PCP as correct on the facesheet. He stated the phone # on face sheet is inactive and that he does not have a phone at the moment. He stated that he lives with his son and has a walker and a w/c ( stated he has a two vitale and a 4 vitale). Family assists with needs ADL's as needed. MIKI valdez is Son Elvis Creed [phone]. Plan at this time is to return home with family. Patient will benefit from PT/OT when appropriate. Used medical transport home last admit. CM to continue to follow for needs.

## 2020-04-21 NOTE — PROGRESS NOTES
Pharmacy Service: Antibiotic Monitoring Zosyn    Day of therapy: 0 of X (start date: )    Indication: Bloodstream Infection  Current regimen:Piperacillin/Tazobactam (Zosyn) and Vancomycin    Visit Vitals  /57   Pulse 85   Temp 97 °F (36.1 °C)   Resp 20   Wt 68 kg (150 lb)   SpO2 96%   BMI 23.49 kg/m²     Temp (24hrs), Av.8 °F (33.2 °C), Min:34.7 °F (1.5 °C), Max:97.2 °F (36.2 °C)      Labs:     Recent Labs     20  0636   WBC 6.5   CREA 1.55*        Estimated Creatinine Clearance Estimated Creatinine Clearance: 32.6 mL/min (A) (based on SCr of 1.55 mg/dL (H)). Estimated Creatinine Clearance (using IBW): 32.6 mL/min       Cultures:  Results       Procedure Component Value Units Date/Time    CULTURE, BLOOD [[id number]] Collected:  20 0815    Order Status:  Completed Specimen:  Blood Updated:  20    CULTURE, BLOOD [[id number]] Collected:  20 0800    Order Status:  Completed Specimen:  Blood Updated:  20            Impression/Plan:  -New start. Pharmacy consulted by Dr. Ann Goldberg for both vancomycin and zosyn dosing.  -Renal stable. Latest scr in line with previous admission Scr labs.        Thank you,  Opal Hoffman [phone]

## 2020-04-21 NOTE — CONSULTS
CARDIOLOGY CONSULT    Patient: Jordy Ross  MR #: [id number]      Reason for consult: Chest discomfort, arrhythmia    Assessment/Plan:     Hospital Problems  Date Reviewed: 4/6/2020          Codes Class Noted POA    Hypoglycemia ICD-10-CM: E16.2  ICD-9-CM: 251.2  4/21/2020 Unknown        HTN (hypertension) ICD-10-CM: I10  ICD-9-CM: 401.9  4/21/2020 Unknown        Left bundle branch block, patient appears to have further widening of his bundle branch configuration. He is known to have a long first-degree AV block and markedly reduced P wave amplitude. His rhythm has been noteworthy for frequent ectopics and short runs of NSVT. How this relates to his current hypothermia, thyroid dysfunction and possible ischemia is uncertain. Plan is for a Patrici Marten to be done tomorrow to exclude significant ischemia. If the patient does not appear to have any evidence for sepsis or significant ongoing ischemia, he would likely be a candidate for permanent pacing. ICD-10-CM: I44.7  ICD-9-CM: 426.3  4/21/2020 Unknown        Sinoatrial node dysfunction ([address] ICD-10-CM: I49.5  ICD-9-CM: 427.81  4/21/2020 Unknown        Dysphagia ICD-10-CM: R13.10  ICD-9-CM: 787.20  4/8/2020 Yes        Cardiomyopathy ([address] ICD-10-CM: I42.9  ICD-9-CM: 425.4  2/20/2020 Yes    Overview Signed 2/20/2020 12:40 AM by Fernie KRAUSE DO     LVEF 35-40% by previous TTE. Grade II diastolic dysfunction [id number]: I51.9  ICD-9-CM: 429.9  2/20/2020 Yes    Overview Signed 2/20/2020 12:41 AM by Mikayla Myles, DO     Per previous TTE. Hypothyroidism ICD-10-CM: E03.9  ICD-9-CM: 244.9  2/20/2020 Yes        Bradycardia ICD-10-CM: R00.1  ICD-9-CM: 427.89  2/19/2020 Yes        Hypotension ICD-10-CM: I95.9  ICD-9-CM: 458.9  2/19/2020 Yes        * (Principal) Hypothermia ICD-10-CM: T68. Yovanny Joeholland  ICD-9-CM: 991.6  12/23/2019 Unknown        Chronic renal failure, stage 3 (moderate) ([facility] ICD-10-CM: N18.3  ICD-9-CM: 585.3  12/23/2019 Yes SIRS (systemic inflammatory response syndrome) (HCC) ICD-10-CM: R65.10  ICD-9-CM: 995.90  12/23/2019 Yes              History of Present Illness  Joe Rivera is a 80 y.o. man that presented to the [facility] ED early this morning with complaints of chest tightness. The symptoms that the patient was experiencing were similar to a earlier admission to this hospital several weeks ago. At that time he was found to have an elevated TSH. He was also having episodic hypothermia. He was started on thyroid replacement therapy at a low dose of 50 mcg daily. He had some episodic severe bradycardia and so his beta-blocker was discontinued. He seemed to improved with those measures and was discharged for follow-up. On presentation to the ED, the patient was hypotensive. He did not respond well to intravenous fluid resuscitation. He was started on Levophed by Dr. Lety Woods in the ED. His initial serum troponin was 0.03. TSH remains elevated at 4.64 compared to the 7.65 on 4/6/2020 when the patient was started on Synthroid. At present, patient denies chest discomfort.    .    Past Medical History  Past Medical History:   Diagnosis Date    Difficulty urinating     Elevated PSA     Hematuria, unspecified     Hypercholesterolemia     Hypertension     Incomplete bladder emptying     Urinary retention     UTI (urinary tract infection)        Past Surgical History  Social History     Socioeconomic History    Marital status:      Spouse name: Not on file    Number of children: Not on file    Years of education: Not on file    Highest education level: Not on file   Occupational History    Occupation:  thirty years   Social Needs    Financial resource strain: Not on file    Food insecurity     Worry: Not on file     Inability: Not on file   [employer] needs     Medical: Not on file     Non-medical: Not on file   Tobacco Use    Smoking status: Never Smoker    Smokeless tobacco: Never Used   Substance and Sexual Activity    Alcohol use:  Yes     Alcohol/week: 2.0 standard drinks     Types: 2 Cans of beer per week     Comment: Occasional    Drug use: No    Sexual activity: Not on file   Lifestyle    Physical activity     Days per week: Not on file     Minutes per session: Not on file    Stress: Not on file   Relationships    Social connections     Talks on phone: Not on file     Gets together: Not on file     Attends Congregation service: Not on file     Active member of club or organization: Not on file     Attends meetings of clubs or organizations: Not on file     Relationship status: Not on file    Intimate partner violence     Fear of current or ex partner: Not on file     Emotionally abused: Not on file     Physically abused: Not on file     Forced sexual activity: Not on file   Other Topics Concern    Not on file   Social History Narrative    Not on file         Meds  Current Facility-Administered Medications   Medication Dose Route Frequency    lidocaine 4 % patch 1 Patch  1 Patch TransDERmal Q24H    sodium chloride (NS) flush 5-10 mL  5-10 mL IntraVENous PRN    NOREPINephrine (LEVOPHED) 8 mg in 0.9% NS 250ml infusion  0.5-16 mcg/min IntraVENous TITRATE    [START ON 4/22/2020] aspirin delayed-release tablet 81 mg  81 mg Oral DAILY    [START ON 4/22/2020] cholecalciferol (VITAMIN D3) (1000 Units /25 mcg) tablet 2 Tab  2,000 Units Oral DAILY    [START ON 4/22/2020] levothyroxine (SYNTHROID) tablet 50 mcg  50 mcg Oral 6am    pantoprazole (PROTONIX) 40 mg in 0.9% sodium chloride 10 mL injection  40 mg IntraVENous Q12H    atorvastatin (LIPITOR) tablet 40 mg  40 mg Oral QHS    sucralfate (CARAFATE) tablet 1 g  1 g Oral QID    [START ON 4/22/2020] tamsulosin (FLOMAX) capsule 0.4 mg  0.4 mg Oral DAILY    heparin (porcine) injection 5,000 Units  5,000 Units SubCUTAneous Q8H    vancomycin (VANCOCIN) 1,750 mg in 0.9% sodium chloride 500 mL IVPB  1,750 mg IntraVENous ONCE    piperacillin-tazobactam (ZOSYN) 4.5 g in 0.9% sodium chloride (MBP/ADV) 100 mL MBP  4.5 g IntraVENous ONCE    Followed by    piperacillin-tazobactam (ZOSYN) 4.5 g in 0.9% sodium chloride (MBP/ADV) 100 mL MBP ###EXTENDED 4-HOUR INFUSION###  4.5 g IntraVENous Q8H         Allergies  Allergies   Allergen Reactions    Amlodipine Swelling and Itching    Bactrim [Sulfamethoprim Ds] Rash    Lisinopril Other (comments)     Acute renal failure    Ciprofloxacin Rash and Itching       Social History  Social History     Socioeconomic History    Marital status:      Spouse name: Not on file    Number of children: Not on file    Years of education: Not on file    Highest education level: Not on file   Occupational History    Occupation:  thirty years   Social Needs    Financial resource strain: Not on file    Food insecurity     Worry: Not on file     Inability: Not on file   [employer] needs     Medical: Not on file     Non-medical: Not on file   Tobacco Use    Smoking status: Never Smoker    Smokeless tobacco: Never Used   Substance and Sexual Activity    Alcohol use:  Yes     Alcohol/week: 2.0 standard drinks     Types: 2 Cans of beer per week     Comment: Occasional    Drug use: No    Sexual activity: Not on file   Lifestyle    Physical activity     Days per week: Not on file     Minutes per session: Not on file    Stress: Not on file   Relationships    Social connections     Talks on phone: Not on file     Gets together: Not on file     Attends [employer] service: Not on file     Active member of club or organization: Not on file     Attends meetings of clubs or organizations: Not on file     Relationship status: Not on file    Intimate partner violence     Fear of current or ex partner: Not on file     Emotionally abused: Not on file     Physically abused: Not on file     Forced sexual activity: Not on file   Other Topics Concern    Not on file   Social History Narrative    Not on file       Family History     Family History   Problem Relation Age of Onset    Cancer Father     Alzheimer Mother     Heart defect Brother          Review of systems    Unobtainable at this time      Physical Exam  Visit Vitals  /57   Pulse 85   Temp 97 °F (36.1 °C)   Resp 20   Wt 68 kg (150 lb)   SpO2 96%   BMI 23.49 kg/m²       Rylee Yee is who is asleep but awakens easily . Head is normocephalic and atraumatic. Trachea appears midline with no noted thyromegaly. Carotids are full without definite bruits. There is no JVD. Chest is clear to auscultation bilaterally. Cardiac auscultation reveals regular rate and rhythm with ectopic beats without significant murmur. Abdomen is soft and nontender. Extremities are without significant edema. Dorsalis pedis and posterior tibial pulses are  palpable. . Skin is warm and dry. Diagnostic Tests  Labs:   Recent Labs     04/21/20  0636   WBC 6.5   HGB 10.5*   HCT 31.4*        Recent Labs     04/21/20  1105 04/21/20  0636   NA  --  145   K  --  3.4*   CL  --  109   CO2  --  34*   GLU  --  54*   BUN  --  45*   CREA  --  1.55*   CA  --  9.3   MG 2.0  --    ALB  --  2.9*   SGOT  --  113*   ALT  --  121*       Recent Labs     04/21/20  1105   TROIQ 0.03   CPK 60   CKMB 7.3*     Last Lipid:  No results found for: CHOL, CHOLX, CHLST, CHOLV, HDL, HDLP, LDL, LDLC, DLDLP, TGLX, TRIGL, TRIGP, CHHD, CHHDX        We appreciate the opportunity to see Rylee Yee with you. We will follow along.     Lupe Santana MD  4/21/2020

## 2020-04-21 NOTE — PROGRESS NOTES
attempted to  complete the initial Spiritual Assessment of the patient in bed 5 of the emergency room  and offer Pastoral Care, support but found patient currently not in a condition where he could talk with me.  will follow once patient arrives to the floor. Patient does not have any Holiness/cultural needs that will affect patients preferences in health care. Chaplains will continue to follow and will provide pastoral care on an as needed/requested basis.     [facility] Care Department  [phone]

## 2020-04-21 NOTE — Clinical Note
TRANSFER - OUT REPORT:     Verbal report given to: Charlie Sanchez RN. Report consisted of patient's Situation, Background, Assessment and   Recommendations(SBAR). Opportunity for questions and clarification was provided. Patient transported with a Cardiac Cath Tech / Patient Care Tech, Monitor and Oxygen. Oxygen used for patient = nasal cannula, @ 2 - 6 Liters. Patient transported to: 3010.

## 2020-04-21 NOTE — ED NOTES
Central line pulled back 1cm by MD. KCl moved from central line to PIV 18g R A/C    Pt remains in irregular rhythm- HR-60s

## 2020-04-22 ENCOUNTER — APPOINTMENT (OUTPATIENT)
Dept: NON INVASIVE DIAGNOSTICS | Age: 85
DRG: 981 | End: 2020-04-22
Attending: INTERNAL MEDICINE
Payer: MEDICARE

## 2020-04-22 ENCOUNTER — PATIENT OUTREACH (OUTPATIENT)
Dept: CASE MANAGEMENT | Age: 85
End: 2020-04-22

## 2020-04-22 ENCOUNTER — HOSPITAL ENCOUNTER (OUTPATIENT)
Dept: NUCLEAR MEDICINE | Age: 85
Discharge: HOME | DRG: 981 | End: 2020-04-22
Attending: INTERNAL MEDICINE
Payer: MEDICARE

## 2020-04-22 LAB
ALBUMIN SERPL-MCNC: 2.5 G/DL (ref 3.4–5)
ALBUMIN/GLOB SERPL: 0.8 {RATIO} (ref 0.8–1.7)
ALP SERPL-CCNC: 306 U/L (ref 45–117)
ALT SERPL-CCNC: 91 U/L (ref 16–61)
ANION GAP SERPL CALC-SCNC: 6 MMOL/L (ref 3–18)
AST SERPL-CCNC: 79 U/L (ref 10–38)
BASOPHILS # BLD: 0 K/UL (ref 0–0.1)
BASOPHILS NFR BLD: 0 % (ref 0–2)
BILIRUB DIRECT SERPL-MCNC: 0.1 MG/DL (ref 0–0.2)
BILIRUB SERPL-MCNC: 0.3 MG/DL (ref 0.2–1)
BUN SERPL-MCNC: 37 MG/DL (ref 7–18)
BUN/CREAT SERPL: 23 (ref 12–20)
CALCIUM SERPL-MCNC: 8.1 MG/DL (ref 8.5–10.1)
CALCULATED R AXIS, ECG10: -64 DEGREES
CALCULATED T AXIS, ECG11: 113 DEGREES
CHLORIDE SERPL-SCNC: 117 MMOL/L (ref 100–111)
CO2 SERPL-SCNC: 25 MMOL/L (ref 21–32)
CORTIS SERPL-MCNC: 13.3 UG/DL
CREAT SERPL-MCNC: 1.59 MG/DL (ref 0.6–1.3)
DIAGNOSIS, 93000: NORMAL
DIFFERENTIAL METHOD BLD: ABNORMAL
ECHO IVC SNIFF: 1.73 CM
ECHO LV EDV A2C: 126.5 ML
ECHO LV EDV A4C: 151.1 ML
ECHO LV EDV BP: 142.6 ML (ref 67–155)
ECHO LV EDV INDEX A4C: 84.4 ML/M2
ECHO LV EDV INDEX BP: 79.7 ML/M2
ECHO LV EDV NDEX A2C: 70.7 ML/M2
ECHO LV EJECTION FRACTION A2C: 46 %
ECHO LV EJECTION FRACTION A4C: 26 %
ECHO LV EJECTION FRACTION BIPLANE: 36.4 % (ref 55–100)
ECHO LV ESV A2C: 68.8 ML
ECHO LV ESV A4C: 111.4 ML
ECHO LV ESV BP: 90.6 ML (ref 22–58)
ECHO LV ESV INDEX A2C: 38.4 ML/M2
ECHO LV ESV INDEX A4C: 62.3 ML/M2
ECHO LV ESV INDEX BP: 50.6 ML/M2
ECHO LVOT PEAK GRADIENT: 4.5 MMHG
ECHO LVOT PEAK VELOCITY: 106.17 CM/S
ECHO LVOT VTI: 23.48 CM
ECHO MV A VELOCITY: 112.18 CM/S
ECHO MV E DECELERATION TIME (DT): 201.8 MS
ECHO MV E VELOCITY: 74.33 CM/S
ECHO MV E/A RATIO: 0.66
ECHO TV REGURGITANT MAX VELOCITY: 295 CM/S
ECHO TV REGURGITANT PEAK GRADIENT: 34.9 MMHG
EOSINOPHIL # BLD: 0 K/UL (ref 0–0.4)
EOSINOPHIL NFR BLD: 0 % (ref 0–5)
ERYTHROCYTE [DISTWIDTH] IN BLOOD BY AUTOMATED COUNT: 15.8 % (ref 11.6–14.5)
GLOBULIN SER CALC-MCNC: 3.3 G/DL (ref 2–4)
GLUCOSE BLD STRIP.AUTO-MCNC: 170 MG/DL (ref 70–110)
GLUCOSE BLD STRIP.AUTO-MCNC: 69 MG/DL (ref 70–110)
GLUCOSE SERPL-MCNC: 79 MG/DL (ref 74–99)
HCT VFR BLD AUTO: 29.5 % (ref 36–48)
HGB BLD-MCNC: 9.9 G/DL (ref 13–16)
INR PPP: 1.2 (ref 0.8–1.2)
LVOT MG: 2.66 MMHG
LVOT MV: 0.78 CM/S
LVSV (MOD BI): 28.98 ML
LVSV (MOD SINGLE 4C): 22.18 ML
LVSV (MOD SINGLE): 32.15 ML
LYMPHOCYTES # BLD: 0.8 K/UL (ref 0.9–3.6)
LYMPHOCYTES NFR BLD: 11 % (ref 21–52)
MAGNESIUM SERPL-MCNC: 2.4 MG/DL (ref 1.6–2.6)
MCH RBC QN AUTO: 33.2 PG (ref 24–34)
MCHC RBC AUTO-ENTMCNC: 33.6 G/DL (ref 31–37)
MCV RBC AUTO: 99 FL (ref 74–97)
MONOCYTES # BLD: 0.2 K/UL (ref 0.05–1.2)
MONOCYTES NFR BLD: 3 % (ref 3–10)
MV DEC SLOPE: 3.68
NEUTS SEG # BLD: 5.7 K/UL (ref 1.8–8)
NEUTS SEG NFR BLD: 86 % (ref 40–73)
PHOSPHATE SERPL-MCNC: 4 MG/DL (ref 2.5–4.9)
PLATELET # BLD AUTO: 170 K/UL (ref 135–420)
PMV BLD AUTO: 11.5 FL (ref 9.2–11.8)
POTASSIUM SERPL-SCNC: 3.8 MMOL/L (ref 3.5–5.5)
POTASSIUM SERPL-SCNC: 3.9 MMOL/L (ref 3.5–5.5)
PROT SERPL-MCNC: 5.8 G/DL (ref 6.4–8.2)
PROTHROMBIN TIME: 15.4 SEC (ref 11.5–15.2)
Q-T INTERVAL, ECG07: 470 MS
QRS DURATION, ECG06: 164 MS
QTC CALCULATION (BEZET), ECG08: 524 MS
RBC # BLD AUTO: 2.98 M/UL (ref 4.7–5.5)
SODIUM SERPL-SCNC: 148 MMOL/L (ref 136–145)
STRESS BASELINE HR: 65 BPM
STRESS ESTIMATED WORKLOAD: 1 METS
STRESS EXERCISE DUR MIN: NORMAL
STRESS PEAK DIAS BP: 52 MMHG
STRESS PEAK SYS BP: 120 MMHG
STRESS PERCENT HR ACHIEVED: 67 %
STRESS POST PEAK HR: 90 BPM
STRESS RATE PRESSURE PRODUCT: NORMAL BPM*MMHG
STRESS TARGET HR: 135 BPM
TROPONIN I SERPL-MCNC: 0.08 NG/ML (ref 0–0.04)
VENTRICULAR RATE, ECG03: 75 BPM
WBC # BLD AUTO: 6.6 K/UL (ref 4.6–13.2)

## 2020-04-22 PROCEDURE — 74011250636 HC RX REV CODE- 250/636: Performed by: INTERNAL MEDICINE

## 2020-04-22 PROCEDURE — 84132 ASSAY OF SERUM POTASSIUM: CPT

## 2020-04-22 PROCEDURE — 85025 COMPLETE CBC W/AUTO DIFF WBC: CPT

## 2020-04-22 PROCEDURE — 84484 ASSAY OF TROPONIN QUANT: CPT

## 2020-04-22 PROCEDURE — 93017 CV STRESS TEST TRACING ONLY: CPT

## 2020-04-22 PROCEDURE — C9113 INJ PANTOPRAZOLE SODIUM, VIA: HCPCS | Performed by: INTERNAL MEDICINE

## 2020-04-22 PROCEDURE — 84100 ASSAY OF PHOSPHORUS: CPT

## 2020-04-22 PROCEDURE — 74011000250 HC RX REV CODE- 250: Performed by: INTERNAL MEDICINE

## 2020-04-22 PROCEDURE — A9500 TC99M SESTAMIBI: HCPCS

## 2020-04-22 PROCEDURE — 74011000258 HC RX REV CODE- 258: Performed by: HOSPITALIST

## 2020-04-22 PROCEDURE — 80076 HEPATIC FUNCTION PANEL: CPT

## 2020-04-22 PROCEDURE — 87086 URINE CULTURE/COLONY COUNT: CPT

## 2020-04-22 PROCEDURE — 77030040392 HC DRSG OPTIFOAM MDII -A

## 2020-04-22 PROCEDURE — 74011000250 HC RX REV CODE- 250

## 2020-04-22 PROCEDURE — 85610 PROTHROMBIN TIME: CPT

## 2020-04-22 PROCEDURE — 74011000258 HC RX REV CODE- 258: Performed by: INTERNAL MEDICINE

## 2020-04-22 PROCEDURE — C8923 2D TTE W OR W/O FOL W/CON,CO: HCPCS

## 2020-04-22 PROCEDURE — 74011250637 HC RX REV CODE- 250/637: Performed by: INTERNAL MEDICINE

## 2020-04-22 PROCEDURE — 82962 GLUCOSE BLOOD TEST: CPT

## 2020-04-22 PROCEDURE — 65610000006 HC RM INTENSIVE CARE

## 2020-04-22 PROCEDURE — 83735 ASSAY OF MAGNESIUM: CPT

## 2020-04-22 RX ORDER — DEXTROSE MONOHYDRATE 100 MG/ML
125-250 INJECTION, SOLUTION INTRAVENOUS AS NEEDED
Status: DISCONTINUED | OUTPATIENT
Start: 2020-04-22 | End: 2020-04-27 | Stop reason: SDUPTHER

## 2020-04-22 RX ORDER — MAGNESIUM SULFATE 100 %
4 CRYSTALS MISCELLANEOUS AS NEEDED
Status: DISCONTINUED | OUTPATIENT
Start: 2020-04-22 | End: 2020-05-11 | Stop reason: HOSPADM

## 2020-04-22 RX ORDER — DEXTROSE MONOHYDRATE 50 MG/ML
100 INJECTION, SOLUTION INTRAVENOUS ONCE
Status: DISCONTINUED | OUTPATIENT
Start: 2020-04-22 | End: 2020-04-22

## 2020-04-22 RX ORDER — POTASSIUM CHLORIDE 7.45 MG/ML
10 INJECTION INTRAVENOUS ONCE
Status: DISCONTINUED | OUTPATIENT
Start: 2020-04-22 | End: 2020-04-22

## 2020-04-22 RX ADMIN — TAMSULOSIN HYDROCHLORIDE 0.4 MG: 0.4 CAPSULE ORAL at 14:29

## 2020-04-22 RX ADMIN — PIPERACILLIN SODIUM AND TAZOBACTAM SODIUM 4.5 G: 4; .5 INJECTION, POWDER, LYOPHILIZED, FOR SOLUTION INTRAVENOUS at 21:59

## 2020-04-22 RX ADMIN — ATORVASTATIN CALCIUM 40 MG: 20 TABLET, FILM COATED ORAL at 21:57

## 2020-04-22 RX ADMIN — SODIUM CHLORIDE 40 MG: 9 INJECTION, SOLUTION INTRAMUSCULAR; INTRAVENOUS; SUBCUTANEOUS at 21:58

## 2020-04-22 RX ADMIN — PIPERACILLIN SODIUM AND TAZOBACTAM SODIUM 4.5 G: 4; .5 INJECTION, POWDER, LYOPHILIZED, FOR SOLUTION INTRAVENOUS at 04:14

## 2020-04-22 RX ADMIN — DEXTROSE MONOHYDRATE 250 ML: 100 INJECTION, SOLUTION INTRAVENOUS at 06:26

## 2020-04-22 RX ADMIN — HEPARIN SODIUM 5000 UNITS: 5000 INJECTION INTRAVENOUS; SUBCUTANEOUS at 00:11

## 2020-04-22 RX ADMIN — NOREPINEPHRINE BITARTRATE 6 MCG/MIN: 1 INJECTION INTRAVENOUS at 03:29

## 2020-04-22 RX ADMIN — NOREPINEPHRINE BITARTRATE 7 MCG/MIN: 1 INJECTION INTRAVENOUS at 06:51

## 2020-04-22 RX ADMIN — NOREPINEPHRINE BITARTRATE 12 MCG/MIN: 1 INJECTION INTRAVENOUS at 23:00

## 2020-04-22 RX ADMIN — REGADENOSON 0.4 MG: 0.08 INJECTION, SOLUTION INTRAVENOUS at 13:09

## 2020-04-22 RX ADMIN — DEXTROSE MONOHYDRATE: 100 INJECTION, SOLUTION INTRAVENOUS at 07:00

## 2020-04-22 RX ADMIN — PIPERACILLIN SODIUM AND TAZOBACTAM SODIUM 4.5 G: 4; .5 INJECTION, POWDER, LYOPHILIZED, FOR SOLUTION INTRAVENOUS at 14:14

## 2020-04-22 RX ADMIN — HEPARIN SODIUM 5000 UNITS: 5000 INJECTION INTRAVENOUS; SUBCUTANEOUS at 16:05

## 2020-04-22 RX ADMIN — ASPIRIN 81 MG: 81 TABLET, COATED ORAL at 14:29

## 2020-04-22 RX ADMIN — SODIUM CHLORIDE 1000 MG: 900 INJECTION, SOLUTION INTRAVENOUS at 17:13

## 2020-04-22 RX ADMIN — HEPARIN SODIUM 5000 UNITS: 5000 INJECTION INTRAVENOUS; SUBCUTANEOUS at 08:10

## 2020-04-22 RX ADMIN — MELATONIN 2 TABLET: at 14:29

## 2020-04-22 RX ADMIN — SODIUM CHLORIDE 40 MG: 9 INJECTION, SOLUTION INTRAMUSCULAR; INTRAVENOUS; SUBCUTANEOUS at 08:12

## 2020-04-22 RX ADMIN — SUCRALFATE 1 G: 1 TABLET ORAL at 14:25

## 2020-04-22 RX ADMIN — PERFLUTREN 1 ML: 6.52 INJECTION, SUSPENSION INTRAVENOUS at 12:31

## 2020-04-22 RX ADMIN — SUCRALFATE 1 G: 1 TABLET ORAL at 21:58

## 2020-04-22 RX ADMIN — DEXTROSE MONOHYDRATE: 10 INJECTION, SOLUTION INTRAVENOUS at 07:00

## 2020-04-22 NOTE — ROUTINE PROCESS
1915- Bedside shift change report given to Monica Shane RN (oncoming nurse) by Edelmira Cohn RN (offgoing nurse). Report included the following information SBAR, ED Summary, Intake/Output, MAR, Recent Results and Cardiac Rhythm NSR Received patient resting in bed on bear hugger with normal temperature on monitor. . Patient denies pain at this time. Will continue to monitor. 2200-Assist patient with dinner tray. Patient ate approximately 25% of plate. Patient able to swallow food and scheduled medications without any issues. Will continue to monitor. VSS. 0000- Reassessment completed. No new changes. Patient inquiring about outpatient appointments tomorrow morning. Nurse reassured him that his son would be spoken to in the morning to discuss rescheduling outpatient visit. Patient is aware of nuc med stress test in the morning. Patient now NPO. VSS. Will continue to monotor. 0400- Reassessment completed. No new changes noted. Gown, linens, tele leads and mepalex changed, patient tolerated well. VSS, will continue to monitor. 5322- Bedside shift change report given to Sheila Linares RN (oncoming nurse) by Monica Shane RN (offgoing nurse). Report included the following information SBAR, Kardex, ED Summary, Intake/Output, Recent Results and Cardiac Rhythm BBB.

## 2020-04-22 NOTE — PROGRESS NOTES
Internal Medicine Progress Note        NAME: Braulio Tomlin   :  1934  MRM:  [id number]    Date/Time: 2020        ASSESSMENT/PLAN:    #  SIRS. Hypothermia , Hypotension and bradycardia. Unclear source of infection. Sepsis work-up started in the emergency room. Treated with Abx vanco and zosyn. Follow cultures progress. Appreciate PCCM input. Warming blankets. Used    # Hypoglycemia. Monitor blood sugar and treat accordingly    #  Dysrhythmia , heart block (LBBB ,long first degree and wide QRS), ho   Sinoatrial node dysfunction  and mild elevation of trop level. Stabilized initially in ICU. Iv pressors as needed   Cardiac stress test  History of  Cardiomyopathy  LVEF 35-40% by previous TTE and Grade II diastolic dysfunction. #  Chronic renal failure, stage 3 (moderate) ([address] (2019). Monitor Renal function and other labs as indicated. Avoid nephrotoxins , iv Contrast, NSAID. Renally dosing medications. Monitor urine out put. # ho   Hypothyroidism . On replacement.  Check TSH , T4     # #   Dysphagia (2020)     #  Hypoglycemia (2020)    #   HTN (hypertension) (2020)      -DVT prophylaxis : Heparin.   - Code Status : Full        Lab Review:     Recent Labs     20  0218 20  0636   WBC 6.6 6.5   HGB 9.9* 10.5*   HCT 29.5* 31.4*    160     Recent Labs     20  0447 20  0218 20  2038 20  1105 20  0636   NA  --  148* 148*  --  145   K 3.9 3.8 3.6  --  3.4*   CL  --  117* 116*  --  109   CO2  --  25 27  --  34*   GLU  --  79 92  --  54*   BUN  --  37* 38*  --  45*   CREA  --  1.59* 1.49*  --  1.55*   CA  --  8.1* 8.1*  --  9.3   MG  --  2.4  --  2.0  --    PHOS  --  4.0  --   --   --    ALB  --  2.5*  --   --  2.9*   TBILI  --  0.3  --   --  0.3   SGOT  --  79*  --   --  113*   ALT  --  91*  --   --  121*   INR  --  1.2  --   --   --      Lab Results   Component Value Date/Time    Glucose (POC) 170 (H) 2020 07:08 AM    Glucose (POC) 69 (L) 04/22/2020 05:57 AM    Glucose (POC) 97 04/21/2020 02:41 PM    Glucose (POC) 167 (H) 04/21/2020 08:07 AM    Glucose (POC) 173 (H) 04/09/2020 11:38 AM    Glucose (POC) 96 10/10/2014 09:28 PM    Glucose (POC) 186 (H) 10/10/2014 03:48 PM     No results for input(s): PH, PCO2, PO2, HCO3, FIO2 in the last 72 hours. Recent Labs     04/22/20  0218   INR 1.2       Lab Results   Component Value Date/Time    Specimen Description: [facility] 02/06/2014 05:45 PM     Lab Results   Component Value Date/Time    Culture result: NO GROWTH AFTER 22 HOURS 04/21/2020 08:15 AM    Culture result: NO GROWTH AFTER 22 HOURS 04/21/2020 08:00 AM    Culture result: NO GROWTH 6 DAYS 04/06/2020 04:40 PM       All Cardiac Markers in the last 24 hours:   Lab Results   Component Value Date/Time    TROIQ 0.08 (H) 04/22/2020 02:18 AM    TROIQ 0.05 (H) 04/21/2020 08:38 PM    TROIQ 0.04 04/21/2020 05:10 PM           Intervals noted      Subjective:     Chief Complaint:      F eel well, just back from stress test. Deny CP/SOB  Still on iv levophed    ROS:  (bold if positive,otherwise negative)    Fever/chills ,  Dysuria   Cough , Sputum , SOB/HERRMANN , Chest Pain     Diarrhea ,Nausea/Vomit , Abd Pain , Constipation           Objective:     Vitals:  Last 24hrs VS reviewed since prior progress note. Most recent are:    Visit Vitals  /46   Pulse 73   Temp 97.6 °F (36.4 °C)   Resp 12   Ht 5' 7\" (1.702 m)   Wt 68 kg (150 lb)   SpO2 100%   BMI 23.49 kg/m²     SpO2 Readings from Last 6 Encounters:   04/22/20 100%   04/09/20 98%   02/21/20 92%   01/02/20 96%   12/16/19 97%   09/23/18 99%            Intake/Output Summary (Last 24 hours) at 4/22/2020 1357  Last data filed at 4/22/2020 1147  Gross per 24 hour   Intake 846.01 ml   Output 1295 ml   Net -448.99 ml          Physical Exam:   Gen: looks much better, more alert and comfortable, talking, chatting ,  in no acute distress.   Appear stated age, Well-developed   HEENT: Head atraumatic, normocephalic ,  moist mucous membranes. Neck:   Trachea midline , No apparent JVD, Supple, without masses, thyroid non-tender  Resp:  No accessory muscle use,Bilateral BS present, clear breath sounds without wheezes rales or rhonchi  Card:  normal S1, S2 without Gallop . Mild lower leg peripheral edema. Abd:  Soft, non-tender, non-distended, bowel sounds are present . Darlynn Magic Musc:  No cyanosis or clubbing. Skin:  No rashes or ulcers, skin turgor is good. Neuro:  Cranial nerves are grossly intact, no clear area of focal motor weakness, follows commands appropriately. Psych:  Good insight, oriented to person, place and time, alert.        Telemetry reviewed:   normal sinus rhythm    Medications Reviewed: (see below)    Lab Data Reviewed: (see below)    ______________________________________________________________________    Medications:     Current Facility-Administered Medications   Medication Dose Route Frequency    glucose chewable tablet 16 g  4 Tab Oral PRN    glucagon (GLUCAGEN) injection 1 mg  1 mg IntraMUSCular PRN    dextrose 10% infusion 125-250 mL  125-250 mL IntraVENous PRN    [START ON 4/23/2020] levothyroxine (SYNTHROID) tablet 100 mcg  100 mcg Oral 6am    lidocaine 4 % patch 1 Patch  1 Patch TransDERmal Q24H    sodium chloride (NS) flush 5-10 mL  5-10 mL IntraVENous PRN    NOREPINephrine (LEVOPHED) 8 mg in 0.9% NS 250ml infusion  0.5-16 mcg/min IntraVENous TITRATE    aspirin delayed-release tablet 81 mg  81 mg Oral DAILY    cholecalciferol (VITAMIN D3) (1000 Units /25 mcg) tablet 2 Tab  2,000 Units Oral DAILY    pantoprazole (PROTONIX) 40 mg in 0.9% sodium chloride 10 mL injection  40 mg IntraVENous Q12H    atorvastatin (LIPITOR) tablet 40 mg  40 mg Oral QHS    sucralfate (CARAFATE) tablet 1 g  1 g Oral QID    tamsulosin (FLOMAX) capsule 0.4 mg  0.4 mg Oral DAILY    heparin (porcine) injection 5,000 Units  5,000 Units SubCUTAneous Q8H    piperacillin-tazobactam (ZOSYN) 4.5 g in 0.9% sodium chloride (MBP/ADV) 100 mL MBP ###EXTENDED 4-HOUR INFUSION###  4.5 g IntraVENous Q8H    vancomycin (VANCOCIN) 1,000 mg in 0.9% sodium chloride (MBP/ADV) 250 mL adv  1,000 mg IntraVENous Q24H    [START ON 4/23/2020] VANCOMYCIN INFORMATION NOTE   Other ONCE    VANCOMYCIN INFORMATION NOTE 1 Each  1 Each Other Rx Dosing/Monitoring          Total time spent with patient: 35 minutes                  Care Plan discussed with: Patient, Nursing Staff, Consultant/Specialist and >50% of time spent in counseling and coordination of care    Discussed:  Care Plan    Prophylaxis:  Hep SQ    Disposition:  Home w/Family           This document in whole or part of it has been produced using voice recognition software. Unrecognized errors in transcription may be present.     Attending Physician: Gail Castillo MD

## 2020-04-22 NOTE — DIABETES MGMT
NUTRITIONAL ASSESSMENT GLYCEMIC CONTROL/ PLAN OF CARE     Rylee Yee           80 y.o.           4/21/2020                 1. Hypothermia, initial encounter    2. Hypoglycemia    3. General weakness    4. Acute chest pain    5. Hx of aspiration pneumonitis    6. Hypercarbia    7. Anemia, unspecified type    8. Urinary tract infection with hematuria, site unspecified       INTERVENTIONS/PLAN:   1. Monitor diet advancement and appetite status, labs and weights. 2. Suggest Ensure Enlive BID when diet advanced (pt received at prior admission). ASSESSMENT:   Pt is  101% ideal weight with BMI = 23.5 kg/m2 (normal weight  Classification). Noted at prior admission pt had had an unintentional 33 lb weight loss over prior 10 months and his po intake was poor. Noted variable weights from 150 to 167 lbs since Dec. 2019. Pt with hypoglycemia at this admission (BG - 54 on 4/21) and this morning (BG - 60). Nutrition Diagnoses: Altered nutrition related labs due to hypoglycemia as evidenced by BG of 54 mg/dL (4/21) and 69 (4/22). SUBJECTIVE/OBJECTIVE:   Information obtained from: chart review, ICU rounds, RN (pt off unit for nuclear stress test)  Pt known from prior admission 12/31/19. Diet: NPO    No data found.     Medications: [x]                Reviewed   Pertinent:  Lipitor 40 mg/d  Most Recent POC Glucose:   Recent Labs     04/22/20  0218 04/21/20  2038 04/21/20  0636   GLU 79 92 54*        Labs:   No results found for: HBA1C, HGBE8, [id number]  Lab Results   Component Value Date/Time    Sodium 148 (H) 04/22/2020 02:18 AM    Potassium 3.9 04/22/2020 04:47 AM    Chloride 117 (H) 04/22/2020 02:18 AM    CO2 25 04/22/2020 02:18 AM    Anion gap 6 04/22/2020 02:18 AM    Glucose 79 04/22/2020 02:18 AM    BUN 37 (H) 04/22/2020 02:18 AM    Creatinine 1.59 (H) 04/22/2020 02:18 AM    Calcium 8.1 (L) 04/22/2020 02:18 AM    Magnesium 2.4 04/22/2020 02:18 AM    Phosphorus 4.0 04/22/2020 02:18 AM    Albumin 2.5 (L) 04/22/2020 02:18 AM       Anthropometrics: IBW : 67.1 kg (148 lb), % IBW (Calculated): 101.35 %, BMI (calculated): 23.5  Wt Readings from Last 1 Encounters:   04/22/20 68 kg (150 lb)     Last Weight Metrics:  Weight Loss Metrics 4/22/2020 4/8/2020 2/19/2020 12/28/2019 12/16/2019 2/12/2019 9/10/2018   Today's Wt 150 lb 151 lb 6.4 oz 160 lb 167 lb 8.8 oz 150 lb 184 lb 180 lb   BMI 23.49 kg/m2 23.71 kg/m2 25.06 kg/m2 25.48 kg/m2 22.81 kg/m2 27.98 kg/m2 27.37 kg/m2       Ht Readings from Last 1 Encounters:   04/22/20 5' 7\" (1.702 m)     Estimated Nutrition Needs:  1852 Kcals/day, Protein (g): 102 g Fluid (ml): 1800 ml  Based on:   [x]          Actual BW    []          ABW   []            Adjusted BW         Nutrition Interventions:  None at this time - NPO  Goal:   Blood glucose will be within target range of  mg/dL by 4/25/20. Provision of adequate nutrition (> or + 75% estimated needs) by 4/27/20. Weight maintenance (+/- 1-2 kg) by 5/2/20.         Nutrition Monitoring and Evaluation      [x]     Monitor po intake on meal rounds  [x]     Continue inpatient monitoring and intervention  []     Other:    Nutrition Risk:  []   High     [x]  Moderate    []  Minimal/Uncompromised    Cm Gray RD, CDE   Office:  [address] [place] [address] Pager:  [phone]

## 2020-04-22 NOTE — PROGRESS NOTES
[facility]  ICU Progress Note      Name: Nuha Bond   : 1934   MRN: [id number]   Date: 2020 1:00 PM     [x]I have reviewed the flowsheet and previous days notes. Events overnight reviewed and discussed with nursing staff. Vital signs and records reviewed. Subjective:  20:    -Stable overnight, still requiring 8mcg/min of levo fed for blood pressure control. Clearly though clinically improved. EKG continues to show irregularly irregular rhythm with multifocal PVCs and wide complex junctional escape beats. Mental status significantly improved. Clear lung fields bilaterally. Cardiology plans on stress test this a.m. []The patient is unable to give any meaningful history or review of systems because the patient is:  []Intubated []Sedated   []Unresponsive      []The patient is critically ill on      []Mechanical ventilation [x]Pressors   []BiPAP []                 ROS:Pertinent items are noted in HPI.     Medication Review:  · Pressors -  · Sedation -  · Antibiotics -  · Pain -  · GI/ DVT -  · Others (other gtts)      Vital Signs:    Visit Vitals  BP 94/57   Pulse 64   Temp 97.9 °F (36.6 °C)   Resp 12   Ht 5' 7\" (1.702 m)   Wt 68 kg (150 lb)   SpO2 97%   BMI 23.49 kg/m²       O2 Device: Room air       Temp (24hrs), Av.4 °F (36.3 °C), Min:96.2 °F (35.7 °C), Max:98.8 °F (37.1 °C)       Intake/Output:   Last shift:       07 - 1900  In: 42.7 [I.V.:42.7]  Out: 285 [Urine:285]  Last 3 shifts: 1901 -  0700  In: 803.4 [I.V.:803.4]  Out: 1010 [Urine:1010]    Intake/Output Summary (Last 24 hours) at 2020 1300  Last data filed at 2020 1147  Gross per 24 hour   Intake 846.01 ml   Output 1295 ml   Net -448.99 ml       Ventilator Settings:  Mode Rate Tidal Volume Pressure FiO2 PEEP                    Peak airway pressure:      Minute ventilation:        ARDS network Guidelines:   Lung protective strategy and Plateau  Pressure goal < 30 cm H2O goals  Oxygenation Goals PaO2 55-80 mm Hg or SaO2 88-95%  PH goal 7.30-7.45    VAP bundle:  Reviewed. [place] tube to suction at 20-30 cm Hg. Maintain [place] tube with 5-10ml air every 4 hours  Routine oral care every 4 hours  Elevation of head > 45 degree  Daily sedation holiday and SBT evaluation starting at 6.00am.    Physical Exam:    General: Intubated/sedated;    HEENT:  Anicteric sclerae; pink palpebral conjunctivae; mucosa moist  Resp:  Symmetrical chest rise, no accessory muscle use; good AE Bilat; no rales/ wheezing/ rhonchi noted  CV:  S1, S2 present; RRR; no m/g/r  GI:  Abdomen soft, non-tender; (+) active bowel sounds  Extremities:  +2 pulses on all extremities; no edema/ cyanosis/ clubbing noted  Skin:  Warm; no rashes/ lesions noted  Neurologic:  Non-focal  Devices:  No NGT/OGT, Central line/ PICC, ETT/tracheostomy, chest tube      DATA:     Current Facility-Administered Medications   Medication Dose Route Frequency    glucose chewable tablet 16 g  4 Tab Oral PRN    glucagon (GLUCAGEN) injection 1 mg  1 mg IntraMUSCular PRN    dextrose 10% infusion 125-250 mL  125-250 mL IntraVENous PRN    [START ON 4/23/2020] levothyroxine (SYNTHROID) tablet 100 mcg  100 mcg Oral 6am    lidocaine 4 % patch 1 Patch  1 Patch TransDERmal Q24H    sodium chloride (NS) flush 5-10 mL  5-10 mL IntraVENous PRN    NOREPINephrine (LEVOPHED) 8 mg in 0.9% NS 250ml infusion  0.5-16 mcg/min IntraVENous TITRATE    aspirin delayed-release tablet 81 mg  81 mg Oral DAILY    cholecalciferol (VITAMIN D3) (1000 Units /25 mcg) tablet 2 Tab  2,000 Units Oral DAILY    pantoprazole (PROTONIX) 40 mg in 0.9% sodium chloride 10 mL injection  40 mg IntraVENous Q12H    atorvastatin (LIPITOR) tablet 40 mg  40 mg Oral QHS    sucralfate (CARAFATE) tablet 1 g  1 g Oral QID    tamsulosin (FLOMAX) capsule 0.4 mg  0.4 mg Oral DAILY    heparin (porcine) injection 5,000 Units  5,000 Units SubCUTAneous Q8H    piperacillin-tazobactam (ZOSYN) 4.5 g in 0.9% sodium chloride (MBP/ADV) 100 mL MBP ###EXTENDED 4-HOUR INFUSION###  4.5 g IntraVENous Q8H    vancomycin (VANCOCIN) 1,000 mg in 0.9% sodium chloride (MBP/ADV) 250 mL adv  1,000 mg IntraVENous Q24H    [START ON 4/23/2020] VANCOMYCIN INFORMATION NOTE   Other ONCE    VANCOMYCIN INFORMATION NOTE 1 Each  1 Each Other Rx Dosing/Monitoring     Facility-Administered Medications Ordered in Other Encounters   Medication Dose Route Frequency    technetium sestamibi (CARDIOLITE) injection 12 millicurie  12 millicurie IntraVENous RAD ONCE    regadenoson (LEXISCAN) injection 0.4 mg  0.4 mg IntraVENous RAD ONCE         Labs: Results:       Chemistry Recent Labs     04/22/20 0447 04/22/20 0218 04/21/20 2038 04/21/20  0636   GLU  --  79 92 54*   NA  --  148* 148* 145   K 3.9 3.8 3.6 3.4*   CL  --  117* 116* 109   CO2  --  25 27 34*   BUN  --  37* 38* 45*   CREA  --  1.59* 1.49* 1.55*   CA  --  8.1* 8.1* 9.3   AGAP  --  6 5 2*   BUCR  --  23* 26* 29*   AP  --  306*  --  312*   TP  --  5.8*  --  6.7   ALB  --  2.5*  --  2.9*   GLOB  --  3.3  --  3.8   AGRAT  --  0.8  --  0.8      CBC w/Diff Recent Labs     04/22/20 0218 04/21/20  0636   WBC 6.6 6.5   RBC 2.98* 3.18*   HGB 9.9* 10.5*   HCT 29.5* 31.4*    160   GRANS 86* 75*   LYMPH 11* 19*   EOS 0 2      Coagulation Recent Labs     04/22/20 0218   PTP 15.4*   INR 1.2       Liver Enzymes Recent Labs     04/22/20 0218   TP 5.8*   ALB 2.5*   *   SGOT 79*      ABG No results found for: PH, PHI, PCO2, PCO2I, PO2, PO2I, HCO3, HCO3I, FIO2, FIO2I   Microbiology Recent Labs     04/21/20  0815 04/21/20  0800   CULT NO GROWTH AFTER 22 HOURS NO GROWTH AFTER 22 HOURS          Telemetry: []Sinus []A-flutter []Paced    []A-fib []Multiple PVCs                  Imaging:    CXR [date]:    CT HEAD/CHEST/ABD/PELVIS [date]:    []I have personally reviewed the patients radiographs  []Radiographs reviewed with radiologist   []No change from prior, tubes and lines in adequate position  []Improved   []Worsening        IMPRESSION:   · Cardiac dysrhythmias improved  · Stress test today  Continue antibiotics  ·    RECOMMENDATIONS:   · Resp: Titrate FiO2/ supp O2 for SpO2 >90%;   · I/D: Afebrile; aleukocytosis; Sepsis bundle per hospital protocol, f/u BxCx/UC, Sputum Cx's. Trend LA q4 until normal. ABX: Vancomycin and Zosyn deescalate ABX once Cx's finalize. · Hem/Onc: Daily CBC; H/H, and plts are stable  · CVS: HD stable; Actively titrate vasopressors aim MAP >65mmHg,   . · Metabolic: Daily BMP; monitor e-lytes; replace PRN  · Renal: Trend Renal indices; Diuresis, Mojica to BSD,   · Endocrine: POC Glucose q6; Check TSH level  · GI: SUP, Trend LFTs, Zofran PRN for N/V   · Musc/Skin: No acute issues, wound care  · Neuro: PRN pain medications, +/- sedation  · Fluids:  · Discussed in Interdisciplinary Rounds      Best Practices/ Safety Bundles:  · Sepsis Bundle per Hospital Protocol  · CAUTI Bundle  · Electrolyte Replacement Bundle  · Glycemic control goal <180; avoid Hypoglycemia  · IHI ICU Bundles:  ·  Central Line Bundle Followed     · Mech Vent patients:   · VAP bundle, Aim to keep peak plateau pressure 03-01HY H2O  · Aspiration Precautions - HOB >30'  · Daily sedation holiday as indicated  · SBT as tolerated/appropriate  · Titrate FiO2 for SpO2 >94%  · Aggressive Pulmonary Hygeiene  · Stress ulcer prophylaxis: Carafate  · DVT prophylaxis: Heparin  · Need for Lines, mojica assessed. · Restraints need. · Palliative care evaluation.   None      The patient is: [x] acutely ill Risk of deterioration: [] moderate    [] critically ill  [] high     []See my orders for details    My assessment/plan was discussed with:  [x]Nursing []PT/OT    []Respiratory therapy []   []Family []     MD Liu Atkinson

## 2020-04-22 NOTE — PROGRESS NOTES
Writing RN to monitor pt while in [facility] and [address] for cardiac testing. Report received from [place] [id number]. Will continue to monitor and intervene as needed. 1255- Stress portion of exam completed, pt resting at this time. Sips of H2O provided. 1329- All cardiac testing completed at this time. Pt stewart well. Waiting for transport, writing RN will accompany pt back to ICU.

## 2020-04-22 NOTE — CDMP QUERY
Patient admitted with hypothermia and hypotension. Patient given IVF and started on Levophed. If possible, please document in the progress notes and d/c summary if you are evaluating and / or treating any of the following:  Cardiogenic Shock  Hypovolemic Shock  Other Shock, Please specify  Shock-unknown cause  No Shock  Other, please specify  Clinically unable to determine The medical record reflects the following: 
   Risk Factors: Acute illness; recent admission for aspiration PNA; hx HF; SIRS Clinical Indicators: Serial BPs:  88/41  73/39  73/39  73/36  77/37  74/55  81/38; MAP range 45-58 Treatment: started on IV fluid then Levophed started with the placement of central line; Zosyn; Regino 
 
Thank you, Yuliya Lechuga [email or web address]

## 2020-04-22 NOTE — PROGRESS NOTES
Physical Exam  Skin:           1100 Transported pt for Nuclear stress test    1348 Back to unit stable.

## 2020-04-22 NOTE — PROGRESS NOTES
Problem: Discharge Planning  Goal: *Discharge to safe environment  Outcome: Progressing Towards Goal     Problem: Falls - Risk of  Goal: *Absence of Falls  Description: Document [place] Fall Risk and appropriate interventions in the flowsheet. Outcome: Progressing Towards Goal  Note: Fall Risk Interventions:       Mentation Interventions: Bed/chair exit alarm, Room close to nurse's station    Medication Interventions: Bed/chair exit alarm    Elimination Interventions: Bed/chair exit alarm, Call light in reach              Problem: Patient Education: Go to Patient Education Activity  Goal: Patient/Family Education  Outcome: Progressing Towards Goal     Problem: Pressure Injury - Risk of  Goal: *Prevention of pressure injury  Description: Document Alex Scale and appropriate interventions in the flowsheet. Outcome: Progressing Towards Goal  Note: Pressure Injury Interventions:  Sensory Interventions: Assess changes in LOC, Keep linens dry and wrinkle-free, Sit a 90-degree angle/use footstool if needed, Turn and reposition approx. every two hours (pillows and wedges if needed)    Moisture Interventions: Absorbent underpads, Internal/External urinary devices    Activity Interventions: Pressure redistribution bed/mattress(bed type)    Mobility Interventions: HOB 30 degrees or less, Pressure redistribution bed/mattress (bed type), Turn and reposition approx.  every two hours(pillow and wedges)    Nutrition Interventions: Document food/fluid/supplement intake    Friction and Shear Interventions: Foam dressings/transparent film/skin sealants                Problem: Patient Education: Go to Patient Education Activity  Goal: Patient/Family Education  Outcome: Progressing Towards Goal

## 2020-04-22 NOTE — PROGRESS NOTES
Pt admitted for non COVID related issue on 4/21/20. Will close COVID ambulatory episode. Ambulatory care team will resume upon discharge.

## 2020-04-23 ENCOUNTER — HOSPITAL ENCOUNTER (INPATIENT)
Dept: NUCLEAR MEDICINE | Age: 85
Discharge: HOME | DRG: 981 | End: 2020-04-23
Payer: MEDICARE

## 2020-04-23 PROBLEM — K92.2 GASTROINTESTINAL BLEEDING: Status: ACTIVE | Noted: 2020-04-23

## 2020-04-23 LAB
ALBUMIN SERPL-MCNC: 2.1 G/DL (ref 3.4–5)
ALBUMIN/GLOB SERPL: 0.8 {RATIO} (ref 0.8–1.7)
ALP SERPL-CCNC: 204 U/L (ref 45–117)
ALT SERPL-CCNC: 54 U/L (ref 16–61)
ANION GAP SERPL CALC-SCNC: 3 MMOL/L (ref 3–18)
ANION GAP SERPL CALC-SCNC: 8 MMOL/L (ref 3–18)
APTT PPP: 46.3 SEC (ref 23–36.4)
AST SERPL-CCNC: 37 U/L (ref 10–38)
BACTERIA SPEC CULT: NORMAL
BASOPHILS # BLD: 0 K/UL (ref 0–0.1)
BASOPHILS NFR BLD: 0 % (ref 0–2)
BILIRUB SERPL-MCNC: 0.9 MG/DL (ref 0.2–1)
BUN SERPL-MCNC: 54 MG/DL (ref 7–18)
BUN SERPL-MCNC: 55 MG/DL (ref 7–18)
BUN/CREAT SERPL: 26 (ref 12–20)
BUN/CREAT SERPL: 29 (ref 12–20)
CALCIUM SERPL-MCNC: 7.9 MG/DL (ref 8.5–10.1)
CALCIUM SERPL-MCNC: 8.1 MG/DL (ref 8.5–10.1)
CHLORIDE SERPL-SCNC: 123 MMOL/L (ref 100–111)
CHLORIDE SERPL-SCNC: 124 MMOL/L (ref 100–111)
CO2 SERPL-SCNC: 22 MMOL/L (ref 21–32)
CO2 SERPL-SCNC: 27 MMOL/L (ref 21–32)
CREAT SERPL-MCNC: 1.85 MG/DL (ref 0.6–1.3)
CREAT SERPL-MCNC: 2.13 MG/DL (ref 0.6–1.3)
DATE LAST DOSE: NORMAL
DIFFERENTIAL METHOD BLD: ABNORMAL
EOSINOPHIL # BLD: 0.2 K/UL (ref 0–0.4)
EOSINOPHIL NFR BLD: 2 % (ref 0–5)
ERYTHROCYTE [DISTWIDTH] IN BLOOD BY AUTOMATED COUNT: 16.2 % (ref 11.6–14.5)
GLOBULIN SER CALC-MCNC: 2.5 G/DL (ref 2–4)
GLUCOSE BLD STRIP.AUTO-MCNC: 177 MG/DL (ref 70–110)
GLUCOSE BLD STRIP.AUTO-MCNC: 185 MG/DL (ref 70–110)
GLUCOSE SERPL-MCNC: 125 MG/DL (ref 74–99)
GLUCOSE SERPL-MCNC: 188 MG/DL (ref 74–99)
HCT VFR BLD AUTO: 23.4 % (ref 36–48)
HCT VFR BLD AUTO: 25.6 % (ref 36–48)
HCT VFR BLD AUTO: 27.4 % (ref 36–48)
HGB BLD-MCNC: 7.9 G/DL (ref 13–16)
HGB BLD-MCNC: 8.5 G/DL (ref 13–16)
HGB BLD-MCNC: 9.1 G/DL (ref 13–16)
INR PPP: 1.4 (ref 0.8–1.2)
LYMPHOCYTES # BLD: 0.9 K/UL (ref 0.9–3.6)
LYMPHOCYTES NFR BLD: 8 % (ref 21–52)
MAGNESIUM SERPL-MCNC: 2.2 MG/DL (ref 1.6–2.6)
MAGNESIUM SERPL-MCNC: 2.3 MG/DL (ref 1.6–2.6)
MCH RBC QN AUTO: 33.1 PG (ref 24–34)
MCHC RBC AUTO-ENTMCNC: 33.2 G/DL (ref 31–37)
MCV RBC AUTO: 99.6 FL (ref 74–97)
MONOCYTES # BLD: 0.3 K/UL (ref 0.05–1.2)
MONOCYTES NFR BLD: 2 % (ref 3–10)
NEUTS SEG # BLD: 10.4 K/UL (ref 1.8–8)
NEUTS SEG NFR BLD: 88 % (ref 40–73)
PHOSPHATE SERPL-MCNC: 2.8 MG/DL (ref 2.5–4.9)
PLATELET # BLD AUTO: 182 K/UL (ref 135–420)
PMV BLD AUTO: 11.1 FL (ref 9.2–11.8)
POTASSIUM SERPL-SCNC: 4.3 MMOL/L (ref 3.5–5.5)
POTASSIUM SERPL-SCNC: 4.9 MMOL/L (ref 3.5–5.5)
PROT SERPL-MCNC: 4.6 G/DL (ref 6.4–8.2)
PROTHROMBIN TIME: 16.9 SEC (ref 11.5–15.2)
RBC # BLD AUTO: 2.57 M/UL (ref 4.7–5.5)
REPORTED DOSE,DOSE: NORMAL UNITS
REPORTED DOSE/TIME,TMG: 1700
SERVICE CMNT-IMP: NORMAL
SODIUM SERPL-SCNC: 153 MMOL/L (ref 136–145)
SODIUM SERPL-SCNC: 154 MMOL/L (ref 136–145)
VANCOMYCIN TROUGH SERPL-MCNC: 16.9 UG/ML (ref 10–20)
WBC # BLD AUTO: 11.8 K/UL (ref 4.6–13.2)

## 2020-04-23 PROCEDURE — 85025 COMPLETE CBC W/AUTO DIFF WBC: CPT

## 2020-04-23 PROCEDURE — 74011000258 HC RX REV CODE- 258: Performed by: PHYSICIAN ASSISTANT

## 2020-04-23 PROCEDURE — 85610 PROTHROMBIN TIME: CPT

## 2020-04-23 PROCEDURE — 74011000258 HC RX REV CODE- 258: Performed by: INTERNAL MEDICINE

## 2020-04-23 PROCEDURE — 74011000250 HC RX REV CODE- 250: Performed by: INTERNAL MEDICINE

## 2020-04-23 PROCEDURE — 77030040361 HC SLV COMPR DVT MDII -B

## 2020-04-23 PROCEDURE — 74011250636 HC RX REV CODE- 250/636: Performed by: INTERNAL MEDICINE

## 2020-04-23 PROCEDURE — 74011250636 HC RX REV CODE- 250/636: Performed by: PHYSICIAN ASSISTANT

## 2020-04-23 PROCEDURE — 86900 BLOOD TYPING SEROLOGIC ABO: CPT

## 2020-04-23 PROCEDURE — 74011000258 HC RX REV CODE- 258: Performed by: HOSPITALIST

## 2020-04-23 PROCEDURE — 86923 COMPATIBILITY TEST ELECTRIC: CPT

## 2020-04-23 PROCEDURE — 65610000006 HC RM INTENSIVE CARE

## 2020-04-23 PROCEDURE — C9113 INJ PANTOPRAZOLE SODIUM, VIA: HCPCS | Performed by: INTERNAL MEDICINE

## 2020-04-23 PROCEDURE — 74011000250 HC RX REV CODE- 250: Performed by: HOSPITALIST

## 2020-04-23 PROCEDURE — 85018 HEMOGLOBIN: CPT

## 2020-04-23 PROCEDURE — 80053 COMPREHEN METABOLIC PANEL: CPT

## 2020-04-23 PROCEDURE — 85730 THROMBOPLASTIN TIME PARTIAL: CPT

## 2020-04-23 PROCEDURE — 84100 ASSAY OF PHOSPHORUS: CPT

## 2020-04-23 PROCEDURE — 83735 ASSAY OF MAGNESIUM: CPT

## 2020-04-23 PROCEDURE — 82962 GLUCOSE BLOOD TEST: CPT

## 2020-04-23 PROCEDURE — 36430 TRANSFUSION BLD/BLD COMPNT: CPT

## 2020-04-23 PROCEDURE — 80202 ASSAY OF VANCOMYCIN: CPT

## 2020-04-23 PROCEDURE — P9016 RBC LEUKOCYTES REDUCED: HCPCS

## 2020-04-23 PROCEDURE — 74011250636 HC RX REV CODE- 250/636: Performed by: HOSPITALIST

## 2020-04-23 PROCEDURE — 74011250637 HC RX REV CODE- 250/637: Performed by: PHYSICIAN ASSISTANT

## 2020-04-23 RX ORDER — SODIUM CHLORIDE 9 MG/ML
100 INJECTION, SOLUTION INTRAVENOUS CONTINUOUS
Status: DISCONTINUED | OUTPATIENT
Start: 2020-04-23 | End: 2020-04-23

## 2020-04-23 RX ORDER — DEXTROSE MONOHYDRATE AND SODIUM CHLORIDE 5; .45 G/100ML; G/100ML
100 INJECTION, SOLUTION INTRAVENOUS CONTINUOUS
Status: DISCONTINUED | OUTPATIENT
Start: 2020-04-23 | End: 2020-04-23

## 2020-04-23 RX ORDER — DEXTROSE MONOHYDRATE 50 MG/ML
100 INJECTION, SOLUTION INTRAVENOUS CONTINUOUS
Status: DISCONTINUED | OUTPATIENT
Start: 2020-04-23 | End: 2020-04-24

## 2020-04-23 RX ORDER — SODIUM CHLORIDE 9 MG/ML
250 INJECTION, SOLUTION INTRAVENOUS AS NEEDED
Status: DISCONTINUED | OUTPATIENT
Start: 2020-04-23 | End: 2020-04-29

## 2020-04-23 RX ORDER — SODIUM CHLORIDE 9 MG/ML
250 INJECTION, SOLUTION INTRAVENOUS AS NEEDED
Status: DISCONTINUED | OUTPATIENT
Start: 2020-04-23 | End: 2020-04-24 | Stop reason: SDUPTHER

## 2020-04-23 RX ORDER — SODIUM CHLORIDE 9 MG/ML
500 INJECTION, SOLUTION INTRAVENOUS ONCE
Status: COMPLETED | OUTPATIENT
Start: 2020-04-23 | End: 2020-04-23

## 2020-04-23 RX ADMIN — VASOPRESSIN 0.04 UNITS/MIN: 20 INJECTION INTRAVENOUS at 12:21

## 2020-04-23 RX ADMIN — NOREPINEPHRINE BITARTRATE 30 MCG/MIN: 1 INJECTION INTRAVENOUS at 02:50

## 2020-04-23 RX ADMIN — NOREPINEPHRINE BITARTRATE 24 MCG/MIN: 1 INJECTION INTRAVENOUS at 10:20

## 2020-04-23 RX ADMIN — NOREPINEPHRINE BITARTRATE 12 MCG/MIN: 1 INJECTION INTRAVENOUS at 21:53

## 2020-04-23 RX ADMIN — SODIUM CHLORIDE 100 ML/HR: 900 INJECTION, SOLUTION INTRAVENOUS at 06:44

## 2020-04-23 RX ADMIN — DEXTROSE MONOHYDRATE AND SODIUM CHLORIDE 100 ML/HR: 5; .45 INJECTION, SOLUTION INTRAVENOUS at 11:00

## 2020-04-23 RX ADMIN — SODIUM CHLORIDE 100 ML/HR: 900 INJECTION, SOLUTION INTRAVENOUS at 01:15

## 2020-04-23 RX ADMIN — PIPERACILLIN SODIUM AND TAZOBACTAM SODIUM 4.5 G: 4; .5 INJECTION, POWDER, LYOPHILIZED, FOR SOLUTION INTRAVENOUS at 06:31

## 2020-04-23 RX ADMIN — SODIUM CHLORIDE 40 MG: 9 INJECTION, SOLUTION INTRAMUSCULAR; INTRAVENOUS; SUBCUTANEOUS at 21:46

## 2020-04-23 RX ADMIN — PIPERACILLIN SODIUM AND TAZOBACTAM SODIUM 4.5 G: 4; .5 INJECTION, POWDER, LYOPHILIZED, FOR SOLUTION INTRAVENOUS at 14:05

## 2020-04-23 RX ADMIN — NOREPINEPHRINE BITARTRATE 16 MCG/MIN: 1 INJECTION INTRAVENOUS at 12:45

## 2020-04-23 RX ADMIN — VASOPRESSIN 0.04 UNITS/MIN: 20 INJECTION INTRAVENOUS at 04:00

## 2020-04-23 RX ADMIN — NOREPINEPHRINE BITARTRATE 15 MCG/MIN: 1 INJECTION INTRAVENOUS at 13:01

## 2020-04-23 RX ADMIN — PIPERACILLIN SODIUM AND TAZOBACTAM SODIUM 4.5 G: 4; .5 INJECTION, POWDER, LYOPHILIZED, FOR SOLUTION INTRAVENOUS at 21:46

## 2020-04-23 RX ADMIN — DEXTROSE MONOHYDRATE 100 ML/HR: 5 INJECTION, SOLUTION INTRAVENOUS at 13:04

## 2020-04-23 RX ADMIN — SODIUM CHLORIDE 1000 MG: 900 INJECTION, SOLUTION INTRAVENOUS at 17:49

## 2020-04-23 RX ADMIN — NOREPINEPHRINE BITARTRATE 30 MCG/MIN: 1 INJECTION INTRAVENOUS at 07:42

## 2020-04-23 RX ADMIN — LEVOTHYROXINE SODIUM 100 MCG: 0.03 TABLET ORAL at 06:32

## 2020-04-23 RX ADMIN — SODIUM CHLORIDE 40 MG: 9 INJECTION, SOLUTION INTRAMUSCULAR; INTRAVENOUS; SUBCUTANEOUS at 09:05

## 2020-04-23 RX ADMIN — HEPARIN SODIUM 5000 UNITS: 5000 INJECTION INTRAVENOUS; SUBCUTANEOUS at 00:15

## 2020-04-23 RX ADMIN — SODIUM CHLORIDE 500 ML: 900 INJECTION, SOLUTION INTRAVENOUS at 10:05

## 2020-04-23 NOTE — PROGRESS NOTES
Physical Exam  Skin:             24 hours event noted. Drip checks. Levophed gtt @ 30 mcg/min; taper down to 29 mcg/min. Vasopressin 0.04 units /hr.  NS @ 100 ml/hr. 0900 SCD's applied to bilateral lower extremities for DVT prophylaxis    0958 Started on Blood transfusion of 1 unit PRBC  Pt is also for  ml bolus- ok to give while pt is receiving blood per ROBERT Yadav. Instruction is also verified with Charge Nurse Augusto. [address] with instruction to continue  Vasopressin and may stop it once Levophed at 10 mcg/min. Tapering down Levophed in progress. ROBERT Yadav made aware pt has frequent PVC's    7558 Blood transfusion completed. Pt denies any shortness of breath, no chest pain. 200 Pt's son called and updated of pt's condition      [address] [facility] Pt had a large Bm; maroon colored stool with blood clots. Dr. Chao Alan came in at bedside and had seen the stool . Pt is for stat bleeding scan. 1630 Spoke to [employer] on call. 56 Transported pt to [facility] for Bleeding scan. 1807 Test aborted due to pt still have a residual imaging material from Nuclear stress test yesterday. Dr. Chao Alan made aware of above situation and updated of pt's condition. MD wants H/H done at 2100 and depending on the result, MD will make an order if Blood tx is needed. 1830 Pt is transported back to unit. VSS. 1930 Bedside and Verbal shift change report given to Moises Chau (oncoming nurse) by Jillian Das RN   (offgoing nurse). Report included the following information SBAR, Kardex, Intake/Output, MAR, Recent Results and Cardiac Rhythm BBB, Frequent PVC's.

## 2020-04-23 NOTE — DIABETES MGMT
NUTRITION AND GLYCEMIC CONTROL:  Per RN pt with GI bleed;  RN also noted pt with possible dysphagia. Diet:  NPO  Noted BG - 125 mg/dL today after hypoglycemia espisodes prior 2 days (BG 4/21: 54 mg/dL  and 4/22: 69 mg/dL)  IVF:  D5 1/2 NS at 100 ml/hr (408 calories/24 hours)  Will monitor clinical course and feeding status, labs and weights.     Bonita Wong RD, CDE   Office:  [address] [place] [address] Pager:  [phone]

## 2020-04-23 NOTE — PROGRESS NOTES
2000 Assumed care of pt after bedside report with pt lying in bed with HOB elevated. Neuro intact. AAO x 4. COLUNGA x 4, Monitor denotes NSR with BBB abd 1st degree AVB with occasional PVC's. Lungs clear diminished in bases. Pt on RA. Abd intact, flat nontender. Temp mojica in place and draining yellow urine. Pt on Levophed drip for MAP 65. Елена hugger on for low temp. 2100 Елена hugger turned off. Temp stable. 04/23/2020 0015. Pt requests bedpan for BM. 0030 Pt had medium liquid maroon, bloody stool in bedpan and on linens. Pt cleansed,  Linens ad bed pads changed. 0050 Pt incontinenot of larger liquid maroon, black, bloody stool. Pt cleansed, bed linen changed, bed pads changed, gown changed. 0100 Dr. Johana Martin paged and advised of same. Orders given and implemented. 0130 Levophed drip titrated up for MAP 65. NS IVF added at 100 ml/hr. 0145 AM labs drawn and sent to lab. 0230 Pt incontinent of large amount of liquid maroon,bloody, black stool. Pt cleansed, bed linens and gown changed, bed pads changed. 0400 Vasopressin drip added to infuse at 0.04 units/min for MAP 65 as ordered. 0530 Pt incontinent of very large  maroon, bloody, black liquid stool. Pt cleansed,bed linens all changed. Bed pads changed, gown changed. Tolerated without difficulty. 9908. Pt request bedpan because he said he needed to have another BM. Pt placed on bedpan. 0600 Pt off of bedpan with no results. 0630 Nurse called pt's son, Kyle Carbone and updated him on course during the night. Nurse advised that pt had had several appointments with GI but ad to cancel due to pt being in hospital and that pt does have a gastric ulcer.

## 2020-04-23 NOTE — PROGRESS NOTES
[facility]  ICU Progress Note      Name: Viridiana Starr   : 1934   MRN: [id number]   Date: 2020 1:18 PM     []I have reviewed the flowsheet and previous days notes. Events overnight reviewed and discussed with nursing staff. Vital signs and records reviewed. Subjective:  20:    LGBI over night with Hct drop 29% to 24%. No abd pain. Stress test ok. Old fixed defects. Heparin stopped, SCD's placed GI consulted. Will transfuse gived cardiac condition. Protonix instead of Carafate    []The patient is unable to give any meaningful history or review of systems because the patient is:  []Intubated []Sedated   []Unresponsive      []The patient is critically ill on      []Mechanical ventilation []Pressors   []BiPAP []                 ROS:Pertinent items are noted in HPI. Medication Review:  · Pressors -Levophed at 8mcg/min, Vesopressin  · Sedation -min  · Antibiotics -  · Pain -  · GI/ DVT -  · Others (other gtts)      Vital Signs:    Visit Vitals  /67   Pulse 94   Temp 97.6 °F (36.4 °C)   Resp 10   Ht 5' 7\" (1.702 m)   Wt 68 kg (150 lb)   SpO2 100%   BMI 23.49 kg/m²       O2 Device: Room air       Temp (24hrs), Av.2 °F (36.8 °C), Min:96.3 °F (35.7 °C), Max:99.2 °F (37.3 °C)       Intake/Output:   Last shift:       07 - 1900  In: 1154.2 [I.V.:800]  Out: 240 [Urine:240]  Last 3 shifts: 1901 -  0700  In: 1766.7 [P.O.:120;  I.V.:1646.7]  Out: 1685 [Urine:1685]    Intake/Output Summary (Last 24 hours) at 2020 1318  Last data filed at 2020 1248  Gross per 24 hour   Intake 2516.99 ml   Output 830 ml   Net 1686.99 ml       Ventilator Settings:  Mode Rate Tidal Volume Pressure FiO2 PEEP                    Peak airway pressure:      Minute ventilation:        ARDS network Guidelines:   Lung protective strategy and Plateau  Pressure goal < 30 cm H2O goals  Oxygenation Goals PaO2 55-80 mm Hg or SaO2 88-95%  PH goal 7.30-7.45    VAP bundle:  Reviewed. [place] tube to suction at 20-30 cm Hg. Maintain [place] tube with 5-10ml air every 4 hours  Routine oral care every 4 hours  Elevation of head > 45 degree  Daily sedation holiday and SBT evaluation starting at 6.00am.    Physical Exam:    General: Intubated/sedated;    HEENT:  Anicteric sclerae; pink palpebral conjunctivae; mucosa moist  Resp:  Symmetrical chest rise, no accessory muscle use; good AE Bilat; no rales/ wheezing/ rhonchi noted  CV:  S1, S2 present; RRR; no m/g/r  GI:  Abdomen soft, non-tender; (+) active bowel sounds  Extremities:  +2 pulses on all extremities; no edema/ cyanosis/ clubbing noted  Skin:  Warm; no rashes/ lesions noted  Neurologic:  Non-focal  Devices:  No NGT/OGT, Central line/ PICC, ETT/tracheostomy, chest tube      DATA:     Current Facility-Administered Medications   Medication Dose Route Frequency    0.9% sodium chloride infusion 250 mL  250 mL IntraVENous PRN    vasopressin (VASOSTRICT) 20 Units in 0.9% sodium chloride 100 mL infusion  0.04 Units/min IntraVENous CONTINUOUS    0.9% sodium chloride infusion 250 mL  250 mL IntraVENous PRN    dextrose 5% infusion  100 mL/hr IntraVENous CONTINUOUS    glucose chewable tablet 16 g  4 Tab Oral PRN    glucagon (GLUCAGEN) injection 1 mg  1 mg IntraMUSCular PRN    dextrose 10% infusion 125-250 mL  125-250 mL IntraVENous PRN    levothyroxine (SYNTHROID) tablet 100 mcg  100 mcg Oral 6am    lidocaine 4 % patch 1 Patch  1 Patch TransDERmal Q24H    sodium chloride (NS) flush 5-10 mL  5-10 mL IntraVENous PRN    NOREPINephrine (LEVOPHED) 8 mg in 0.9% NS 250ml infusion  0.5-30 mcg/min IntraVENous TITRATE    aspirin delayed-release tablet 81 mg  81 mg Oral DAILY    cholecalciferol (VITAMIN D3) (1000 Units /25 mcg) tablet 2 Tab  2,000 Units Oral DAILY    pantoprazole (PROTONIX) 40 mg in 0.9% sodium chloride 10 mL injection  40 mg IntraVENous Q12H    atorvastatin (LIPITOR) tablet 40 mg  40 mg Oral QHS    sucralfate (CARAFATE) tablet 1 g  1 g Oral QID    tamsulosin (FLOMAX) capsule 0.4 mg  0.4 mg Oral DAILY    [Held by provider] heparin (porcine) injection 5,000 Units  5,000 Units SubCUTAneous Q8H    piperacillin-tazobactam (ZOSYN) 4.5 g in 0.9% sodium chloride (MBP/ADV) 100 mL MBP ###EXTENDED 4-HOUR INFUSION###  4.5 g IntraVENous Q8H    vancomycin (VANCOCIN) 1,000 mg in 0.9% sodium chloride (MBP/ADV) 250 mL adv  1,000 mg IntraVENous Q24H    VANCOMYCIN INFORMATION NOTE   Other ONCE    VANCOMYCIN INFORMATION NOTE 1 Each  1 Each Other Rx Dosing/Monitoring         Labs: Results:       Chemistry Recent Labs     04/23/20 0145 04/22/20 0447 04/22/20 0218 04/21/20 2038 04/21/20  0636   *  --  79 92 54*   *  --  148* 148* 145   K 4.9 3.9 3.8 3.6 3.4*   *  --  117* 116* 109   CO2 27  --  25 27 34*   BUN 54*  --  37* 38* 45*   CREA 1.85*  --  1.59* 1.49* 1.55*   CA 8.1*  --  8.1* 8.1* 9.3   AGAP 3  --  6 5 2*   BUCR 29*  --  23* 26* 29*   AP  --   --  306*  --  312*   TP  --   --  5.8*  --  6.7   ALB  --   --  2.5*  --  2.9*   GLOB  --   --  3.3  --  3.8   AGRAT  --   --  0.8  --  0.8      CBC w/Diff Recent Labs     04/23/20 0145 04/22/20 0218 04/21/20  0636   WBC 11.8 6.6 6.5   RBC 2.57* 2.98* 3.18*   HGB 8.5* 9.9* 10.5*   HCT 25.6* 29.5* 31.4*    170 160   GRANS 88* 86* 75*   LYMPH 8* 11* 19*   EOS 2 0 2      Coagulation Recent Labs     04/23/20 0145 04/22/20 0218   PTP 16.9* 15.4*   INR 1.4* 1.2   APTT 46.3*  --        Liver Enzymes Recent Labs     04/22/20 0218   TP 5.8*   ALB 2.5*   *   SGOT 79*      ABG No results found for: PH, PHI, PCO2, PCO2I, PO2, PO2I, HCO3, HCO3I, FIO2, FIO2I   Microbiology Recent Labs     04/21/20  0815 04/21/20  0800   CULT NO GROWTH 2 DAYS NO GROWTH 2 DAYS          Telemetry: []Sinus []A-flutter []Paced    []A-fib []Multiple PVCs                  Imaging:    CXR [date]:    CT HEAD/CHEST/ABD/PELVIS [date]:    []I have personally reviewed the patients radiographs  []Radiographs reviewed with radiologist   []No change from prior, tubes and lines in adequate position  []Improved   []Worsening        IMPRESSION:   · Acute Respiratory Failure  ·    RECOMMENDATIONS:   · Resp: Titrate FiO2/ supp O2 for SpO2 >90%;   · I/D: Afebrile; aleukocytosis; Sepsis bundle per hospital protocol, f/u BxCx/UC, Sputum Cx's. Trend LA q4 until normal. ABX:Vanc Zosyn Deescalate ABX once Cx's finalize. · Hem/Onc: Daily CBC; H/H, and plts are stable  · CVS: HD stable; Actively titrate vasopressors aim MAP >28NUIY,T.   · Metabolic: Daily BMP; monitor e-lytes; replace PRN  · Renal: Trend Renal indices; Diuresis, Mojica to BSD,   · Endocrine: POC Glucose q6; Check TSH level  · GI: SUP, Trend LFTs, Zofran PRN for N/V   · Musc/Skin: No acute issues, wound care  · Neuro: PRN pain medications, +/- sedation  · Fluids:  · Discussed in Interdisciplinary Rounds      Best Practices/ Safety Bundles:  · Sepsis Bundle per Hospital Protocol  · CAUTI Bundle  · Electrolyte Replacement Bundle  · Glycemic control goal <180; avoid Hypoglycemia  · IHI ICU Bundles:  ·  [place] Bundle Followed    · Mech Vent patients:   · VAP bundle, Aim to keep peak plateau pressure 49-35TA H2O  · Aspiration Precautions - HOB >30'  · Daily sedation holiday as indicated  · SBT as tolerated/appropriate  · Titrate FiO2 for SpO2 >94%  · Aggressive Pulmonary Hygeiene  · Stress ulcer prophylaxis: Protonix   · DVT prophylaxis: SCD's  · Need for Lines, mojica assessed. · Restraints need. · Palliative care evaluation. none      The patient is: [x] acutely ill Risk of deterioration: [] moderate    [] critically ill  [] high     []See my orders for details    My assessment/plan was discussed with:  [x]Nursing []PT/OT    []Respiratory therapy []   []Family []     MD Yassine Pardo

## 2020-04-23 NOTE — PROGRESS NOTES
Problem: Discharge Planning  Goal: *Discharge to safe environment  Outcome: Progressing Towards Goal     Problem: Falls - Risk of  Goal: *Absence of Falls  Description: Document Prasanth Lazo Fall Risk and appropriate interventions in the flowsheet. Outcome: Progressing Towards Goal  Note: Fall Risk Interventions:  Mobility Interventions: Bed/chair exit alarm    Mentation Interventions: Bed/chair exit alarm, Evaluate medications/consider consulting pharmacy    Medication Interventions: Bed/chair exit alarm    Elimination Interventions: Bed/chair exit alarm, Call light in reach              Problem: Patient Education: Go to Patient Education Activity  Goal: Patient/Family Education  Outcome: Progressing Towards Goal     Problem: Pressure Injury - Risk of  Goal: *Prevention of pressure injury  Description: Document Alex Scale and appropriate interventions in the flowsheet. Outcome: Progressing Towards Goal  Note: Pressure Injury Interventions:  Sensory Interventions: Assess changes in LOC    Moisture Interventions: Absorbent underpads, Apply protective barrier, creams and emollients, Maintain skin hydration (lotion/cream), Moisture barrier    Activity Interventions: Pressure redistribution bed/mattress(bed type)    Mobility Interventions: HOB 30 degrees or less, Float heels, Turn and reposition approx.  every two hours(pillow and wedges)    Nutrition Interventions: Document food/fluid/supplement intake, Discuss nutritional consult with provider    Friction and Shear Interventions: Foam dressings/transparent film/skin sealants, HOB 30 degrees or less                Problem: Patient Education: Go to Patient Education Activity  Goal: Patient/Family Education  Outcome: Progressing Towards Goal     Problem: Altered nutrition  Goal: *Adequate nutrition  Outcome: Progressing Towards Goal     Problem: Pain  Goal: *Control of Pain  Outcome: Progressing Towards Goal  Goal: *PALLIATIVE CARE:  Alleviation of Pain  Outcome: Progressing Towards Goal     Problem: Tissue Perfusion - Cardiopulmonary, Altered  Goal: *Optimize tissue perfusion  Outcome: Progressing Towards Goal  Goal: *Absence of hypoxia  Outcome: Progressing Towards Goal     Problem: Patient Education: Go to Patient Education Activity  Goal: Patient/Family Education  Outcome: Progressing Towards Goal     Problem: Upper and Lower GI Bleed: Day 1  Goal: Off Pathway (Use only if patient is Off Pathway)  Outcome: Progressing Towards Goal  Goal: Activity/Safety  Outcome: Progressing Towards Goal  Goal: Consults, if ordered  Outcome: Progressing Towards Goal  Goal: Diagnostic Test/Procedures  Outcome: Progressing Towards Goal  Goal: Nutrition/Diet  Outcome: Progressing Towards Goal  Goal: Discharge Planning  Outcome: Progressing Towards Goal  Goal: Medications  Outcome: Progressing Towards Goal  Goal: Respiratory  Outcome: Progressing Towards Goal  Goal: Treatments/Interventions/Procedures  Outcome: Progressing Towards Goal  Goal: Psychosocial  Outcome: Progressing Towards Goal  Goal: *Optimal pain control at patient's stated goal  Outcome: Progressing Towards Goal  Goal: *Hemodynamically stable  Outcome: Progressing Towards Goal  Goal: *Demonstrates progressive activity  Outcome: Progressing Towards Goal

## 2020-04-23 NOTE — PROGRESS NOTES
Received call from [facility] hh they are currently active with pt. Posted in edc.

## 2020-04-23 NOTE — PROGRESS NOTES
Critical Care Medicine  Pt contiues to have bloody bowel movments  Transfused earlier today  Still on pressors, but weening Vasopressin  AAOx3 NAD  Bleeding scan

## 2020-04-23 NOTE — CONSULTS
Gastroenterology Consult    Patient: Nahomy Domínguez MRN: [id number]  SSN: [id number]    YOB: 1934  Age: 80 y.o. Sex: male        Assessment:   1. GI bleed:  Mr. Missy Betancur is an 80 yr old male with sepsis and cardiac arrythmia and currently requiring two pressor agents. During admission he had melena overnight with drop in HGB to 8. He reports no other symptoms related and no bleeding prior to admission. He has history of duodenal ulcers and esophagitis on EGD in 2014. Most likely a recurrence. At this point he is too unstable for a sedated endoscopy. I recommend he start on IV protonix. I recommend keeping NPO for now. I recommend avoiding NSAIDS. Will follow clinically and when more stable will plan for endoscopy. 2. Anemia:  He had drop in HGB, most likely multifactorial including GI bleeding. I agree with plan for transfusion of PRBCS. Will follow H/H and transfuse as needed. Plan:   1. Avoid NSAIDs  2. Keep NPO  3. Recommend IV protonix. 4. Currently unstable for sedated endoscopy, will follow clinically and when appropriate will plan for endoscopy. Subjective:      Nahomy Domínguez is a 80 y.o. male who is being seen for GI bleeding. Mr. Missy Betancur was admitted for chest pain and shortness of breath. He was felt to be septic but also found to have arrythmia. He is currently undergoing treatment with broadspectrum antibiotics and requiring pressors. He developed black stools overnight. HGB dropped from 9 to 8.5. No red blood was noted. He reports no nausea or vomiting. He has dysphagia to solids that has been ongoing for months. He reports no difficulty with liquids. He has no odynophagia. He reports no abdominal pain. He has had no melena prior to admission and reports not seeing any red blood. He has had normal bowel movements with no diarrhea or constipation. He reports no NSAID use.   He had EGD in 2014 by Dr. Colin Mata that noted small duodenal ulcers, gastritis and reflux esophagitis with ulcer. Biopsies were negative for H. Pylori, Barretts. He has not had another endoscopy and reports never having a colonoscopy. He is currently being evaluated by cardiology for his arrythmia. He reports no chest pain or shortness of breath currently. He has no other complaints. Past Medical History  HTN  HLP  Peptic ulcer disease    Past Surgical History  Appendectomy  EGD    Family History  No family history of any chronic GI illnexx    Social History  No alcohol or drug use. No smoking.      Medications  Current Facility-Administered Medications   Medication Dose Route Frequency    0.9% sodium chloride infusion 250 mL  250 mL IntraVENous PRN    vasopressin (VASOSTRICT) 20 Units in 0.9% sodium chloride 100 mL infusion  0.04 Units/min IntraVENous CONTINUOUS    0.9% sodium chloride infusion 250 mL  250 mL IntraVENous PRN    dextrose 5 % - 0.45% NaCl infusion  100 mL/hr IntraVENous CONTINUOUS    0.9% sodium chloride infusion 500 mL  500 mL IntraVENous ONCE    glucose chewable tablet 16 g  4 Tab Oral PRN    glucagon (GLUCAGEN) injection 1 mg  1 mg IntraMUSCular PRN    dextrose 10% infusion 125-250 mL  125-250 mL IntraVENous PRN    levothyroxine (SYNTHROID) tablet 100 mcg  100 mcg Oral 6am    lidocaine 4 % patch 1 Patch  1 Patch TransDERmal Q24H    sodium chloride (NS) flush 5-10 mL  5-10 mL IntraVENous PRN    NOREPINephrine (LEVOPHED) 8 mg in 0.9% NS 250ml infusion  0.5-30 mcg/min IntraVENous TITRATE    aspirin delayed-release tablet 81 mg  81 mg Oral DAILY    cholecalciferol (VITAMIN D3) (1000 Units /25 mcg) tablet 2 Tab  2,000 Units Oral DAILY    pantoprazole (PROTONIX) 40 mg in 0.9% sodium chloride 10 mL injection  40 mg IntraVENous Q12H    atorvastatin (LIPITOR) tablet 40 mg  40 mg Oral QHS    sucralfate (CARAFATE) tablet 1 g  1 g Oral QID    tamsulosin (FLOMAX) capsule 0.4 mg  0.4 mg Oral DAILY    [Held by provider] heparin (porcine) injection 5,000 Units  5,000 Units SubCUTAneous Q8H    piperacillin-tazobactam (ZOSYN) 4.5 g in 0.9% sodium chloride (MBP/ADV) 100 mL MBP ###EXTENDED 4-HOUR INFUSION###  4.5 g IntraVENous Q8H    vancomycin (VANCOCIN) 1,000 mg in 0.9% sodium chloride (MBP/ADV) 250 mL adv  1,000 mg IntraVENous Q24H    VANCOMYCIN INFORMATION NOTE   Other ONCE    VANCOMYCIN INFORMATION NOTE 1 Each  1 Each Other Rx Dosing/Monitoring        Hospital Problems  Date Reviewed: 4/6/2020          Codes Class Noted POA    Hypoglycemia ICD-10-CM: E16.2  ICD-9-CM: 251.2  4/21/2020 Unknown        HTN (hypertension) ICD-10-CM: I10  ICD-9-CM: 401.9  4/21/2020 Unknown        Left bundle branch block ICD-10-CM: I44.7  ICD-9-CM: 426.3  4/21/2020 Unknown        Sinoatrial node dysfunction ([facility] ICD-10-CM: I49.5  ICD-9-CM: 427.81  4/21/2020 Unknown        Dysphagia ICD-10-CM: R13.10  ICD-9-CM: 787.20  4/8/2020 Yes        Cardiomyopathy ([facility] [address] ICD-10-CM: I42.9  ICD-9-CM: 425.4  2/20/2020 Yes    Overview Signed 2/20/2020 12:40 AM by Jacy KRAUSE, DO     LVEF 35-40% by previous TTE. Grade II diastolic dysfunction DOS-07-OW: I51.9  ICD-9-CM: 429.9  2/20/2020 Yes    Overview Signed 2/20/2020 12:41 AM by Poppy Justice, DO     Per previous TTE. Hypothyroidism ICD-10-CM: E03.9  ICD-9-CM: 244.9  2/20/2020 Yes        Bradycardia ICD-10-CM: R00.1  ICD-9-CM: 427.89  2/19/2020 Yes        Hypotension ICD-10-CM: I95.9  ICD-9-CM: 458.9  2/19/2020 Yes        * (Principal) Hypothermia ICD-10-CM: T68. XXXA  ICD-9-CM: 991.6  12/23/2019 Unknown        Chronic renal failure, stage 3 (moderate) ([facility] [address] ICD-10-CM: N18.3  ICD-9-CM: 585.3  12/23/2019 Yes        SIRS (systemic inflammatory response syndrome) (HCC) ICD-10-CM: R65.10  ICD-9-CM: 995.90  12/23/2019 Yes            Allergies   Allergen Reactions    Amlodipine Swelling and Itching    Bactrim [Sulfamethoprim Ds] Rash    Lisinopril Other (comments)     Acute renal failure    Ciprofloxacin Rash and Itching       Review of Systems:  A comprehensive review of systems was negative except for that written in the History of Present Illness. Objective:     Physical Exam:  Visit Vitals  /60   Pulse 90   Temp 98.3 °F (36.8 °C)   Resp 12   Ht 5' 7\" (1.702 m)   Wt 68 kg (150 lb)   SpO2 99%   BMI 23.49 kg/m²     General appearance: alert, cooperative, no distress, appears stated age  Lungs: clear to auscultation bilaterally  Heart: regular rate and rhythm, S1, S2 normal, no murmur, click, rub or gallop  Abdomen: soft, non-tender.  Bowel sounds normal. No masses,  no organomegaly    Recent Results (from the past 24 hour(s))   ECHO ADULT FOLLOW-UP OR LIMITED    Collection Time: 04/22/20 12:00 PM   Result Value Ref Range    LV ED Vol A2C 126.5 mL    LV ES Vol A4C 111.4 mL    LV ES Vol BP 90.6 (A) 22 - 58 mL    LVOT Peak Velocity 106.17 cm/s    LVOT Peak Gradient 4.5 mmHg    LVOT VTI 23.48 cm    MV A Keven 112.18 cm/s    MV E Keven 74.33 cm/s    MV E/A 0.66     BP EF 36.4 (A) 55 - 100 %    LV Ejection Fraction MOD 4C 26 %    LV Ejection Fraction MOD 2C 46 %    Inferior Vena [place]ava Diameter Sniffing 1.73 cm    LV ES Vol A2C 68.8 mL    LVES Vol Index BP 50.6 mL/m2    LV ED Vol A4C 151.1 mL    LVED Vol Index BP 79.7 mL/m2    Mitral Valve E Wave Deceleration Time 201.8 ms    Triscuspid Valve Regurgitation Peak Gradient 34.9 mmHg    LV ED Vol .6 67 - 155 ml    TR Max Velocity 295.00 cm/s    LVED Vol Index A4C 84.4 mL/m2    LVED Vol Index A2C 70.7 mL/m2    LVES Vol Index A4C 62.3 mL/m2    LVES Vol Index A2C 38.4 mL/m2    Left Ventricular Outflow Tract Mean Gradient 9.7439223297 mmHg    Left Ventricular Outflow Tract Mean Velocity 3.82297577691 cm/s    Mitral Valve Deceleration [place] 9.8826556535     Left Ventricular Stroke Volume by 2-D Biplane-MOD 62.794284952 mL    Left Ventricular Stroke Volume by 2-D Single Plane- MOD 72.617038489 mL    Left Ventricular Stroke Volume by 2-D Single Plane- MOD 36.170855425 mL NUCLEAR CARDIAC STRESS TEST    Collection Time: 04/22/20  1:33 PM   Result Value Ref Range    Target  bpm    Exercise duration time 00:02:00     Stress Base Systolic  mmHg    Stress Base Diastolic BP 52 mmHg    Post peak HR 90 BPM    Baseline HR 65 BPM    Estimated workload 1.0 METS    Percent HR 67 %    Stress Rate Pressure Product 10,800 BPM*mmHg   METABOLIC PANEL, BASIC    Collection Time: 04/23/20  1:45 AM   Result Value Ref Range    Sodium 153 (H) 136 - 145 mmol/L    Potassium 4.9 3.5 - 5.5 mmol/L    Chloride 123 (H) 100 - 111 mmol/L    CO2 27 21 - 32 mmol/L    Anion gap 3 3.0 - 18 mmol/L    Glucose 125 (H) 74 - 99 mg/dL    BUN 54 (H) 7.0 - 18 MG/DL    Creatinine 1.85 (H) 0.6 - 1.3 MG/DL    BUN/Creatinine ratio 29 (H) 12 - 20      GFR est AA 42 (L) >60 ml/min/1.73m2    GFR est non-AA 35 (L) >60 ml/min/1.73m2    Calcium 8.1 (L) 8.5 - 10.1 MG/DL   CBC WITH AUTOMATED DIFF    Collection Time: 04/23/20  1:45 AM   Result Value Ref Range    WBC 11.8 4.6 - 13.2 K/uL    RBC 2.57 (L) 4.70 - 5.50 M/uL    HGB 8.5 (L) 13.0 - 16.0 g/dL    HCT 25.6 (L) 36.0 - 48.0 %    MCV 99.6 (H) 74.0 - 97.0 FL    MCH 33.1 24.0 - 34.0 PG    MCHC 33.2 31.0 - 37.0 g/dL    RDW 16.2 (H) 11.6 - 14.5 %    PLATELET 090 744 - 848 K/uL    MPV 11.1 9.2 - 11.8 FL    NEUTROPHILS 88 (H) 40 - 73 %    LYMPHOCYTES 8 (L) 21 - 52 %    MONOCYTES 2 (L) 3 - 10 %    EOSINOPHILS 2 0 - 5 %    BASOPHILS 0 0 - 2 %    ABS. NEUTROPHILS 10.4 (H) 1.8 - 8.0 K/UL    ABS. LYMPHOCYTES 0.9 0.9 - 3.6 K/UL    ABS. MONOCYTES 0.3 0.05 - 1.2 K/UL    ABS. EOSINOPHILS 0.2 0.0 - 0.4 K/UL    ABS.  BASOPHILS 0.0 0.0 - 0.1 K/UL    DF AUTOMATED     PTT    Collection Time: 04/23/20  1:45 AM   Result Value Ref Range    aPTT 46.3 (H) 23.0 - 36.4 SEC   PROTHROMBIN TIME + INR    Collection Time: 04/23/20  1:45 AM   Result Value Ref Range    Prothrombin time 16.9 (H) 11.5 - 15.2 sec    INR 1.4 (H) 0.8 - 1.2     TYPE & SCREEN    Collection Time: 04/23/20  1:45 AM   Result Value Ref Range    Crossmatch Expiration 04/26/2020     ABO/Rh(D) A POSITIVE     Antibody screen NEG     CALLED TO:       Mirela Ray, 2600, AT 0911 ON 04/23/2020 BY [facility].     Unit number [id number]     Blood component type [facility]     Unit division 00     Status of unit ISSUED     Crossmatch result Compatible    MAGNESIUM    Collection Time: 04/23/20  1:45 AM   Result Value Ref Range    Magnesium 2.2 1.6 - 2.6 mg/dL         Signed By: Joshua Orlando MD     April 23, 2020

## 2020-04-23 NOTE — PROGRESS NOTES
CARDIOLOGY Progress Note    Patient: Russell Albert  MR #: [id number]          Assessment/Plan:     Hospital Problems  Date Reviewed: 4/6/2020          Codes Class Noted POA    Hypoglycemia ICD-10-CM: E16.2  ICD-9-CM: 251.2  4/21/2020 Unknown        HTN (hypertension) ICD-10-CM: I10  ICD-9-CM: 401.9  4/21/2020 Unknown        Left bundle branch block, patient appears to have further widening of his bundle branch configuration. He is known to have a long first-degree AV block and markedly reduced P wave amplitude. His rhythm has been noteworthy for frequent ectopics and short runs of NSVT. There  also appears to be some intermittent runs of atrial fibrillation. The patient developed some gastrointestinal bleeding overnight. Heparin has been discontinued. Await urine culture report. Garza Pears testing done yesterday demonstrated an inferior and inferoseptal fixed defect more consistent with infarction. There was no significant ongoing ischemia. Will continue present cardiac Rx. ICD-10-CM: I44.7  ICD-9-CM: 426.3  4/21/2020 Unknown        Sinoatrial node dysfunction ([facility] [address] ICD-10-CM: I49.5  ICD-9-CM: 427.81  4/21/2020 Unknown        Dysphagia ICD-10-CM: R13.10  ICD-9-CM: 787.20  4/8/2020 Yes        Cardiomyopathy ([address] ICD-10-CM: I42.9  ICD-9-CM: 425.4  2/20/2020 Yes    Overview Signed 2/20/2020 12:40 AM by Britni KRAUSE DO     LVEF 35-40% by previous TTE. Grade II diastolic dysfunction LRO-59-AF: I51.9  ICD-9-CM: 429.9  2/20/2020 Yes    Overview Signed 2/20/2020 12:41 AM by Gayle Shelton, DO     Per previous TTE. Hypothyroidism ICD-10-CM: E03.9  ICD-9-CM: 244.9  2/20/2020 Yes        Bradycardia ICD-10-CM: R00.1  ICD-9-CM: 427.89  2/19/2020 Yes        Hypotension ICD-10-CM: I95.9  ICD-9-CM: 458.9  2/19/2020 Yes        * (Principal) Hypothermia ICD-10-CM: T68. [place]  ICD-9-CM: 991.6  12/23/2019 Unknown        Chronic renal failure, stage 3 (moderate) ([facility] [address] ICD-10-CM: N18.3  ICD-9-CM: 585.3  12/23/2019 Yes        SIRS (systemic inflammatory response syndrome) ([facility] ICD-10-CM: R65.10  ICD-9-CM: 995.90  12/23/2019 Yes    Gastrointestinal bleeding, GI consulted      Subjective   patient denies chest pain. He has no specific complaints. History of Present Illness  Jazmyne Santana is a 80 y.o. man that presented to the [address] [place] [address] ED early this morning with complaints of chest tightness. The symptoms that the patient was experiencing were similar to a earlier admission to this hospital several weeks ago. At that time he was found to have an elevated TSH. He was also having episodic hypothermia. He was started on thyroid replacement therapy at a low dose of 50 mcg daily. He had some episodic severe bradycardia and so his beta-blocker was discontinued. He seemed to improved with those measures and was discharged for follow-up. On presentation to the ED, the patient was hypotensive. He did not respond well to intravenous fluid resuscitation. He was started on Levophed by Dr. Randy Rodriguez in the ED. His initial serum troponin was 0.03. TSH remains elevated at 4.64 compared to the 7.65 on 4/6/2020 when the patient was started on Synthroid. At present, patient denies chest discomfort.    .    Past Medical History  Past Medical History:   Diagnosis Date    Difficulty urinating     Elevated PSA     Hematuria, unspecified     Hypercholesterolemia     Hypertension     Incomplete bladder emptying     Urinary retention     UTI (urinary tract infection)          Meds  Current Facility-Administered Medications   Medication Dose Route Frequency    0.9% sodium chloride infusion 250 mL  250 mL IntraVENous PRN    vasopressin (VASOSTRICT) 20 Units in 0.9% sodium chloride 100 mL infusion  0.04 Units/min IntraVENous CONTINUOUS    0.9% sodium chloride infusion 250 mL  250 mL IntraVENous PRN    dextrose 5 % - 0.45% NaCl infusion  100 mL/hr IntraVENous CONTINUOUS    glucose chewable tablet 16 g  4 Tab Oral PRN    glucagon (GLUCAGEN) injection 1 mg  1 mg IntraMUSCular PRN    dextrose 10% infusion 125-250 mL  125-250 mL IntraVENous PRN    levothyroxine (SYNTHROID) tablet 100 mcg  100 mcg Oral 6am    lidocaine 4 % patch 1 Patch  1 Patch TransDERmal Q24H    sodium chloride (NS) flush 5-10 mL  5-10 mL IntraVENous PRN    NOREPINephrine (LEVOPHED) 8 mg in 0.9% NS 250ml infusion  0.5-30 mcg/min IntraVENous TITRATE    aspirin delayed-release tablet 81 mg  81 mg Oral DAILY    cholecalciferol (VITAMIN D3) (1000 Units /25 mcg) tablet 2 Tab  2,000 Units Oral DAILY    pantoprazole (PROTONIX) 40 mg in 0.9% sodium chloride 10 mL injection  40 mg IntraVENous Q12H    atorvastatin (LIPITOR) tablet 40 mg  40 mg Oral QHS    sucralfate (CARAFATE) tablet 1 g  1 g Oral QID    tamsulosin (FLOMAX) capsule 0.4 mg  0.4 mg Oral DAILY    [Held by provider] heparin (porcine) injection 5,000 Units  5,000 Units SubCUTAneous Q8H    piperacillin-tazobactam (ZOSYN) 4.5 g in 0.9% sodium chloride (MBP/ADV) 100 mL MBP ###EXTENDED 4-HOUR INFUSION###  4.5 g IntraVENous Q8H    vancomycin (VANCOCIN) 1,000 mg in 0.9% sodium chloride (MBP/ADV) 250 mL adv  1,000 mg IntraVENous Q24H    VANCOMYCIN INFORMATION NOTE   Other ONCE    VANCOMYCIN INFORMATION NOTE 1 Each  1 Each Other Rx Dosing/Monitoring         Allergies  Allergies   Allergen Reactions    Amlodipine Swelling and Itching    Bactrim [Sulfamethoprim Ds] Rash    Lisinopril Other (comments)     Acute renal failure    Ciprofloxacin Rash and Itching           Family History     Family History   Problem Relation Age of Onset    Cancer Father     Alzheimer Mother     Heart defect Brother          Review of systems    Unobtainable at this time      Physical Exam  Visit Vitals  /60   Pulse 90   Temp 98.3 °F (36.8 °C)   Resp 12   Ht 5' 7\" (1.702 m)   Wt 68 kg (150 lb)   SpO2 99%   BMI 23.49 kg/m²       Gerianne Nurse is who is asleep but awakens easily . Head is normocephalic and atraumatic. Trachea appears midline with no noted thyromegaly. Carotids are full without definite bruits. There is no JVD. Chest is clear to auscultation bilaterally. Cardiac auscultation reveals regular rate and rhythm with ectopic beats without significant murmur. Abdomen is soft and nontender. Extremities are without significant edema. Dorsalis pedis and posterior tibial pulses are  palpable. . Skin is warm and dry.      Diagnostic Tests  Labs:   Recent Labs     04/23/20 0145 04/22/20 0218 04/21/20  0636   WBC 11.8 6.6 6.5   HGB 8.5* 9.9* 10.5*   HCT 25.6* 29.5* 31.4*    170 160     Recent Labs     04/23/20 0145 04/22/20  0447 04/22/20 0218 04/21/20 2038 04/21/20  1105 04/21/20  0636   *  --  148* 148*  --  145   K 4.9 3.9 3.8 3.6  --  3.4*   *  --  117* 116*  --  109   CO2 27  --  25 27  --  34*   *  --  79 92  --  54*   BUN 54*  --  37* 38*  --  45*   CREA 1.85*  --  1.59* 1.49*  --  1.55*   CA 8.1*  --  8.1* 8.1*  --  9.3   MG 2.2  --  2.4  --  2.0  --    PHOS  --   --  4.0  --   --   --    ALB  --   --  2.5*  --   --  2.9*   SGOT  --   --  79*  --   --  113*   ALT  --   --  91*  --   --  121*   INR 1.4*  --  1.2  --   --   --        Recent Labs     04/22/20 0218 04/21/20 2038 04/21/20  1710 04/21/20  1105   TROIQ 0.08* 0.05* 0.04 0.03   CPK  --   --   --  60   CKMB  --   --   --  7.3*     Last Lipid:  No results found for: CHOL, CHOLX, CHLST, CHOLV, HDL, HDLP, LDL, LDLC, DLDLP, Cathryn Siu, HD, CHHDX            Rina Garza MD  4/23/2020

## 2020-04-23 NOTE — PROGRESS NOTES
Pharmacy Dosing Services: Vancomycin    Indication: Bloodstream Infection    Day of therapy: 3    Other Antimicrobials (Include dose, start day & day of therapy):    Piperacillin/Tazobactam 4.5g x1 then 4.5g KVNG (start ; pharmacy consulted for Zosyn dosing as well)  Current Antimicrobial Therapy (168h ago, onward)      Ordered     Start Stop    20 1632  VANCOMYCIN INFORMATION NOTE  Other,   ONCE      20 1600 20 0359    20 1632  vancomycin (VANCOCIN) 1,000 mg in 0.9% sodium chloride (MBP/ADV) 250 mL adv  1,000 mg,   IntraVENous,   EVERY 24 HOURS      20 1700 --    20 1543  piperacillin-tazobactam (ZOSYN) 4.5 g in 0.9% sodium chloride (MBP/ADV) 100 mL MBP ###EXTENDED 4-HOUR INFUSION###  4.5 g,   IntraVENous,   EVERY 8 HOURS      20 2000 --    20 1632  VANCOMYCIN INFORMATION NOTE 1 Each  1 Each,   Other,   RX DOSING/MONITORING      20 1628 --    20 1524  vancomycin (VANCOCIN) 1,750 mg in 0.9% sodium chloride 500 mL IVPB  1,750 mg,   IntraVENous,   ONCE      20 1600 20 0359    20 1543  piperacillin-tazobactam (ZOSYN) 4.5 g in 0.9% sodium chloride (MBP/ADV) 100 mL MBP  4.5 g,   IntraVENous,   ONCE      20 1600 20 0359            Loading dose (date given): 1750 mg  @1700  Current Maintenance dose: 1000 mg IV every 24 hours    Goal Vancomycin Level: Vancomycin Trough: 15 - 20 mcg/mL (most infections)    Vancomycin Level (if drawn):   Recent Labs     20  1617   VANCT 16.9         Pt Weight Weight: 68 kg (150 lb)   Temperature Temp: 97.1 °F (36.2 °C)   Temp (72hrs), Av °F (36.1 °C), Min:34.7 °F (1.5 °C), Max:99.2 °F (37.3 °C)     HR Pulse (Heart Rate): 98   BP BP: 113/48     Recent Labs     20  1617 20  0145 20  0218  20  0636   CREA 2.13* 1.85* 1.59*   < > 1.55*   BUN 55* 54* 37*   < > 45*   WBC  --  11.8 6.6  --  6.5    < > = values in this interval not displayed.      Estimated Creatinine Clearance Estimated Creatinine Clearance: 23.7 mL/min (A) (based on SCr of 2.13 mg/dL (H)). Estimated Creatinine Clearance (using IBW): 23.7 mL/min      CAPD, Hemodialysis or Renal Replacement Therapy: N/A  Renal function stable? (unstable defined as SCr increase of 0.5 mg/dL or > 50% increase from baseline, whichever is greater) (Y/N): y    Significant Cultures:   Results       Procedure Component Value Units Date/Time    CULTURE, URINE [[id number]] Collected:  04/22/20 0920    Order Status:  Completed Specimen:  Urine from [place] Specimen Updated:  04/23/20 1912     Special Requests: NO SPECIAL REQUESTS        Culture result: No growth (<1,000 CFU/ML)       CULTURE, BLOOD [[id number]] Collected:  04/21/20 0815    Order Status:  Completed Specimen:  Blood Updated:  04/23/20 0701     Special Requests: PERIPHERAL        Culture result: NO GROWTH 2 DAYS       CULTURE, BLOOD [[id number]] Collected:  04/21/20 0800    Order Status:  Completed Specimen:  Blood Updated:  04/23/20 0701     Special Requests: PERIPHERAL        Culture result: NO GROWTH 2 DAYS                 Regimen assessment: Trough before 3rd dose came back at 16.9. Extrapolated level can be expected to be ~ 22.5. Decreased dose to from 1000 mg q24h to 750 mg q24h  - Renal function has declined some since admission, will continue to monitor and adjust doses accordingly.  - NGTD for cultures, patient afebrile  Maintenance dose: 750 mg  IV every 24 hours  Next scheduled level: 4/25 at 1730        Pharmacy will follow daily and adjust medications as appropriate for renal function and/or serum levels.     Thank you,  Jennifer Sy, [address]  Clinical Pharmacist  [phone]

## 2020-04-23 NOTE — PROGRESS NOTES
Patient had a nuclear stress test completed yesterday (4/22). Residual imaging material from nuclear stress test was still remaining in the bowel. Acquired a static image and sent to Dr. Gabriella Echavarria at [facility]. He reviewed image and it was decided that too much imaging material from nuclear stress test was remaining in the bowel for a GI bleed scan to be completed at this time. Karie Zavala, pt RN, informed ordering MD, Dr. Kaylin Murillo.

## 2020-04-23 NOTE — PROGRESS NOTES
1930 Bedside and Verbal shift change report given to Thanh Toledo (oncoming nurse) by Kaley Zamora RN   (offgoing nurse). Report included the following information SBAR, Kardex, Intake/Output, MAR, Recent Results and Cardiac Rhythm SR/BBB.

## 2020-04-23 NOTE — PROGRESS NOTES
Internal Medicine Progress Note        NAME: Braulio Tomlin   :  1934  MRM:  [id number]    Date/Time: 2020        ASSESSMENT/PLAN:     #  SIRS. Hypothermia , Hypotension and bradycardia. Unclear source of infection. Sepsis work-up started in the emergency room. Treated with Abx vanco and zosyn. Follow cultures progress. Appreciate PCCM input. Warming blankets Used  Needed iv pressors    # GIB presented as Melena , anemia. GI consulted. NPO. PI. IVF dc anticoagulation    # Hypoglycemia. Monitor blood sugar and treat accordingly    #  Dysrhythmia , heart block (LBBB ,long first degree and wide QRS), ho   Sinoatrial node dysfunction  and mild elevation of trop level. Stabilized initially in ICU. Iv pressors as needed   Cardiac stress test  History of  Cardiomyopathy  LVEF 35-40% by previous TTE and Grade II diastolic dysfunction. short runs of NSVT. Lexiscan Myoview testing with inferior and inferoseptal fixed defect more consistent with infarction,  no significant ongoing ischemia. #  Chronic renal failure, stage 3 (moderate) ([address] (2019). Monitor Renal function and other labs as indicated. Avoid nephrotoxins , iv Contrast, NSAID. Renally dosing medications. Monitor urine out put. # Hypernatremia. Change IVF to D5. # ho   Hypothyroidism . On replacement.  Checked TSH , T4     #  Dysphagia (2020)     #   HTN (hypertension) (2020)      -DVT prophylaxis : Heparin.   - Code Status : Full        Lab Review:     Recent Labs     20  0145 20  0218 20  0636   WBC 11.8 6.6 6.5   HGB 8.5* 9.9* 10.5*   HCT 25.6* 29.5* 31.4*    170 160     Recent Labs     20  0145 20  0447 20  0218 20  2038 20  1105 20  0636   *  --  148* 148*  --  145   K 4.9 3.9 3.8 3.6  --  3.4*   *  --  117* 116*  --  109   CO2 27  --  25 27  --  34*   *  --  79 92  --  54*   BUN 54*  --  37* 38*  --  45*   CREA 1.85*  -- 1.59* 1.49*  --  1.55*   CA 8.1*  --  8.1* 8.1*  --  9.3   MG 2.2  --  2.4  --  2.0  --    PHOS  --   --  4.0  --   --   --    ALB  --   --  2.5*  --   --  2.9*   TBILI  --   --  0.3  --   --  0.3   SGOT  --   --  79*  --   --  113*   ALT  --   --  91*  --   --  121*   INR 1.4*  --  1.2  --   --   --      Lab Results   Component Value Date/Time    Glucose (POC) 185 (H) 04/23/2020 11:48 AM    Glucose (POC) 170 (H) 04/22/2020 07:08 AM    Glucose (POC) 69 (L) 04/22/2020 05:57 AM    Glucose (POC) 97 04/21/2020 02:41 PM    Glucose (POC) 167 (H) 04/21/2020 08:07 AM    Glucose (POC) 96 10/10/2014 09:28 PM    Glucose (POC) 186 (H) 10/10/2014 03:48 PM     No results for input(s): PH, PCO2, PO2, HCO3, FIO2 in the last 72 hours. Recent Labs     04/23/20  0145 04/22/20  0218   INR 1.4* 1.2       Lab Results   Component Value Date/Time    Specimen Description: [facility] 02/06/2014 05:45 PM     Lab Results   Component Value Date/Time    Culture result: NO GROWTH 2 DAYS 04/21/2020 08:15 AM    Culture result: NO GROWTH 2 DAYS 04/21/2020 08:00 AM    Culture result: NO GROWTH 6 DAYS 04/06/2020 04:40 PM       All Cardiac Markers in the last 24 hours:   No results found for: CPK, CK, CKMMB, CKMB, RCK3, CKMBT, CKNDX, CKND1, TELLY, TROPT, TROIQ, JOSE, TROPT, TNIPOC, BNP, BNPP        Intervals noted      Subjective:     Chief Complaint:      Poor sleep last night  Deny pains  Nurse report periods of confusion. ROS:  (bold if positive,otherwise negative)    Fever/chills ,  Dysuria   Cough , Sputum , SOB/HERRMANN , Chest Pain     Diarrhea ,Nausea/Vomit , Abd Pain , Constipation           Objective:     Vitals:  Last 24hrs VS reviewed since prior progress note.  Most recent are:    Visit Vitals  /67   Pulse 94   Temp 97.6 °F (36.4 °C)   Resp 10   Ht 5' 7\" (1.702 m)   Wt 68 kg (150 lb)   SpO2 100%   BMI 23.49 kg/m²     SpO2 Readings from Last 6 Encounters:   04/23/20 100%   04/09/20 98%   02/21/20 92%   01/02/20 96%   12/16/19 97% 09/23/18 99%            Intake/Output Summary (Last 24 hours) at 4/23/2020 1302  Last data filed at 4/23/2020 1248  Gross per 24 hour   Intake 2516.99 ml   Output 830 ml   Net 1686.99 ml          Physical Exam:   Gen: looks much better, more alert and comfortable, talking, chatting ,  in no acute distress. Appear stated age, Well-developed   HEENT:  Head atraumatic, normocephalic ,  moist mucous membranes. Neck:   Trachea midline , No apparent JVD, Supple, without masses, thyroid non-tender  Resp:  No accessory muscle use,Bilateral BS present, clear breath sounds without wheezes rales or rhonchi  Card:  normal S1, S2 without Gallop . Mild lower leg peripheral edema. Abd:  Soft, non-tender, non-distended, bowel sounds are present . [place]:  No cyanosis or clubbing. Skin:  No rashes or ulcers, skin turgor is good. Neuro:  Cranial nerves are grossly intact, no clear area of focal motor weakness, follows commands appropriately. Psych:  Good insight, oriented to person, place and time, alert.        Telemetry reviewed:   normal sinus rhythm    Medications Reviewed: (see below)    Lab Data Reviewed: (see below)    ______________________________________________________________________    Medications:     Current Facility-Administered Medications   Medication Dose Route Frequency    0.9% sodium chloride infusion 250 mL  250 mL IntraVENous PRN    vasopressin (VASOSTRICT) 20 Units in 0.9% sodium chloride 100 mL infusion  0.04 Units/min IntraVENous CONTINUOUS    0.9% sodium chloride infusion 250 mL  250 mL IntraVENous PRN    dextrose 5% infusion  100 mL/hr IntraVENous CONTINUOUS    glucose chewable tablet 16 g  4 Tab Oral PRN    glucagon (GLUCAGEN) injection 1 mg  1 mg IntraMUSCular PRN    dextrose 10% infusion 125-250 mL  125-250 mL IntraVENous PRN    levothyroxine (SYNTHROID) tablet 100 mcg  100 mcg Oral 6am    lidocaine 4 % patch 1 Patch  1 Patch TransDERmal Q24H    sodium chloride (NS) flush 5-10 mL 5-10 mL IntraVENous PRN    NOREPINephrine (LEVOPHED) 8 mg in 0.9% NS 250ml infusion  0.5-30 mcg/min IntraVENous TITRATE    aspirin delayed-release tablet 81 mg  81 mg Oral DAILY    cholecalciferol (VITAMIN D3) (1000 Units /25 mcg) tablet 2 Tab  2,000 Units Oral DAILY    pantoprazole (PROTONIX) 40 mg in 0.9% sodium chloride 10 mL injection  40 mg IntraVENous Q12H    atorvastatin (LIPITOR) tablet 40 mg  40 mg Oral QHS    sucralfate (CARAFATE) tablet 1 g  1 g Oral QID    tamsulosin (FLOMAX) capsule 0.4 mg  0.4 mg Oral DAILY    [Held by provider] heparin (porcine) injection 5,000 Units  5,000 Units SubCUTAneous Q8H    piperacillin-tazobactam (ZOSYN) 4.5 g in 0.9% sodium chloride (MBP/ADV) 100 mL MBP ###EXTENDED 4-HOUR INFUSION###  4.5 g IntraVENous Q8H    vancomycin (VANCOCIN) 1,000 mg in 0.9% sodium chloride (MBP/ADV) 250 mL adv  1,000 mg IntraVENous Q24H    VANCOMYCIN INFORMATION NOTE   Other ONCE    VANCOMYCIN INFORMATION NOTE 1 Each  1 Each Other Rx Dosing/Monitoring          Total time spent with patient: 35 minutes                  Care Plan discussed with: Patient, Nursing Staff, Consultant/Specialist and >50% of time spent in counseling and coordination of care    Discussed:  Care Plan    Prophylaxis:  Hep SQ    Disposition:  Home w/Family           This document in whole or part of it has been produced using voice recognition software. Unrecognized errors in transcription may be present.     Attending Physician: Kirit Drake MD

## 2020-04-24 LAB
ABO + RH BLD: NORMAL
ANION GAP SERPL CALC-SCNC: 6 MMOL/L (ref 3–18)
APPEARANCE UR: ABNORMAL
BACTERIA URNS QL MICRO: ABNORMAL /HPF
BASOPHILS # BLD: 0 K/UL (ref 0–0.1)
BASOPHILS NFR BLD: 0 % (ref 0–2)
BILIRUB UR QL: NEGATIVE
BLD PROD TYP BPU: NORMAL
BLD PROD TYP BPU: NORMAL
BLOOD GROUP ANTIBODIES SERPL: NORMAL
BPU ID: NORMAL
BPU ID: NORMAL
BUN SERPL-MCNC: 51 MG/DL (ref 7–18)
BUN/CREAT SERPL: 27 (ref 12–20)
CALCIUM SERPL-MCNC: 8 MG/DL (ref 8.5–10.1)
CALLED TO:,BCALL1: NORMAL
CHLORIDE SERPL-SCNC: 124 MMOL/L (ref 100–111)
CO2 SERPL-SCNC: 23 MMOL/L (ref 21–32)
COLOR UR: YELLOW
CREAT SERPL-MCNC: 1.89 MG/DL (ref 0.6–1.3)
CROSSMATCH RESULT,%XM: NORMAL
CROSSMATCH RESULT,%XM: NORMAL
DIFFERENTIAL METHOD BLD: ABNORMAL
EOSINOPHIL # BLD: 0.1 K/UL (ref 0–0.4)
EOSINOPHIL NFR BLD: 1 % (ref 0–5)
EPITH CASTS URNS QL MICRO: ABNORMAL /LPF (ref 0–5)
ERYTHROCYTE [DISTWIDTH] IN BLOOD BY AUTOMATED COUNT: 17.1 % (ref 11.6–14.5)
GLUCOSE BLD STRIP.AUTO-MCNC: 146 MG/DL (ref 70–110)
GLUCOSE SERPL-MCNC: 134 MG/DL (ref 74–99)
GLUCOSE UR STRIP.AUTO-MCNC: NEGATIVE MG/DL
HCT VFR BLD AUTO: 24.3 % (ref 36–48)
HCT VFR BLD AUTO: 26.1 % (ref 36–48)
HGB BLD-MCNC: 8.2 G/DL (ref 13–16)
HGB BLD-MCNC: 8.8 G/DL (ref 13–16)
HGB UR QL STRIP: ABNORMAL
KETONES UR QL STRIP.AUTO: NEGATIVE MG/DL
LEUKOCYTE ESTERASE UR QL STRIP.AUTO: ABNORMAL
LYMPHOCYTES # BLD: 0.7 K/UL (ref 0.9–3.6)
LYMPHOCYTES NFR BLD: 5 % (ref 21–52)
MCH RBC QN AUTO: 31.5 PG (ref 24–34)
MCHC RBC AUTO-ENTMCNC: 33.7 G/DL (ref 31–37)
MCV RBC AUTO: 93.5 FL (ref 74–97)
MONOCYTES # BLD: 0.4 K/UL (ref 0.05–1.2)
MONOCYTES NFR BLD: 3 % (ref 3–10)
NEUTS SEG # BLD: 12.3 K/UL (ref 1.8–8)
NEUTS SEG NFR BLD: 91 % (ref 40–73)
NITRITE UR QL STRIP.AUTO: NEGATIVE
PH UR STRIP: 5 [PH] (ref 5–8)
PLATELET # BLD AUTO: 133 K/UL (ref 135–420)
PMV BLD AUTO: 10.9 FL (ref 9.2–11.8)
POTASSIUM SERPL-SCNC: 3.7 MMOL/L (ref 3.5–5.5)
PROT UR STRIP-MCNC: 30 MG/DL
RBC # BLD AUTO: 2.79 M/UL (ref 4.7–5.5)
RBC #/AREA URNS HPF: ABNORMAL /HPF (ref 0–5)
SODIUM SERPL-SCNC: 153 MMOL/L (ref 136–145)
SP GR UR REFRACTOMETRY: 1.02 (ref 1–1.03)
SPECIMEN EXP DATE BLD: NORMAL
STATUS OF UNIT,%ST: NORMAL
STATUS OF UNIT,%ST: NORMAL
UNIT DIVISION, %UDIV: 0
UNIT DIVISION, %UDIV: 0
UROBILINOGEN UR QL STRIP.AUTO: 0.2 EU/DL (ref 0.2–1)
WBC # BLD AUTO: 13.5 K/UL (ref 4.6–13.2)
WBC URNS QL MICRO: ABNORMAL /HPF (ref 0–4)

## 2020-04-24 PROCEDURE — 74011000250 HC RX REV CODE- 250: Performed by: HOSPITALIST

## 2020-04-24 PROCEDURE — 74011000250 HC RX REV CODE- 250: Performed by: INTERNAL MEDICINE

## 2020-04-24 PROCEDURE — 74011250636 HC RX REV CODE- 250/636: Performed by: INTERNAL MEDICINE

## 2020-04-24 PROCEDURE — 81001 URINALYSIS AUTO W/SCOPE: CPT

## 2020-04-24 PROCEDURE — 74011000258 HC RX REV CODE- 258: Performed by: INTERNAL MEDICINE

## 2020-04-24 PROCEDURE — 74011250636 HC RX REV CODE- 250/636: Performed by: HOSPITALIST

## 2020-04-24 PROCEDURE — 85018 HEMOGLOBIN: CPT

## 2020-04-24 PROCEDURE — 74011000258 HC RX REV CODE- 258: Performed by: PHYSICIAN ASSISTANT

## 2020-04-24 PROCEDURE — 74011250636 HC RX REV CODE- 250/636: Performed by: PHYSICIAN ASSISTANT

## 2020-04-24 PROCEDURE — 65610000006 HC RM INTENSIVE CARE

## 2020-04-24 PROCEDURE — 74011000258 HC RX REV CODE- 258

## 2020-04-24 PROCEDURE — 80048 BASIC METABOLIC PNL TOTAL CA: CPT

## 2020-04-24 PROCEDURE — 82962 GLUCOSE BLOOD TEST: CPT

## 2020-04-24 PROCEDURE — C9113 INJ PANTOPRAZOLE SODIUM, VIA: HCPCS | Performed by: INTERNAL MEDICINE

## 2020-04-24 PROCEDURE — 92610 EVALUATE SWALLOWING FUNCTION: CPT

## 2020-04-24 PROCEDURE — 30233N1 TRANSFUSION OF NONAUTOLOGOUS RED BLOOD CELLS INTO PERIPHERAL VEIN, PERCUTANEOUS APPROACH: ICD-10-PCS | Performed by: PHYSICIAN ASSISTANT

## 2020-04-24 PROCEDURE — 85025 COMPLETE CBC W/AUTO DIFF WBC: CPT

## 2020-04-24 RX ORDER — DEXTROSE MONOHYDRATE 50 MG/ML
100 INJECTION, SOLUTION INTRAVENOUS CONTINUOUS
Status: DISCONTINUED | OUTPATIENT
Start: 2020-04-24 | End: 2020-04-24

## 2020-04-24 RX ORDER — DEXTROSE MONOHYDRATE AND SODIUM CHLORIDE 5; .45 G/100ML; G/100ML
100 INJECTION, SOLUTION INTRAVENOUS CONTINUOUS
Status: DISCONTINUED | OUTPATIENT
Start: 2020-04-24 | End: 2020-04-24

## 2020-04-24 RX ORDER — HALOPERIDOL 5 MG/ML
2 INJECTION INTRAMUSCULAR
Status: DISCONTINUED | OUTPATIENT
Start: 2020-04-24 | End: 2020-04-24

## 2020-04-24 RX ORDER — HALOPERIDOL 5 MG/ML
INJECTION INTRAMUSCULAR
Status: DISPENSED
Start: 2020-04-24 | End: 2020-04-25

## 2020-04-24 RX ORDER — DEXTROSE MONOHYDRATE AND SODIUM CHLORIDE 5; .45 G/100ML; G/100ML
100 INJECTION, SOLUTION INTRAVENOUS CONTINUOUS
Status: DISCONTINUED | OUTPATIENT
Start: 2020-04-24 | End: 2020-04-25

## 2020-04-24 RX ORDER — HALOPERIDOL 5 MG/ML
2 INJECTION INTRAMUSCULAR
Status: DISCONTINUED | OUTPATIENT
Start: 2020-04-24 | End: 2020-05-11 | Stop reason: HOSPADM

## 2020-04-24 RX ORDER — SODIUM CHLORIDE 9 MG/ML
500 INJECTION, SOLUTION INTRAVENOUS ONCE
Status: COMPLETED | OUTPATIENT
Start: 2020-04-24 | End: 2020-04-24

## 2020-04-24 RX ORDER — POTASSIUM CHLORIDE 7.45 MG/ML
10 INJECTION INTRAVENOUS ONCE
Status: COMPLETED | OUTPATIENT
Start: 2020-04-24 | End: 2020-04-24

## 2020-04-24 RX ADMIN — PIPERACILLIN SODIUM AND TAZOBACTAM SODIUM 4.5 G: 4; .5 INJECTION, POWDER, LYOPHILIZED, FOR SOLUTION INTRAVENOUS at 14:39

## 2020-04-24 RX ADMIN — POTASSIUM CHLORIDE 10 MEQ: 7.46 INJECTION, SOLUTION INTRAVENOUS at 06:16

## 2020-04-24 RX ADMIN — SODIUM CHLORIDE 40 MG: 9 INJECTION, SOLUTION INTRAMUSCULAR; INTRAVENOUS; SUBCUTANEOUS at 09:30

## 2020-04-24 RX ADMIN — DEXTROSE MONOHYDRATE 100 ML/HR: 5 INJECTION, SOLUTION INTRAVENOUS at 00:00

## 2020-04-24 RX ADMIN — NOREPINEPHRINE BITARTRATE 2 MCG/MIN: 1 INJECTION INTRAVENOUS at 13:45

## 2020-04-24 RX ADMIN — DEXTROSE MONOHYDRATE AND SODIUM CHLORIDE 100 ML/HR: 5; .45 INJECTION, SOLUTION INTRAVENOUS at 15:14

## 2020-04-24 RX ADMIN — DEXTROSE MONOHYDRATE AND SODIUM CHLORIDE 100 ML/HR: 5; .45 INJECTION, SOLUTION INTRAVENOUS at 10:40

## 2020-04-24 RX ADMIN — DEXTROSE MONOHYDRATE AND SODIUM CHLORIDE 100 ML/HR: 5; .45 INJECTION, SOLUTION INTRAVENOUS at 21:31

## 2020-04-24 RX ADMIN — PIPERACILLIN SODIUM AND TAZOBACTAM SODIUM 4.5 G: 4; .5 INJECTION, POWDER, LYOPHILIZED, FOR SOLUTION INTRAVENOUS at 06:16

## 2020-04-24 RX ADMIN — SODIUM CHLORIDE 40 MG: 9 INJECTION, SOLUTION INTRAMUSCULAR; INTRAVENOUS; SUBCUTANEOUS at 21:36

## 2020-04-24 RX ADMIN — PIPERACILLIN SODIUM AND TAZOBACTAM SODIUM 4.5 G: 4; .5 INJECTION, POWDER, LYOPHILIZED, FOR SOLUTION INTRAVENOUS at 21:40

## 2020-04-24 RX ADMIN — SODIUM CHLORIDE 500 ML: 900 INJECTION, SOLUTION INTRAVENOUS at 09:30

## 2020-04-24 RX ADMIN — HALOPERIDOL LACTATE 2 MG: 5 INJECTION INTRAMUSCULAR at 12:42

## 2020-04-24 RX ADMIN — DEXTROSE MONOHYDRATE 100 ML/HR: 5 INJECTION, SOLUTION INTRAVENOUS at 09:33

## 2020-04-24 RX ADMIN — DEXTROSE MONOHYDRATE 100 ML/HR: 5 INJECTION, SOLUTION INTRAVENOUS at 07:24

## 2020-04-24 NOTE — PROGRESS NOTES
Physical Exam  Skin:     General: Skin is warm and dry. Capillary Refill: Capillary refill takes less than 2 seconds. Primary Nurse Kerri Bowman RN and Joe See RN performed a dual skin assessment on this patient No impairment noted Alex score is 12.

## 2020-04-24 NOTE — PROGRESS NOTES
Problem: Falls - Risk of  Goal: *Absence of Falls  Description: Document Pacheco Clemens Fall Risk and appropriate interventions in the flowsheet. 4/24/2020 0917 by Amie Greco RN  Outcome: Progressing Towards Goal  Note: Fall Risk Interventions:  Mobility Interventions: Bed/chair exit alarm    Mentation Interventions: Bed/chair exit alarm, Door open when patient unattended    Medication Interventions: Bed/chair exit alarm, Evaluate medications/consider consulting pharmacy    Elimination Interventions: Bed/chair exit alarm, Call light in reach           4/24/2020 0916 by Amie Greco RN  Outcome: Progressing Towards Goal  Note: Fall Risk Interventions:  Mobility Interventions: Bed/chair exit alarm    Mentation Interventions: Bed/chair exit alarm, Door open when patient unattended    Medication Interventions: Bed/chair exit alarm, Evaluate medications/consider consulting pharmacy    Elimination Interventions: Bed/chair exit alarm, Call light in reach              Problem: Patient Education: Go to Patient Education Activity  Goal: Patient/Family Education  4/24/2020 0917 by Amie Greco RN  Outcome: Progressing Towards Goal  4/24/2020 0916 by Amie Greco RN  Outcome: Progressing Towards Goal     Problem: Pressure Injury - Risk of  Goal: *Prevention of pressure injury  Description: Document Alex Scale and appropriate interventions in the flowsheet.   4/24/2020 0917 by Amie Greco RN  Outcome: Progressing Towards Goal  Note: Pressure Injury Interventions:  Sensory Interventions: Assess changes in LOC, Assess need for specialty bed    Moisture Interventions: Absorbent underpads, Apply protective barrier, creams and emollients    Activity Interventions: Pressure redistribution bed/mattress(bed type)    Mobility Interventions: HOB 30 degrees or less, Pressure redistribution bed/mattress (bed type)    Nutrition Interventions: Document food/fluid/supplement intake    Friction and Shear Interventions: HOB 30 degrees or less             4/24/2020 0916 by Yosef Mendez RN  Outcome: Progressing Towards Goal  Note: Pressure Injury Interventions:  Sensory Interventions: Assess changes in LOC, Assess need for specialty bed    Moisture Interventions: Absorbent underpads, Apply protective barrier, creams and emollients    Activity Interventions: Pressure redistribution bed/mattress(bed type)    Mobility Interventions: HOB 30 degrees or less, Pressure redistribution bed/mattress (bed type)    Nutrition Interventions: Document food/fluid/supplement intake    Friction and Shear Interventions: HOB 30 degrees or less                Problem: Patient Education: Go to Patient Education Activity  Goal: Patient/Family Education  4/24/2020 0917 by Yosef Mendez RN  Outcome: Progressing Towards Goal  4/24/2020 0916 by Yosef Mendez RN  Outcome: Progressing Towards Goal     Problem: Altered nutrition  Goal: *Adequate nutrition  Outcome: Progressing Towards Goal     Problem: Pain  Goal: *Control of Pain  4/24/2020 0917 by Yosef Mendez RN  Outcome: Progressing Towards Goal  4/24/2020 0916 by Yosef Mendez RN  Outcome: Progressing Towards Goal  Goal: *PALLIATIVE CARE:  Alleviation of Pain  4/24/2020 0917 by Yosef Mendez RN  Outcome: Progressing Towards Goal  4/24/2020 0916 by Yosef Mendez RN  Outcome: Progressing Towards Goal     Problem: Tissue Perfusion - Cardiopulmonary, Altered  Goal: *Optimize tissue perfusion  4/24/2020 0917 by Yosef Mendez RN  Outcome: Progressing Towards Goal  4/24/2020 0916 by Yosef Mendez RN  Outcome: Progressing Towards Goal  Goal: *Absence of hypoxia  4/24/2020 0917 by Yosef Mendez RN  Outcome: Progressing Towards Goal  4/24/2020 0916 by Yosef Mendez RN  Outcome: Progressing Towards Goal     Problem: Patient Education: Go to Patient Education Activity  Goal: Patient/Family Education  4/24/2020 0917 by Yosef Mendez RN  Outcome: Progressing Towards Goal  4/24/2020 9438 by Ezequiel Castillo RN  Outcome: Progressing Towards Goal     Problem: Delirium Treatment  Goal: *Level of consciousness restored to baseline  Outcome: Progressing Towards Goal  Goal: *Level of environmental perceptions restored to baseline  Outcome: Progressing Towards Goal  Goal: *Sensory perception restored to baseline  Outcome: Progressing Towards Goal  Goal: *Emotional stability restored to baseline  Outcome: Progressing Towards Goal  Goal: *Functional assessment restored to baseline  Outcome: Progressing Towards Goal  Goal: *Absence of falls  Outcome: Progressing Towards Goal  Goal: *Will remain free of delirium, CAM Score negative  Outcome: Progressing Towards Goal  Goal: *Cognitive status will be restored to baseline  Outcome: Progressing Towards Goal  Goal: Interventions  Outcome: Progressing Towards Goal     Problem: Patient Education: Go to Patient Education Activity  Goal: Patient/Family Education  Outcome: Progressing Towards Goal

## 2020-04-24 NOTE — PROGRESS NOTES
Gastrointestinal Progress Note    Patient Name: Jordy Ross    BKAXF'B Date: 4/24/2020    Admit Date: 4/21/2020    Subjective:     Jordy Ross is a 80 y.o. Male with admission for possible sepsis and arrythmia. During admission he was noted to have blood with bowel movements. He had drop in HGB to 8. He had anticoagulation held. He has history of duodenal ulcer and esophageal ulcer on EGD in distant past.  He reports no NSAIDS. He had no bleeding noted today. HGB remains stable. He is having some mental status changes today. He has been weaned off pressors. He is being evaluated and treated medically by cardiology. No abdominal pain. No nausea or vomiting. No other complaints.            Current Facility-Administered Medications   Medication Dose Route Frequency    haloperidol lactate (HALDOL) 5 mg/mL injection        dextrose 5 % - 0.45% NaCl infusion  100 mL/hr IntraVENous CONTINUOUS    haloperidol lactate (HALDOL) injection 2 mg  2 mg IntraMUSCular Q6H PRN    vasopressin (VASOSTRICT) 20 Units in 0.9% sodium chloride 100 mL infusion  0.04 Units/min IntraVENous CONTINUOUS    0.9% sodium chloride infusion 250 mL  250 mL IntraVENous PRN    glucose chewable tablet 16 g  4 Tab Oral PRN    glucagon (GLUCAGEN) injection 1 mg  1 mg IntraMUSCular PRN    dextrose 10% infusion 125-250 mL  125-250 mL IntraVENous PRN    levothyroxine (SYNTHROID) tablet 100 mcg  100 mcg Oral 6am    lidocaine 4 % patch 1 Patch  1 Patch TransDERmal Q24H    sodium chloride (NS) flush 5-10 mL  5-10 mL IntraVENous PRN    NOREPINephrine (LEVOPHED) 8 mg in 0.9% NS 250ml infusion  0.5-30 mcg/min IntraVENous TITRATE    aspirin delayed-release tablet 81 mg  81 mg Oral DAILY    cholecalciferol (VITAMIN D3) (1000 Units /25 mcg) tablet 2 Tab  2,000 Units Oral DAILY    pantoprazole (PROTONIX) 40 mg in 0.9% sodium chloride 10 mL injection  40 mg IntraVENous Q12H    atorvastatin (LIPITOR) tablet 40 mg  40 mg Oral QHS    sucralfate (CARAFATE) tablet 1 g  1 g Oral QID    tamsulosin (FLOMAX) capsule 0.4 mg  0.4 mg Oral DAILY    [Held by provider] heparin (porcine) injection 5,000 Units  5,000 Units SubCUTAneous Q8H    piperacillin-tazobactam (ZOSYN) 4.5 g in 0.9% sodium chloride (MBP/ADV) 100 mL MBP ###EXTENDED 4-HOUR INFUSION###  4.5 g IntraVENous Q8H          Objective:     Physical Exam:    Visit Vitals  /57   Pulse 70   Temp 96.5 °F (35.8 °C)   Resp 13   Ht 5' 7\" (1.702 m)   Wt 73.7 kg (162 lb 7.7 oz)   SpO2 100%   BMI 25.45 kg/m²     General appearance: alert, cooperative, no distress, appears stated age  Abdomen: soft, non-tender. Bowel sounds normal. No masses,  no organomegaly    Data Review:    Labs: Results:       Chemistry Recent Labs     04/24/20  0345 04/23/20  1617 04/23/20  0145  04/22/20  0218   * 188* 125*  --  79   * 154* 153*  --  148*   K 3.7 4.3 4.9   < > 3.8   * 124* 123*  --  117*   CO2 23 22 27  --  25   BUN 51* 55* 54*  --  37*   CREA 1.89* 2.13* 1.85*  --  1.59*   CA 8.0* 7.9* 8.1*  --  8.1*   AGAP 6 8 3  --  6   BUCR 27* 26* 29*  --  23*   AP  --  204*  --   --  306*   TP  --  4.6*  --   --  5.8*   ALB  --  2.1*  --   --  2.5*   GLOB  --  2.5  --   --  3.3   AGRAT  --  0.8  --   --  0.8    < > = values in this interval not displayed. CBC w/Diff Recent Labs     04/24/20  1223 04/24/20  0345 04/23/20  2050  04/23/20  0145 04/22/20  0218   WBC  --  13.5*  --   --  11.8 6.6   RBC  --  2.79*  --   --  2.57* 2.98*   HGB 8.2* 8.8* 7.9*   < > 8.5* 9.9*   HCT 24.3* 26.1* 23.4*   < > 25.6* 29.5*   PLT  --  133*  --   --  182 170   GRANS  --  91*  --   --  88* 86*   LYMPH  --  5*  --   --  8* 11*   EOS  --  1  --   --  2 0    < > = values in this interval not displayed.       Coagulation Recent Labs     04/23/20  0145 04/22/20  0218   PTP 16.9* 15.4*   INR 1.4* 1.2   APTT 46.3*  --        Liver Enzymes Recent Labs     04/23/20  1617   TP 4.6*   ALB 2.1*   *   SGOT 37   ALT 47          Assessment:   1. GI bleeding:  Mr. Fabiola Valencia is an 80 yr old male who was admitted for sepsis and noted to have arrythmia. He was requiring pressors until now. He had GI bleeding while on anticoagulation. He has history of peptic ulcer disease in the distant past.  He was unstable for endoscopy during evaluation yesterday. He is more stable today but no bleeding noted. I recommend continue observation for now. Continue protonix. Will follow for signs of rebleeding. Follow H/H and transfuse as needed. Recommendation:   1. Continue protonix  2. Follow H/H and transfuse as needed  3. Observe for signs of re-bleeding. 4. Hold anticoagulation.          Nba Willis MD  April 24, 2020

## 2020-04-24 NOTE — PROGRESS NOTES
CARDIOLOGY Progress Note    Patient: Aren Tran  MR #: [id number]    Assessment/Plan:     Hospital Problems  Date Reviewed: 4/6/2020          Codes Class Noted POA    Hypoglycemia ICD-10-CM: E16.2  ICD-9-CM: 251.2  4/21/2020 Unknown        History of HTN (hypertension)  Currently borderline low blood pressure and is on pressors ICD-10-CM: I10  ICD-9-CM: 401.9  4/21/2020 Unknown        Left bundle branch block, patient appears to have further widening of his bundle branch configuration. He is known to have a long first-degree AV block and markedly reduced P wave amplitude. His rhythm has been noteworthy for frequent ectopics and short runs of NSVT. Intermittent atrial fibrillation  Heparin discontinued because of lower GI bleed. Awaiting bleeding scan. Defer to primary team and ICU team  Will use digoxin if needed    Nuclear stress test performed today  Results reviewed personally. No significant ongoing ischemia    Patient with left bundle branch block, intermittent atrial fibrillation and bradycardia. Likely has sinus node dysfunction as well  Suboptimal candidate for pacemaker at this time. Discussed with Dr. Jeramie Jamil who agreed at this time   ICD-10-CM: I44.7  ICD-9-CM: 426.3  4/21/2020 Unknown        Sinoatrial node dysfunction ([facility] ICD-10-CM: I49.5  ICD-9-CM: 427.81  4/21/2020 Unknown        Dysphagia ICD-10-CM: R13.10  ICD-9-CM: 787.20  4/8/2020 Yes        Cardiomyopathy ([address] ICD-10-CM: I42.9  ICD-9-CM: 425.4  2/20/2020 Yes    Overview Signed 2/20/2020 12:40 AM by Yisel KRAUSE DO     LVEF 35-40% by previous TTE. Grade II diastolic dysfunction DLI-[id number]: I51.9  ICD-9-CM: 429.9  2/20/2020 Yes    Overview Signed 2/20/2020 12:41 AM by Brittni Edwards DO     Per previous TTE.              Hypothyroidism ICD-10-CM: E03.9  ICD-9-CM: 244.9  2/20/2020 Yes        Bradycardia ICD-10-CM: R00.1  ICD-9-CM: 427.89  2/19/2020 Yes        Hypotension ICD-10-CM: I95.9  ICD-9-CM: 458.9 2/19/2020 Yes        * (Principal) Hypothermia ICD-10-CM: T68. Annemarie Arias  ICD-9-CM: 991.6  12/23/2019 Unknown        Chronic renal failure, stage 3 (moderate) ([facility] ICD-10-CM: N18.3  ICD-9-CM: 585.3  12/23/2019 Yes        SIRS (systemic inflammatory response syndrome) ([facility] ICD-10-CM: R65.10  ICD-9-CM: 995.90  12/23/2019 Yes    Gastrointestinal bleeding, GI consulted      Subjective     Denies any chest pain or shortness of breath. He is tired. He denies any dizziness    patient denies chest pain. He has no specific complaints. He is tired and he would like to sleep    History of Present Illness  Kristan Fowler is a 80 y.o. man that presented to the [facility] ED early this morning with complaints of chest tightness. The symptoms that the patient was experiencing were similar to a earlier admission to this hospital several weeks ago. At that time he was found to have an elevated TSH. He was also having episodic hypothermia. He was started on thyroid replacement therapy at a low dose of 50 mcg daily. He had some episodic severe bradycardia and so his beta-blocker was discontinued. He seemed to improved with those measures and was discharged for follow-up. On presentation to the ED, the patient was hypotensive. He did not respond well to intravenous fluid resuscitation. He was started on Levophed by Dr. Mau Mccray in the ED. His initial serum troponin was 0.03. TSH remains elevated at 4.64 compared to the 7.65 on 4/6/2020 when the patient was started on Synthroid. At present, patient denies chest discomfort.    .    Past Medical History  Past Medical History:   Diagnosis Date    Difficulty urinating     Elevated PSA     Hematuria, unspecified     Hypercholesterolemia     Hypertension     Incomplete bladder emptying     Urinary retention     UTI (urinary tract infection)          Meds  Current Facility-Administered Medications   Medication Dose Route Frequency    vasopressin (VASOSTRICT) 20 Units in 0.9% sodium chloride 100 mL infusion  0.04 Units/min IntraVENous CONTINUOUS    dextrose 5% infusion  100 mL/hr IntraVENous CONTINUOUS    vancomycin (VANCOCIN) 750 mg in 0.9% sodium chloride 250 mL IVPB  750 mg IntraVENous Q24H    [START ON 4/25/2020] VANCOMYCIN INFORMATION NOTE   Other ONCE    0.9% sodium chloride infusion 250 mL  250 mL IntraVENous PRN    glucose chewable tablet 16 g  4 Tab Oral PRN    glucagon (GLUCAGEN) injection 1 mg  1 mg IntraMUSCular PRN    dextrose 10% infusion 125-250 mL  125-250 mL IntraVENous PRN    levothyroxine (SYNTHROID) tablet 100 mcg  100 mcg Oral 6am    lidocaine 4 % patch 1 Patch  1 Patch TransDERmal Q24H    sodium chloride (NS) flush 5-10 mL  5-10 mL IntraVENous PRN    NOREPINephrine (LEVOPHED) 8 mg in 0.9% NS 250ml infusion  0.5-30 mcg/min IntraVENous TITRATE    aspirin delayed-release tablet 81 mg  81 mg Oral DAILY    cholecalciferol (VITAMIN D3) (1000 Units /25 mcg) tablet 2 Tab  2,000 Units Oral DAILY    pantoprazole (PROTONIX) 40 mg in 0.9% sodium chloride 10 mL injection  40 mg IntraVENous Q12H    atorvastatin (LIPITOR) tablet 40 mg  40 mg Oral QHS    sucralfate (CARAFATE) tablet 1 g  1 g Oral QID    tamsulosin (FLOMAX) capsule 0.4 mg  0.4 mg Oral DAILY    [Held by provider] heparin (porcine) injection 5,000 Units  5,000 Units SubCUTAneous Q8H    piperacillin-tazobactam (ZOSYN) 4.5 g in 0.9% sodium chloride (MBP/ADV) 100 mL MBP ###EXTENDED 4-HOUR INFUSION###  4.5 g IntraVENous Q8H    VANCOMYCIN INFORMATION NOTE 1 Each  1 Each Other Rx Dosing/Monitoring         Allergies  Allergies   Allergen Reactions    Amlodipine Swelling and Itching    Bactrim [Sulfamethoprim Ds] Rash    Lisinopril Other (comments)     Acute renal failure    Ciprofloxacin Rash and Itching           Family History     Family History   Problem Relation Age of Onset    Cancer Father     Alzheimer Mother     Heart defect Brother          Review of systems    Unobtainable at this time      Physical Exam  Visit Vitals  /63   Pulse 84   Temp 98.4 °F (36.9 °C)   Resp 11   Ht 5' 7\" (1.702 m)   Wt 162 lb 7.7 oz (73.7 kg)   SpO2 (!) 83%   BMI 25.45 kg/m²       Martha Clark is who is asleep but awakens easily . Head is normocephalic and atraumatic. Trachea appears midline with no noted thyromegaly. Carotids are full without definite bruits. There is no JVD. Decreased breath sound at base of the lungs. No obvious rales noted  Cardiac examination revealed irregular rhythm at this time. No obvious murmur   abdomen is soft and nontender. Extremities are without significant edema. Skin is warm and dry. Diagnostic Tests  Labs:   Recent Labs     04/24/20 0345 04/23/20 2050 04/23/20  1430 04/23/20 0145 04/22/20 0218   WBC 13.5*  --   --  11.8 6.6   HGB 8.8* 7.9* 9.1* 8.5* 9.9*   HCT 26.1* 23.4* 27.4* 25.6* 29.5*   *  --   --  182 170     Recent Labs     04/24/20 0345 04/23/20  1617 04/23/20 0145 04/22/20 0218   * 154* 153*  --  148*   K 3.7 4.3 4.9   < > 3.8   * 124* 123*  --  117*   CO2 23 22 27  --  25   * 188* 125*  --  79   BUN 51* 55* 54*  --  37*   CREA 1.89* 2.13* 1.85*  --  1.59*   CA 8.0* 7.9* 8.1*  --  8.1*   MG  --  2.3 2.2  --  2.4   PHOS  --  2.8  --   --  4.0   ALB  --  2.1*  --   --  2.5*   SGOT  --  37  --   --  79*   ALT  --  54  --   --  91*   INR  --   --  1.4*  --  1.2    < > = values in this interval not displayed.        Recent Labs     04/22/20 0218 04/21/20  2038 04/21/20  1710 04/21/20  1105   TROIQ 0.08* 0.05* 0.04 0.03   CPK  --   --   --  60   CKMB  --   --   --  7.3*     Last Lipid:  No results found for: CHOL, CHOLX, CHLST, CHOLV, HDL, HDLP, LDL, LDLC, DLDLP, TGLX, TRIGL, TRIGP, CHHD, CHHDX    Kareem montemayor MD

## 2020-04-24 NOTE — PROGRESS NOTES
2000 Assumed care of pt after bedside report with pt lying in bed with HOB elevated. Pt AAO x 3. COLUNGA x4 but with weakness. Monitor denotes NSR with BB and 1st degree AVB. Pt having frequent PVC's . Pt asymptomatic. Pt on Levophed drip at 12 mcg/min. Lungs clear diminished in bases. Pt on RA with O2 sat 100 %. Abd intact, soft. Farrell catheter in place and draining in yellow urine. SCD's in place. Pt is NPO. No BM at present. 2050 Lab drawn as ordered and sent to lab. 2200 H/H results called to ROBERT Patriica and orders given. 2224 1 unit PRBC's  Added to transfuse as ordered. 04/24/2020 0000 Accucheck result 146. 0400 AM labs drawn and sent to lab. 0400 Pt pulled out rt AC PIV and bleeding notedon abd, gown, sheets, etc. Pt also kin tears at rt arm and lt upper arm where pt scratched himself. 6463  Bilaterl soft wrist reatraint applied as ordered due to pt's confusion and pulling out IV's and clothes, etc. Pt seeing police cars in ICU and states is in [facility]. 0600 K+ IV replacement added as per protocol.

## 2020-04-24 NOTE — PROGRESS NOTES
Problem: Dysphagia (Adult)  Goal: *Acute Goals and Plan of Care (Insert Text)  Description: Recommendations:  Diet: Cautious puree/NECTAR thick liquid  Meds: Crushed in puree  Aspiration Precautions  Oral Care TID  Feeder  Small sips/bites  No straws    Goals:  Patient will:  1. Tolerate PO trials with 0 s/s overt distress in 4/5 trials  2. Utilize compensatory swallow strategies/maneuvers (decrease bite/sip, size/rate, alt. liq/sol) with min cues in 4/5 trials  3. Complete an objective swallow study (i.e., MBSS) to assess swallow integrity, r/o aspiration, and determine of safest LRD, min A   Outcome: Progressing Towards Goal    SPEECH LANGUAGE PATHOLOGY BEDSIDE SWALLOW EVALUATION    Patient: Kelly Roman (28 y.o. male)  Date: 4/24/2020  Primary Diagnosis: Hypothermia [T68. XXXA]        Precautions: Aspiration  PLOF: SNF    ASSESSMENT :  Based on the objective data described below, the patient presents with moderate oral and mod-severe pharyngeal dysphagia. MBS completed 4/9/20 with SILENT aspiration on thin liquids via cup sips; VF penetration with thin liquids + straw. This day, A&O to self only, inconsistent command following. Oral-motor exam revealed pt with lingual and labial weakness; however, all other structures grossly intact for mastication and deglutition. Pt presented with puree and nectar-thick liquid; demo prolonged oral bolus prep and transit; delayed swallow initiation and per palpation/observation laryngeal elevation was assessed to be weak. No overt s/sx aspiration. Solid trial; incomplete mastication, SLP removed. Recommend cautious puree diet with nectar-thick liquids with aspiration precautions when cleared for diet by GI MD. Patient not appropriate for education at this time. SLP will continue to follow as indicated. Patient will benefit from skilled intervention to address the above impairments.   Patient's rehabilitation potential is considered to be Fair  Factors which may influence rehabilitation potential include:  []            None noted  [x]            Mental ability/status  [x]            Medical condition  []            Home/family situation and support systems  [x]            Safety awareness  []            Pain tolerance/management  []            Other:      PLAN :  Recommendations and Planned Interventions:  Puree/NECTAR thick liquid   Frequency/Duration: Patient will be followed by speech-language pathology 1-2 times per day/4-7 days per week to address goals.   Discharge Recommendations: Skilled Nursing Facility     SUBJECTIVE:   Patient stated [facility].    OBJECTIVE:     Past Medical History:   Diagnosis Date    Difficulty urinating     Elevated PSA     Hematuria, unspecified     Hypercholesterolemia     Hypertension     Incomplete bladder emptying     Urinary retention     UTI (urinary tract infection)      Past Surgical History:   Procedure Laterality Date    APPENDECTOMY      EGD  2/7/2014         HX TURP  6/13/16    HX TURP       Home Situation:   Home Situation  Home Environment: Private residence  One/Two Story Residence: One story  Living Alone: No  Support Systems: Family member(s), Friends \ neighbors  Patient Expects to be Discharged to[de-identified] Private residence  Current DME Used/Available at Home: [employer], rollator    Diet prior to admission: Soft solid/NECTAR thick liquid   Current Diet:  Soft solid/NECTAR thick liquid    Cognitive and Communication Status:  Neurologic State: Alert, Confused  Orientation Level: Oriented to person, Disoriented to place, Disoriented to situation, Disoriented to time  Cognition: Decreased attention/concentration, Follows commands  Perception: Appears intact  Perseveration: Perseverates during conversation  Safety/Judgement: Decreased awareness of environment  Oral Assessment:  Oral Assessment  Labial: No impairment  Dentition: Limited  Oral Hygiene: Poor  Lingual: Decreased rate;Decreased strength  Velum: No impairment  Mandible: No impairment  P.O. Trials:  Patient Position: [facility]  Vocal quality prior to P.O.: No impairment  Consistency Presented: Nectar thick liquid;Pudding; Solid  How Presented: SLP-fed/presented;Cup/sip;Spoon     Bolus Acceptance: No impairment  Bolus Formation/Control: Impaired  Type of Impairment: Incomplete;Mastication  Propulsion: Delayed (# of seconds)  Oral Residue: Greater than 50% of bolus; Lingual  Initiation of Swallow: Delayed (# of seconds)  Laryngeal Elevation: Functional  Aspiration Signs/Symptoms: None  Pharyngeal Phase Characteristics: Suspected pharyngeal residue; Audible swallow  Effective Modifications: Cup/sip;Small sips and bites  Cues for Modifications: Maximal       Oral Phase Severity: Moderate  Pharyngeal Phase Severity : Moderate-severe    PAIN:  Pain level pre-treatment: 0/10   Pain level post-treatment: 0/10     After treatment:   []            Patient left in no apparent distress sitting up in chair  [x]            Patient left in no apparent distress in bed  [x]            Call bell left within reach  [x]            Nursing notified  []            Family present  []            Caregiver present  []            Bed alarm activated    COMMUNICATION/EDUCATION:   [x]            Aspiration precautions; swallow safety; compensatory techniques. [x]            Patient/family have participated as able in goal setting and plan of care. [x]            Patient/family agree to work toward stated goals and plan of care. []            Patient understands intent and goals of therapy; neutral about participation. []            Patient unable to participate in goal setting/plan of care; educ ongoing with interdisciplinary staff  []         Posted safety precautions in patient's room.     Thank you for this referral,  CAMRON Florez  Time Calculation: 12 mins

## 2020-04-24 NOTE — PROGRESS NOTES
(8239) Received report from [facility]. Assumed care of patient. Whiteboard updated. VSS. Will continue to monitor. (1680) Patient started thrashing in bed, yelling \"someone call the police! They are trying to kill me! \" Attempted to reorient patient and was told \"if you come near me I'll kill you! \" Patient medicated with 2mg of haldol IV per order. Will continue to monitor. (1930) Bedside and Verbal shift change report given to JER Vela (oncoming nurse) by Irina Wilhelm RN (offgoing nurse). Report included the following information SBAR, Intake/Output, MAR, Cardiac Rhythm NSR w/ BBB and frequent PVCs and Alarm Parameters .

## 2020-04-24 NOTE — PROGRESS NOTES
Internal Medicine Progress Note        NAME: Vikas Pollack   :  1934  MRM:  [id number]    Date/Time: 2020        ASSESSMENT/PLAN:     # Acute delirium. Acute confusional state. Treat medical problems as below. Monitor. Restrain as needed. Correct s. Naqa level  Haldol per PCCM team    #  SIRS. Hypothermia , Hypotension and bradycardia. Unclear source of infection. Sepsis work-up started in the emergency room. Treated with Abx vanco and zosyn. Appreciate PCCM input. Warming blankets Used  Needed iv pressors   Blood and urine CS NTD  Repeat UA. # GIB presented as Melena , anemia. GI consulted : 1. Avoid NSAIDs 2. Keep NPO  3. Recommend IV protonix. 4. Currently unstable for sedated endoscopy, will follow clinically and when appropriate will plan for endoscopy. Avoid anticoagulation     # Hypoglycemia. Monitor blood sugar and treat accordingly. BSL stable    #  Dysrhythmia , heart block (LBBB ,long first degree and wide QRS), ho   Sinoatrial node dysfunction  and mild elevation of trop level. Stabilized initially in ICU. Iv pressors as needed   Cardiac stress test  History of  Cardiomyopathy  LVEF 35-40% by previous TTE and Grade II diastolic dysfunction. short runs of NSVT. Lexiscan Myoview testing with inferior and inferoseptal fixed defect more consistent with infarction,  no significant ongoing ischemia. #  Chronic renal failure, stage 3 (moderate) ([facility] [address] (2019). S.cr stable   Monitor Renal function and other labs as indicated. Avoid nephrotoxins , iv Contrast, NSAID. Renally dosing medications. Monitor urine out put. # Hypernatremia. Change IVF to D5. Still on D5 1/2 NS , will change again to D5. # ho   Hypothyroidism . On replacement.  Checked TSH , T4     #  Dysphagia (2020)    Currently NPO  Episode  of aspiration again 424  am SLP to see again    #   HTN (hypertension) (2020)      -DVT prophylaxis : Heparin.   - Code Status : Full    Lab Review:     Recent Labs     04/24/20  1223 04/24/20  0345 04/23/20 2050 04/23/20  0145 04/22/20  0218   WBC  --  13.5*  --   --  11.8 6.6   HGB 8.2* 8.8* 7.9*   < > 8.5* 9.9*   HCT 24.3* 26.1* 23.4*   < > 25.6* 29.5*   PLT  --  133*  --   --  182 170    < > = values in this interval not displayed. Recent Labs     04/24/20  0345 04/23/20  1617 04/23/20 0145 04/22/20 0218   * 154* 153*  --  148*   K 3.7 4.3 4.9   < > 3.8   * 124* 123*  --  117*   CO2 23 22 27  --  25   * 188* 125*  --  79   BUN 51* 55* 54*  --  37*   CREA 1.89* 2.13* 1.85*  --  1.59*   CA 8.0* 7.9* 8.1*  --  8.1*   MG  --  2.3 2.2  --  2.4   PHOS  --  2.8  --   --  4.0   ALB  --  2.1*  --   --  2.5*   TBILI  --  0.9  --   --  0.3   SGOT  --  37  --   --  79*   ALT  --  54  --   --  91*   INR  --   --  1.4*  --  1.2    < > = values in this interval not displayed. Lab Results   Component Value Date/Time    Glucose (POC) 146 (H) 04/24/2020 02:11 AM    Glucose (POC) 177 (H) 04/23/2020 07:44 PM    Glucose (POC) 185 (H) 04/23/2020 11:48 AM    Glucose (POC) 170 (H) 04/22/2020 07:08 AM    Glucose (POC) 69 (L) 04/22/2020 05:57 AM    Glucose (POC) 96 10/10/2014 09:28 PM    Glucose (POC) 186 (H) 10/10/2014 03:48 PM     No results for input(s): PH, PCO2, PO2, HCO3, FIO2 in the last 72 hours.   Recent Labs     04/23/20  0145 04/22/20  0218   INR 1.4* 1.2       Lab Results   Component Value Date/Time    Specimen Description: [facility] 02/06/2014 05:45 PM     Lab Results   Component Value Date/Time    Culture result: No growth (<1,000 CFU/ML) 04/22/2020 09:20 AM    Culture result: NO GROWTH 2 DAYS 04/21/2020 08:15 AM    Culture result: NO GROWTH 2 DAYS 04/21/2020 08:00 AM       All Cardiac Markers in the last 24 hours:   No results found for: CPK, CK, CKMMB, CKMB, RCK3, CKMBT, CKNDX, CKND1, TELLY, TROPT, TROIQ, JOSE, TROPT, TNIPOC, BNP, BNPP        Intervals noted      Subjective:     Chief Complaint:      Confused non historian  Akil nursing: per son this happened with past admission as well. Pressors dose down , haldol for thrashing his arms and worry to hurst himself. Episode of aspiration this am SLP to see again  Hypoxic episode during aspiration   No Brian of temp, actually on low side    ROS: unable to obtain  (bold if positive,otherwise negative)    Fever/chills ,  Dysuria   Cough , Sputum , SOB/HERRMANN , Chest Pain     Diarrhea ,Nausea/Vomit , Abd Pain , Constipation           Objective:     Vitals:  Last 24hrs VS reviewed since prior progress note. Most recent are:    Visit Vitals  /49   Pulse 76   Temp 96.4 °F (35.8 °C)   Resp 13   Ht 5' 7\" (1.702 m)   Wt 73.7 kg (162 lb 7.7 oz)   SpO2 100%   BMI 25.45 kg/m²     SpO2 Readings from Last 6 Encounters:   04/24/20 100%   04/09/20 98%   02/21/20 92%   01/02/20 96%   12/16/19 97%   09/23/18 99%            Intake/Output Summary (Last 24 hours) at 4/24/2020 1424  Last data filed at 4/24/2020 1300  Gross per 24 hour   Intake 3040.95 ml   Output 675 ml   Net 2365.95 ml          Physical Exam:   Gen:  comfortable, talking, chatting BUT confused,  in no acute distress. Appear stated age, Well-developed   HEENT:  Head atraumatic, normocephalic ,  moist mucous membranes. Neck:   Trachea midline , No apparent JVD, Supple, without masses, thyroid non-tender  Resp:  No accessory muscle use,Bilateral BS present, clear breath sounds without wheezes rales or rhonchi  Card:  normal S1, S2 without Gallop . Mild lower leg peripheral edema. Abd:  Soft, non-tender, non-distended, bowel sounds are present . Brina Money Musc:  No cyanosis or clubbing. Skin:  No rashes or ulcers, skin turgor is good. Neuro:  Cranial nerves are grossly intact, no clear area of focal motor weakness, follows commands appropriately.   Psych:  Confused , POOR insight,        Telemetry reviewed:   normal sinus rhythm    Medications Reviewed: (see below)    Lab Data Reviewed: (see below)    ______________________________________________________________________    Medications:     Current Facility-Administered Medications   Medication Dose Route Frequency    haloperidol lactate (HALDOL) 5 mg/mL injection        haloperidol lactate (HALDOL) injection 2 mg  2 mg IntraVENous Q6H PRN    dextrose 5 % - 0.45% NaCl infusion  100 mL/hr IntraVENous CONTINUOUS    vasopressin (VASOSTRICT) 20 Units in 0.9% sodium chloride 100 mL infusion  0.04 Units/min IntraVENous CONTINUOUS    0.9% sodium chloride infusion 250 mL  250 mL IntraVENous PRN    glucose chewable tablet 16 g  4 Tab Oral PRN    glucagon (GLUCAGEN) injection 1 mg  1 mg IntraMUSCular PRN    dextrose 10% infusion 125-250 mL  125-250 mL IntraVENous PRN    levothyroxine (SYNTHROID) tablet 100 mcg  100 mcg Oral 6am    lidocaine 4 % patch 1 Patch  1 Patch TransDERmal Q24H    sodium chloride (NS) flush 5-10 mL  5-10 mL IntraVENous PRN    NOREPINephrine (LEVOPHED) 8 mg in 0.9% NS 250ml infusion  0.5-30 mcg/min IntraVENous TITRATE    aspirin delayed-release tablet 81 mg  81 mg Oral DAILY    cholecalciferol (VITAMIN D3) (1000 Units /25 mcg) tablet 2 Tab  2,000 Units Oral DAILY    pantoprazole (PROTONIX) 40 mg in 0.9% sodium chloride 10 mL injection  40 mg IntraVENous Q12H    atorvastatin (LIPITOR) tablet 40 mg  40 mg Oral QHS    sucralfate (CARAFATE) tablet 1 g  1 g Oral QID    tamsulosin (FLOMAX) capsule 0.4 mg  0.4 mg Oral DAILY    [Held by provider] heparin (porcine) injection 5,000 Units  5,000 Units SubCUTAneous Q8H    piperacillin-tazobactam (ZOSYN) 4.5 g in 0.9% sodium chloride (MBP/ADV) 100 mL MBP ###EXTENDED 4-HOUR INFUSION###  4.5 g IntraVENous Q8H          Total time spent with patient: 35 minutes                  Care Plan discussed with: Patient, Nursing Staff, Consultant/Specialist and >50% of time spent in counseling and coordination of care    Discussed:  Care Plan    Prophylaxis:  Hep SQ    Disposition:  Home w/Family           This document in whole or part of it has been produced using voice recognition software. Unrecognized errors in transcription may be present.     Attending Physician: Opal Raines MD

## 2020-04-24 NOTE — CDMP QUERY
Pt with GI bleed  and has anemia documented. Please further specify type and acuity of anemia in the medical record. ? Anemia due to acute blood loss ? Anemia due to chronic blood loss ? Anemia due to iron deficiency ? Anemia due to postoperative blood loss  (please specify if expected or complication of the surgery) ? Anemia due to chronic disease, please specify disease ? Dilutional anemia 
? Other, please specify ? Clinically unable to determine The medical record reflects the following: 
   Risk Factors: gi bleed, melena Clinical Indicators: H&H 7.9/23.4 Treatment: 2 units prbc Thank- You Yonathan Maddox RN [facility] [phone]

## 2020-04-24 NOTE — PROGRESS NOTES
CARDIOLOGY Progress Note    Patient: Soila Doshi  MR #: [id number]    Patient was seen on April 22, late entry of note in the chart    Assessment/Plan:     Hospital Problems  Date Reviewed: 4/6/2020          Codes Class Noted POA    Hypoglycemia ICD-10-CM: E16.2  ICD-9-CM: 251.2  4/21/2020 Unknown        HTN (hypertension) ICD-10-CM: I10  ICD-9-CM: 401.9  4/21/2020 Unknown        Left bundle branch block, patient appears to have further widening of his bundle branch configuration. He is known to have a long first-degree AV block and markedly reduced P wave amplitude. His rhythm has been noteworthy for frequent ectopics and short runs of NSVT. Intermittent atrial fibrillation  Use AV adele blocking agent as needed. Patient initially on IV heparin    Nuclear stress test performed today  Results reviewed personally. No significant ongoing ischemia    Patient with left bundle branch block, intermittent atrial fibrillation and bradycardia. Likely has sinus node dysfunction as well  Suboptimal candidate for pacemaker at this time. Discussed with Dr. Eve Jimenez who agreed at this time   ICD-10-CM: I44.7  ICD-9-CM: 426.3  4/21/2020 Unknown        Sinoatrial node dysfunction ([facility] [address] ICD-10-CM: I49.5  ICD-9-CM: 427.81  4/21/2020 Unknown        Dysphagia ICD-10-CM: R13.10  ICD-9-CM: 787.20  4/8/2020 Yes        Cardiomyopathy ([facility] [address] ICD-10-CM: I42.9  ICD-9-CM: 425.4  2/20/2020 Yes    Overview Signed 2/20/2020 12:40 AM by Ame KRAUSE DO     LVEF 35-40% by previous TTE. Grade II diastolic dysfunction NZB-66-TC: I51.9  ICD-9-CM: 429.9  2/20/2020 Yes    Overview Signed 2/20/2020 12:41 AM by Alan Humphreys DO     Per previous TTE. Hypothyroidism ICD-10-CM: E03.9  ICD-9-CM: 244.9  2/20/2020 Yes        Bradycardia ICD-10-CM: R00.1  ICD-9-CM: 427.89  2/19/2020 Yes        Hypotension ICD-10-CM: I95.9  ICD-9-CM: 458.9  2/19/2020 Yes        * (Principal) Hypothermia ICD-10-CM: T68. Desi Ceron  ICD-9-CM: 991.6  12/23/2019 Unknown        Chronic renal failure, stage 3 (moderate) (HCC) ICD-10-CM: N18.3  ICD-9-CM: 585.3  12/23/2019 Yes        SIRS (systemic inflammatory response syndrome) ([address] ICD-10-CM: R65.10  ICD-9-CM: 995.90  12/23/2019 Yes    Gastrointestinal bleeding, GI consulted      Subjective   patient denies chest pain. He has no specific complaints. He is tired and he would like to sleep    History of Present Illness  Rylee Yee is a 80 y.o. man that presented to the [facility] ED early this morning with complaints of chest tightness. The symptoms that the patient was experiencing were similar to a earlier admission to this hospital several weeks ago. At that time he was found to have an elevated TSH. He was also having episodic hypothermia. He was started on thyroid replacement therapy at a low dose of 50 mcg daily. He had some episodic severe bradycardia and so his beta-blocker was discontinued. He seemed to improved with those measures and was discharged for follow-up. On presentation to the ED, the patient was hypotensive. He did not respond well to intravenous fluid resuscitation. He was started on Levophed by Dr. Jordy Christiansen in the ED. His initial serum troponin was 0.03. TSH remains elevated at 4.64 compared to the 7.65 on 4/6/2020 when the patient was started on Synthroid. At present, patient denies chest discomfort.    .    Past Medical History  Past Medical History:   Diagnosis Date    Difficulty urinating     Elevated PSA     Hematuria, unspecified     Hypercholesterolemia     Hypertension     Incomplete bladder emptying     Urinary retention     UTI (urinary tract infection)          Meds  Current Facility-Administered Medications   Medication Dose Route Frequency    vasopressin (VASOSTRICT) 20 Units in 0.9% sodium chloride 100 mL infusion  0.04 Units/min IntraVENous CONTINUOUS    dextrose 5% infusion  100 mL/hr IntraVENous CONTINUOUS    vancomycin (VANCOCIN) 750 mg in 0.9% sodium chloride 250 mL IVPB  750 mg IntraVENous Q24H    [START ON 4/25/2020] VANCOMYCIN INFORMATION NOTE   Other ONCE    0.9% sodium chloride infusion 250 mL  250 mL IntraVENous PRN    glucose chewable tablet 16 g  4 Tab Oral PRN    glucagon (GLUCAGEN) injection 1 mg  1 mg IntraMUSCular PRN    dextrose 10% infusion 125-250 mL  125-250 mL IntraVENous PRN    levothyroxine (SYNTHROID) tablet 100 mcg  100 mcg Oral 6am    lidocaine 4 % patch 1 Patch  1 Patch TransDERmal Q24H    sodium chloride (NS) flush 5-10 mL  5-10 mL IntraVENous PRN    NOREPINephrine (LEVOPHED) 8 mg in 0.9% NS 250ml infusion  0.5-30 mcg/min IntraVENous TITRATE    aspirin delayed-release tablet 81 mg  81 mg Oral DAILY    cholecalciferol (VITAMIN D3) (1000 Units /25 mcg) tablet 2 Tab  2,000 Units Oral DAILY    pantoprazole (PROTONIX) 40 mg in 0.9% sodium chloride 10 mL injection  40 mg IntraVENous Q12H    atorvastatin (LIPITOR) tablet 40 mg  40 mg Oral QHS    sucralfate (CARAFATE) tablet 1 g  1 g Oral QID    tamsulosin (FLOMAX) capsule 0.4 mg  0.4 mg Oral DAILY    [Held by provider] heparin (porcine) injection 5,000 Units  5,000 Units SubCUTAneous Q8H    piperacillin-tazobactam (ZOSYN) 4.5 g in 0.9% sodium chloride (MBP/ADV) 100 mL MBP ###EXTENDED 4-HOUR INFUSION###  4.5 g IntraVENous Q8H    VANCOMYCIN INFORMATION NOTE 1 Each  1 Each Other Rx Dosing/Monitoring         Allergies  Allergies   Allergen Reactions    Amlodipine Swelling and Itching    Bactrim [Sulfamethoprim Ds] Rash    Lisinopril Other (comments)     Acute renal failure    Ciprofloxacin Rash and Itching           Family History     Family History   Problem Relation Age of Onset    Cancer Father     Alzheimer Mother     Heart defect Brother          Review of systems    Unobtainable at this time      Physical Exam  Visit Vitals  /63   Pulse 84   Temp 98.4 °F (36.9 °C)   Resp 11   Ht 5' 7\" (1.702 m)   Wt 162 lb 7.7 oz (73.7 kg)   SpO2 (!) 83%   BMI 25.45 kg/m²       Aren Tran is who is asleep but awakens easily . Head is normocephalic and atraumatic. Trachea appears midline with no noted thyromegaly. Carotids are full without definite bruits. There is no JVD. Decreased breath sound at base of the lungs. No obvious rales noted  Cardiac examination revealed irregular rhythm at this time. No obvious murmur   abdomen is soft and nontender. Extremities are without significant edema. Skin is warm and dry. Diagnostic Tests  Labs:   Recent Labs     04/24/20 0345 04/23/20 2050 04/23/20  1430 04/23/20 0145 04/22/20 0218   WBC 13.5*  --   --  11.8 6.6   HGB 8.8* 7.9* 9.1* 8.5* 9.9*   HCT 26.1* 23.4* 27.4* 25.6* 29.5*   *  --   --  182 170     Recent Labs     04/24/20 0345 04/23/20  1617 04/23/20 0145 04/22/20 0218   * 154* 153*  --  148*   K 3.7 4.3 4.9   < > 3.8   * 124* 123*  --  117*   CO2 23 22 27  --  25   * 188* 125*  --  79   BUN 51* 55* 54*  --  37*   CREA 1.89* 2.13* 1.85*  --  1.59*   CA 8.0* 7.9* 8.1*  --  8.1*   MG  --  2.3 2.2  --  2.4   PHOS  --  2.8  --   --  4.0   ALB  --  2.1*  --   --  2.5*   SGOT  --  37  --   --  79*   ALT  --  54  --   --  91*   INR  --   --  1.4*  --  1.2    < > = values in this interval not displayed.        Recent Labs     04/22/20 0218 04/21/20 2038 04/21/20 1710 04/21/20  1105   TROIQ 0.08* 0.05* 0.04 0.03   CPK  --   --   --  60   CKMB  --   --   --  7.3*     Last Lipid:  No results found for: CHOL, CHOLX, CHLST, CHOLV, HDL, HDLP, LDL, LDLC, DLDLP, TGLX, TRIGL, TRIGP, CHHD, CHHDX    Kareem montemayor MD

## 2020-04-24 NOTE — CDMP QUERY
Pt admitted with hypotension Pt noted to have acute delirium and acute confusional state per Hospitalist progress note  . If possible, please document in the progress notes and d/c summary if you are evaluating and / or treating any of the following: 
 
 
 
      Delirium only ? Metabolic Encephalopathy ? Septic Encephalopathy ? Toxic Encephalopathy 
? Encephalopathy due to medications or drugs (please specify) ? Toxic Metabolic Encephalopathy ? Wernickes Encephalopathy 
? Other Encephalopathy 
? Other, please specify ? Clinically unable to determine The medical record reflects the following: 
   Risk Factors: hypotension Clinical Indicators:Delirium, Confusion, GI bleed, Sodium 154 Treatment: restrains as needed, haldol , correct sodium level Thank- you Kathy Richards RN [facility] Cell [phone]

## 2020-04-24 NOTE — PROGRESS NOTES
04/24/20 0800   Dysphagia Screening   Vocal Quality/Secretions 0   History of Dysphagia 0   O2 Saturation 0   Alertness 0   Pre-Swallow Assessment Score 0   Purees (!) 1

## 2020-04-25 LAB
ANION GAP SERPL CALC-SCNC: 7 MMOL/L (ref 3–18)
BASOPHILS # BLD: 0 K/UL (ref 0–0.1)
BASOPHILS NFR BLD: 0 % (ref 0–2)
BUN SERPL-MCNC: 42 MG/DL (ref 7–18)
BUN/CREAT SERPL: 23 (ref 12–20)
CALCIUM SERPL-MCNC: 8.2 MG/DL (ref 8.5–10.1)
CHLORIDE SERPL-SCNC: 124 MMOL/L (ref 100–111)
CO2 SERPL-SCNC: 22 MMOL/L (ref 21–32)
CREAT SERPL-MCNC: 1.82 MG/DL (ref 0.6–1.3)
DATE LAST DOSE: NORMAL
DIFFERENTIAL METHOD BLD: ABNORMAL
EOSINOPHIL # BLD: 0.2 K/UL (ref 0–0.4)
EOSINOPHIL NFR BLD: 1 % (ref 0–5)
ERYTHROCYTE [DISTWIDTH] IN BLOOD BY AUTOMATED COUNT: 17.1 % (ref 11.6–14.5)
GLUCOSE SERPL-MCNC: 113 MG/DL (ref 74–99)
HCT VFR BLD AUTO: 22.1 % (ref 36–48)
HCT VFR BLD AUTO: 23.6 % (ref 36–48)
HGB BLD-MCNC: 7.5 G/DL (ref 13–16)
HGB BLD-MCNC: 7.9 G/DL (ref 13–16)
LYMPHOCYTES # BLD: 0.7 K/UL (ref 0.9–3.6)
LYMPHOCYTES NFR BLD: 7 % (ref 21–52)
MCH RBC QN AUTO: 31.6 PG (ref 24–34)
MCHC RBC AUTO-ENTMCNC: 33.5 G/DL (ref 31–37)
MCV RBC AUTO: 94.4 FL (ref 74–97)
MONOCYTES # BLD: 0.4 K/UL (ref 0.05–1.2)
MONOCYTES NFR BLD: 4 % (ref 3–10)
NEUTS SEG # BLD: 10.1 K/UL (ref 1.8–8)
NEUTS SEG NFR BLD: 88 % (ref 40–73)
PLATELET # BLD AUTO: 126 K/UL (ref 135–420)
PMV BLD AUTO: 10.3 FL (ref 9.2–11.8)
POTASSIUM SERPL-SCNC: 3.3 MMOL/L (ref 3.5–5.5)
RBC # BLD AUTO: 2.5 M/UL (ref 4.7–5.5)
REPORTED DOSE,DOSE: NORMAL UNITS
REPORTED DOSE/TIME,TMG: 1800
SODIUM SERPL-SCNC: 153 MMOL/L (ref 136–145)
VANCOMYCIN TROUGH SERPL-MCNC: 13.5 UG/ML (ref 10–20)
WBC # BLD AUTO: 11.4 K/UL (ref 4.6–13.2)

## 2020-04-25 PROCEDURE — 85018 HEMOGLOBIN: CPT

## 2020-04-25 PROCEDURE — 65610000006 HC RM INTENSIVE CARE

## 2020-04-25 PROCEDURE — 85025 COMPLETE CBC W/AUTO DIFF WBC: CPT

## 2020-04-25 PROCEDURE — 80048 BASIC METABOLIC PNL TOTAL CA: CPT

## 2020-04-25 PROCEDURE — 74011250636 HC RX REV CODE- 250/636: Performed by: PHYSICIAN ASSISTANT

## 2020-04-25 PROCEDURE — C9113 INJ PANTOPRAZOLE SODIUM, VIA: HCPCS | Performed by: INTERNAL MEDICINE

## 2020-04-25 PROCEDURE — 74011000250 HC RX REV CODE- 250: Performed by: HOSPITALIST

## 2020-04-25 PROCEDURE — 74011000258 HC RX REV CODE- 258: Performed by: INTERNAL MEDICINE

## 2020-04-25 PROCEDURE — 74011000250 HC RX REV CODE- 250: Performed by: INTERNAL MEDICINE

## 2020-04-25 PROCEDURE — 74011250636 HC RX REV CODE- 250/636: Performed by: INTERNAL MEDICINE

## 2020-04-25 PROCEDURE — 80202 ASSAY OF VANCOMYCIN: CPT

## 2020-04-25 RX ORDER — DEXTROSE MONOHYDRATE 50 MG/ML
100 INJECTION, SOLUTION INTRAVENOUS CONTINUOUS
Status: DISCONTINUED | OUTPATIENT
Start: 2020-04-25 | End: 2020-04-27

## 2020-04-25 RX ORDER — LIDOCAINE 4 G/100G
1 PATCH TOPICAL EVERY 24 HOURS
Status: DISCONTINUED | OUTPATIENT
Start: 2020-04-25 | End: 2020-05-11 | Stop reason: HOSPADM

## 2020-04-25 RX ADMIN — DEXTROSE MONOHYDRATE 100 ML/HR: 5 INJECTION, SOLUTION INTRAVENOUS at 17:02

## 2020-04-25 RX ADMIN — PIPERACILLIN SODIUM AND TAZOBACTAM SODIUM 4.5 G: 4; .5 INJECTION, POWDER, LYOPHILIZED, FOR SOLUTION INTRAVENOUS at 21:20

## 2020-04-25 RX ADMIN — SODIUM CHLORIDE 40 MG: 9 INJECTION, SOLUTION INTRAMUSCULAR; INTRAVENOUS; SUBCUTANEOUS at 21:21

## 2020-04-25 RX ADMIN — NOREPINEPHRINE BITARTRATE 4 MCG/MIN: 1 INJECTION INTRAVENOUS at 03:40

## 2020-04-25 RX ADMIN — PIPERACILLIN SODIUM AND TAZOBACTAM SODIUM 4.5 G: 4; .5 INJECTION, POWDER, LYOPHILIZED, FOR SOLUTION INTRAVENOUS at 06:40

## 2020-04-25 RX ADMIN — HALOPERIDOL LACTATE 2 MG: 5 INJECTION, SOLUTION INTRAMUSCULAR at 21:30

## 2020-04-25 RX ADMIN — HALOPERIDOL LACTATE 2 MG: 5 INJECTION, SOLUTION INTRAMUSCULAR at 02:11

## 2020-04-25 RX ADMIN — SODIUM CHLORIDE 40 MG: 9 INJECTION, SOLUTION INTRAMUSCULAR; INTRAVENOUS; SUBCUTANEOUS at 10:51

## 2020-04-25 RX ADMIN — PIPERACILLIN SODIUM AND TAZOBACTAM SODIUM 4.5 G: 4; .5 INJECTION, POWDER, LYOPHILIZED, FOR SOLUTION INTRAVENOUS at 14:08

## 2020-04-25 NOTE — PROGRESS NOTES
Gastrointestinal Progress Note    Patient Name: Dyllan Thomas    GAKZI'X Date: 4/25/2020    Admit Date: 4/21/2020    Subjective:     Dyllan Thomas is a 80 y.o. Male who we are seeing b/o GI bleed. The patient is confused and history of is of questionable reliability. He reports black stool yesterday but brown today. He  Denies abdominal pain, N/V and states that he is hungry.         Current Facility-Administered Medications   Medication Dose Route Frequency    lidocaine 4 % patch 1 Patch  1 Patch TransDERmal Q24H    dextrose 5% infusion  100 mL/hr IntraVENous CONTINUOUS    haloperidol lactate (HALDOL) injection 2 mg  2 mg IntraMUSCular Q6H PRN    vasopressin (VASOSTRICT) 20 Units in 0.9% sodium chloride 100 mL infusion  0.04 Units/min IntraVENous CONTINUOUS    0.9% sodium chloride infusion 250 mL  250 mL IntraVENous PRN    glucose chewable tablet 16 g  4 Tab Oral PRN    glucagon (GLUCAGEN) injection 1 mg  1 mg IntraMUSCular PRN    dextrose 10% infusion 125-250 mL  125-250 mL IntraVENous PRN    levothyroxine (SYNTHROID) tablet 100 mcg  100 mcg Oral 6am    sodium chloride (NS) flush 5-10 mL  5-10 mL IntraVENous PRN    NOREPINephrine (LEVOPHED) 8 mg in 0.9% NS 250ml infusion  0.5-30 mcg/min IntraVENous TITRATE    aspirin delayed-release tablet 81 mg  81 mg Oral DAILY    cholecalciferol (VITAMIN D3) (1000 Units /25 mcg) tablet 2 Tab  2,000 Units Oral DAILY    pantoprazole (PROTONIX) 40 mg in 0.9% sodium chloride 10 mL injection  40 mg IntraVENous Q12H    atorvastatin (LIPITOR) tablet 40 mg  40 mg Oral QHS    sucralfate (CARAFATE) tablet 1 g  1 g Oral QID    tamsulosin (FLOMAX) capsule 0.4 mg  0.4 mg Oral DAILY    [Held by provider] heparin (porcine) injection 5,000 Units  5,000 Units SubCUTAneous Q8H    piperacillin-tazobactam (ZOSYN) 4.5 g in 0.9% sodium chloride (MBP/ADV) 100 mL MBP ###EXTENDED 4-HOUR INFUSION###  4.5 g IntraVENous Q8H          Objective:     Visit Vitals  /56 Pulse 62   Temp 97.9 °F (36.6 °C)   Resp 11   Ht 5' 7\" (1.702 m)   Wt 73.7 kg (162 lb 7.7 oz)   SpO2 100%   BMI 25.45 kg/m²       General appearance: alert, cooperative, no distress, appears stated age, confused  Abdomen: soft, non-tender. Bowel sounds normal. No masses,  no organomegaly  Extremities: extremities normal, atraumatic, no cyanosis or edema    Data Review:      Labs: Results:       Chemistry Recent Labs     04/25/20  0353 04/24/20  0345 04/23/20  1617   * 134* 188*   * 153* 154*   K 3.3* 3.7 4.3   * 124* 124*   CO2 22 23 22   BUN 42* 51* 55*   CREA 1.82* 1.89* 2.13*   CA 8.2* 8.0* 7.9*   AGAP 7 6 8   BUCR 23* 27* 26*      CBC w/Diff Recent Labs     04/25/20  1400 04/25/20  0353 04/24/20  1223 04/24/20  0345  04/23/20  0145   WBC  --  11.4  --  13.5*  --  11.8   RBC  --  2.50*  --  2.79*  --  2.57*   HGB 7.5* 7.9* 8.2* 8.8*   < > 8.5*   HCT 22.1* 23.6* 24.3* 26.1*   < > 25.6*   PLT  --  126*  --  133*  --  182   GRANS  --  88*  --  91*  --  88*   LYMPH  --  7*  --  5*  --  8*   EOS  --  1  --  1  --  2    < > = values in this interval not displayed. Coagulation Recent Labs     04/23/20  0145   PTP 16.9*   INR 1.4*   APTT 46.3*       Liver Enzymes Recent Labs     04/23/20  1617   TP 4.6*   ALB 2.1*   TBILI 0.9   *   SGOT 37   ALT 54      Lipase No results for input(s): LPSE in the last 72 hours. Assessment:   1. History of Melena---Hgb lower that admission but stable. ?? Brown or melanotic stool today. Patient is hungry but confused and cannot provide  'his own consent for procedures  2. History of PUD and gastritis    Recommendation:   1. Continue IV PPI  2. Clear liquid diet  3. Transfuse as  Needed to keep Hgb 7-8  4. I ask nurses to assist in locating family who can provide consent for EGD        Francine Doshi.  Gianna Chaudhry MD, Vicky Galvez, [address]  April 25, 2020  Candiss Rinne

## 2020-04-25 NOTE — PROGRESS NOTES
Problem: Discharge Planning  Goal: *Discharge to safe environment  Outcome: Progressing Towards Goal     Problem: Falls - Risk of  Goal: *Absence of Falls  Description: Document Doyle Davila Fall Risk and appropriate interventions in the flowsheet. Outcome: Progressing Towards Goal  Note: Fall Risk Interventions:  Mobility Interventions: Bed/chair exit alarm    Mentation Interventions: Evaluate medications/consider consulting pharmacy    Medication Interventions: Evaluate medications/consider consulting pharmacy    Elimination Interventions: Bed/chair exit alarm, Call light in reach              Problem: Patient Education: Go to Patient Education Activity  Goal: Patient/Family Education  Outcome: Progressing Towards Goal     Problem: Pressure Injury - Risk of  Goal: *Prevention of pressure injury  Description: Document Alex Scale and appropriate interventions in the flowsheet.   Outcome: Progressing Towards Goal  Note: Pressure Injury Interventions:  Sensory Interventions: Assess changes in LOC    Moisture Interventions: Check for incontinence Q2 hours and as needed    Activity Interventions: Pressure redistribution bed/mattress(bed type)    Mobility Interventions: HOB 30 degrees or less    Nutrition Interventions: Document food/fluid/supplement intake    Friction and Shear Interventions: HOB 30 degrees or less                Problem: Patient Education: Go to Patient Education Activity  Goal: Patient/Family Education  Outcome: Progressing Towards Goal     Problem: Altered nutrition  Goal: *Adequate nutrition  Outcome: Progressing Towards Goal     Problem: Pain  Goal: *Control of Pain  Outcome: Progressing Towards Goal  Goal: *PALLIATIVE CARE:  Alleviation of Pain  Outcome: Progressing Towards Goal     Problem: Tissue Perfusion - Cardiopulmonary, Altered  Goal: *Optimize tissue perfusion  Outcome: Progressing Towards Goal  Goal: *Absence of hypoxia  Outcome: Progressing Towards Goal     Problem: Patient Education: Go to Patient Education Activity  Goal: Patient/Family Education  Outcome: Progressing Towards Goal     Problem: Delirium Treatment  Goal: *Level of consciousness restored to baseline  Outcome: Progressing Towards Goal  Goal: *Level of environmental perceptions restored to baseline  Outcome: Progressing Towards Goal  Goal: *Sensory perception restored to baseline  Outcome: Progressing Towards Goal  Goal: *Emotional stability restored to baseline  Outcome: Progressing Towards Goal  Goal: *Functional assessment restored to baseline  Outcome: Progressing Towards Goal  Goal: *Absence of falls  Outcome: Progressing Towards Goal  Goal: *Will remain free of delirium, CAM Score negative  Outcome: Progressing Towards Goal  Goal: *Cognitive status will be restored to baseline  Outcome: Progressing Towards Goal  Goal: Interventions  Outcome: Progressing Towards Goal     Problem: Patient Education: Go to Patient Education Activity  Goal: Patient/Family Education  Outcome: Progressing Towards Goal     Problem: Patient Education: Go to Patient Education Activity  Goal: Patient/Family Education  Outcome: Progressing Towards Goal     Problem: Non-Violent Restraints  Goal: *Removal from restraints as soon as assessed to be safe  Outcome: Progressing Towards Goal  Goal: *No harm/injury to patient while restraints in use  Outcome: Progressing Towards Goal  Goal: *Patient's dignity will be maintained  Outcome: Progressing Towards Goal  Goal: *Patient Specific Goal (EDIT GOAL, INSERT TEXT)  Outcome: Progressing Towards Goal  Goal: Non-violent Restaints:Standard Interventions  Outcome: Progressing Towards Goal  Goal: Non-violent Restraints:Patient Interventions  Outcome: Progressing Towards Goal  Goal: Patient/Family Education  Outcome: Progressing Towards Goal

## 2020-04-25 NOTE — PROGRESS NOTES
Internal Medicine Progress Note        NAME: Ursula Carbone   :  1934  MRM:  [id number]    Date/Time: 2020        ASSESSMENT/PLAN:     # Acute delirium. Acute confusional state. Treat medical problems as below. Monitor. Restrain as needed. Correct s. Naqa level  Haldol per PCCM team    #  SIRS. Hypothermia , Hypotension and bradycardia. Unclear source of infection. Sepsis work-up started in the emergency room. Treated with Abx vanco and zosyn. Appreciate PCCM input. Warming blankets Used  Needed iv pressors   Blood and urine CS NTD  Repeat UA. # GIB presented as Melena , anemia. GI consulted : 1. Avoid NSAIDs 2. Keep NPO  3. Recommend IV protonix. 4. Currently unstable for sedated endoscopy, will follow clinically and when appropriate will plan for endoscopy. Avoid anticoagulation     # Anemia due to likely acute blood loss . Maintain Hb jacinto than 7, transfuse as needed. # Hypoglycemia. Monitor blood sugar and treat accordingly. BSL stable    #  Dysrhythmia , heart block (LBBB ,long first degree and wide QRS), ho   Sinoatrial node dysfunction  and mild elevation of trop level. Stabilized initially in ICU. Iv pressors as needed   Cardiac stress test  History of  Cardiomyopathy  LVEF 35-40% by previous TTE and Grade II diastolic dysfunction. short runs of NSVT. Lexiscan Myoview testing with inferior and inferoseptal fixed defect more consistent with infarction,  no significant ongoing ischemia. #  Chronic renal failure, stage 3 (moderate) ([facility] [address] (2019). S.cr stable   Monitor Renal function and other labs as indicated. Avoid nephrotoxins , iv Contrast, NSAID. Renally dosing medications. Monitor urine out put. # Hypernatremia. Change IVF to D5. Still on D5 1/2 NS , will change again to D5. # ho   Hypothyroidism . On replacement.  Checked TSH , T4     #  Dysphagia (2020)    Currently NPO  Episode  of aspiration again 24  am SLP to see again    #   HTN (hypertension) (4/21/2020)      -DVT prophylaxis : Heparin.   - Code Status : Full        4/25 : Still hypernatremic and not sure if contribute to his confusion, try to change IV fluid to D5 infusion and monitor closely. Discussed with nurses to reduce sodium in the event if used and whenever possible. Blood pressure stable on pressors  Pending SLP evaluation for his dysphagia. Urine output is good 50 mils/hour. He passed melena small BM today. He still on Levophed. Still confused. Reduce frequency of hemoglobin checks to every 12 hours. per son this happened with past admission as well. Lab Review:     Recent Labs     04/25/20  0353 04/24/20  1223 04/24/20  0345  04/23/20  0145   WBC 11.4  --  13.5*  --  11.8   HGB 7.9* 8.2* 8.8*   < > 8.5*   HCT 23.6* 24.3* 26.1*   < > 25.6*   *  --  133*  --  182    < > = values in this interval not displayed. Recent Labs     04/25/20  0353 04/24/20  0345 04/23/20  1617 04/23/20  0145   * 153* 154* 153*   K 3.3* 3.7 4.3 4.9   * 124* 124* 123*   CO2 22 23 22 27   * 134* 188* 125*   BUN 42* 51* 55* 54*   CREA 1.82* 1.89* 2.13* 1.85*   CA 8.2* 8.0* 7.9* 8.1*   MG  --   --  2.3 2.2   PHOS  --   --  2.8  --    ALB  --   --  2.1*  --    TBILI  --   --  0.9  --    SGOT  --   --  37  --    ALT  --   --  54  --    INR  --   --   --  1.4*     Lab Results   Component Value Date/Time    Glucose (POC) 146 (H) 04/24/2020 02:11 AM    Glucose (POC) 177 (H) 04/23/2020 07:44 PM    Glucose (POC) 185 (H) 04/23/2020 11:48 AM    Glucose (POC) 170 (H) 04/22/2020 07:08 AM    Glucose (POC) 69 (L) 04/22/2020 05:57 AM    Glucose (POC) 96 10/10/2014 09:28 PM    Glucose (POC) 186 (H) 10/10/2014 03:48 PM     No results for input(s): PH, PCO2, PO2, HCO3, FIO2 in the last 72 hours.   Recent Labs     04/23/20  0145   INR 1.4*       Lab Results   Component Value Date/Time    Specimen Description: [facility] 02/06/2014 05:45 PM     Lab Results   Component Value Date/Time    Culture result: No growth (<1,000 CFU/ML) 04/22/2020 09:20 AM    Culture result: NO GROWTH 4 DAYS 04/21/2020 08:15 AM    Culture result: NO GROWTH 4 DAYS 04/21/2020 08:00 AM       All Cardiac Markers in the last 24 hours:   No results found for: CPK, CK, CKMMB, CKMB, RCK3, CKMBT, CKNDX, CKND1, TELLY, TROPT, TROIQ, JOSE, TROPT, TNIPOC, BNP, BNPP       Intervals noted      Subjective:     Chief Complaint:      Seen at bedside. He is a still confused non historian. No Brian of temp      ROS: unable to obtain             Objective:     Vitals:  Last 24hrs VS reviewed since prior progress note. Most recent are:    Visit Vitals  /68   Pulse 75   Temp 97.9 °F (36.6 °C)   Resp 14   Ht 5' 7\" (1.702 m)   Wt 73.7 kg (162 lb 7.7 oz)   SpO2 96%   BMI 25.45 kg/m²     SpO2 Readings from Last 6 Encounters:   04/25/20 96%   04/09/20 98%   02/21/20 92%   01/02/20 96%   12/16/19 97%   09/23/18 99%            Intake/Output Summary (Last 24 hours) at 4/25/2020 1446  Last data filed at 4/25/2020 1100  Gross per 24 hour   Intake 2425.64 ml   Output 695 ml   Net 1730.64 ml          Physical Exam:   Gen:  Comfortable,  confused,  in no acute distress. Appear stated age, Well-developed   HEENT:  Head atraumatic, normocephalic ,  moist mucous membranes. Neck:   Trachea midline , No apparent JVD, Supple, without masses, thyroid non-tender  Resp:  No accessory muscle use,Bilateral BS present, clear breath sounds without wheezes rales or rhonchi  Card:  normal S1, S2 without Gallop . Mild lower leg peripheral edema. Abd:  Soft, non-tender, non-distended, bowel sounds are present . Brina Money Musc:  No cyanosis or clubbing. Skin:  No rashes or ulcers, skin turgor is good. Neuro:  Cranial nerves are grossly intact, no clear area of focal motor weakness, does not follows commands appropriately.   Psych:  Confused , POOR insight,        Telemetry reviewed:   normal sinus rhythm    Medications Reviewed: (see below)    Lab Data Reviewed: (see below)    ______________________________________________________________________    Medications:     Current Facility-Administered Medications   Medication Dose Route Frequency    lidocaine 4 % patch 1 Patch  1 Patch TransDERmal Q24H    dextrose 5% infusion  100 mL/hr IntraVENous CONTINUOUS    haloperidol lactate (HALDOL) injection 2 mg  2 mg IntraMUSCular Q6H PRN    vasopressin (VASOSTRICT) 20 Units in 0.9% sodium chloride 100 mL infusion  0.04 Units/min IntraVENous CONTINUOUS    0.9% sodium chloride infusion 250 mL  250 mL IntraVENous PRN    glucose chewable tablet 16 g  4 Tab Oral PRN    glucagon (GLUCAGEN) injection 1 mg  1 mg IntraMUSCular PRN    dextrose 10% infusion 125-250 mL  125-250 mL IntraVENous PRN    levothyroxine (SYNTHROID) tablet 100 mcg  100 mcg Oral 6am    sodium chloride (NS) flush 5-10 mL  5-10 mL IntraVENous PRN    NOREPINephrine (LEVOPHED) 8 mg in 0.9% NS 250ml infusion  0.5-30 mcg/min IntraVENous TITRATE    aspirin delayed-release tablet 81 mg  81 mg Oral DAILY    cholecalciferol (VITAMIN D3) (1000 Units /25 mcg) tablet 2 Tab  2,000 Units Oral DAILY    pantoprazole (PROTONIX) 40 mg in 0.9% sodium chloride 10 mL injection  40 mg IntraVENous Q12H    atorvastatin (LIPITOR) tablet 40 mg  40 mg Oral QHS    sucralfate (CARAFATE) tablet 1 g  1 g Oral QID    tamsulosin (FLOMAX) capsule 0.4 mg  0.4 mg Oral DAILY    [Held by provider] heparin (porcine) injection 5,000 Units  5,000 Units SubCUTAneous Q8H    piperacillin-tazobactam (ZOSYN) 4.5 g in 0.9% sodium chloride (MBP/ADV) 100 mL MBP ###EXTENDED 4-HOUR INFUSION###  4.5 g IntraVENous Q8H          Total time spent with patient: 35 minutes                  Care Plan discussed with: Patient, Nursing Staff and >50% of time spent in counseling and coordination of care    Discussed:  Care Plan    Prophylaxis:  Hep SQ    Disposition:  Home w/Family           This document in whole or part of it has been produced using voice recognition software. Unrecognized errors in transcription may be present.     Attending Physician: Jesusita Bowen MD

## 2020-04-25 NOTE — ROUTINE PROCESS
Bedside shift change report given to moni (oncoming nurse) by Deng Swanson RN 
 (offgoing nurse).  Report included the following information SBAR, MAR and Recent Results. '

## 2020-04-25 NOTE — PROGRESS NOTES
Problem: Discharge Planning  Goal: *Discharge to safe environment  Outcome: Progressing Towards Goal     Problem: Falls - Risk of  Goal: *Absence of Falls  Description: Document Malik Varela Fall Risk and appropriate interventions in the flowsheet. Outcome: Progressing Towards Goal  Note: Fall Risk Interventions:  Mobility Interventions: Bed/chair exit alarm    Mentation Interventions: Evaluate medications/consider consulting pharmacy    Medication Interventions: Evaluate medications/consider consulting pharmacy    Elimination Interventions: Bed/chair exit alarm, Call light in reach              Problem: Patient Education: Go to Patient Education Activity  Goal: Patient/Family Education  Outcome: Progressing Towards Goal     Problem: Pressure Injury - Risk of  Goal: *Prevention of pressure injury  Description: Document Alex Scale and appropriate interventions in the flowsheet.   Outcome: Progressing Towards Goal  Note: Pressure Injury Interventions:  Sensory Interventions: Assess changes in LOC    Moisture Interventions: Check for incontinence Q2 hours and as needed    Activity Interventions: Pressure redistribution bed/mattress(bed type)    Mobility Interventions: HOB 30 degrees or less    Nutrition Interventions: Document food/fluid/supplement intake    Friction and Shear Interventions: HOB 30 degrees or less                Problem: Patient Education: Go to Patient Education Activity  Goal: Patient/Family Education  Outcome: Progressing Towards Goal     Problem: Altered nutrition  Goal: *Adequate nutrition  Outcome: Progressing Towards Goal     Problem: Pain  Goal: *Control of Pain  Outcome: Progressing Towards Goal  Goal: *PALLIATIVE CARE:  Alleviation of Pain  Outcome: Progressing Towards Goal     Problem: Tissue Perfusion - Cardiopulmonary, Altered  Goal: *Optimize tissue perfusion  Outcome: Progressing Towards Goal  Goal: *Absence of hypoxia  Outcome: Progressing Towards Goal     Problem: Patient Education: Go to Patient Education Activity  Goal: Patient/Family Education  Outcome: Progressing Towards Goal     Problem: Delirium Treatment  Goal: *Level of consciousness restored to baseline  Outcome: Progressing Towards Goal  Goal: *Level of environmental perceptions restored to baseline  Outcome: Progressing Towards Goal  Goal: *Sensory perception restored to baseline  Outcome: Progressing Towards Goal  Goal: *Emotional stability restored to baseline  Outcome: Progressing Towards Goal  Goal: *Functional assessment restored to baseline  Outcome: Progressing Towards Goal  Goal: *Absence of falls  Outcome: Progressing Towards Goal  Goal: *Will remain free of delirium, CAM Score negative  Outcome: Progressing Towards Goal  Goal: *Cognitive status will be restored to baseline  Outcome: Progressing Towards Goal  Goal: Interventions  Outcome: Progressing Towards Goal     Problem: Patient Education: Go to Patient Education Activity  Goal: Patient/Family Education  Outcome: Progressing Towards Goal     Problem: Patient Education: Go to Patient Education Activity  Goal: Patient/Family Education  Outcome: Progressing Towards Goal

## 2020-04-25 NOTE — PROGRESS NOTES
CARDIOLOGY Progress Note    Patient: Keira Giron  MR #: [id number]    Assessment/Plan:     Hospital Problems  Date Reviewed: 4/6/2020          Codes Class Noted POA    Hypoglycemia ICD-10-CM: E16.2  ICD-9-CM: 251.2  4/21/2020 Unknown    Mental status changes, delirium. Defer to primary and ICU team      History of HTN (hypertension)  Currently borderline low blood pressure and is on pressors as needed ICD-10-CM: I10  ICD-9-CM: 401.9  4/21/2020 Unknown        Left bundle branch block, p He is known to have a long first-degree AV block and markedly reduced P wave amplitude. His rhythm has been noteworthy for frequent ectopics and short runs of NSVT. Intermittent atrial fibrillation  Heparin discontinued because of lower GI bleed. GI team managing. Defer to primary team and ICU team  Will use digoxin if needed. Currently stable    Nuclear stress test  Results reviewed personally. No significant ongoing ischemia    Patient with left bundle branch block, intermittent atrial fibrillation and bradycardia. Likely has sinus node dysfunction as well  Suboptimal candidate for pacemaker at this time. Discussed with Dr. Lin Winchester who agreed at this time   ICD-10-CM: I44.7  ICD-9-CM: 426.3  4/21/2020 Unknown      GI bleed: Transfusion as needed. GI team following. Defer management to GI team      Sinoatrial node dysfunction ([facility] ICD-10-CM: I49.5  ICD-9-CM: 427.81  4/21/2020 Unknown        Dysphagia ICD-10-CM: R13.10  ICD-9-CM: 787.20  4/8/2020 Yes        Cardiomyopathy ([facility] ICD-10-CM: I42.9  ICD-9-CM: 425.4  2/20/2020 Yes    Overview Signed 2/20/2020 12:40 AM by Mary Carmen Grover E, DO     LVEF 35-40% by previous TTE. Grade II diastolic dysfunction OOT-18-EX: I51.9  ICD-9-CM: 429.9  2/20/2020 Yes    Overview Signed 2/20/2020 12:41 AM by Tammy Chauhan, DO     Per previous TTE.              Hypothyroidism ICD-10-CM: E03.9  ICD-9-CM: 244.9  2/20/2020 Yes        Bradycardia ICD-10-CM: R00.1  ICD-9-CM: 427.89  2/19/2020 Yes        Hypotension ICD-10-CM: I95.9  ICD-9-CM: 458.9  2/19/2020 Yes        * (Principal) Hypothermia ICD-10-CM: T68Carmela Siu  ICD-9-CM: 991.6  12/23/2019 Unknown        Chronic renal failure, stage 3 (moderate) ([facility] ICD-10-CM: N18.3  ICD-9-CM: 585.3  12/23/2019 Yes        SIRS (systemic inflammatory response syndrome) ([facility] ICD-10-CM: R65.10  ICD-9-CM: 995.90  12/23/2019 Yes    Gastrointestinal bleeding, GI consulted      Subjective     Denies any chest pain or shortness of breath. Is confused and appears to be delirious. This was noted by nursing staff as well. ICU team and primary team managing. patient denies chest pain. He has no specific complaints. He is tired and he would like to sleep    History of Present Illness  hSalom Novoa is a 80 y.o. man that presented to the [facility] ED early this morning with complaints of chest tightness. The symptoms that the patient was experiencing were similar to a earlier admission to this hospital several weeks ago. At that time he was found to have an elevated TSH. He was also having episodic hypothermia. He was started on thyroid replacement therapy at a low dose of 50 mcg daily. He had some episodic severe bradycardia and so his beta-blocker was discontinued. He seemed to improved with those measures and was discharged for follow-up. On presentation to the ED, the patient was hypotensive. He did not respond well to intravenous fluid resuscitation. He was started on Levophed by Dr. Brenda Sellers in the ED. His initial serum troponin was 0.03. TSH remains elevated at 4.64 compared to the 7.65 on 4/6/2020 when the patient was started on Synthroid. At present, patient denies chest discomfort.    .    Past Medical History  Past Medical History:   Diagnosis Date    Difficulty urinating     Elevated PSA     Hematuria, unspecified     Hypercholesterolemia     Hypertension     Incomplete bladder emptying     Urinary retention     UTI (urinary tract infection)          Meds  Current Facility-Administered Medications   Medication Dose Route Frequency    lidocaine 4 % patch 1 Patch  1 Patch TransDERmal Q24H    dextrose 5 % - 0.45% NaCl infusion  100 mL/hr IntraVENous CONTINUOUS    haloperidol lactate (HALDOL) injection 2 mg  2 mg IntraMUSCular Q6H PRN    vasopressin (VASOSTRICT) 20 Units in 0.9% sodium chloride 100 mL infusion  0.04 Units/min IntraVENous CONTINUOUS    0.9% sodium chloride infusion 250 mL  250 mL IntraVENous PRN    glucose chewable tablet 16 g  4 Tab Oral PRN    glucagon (GLUCAGEN) injection 1 mg  1 mg IntraMUSCular PRN    dextrose 10% infusion 125-250 mL  125-250 mL IntraVENous PRN    levothyroxine (SYNTHROID) tablet 100 mcg  100 mcg Oral 6am    sodium chloride (NS) flush 5-10 mL  5-10 mL IntraVENous PRN    NOREPINephrine (LEVOPHED) 8 mg in 0.9% NS 250ml infusion  0.5-30 mcg/min IntraVENous TITRATE    aspirin delayed-release tablet 81 mg  81 mg Oral DAILY    cholecalciferol (VITAMIN D3) (1000 Units /25 mcg) tablet 2 Tab  2,000 Units Oral DAILY    pantoprazole (PROTONIX) 40 mg in 0.9% sodium chloride 10 mL injection  40 mg IntraVENous Q12H    atorvastatin (LIPITOR) tablet 40 mg  40 mg Oral QHS    sucralfate (CARAFATE) tablet 1 g  1 g Oral QID    tamsulosin (FLOMAX) capsule 0.4 mg  0.4 mg Oral DAILY    [Held by provider] heparin (porcine) injection 5,000 Units  5,000 Units SubCUTAneous Q8H    piperacillin-tazobactam (ZOSYN) 4.5 g in 0.9% sodium chloride (MBP/ADV) 100 mL MBP ###EXTENDED 4-HOUR INFUSION###  4.5 g IntraVENous Q8H         Allergies  Allergies   Allergen Reactions    Amlodipine Swelling and Itching    Bactrim [Sulfamethoprim Ds] Rash    Lisinopril Other (comments)     Acute renal failure    Ciprofloxacin Rash and Itching           Family History     Family History   Problem Relation Age of Onset    Cancer Father     Alzheimer Mother     Heart defect Brother          Review of systems Unobtainable at this time      Physical Exam  Visit Vitals  /49   Pulse 68   Temp 97.5 °F (36.4 °C)   Resp 12   Ht 5' 7\" (1.702 m)   Wt 162 lb 7.7 oz (73.7 kg)   SpO2 100%   BMI 25.45 kg/m²       Ursula valdez who is asleep but awakens easily . Head is normocephalic and atraumatic. Trachea appears midline with no noted thyromegaly. Carotids are full without definite bruits. There is no JVD. Decreased breath sound at base of the lungs. No obvious rales noted  Cardiac examination revealed irregular rhythm at time. No obvious murmur   abdomen is soft and nontender. Extremities are without significant edema. Skin is warm and dry. Diagnostic Tests  Labs:   Recent Labs     04/25/20  0353 04/24/20  1223 04/24/20  0345  04/23/20  0145   WBC 11.4  --  13.5*  --  11.8   HGB 7.9* 8.2* 8.8*   < > 8.5*   HCT 23.6* 24.3* 26.1*   < > 25.6*   *  --  133*  --  182    < > = values in this interval not displayed. Recent Labs     04/25/20  0353 04/24/20  0345 04/23/20  1617 04/23/20  0145   * 153* 154* 153*   K 3.3* 3.7 4.3 4.9   * 124* 124* 123*   CO2 22 23 22 27   * 134* 188* 125*   BUN 42* 51* 55* 54*   CREA 1.82* 1.89* 2.13* 1.85*   CA 8.2* 8.0* 7.9* 8.1*   MG  --   --  2.3 2.2   PHOS  --   --  2.8  --    ALB  --   --  2.1*  --    SGOT  --   --  37  --    ALT  --   --  54  --    INR  --   --   --  1.4*       No results for input(s): TROIQ, CPK, CKMB in the last 72 hours.   Last Lipid:  No results found for: CHOL, CHOLX, CHLST, CHOLV, HDL, HDLP, LDL, LDLC, DLDLP, TGLX, TRIGL, TRIGP, CHHD, CHHDX    Kareem montemayor MD

## 2020-04-25 NOTE — PROGRESS NOTES
0725 received bedside verbal report from Carolee, [facility]. Pt awake and alert in bed; follows commands; VSS on room air and 1 pressor  0915 pt asked if his family were waiting outside for him; informed pt of visitor restriction, but told pt that his family has called to check on him  [address] spoke to Rafael, pt son; updated family on pt status  1330 updated Dr. Vandana Sommer on pt status; new orders received  1500 pt had small loose bowel movement; cleaned pt, changed gown and absorbant pads; pt tolerated well; VSS; no signs of distress  1715 pt resting quietly in bed; VSS  1925 Bedside and Verbal shift change report given to JER Zarco (oncoming nurse) by Hetal Hubbard RN and Brian Patton RN (offgoing nurse). Report included the following information SBAR, Kardex, Intake/Output, MAR, Recent Results and Cardiac Rhythm AFib.

## 2020-04-25 NOTE — PROGRESS NOTES
[facility]  ICU Progress Note      Name: Russell President   : 1934   MRN: [id number]   Date: 2020 9:53 AM     []I have reviewed the flowsheet and previous days notes. Events overnight reviewed and discussed with nursing staff. Vital signs and records reviewed. Subjective:  20:    Hemodynamics stable over night. No more bloody stools. Confused requiring sedation. EKG tracing unchanged. Can not seem to wean Levophed. May need fluid challenge again. []The patient is unable to give any meaningful history or review of systems because the patient is:  []Intubated [x]Sedated   []Unresponsive      []The patient is critically ill on      []Mechanical ventilation []Pressors   []BiPAP []                 ROS:Review of systems not obtained due to patient factors. Medication Review:  · Pressors -Levophed at 8 mcg/min  · Sedation -  · Antibiotics -Zosyn  · Pain -none  · GI/ DVT -routine  · Others (other gtts)      Vital Signs:    Visit Vitals  /49   Pulse 68   Temp 97.5 °F (36.4 °C)   Resp 12   Ht 5' 7\" (1.702 m)   Wt 73.7 kg (162 lb 7.7 oz)   SpO2 100%   BMI 25.45 kg/m²       O2 Device: Room air       Temp (24hrs), Av.1 °F (36.2 °C), Min:96.4 °F (35.8 °C), Max:98.2 °F (36.8 °C)       Intake/Output:   Last shift:      No intake/output data recorded. Last 3 shifts:  1901 -  0700  In: 4407.9 [I.V.:4407.9]  Out: 1110 [Urine:1110]    Intake/Output Summary (Last 24 hours) at 2020 0953  Last data filed at 2020 0640  Gross per 24 hour   Intake 2392.93 ml   Output 720 ml   Net 1672.93 ml       Ventilator Settings:  Mode Rate Tidal Volume Pressure FiO2 PEEP                    Peak airway pressure:      Minute ventilation:        ARDS network Guidelines: na  Lung protective strategy and Plateau  Pressure goal < 30 cm H2O goals  Oxygenation Goals PaO2 55-80 mm Hg or SaO2 88-95%  PH goal 7.30-7.45    VAP bundle:na  Reviewed.   [place] tube to suction at 20-30 cm Hg.  Maintain [place] tube with 5-10ml air every 4 hours  Routine oral care every 4 hours  Elevation of head > 45 degree  Daily sedation holiday and SBT evaluation starting at 6.00am.    Physical Exam:    General: Intubated/sedated;    HEENT:  Anicteric sclerae; pink palpebral conjunctivae; mucosa moist  Resp:  Symmetrical chest rise, no accessory muscle use; good AE Bilat; no rales/ wheezing/ rhonchi noted  CV:  S1, S2 present; RRR; no m/g/r  GI:  Abdomen soft, non-tender; (+) active bowel sounds  Extremities:  +2 pulses on all extremities; no edema/ cyanosis/ clubbing noted  Skin:  Warm; no rashes/ lesions noted  Neurologic:  Non-focal  Devices:  No NGT/OGT, Central line/ PICC, ETT/tracheostomy, chest tube      DATA:     Current Facility-Administered Medications   Medication Dose Route Frequency    lidocaine 4 % patch 1 Patch  1 Patch TransDERmal Q24H    dextrose 5 % - 0.45% NaCl infusion  100 mL/hr IntraVENous CONTINUOUS    haloperidol lactate (HALDOL) injection 2 mg  2 mg IntraMUSCular Q6H PRN    vasopressin (VASOSTRICT) 20 Units in 0.9% sodium chloride 100 mL infusion  0.04 Units/min IntraVENous CONTINUOUS    0.9% sodium chloride infusion 250 mL  250 mL IntraVENous PRN    glucose chewable tablet 16 g  4 Tab Oral PRN    glucagon (GLUCAGEN) injection 1 mg  1 mg IntraMUSCular PRN    dextrose 10% infusion 125-250 mL  125-250 mL IntraVENous PRN    levothyroxine (SYNTHROID) tablet 100 mcg  100 mcg Oral 6am    sodium chloride (NS) flush 5-10 mL  5-10 mL IntraVENous PRN    NOREPINephrine (LEVOPHED) 8 mg in 0.9% NS 250ml infusion  0.5-30 mcg/min IntraVENous TITRATE    aspirin delayed-release tablet 81 mg  81 mg Oral DAILY    cholecalciferol (VITAMIN D3) (1000 Units /25 mcg) tablet 2 Tab  2,000 Units Oral DAILY    pantoprazole (PROTONIX) 40 mg in 0.9% sodium chloride 10 mL injection  40 mg IntraVENous Q12H    atorvastatin (LIPITOR) tablet 40 mg  40 mg Oral QHS    sucralfate (CARAFATE) tablet 1 g  1 g Oral QID    tamsulosin (FLOMAX) capsule 0.4 mg  0.4 mg Oral DAILY    [Held by provider] heparin (porcine) injection 5,000 Units  5,000 Units SubCUTAneous Q8H    piperacillin-tazobactam (ZOSYN) 4.5 g in 0.9% sodium chloride (MBP/ADV) 100 mL MBP ###EXTENDED 4-HOUR INFUSION###  4.5 g IntraVENous Q8H         Labs: Results:       Chemistry Recent Labs     04/25/20  0353 04/24/20  0345 04/23/20  1617   * 134* 188*   * 153* 154*   K 3.3* 3.7 4.3   * 124* 124*   CO2 22 23 22   BUN 42* 51* 55*   CREA 1.82* 1.89* 2.13*   CA 8.2* 8.0* 7.9*   AGAP 7 6 8   BUCR 23* 27* 26*   AP  --   --  204*   TP  --   --  4.6*   ALB  --   --  2.1*   GLOB  --   --  2.5   AGRAT  --   --  0.8      CBC w/Diff Recent Labs     04/25/20  0353 04/24/20  1223 04/24/20  0345  04/23/20  0145   WBC 11.4  --  13.5*  --  11.8   RBC 2.50*  --  2.79*  --  2.57*   HGB 7.9* 8.2* 8.8*   < > 8.5*   HCT 23.6* 24.3* 26.1*   < > 25.6*   *  --  133*  --  182   GRANS 88*  --  91*  --  88*   LYMPH 7*  --  5*  --  8*   EOS 1  --  1  --  2    < > = values in this interval not displayed. Coagulation Recent Labs     04/23/20  0145   PTP 16.9*   INR 1.4*   APTT 46.3*       Liver Enzymes Recent Labs     04/23/20 1617   TP 4.6*   ALB 2.1*   *   SGOT 37      ABG No results found for: PH, PHI, PCO2, PCO2I, PO2, PO2I, HCO3, HCO3I, FIO2, FIO2I   Microbiology No results for input(s): CULT in the last 72 hours. Telemetry: []Sinus []A-flutter []Paced    []A-fib []Multiple PVCs                  Imaging:    CXR [date]:    CT HEAD/CHEST/ABD/PELVIS [date]:    []I have personally reviewed the patients radiographs  []Radiographs reviewed with radiologist   []No change from prior, tubes and lines in adequate position  []Improved   []Worsening        IMPRESSION:   · Acute Respiratory Failure  ·    RECOMMENDATIONS:   · Resp: Titrate FiO2/ supp O2 for SpO2 >90%;   · I/D: Afebrile; aleukocytosis;  Sepsis bundle per hospital protocol, f/u BxCx/UC, Sputum Cx's. Trend LA q4 until normal. ABX:Zosyn  Deescalate ABX once Cx's finalize. · Hem/Onc: Daily CBC; H/H, and plts are stable  · CVS: HD stable; Actively titrate vasopressors aim MAP >98YVVR,   · Metabolic: Daily BMP; monitor e-lytes; replace PRN  · Renal: Trend Renal indices; Diuresis, Mojica to BSD,   · Endocrine: POC Glucose q6; Check TSH level  · GI: SUP, Trend LFTs, Zofran PRN for N/V   · Musc/Skin: No acute issues, wound care  · Neuro: PRN pain medications, sedation as needed  · Fluids:  · Discussed in Interdisciplinary Rounds      Best Practices/ Safety Bundles:  · Sepsis Bundle per Hospital Protocol  · CAUTI Bundle  · Electrolyte Replacement Bundle  · Glycemic control goal <180; avoid Hypoglycemia  · IHI ICU Bundles:  ·  Mojica Bundle Followed    · Mech Vent patients:   · VAP bundle, Aim to keep peak plateau pressure 72-81QP H2O  · Aspiration Precautions - HOB >30'  · Daily sedation holiday as indicated  · SBT as tolerated/appropriate  · Titrate FiO2 for SpO2 >94%  · Aggressive Pulmonary Hygeiene  · Stress ulcer prophylaxis: protonix   · DVT prophylaxis: SCD's  · Need for Lines, mojica assessed. · Restraints need.   ·       The patient is: [] acutely ill Risk of deterioration: [] moderate    [] critically ill  [] high     []See my orders for details    My assessment/plan was discussed with:  [x]Nursing []PT/OT    []Respiratory therapy []   []Family []     MD Liu Atkinson

## 2020-04-26 LAB
ANION GAP SERPL CALC-SCNC: 5 MMOL/L (ref 3–18)
BASOPHILS # BLD: 0 K/UL (ref 0–0.1)
BASOPHILS NFR BLD: 0 % (ref 0–2)
BUN SERPL-MCNC: 34 MG/DL (ref 7–18)
BUN/CREAT SERPL: 22 (ref 12–20)
CALCIUM SERPL-MCNC: 8 MG/DL (ref 8.5–10.1)
CHLORIDE SERPL-SCNC: 123 MMOL/L (ref 100–111)
CO2 SERPL-SCNC: 23 MMOL/L (ref 21–32)
CREAT SERPL-MCNC: 1.52 MG/DL (ref 0.6–1.3)
DIFFERENTIAL METHOD BLD: ABNORMAL
EOSINOPHIL # BLD: 0.2 K/UL (ref 0–0.4)
EOSINOPHIL NFR BLD: 3 % (ref 0–5)
ERYTHROCYTE [DISTWIDTH] IN BLOOD BY AUTOMATED COUNT: 16.9 % (ref 11.6–14.5)
GLUCOSE BLD STRIP.AUTO-MCNC: 96 MG/DL (ref 70–110)
GLUCOSE SERPL-MCNC: 108 MG/DL (ref 74–99)
HCT VFR BLD AUTO: 22 % (ref 36–48)
HCT VFR BLD AUTO: 22.6 % (ref 36–48)
HGB BLD-MCNC: 7.3 G/DL (ref 13–16)
HGB BLD-MCNC: 7.6 G/DL (ref 13–16)
LYMPHOCYTES # BLD: 0.6 K/UL (ref 0.9–3.6)
LYMPHOCYTES NFR BLD: 7 % (ref 21–52)
MAGNESIUM SERPL-MCNC: 1.9 MG/DL (ref 1.6–2.6)
MCH RBC QN AUTO: 31.5 PG (ref 24–34)
MCHC RBC AUTO-ENTMCNC: 33.2 G/DL (ref 31–37)
MCV RBC AUTO: 94.8 FL (ref 74–97)
MONOCYTES # BLD: 0.3 K/UL (ref 0.05–1.2)
MONOCYTES NFR BLD: 4 % (ref 3–10)
NEUTS SEG # BLD: 6.8 K/UL (ref 1.8–8)
NEUTS SEG NFR BLD: 86 % (ref 40–73)
PLATELET # BLD AUTO: 130 K/UL (ref 135–420)
PMV BLD AUTO: 11 FL (ref 9.2–11.8)
POTASSIUM SERPL-SCNC: 2.9 MMOL/L (ref 3.5–5.5)
POTASSIUM SERPL-SCNC: 3.1 MMOL/L (ref 3.5–5.5)
PREALB SERPL-MCNC: 9.5 MG/DL (ref 20–40)
RBC # BLD AUTO: 2.32 M/UL (ref 4.7–5.5)
SODIUM SERPL-SCNC: 151 MMOL/L (ref 136–145)
WBC # BLD AUTO: 7.8 K/UL (ref 4.6–13.2)

## 2020-04-26 PROCEDURE — 84134 ASSAY OF PREALBUMIN: CPT

## 2020-04-26 PROCEDURE — 74011000258 HC RX REV CODE- 258: Performed by: INTERNAL MEDICINE

## 2020-04-26 PROCEDURE — 85025 COMPLETE CBC W/AUTO DIFF WBC: CPT

## 2020-04-26 PROCEDURE — 74011000250 HC RX REV CODE- 250: Performed by: HOSPITALIST

## 2020-04-26 PROCEDURE — 74011000250 HC RX REV CODE- 250

## 2020-04-26 PROCEDURE — 74011250636 HC RX REV CODE- 250/636: Performed by: INTERNAL MEDICINE

## 2020-04-26 PROCEDURE — 65610000006 HC RM INTENSIVE CARE

## 2020-04-26 PROCEDURE — 74011250636 HC RX REV CODE- 250/636: Performed by: HOSPITALIST

## 2020-04-26 PROCEDURE — C9113 INJ PANTOPRAZOLE SODIUM, VIA: HCPCS | Performed by: INTERNAL MEDICINE

## 2020-04-26 PROCEDURE — 83735 ASSAY OF MAGNESIUM: CPT

## 2020-04-26 PROCEDURE — 74011000258 HC RX REV CODE- 258

## 2020-04-26 PROCEDURE — 74011000250 HC RX REV CODE- 250: Performed by: INTERNAL MEDICINE

## 2020-04-26 PROCEDURE — 82962 GLUCOSE BLOOD TEST: CPT

## 2020-04-26 PROCEDURE — 84132 ASSAY OF SERUM POTASSIUM: CPT

## 2020-04-26 PROCEDURE — 85018 HEMOGLOBIN: CPT

## 2020-04-26 PROCEDURE — 74011250636 HC RX REV CODE- 250/636

## 2020-04-26 RX ORDER — POTASSIUM CHLORIDE 7.45 MG/ML
10 INJECTION INTRAVENOUS
Status: COMPLETED | OUTPATIENT
Start: 2020-04-26 | End: 2020-04-27

## 2020-04-26 RX ORDER — POTASSIUM CHLORIDE 7.45 MG/ML
10 INJECTION INTRAVENOUS
Status: DISPENSED | OUTPATIENT
Start: 2020-04-26 | End: 2020-04-26

## 2020-04-26 RX ADMIN — POTASSIUM CHLORIDE 10 MEQ: 7.46 INJECTION, SOLUTION INTRAVENOUS at 19:00

## 2020-04-26 RX ADMIN — POTASSIUM CHLORIDE 10 MEQ: 7.46 INJECTION, SOLUTION INTRAVENOUS at 04:14

## 2020-04-26 RX ADMIN — PIPERACILLIN SODIUM AND TAZOBACTAM SODIUM 4.5 G: 4; .5 INJECTION, POWDER, LYOPHILIZED, FOR SOLUTION INTRAVENOUS at 13:58

## 2020-04-26 RX ADMIN — SODIUM CHLORIDE 40 MG: 9 INJECTION, SOLUTION INTRAMUSCULAR; INTRAVENOUS; SUBCUTANEOUS at 21:00

## 2020-04-26 RX ADMIN — POTASSIUM CHLORIDE 10 MEQ: 7.46 INJECTION, SOLUTION INTRAVENOUS at 17:07

## 2020-04-26 RX ADMIN — POTASSIUM CHLORIDE 10 MEQ: 7.46 INJECTION, SOLUTION INTRAVENOUS at 05:19

## 2020-04-26 RX ADMIN — POTASSIUM CHLORIDE 10 MEQ: 7.46 INJECTION, SOLUTION INTRAVENOUS at 02:57

## 2020-04-26 RX ADMIN — SODIUM CHLORIDE 40 MG: 9 INJECTION, SOLUTION INTRAMUSCULAR; INTRAVENOUS; SUBCUTANEOUS at 09:21

## 2020-04-26 RX ADMIN — POTASSIUM CHLORIDE 10 MEQ: 7.46 INJECTION, SOLUTION INTRAVENOUS at 15:53

## 2020-04-26 RX ADMIN — PIPERACILLIN SODIUM AND TAZOBACTAM SODIUM 4.5 G: 4; .5 INJECTION, POWDER, LYOPHILIZED, FOR SOLUTION INTRAVENOUS at 22:32

## 2020-04-26 RX ADMIN — DEXTROSE MONOHYDRATE 100 ML/HR: 5 INJECTION, SOLUTION INTRAVENOUS at 02:57

## 2020-04-26 RX ADMIN — DEXTROSE MONOHYDRATE 100 ML/HR: 5 INJECTION, SOLUTION INTRAVENOUS at 15:49

## 2020-04-26 RX ADMIN — PIPERACILLIN SODIUM AND TAZOBACTAM SODIUM 4.5 G: 4; .5 INJECTION, POWDER, LYOPHILIZED, FOR SOLUTION INTRAVENOUS at 05:19

## 2020-04-26 RX ADMIN — MAGNESIUM SULFATE HEPTAHYDRATE: 500 INJECTION, SOLUTION INTRAMUSCULAR; INTRAVENOUS at 21:32

## 2020-04-26 RX ADMIN — POTASSIUM CHLORIDE 10 MEQ: 7.46 INJECTION, SOLUTION INTRAVENOUS at 18:05

## 2020-04-26 NOTE — PROGRESS NOTES
Problem: Discharge Planning  Goal: *Discharge to safe environment  Outcome: Progressing Towards Goal     Problem: Falls - Risk of  Goal: *Absence of Falls  Description: Document Miles Sol Fall Risk and appropriate interventions in the flowsheet. Outcome: Progressing Towards Goal  Note: Fall Risk Interventions:  Mobility Interventions: Bed/chair exit alarm    Mentation Interventions: Adequate sleep, hydration, pain control, Bed/chair exit alarm, Door open when patient unattended, More frequent rounding, Reorient patient, Room close to nurse's station    Medication Interventions: Bed/chair exit alarm, Evaluate medications/consider consulting pharmacy    Elimination Interventions: Bed/chair exit alarm, Call light in reach, Toileting schedule/hourly rounds              Problem: Patient Education: Go to Patient Education Activity  Goal: Patient/Family Education  Outcome: Progressing Towards Goal     Problem: Pressure Injury - Risk of  Goal: *Prevention of pressure injury  Description: Document Alex Scale and appropriate interventions in the flowsheet. Outcome: Progressing Towards Goal  Note: Pressure Injury Interventions:  Sensory Interventions: Assess changes in LOC, Check visual cues for pain, Keep linens dry and wrinkle-free, Minimize linen layers, Turn and reposition approx. every two hours (pillows and wedges if needed)    Moisture Interventions: Absorbent underpads, Apply protective barrier, creams and emollients, Check for incontinence Q2 hours and as needed, Internal/External urinary devices, Minimize layers    Activity Interventions: Pressure redistribution bed/mattress(bed type)    Mobility Interventions: HOB 30 degrees or less, Pressure redistribution bed/mattress (bed type), Turn and reposition approx.  every two hours(pillow and wedges)    Nutrition Interventions: Discuss nutritional consult with provider    Friction and Shear Interventions: Apply protective barrier, creams and emollients, HOB 30 degrees or less, Minimize layers                Problem: Patient Education: Go to Patient Education Activity  Goal: Patient/Family Education  Outcome: Progressing Towards Goal     Problem: Altered nutrition  Goal: *Adequate nutrition  Outcome: Progressing Towards Goal     Problem: Pain  Goal: *Control of Pain  Outcome: Progressing Towards Goal  Goal: *PALLIATIVE CARE:  Alleviation of Pain  Outcome: Progressing Towards Goal     Problem: Tissue Perfusion - Cardiopulmonary, Altered  Goal: *Optimize tissue perfusion  Outcome: Progressing Towards Goal  Goal: *Absence of hypoxia  Outcome: Progressing Towards Goal     Problem: Patient Education: Go to Patient Education Activity  Goal: Patient/Family Education  Outcome: Progressing Towards Goal     Problem: Delirium Treatment  Goal: *Level of consciousness restored to baseline  Outcome: Progressing Towards Goal  Goal: *Level of environmental perceptions restored to baseline  Outcome: Progressing Towards Goal  Goal: *Sensory perception restored to baseline  Outcome: Progressing Towards Goal  Goal: *Emotional stability restored to baseline  Outcome: Progressing Towards Goal  Goal: *Functional assessment restored to baseline  Outcome: Progressing Towards Goal  Goal: *Absence of falls  Outcome: Progressing Towards Goal  Goal: *Will remain free of delirium, CAM Score negative  Outcome: Progressing Towards Goal  Goal: *Cognitive status will be restored to baseline  Outcome: Progressing Towards Goal  Goal: Interventions  Outcome: Progressing Towards Goal     Problem: Patient Education: Go to Patient Education Activity  Goal: Patient/Family Education  Outcome: Progressing Towards Goal     Problem: Patient Education: Go to Patient Education Activity  Goal: Patient/Family Education  Outcome: Progressing Towards Goal     Problem: Non-Violent Restraints  Goal: *Removal from restraints as soon as assessed to be safe  Outcome: Progressing Towards Goal  Goal: *No harm/injury to patient while restraints in use  Outcome: Progressing Towards Goal  Goal: *Patient's dignity will be maintained  Outcome: Progressing Towards Goal  Goal: *Patient Specific Goal (EDIT GOAL, INSERT TEXT)  Outcome: Progressing Towards Goal  Goal: Non-violent Restaints:Standard Interventions  Outcome: Progressing Towards Goal  Goal: Non-violent Restraints:Patient Interventions  Outcome: Progressing Towards Goal  Goal: Patient/Family Education  Outcome: Progressing Towards Goal

## 2020-04-26 NOTE — DIABETES MGMT
Nutrition/Glycemic Control: For first TPN recommend:75 g amino acids/d 250 g dextrose/d with 250ml 20%  Intralipid at 100ml/hour TPN will provide 75 g protein, 1350 non-protein calories/d for initial order. Recommend DC IV fluids when TPN is infusing. Lab Results   Component Value Date/Time    Sodium 151 (H) 04/26/2020 01:40 AM    Potassium 2.9 (LL) 04/26/2020 01:40 AM    Chloride 123 (H) 04/26/2020 01:40 AM    CO2 23 04/26/2020 01:40 AM    Anion gap 5 04/26/2020 01:40 AM    Glucose 108 (H) 04/26/2020 01:40 AM    BUN 34 (H) 04/26/2020 01:40 AM    Creatinine 1.52 (H) 04/26/2020 01:40 AM    BUN/Creatinine ratio 22 (H) 04/26/2020 01:40 AM    GFR est AA 53 (L) 04/26/2020 01:40 AM    GFR est non-AA 44 (L) 04/26/2020 01:40 AM    Calcium 8.0 (L) 04/26/2020 01:40 AM     Albumin 2.5 Mg 1.9 Phos 2.8    Zero Na in TPN.   Paul PEREZ

## 2020-04-26 NOTE — PROGRESS NOTES
[facility]  ICU Progress Note      Name: Kelly Roman   : 1934   MRN: [id number]   Date: 2020 10:39 AM     []I have reviewed the flowsheet and previous days notes. Events overnight reviewed and discussed with nursing staff. Vital signs and records reviewed. Subjective:  20:    Stable and confused in unit. No bloody BM for several days. Needs free water. Start TPN. Minimize sedation for confusion. If dilute may need one more transfusion for Hct of 22%    []The patient is unable to give any meaningful history or review of systems because the patient is:  []Intubated [x]Sedated   []Unresponsive      []The patient is critically ill on      []Mechanical ventilation [x]Pressors   []BiPAP []                 ROS:Review of systems not obtained due to patient factors.     Medication Review:  · Pressors -Levophed at 2 mcg/kg/min  ·  Sedation -prn  · Antibiotics -Zosyn  · Pain -min  · GI/ DVT -SCD's and Protonix  · Others (other gtts)      Vital Signs:    Visit Vitals  /72   Pulse 65   Temp 97.6 °F (36.4 °C)   Resp 11   Ht 5' 7\" (1.702 m)   Wt 73.7 kg (162 lb 7.7 oz)   SpO2 97%   BMI 25.45 kg/m²       O2 Device: Room air       Temp (24hrs), Av.6 °F (36.4 °C), Min:97.4 °F (36.3 °C), Max:97.9 °F (36.6 °C)       Intake/Output:   Last shift:       07 - 1900  In: 415.2 [I.V.:415.2]  Out: 100 [Urine:100]  Last 3 shifts:  190 -  0700  In: 4731.5 [I.V.:4731.5]  Out: 1035 [Urine:1035]    Intake/Output Summary (Last 24 hours) at 2020 1039  Last data filed at 2020 1000  Gross per 24 hour   Intake 3222.18 ml   Output 635 ml   Net 2587.18 ml       Ventilator Settings:  Mode Rate Tidal Volume Pressure FiO2 PEEP                    Peak airway pressure:      Minute ventilation:        ARDS network Guidelines: na  Lung protective strategy and Plateau  Pressure goal < 30 cm H2O goals  Oxygenation Goals PaO2 55-80 mm Hg or SaO2 88-95%  PH goal 7. 30-7.45    VAP bundle:  Reviewed. Julissa tube to suction at 20-30 cm Hg. Maintain [place] tube with 5-10ml air every 4 hours  Routine oral care every 4 hours  Elevation of head > 45 degree  Daily sedation holiday and SBT evaluation starting at 6.00am.    Physical Exam:    General: Intubated/sedated;    HEENT:  Anicteric sclerae; pink palpebral conjunctivae; mucosa moist  Resp:  Symmetrical chest rise, no accessory muscle use; good AE Bilat; no rales/ wheezing/ rhonchi noted  CV:  S1, S2 present; RRR; no m/g/r  GI:  Abdomen soft, non-tender; (+) active bowel sounds  Extremities:  +2 pulses on all extremities; no edema/ cyanosis/ clubbing noted  Skin:  Warm; no rashes/ lesions noted  Neurologic:  Non-focal  Devices:  No NGT/OGT, Central line/ PICC, ETT/tracheostomy, chest tube      DATA:     Current Facility-Administered Medications   Medication Dose Route Frequency    lidocaine 4 % patch 1 Patch  1 Patch TransDERmal Q24H    dextrose 5% infusion  100 mL/hr IntraVENous CONTINUOUS    haloperidol lactate (HALDOL) injection 2 mg  2 mg IntraMUSCular Q6H PRN    vasopressin (VASOSTRICT) 20 Units in 0.9% sodium chloride 100 mL infusion  0.04 Units/min IntraVENous CONTINUOUS    0.9% sodium chloride infusion 250 mL  250 mL IntraVENous PRN    glucose chewable tablet 16 g  4 Tab Oral PRN    glucagon (GLUCAGEN) injection 1 mg  1 mg IntraMUSCular PRN    dextrose 10% infusion 125-250 mL  125-250 mL IntraVENous PRN    levothyroxine (SYNTHROID) tablet 100 mcg  100 mcg Oral 6am    sodium chloride (NS) flush 5-10 mL  5-10 mL IntraVENous PRN    NOREPINephrine (LEVOPHED) 8 mg in 0.9% NS 250ml infusion  0.5-30 mcg/min IntraVENous TITRATE    aspirin delayed-release tablet 81 mg  81 mg Oral DAILY    cholecalciferol (VITAMIN D3) (1000 Units /25 mcg) tablet 2 Tab  2,000 Units Oral DAILY    pantoprazole (PROTONIX) 40 mg in 0.9% sodium chloride 10 mL injection  40 mg IntraVENous Q12H    atorvastatin (LIPITOR) tablet 40 mg  40 mg Oral QHS    sucralfate (CARAFATE) tablet 1 g  1 g Oral QID    tamsulosin (FLOMAX) capsule 0.4 mg  0.4 mg Oral DAILY    [Held by provider] heparin (porcine) injection 5,000 Units  5,000 Units SubCUTAneous Q8H    piperacillin-tazobactam (ZOSYN) 4.5 g in 0.9% sodium chloride (MBP/ADV) 100 mL MBP ###EXTENDED 4-HOUR INFUSION###  4.5 g IntraVENous Q8H         Labs: Results:       Chemistry Recent Labs     04/26/20  0140 04/25/20  0353 04/24/20  0345 04/23/20  1617   * 113* 134* 188*   * 153* 153* 154*   K 2.9* 3.3* 3.7 4.3   * 124* 124* 124*   CO2 23 22 23 22   BUN 34* 42* 51* 55*   CREA 1.52* 1.82* 1.89* 2.13*   CA 8.0* 8.2* 8.0* 7.9*   AGAP 5 7 6 8   BUCR 22* 23* 27* 26*   AP  --   --   --  204*   TP  --   --   --  4.6*   ALB  --   --   --  2.1*   GLOB  --   --   --  2.5   AGRAT  --   --   --  0.8      CBC w/Diff Recent Labs     04/26/20  0140 04/25/20  1400 04/25/20 0353  04/24/20  0345   WBC 7.8  --  11.4  --  13.5*   RBC 2.32*  --  2.50*  --  2.79*   HGB 7.3* 7.5* 7.9*   < > 8.8*   HCT 22.0* 22.1* 23.6*   < > 26.1*   *  --  126*  --  133*   GRANS 86*  --  88*  --  91*   LYMPH 7*  --  7*  --  5*   EOS 3  --  1  --  1    < > = values in this interval not displayed. Coagulation No results for input(s): PTP, INR, APTT, INREXT in the last 72 hours. Liver Enzymes Recent Labs     04/23/20  1617   TP 4.6*   ALB 2.1*   *   SGOT 37      ABG No results found for: PH, PHI, PCO2, PCO2I, PO2, PO2I, HCO3, HCO3I, FIO2, FIO2I   Microbiology No results for input(s): CULT in the last 72 hours.        Telemetry: []Sinus []A-flutter []Paced    []A-fib []Multiple PVCs                  Imaging:    CXR [date]:    CT HEAD/CHEST/ABD/PELVIS [date]:    []I have personally reviewed the patients radiographs  []Radiographs reviewed with radiologist   []No change from prior, tubes and lines in adequate position  []Improved   []Worsening        IMPRESSION:   · Acute Respiratory Failure  · RECOMMENDATIONS:   · Resp: Titrate FiO2/ supp O2 for SpO2 >90%;   · I/D: Afebrile; aleukocytosis; Sepsis bundle per hospital protocol, f/u BxCx/UC, Sputum Cx's. Trend LA q4 until normal. ABX:as above Deescalate ABX once Cx's finalize. · Hem/Onc: Daily CBC; H/H, and plts are stable  · CVS: HD stable; Actively titrate vasopressors aim MAP >25ZIRF,   · Metabolic: Daily BMP; monitor e-lytes; replace PRN needs free water. Start TPN  · Renal: Trend Renal indices; Diuresis, Mojica to BSD,   · Endocrine: POC Glucose q6;  · GI: SUP, Trend LFTs, Zofran PRN for N/V   · Musc/Skin: No acute issues, wound care  · Neuro: PRN pain medications, +/- sedation  · Fluids:  · Discussed in Interdisciplinary Rounds      Best Practices/ Safety Bundles:  · Sepsis Bundle per Hospital Protocol  · CAUTI Bundle  · Electrolyte Replacement Bundle  · Glycemic control goal <180; avoid Hypoglycemia  · IHI ICU Bundles:  ·  Central Line Bundle Followed     · Mech Vent patients:   · VAP bundle, Aim to keep peak plateau pressure 37-25AZ H2O  · Aspiration Precautions - HOB >30'  · Daily sedation holiday as indicated  · SBT as tolerated/appropriate  · Titrate FiO2 for SpO2 >94%  · Aggressive Pulmonary Hygeiene  · Stress ulcer prophylaxis: as above   · DVT prophylaxis: as above  · Need for Lines, mojica assessed. · Restraints need.       The patient is: [] acutely ill Risk of deterioration: [] moderate    [] critically ill  [] high     []See my orders for details    My assessment/plan was discussed with:  [x]Nursing []PT/OT    []Respiratory therapy []   []Family []     Mar Locke MD    @[facility]@

## 2020-04-26 NOTE — PROGRESS NOTES
0768 received bedside, verbal report from Carolee, [facility]. Pt awake and alert in bed; oriented to self and time; VSS on room air and 1 pressor  0910 Dr. Madelyn Verdugo at pt bedside; new orders received  [phone] spoke with Jorje mccarty TPN; new order received  1145 pt left arm weeping; changed pt gown, place absorbant pad beneath arm; pt tolerated well  1200 pt peeled electrodes off chest; reminded pt the electrodes were necessary and replaced with new electrodes  1324 pt son, Kyle Carbone, called; updated family on pt status  1407 pt left arm weeping, saturating the dressing; removed old dressing; applied abd pad and redressed with gauze; pt tolerated well  1720 pt removed dressing from arm; did not reapply dressing at this time  1815 pt had small black bowel movement; changed pt gown and absorbant pads; pt tolerated well; VSS  1915 Bedside and Verbal shift change report given to JER Zarco (oncoming nurse) by Huber Blevins RN and Monique Argueta RN (offgoing nurse). Report included the following information SBAR, Kardex, Intake/Output, MAR, Recent Results and Cardiac Rhythm AFIB.

## 2020-04-26 NOTE — PROGRESS NOTES
CARDIOLOGY Progress Note    Patient: Fernie Robles  MR #: [id number]    Assessment/Plan:     Hospital Problems  Date Reviewed: 4/6/2020          Codes Class Noted POA    Hypoglycemia ICD-10-CM: E16.2  ICD-9-CM: 251.2  4/21/2020 Unknown    Mental status changes, delirium. Defer to primary and ICU team      History of HTN (hypertension)  Currently borderline low blood pressure and is on pressors as needed ICD-10-CM: I10  ICD-9-CM: 401.9  4/21/2020 Unknown        Left bundle branch block, p He is known to have a long first-degree AV block and markedly reduced P wave amplitude. His rhythm has been noteworthy for frequent ectopics and short runs of NSVT. Intermittent atrial fibrillation  Heparin discontinued because of lower GI bleed. GI team managing. Defer to primary team and ICU team  Will use digoxin if needed. Currently stable  Today appears to be in sinus bradycardia    Nuclear stress test  No significant ongoing ischemia    Patient with left bundle branch block, intermittent atrial fibrillation and bradycardia. Likely has sinus node dysfunction as well  Suboptimal candidate for pacemaker at this time. Discussed with Dr. Ophelia Thomas who agreed at this time   ICD-10-CM: I44.7  ICD-9-CM: 426.3  4/21/2020 Unknown      GI bleed: Transfusion as needed. GI team following. Defer management to GI team. Possible ? EGD      Sinoatrial node dysfunction (HCC) ICD-10-CM: I49.5  ICD-9-CM: 427.81  4/21/2020 Unknown        Dysphagia ICD-10-CM: R13.10  ICD-9-CM: 787.20  4/8/2020 Yes        Cardiomyopathy ([address] ICD-10-CM: I42.9  ICD-9-CM: 425.4  2/20/2020 Yes    Overview Signed 2/20/2020 12:40 AM by Marcy KRAUES, DO     LVEF 35-40% by previous TTE. Grade II diastolic dysfunction FUD-22-UM: I51.9  ICD-9-CM: 429.9  2/20/2020 Yes    Overview Signed 2/20/2020 12:41 AM by Nadine Lang, DO     Per previous TTE.              Hypothyroidism ICD-10-CM: E03.9  ICD-9-CM: 244.9  2/20/2020 Yes        Bradycardia ICD-10-CM: R00.1  ICD-9-CM: 427.89  2/19/2020 Yes        Hypotension ICD-10-CM: I95.9  ICD-9-CM: 458.9  2/19/2020 Yes        * (Principal) Hypothermia ICD-10-CM: T68. Maurizio Mijares  ICD-9-CM: 991.6  12/23/2019 Unknown        Chronic renal failure, stage 3 (moderate) ([facility] ICD-10-CM: N18.3  ICD-9-CM: 585.3  12/23/2019 Yes        SIRS (systemic inflammatory response syndrome) ([facility] ICD-10-CM: R65.10  ICD-9-CM: 995.90  12/23/2019 Yes    Gastrointestinal bleeding, GI consulted        Subjective     Denies any chest pain or shortness of breath. Still confused. This was noted by nursing staff as well. History of Present Illness  Earline Mccabe is a 80 y.o. man that presented to the [facility] ED early this morning with complaints of chest tightness. The symptoms that the patient was experiencing were similar to a earlier admission to this hospital several weeks ago. At that time he was found to have an elevated TSH. He was also having episodic hypothermia. He was started on thyroid replacement therapy at a low dose of 50 mcg daily. He had some episodic severe bradycardia and so his beta-blocker was discontinued. He seemed to improved with those measures and was discharged for follow-up. On presentation to the ED, the patient was hypotensive. He did not respond well to intravenous fluid resuscitation. He was started on Levophed by Dr. Carlito Medina in the ED. His initial serum troponin was 0.03. TSH remains elevated at 4.64 compared to the 7.65 on 4/6/2020 when the patient was started on Synthroid. At present, patient denies chest discomfort.    .    Past Medical History  Past Medical History:   Diagnosis Date    Difficulty urinating     Elevated PSA     Hematuria, unspecified     Hypercholesterolemia     Hypertension     Incomplete bladder emptying     Urinary retention     UTI (urinary tract infection)          Meds  Current Facility-Administered Medications   Medication Dose Route Frequency    lidocaine 4 % patch 1 Patch  1 Patch TransDERmal Q24H    dextrose 5% infusion  100 mL/hr IntraVENous CONTINUOUS    haloperidol lactate (HALDOL) injection 2 mg  2 mg IntraMUSCular Q6H PRN    vasopressin (VASOSTRICT) 20 Units in 0.9% sodium chloride 100 mL infusion  0.04 Units/min IntraVENous CONTINUOUS    0.9% sodium chloride infusion 250 mL  250 mL IntraVENous PRN    glucose chewable tablet 16 g  4 Tab Oral PRN    glucagon (GLUCAGEN) injection 1 mg  1 mg IntraMUSCular PRN    dextrose 10% infusion 125-250 mL  125-250 mL IntraVENous PRN    levothyroxine (SYNTHROID) tablet 100 mcg  100 mcg Oral 6am    sodium chloride (NS) flush 5-10 mL  5-10 mL IntraVENous PRN    NOREPINephrine (LEVOPHED) 8 mg in 0.9% NS 250ml infusion  0.5-30 mcg/min IntraVENous TITRATE    aspirin delayed-release tablet 81 mg  81 mg Oral DAILY    cholecalciferol (VITAMIN D3) (1000 Units /25 mcg) tablet 2 Tab  2,000 Units Oral DAILY    pantoprazole (PROTONIX) 40 mg in 0.9% sodium chloride 10 mL injection  40 mg IntraVENous Q12H    atorvastatin (LIPITOR) tablet 40 mg  40 mg Oral QHS    sucralfate (CARAFATE) tablet 1 g  1 g Oral QID    tamsulosin (FLOMAX) capsule 0.4 mg  0.4 mg Oral DAILY    [Held by provider] heparin (porcine) injection 5,000 Units  5,000 Units SubCUTAneous Q8H    piperacillin-tazobactam (ZOSYN) 4.5 g in 0.9% sodium chloride (MBP/ADV) 100 mL MBP ###EXTENDED 4-HOUR INFUSION###  4.5 g IntraVENous Q8H         Allergies  Allergies   Allergen Reactions    Amlodipine Swelling and Itching    Bactrim [Sulfamethoprim Ds] Rash    Lisinopril Other (comments)     Acute renal failure    Ciprofloxacin Rash and Itching           Family History     Family History   Problem Relation Age of Onset    Cancer Father     Alzheimer Mother     Heart defect Brother          Review of systems    Unobtainable at this time      Physical Exam  Visit Vitals  /72   Pulse 65   Temp 97.6 °F (36.4 °C)   Resp 11   Ht 5' 7\" (1.702 m)   Wt 162 lb 7.7 oz (73.7 kg)   SpO2 97%   BMI 25.45 kg/m²       Mich Mccormick is who is asleep but awakens easily . Head is normocephalic and atraumatic. Trachea appears midline with no noted thyromegaly. There is no JVD. Decreased breath sound at base of the lungs. No obvious rales noted  Cardiac examination revealed Bradycardia and regular  No obvious murmur   abdomen is soft and nontender. Extremities are without significant edema. Skin is warm and dry. Diagnostic Tests  Labs:   Recent Labs     04/26/20  0140 04/25/20  1400 04/25/20  0353  04/24/20  0345   WBC 7.8  --  11.4  --  13.5*   HGB 7.3* 7.5* 7.9*   < > 8.8*   HCT 22.0* 22.1* 23.6*   < > 26.1*   *  --  126*  --  133*    < > = values in this interval not displayed. Recent Labs     04/26/20  0140 04/25/20  0353 04/24/20  0345 04/23/20  1617   * 153* 153* 154*   K 2.9* 3.3* 3.7 4.3   * 124* 124* 124*   CO2 23 22 23 22   * 113* 134* 188*   BUN 34* 42* 51* 55*   CREA 1.52* 1.82* 1.89* 2.13*   CA 8.0* 8.2* 8.0* 7.9*   MG 1.9  --   --  2.3   PHOS  --   --   --  2.8   ALB  --   --   --  2.1*   SGOT  --   --   --  37   ALT  --   --   --  54       No results for input(s): TROIQ, CPK, CKMB in the last 72 hours.   Last Lipid:  No results found for: CHOL, CHOLX, CHLST, CHOLV, HDL, HDLP, LDL, LDLC, DLDLP, TGLX, TRIGL, TRIGP, CHHD, CHHDX    Kareem montemayor MD

## 2020-04-26 NOTE — PROGRESS NOTES
Gastrointestinal Progress Note    Patient Name: Joe Rivera    JZCDO'R Date: 4/26/2020    Admit Date: 4/21/2020    Subjective:     Joe Rivera is a 80 y.o. Male who we are seeing for UGI bleed manifesting as intermittent melena. There is no report of persistent melena in the nursing notes and the patient is confused and not a reliable historian.   He reports to me feeling well with a negative GI ROS---?? validity        Current Facility-Administered Medications   Medication Dose Route Frequency    TPN ADULT - CENTRAL   IntraVENous CONTINUOUS    potassium chloride 10 mEq in 100 ml IVPB  10 mEq IntraVENous Q1H    lidocaine 4 % patch 1 Patch  1 Patch TransDERmal Q24H    dextrose 5% infusion  100 mL/hr IntraVENous CONTINUOUS    haloperidol lactate (HALDOL) injection 2 mg  2 mg IntraMUSCular Q6H PRN    vasopressin (VASOSTRICT) 20 Units in 0.9% sodium chloride 100 mL infusion  0.04 Units/min IntraVENous CONTINUOUS    0.9% sodium chloride infusion 250 mL  250 mL IntraVENous PRN    glucose chewable tablet 16 g  4 Tab Oral PRN    glucagon (GLUCAGEN) injection 1 mg  1 mg IntraMUSCular PRN    dextrose 10% infusion 125-250 mL  125-250 mL IntraVENous PRN    levothyroxine (SYNTHROID) tablet 100 mcg  100 mcg Oral 6am    sodium chloride (NS) flush 5-10 mL  5-10 mL IntraVENous PRN    NOREPINephrine (LEVOPHED) 8 mg in 0.9% NS 250ml infusion  0.5-30 mcg/min IntraVENous TITRATE    aspirin delayed-release tablet 81 mg  81 mg Oral DAILY    cholecalciferol (VITAMIN D3) (1000 Units /25 mcg) tablet 2 Tab  2,000 Units Oral DAILY    pantoprazole (PROTONIX) 40 mg in 0.9% sodium chloride 10 mL injection  40 mg IntraVENous Q12H    atorvastatin (LIPITOR) tablet 40 mg  40 mg Oral QHS    sucralfate (CARAFATE) tablet 1 g  1 g Oral QID    tamsulosin (FLOMAX) capsule 0.4 mg  0.4 mg Oral DAILY    [Held by provider] heparin (porcine) injection 5,000 Units  5,000 Units SubCUTAneous Q8H    piperacillin-tazobactam (ZOSYN) 4.5 g in 0.9% sodium chloride (MBP/ADV) 100 mL MBP ###EXTENDED 4-HOUR INFUSION###  4.5 g IntraVENous Q8H          Objective:     Visit Vitals  BP (!) 170/140   Pulse 79   Temp 98.7 °F (37.1 °C)   Resp 15   Ht 5' 7\" (1.702 m)   Wt 73.7 kg (162 lb 7.7 oz)   SpO2 97%   BMI 25.45 kg/m²       General appearance: alert, cooperative, no distress, appears stated age--confused  Abdomen: soft, non-tender. Bowel sounds normal. No masses,  no organomegaly    Data Review:      Labs: Results:       Chemistry Recent Labs     04/26/20  1408 04/26/20  0140 04/25/20  0353 04/24/20  0345   GLU  --  108* 113* 134*   NA  --  151* 153* 153*   K 3.1* 2.9* 3.3* 3.7   CL  --  123* 124* 124*   CO2  --  23 22 23   BUN  --  34* 42* 51*   CREA  --  1.52* 1.82* 1.89*   CA  --  8.0* 8.2* 8.0*   AGAP  --  5 7 6   BUCR  --  22* 23* 27*      CBC w/Diff Recent Labs     04/26/20  1408 04/26/20  0140 04/25/20  1400 04/25/20  0353  04/24/20  0345   WBC  --  7.8  --  11.4  --  13.5*   RBC  --  2.32*  --  2.50*  --  2.79*   HGB 7.6* 7.3* 7.5* 7.9*   < > 8.8*   HCT 22.6* 22.0* 22.1* 23.6*   < > 26.1*   PLT  --  130*  --  126*  --  133*   GRANS  --  86*  --  88*  --  91*   LYMPH  --  7*  --  7*  --  5*   EOS  --  3  --  1  --  1    < > = values in this interval not displayed. Coagulation No results for input(s): PTP, INR, APTT, INREXT in the last 72 hours. Liver Enzymes Recent Labs     04/23/20  1617   TP 4.6*   ALB 2.1*   TBILI 0.9   *   SGOT 37   ALT 54      Lipase No results for input(s): LPSE in the last 72 hours. Assessment:   1. History of Melena---Hgb lower that admission but stable. ?? Brown or melanotic stool today. Patient is hungry but confused and cannot provide  'his own consent for procedures  2. History of PUD and gastritis     Recommendation:   1. Continue IV PPI  2. Clear liquid diet  3. Transfuse as  Needed to keep Hgb 7-8  4. I ask nurses to assist in locating family who can provide consent for EGD.  I have tried calling the patient's son Anu Dunn at [phone] and got a busy  [employer] on several occasions. 5.  Will ask Anesthesia to see patient tomorrow and assess candidacy for anesthesia        Travis Simental.  Caroline Syed MD, Dalia Sacks, [address]  April 26, 2020  Antonio Nettles

## 2020-04-26 NOTE — ROUTINE PROCESS
2000 nursing assessment complete. Restraints in place. Farrell in place. Incontinent of stool. Alert to name. Levophed and mivf infusing 2130 attempted to give patient water. He began to cough. Held po meds. Awaiting speech eval. 
 
0000 reassessment complete. VSS 
 
0315 linen and gown changed 0400 reassessment complete. VSS. Linen changed. Bedside shift change report given to Vania Calderon (Cathieetoya) and manish (oncoming nurse) by Hemant Landis RN 
 (offgoing nurse). Report included the following information SBAR, MAR and Recent Results.

## 2020-04-26 NOTE — PROGRESS NOTES
Problem: Discharge Planning  Goal: *Discharge to safe environment  Outcome: Progressing Towards Goal     Problem: Falls - Risk of  Goal: *Absence of Falls  Description: Document Danne Lobe Fall Risk and appropriate interventions in the flowsheet. Outcome: Progressing Towards Goal  Note: Fall Risk Interventions:  Mobility Interventions: Bed/chair exit alarm    Mentation Interventions: Evaluate medications/consider consulting pharmacy    Medication Interventions: Evaluate medications/consider consulting pharmacy    Elimination Interventions: Call light in reach              Problem: Patient Education: Go to Patient Education Activity  Goal: Patient/Family Education  Outcome: Progressing Towards Goal     Problem: Pressure Injury - Risk of  Goal: *Prevention of pressure injury  Description: Document Alex Scale and appropriate interventions in the flowsheet.   Outcome: Progressing Towards Goal  Note: Pressure Injury Interventions:  Sensory Interventions: Assess changes in LOC    Moisture Interventions: Absorbent underpads    Activity Interventions: Pressure redistribution bed/mattress(bed type)    Mobility Interventions: HOB 30 degrees or less    Nutrition Interventions: Document food/fluid/supplement intake    Friction and Shear Interventions: HOB 30 degrees or less                Problem: Patient Education: Go to Patient Education Activity  Goal: Patient/Family Education  Outcome: Progressing Towards Goal     Problem: Altered nutrition  Goal: *Adequate nutrition  Outcome: Progressing Towards Goal     Problem: Pain  Goal: *Control of Pain  Outcome: Progressing Towards Goal  Goal: *PALLIATIVE CARE:  Alleviation of Pain  Outcome: Progressing Towards Goal     Problem: Tissue Perfusion - Cardiopulmonary, Altered  Goal: *Optimize tissue perfusion  Outcome: Progressing Towards Goal  Goal: *Absence of hypoxia  Outcome: Progressing Towards Goal     Problem: Patient Education: Go to Patient Education Activity  Goal: Patient/Family Education  Outcome: Progressing Towards Goal     Problem: Delirium Treatment  Goal: *Level of consciousness restored to baseline  Outcome: Progressing Towards Goal  Goal: *Level of environmental perceptions restored to baseline  Outcome: Progressing Towards Goal  Goal: *Sensory perception restored to baseline  Outcome: Progressing Towards Goal  Goal: *Emotional stability restored to baseline  Outcome: Progressing Towards Goal  Goal: *Functional assessment restored to baseline  Outcome: Progressing Towards Goal  Goal: *Absence of falls  Outcome: Progressing Towards Goal  Goal: *Will remain free of delirium, CAM Score negative  Outcome: Progressing Towards Goal  Goal: *Cognitive status will be restored to baseline  Outcome: Progressing Towards Goal  Goal: Interventions  Outcome: Progressing Towards Goal     Problem: Patient Education: Go to Patient Education Activity  Goal: Patient/Family Education  Outcome: Progressing Towards Goal     Problem: Patient Education: Go to Patient Education Activity  Goal: Patient/Family Education  Outcome: Progressing Towards Goal     Problem: Non-Violent Restraints  Goal: *Removal from restraints as soon as assessed to be safe  Outcome: Progressing Towards Goal  Goal: *No harm/injury to patient while restraints in use  Outcome: Progressing Towards Goal  Goal: *Patient's dignity will be maintained  Outcome: Progressing Towards Goal  Goal: *Patient Specific Goal (EDIT GOAL, INSERT TEXT)  Outcome: Progressing Towards Goal  Goal: Non-violent Restaints:Standard Interventions  Outcome: Progressing Towards Goal  Goal: Non-violent Restraints:Patient Interventions  Outcome: Progressing Towards Goal  Goal: Patient/Family Education  Outcome: Progressing Towards Goal

## 2020-04-26 NOTE — PROGRESS NOTES
Internal Medicine Progress Note        NAME: Lorna Friedman   :  1934  MRM:  [id number]    Date/Time: 2020        ASSESSMENT/PLAN: Still on pressors infusion, small dose. S  # Acute delirium. Acute confusional state. Treat medical problems as below. Monitor. Restrain as needed. Correct s. Naqa level  Haldol per PCCM team    #  SIRS. Hypothermia , Hypotension and bradycardia. Unclear source of infection. Sepsis work-up started in the emergency room. Treated with Abx vanco and zosyn. Appreciate PCCM input. Warming blankets Used  Needed iv pressors   Blood and urine CS NTD  Repeat UA. # GIB presented as Melena , anemia. GI consulted : 1. Avoid NSAIDs 2. Keep NPO  3. Recommend IV protonix. 4. Currently unstable for sedated endoscopy, will follow clinically and when appropriate will plan for endoscopy. Avoid anticoagulation     # Anemia due to likely acute blood loss . Maintain Hb jacinto than 7, transfuse as needed. # Hypoglycemia. Monitor blood sugar and treat accordingly. BSL stable  On D5 now    #  Dysrhythmia , heart block (LBBB ,long first degree and wide QRS), ho   Sinoatrial node dysfunction  and mild elevation of trop level. Stabilized initially in ICU. Iv pressors as needed   Cardiac stress test  History of  Cardiomyopathy  LVEF 35-40% by previous TTE and Grade II diastolic dysfunction. short runs of NSVT. Lexiscan Myoview testing with inferior and inferoseptal fixed defect more consistent with infarction,  no significant ongoing ischemia. #  Chronic renal failure, stage 3 (moderate) ([facility] [address] (2019). S.cr stable   Monitor Renal function and other labs as indicated. Avoid nephrotoxins , iv Contrast, NSAID. Renally dosing medications. Monitor urine out put.    carol creatinine continues to improve    # Hypernatremia. Change IVF to D5. Trended down  Follow-up     # ho   Hypothyroidism . On replacement.  Checked TSH , T4     #  Dysphagia (2020)    Currently NPO  Episode  of aspiration again 4/24  am SLP to see again  TPN to be started by ICU team.    #   HTN (hypertension) (4/21/2020)      -DVT prophylaxis : Heparin.   - Code Status : Full          Lab Review:     Recent Labs     04/26/20  0140 04/25/20  1400 04/25/20  0353  04/24/20  0345   WBC 7.8  --  11.4  --  13.5*   HGB 7.3* 7.5* 7.9*   < > 8.8*   HCT 22.0* 22.1* 23.6*   < > 26.1*   *  --  126*  --  133*    < > = values in this interval not displayed. Recent Labs     04/26/20  0140 04/25/20  0353 04/24/20  0345 04/23/20  1617   * 153* 153* 154*   K 2.9* 3.3* 3.7 4.3   * 124* 124* 124*   CO2 23 22 23 22   * 113* 134* 188*   BUN 34* 42* 51* 55*   CREA 1.52* 1.82* 1.89* 2.13*   CA 8.0* 8.2* 8.0* 7.9*   MG 1.9  --   --  2.3   PHOS  --   --   --  2.8   ALB  --   --   --  2.1*   TBILI  --   --   --  0.9   SGOT  --   --   --  37   ALT  --   --   --  54     Lab Results   Component Value Date/Time    Glucose (POC) 96 04/26/2020 11:28 AM    Glucose (POC) 146 (H) 04/24/2020 02:11 AM    Glucose (POC) 177 (H) 04/23/2020 07:44 PM    Glucose (POC) 185 (H) 04/23/2020 11:48 AM    Glucose (POC) 170 (H) 04/22/2020 07:08 AM    Glucose (POC) 96 10/10/2014 09:28 PM    Glucose (POC) 186 (H) 10/10/2014 03:48 PM     No results for input(s): PH, PCO2, PO2, HCO3, FIO2 in the last 72 hours. No results for input(s): INR, INREXT, INREXT in the last 72 hours.     Lab Results   Component Value Date/Time    Specimen Description: [facility] 02/06/2014 05:45 PM     Lab Results   Component Value Date/Time    Culture result: No growth (<1,000 CFU/ML) 04/22/2020 09:20 AM    Culture result: NO GROWTH 5 DAYS 04/21/2020 08:15 AM    Culture result: NO GROWTH 5 DAYS 04/21/2020 08:00 AM       All Cardiac Markers in the last 24 hours:   No results found for: CPK, CK, CKMMB, CKMB, RCK3, CKMBT, CKNDX, CKND1, TELLY, TROPT, TROIQ, JOSE, TROPT, TNIPOC, BNP, BNPP       Intervals noted      Subjective:     Chief Complaint:      Seen at bedside. Confused and non-historian. Discussed with his nurse: Patient failed swallowing and risk of choking. Still confused. Still on levo fed. Good urine output. Potassium repleted. Has BM last night. ROS: unable to obtain             Objective:     Vitals:  Last 24hrs VS reviewed since prior progress note. Most recent are:    Visit Vitals  /57   Pulse 67   Temp 98.7 °F (37.1 °C)   Resp 11   Ht 5' 7\" (1.702 m)   Wt 73.7 kg (162 lb 7.7 oz)   SpO2 100%   BMI 25.45 kg/m²     SpO2 Readings from Last 6 Encounters:   04/26/20 100%   04/09/20 98%   02/21/20 92%   01/02/20 96%   12/16/19 97%   09/23/18 99%            Intake/Output Summary (Last 24 hours) at 4/26/2020 1408  Last data filed at 4/26/2020 1300  Gross per 24 hour   Intake 3099.78 ml   Output 640 ml   Net 2459.78 ml          Physical Exam:   Gen:  Comfortable,  confused,  in no acute distress. Appear stated age, Well-developed   HEENT:  Head atraumatic, normocephalic ,  moist mucous membranes. Neck:   Trachea midline , No apparent JVD, Supple, without masses, thyroid non-tender  Resp:  No accessory muscle use,Bilateral BS present, clear breath sounds without wheezes rales or rhonchi  Card:  normal S1, S2 without Gallop . Mild lower leg peripheral edema. Abd:  Soft, non-tender, non-distended, bowel sounds are present . Chen  Musc:  No cyanosis or clubbing. Skin:  No rashes or ulcers, skin turgor is good. Neuro:  Cranial nerves are grossly intact, no clear area of focal motor weakness, does not follows commands appropriately.   Psych:  Confused , POOR insight,        Telemetry reviewed:   normal sinus rhythm    Medications Reviewed: (see below)    Lab Data Reviewed: (see below)    ______________________________________________________________________    Medications:     Current Facility-Administered Medications   Medication Dose Route Frequency    TPN ADULT - CENTRAL   IntraVENous CONTINUOUS    lidocaine 4 % patch 1 Patch  1 Patch TransDERmal Q24H    dextrose 5% infusion  100 mL/hr IntraVENous CONTINUOUS    haloperidol lactate (HALDOL) injection 2 mg  2 mg IntraMUSCular Q6H PRN    vasopressin (VASOSTRICT) 20 Units in 0.9% sodium chloride 100 mL infusion  0.04 Units/min IntraVENous CONTINUOUS    0.9% sodium chloride infusion 250 mL  250 mL IntraVENous PRN    glucose chewable tablet 16 g  4 Tab Oral PRN    glucagon (GLUCAGEN) injection 1 mg  1 mg IntraMUSCular PRN    dextrose 10% infusion 125-250 mL  125-250 mL IntraVENous PRN    levothyroxine (SYNTHROID) tablet 100 mcg  100 mcg Oral 6am    sodium chloride (NS) flush 5-10 mL  5-10 mL IntraVENous PRN    NOREPINephrine (LEVOPHED) 8 mg in 0.9% NS 250ml infusion  0.5-30 mcg/min IntraVENous TITRATE    aspirin delayed-release tablet 81 mg  81 mg Oral DAILY    cholecalciferol (VITAMIN D3) (1000 Units /25 mcg) tablet 2 Tab  2,000 Units Oral DAILY    pantoprazole (PROTONIX) 40 mg in 0.9% sodium chloride 10 mL injection  40 mg IntraVENous Q12H    atorvastatin (LIPITOR) tablet 40 mg  40 mg Oral QHS    sucralfate (CARAFATE) tablet 1 g  1 g Oral QID    tamsulosin (FLOMAX) capsule 0.4 mg  0.4 mg Oral DAILY    [Held by provider] heparin (porcine) injection 5,000 Units  5,000 Units SubCUTAneous Q8H    piperacillin-tazobactam (ZOSYN) 4.5 g in 0.9% sodium chloride (MBP/ADV) 100 mL MBP ###EXTENDED 4-HOUR INFUSION###  4.5 g IntraVENous Q8H          Total time spent with patient: 35 minutes                  Care Plan discussed with: Patient, Nursing Staff and >50% of time spent in counseling and coordination of care    Discussed:  Care Plan    Prophylaxis:  Hep SQ    Disposition:  Home w/Family           This document in whole or part of it has been produced using voice recognition software. Unrecognized errors in transcription may be present.     Attending Physician: Alisa Swartz MD

## 2020-04-27 ENCOUNTER — APPOINTMENT (OUTPATIENT)
Dept: GENERAL RADIOLOGY | Age: 85
DRG: 981 | End: 2020-04-27
Attending: HOSPITALIST
Payer: MEDICARE

## 2020-04-27 LAB
ANION GAP SERPL CALC-SCNC: 7 MMOL/L (ref 3–18)
BACTERIA SPEC CULT: NORMAL
BACTERIA SPEC CULT: NORMAL
BUN SERPL-MCNC: 30 MG/DL (ref 7–18)
BUN/CREAT SERPL: 21 (ref 12–20)
CALCIUM SERPL-MCNC: 7.7 MG/DL (ref 8.5–10.1)
CHLORIDE SERPL-SCNC: 119 MMOL/L (ref 100–111)
CO2 SERPL-SCNC: 21 MMOL/L (ref 21–32)
CREAT SERPL-MCNC: 1.42 MG/DL (ref 0.6–1.3)
ERYTHROCYTE [DISTWIDTH] IN BLOOD BY AUTOMATED COUNT: 16.5 % (ref 11.6–14.5)
ERYTHROCYTE [DISTWIDTH] IN BLOOD BY AUTOMATED COUNT: 16.7 % (ref 11.6–14.5)
EST. AVERAGE GLUCOSE BLD GHB EST-MCNC: 120 MG/DL
GLUCOSE BLD STRIP.AUTO-MCNC: 155 MG/DL (ref 70–110)
GLUCOSE BLD STRIP.AUTO-MCNC: 171 MG/DL (ref 70–110)
GLUCOSE BLD STRIP.AUTO-MCNC: 62 MG/DL (ref 70–110)
GLUCOSE BLD STRIP.AUTO-MCNC: 64 MG/DL (ref 70–110)
GLUCOSE BLD STRIP.AUTO-MCNC: 93 MG/DL (ref 70–110)
GLUCOSE SERPL-MCNC: 201 MG/DL (ref 74–99)
HBA1C MFR BLD: 5.8 % (ref 4.2–5.6)
HCT VFR BLD AUTO: 23.6 % (ref 36–48)
HCT VFR BLD AUTO: 25.6 % (ref 36–48)
HGB BLD-MCNC: 7.8 G/DL (ref 13–16)
HGB BLD-MCNC: 8.5 G/DL (ref 13–16)
MAGNESIUM SERPL-MCNC: 1.9 MG/DL (ref 1.6–2.6)
MCH RBC QN AUTO: 31.3 PG (ref 24–34)
MCH RBC QN AUTO: 31.6 PG (ref 24–34)
MCHC RBC AUTO-ENTMCNC: 33.1 G/DL (ref 31–37)
MCHC RBC AUTO-ENTMCNC: 33.2 G/DL (ref 31–37)
MCV RBC AUTO: 94.8 FL (ref 74–97)
MCV RBC AUTO: 95.2 FL (ref 74–97)
PHOSPHATE SERPL-MCNC: 2.6 MG/DL (ref 2.5–4.9)
PLATELET # BLD AUTO: 146 K/UL (ref 135–420)
PLATELET # BLD AUTO: 204 K/UL (ref 135–420)
PMV BLD AUTO: 10.6 FL (ref 9.2–11.8)
PMV BLD AUTO: 10.9 FL (ref 9.2–11.8)
POTASSIUM SERPL-SCNC: 3.5 MMOL/L (ref 3.5–5.5)
RBC # BLD AUTO: 2.49 M/UL (ref 4.7–5.5)
RBC # BLD AUTO: 2.69 M/UL (ref 4.7–5.5)
SERVICE CMNT-IMP: NORMAL
SERVICE CMNT-IMP: NORMAL
SODIUM SERPL-SCNC: 147 MMOL/L (ref 136–145)
SODIUM SERPL-SCNC: 148 MMOL/L (ref 136–145)
WBC # BLD AUTO: 10.2 K/UL (ref 4.6–13.2)
WBC # BLD AUTO: 14 K/UL (ref 4.6–13.2)

## 2020-04-27 PROCEDURE — 92526 ORAL FUNCTION THERAPY: CPT

## 2020-04-27 PROCEDURE — 74011250636 HC RX REV CODE- 250/636: Performed by: INTERNAL MEDICINE

## 2020-04-27 PROCEDURE — 84100 ASSAY OF PHOSPHORUS: CPT

## 2020-04-27 PROCEDURE — 65610000006 HC RM INTENSIVE CARE

## 2020-04-27 PROCEDURE — 71045 X-RAY EXAM CHEST 1 VIEW: CPT

## 2020-04-27 PROCEDURE — 74011000258 HC RX REV CODE- 258: Performed by: INTERNAL MEDICINE

## 2020-04-27 PROCEDURE — 80048 BASIC METABOLIC PNL TOTAL CA: CPT

## 2020-04-27 PROCEDURE — 85027 COMPLETE CBC AUTOMATED: CPT

## 2020-04-27 PROCEDURE — 74011636637 HC RX REV CODE- 636/637: Performed by: INTERNAL MEDICINE

## 2020-04-27 PROCEDURE — 83036 HEMOGLOBIN GLYCOSYLATED A1C: CPT

## 2020-04-27 PROCEDURE — C9113 INJ PANTOPRAZOLE SODIUM, VIA: HCPCS | Performed by: INTERNAL MEDICINE

## 2020-04-27 PROCEDURE — 74011000250 HC RX REV CODE- 250: Performed by: HOSPITALIST

## 2020-04-27 PROCEDURE — 82962 GLUCOSE BLOOD TEST: CPT

## 2020-04-27 PROCEDURE — 31720 CLEARANCE OF AIRWAYS: CPT

## 2020-04-27 PROCEDURE — 84295 ASSAY OF SERUM SODIUM: CPT

## 2020-04-27 PROCEDURE — 74011000250 HC RX REV CODE- 250: Performed by: INTERNAL MEDICINE

## 2020-04-27 PROCEDURE — 77010033678 HC OXYGEN DAILY

## 2020-04-27 PROCEDURE — 74011250636 HC RX REV CODE- 250/636: Performed by: HOSPITALIST

## 2020-04-27 PROCEDURE — 83735 ASSAY OF MAGNESIUM: CPT

## 2020-04-27 RX ORDER — QUETIAPINE FUMARATE 25 MG/1
25 TABLET, FILM COATED ORAL
Status: DISCONTINUED | OUTPATIENT
Start: 2020-04-27 | End: 2020-05-11 | Stop reason: HOSPADM

## 2020-04-27 RX ORDER — NOREPINEPHRINE BIT/0.9 % NACL 8 MG/250ML
.5-3 INFUSION BOTTLE (ML) INTRAVENOUS
Status: DISCONTINUED | OUTPATIENT
Start: 2020-04-27 | End: 2020-04-29

## 2020-04-27 RX ORDER — NOREPINEPHRINE BITARTRATE/D5W 8 MG/250ML
.5-3 PLASTIC BAG, INJECTION (ML) INTRAVENOUS
Status: DISCONTINUED | OUTPATIENT
Start: 2020-04-27 | End: 2020-04-27 | Stop reason: SDUPTHER

## 2020-04-27 RX ORDER — POTASSIUM CHLORIDE 29.8 MG/ML
20 INJECTION INTRAVENOUS
Status: COMPLETED | OUTPATIENT
Start: 2020-04-27 | End: 2020-04-27

## 2020-04-27 RX ORDER — BUMETANIDE 0.25 MG/ML
2 INJECTION INTRAMUSCULAR; INTRAVENOUS ONCE
Status: COMPLETED | OUTPATIENT
Start: 2020-04-27 | End: 2020-04-27

## 2020-04-27 RX ORDER — POTASSIUM CHLORIDE 29.8 MG/ML
20 INJECTION INTRAVENOUS
Status: DISCONTINUED | OUTPATIENT
Start: 2020-04-27 | End: 2020-04-27

## 2020-04-27 RX ORDER — INSULIN LISPRO 100 [IU]/ML
INJECTION, SOLUTION INTRAVENOUS; SUBCUTANEOUS EVERY 6 HOURS
Status: DISCONTINUED | OUTPATIENT
Start: 2020-04-27 | End: 2020-04-30

## 2020-04-27 RX ORDER — DEXTROSE MONOHYDRATE 100 MG/ML
125-250 INJECTION, SOLUTION INTRAVENOUS AS NEEDED
Status: DISCONTINUED | OUTPATIENT
Start: 2020-04-27 | End: 2020-05-11 | Stop reason: HOSPADM

## 2020-04-27 RX ADMIN — POTASSIUM CHLORIDE 20 MEQ: 400 INJECTION, SOLUTION INTRAVENOUS at 08:58

## 2020-04-27 RX ADMIN — DEXTROSE MONOHYDRATE 125 ML: 10 INJECTION, SOLUTION INTRAVENOUS at 17:16

## 2020-04-27 RX ADMIN — NOREPINEPHRINE BITARTRATE 2 MCG/MIN: 1 INJECTION INTRAVENOUS at 01:47

## 2020-04-27 RX ADMIN — PIPERACILLIN SODIUM AND TAZOBACTAM SODIUM 4.5 G: 4; .5 INJECTION, POWDER, LYOPHILIZED, FOR SOLUTION INTRAVENOUS at 06:05

## 2020-04-27 RX ADMIN — BUMETANIDE 2 MG: 0.25 INJECTION INTRAMUSCULAR; INTRAVENOUS at 01:11

## 2020-04-27 RX ADMIN — INSULIN LISPRO 2 UNITS: 100 INJECTION, SOLUTION INTRAVENOUS; SUBCUTANEOUS at 12:16

## 2020-04-27 RX ADMIN — SODIUM CHLORIDE 40 MG: 9 INJECTION, SOLUTION INTRAMUSCULAR; INTRAVENOUS; SUBCUTANEOUS at 08:58

## 2020-04-27 RX ADMIN — SODIUM CHLORIDE 40 MG: 9 INJECTION, SOLUTION INTRAMUSCULAR; INTRAVENOUS; SUBCUTANEOUS at 20:51

## 2020-04-27 RX ADMIN — POTASSIUM CHLORIDE 20 MEQ: 400 INJECTION, SOLUTION INTRAVENOUS at 11:03

## 2020-04-27 NOTE — PROGRESS NOTES
Problem: Dysphagia (Adult)  Goal: *Acute Goals and Plan of Care (Insert Text)  Description: Recommendations:  Diet: NECTAR thick clear liquid diet, as medically appropriate  Ice chips for pleasure after oral care  Currently receiving TPN  Meds: Via IV  Aspiration Precautions  Oral Care TID  Feeder  Small sips/bites  No straws    Goals:  Patient will:  1. Tolerate PO trials with 0 s/s overt distress in 4/5 trials  2. Utilize compensatory swallow strategies/maneuvers (decrease bite/sip, size/rate, alt. liq/sol) with min cues in 4/5 trials  3. Complete an objective swallow study (i.e., MBSS) to assess swallow integrity, r/o aspiration, and determine of safest LRD, min A    Outcome: Not Progressing Towards Goal   SPEECH LANGUAGE PATHOLOGY DYSPHAGIA TREATMENT    Patient: Olga Carranza (22 y.o. male)  Date: 4/27/2020  Diagnosis: Hypothermia [T68. XXXA]   Hypothermia       Precautions: Aspiration risk    PLOF: Per H&P    ASSESSMENT:  Pt seen at bedside this date for dysphagia intervention/management. RN provided verbal clearance to be seen. Per chart review, MBS on 4/9/20 indicating SILENT aspiration with thin liquids. RN reports as of today pt is receiving TPN, is wet/gurgly with suspect fluid overload, and + for lower GI bleed. Pt repositioned in bed in order to receive po trials in > 60* HOB. ) overt s/sx of aspiration across all po trials: Ice chips, thin liquids, and NTLs, however note SILENT aspiration with thin liquids. Pt refusing puree at this time. Laryngeal elevation noted per observation/ palpation, but weak. Generalized weakness overall with pt reporting no appetite. SLP recs: From a speech standpoint, pt safe for Nectar thick clear liquid diet as deemed medically appropriate. Pt is receiving TPN and d/t medical presentation he probably would benefit more with this from a nutrition/hydration standpoint. Pt may have ice chips for pleasure after oral care.  ST will continue to follow and willing to advance diet as medically cleared. D/w RN Ace Emms; who verbalizes understanding. Progression toward goals:  []         Improving appropriately and progressing toward goals  [x]         Improving slowly and progressing toward goals  []         Not making progress toward goals and plan of care will be adjusted     PLAN:  Recommendations and Planned Interventions:  Diet: NECTAR thick clear liquid diet, as medically appropriate  Ice chips for pleasure after oral care  Currently receiving TPN  Meds: Via IV  Aspiration Precautions  Oral Care TID  Feeder  Small sips/bites  No straws  Patient continues to benefit from skilled intervention to address the above impairments. Continue treatment per established plan of care. Discharge Recommendations:  Skilled Nursing Facility     SUBJECTIVE:   Patient stated I don;t want any more . OBJECTIVE:   Cognitive and Communication Status:  Neurologic State: Alert, Confused  Orientation Level: Oriented to person, Oriented to place, Oriented to situation, Disoriented to time  Cognition: Follows commands  Perception: Appears intact  Perseveration: Perseverates during conversation  Safety/Judgement: Decreased awareness of environment  Dysphagia Treatment:  Oral Assessment:  Oral Assessment  Labial: No impairment  Dentition: Limited  Oral Hygiene: Poor  Lingual: Decreased rate, Decreased strength  Velum: No impairment  Mandible: No impairment  P.O.  Trials:   Patient Position: [facility] 60   Vocal quality prior to P.O.: No impairment   Consistency Presented: Nectar thick liquid, Pudding, Solid   How Presented: SLP-fed/presented, Cup/sip, Spoon       Bolus Acceptance: No impairment   Bolus Formation/Control: Impaired   Type of Impairment: Incomplete, Mastication   Propulsion: Delayed (# of seconds)   Oral Residue: Greater than 50% of bolus, Lingual   Initiation of Swallow: Delayed (# of seconds)   Laryngeal Elevation: Functional   Aspiration Signs/Symptoms: None   Pharyngeal Phase Characteristics: Suspected pharyngeal residue, Audible swallow   Effective Modifications: Cup/sip, Small sips and bites   Cues for Modifications: Maximal         Oral Phase Severity: Moderate   Pharyngeal Phase Severity : Moderate-severe   Oral Motor Exercises:                                                                     PAIN:  Pain level pre-treatment: 0/10   Pain level post-treatment: 010   Pain Intervention(s): Medication (see MAR); Rest, Ice, Repositioning  Response to intervention: Nurse notified, See doc flow    After treatment:   []              Patient left in no apparent distress sitting up in chair  [x]              Patient left in no apparent distress in bed  [x]              Call bell left within reach  [x]              Nursing notified  []              Family present  []              Caregiver present  []              Bed alarm activated      COMMUNICATION/EDUCATION:   [x] Aspiration precautions; swallow safety; compensatory techniques  []        Patient unable to participate in education; education ongoing with staff  []  Posted safety precautions in patient's room.   [] Oral-motor/laryngeal strengthening exercises      Heather Morelos SLP  Time Calculation: 15 mins

## 2020-04-27 NOTE — PROGRESS NOTES
Problem: Discharge Planning  Goal: *Discharge to safe environment  Outcome: Progressing Towards Goal     Problem: Falls - Risk of  Goal: *Absence of Falls  Description: Document Miles Sol Fall Risk and appropriate interventions in the flowsheet. Outcome: Progressing Towards Goal  Note: Fall Risk Interventions:  Mobility Interventions: Bed/chair exit alarm    Mentation Interventions: Evaluate medications/consider consulting pharmacy    Medication Interventions: Bed/chair exit alarm, Evaluate medications/consider consulting pharmacy    Elimination Interventions: Call light in reach              Problem: Patient Education: Go to Patient Education Activity  Goal: Patient/Family Education  Outcome: Progressing Towards Goal     Problem: Pressure Injury - Risk of  Goal: *Prevention of pressure injury  Description: Document Alex Scale and appropriate interventions in the flowsheet.   Outcome: Progressing Towards Goal  Note: Pressure Injury Interventions:  Sensory Interventions: Assess changes in LOC    Moisture Interventions: Absorbent underpads, Apply protective barrier, creams and emollients    Activity Interventions: Pressure redistribution bed/mattress(bed type)    Mobility Interventions: HOB 30 degrees or less    Nutrition Interventions: Document food/fluid/supplement intake    Friction and Shear Interventions: HOB 30 degrees or less                Problem: Patient Education: Go to Patient Education Activity  Goal: Patient/Family Education  Outcome: Progressing Towards Goal     Problem: Altered nutrition  Goal: *Adequate nutrition  Outcome: Progressing Towards Goal     Problem: Pain  Goal: *Control of Pain  Outcome: Progressing Towards Goal  Goal: *PALLIATIVE CARE:  Alleviation of Pain  Outcome: Progressing Towards Goal     Problem: Tissue Perfusion - Cardiopulmonary, Altered  Goal: *Optimize tissue perfusion  Outcome: Progressing Towards Goal  Goal: *Absence of hypoxia  Outcome: Progressing Towards Goal     Problem: Patient Education: Go to Patient Education Activity  Goal: Patient/Family Education  Outcome: Progressing Towards Goal     Problem: Delirium Treatment  Goal: *Level of consciousness restored to baseline  Outcome: Progressing Towards Goal  Goal: *Level of environmental perceptions restored to baseline  Outcome: Progressing Towards Goal  Goal: *Sensory perception restored to baseline  Outcome: Progressing Towards Goal  Goal: *Emotional stability restored to baseline  Outcome: Progressing Towards Goal  Goal: *Functional assessment restored to baseline  Outcome: Progressing Towards Goal  Goal: *Absence of falls  Outcome: Progressing Towards Goal  Goal: *Will remain free of delirium, CAM Score negative  Outcome: Progressing Towards Goal  Goal: *Cognitive status will be restored to baseline  Outcome: Progressing Towards Goal  Goal: Interventions  Outcome: Progressing Towards Goal     Problem: Patient Education: Go to Patient Education Activity  Goal: Patient/Family Education  Outcome: Progressing Towards Goal     Problem: Patient Education: Go to Patient Education Activity  Goal: Patient/Family Education  Outcome: Progressing Towards Goal     Problem: Non-Violent Restraints  Goal: *Removal from restraints as soon as assessed to be safe  Outcome: Progressing Towards Goal  Goal: *No harm/injury to patient while restraints in use  Outcome: Progressing Towards Goal  Goal: *Patient's dignity will be maintained  Outcome: Progressing Towards Goal  Goal: *Patient Specific Goal (EDIT GOAL, INSERT TEXT)  Outcome: Progressing Towards Goal  Goal: Non-violent Restaints:Standard Interventions  Outcome: Progressing Towards Goal  Goal: Non-violent Restraints:Patient Interventions  Outcome: Progressing Towards Goal  Goal: Patient/Family Education  Outcome: Progressing Towards Goal

## 2020-04-27 NOTE — PROGRESS NOTES
(0730) Received report from [facility], [facility]. Assumed care of patient. Whiteboard updated. VSS. Will continue to monitor.     (0930) Spoke with Dr. Phuong Talbot. MD requested patient get a Dobbhoff. Will clear with GI MD before getting Dobbhoff. (1250) GI MD at bedside. Said he would hold off on EGD and Dobbhoff. Good with starting nectar diet. (1300) RT at bedside to NT suction patient. (1930) Bedside and Verbal shift change report given to Jay Dimas RN (oncoming nurse) by Desmond Olivia RN (offgoing nurse). Report included the following information SBAR, Intake/Output, MAR, Recent Results, Cardiac Rhythm AFib and Alarm Parameters .

## 2020-04-27 NOTE — ROUTINE PROCESS
2000 nursing assessment complete . Patient resting in bed. Alert and oriented x4. Levophed and MIVF infusing. RIJ. Farrell in place 2106 levophed stopped 0019 patient becomes sob and wheezing with movement. sats drop into the 80's. Applied 3l O2. Notified  Dr. Jeannette Grimm. Orders received. 0000 reassessment complete. Temp 94.5 rectal. Dr. Jeannette Grimm notified. 0040 xray at bedside [id number] Radiologist called. chesta xray shows new bilateral opacities . Pneumonia vs pulmonary edema. Dr Mihaela Everett notified. Orders received 0115 temp cable attached. Linda Otis hugger placed on patient. Bumex 2mg IV given. MIVF stopped per dr Mihaela Everett orders 0148 levophed restarted. 0400 reassessment complete. VSS. Bedside shift change report given to jasson (oncoming nurse) by Deng Swanson RN 
 (offgoing nurse). Report included the following information SBAR, MAR and Recent Results.

## 2020-04-27 NOTE — PROGRESS NOTES
Physical Exam  Skin:     General: Skin is warm and dry. Capillary Refill: Capillary refill takes less than 2 seconds. Primary Nurse Yady Ortega RN and JER Vela performed a dual skin assessment on this patient Impairment noted- see wound doc flow sheet Alex score is 17.

## 2020-04-27 NOTE — PROGRESS NOTES
Gastrointestinal Progress Note    Patient Name: Matthew Yap    UTITR'C Date: 4/27/2020    Admit Date: 4/21/2020    Subjective:     Matthew Yap is a 80 y.o. Male with admission for possible sepsis and arrythmia. During admission he was noted to have blood with bowel movements. He had drop in HGB to 8. He had anticoagulation held. He has history of duodenal ulcer and esophageal ulcer on EGD in distant past.  He reports no NSAIDS. He had no bleeding noted over the weekend. HGB remains stable at 7.8. He is having some mental status changes today. He has been weaned off pressors. He is being evaluated and treated medically by cardiology. No abdominal pain. No nausea or vomiting. No other complaints.             Current Facility-Administered Medications   Medication Dose Route Frequency    insulin lispro (HUMALOG) injection   SubCUTAneous Q6H    dextrose 10% infusion 125-250 mL  125-250 mL IntraVENous PRN    QUEtiapine (SEROquel) tablet 25 mg  25 mg Oral QHS    lidocaine 4 % patch 1 Patch  1 Patch TransDERmal Q24H    haloperidol lactate (HALDOL) injection 2 mg  2 mg IntraMUSCular Q6H PRN    0.9% sodium chloride infusion 250 mL  250 mL IntraVENous PRN    glucose chewable tablet 16 g  4 Tab Oral PRN    glucagon (GLUCAGEN) injection 1 mg  1 mg IntraMUSCular PRN    levothyroxine (SYNTHROID) tablet 100 mcg  100 mcg Oral 6am    sodium chloride (NS) flush 5-10 mL  5-10 mL IntraVENous PRN    [Held by provider] aspirin delayed-release tablet 81 mg  81 mg Oral DAILY    cholecalciferol (VITAMIN D3) (1000 Units /25 mcg) tablet 2 Tab  2,000 Units Oral DAILY    pantoprazole (PROTONIX) 40 mg in 0.9% sodium chloride 10 mL injection  40 mg IntraVENous Q12H    atorvastatin (LIPITOR) tablet 40 mg  40 mg Oral QHS    sucralfate (CARAFATE) tablet 1 g  1 g Oral QID    tamsulosin (FLOMAX) capsule 0.4 mg  0.4 mg Oral DAILY    [Held by provider] heparin (porcine) injection 5,000 Units  5,000 Units SubCUTAneous Q8H          Objective:     Physical Exam:    Visit Vitals  /71   Pulse 74   Temp 96.7 °F (35.9 °C)   Resp 16   Ht 5' 7\" (1.702 m)   Wt 73.7 kg (162 lb 7.7 oz)   SpO2 100%   BMI 25.45 kg/m²     General appearance: alert, cooperative, no distress, appears stated age  Abdomen: soft, non-tender. Bowel sounds normal. No masses,  no organomegaly    Data Review:    Labs: Results:       Chemistry Recent Labs     04/27/20 0349 04/26/20 1408 04/26/20 0140 04/25/20  0353   *  --  108* 113*   *  --  151* 153*   K 3.5 3.1* 2.9* 3.3*   *  --  123* 124*   CO2 21  --  23 22   BUN 30*  --  34* 42*   CREA 1.42*  --  1.52* 1.82*   CA 7.7*  --  8.0* 8.2*   AGAP 7  --  5 7   BUCR 21*  --  22* 23*      CBC w/Diff Recent Labs     04/27/20 0349 04/26/20 1408 04/26/20 0140  04/25/20  0353   WBC 10.2  --  7.8  --  11.4   RBC 2.49*  --  2.32*  --  2.50*   HGB 7.8* 7.6* 7.3*   < > 7.9*   HCT 23.6* 22.6* 22.0*   < > 23.6*     --  130*  --  126*   GRANS  --   --  86*  --  88*   LYMPH  --   --  7*  --  7*   EOS  --   --  3  --  1    < > = values in this interval not displayed. Coagulation No results for input(s): PTP, INR, APTT, INREXT, INREXT in the last 72 hours. Liver Enzymes No results for input(s): TP, ALB, TBIL, AP, SGOT, ALT in the last 72 hours. No lab exists for component: DBIL       Assessment:   1. GI bleeding:  Mr. Zoe Estrella is an 80 yr old male who was admitted for sepsis and noted to have arrythmia. He was requiring pressors until now. He had GI bleeding while on anticoagulation. He has history of peptic ulcer disease in the distant past.  He was unstable for endoscopy during evaluation on Friday but improved and off of pressors. He has had no further bleeding but there is concern for need for anticoagulation. Discussed with Anesthesiology and will tentatively plan for EGD tomorrow. Will attempt to contact son for consent. Recommendation:   1.  Continue protonix  2. Follow H/H and transfuse as needed  3. Observe for signs of re-bleeding. 4. Hold anticoagulation. 5. NPO after midnight for EGD tomorrow.        Bree Good MD  April 27, 2020

## 2020-04-27 NOTE — PROGRESS NOTES
CARDIOLOGY Progress Note    Patient: Shalom Novoa  MR #: [id number]    Assessment/Plan:     Hospital Problems  Date Reviewed: 4/6/2020          Codes Class Noted POA    Hypoglycemia. ICD-10-CM: E16.2  ICD-9-CM: 251.2  4/21/2020 Unknown    Mental status changes, delirium. Defer to primary and ICU team      History of HTN (hypertension)  on and off pressors as needed ICD-10-CM: I10  ICD-9-CM: 401.9  4/21/2020 Unknown        Left bundle branch block, p He is known to have a long first-degree AV block and markedly reduced P wave amplitude. His rhythm has been noteworthy for frequent ectopics and short runs of NSVT. Intermittent atrial fibrillation  Heparin discontinued because of lower GI bleed. GI team managing. Will use digoxin if needed. Currently stable  Today appears to be in A.fib, rate controlled    Nuclear stress test  No significant ongoing ischemia    Patient with left bundle branch block, intermittent atrial fibrillation and bradycardia. Likely has sinus node dysfunction as well  Suboptimal candidate for pacemaker at this time. Discussed with Dr. Natty Abdullahi this admission who agreed at this time   ICD-10-CM: I44.7  ICD-9-CM: 426.3  4/21/2020 Unknown      GI bleed: Transfusion as needed. GI team following. Defer management to GI team. Possible ? EGD per GI team      Sinoatrial node dysfunction ([facility] ICD-10-CM: I49.5  ICD-9-CM: 427.81  4/21/2020 Unknown        Dysphagia ICD-10-CM: R13.10  ICD-9-CM: 787.20  4/8/2020 Yes        Cardiomyopathy ([facility] ICD-10-CM: I42.9  ICD-9-CM: 425.4  2/20/2020 Yes    Overview Signed 2/20/2020 12:40 AM by Kimberly KRAUSE, DO     LVEF 35-40% by previous TTE. Grade II diastolic dysfunction WIO-26-FF: I51.9  ICD-9-CM: 429.9  2/20/2020 Yes    Overview Signed 2/20/2020 12:41 AM by Deandre Dixon, DO     Per previous TTE.              Hypothyroidism ICD-10-CM: E03.9  ICD-9-CM: 244.9  2/20/2020 Yes        Bradycardia ICD-10-CM: R00.1  ICD-9-CM: 427.89 2/19/2020 Yes        Hypotension ICD-10-CM: I95.9  ICD-9-CM: 458.9  2/19/2020 Yes        * (Principal) Hypothermia ICD-10-CM: T68. Rubin Caldwell  ICD-9-CM: 991.6  12/23/2019 Unknown        Chronic renal failure, stage 3 (moderate) ([facility] ICD-10-CM: N18.3  ICD-9-CM: 585.3  12/23/2019 Yes        SIRS (systemic inflammatory response syndrome) ([facility] ICD-10-CM: R65.10  ICD-9-CM: 995.90  12/23/2019 Yes    Gastrointestinal bleeding, GI consulted        Subjective     Denies any chest pain   Still confused on and off. Wants to leave. Nursing staff confirms confusion on and off    History of Present Illness  Ledy Beth is a 80 y.o. man that presented to the [facility] ED early this morning with complaints of chest tightness. The symptoms that the patient was experiencing were similar to a earlier admission to this hospital several weeks ago. At that time he was found to have an elevated TSH. He was also having episodic hypothermia. He was started on thyroid replacement therapy at a low dose of 50 mcg daily. He had some episodic severe bradycardia and so his beta-blocker was discontinued. He seemed to improved with those measures and was discharged for follow-up. On presentation to the ED, the patient was hypotensive. He did not respond well to intravenous fluid resuscitation. He was started on Levophed by Dr. Maddie Everett in the ED. His initial serum troponin was 0.03. TSH remains elevated at 4.64 compared to the 7.65 on 4/6/2020 when the patient was started on Synthroid. At present, patient denies chest discomfort.    .    Past Medical History  Past Medical History:   Diagnosis Date    Difficulty urinating     Elevated PSA     Hematuria, unspecified     Hypercholesterolemia     Hypertension     Incomplete bladder emptying     Urinary retention     UTI (urinary tract infection)          Meds  Current Facility-Administered Medications   Medication Dose Route Frequency    potassium chloride 20 mEq in 50 ml IVPB  20 mEq IntraVENous Q2H    TPN ADULT - CENTRAL   IntraVENous CONTINUOUS    lidocaine 4 % patch 1 Patch  1 Patch TransDERmal Q24H    haloperidol lactate (HALDOL) injection 2 mg  2 mg IntraMUSCular Q6H PRN    vasopressin (VASOSTRICT) 20 Units in 0.9% sodium chloride 100 mL infusion  0.04 Units/min IntraVENous CONTINUOUS    0.9% sodium chloride infusion 250 mL  250 mL IntraVENous PRN    glucose chewable tablet 16 g  4 Tab Oral PRN    glucagon (GLUCAGEN) injection 1 mg  1 mg IntraMUSCular PRN    dextrose 10% infusion 125-250 mL  125-250 mL IntraVENous PRN    levothyroxine (SYNTHROID) tablet 100 mcg  100 mcg Oral 6am    sodium chloride (NS) flush 5-10 mL  5-10 mL IntraVENous PRN    NOREPINephrine (LEVOPHED) 8 mg in 0.9% NS 250ml infusion  0.5-30 mcg/min IntraVENous TITRATE    aspirin delayed-release tablet 81 mg  81 mg Oral DAILY    cholecalciferol (VITAMIN D3) (1000 Units /25 mcg) tablet 2 Tab  2,000 Units Oral DAILY    pantoprazole (PROTONIX) 40 mg in 0.9% sodium chloride 10 mL injection  40 mg IntraVENous Q12H    atorvastatin (LIPITOR) tablet 40 mg  40 mg Oral QHS    sucralfate (CARAFATE) tablet 1 g  1 g Oral QID    tamsulosin (FLOMAX) capsule 0.4 mg  0.4 mg Oral DAILY    [Held by provider] heparin (porcine) injection 5,000 Units  5,000 Units SubCUTAneous Q8H    piperacillin-tazobactam (ZOSYN) 4.5 g in 0.9% sodium chloride (MBP/ADV) 100 mL MBP ###EXTENDED 4-HOUR INFUSION###  4.5 g IntraVENous Q8H         Allergies  Allergies   Allergen Reactions    Amlodipine Swelling and Itching    Bactrim [Sulfamethoprim Ds] Rash    Lisinopril Other (comments)     Acute renal failure    Ciprofloxacin Rash and Itching           Family History     Family History   Problem Relation Age of Onset    Cancer Father     Alzheimer Mother     Heart defect Brother          Review of systems    Unobtainable at this time      Physical Exam  Visit Vitals  /55   Pulse 75   Temp 97.2 °F (36.2 °C)   Resp 17   Ht 5' 7\" (1.702 m)   Wt 162 lb 7.7 oz (73.7 kg)   SpO2 100%   BMI 25.45 kg/m²       Kelly valdez who is asleep but awakens easily . Head is normocephalic and atraumatic. Trachea appears midline with no noted thyromegaly. There is no JVD. Decreased breath sound at base of the lungs. No obvious rales noted  Cardiac examination revealed irregular rhythm no obvious murmur   abdomen is soft and nontender. Extremities are without significant edema. Skin is warm and dry. Diagnostic Tests  Labs:   Recent Labs     04/27/20 0349 04/26/20 1408 04/26/20 0140  04/25/20  0353   WBC 10.2  --  7.8  --  11.4   HGB 7.8* 7.6* 7.3*   < > 7.9*   HCT 23.6* 22.6* 22.0*   < > 23.6*     --  130*  --  126*    < > = values in this interval not displayed. Recent Labs     04/27/20 0349 04/26/20 1408 04/26/20 0140 04/25/20  0353   *  --  151* 153*   K 3.5 3.1* 2.9* 3.3*   *  --  123* 124*   CO2 21  --  23 22   *  --  108* 113*   BUN 30*  --  34* 42*   CREA 1.42*  --  1.52* 1.82*   CA 7.7*  --  8.0* 8.2*   MG 1.9  --  1.9  --    PHOS 2.6  --   --   --        No results for input(s): TROIQ, CPK, CKMB in the last 72 hours.   Last Lipid:  No results found for: CHOL, CHOLX, CHLST, CHOLV, HDL, HDLP, LDL, LDLC, DLDLP, TGLX, TRIGL, TRIGP, CHHD, CHHDX    Kareem montemayor MD

## 2020-04-27 NOTE — PROGRESS NOTES
1930- Bedside shift change report given to Yadiel Goetz RN (oncoming nurse) by Rocio Beavers RN (offgoing nurse). Report included the following information SBAR, Kardex, Intake/Output, MAR, Accordion, Recent Results and Cardiac Rhythm AFIB. Received patient resting in bed with eyes open. Patient denies pain at this time. Patient A+O to self. Able to be re-oriented. VSS at this time. Farrell temp reading 95.6 at this time. Blankets administered to patient. Will continue to monitor. 2202- Patient has systolic BP <05. Spoke to Dr. Lakisha Gandara, will restart levophed due to continuation of low BP as a result of initiating martell hugger for low temp readings. PO medications held at this time due to NPO.     2300- Martell hugger turned off. Last three consecutive SBP readings resulted in SBP >90. Will hold levophed drip at this time. Patient resting in bed with eyes closed. 0000- Reassessment completed. POC glucose read <60. Patient given 10% Dextrose as per hypoglycemic protocol. Glucose re-check reads 120's. Patient started on levophed due to hypotension. 0100- Patient awake in bed asking about tomorrows \"appointment\". RN explained to him EGD procedure, and obtained patient signature on consent form. 0400- Reassessment completed. Patient had one medium sized stool, watery and green in color. Patient denies abdominal pain at this time. All VSS on monitor. RN titrating down on Levo. Will continue to monitor. 5721- Patient Hgb at 7.7 Spoke to Dr. Lakisha Gandara via telephone, new orders received to redraw H&H in 12 hours. No PRBC's at this time. Will continue to monitor.

## 2020-04-27 NOTE — PROGRESS NOTES
[facility]  Pulmonary, Critical Care, and Sleep Medicine    Name: Olga Carranza MRN: [id number]   : 1934 Hospital: [address] [place] [address]   Date: 2020  Consulted By:      Pulmonary & Critical Care      Subjective/History:   Patient is a 80 y.o. male with HTN, CHF, CKD, admitted  with CP and Heart block, was ruled out for ACS but then developed a GIB, requiring 3u pRBC and continues on IV PPI BID. The GIB has improved and now only had occasional dark tarry stools per RN. Hgb stable in 7's. GI is considering EGD. Patient developed some ICU delirium which has improved over the past few days per RN. This AM patient is spontaneously awake yet calm, obeys questions, is oriented to person and year, thinks he is at [employer]. Says he feels a little confused. Remains off levophed, continues on TPN. Had planned to place NG tube but then patient passed for nectar thickened liquids per Speech this AM.       Past Medical History:   Diagnosis Date    Difficulty urinating     Elevated PSA     Hematuria, unspecified     Hypercholesterolemia     Hypertension     Incomplete bladder emptying     Urinary retention     UTI (urinary tract infection)         Prior to Admission medications    Medication Sig Start Date End Date Taking? Authorizing Provider   levothyroxine (SYNTHROID) 50 mcg tablet Take 1 Tab by mouth every morning. 4/10/20  Yes Sherry Gonzalez PA   cholecalciferol (VITAMIN D3) (1000 Units /25 mcg) tablet Take 2 Tabs by mouth daily. 20  Yes Dori PAREDES, DO   simvastatin (ZOCOR) 40 mg tablet Take 1 Tab by mouth nightly. 20  Yes Dori PAREDES, DO   aspirin delayed-release 81 mg tablet Take 1 Tab by mouth daily. 20  Yes Dori PAREDES, DO   omeprazole (PRILOSEC) 20 mg capsule Take 1 Cap by mouth daily. 19  Yes Julissa Valentin MD   allopurinol (ZYLOPRIM) 300 mg tablet Take 300 mg by mouth daily.    Yes Griselda Rangel MD pantoprazole (PROTONIX) 40 mg tablet  2/22/16  Yes Provider, Historical   tamsulosin (FLOMAX) 0.4 mg capsule take 1 capsule by mouth once daily AFTER DINNER 3/12/15  Yes Abraham Gr MD   acetaminophen (TYLENOL EXTRA STRENGTH) 500 mg tablet Take 2 Tabs by mouth every six (6) hours as needed for Pain. 12/16/19   Dick Babcock MD   sucralfate (CARAFATE) 1 gram tablet Take 1 Tab by mouth four (4) times daily. 12/16/19   Dick Babccok MD     Current Facility-Administered Medications   Medication Dose Route Frequency    potassium chloride 20 mEq in 50 ml IVPB  20 mEq IntraVENous Q2H    insulin lispro (HUMALOG) injection   SubCUTAneous Q6H    TPN ADULT - CENTRAL   IntraVENous CONTINUOUS    QUEtiapine (SEROquel) tablet 25 mg  25 mg Oral QHS    TPN ADULT - CENTRAL   IntraVENous CONTINUOUS    lidocaine 4 % patch 1 Patch  1 Patch TransDERmal Q24H    levothyroxine (SYNTHROID) tablet 100 mcg  100 mcg Oral 6am    NOREPINephrine (LEVOPHED) 8 mg in 0.9% NS 250ml infusion  0.5-30 mcg/min IntraVENous TITRATE    [Held by provider] aspirin delayed-release tablet 81 mg  81 mg Oral DAILY    cholecalciferol (VITAMIN D3) (1000 Units /25 mcg) tablet 2 Tab  2,000 Units Oral DAILY    pantoprazole (PROTONIX) 40 mg in 0.9% sodium chloride 10 mL injection  40 mg IntraVENous Q12H    atorvastatin (LIPITOR) tablet 40 mg  40 mg Oral QHS    sucralfate (CARAFATE) tablet 1 g  1 g Oral QID    tamsulosin (FLOMAX) capsule 0.4 mg  0.4 mg Oral DAILY    [Held by provider] heparin (porcine) injection 5,000 Units  5,000 Units SubCUTAneous Q8H     Allergies   Allergen Reactions    Amlodipine Swelling and Itching    Bactrim [Sulfamethoprim Ds] Rash    Lisinopril Other (comments)     Acute renal failure    Ciprofloxacin Rash and Itching      Social History     Tobacco Use    Smoking status: Never Smoker    Smokeless tobacco: Never Used   Substance Use Topics    Alcohol use:  Yes     Alcohol/week: 2.0 standard drinks     Types: 2 Cans of beer per week     Comment: Occasional      Family History   Problem Relation Age of Onset    Cancer Father     Alzheimer Mother     Heart defect Brother       Review of Systems:  A comprehensive review of systems was negative except for that written in the HPI. Objective:   Vital Signs:    Visit Vitals  BP (!) 149/113   Pulse 77   Temp 96.7 °F (35.9 °C)   Resp 20   Ht 5' 7\" (1.702 m)   Wt 73.7 kg (162 lb 7.7 oz)   SpO2 (!) 86%   BMI 25.45 kg/m²       O2 Device: Nasal cannula   O2 Flow Rate (L/min): 2 l/min   Temp (24hrs), Av.4 °F (35.8 °C), Min:94 °F (34.4 °C), Max:98.3 °F (36.8 °C)       Intake/Output:   Last shift:       07 - 1900  In: 400 [I.V.:400]  Out: 150 [Urine:150]  Last 3 shifts:  190 -  0700  In: 5157.6 [I.V.:5157.6]  Out: 1115 [Urine:1115]    Intake/Output Summary (Last 24 hours) at 2020 1247  Last data filed at 2020 1200  Gross per 24 hour   Intake 3082.78 ml   Output 750 ml   Net 2332.78 ml     Physical Exam:   General:  Elderly male, Awake/Alert, cooperative, NAD   HEENT:   NCAT, PERRL, OP clear, MM moist    Neck: Supple, trachea midline. Lungs:   Symmetrical chest rise; good AE bilat; CTAB; no wheezes/rhonchi/rales noted (CTA in AM, on recheck now patient has rhonchi and weak cough. Will ask RT to NT suction). Heart:  RRR, S1, S2 normal, no m/r/g   Abdomen:   Soft, non-tender. Bowel sounds Hypoactive. Extremities: Extremities normal, atraumatic, no cyanosis or edema. Pulses: 2+ and symmetric all extremities.    Skin: Skin color, texture, turgor normal. No rashes or lesions   Neurologic: Grossly nonfocal, Awake, oriented to person and year, thinks he is at [employer]:      Data:     Recent Results (from the past 24 hour(s))   HGB & HCT    Collection Time: 20  2:08 PM   Result Value Ref Range    HGB 7.6 (L) 13.0 - 16.0 g/dL    HCT 22.6 (L) 36.0 - 48.0 %   POTASSIUM    Collection Time: 20  2:08 PM   Result Value Ref Range    Potassium 3.1 (L) 3.5 - 5.5 mmol/L   METABOLIC PANEL, BASIC    Collection Time: 04/27/20  3:49 AM   Result Value Ref Range    Sodium 147 (H) 136 - 145 mmol/L    Potassium 3.5 3.5 - 5.5 mmol/L    Chloride 119 (H) 100 - 111 mmol/L    CO2 21 21 - 32 mmol/L    Anion gap 7 3.0 - 18 mmol/L    Glucose 201 (H) 74 - 99 mg/dL    BUN 30 (H) 7.0 - 18 MG/DL    Creatinine 1.42 (H) 0.6 - 1.3 MG/DL    BUN/Creatinine ratio 21 (H) 12 - 20      GFR est AA 57 (L) >60 ml/min/1.73m2    GFR est non-AA 47 (L) >60 ml/min/1.73m2    Calcium 7.7 (L) 8.5 - 10.1 MG/DL   MAGNESIUM    Collection Time: 04/27/20  3:49 AM   Result Value Ref Range    Magnesium 1.9 1.6 - 2.6 mg/dL   PHOSPHORUS    Collection Time: 04/27/20  3:49 AM   Result Value Ref Range    Phosphorus 2.6 2.5 - 4.9 MG/DL   CBC W/O DIFF    Collection Time: 04/27/20  3:49 AM   Result Value Ref Range    WBC 10.2 4.6 - 13.2 K/uL    RBC 2.49 (L) 4.70 - 5.50 M/uL    HGB 7.8 (L) 13.0 - 16.0 g/dL    HCT 23.6 (L) 36.0 - 48.0 %    MCV 94.8 74.0 - 97.0 FL    MCH 31.3 24.0 - 34.0 PG    MCHC 33.1 31.0 - 37.0 g/dL    RDW 16.7 (H) 11.6 - 14.5 %    PLATELET 302 322 - 778 K/uL    MPV 10.6 9.2 - 11.8 FL   HEMOGLOBIN A1C WITH EAG    Collection Time: 04/27/20  3:49 AM   Result Value Ref Range    Hemoglobin A1c 5.8 (H) 4.2 - 5.6 %    Est. average glucose 120 mg/dL   GLUCOSE, POC    Collection Time: 04/27/20  8:57 AM   Result Value Ref Range    Glucose (POC) 155 (H) 70 - 110 mg/dL   GLUCOSE, POC    Collection Time: 04/27/20 11:38 AM   Result Value Ref Range    Glucose (POC) 171 (H) 70 - 110 mg/dL           No results for input(s): FIO2I, IFO2, HCO3I, IHCO3, HCOPOC, PCO2I, PCOPOC, IPHI, PHI, PHPOC, PO2I, PO2POC in the last 72 hours.     No lab exists for component: IPOC2    Imaging:  I have personally reviewed the patients radiographs and have reviewed the reports:    IMPRESSION:   85yoM with HTN, CHF, and CKD, admitted 4/21 with CP and Heart block, was ruled out for ACS but then developed a GIB and ICU delirium      PLAN:   Respiratory:  - no active issues    Cardiovascular:  1. CP (resolved); Heart block: appreciate cards consult  2. CHF: TTE showed EF 35-40% with grade 2 diastolic dysfunction  - having intermittent wet-sounding cough; RT will attempt to NT suction. Avoid further IVF's if possible    Renal:  1. CKD Stage 3: patient is currently at his baseline  2. Hyperchloridemia; Hypernatremia: was on D5W but developed SOB and Wheezing with Pox to 80's overnight, likely fluid overload in setting of CHF; Na down to 147 now from 151; Cl remains 119 but should trend down. Infectious Disease:  - no fever at all since admission, WBC 10.2 currently, all cultures remain no growth. Is day 7 of Zosyn which was started empirically; will stop it now. Endocrine:  1. Hyperglycemia: likely from the D5W he was receiving; start SSI    GI:  1. GIB: of unclear etiology but appears to be improving. GI does not plan for EGD at this point. Continue IV PPI. Start Nectar thick clear liquid diet. Transfuse for Hgb < 7.0. Stop TPN. Hematologic:  1. Anemia: due to GIB as above; SCD's for GI prophylaxis    Neurologic:  1. Mild ICU delirium:   - start seroquel 25mg qHS; QTc 475 at baseline. Avoid opiates and benzos. Keep lights on during day, and minimize interruptions as night. Would benefit in transferring to a room with a window if possible. Best Practice:  · Sepsis Bundle per Hospital Protocol  · Glycemic control; avoid Hypoglycemia  · IHI ICU Bundles:  ·  Central Line Bundle Followed  and Mojica Bundle Followed    · Stress ulcer prophylaxis. PPI   · DVT prophylaxis. SCD's  · Need for Lines, mojica assessed. · Restraints need. · Palliative care evaluation. · Code Status: FULL        Total of  60 min critical care time spent at bedside during the course of care providing evaluation,management and care decisions and ordering appropriate treatment related to critical care problems exclusive of procedures.   The reason for providing this level of medical care for this critically ill patient was due a critical illness that impaired one or more vital organ systems such that there was a high probability of imminent or life threatening deterioration in the patients condition. This care involved high complexity decision making to assess, manipulate, and support vital system functions, to treat this degree vital organ system failure and to prevent further life threatening deterioration of the patients condition.       Ceci Wen MD  Pulmonary & Critical Care

## 2020-04-27 NOTE — PROGRESS NOTES
Plan at this time is home with hh but will need PT/OT when appropriate to evaluate needs. If SNF will need auth from [facility].

## 2020-04-27 NOTE — PROGRESS NOTES
Problem: Discharge Planning  Goal: *Discharge to safe environment  Outcome: Progressing Towards Goal     Problem: Falls - Risk of  Goal: *Absence of Falls  Description: Document Miles Sol Fall Risk and appropriate interventions in the flowsheet. Outcome: Progressing Towards Goal  Note: Fall Risk Interventions:  Mobility Interventions: Bed/chair exit alarm    Mentation Interventions: Bed/chair exit alarm, Door open when patient unattended    Medication Interventions: Bed/chair exit alarm, Evaluate medications/consider consulting pharmacy    Elimination Interventions: Bed/chair exit alarm, Call light in reach              Problem: Patient Education: Go to Patient Education Activity  Goal: Patient/Family Education  Outcome: Progressing Towards Goal     Problem: Pressure Injury - Risk of  Goal: *Prevention of pressure injury  Description: Document Alex Scale and appropriate interventions in the flowsheet.   Outcome: Progressing Towards Goal  Note: Pressure Injury Interventions:  Sensory Interventions: Assess changes in LOC    Moisture Interventions: Apply protective barrier, creams and emollients    Activity Interventions: Pressure redistribution bed/mattress(bed type)    Mobility Interventions: HOB 30 degrees or less, Pressure redistribution bed/mattress (bed type)    Nutrition Interventions: Document food/fluid/supplement intake    Friction and Shear Interventions: HOB 30 degrees or less                Problem: Patient Education: Go to Patient Education Activity  Goal: Patient/Family Education  Outcome: Progressing Towards Goal     Problem: Altered nutrition  Goal: *Adequate nutrition  Outcome: Progressing Towards Goal     Problem: Pain  Goal: *Control of Pain  Outcome: Progressing Towards Goal  Goal: *PALLIATIVE CARE:  Alleviation of Pain  Outcome: Progressing Towards Goal     Problem: Tissue Perfusion - Cardiopulmonary, Altered  Goal: *Optimize tissue perfusion  Outcome: Progressing Towards Goal  Goal: *Absence of hypoxia  Outcome: Progressing Towards Goal     Problem: Patient Education: Go to Patient Education Activity  Goal: Patient/Family Education  Outcome: Progressing Towards Goal     Problem: Delirium Treatment  Goal: *Level of consciousness restored to baseline  Outcome: Progressing Towards Goal  Goal: *Level of environmental perceptions restored to baseline  Outcome: Progressing Towards Goal  Goal: *Sensory perception restored to baseline  Outcome: Progressing Towards Goal  Goal: *Emotional stability restored to baseline  Outcome: Progressing Towards Goal  Goal: *Functional assessment restored to baseline  Outcome: Progressing Towards Goal  Goal: *Absence of falls  Outcome: Progressing Towards Goal  Goal: *Will remain free of delirium, CAM Score negative  Outcome: Progressing Towards Goal  Goal: *Cognitive status will be restored to baseline  Outcome: Progressing Towards Goal  Goal: Interventions  Outcome: Progressing Towards Goal     Problem: Patient Education: Go to Patient Education Activity  Goal: Patient/Family Education  Outcome: Progressing Towards Goal     Problem: Patient Education: Go to Patient Education Activity  Goal: Patient/Family Education  Outcome: Progressing Towards Goal

## 2020-04-27 NOTE — PROGRESS NOTES
Internal Medicine Progress Note    Patient's Name: Harika Guzmán  Admit Date: 4/21/2020  Length of Stay: 6      Assessment/Plan     C/Stephanie Oro [address] Problems    Diagnosis Date Noted    Gastrointestinal bleeding 04/23/2020    Hypoglycemia 04/21/2020    HTN (hypertension) 04/21/2020    Left bundle branch block 04/21/2020    Sinoatrial node dysfunction ([facility] 04/21/2020    Dysphagia 04/08/2020    Cardiomyopathy ([facility] 02/20/2020     LVEF 35-40% by previous TTE.  Grade II diastolic dysfunction 66/02/8954     Per previous TTE.  Hypothyroidism 02/20/2020    Bradycardia 02/19/2020    Hypotension 02/19/2020    Hypothermia 12/23/2019    Chronic renal failure, stage 3 (moderate) ([facility]) 12/23/2019    SIRS (systemic inflammatory response syndrome) ([facility]) 12/23/2019     - Afebrile, no white count  - Cont to observe off antibx  - Cont IV fluids  - Cont IV PPI  - Clear liquid diet per GI  - Transfuse as need to keep Hgb > 7  - GI following awaiting for consent for EGD  - Appreciate cardio, no plans for pacer with heart block  - Sodium improving  - Cr improving  - Replete lytes PRN  - Cont acceptable home medications for chronic conditions   - DVT protocol    I have personally reviewed all pertinent labs and films that have officially resulted over the last 24 hours. I have personally checked for all pending labs that are awaiting final results.     Subjective     Pt s/e @ bedside  No major events overnight  Pt offers no complaints this AM  Denies CP or SOB    Objective     Visit Vitals  /61   Pulse 78   Temp 96.7 °F (35.9 °C)   Resp 18   Ht 5' 7\" (1.702 m)   Wt 73.7 kg (162 lb 7.7 oz)   SpO2 (!) 86%   BMI 25.45 kg/m²       Physical Exam:  General Appearance: NAD, conversant  Lungs: CTA with normal respiratory effort  CV: RRR, no m/r/g  Abdomen: soft, non-tender, normal bowel sounds  Extremities: no cyanosis, no peripheral edema  Neuro: No focal deficits, motor/sensory intact    Lab/Data Reviewed:  CMP:   Lab Results   Component Value Date/Time     (H) 04/27/2020 03:49 AM    K 3.5 04/27/2020 03:49 AM     (H) 04/27/2020 03:49 AM    CO2 21 04/27/2020 03:49 AM    AGAP 7 04/27/2020 03:49 AM     (H) 04/27/2020 03:49 AM    BUN 30 (H) 04/27/2020 03:49 AM    CREA 1.42 (H) 04/27/2020 03:49 AM    GFRAA 57 (L) 04/27/2020 03:49 AM    GFRNA 47 (L) 04/27/2020 03:49 AM    CA 7.7 (L) 04/27/2020 03:49 AM    MG 1.9 04/27/2020 03:49 AM    PHOS 2.6 04/27/2020 03:49 AM     CBC:   Lab Results   Component Value Date/Time    WBC 10.2 04/27/2020 03:49 AM    HGB 7.8 (L) 04/27/2020 03:49 AM    HCT 23.6 (L) 04/27/2020 03:49 AM     04/27/2020 03:49 AM       Imaging Reviewed:  [facility] Chest Port    Result Date: 4/27/2020  EXAM: One-view chest CLINICAL HISTORY: wheezing, short of breath, COMPARISON: None FINDINGS: Frontal view of the chest demonstrate patchy bilateral airspace disease and probable trace effusions. . Cardiac silhouette is normal in size and contour. No acute bony or soft tissue abnormality. Right approach central line tip in the lower SVC. IMPRESSION: Nonspecific bilateral airspace disease. Leading differential considerations would include multifocal pneumonia versus pulmonary edema. Probable trace effusions. . Preliminary report was given by the on-call radiology resident.        Medications Reviewed:  Current Facility-Administered Medications   Medication Dose Route Frequency    potassium chloride 20 mEq in 50 ml IVPB  20 mEq IntraVENous Q2H    insulin lispro (HUMALOG) injection   SubCUTAneous Q6H    dextrose 10% infusion 125-250 mL  125-250 mL IntraVENous PRN    TPN ADULT - CENTRAL   IntraVENous CONTINUOUS    QUEtiapine (SEROquel) tablet 25 mg  25 mg Oral QHS    TPN ADULT - CENTRAL   IntraVENous CONTINUOUS    lidocaine 4 % patch 1 Patch  1 Patch TransDERmal Q24H    haloperidol lactate (HALDOL) injection 2 mg  2 mg IntraMUSCular Q6H PRN    0.9% sodium chloride infusion 250 mL  250 mL IntraVENous PRN    glucose chewable tablet 16 g  4 Tab Oral PRN    glucagon (GLUCAGEN) injection 1 mg  1 mg IntraMUSCular PRN    dextrose 10% infusion 125-250 mL  125-250 mL IntraVENous PRN    levothyroxine (SYNTHROID) tablet 100 mcg  100 mcg Oral 6am    sodium chloride (NS) flush 5-10 mL  5-10 mL IntraVENous PRN    NOREPINephrine (LEVOPHED) 8 mg in 0.9% NS 250ml infusion  0.5-30 mcg/min IntraVENous TITRATE    [Held by provider] aspirin delayed-release tablet 81 mg  81 mg Oral DAILY    cholecalciferol (VITAMIN D3) (1000 Units /25 mcg) tablet 2 Tab  2,000 Units Oral DAILY    pantoprazole (PROTONIX) 40 mg in 0.9% sodium chloride 10 mL injection  40 mg IntraVENous Q12H    atorvastatin (LIPITOR) tablet 40 mg  40 mg Oral QHS    sucralfate (CARAFATE) tablet 1 g  1 g Oral QID    tamsulosin (FLOMAX) capsule 0.4 mg  0.4 mg Oral DAILY    [Held by provider] heparin (porcine) injection 5,000 Units  5,000 Units SubCUTAneous Q8H           Jay Curtis DO  Internal Medicine, Hospitalist  Pager: [phone]  [address] [place] [address] Group

## 2020-04-27 NOTE — DIABETES MGMT
NUTRITIONAL RE-ASSESSMENT GLYCEMIC CONTROL/ PLAN OF CARE     Russell President           80 y.o.           4/21/2020                 1. Hypothermia, initial encounter    2. Hypoglycemia    3. General weakness    4. Acute chest pain    5. Hx of aspiration pneumonitis    6. Hypercarbia    7. Anemia, unspecified type    8. Urinary tract infection with hematuria, site unspecified       INTERVENTIONS/PLAN:   Recommend TPN of 75 g amino acids/d 300 g dextrose/d with 250ml 20%  Intralipid at 100ml/hour TPN will provide 75 g protein, 1520 non-protein calories/d. Electrolytes in TPN adjusted based on labs. ASSESSMENT:   Pt is  101% ideal weight with BMI = 25.4 kg/m2 (normal weight  Classification). Noted at prior admission pt had had an unintentional 33 lb weight loss over prior 10 months and his po intake was poor. Nutrition Diagnoses:   Nutrient deficit as evidenced by  npo status. SUBJECTIVE/OBJECTIVE:   Diet:npo    No data found.     Medications: [x]                Reviewed   Most Recent POC Glucose:   Recent Labs     04/27/20  0349 04/26/20  0140 04/25/20  0353   * 108* 113*      Labs:     Lab Results   Component Value Date/Time    Sodium 147 (H) 04/27/2020 03:49 AM    Potassium 3.5 04/27/2020 03:49 AM    Chloride 119 (H) 04/27/2020 03:49 AM    CO2 21 04/27/2020 03:49 AM    Anion gap 7 04/27/2020 03:49 AM    Glucose 201 (H) 04/27/2020 03:49 AM    BUN 30 (H) 04/27/2020 03:49 AM    Creatinine 1.42 (H) 04/27/2020 03:49 AM    Calcium 7.7 (L) 04/27/2020 03:49 AM    Magnesium 1.9 04/27/2020 03:49 AM    Phosphorus 2.6 04/27/2020 03:49 AM    Albumin 2.1 (L) 04/23/2020 04:17 PM   prealbumin 9.6    Anthropometrics: IBW : 67.1 kg (148 lb), % IBW (Calculated): 101.35 %, BMI (calculated): 25.4  Wt Readings from Last 1 Encounters:   04/24/20 73.7 kg (162 lb 7.7 oz)     Ht Readings from Last 1 Encounters:   04/22/20 5' 7\" (1.702 m)     Estimated Nutrition Needs:  1156 Kcals/day, Protein (g): 102 g Fluid (ml): 1800 ml  Based on:   [x]          Actual BW    []          ABW   []            Adjusted BW         Nutrition Interventions: TPN    Goal:   Blood glucose will be within target range of  mg/dL by 4/30/20. Provision of adequate nutrition (> or + 75% estimated needs) by 5/2/20. Weight maintenance (+/- 1-2 kg) by 5/5/20.         Nutrition Monitoring and Evaluation      []     Monitor po intake on meal rounds  [x]     Continue inpatient monitoring and intervention  []     Other:    Nutrition Risk:  [x]   High     []  Moderate    []  Minimal/Uncompromised    Vamshi Arizmendi RD

## 2020-04-27 NOTE — PROGRESS NOTES
Problem: Falls - Risk of  Goal: *Absence of Falls  Description: Document Deja Lobe Fall Risk and appropriate interventions in the flowsheet. Outcome: Progressing Towards Goal  Note: Fall Risk Interventions:  Mobility Interventions: Bed/chair exit alarm    Mentation Interventions: Evaluate medications/consider consulting pharmacy    Medication Interventions: Bed/chair exit alarm, Evaluate medications/consider consulting pharmacy    Elimination Interventions: Call light in reach              Problem: Patient Education: Go to Patient Education Activity  Goal: Patient/Family Education  Outcome: Progressing Towards Goal     Problem: Pressure Injury - Risk of  Goal: *Prevention of pressure injury  Description: Document Alex Scale and appropriate interventions in the flowsheet.   Outcome: Progressing Towards Goal  Note: Pressure Injury Interventions:  Sensory Interventions: Assess changes in LOC    Moisture Interventions: Absorbent underpads, Apply protective barrier, creams and emollients    Activity Interventions: Pressure redistribution bed/mattress(bed type)    Mobility Interventions: HOB 30 degrees or less    Nutrition Interventions: Document food/fluid/supplement intake    Friction and Shear Interventions: HOB 30 degrees or less                Problem: Patient Education: Go to Patient Education Activity  Goal: Patient/Family Education  Outcome: Progressing Towards Goal     Problem: Altered nutrition  Goal: *Adequate nutrition  Outcome: Progressing Towards Goal     Problem: Pain  Goal: *Control of Pain  Outcome: Progressing Towards Goal  Goal: *PALLIATIVE CARE:  Alleviation of Pain  Outcome: Progressing Towards Goal     Problem: Tissue Perfusion - Cardiopulmonary, Altered  Goal: *Optimize tissue perfusion  Outcome: Progressing Towards Goal  Goal: *Absence of hypoxia  Outcome: Progressing Towards Goal     Problem: Patient Education: Go to Patient Education Activity  Goal: Patient/Family Education  Outcome: Progressing Towards Goal     Problem: Delirium Treatment  Goal: *Level of consciousness restored to baseline  Outcome: Progressing Towards Goal  Goal: *Level of environmental perceptions restored to baseline  Outcome: Progressing Towards Goal  Goal: *Sensory perception restored to baseline  Outcome: Progressing Towards Goal  Goal: *Emotional stability restored to baseline  Outcome: Progressing Towards Goal  Goal: *Functional assessment restored to baseline  Outcome: Progressing Towards Goal  Goal: *Absence of falls  Outcome: Progressing Towards Goal  Goal: *Will remain free of delirium, CAM Score negative  Outcome: Progressing Towards Goal  Goal: *Cognitive status will be restored to baseline  Outcome: Progressing Towards Goal  Goal: Interventions  Outcome: Progressing Towards Goal     Problem: Patient Education: Go to Patient Education Activity  Goal: Patient/Family Education  Outcome: Progressing Towards Goal     Problem: Patient Education: Go to Patient Education Activity  Goal: Patient/Family Education  Outcome: Progressing Towards Goal

## 2020-04-28 LAB
ANION GAP SERPL CALC-SCNC: 6 MMOL/L (ref 3–18)
BUN SERPL-MCNC: 33 MG/DL (ref 7–18)
BUN/CREAT SERPL: 24 (ref 12–20)
CALCIUM SERPL-MCNC: 7.9 MG/DL (ref 8.5–10.1)
CHLORIDE SERPL-SCNC: 122 MMOL/L (ref 100–111)
CO2 SERPL-SCNC: 20 MMOL/L (ref 21–32)
CREAT SERPL-MCNC: 1.37 MG/DL (ref 0.6–1.3)
ERYTHROCYTE [DISTWIDTH] IN BLOOD BY AUTOMATED COUNT: 16.4 % (ref 11.6–14.5)
ERYTHROCYTE [DISTWIDTH] IN BLOOD BY AUTOMATED COUNT: 16.9 % (ref 11.6–14.5)
GLUCOSE BLD STRIP.AUTO-MCNC: 122 MG/DL (ref 70–110)
GLUCOSE BLD STRIP.AUTO-MCNC: 59 MG/DL (ref 70–110)
GLUCOSE BLD STRIP.AUTO-MCNC: 66 MG/DL (ref 70–110)
GLUCOSE BLD STRIP.AUTO-MCNC: 67 MG/DL (ref 70–110)
GLUCOSE BLD STRIP.AUTO-MCNC: 78 MG/DL (ref 70–110)
GLUCOSE BLD STRIP.AUTO-MCNC: 86 MG/DL (ref 70–110)
GLUCOSE BLD STRIP.AUTO-MCNC: 99 MG/DL (ref 70–110)
GLUCOSE SERPL-MCNC: 81 MG/DL (ref 74–99)
HCT VFR BLD AUTO: 20.6 % (ref 36–48)
HCT VFR BLD AUTO: 22.6 % (ref 36–48)
HGB BLD-MCNC: 6.8 G/DL (ref 13–16)
HGB BLD-MCNC: 7.7 G/DL (ref 13–16)
LACTATE SERPL-SCNC: 0.9 MMOL/L (ref 0.4–2)
MAGNESIUM SERPL-MCNC: 2.1 MG/DL (ref 1.6–2.6)
MCH RBC QN AUTO: 31.8 PG (ref 24–34)
MCH RBC QN AUTO: 32.4 PG (ref 24–34)
MCHC RBC AUTO-ENTMCNC: 33 G/DL (ref 31–37)
MCHC RBC AUTO-ENTMCNC: 34.1 G/DL (ref 31–37)
MCV RBC AUTO: 95 FL (ref 74–97)
MCV RBC AUTO: 96.3 FL (ref 74–97)
PHOSPHATE SERPL-MCNC: 2.5 MG/DL (ref 2.5–4.9)
PLATELET # BLD AUTO: 196 K/UL (ref 135–420)
PLATELET # BLD AUTO: 205 K/UL (ref 135–420)
PMV BLD AUTO: 10.6 FL (ref 9.2–11.8)
PMV BLD AUTO: 11 FL (ref 9.2–11.8)
POTASSIUM SERPL-SCNC: 3.7 MMOL/L (ref 3.5–5.5)
POTASSIUM SERPL-SCNC: 3.7 MMOL/L (ref 3.5–5.5)
POTASSIUM SERPL-SCNC: 3.9 MMOL/L (ref 3.5–5.5)
RBC # BLD AUTO: 2.14 M/UL (ref 4.7–5.5)
RBC # BLD AUTO: 2.38 M/UL (ref 4.7–5.5)
SODIUM SERPL-SCNC: 148 MMOL/L (ref 136–145)
WBC # BLD AUTO: 10.6 K/UL (ref 4.6–13.2)
WBC # BLD AUTO: 7.7 K/UL (ref 4.6–13.2)

## 2020-04-28 PROCEDURE — 74011000250 HC RX REV CODE- 250: Performed by: HOSPITALIST

## 2020-04-28 PROCEDURE — 77010033678 HC OXYGEN DAILY

## 2020-04-28 PROCEDURE — 74011000250 HC RX REV CODE- 250: Performed by: INTERNAL MEDICINE

## 2020-04-28 PROCEDURE — 74011250636 HC RX REV CODE- 250/636: Performed by: INTERNAL MEDICINE

## 2020-04-28 PROCEDURE — 74011250636 HC RX REV CODE- 250/636: Performed by: HOSPITALIST

## 2020-04-28 PROCEDURE — 74011000258 HC RX REV CODE- 258: Performed by: INTERNAL MEDICINE

## 2020-04-28 PROCEDURE — 74011250637 HC RX REV CODE- 250/637: Performed by: INTERNAL MEDICINE

## 2020-04-28 PROCEDURE — P9016 RBC LEUKOCYTES REDUCED: HCPCS

## 2020-04-28 PROCEDURE — 85027 COMPLETE CBC AUTOMATED: CPT

## 2020-04-28 PROCEDURE — 86900 BLOOD TYPING SEROLOGIC ABO: CPT

## 2020-04-28 PROCEDURE — 84132 ASSAY OF SERUM POTASSIUM: CPT

## 2020-04-28 PROCEDURE — 82962 GLUCOSE BLOOD TEST: CPT

## 2020-04-28 PROCEDURE — P9047 ALBUMIN (HUMAN), 25%, 50ML: HCPCS | Performed by: INTERNAL MEDICINE

## 2020-04-28 PROCEDURE — 83605 ASSAY OF LACTIC ACID: CPT

## 2020-04-28 PROCEDURE — 74011250637 HC RX REV CODE- 250/637: Performed by: HOSPITALIST

## 2020-04-28 PROCEDURE — 36430 TRANSFUSION BLD/BLD COMPNT: CPT

## 2020-04-28 PROCEDURE — 83735 ASSAY OF MAGNESIUM: CPT

## 2020-04-28 PROCEDURE — 86923 COMPATIBILITY TEST ELECTRIC: CPT

## 2020-04-28 PROCEDURE — 65610000006 HC RM INTENSIVE CARE

## 2020-04-28 PROCEDURE — 84100 ASSAY OF PHOSPHORUS: CPT

## 2020-04-28 PROCEDURE — C9113 INJ PANTOPRAZOLE SODIUM, VIA: HCPCS | Performed by: INTERNAL MEDICINE

## 2020-04-28 RX ORDER — POTASSIUM CHLORIDE 750 MG/1
10 TABLET, EXTENDED RELEASE ORAL ONCE
Status: COMPLETED | OUTPATIENT
Start: 2020-04-28 | End: 2020-04-28

## 2020-04-28 RX ORDER — ACETAMINOPHEN 325 MG/1
325 TABLET ORAL ONCE
Status: DISCONTINUED | OUTPATIENT
Start: 2020-04-28 | End: 2020-04-28

## 2020-04-28 RX ORDER — FAMOTIDINE 20 MG/1
20 TABLET, FILM COATED ORAL ONCE
Status: DISCONTINUED | OUTPATIENT
Start: 2020-04-28 | End: 2020-04-28

## 2020-04-28 RX ORDER — ACETAMINOPHEN 325 MG/1
650 TABLET ORAL
Status: COMPLETED | OUTPATIENT
Start: 2020-04-28 | End: 2020-04-28

## 2020-04-28 RX ORDER — SODIUM CHLORIDE 9 MG/ML
250 INJECTION, SOLUTION INTRAVENOUS AS NEEDED
Status: DISCONTINUED | OUTPATIENT
Start: 2020-04-28 | End: 2020-04-29

## 2020-04-28 RX ORDER — POTASSIUM CHLORIDE 7.45 MG/ML
10 INJECTION INTRAVENOUS ONCE
Status: COMPLETED | OUTPATIENT
Start: 2020-04-28 | End: 2020-04-28

## 2020-04-28 RX ORDER — ALBUMIN HUMAN 250 G/1000ML
25 SOLUTION INTRAVENOUS EVERY 4 HOURS
Status: COMPLETED | OUTPATIENT
Start: 2020-04-28 | End: 2020-04-29

## 2020-04-28 RX ORDER — SODIUM CHLORIDE 9 MG/ML
250 INJECTION, SOLUTION INTRAVENOUS AS NEEDED
Status: DISCONTINUED | OUTPATIENT
Start: 2020-04-28 | End: 2020-05-11 | Stop reason: HOSPADM

## 2020-04-28 RX ADMIN — DEXTROSE MONOHYDRATE 125 ML: 10 INJECTION, SOLUTION INTRAVENOUS at 00:22

## 2020-04-28 RX ADMIN — SUCRALFATE 1 G: 1 TABLET ORAL at 18:03

## 2020-04-28 RX ADMIN — FAMOTIDINE 40 MG: 10 INJECTION, SOLUTION INTRAVENOUS at 20:03

## 2020-04-28 RX ADMIN — SUCRALFATE 1 G: 1 TABLET ORAL at 12:30

## 2020-04-28 RX ADMIN — QUETIAPINE FUMARATE 25 MG: 25 TABLET ORAL at 22:22

## 2020-04-28 RX ADMIN — POTASSIUM CHLORIDE 10 MEQ: 750 TABLET, EXTENDED RELEASE ORAL at 12:30

## 2020-04-28 RX ADMIN — SODIUM CHLORIDE 40 MG: 9 INJECTION, SOLUTION INTRAMUSCULAR; INTRAVENOUS; SUBCUTANEOUS at 09:12

## 2020-04-28 RX ADMIN — ALBUMIN (HUMAN) 25 G: 0.25 INJECTION, SOLUTION INTRAVENOUS at 20:02

## 2020-04-28 RX ADMIN — NOREPINEPHRINE BITARTRATE 4 MCG/MIN: 1 INJECTION INTRAVENOUS at 00:13

## 2020-04-28 RX ADMIN — ALBUMIN (HUMAN) 25 G: 0.25 INJECTION, SOLUTION INTRAVENOUS at 12:30

## 2020-04-28 RX ADMIN — SUCRALFATE 1 G: 1 TABLET ORAL at 22:22

## 2020-04-28 RX ADMIN — ACETAMINOPHEN 650 MG: 325 TABLET, FILM COATED ORAL at 20:04

## 2020-04-28 RX ADMIN — ATORVASTATIN CALCIUM 40 MG: 20 TABLET, FILM COATED ORAL at 22:22

## 2020-04-28 RX ADMIN — ALBUMIN (HUMAN) 25 G: 0.25 INJECTION, SOLUTION INTRAVENOUS at 15:48

## 2020-04-28 RX ADMIN — DEXTROSE MONOHYDRATE 125 ML: 10 INJECTION, SOLUTION INTRAVENOUS at 05:51

## 2020-04-28 RX ADMIN — POTASSIUM CHLORIDE 10 MEQ: 7.46 INJECTION, SOLUTION INTRAVENOUS at 05:56

## 2020-04-28 NOTE — PROGRESS NOTES
CARDIOLOGY Progress Note    Patient: Vikas Pollack  MR #: [id number]    Assessment/Plan:     Hospital Problems  Date Reviewed: 4/6/2020          Codes Class Noted POA    Hypoglycemia. ICD-10-CM: E16.2  ICD-9-CM: 251.2  4/21/2020 Unknown    Mental status changes, delirium. Defer to primary and ICU team      History of HTN (hypertension)  Back on vasopressors today ICD-10-CM: I10  ICD-9-CM: 401.9  4/21/2020 Unknown        Left bundle branch block,  He is known to have a long first-degree AV block and markedly reduced P wave amplitude. His rhythm has been noteworthy for frequent ectopics and short runs of NSVT. Intermittent atrial fibrillation  Heparin discontinued because of lower GI bleed. GI team managing. Will use digoxin if needed. Currently stable  Today appears to be back in sinus rhythm    Nuclear stress test  No significant ongoing ischemia    Patient with left bundle branch block, intermittent atrial fibrillation and bradycardia. Likely has sinus node dysfunction as well. He continues to be a  suboptimal candidate for pacemaker at this time. Discussed with Dr. Aruna Johnson this admission who agreed at this time   ICD-10-CM: I44.7  ICD-9-CM: 426.3  4/21/2020 Unknown      GI bleed: Transfusion as needed. GI team following. Defer management to GI team. Possible ? EGD per GI team      [facility]at[facility] node dysfunction ([facility] ICD-10-CM: I49.5  ICD-9-CM: 427.81  4/21/2020 Unknown        Dysphagia ICD-10-CM: R13.10  ICD-9-CM: 787.20  4/8/2020 Yes        Cardiomyopathy ([facility] ICD-10-CM: I42.9  ICD-9-CM: 425.4  2/20/2020 Yes    Overview Signed 2/20/2020 12:40 AM by Gaston KRAUSE, DO     LVEF 35-40% by previous TTE. Grade II diastolic dysfunction TKM-14-IY: I51.9  ICD-9-CM: 429.9  2/20/2020 Yes    Overview Signed 2/20/2020 12:41 AM by Lakesha King, DO     Per previous TTE.              Hypothyroidism ICD-10-CM: E03.9  ICD-9-CM: 244.9  2/20/2020 Yes        Bradycardia ICD-10-CM: R00.1  ICD-9-CM: 427.89  2/19/2020 Yes        Hypotension ICD-10-CM: I95.9  ICD-9-CM: 458.9  2/19/2020 Yes        * (Principal) Hypothermia ICD-10-CM: T68. Antonia Santiago  ICD-9-CM: 991.6  12/23/2019 Unknown        Chronic renal failure, stage 3 (moderate) (HCC) ICD-10-CM: N18.3  ICD-9-CM: 585.3  12/23/2019 Yes        SIRS (systemic inflammatory response syndrome) ([facility] ICD-10-CM: R65.10  ICD-9-CM: 995.90  12/23/2019 Yes    Gastrointestinal bleeding, GI following        Subjective     Sleeping soundly at present. Confusion previously reported  History of Present Illness  Lorena Sarkar is a 80 y.o. man that presented to the [facility] ED early this morning with complaints of chest tightness. The symptoms that the patient was experiencing were similar to a earlier admission to this hospital several weeks ago. At that time he was found to have an elevated TSH. He was also having episodic hypothermia. He was started on thyroid replacement therapy at a low dose of 50 mcg daily. He had some episodic severe bradycardia and so his beta-blocker was discontinued. He seemed to improved with those measures and was discharged for follow-up. On presentation to the ED, the patient was hypotensive. He did not respond well to intravenous fluid resuscitation. He was started on Levophed by Dr. Jourdan Verde in the ED. His initial serum troponin was 0.03. TSH remains elevated at 4.64 compared to the 7.65 on 4/6/2020 when the patient was started on Synthroid.         Past Medical History  Past Medical History:   Diagnosis Date    Difficulty urinating     Elevated PSA     Hematuria, unspecified     Hypercholesterolemia     Hypertension     Incomplete bladder emptying     Urinary retention     UTI (urinary tract infection)          Meds  Current Facility-Administered Medications   Medication Dose Route Frequency    insulin lispro (HUMALOG) injection   SubCUTAneous Q6H    dextrose 10% infusion 125-250 mL  125-250 mL IntraVENous PRN    QUEtiapine (SEROquel) tablet 25 mg  25 mg Oral QHS    NOREPINephrine (LEVOPHED) 8 mg in 0.9% NS 250ml infusion  0.5-30 mcg/min IntraVENous TITRATE    lidocaine 4 % patch 1 Patch  1 Patch TransDERmal Q24H    haloperidol lactate (HALDOL) injection 2 mg  2 mg IntraMUSCular Q6H PRN    0.9% sodium chloride infusion 250 mL  250 mL IntraVENous PRN    glucose chewable tablet 16 g  4 Tab Oral PRN    glucagon (GLUCAGEN) injection 1 mg  1 mg IntraMUSCular PRN    levothyroxine (SYNTHROID) tablet 100 mcg  100 mcg Oral 6am    sodium chloride (NS) flush 5-10 mL  5-10 mL IntraVENous PRN    [Held by provider] aspirin delayed-release tablet 81 mg  81 mg Oral DAILY    cholecalciferol (VITAMIN D3) (1000 Units /25 mcg) tablet 2 Tab  2,000 Units Oral DAILY    pantoprazole (PROTONIX) 40 mg in 0.9% sodium chloride 10 mL injection  40 mg IntraVENous Q12H    atorvastatin (LIPITOR) tablet 40 mg  40 mg Oral QHS    sucralfate (CARAFATE) tablet 1 g  1 g Oral QID    tamsulosin (FLOMAX) capsule 0.4 mg  0.4 mg Oral DAILY    [Held by provider] heparin (porcine) injection 5,000 Units  5,000 Units SubCUTAneous Q8H         Allergies  Allergies   Allergen Reactions    Amlodipine Swelling and Itching    Bactrim [Sulfamethoprim Ds] Rash    Lisinopril Other (comments)     Acute renal failure    Ciprofloxacin Rash and Itching           Family History     Family History   Problem Relation Age of Onset    Cancer Father     Alzheimer Mother     Heart defect Brother          Review of systems    Unobtainable at this time      Physical Exam  Visit Vitals  /44   Pulse (!) 54   Temp 97.1 °F (36.2 °C)   Resp 13   Ht 5' 7\" (1.702 m)   Wt 74 kg (163 lb 2.3 oz)   SpO2 100%   BMI 25.55 kg/m²       Earline Mccabe is who is asleep but awakens easily . Head is normocephalic and atraumatic. Trachea appears midline with no noted thyromegaly. There is no JVD. Decreased breath sound at base of the lungs.   No obvious rales noted  Cardiac examination revealed regular rhythm no obvious murmur   abdomen is soft and nontender. Extremities are without significant edema. Skin is warm and dry. Diagnostic Tests  Labs:   Recent Labs     04/28/20 0207 04/27/20 1652 04/27/20  0349   WBC 10.6 14.0* 10.2   HGB 7.7* 8.5* 7.8*   HCT 22.6* 25.6* 23.6*    204 146     Recent Labs     04/28/20  0207 04/27/20  1652 04/27/20  0349 04/26/20  1408 04/26/20  0140   * 148* 147*  --  151*   K 3.7  --  3.5 3.1* 2.9*   *  --  119*  --  123*   CO2 20*  --  21  --  23   GLU 81  --  201*  --  108*   BUN 33*  --  30*  --  34*   CREA 1.37*  --  1.42*  --  1.52*   CA 7.9*  --  7.7*  --  8.0*   MG 2.1  --  1.9  --  1.9   PHOS 2.5  --  2.6  --   --        No results for input(s): TROIQ, CPK, CKMB in the last 72 hours.   Last Lipid:  No results found for: CHOL, CHOLX, CHLST, CHOLV, HDL, HDLP, LDL, LDLC, DLDLP, TGLX, TRIGL, TRIGP, CHHD, CHHDX

## 2020-04-28 NOTE — PROGRESS NOTES
Internal Medicine Progress Note    Patient's Name: Dariela Rome  Admit Date: 4/21/2020  Length of Stay: 7      Assessment/Plan     C/Stephanie [address]ro [address] Problems    Diagnosis Date Noted    Gastrointestinal bleeding 04/23/2020    Hypoglycemia 04/21/2020    HTN (hypertension) 04/21/2020    Left bundle branch block 04/21/2020    Sinoatrial node dysfunction ([address] 04/21/2020    Dysphagia 04/08/2020    Cardiomyopathy ([address] 02/20/2020     LVEF 35-40% by previous TTE.  Grade II diastolic dysfunction 43/12/3418     Per previous TTE.  Hypothyroidism 02/20/2020    Bradycardia 02/19/2020    Hypotension 02/19/2020    Hypothermia 12/23/2019    Chronic renal failure, stage 3 (moderate) (HCC) 12/23/2019    SIRS (systemic inflammatory response syndrome) ([facility]) 12/23/2019     - Afebrile, no white count  - Cont to observe off antibx  - Cont IV fluids  - Cont IV PPI  - Plan for EGD today  - Transfuse as need to keep Hgb > 7  - Appreciate cardio, no plans for pacer with heart block  - Sodium stable  - Cr improving  - Replete lytes PRN  - Cont acceptable home medications for chronic conditions   - DVT protocol    I have personally reviewed all pertinent labs and films that have officially resulted over the last 24 hours. I have personally checked for all pending labs that are awaiting final results.     Subjective     Pt s/e @ bedside  No major events overnight  Doing well with no complaints  Back on levo overnight  Denies CP or SOB    Objective     Visit Vitals  /58   Pulse 66   Temp 97.2 °F (36.2 °C)   Resp 17   Ht 5' 7\" (1.702 m)   Wt 74 kg (163 lb 2.3 oz)   SpO2 100%   BMI 25.55 kg/m²       Physical Exam:  General Appearance: NAD, conversant  Lungs: CTA with normal respiratory effort  CV: RRR, no m/r/g  Abdomen: soft, non-tender, normal bowel sounds  Extremities: no cyanosis, no peripheral edema  Neuro: No focal deficits, motor/sensory intact    Lab/Data Reviewed:  CMP:   Lab Results   Component Value Date/Time     (H) 04/28/2020 02:07 AM    K 3.7 04/28/2020 10:55 AM     (H) 04/28/2020 02:07 AM    CO2 20 (L) 04/28/2020 02:07 AM    AGAP 6 04/28/2020 02:07 AM    GLU 81 04/28/2020 02:07 AM    BUN 33 (H) 04/28/2020 02:07 AM    CREA 1.37 (H) 04/28/2020 02:07 AM    GFRAA 60 (L) 04/28/2020 02:07 AM    GFRNA 49 (L) 04/28/2020 02:07 AM    CA 7.9 (L) 04/28/2020 02:07 AM    MG 2.1 04/28/2020 02:07 AM    PHOS 2.5 04/28/2020 02:07 AM     CBC:   Lab Results   Component Value Date/Time    WBC 10.6 04/28/2020 02:07 AM    HGB 7.7 (L) 04/28/2020 02:07 AM    HCT 22.6 (L) 04/28/2020 02:07 AM     04/28/2020 02:07 AM       Imaging Reviewed:  No results found.     Medications Reviewed:  Current Facility-Administered Medications   Medication Dose Route Frequency    albumin human 25% (BUMINATE) solution 25 g  25 g IntraVENous Q4H    potassium chloride (KLOR-CON) tablet 10 mEq  10 mEq Oral ONCE    insulin lispro (HUMALOG) injection   SubCUTAneous Q6H    dextrose 10% infusion 125-250 mL  125-250 mL IntraVENous PRN    QUEtiapine (SEROquel) tablet 25 mg  25 mg Oral QHS    NOREPINephrine (LEVOPHED) 8 mg in 0.9% NS 250ml infusion  0.5-30 mcg/min IntraVENous TITRATE    lidocaine 4 % patch 1 Patch  1 Patch TransDERmal Q24H    haloperidol lactate (HALDOL) injection 2 mg  2 mg IntraMUSCular Q6H PRN    0.9% sodium chloride infusion 250 mL  250 mL IntraVENous PRN    glucose chewable tablet 16 g  4 Tab Oral PRN    glucagon (GLUCAGEN) injection 1 mg  1 mg IntraMUSCular PRN    levothyroxine (SYNTHROID) tablet 100 mcg  100 mcg Oral 6am    sodium chloride (NS) flush 5-10 mL  5-10 mL IntraVENous PRN    [Held by provider] aspirin delayed-release tablet 81 mg  81 mg Oral DAILY    cholecalciferol (VITAMIN D3) (1000 Units /25 mcg) tablet 2 Tab  2,000 Units Oral DAILY    pantoprazole (PROTONIX) 40 mg in 0.9% sodium chloride 10 mL injection  40 mg IntraVENous Q12H    atorvastatin (LIPITOR) tablet 40 mg  40 mg Oral QHS    sucralfate (CARAFATE) tablet 1 g  1 g Oral QID    tamsulosin (FLOMAX) capsule 0.4 mg  0.4 mg Oral DAILY    [Held by provider] heparin (porcine) injection 5,000 Units  5,000 Units SubCUTAneous Q8H           Kobe Rudd DO  Internal Medicine, Hospitalist  Pager: [facility]

## 2020-04-28 NOTE — PROGRESS NOTES
attempted to  Complete a follow up visit with and Spiritual assessment of patient in room 2606 today but found the patient resting peacefully at present and therefore unable to communicate.  Chaplains will continue to follow and will provide pastoral care on an as needed/requested basis    Chaplain Hernandez Hoffmann   Board Certified [address] [place] [address]   [phone]

## 2020-04-28 NOTE — PROGRESS NOTES
Gastrointestinal Progress Note    Patient Name: Harika Guzmán    PHHKE'B Date: 4/28/2020    Admit Date: 4/21/2020    Subjective:     Harika Guzmán is a 80 y.o. Male with admission for possible sepsis and arrythmia. During admission he was noted to have blood with bowel movements. He had drop in HGB to 8. He had anticoagulation held. He has history of duodenal ulcer and esophageal ulcer on EGD in distant past.  He reports no NSAIDS. He had no bleeding noted over the weekend. HGB remains stable at 7.8. He is having some mental status changes today. He had been weaned off pressors. He is being evaluated and treated medically by cardiology. No abdominal pain. No nausea or vomiting. He was to have endoscopy today but had worsening Blood pressure over night and back on pressors today. No other complaints.             Current Facility-Administered Medications   Medication Dose Route Frequency    albumin human 25% (BUMINATE) solution 25 g  25 g IntraVENous Q4H    insulin lispro (HUMALOG) injection   SubCUTAneous Q6H    dextrose 10% infusion 125-250 mL  125-250 mL IntraVENous PRN    QUEtiapine (SEROquel) tablet 25 mg  25 mg Oral QHS    NOREPINephrine (LEVOPHED) 8 mg in 0.9% NS 250ml infusion  0.5-30 mcg/min IntraVENous TITRATE    lidocaine 4 % patch 1 Patch  1 Patch TransDERmal Q24H    haloperidol lactate (HALDOL) injection 2 mg  2 mg IntraMUSCular Q6H PRN    0.9% sodium chloride infusion 250 mL  250 mL IntraVENous PRN    glucose chewable tablet 16 g  4 Tab Oral PRN    glucagon (GLUCAGEN) injection 1 mg  1 mg IntraMUSCular PRN    levothyroxine (SYNTHROID) tablet 100 mcg  100 mcg Oral 6am    sodium chloride (NS) flush 5-10 mL  5-10 mL IntraVENous PRN    [Held by provider] aspirin delayed-release tablet 81 mg  81 mg Oral DAILY    cholecalciferol (VITAMIN D3) (1000 Units /25 mcg) tablet 2 Tab  2,000 Units Oral DAILY    pantoprazole (PROTONIX) 40 mg in 0.9% sodium chloride 10 mL injection  40 mg IntraVENous Q12H    atorvastatin (LIPITOR) tablet 40 mg  40 mg Oral QHS    sucralfate (CARAFATE) tablet 1 g  1 g Oral QID    tamsulosin (FLOMAX) capsule 0.4 mg  0.4 mg Oral DAILY    [Held by provider] heparin (porcine) injection 5,000 Units  5,000 Units SubCUTAneous Q8H          Objective:     Physical Exam:    Visit Vitals  /53   Pulse 62   Temp 97.4 °F (36.3 °C)   Resp 16   Ht 5' 7\" (1.702 m)   Wt 74 kg (163 lb 2.3 oz)   SpO2 100%   BMI 25.55 kg/m²     General appearance: alert, cooperative, no distress, appears stated age  Abdomen: soft, non-tender. Bowel sounds normal. No masses,  no organomegaly    Data Review:    Labs: Results:       Chemistry Recent Labs     04/28/20  1055 04/28/20  0207 04/27/20  1652 04/27/20  0349 04/26/20  0140   GLU  --  81  --  201*  --  108*   NA  --  148* 148* 147*  --  151*   K 3.7 3.7  --  3.5   < > 2.9*   CL  --  122*  --  119*  --  123*   CO2  --  20*  --  21  --  23   BUN  --  33*  --  30*  --  34*   CREA  --  1.37*  --  1.42*  --  1.52*   CA  --  7.9*  --  7.7*  --  8.0*   AGAP  --  6  --  7  --  5   BUCR  --  24*  --  21*  --  22*    < > = values in this interval not displayed. CBC w/Diff Recent Labs     04/28/20  0207 04/27/20  1652 04/27/20  0349 04/26/20  0140   WBC 10.6 14.0* 10.2  --  7.8   RBC 2.38* 2.69* 2.49*  --  2.32*   HGB 7.7* 8.5* 7.8*   < > 7.3*   HCT 22.6* 25.6* 23.6*   < > 22.0*    204 146  --  130*   GRANS  --   --   --   --  86*   LYMPH  --   --   --   --  7*   EOS  --   --   --   --  3    < > = values in this interval not displayed. Coagulation No results for input(s): PTP, INR, APTT, INREXT, INREXT in the last 72 hours. Liver Enzymes No results for input(s): TP, ALB, TBIL, AP, SGOT, ALT in the last 72 hours. No lab exists for component: DBIL       Assessment:   1. GI bleeding:  Mr. Melody Bartlett is an 80 yr old male who was admitted for sepsis and noted to have arrythmia. He was requiring pressors until now. He had GI bleeding while on anticoagulation. He has history of peptic ulcer disease in the distant past.  He was unstable for endoscopy during evaluation on Friday but improved and off of pressors. He has had no further bleeding but there is concern for need for anticoagulation. We were planning on EGD today but pressure worsened and requiring pressor agents again. But still no signs of rebleeding and HGB stable. I would not recommend EGD at this time. I recommend treating medically. Continue Protonix and follow clinically for signs of rebleeding. Recommendation:   1. Continue protonix  2. Follow H/H and transfuse as needed  3. Observe for signs of re-bleeding. 4. Hold anticoagulation. 5. Can resume diet.        George So MD  April 28, 2020

## 2020-04-28 NOTE — PROGRESS NOTES
[facility]  Pulmonary, Critical Care, and Sleep Medicine    Name: Jordy Ross MRN: [id number]   : 1934 Hospital: [facility]   Date: 2020  Consulted By:      Pulmonary & Critical Care      Subjective/History:   Patient is a 80 y.o. male with HTN, CHF, CKD, admitted  with CP and Heart block, was ruled out for ACS but then developed a GIB, requiring 3u pRBC and continues on IV PPI BID. The GIB has improved and now only had occasional dark tarry stools per RN. Hgb stable in 7's. GI is considering EGD. Patient developed some ICU delirium which has improved over the past few days per RN. This AM patient is spontaneously awake yet calm, obeys questions, is oriented to person and year, thinks he is at [place]. Says he feels a little confused. Remains off levophed, continues on TPN. Had planned to place NG tube but then patient passed for nectar thickened liquids per Speech on  AM.     Overnight: was made NPO p MN for EGD this AM. But then patient back on pressors this AM so EGD called off. No further GIB per RN. Low UOP 30-40cc/hr. Past Medical History:   Diagnosis Date    Difficulty urinating     Elevated PSA     Hematuria, unspecified     Hypercholesterolemia     Hypertension     Incomplete bladder emptying     Urinary retention     UTI (urinary tract infection)         Prior to Admission medications    Medication Sig Start Date End Date Taking? Authorizing Provider   levothyroxine (SYNTHROID) 50 mcg tablet Take 1 Tab by mouth every morning. 4/10/20  Yes Mandy Gonzalez PA   cholecalciferol (VITAMIN D3) (1000 Units /25 mcg) tablet Take 2 Tabs by mouth daily. 20  Yes Hermes Estevez H, DO   simvastatin (ZOCOR) 40 mg tablet Take 1 Tab by mouth nightly. 20  Yes Wadie Perch H, DO   aspirin delayed-release 81 mg tablet Take 1 Tab by mouth daily.  20  Yes Wadie Perch H, DO   omeprazole (PRILOSEC) 20 mg capsule Take 1 Cap by mouth daily. 12/16/19  Yes Renata Brito MD   allopurinol (ZYLOPRIM) 300 mg tablet Take 300 mg by mouth daily. Yes Other, MD Griselda   pantoprazole (PROTONIX) 40 mg tablet  2/22/16  Yes Provider, Historical   tamsulosin (FLOMAX) 0.4 mg capsule take 1 capsule by mouth once daily AFTER DINNER 3/12/15  Yes Sami Ricketts MD   acetaminophen (TYLENOL EXTRA STRENGTH) 500 mg tablet Take 2 Tabs by mouth every six (6) hours as needed for Pain. 12/16/19   Renata Brito MD   sucralfate (CARAFATE) 1 gram tablet Take 1 Tab by mouth four (4) times daily. 12/16/19   Renata Brito MD     Current Facility-Administered Medications   Medication Dose Route Frequency    albumin human 25% (BUMINATE) solution 25 g  25 g IntraVENous Q4H    insulin lispro (HUMALOG) injection   SubCUTAneous Q6H    QUEtiapine (SEROquel) tablet 25 mg  25 mg Oral QHS    NOREPINephrine (LEVOPHED) 8 mg in 0.9% NS 250ml infusion  0.5-30 mcg/min IntraVENous TITRATE    lidocaine 4 % patch 1 Patch  1 Patch TransDERmal Q24H    levothyroxine (SYNTHROID) tablet 100 mcg  100 mcg Oral 6am    [Held by provider] aspirin delayed-release tablet 81 mg  81 mg Oral DAILY    cholecalciferol (VITAMIN D3) (1000 Units /25 mcg) tablet 2 Tab  2,000 Units Oral DAILY    pantoprazole (PROTONIX) 40 mg in 0.9% sodium chloride 10 mL injection  40 mg IntraVENous Q12H    atorvastatin (LIPITOR) tablet 40 mg  40 mg Oral QHS    sucralfate (CARAFATE) tablet 1 g  1 g Oral QID    tamsulosin (FLOMAX) capsule 0.4 mg  0.4 mg Oral DAILY    [Held by provider] heparin (porcine) injection 5,000 Units  5,000 Units SubCUTAneous Q8H     Allergies   Allergen Reactions    Amlodipine Swelling and Itching    Bactrim [Sulfamethoprim Ds] Rash    Lisinopril Other (comments)     Acute renal failure    Ciprofloxacin Rash and Itching      Social History     Tobacco Use    Smoking status: Never Smoker    Smokeless tobacco: Never Used   Substance Use Topics    Alcohol use:  Yes Alcohol/week: 2.0 standard drinks     Types: 2 Cans of beer per week     Comment: Occasional      Family History   Problem Relation Age of Onset    Cancer Father     Alzheimer Mother     Heart defect Brother       Review of Systems:  A comprehensive review of systems was negative except for that written in the HPI. Objective:   Vital Signs:    Visit Vitals  /53   Pulse 62   Temp 97.4 °F (36.3 °C)   Resp 16   Ht 5' 7\" (1.702 m)   Wt 74 kg (163 lb 2.3 oz)   SpO2 100%   BMI 25.55 kg/m²       O2 Device: Nasal cannula   O2 Flow Rate (L/min): 2 l/min   Temp (24hrs), Av.7 °F (35.9 °C), Min:95 °F (35 °C), Max:97.6 °F (36.4 °C)       Intake/Output:   Last shift:       07 - 1900  In: -   Out: 400 [Urine:400]  Last 3 shifts: 1901 -  0700  In: 2894.9 [P.O.:400; I.V.:2494.9]  Out: 1355 [Urine:1355]    Intake/Output Summary (Last 24 hours) at 2020 1554  Last data filed at 2020 1500  Gross per 24 hour   Intake 145.39 ml   Output 1000 ml   Net -854.61 ml     Physical Exam:   General:  Elderly male, Awake/Alert, cooperative, NAD   HEENT:   NCAT, PERRL, OP clear, MM moist    Neck: Supple, trachea midline. Lungs:   Symmetrical chest rise; good AE bilat; CTAB; no wheezes/rhonchi/rales noted   Heart:  RRR, S1, S2 normal, no m/r/g   Abdomen:   Soft, non-tender. Bowel sounds Hypoactive. Extremities: Extremities normal, atraumatic, no cyanosis or edema. Pulses: 2+ and symmetric all extremities. Skin: Skin color, texture, turgor normal. No rashes or lesions   Neurologic: Grossly nonfocal, Awake, oriented to person and year, knows he is in a hospital but doesn't know where. He says he still feels somewhat confused. Slept well overnight per RN.     Devices:      Data:     Recent Results (from the past 24 hour(s))   CBC W/O DIFF    Collection Time: 20  4:52 PM   Result Value Ref Range    WBC 14.0 (H) 4.6 - 13.2 K/uL    RBC 2.69 (L) 4.70 - 5.50 M/uL    HGB 8.5 (L) 13.0 - 16.0 g/dL    HCT 25.6 (L) 36.0 - 48.0 %    MCV 95.2 74.0 - 97.0 FL    MCH 31.6 24.0 - 34.0 PG    MCHC 33.2 31.0 - 37.0 g/dL    RDW 16.5 (H) 11.6 - 14.5 %    PLATELET 816 417 - 401 K/uL    MPV 10.9 9.2 - 11.8 FL   SODIUM    Collection Time: 04/27/20  4:52 PM   Result Value Ref Range    Sodium 148 (H) 136 - 145 mmol/L   GLUCOSE, POC    Collection Time: 04/27/20  5:10 PM   Result Value Ref Range    Glucose (POC) 64 (L) 70 - 110 mg/dL   GLUCOSE, POC    Collection Time: 04/27/20  5:11 PM   Result Value Ref Range    Glucose (POC) 62 (L) 70 - 110 mg/dL   GLUCOSE, POC    Collection Time: 04/27/20  5:43 PM   Result Value Ref Range    Glucose (POC) 93 70 - 110 mg/dL   GLUCOSE, POC    Collection Time: 04/28/20 12:19 AM   Result Value Ref Range    Glucose (POC) 66 (L) 70 - 110 mg/dL   GLUCOSE, POC    Collection Time: 04/28/20 12:34 AM   Result Value Ref Range    Glucose (POC) 122 (H) 70 - 110 mg/dL   LACTIC ACID    Collection Time: 04/28/20  2:07 AM   Result Value Ref Range    Lactic acid 0.9 0.4 - 2.0 MMOL/L   METABOLIC PANEL, BASIC    Collection Time: 04/28/20  2:07 AM   Result Value Ref Range    Sodium 148 (H) 136 - 145 mmol/L    Potassium 3.7 3.5 - 5.5 mmol/L    Chloride 122 (H) 100 - 111 mmol/L    CO2 20 (L) 21 - 32 mmol/L    Anion gap 6 3.0 - 18 mmol/L    Glucose 81 74 - 99 mg/dL    BUN 33 (H) 7.0 - 18 MG/DL    Creatinine 1.37 (H) 0.6 - 1.3 MG/DL    BUN/Creatinine ratio 24 (H) 12 - 20      GFR est AA 60 (L) >60 ml/min/1.73m2    GFR est non-AA 49 (L) >60 ml/min/1.73m2    Calcium 7.9 (L) 8.5 - 10.1 MG/DL   CBC W/O DIFF    Collection Time: 04/28/20  2:07 AM   Result Value Ref Range    WBC 10.6 4.6 - 13.2 K/uL    RBC 2.38 (L) 4.70 - 5.50 M/uL    HGB 7.7 (L) 13.0 - 16.0 g/dL    HCT 22.6 (L) 36.0 - 48.0 %    MCV 95.0 74.0 - 97.0 FL    MCH 32.4 24.0 - 34.0 PG    MCHC 34.1 31.0 - 37.0 g/dL    RDW 16.4 (H) 11.6 - 14.5 %    PLATELET 860 829 - 073 K/uL    MPV 11.0 9.2 - 11.8 FL   MAGNESIUM    Collection Time: 04/28/20  2:07 AM   Result Value Ref Range    Magnesium 2.1 1.6 - 2.6 mg/dL   PHOSPHORUS    Collection Time: 04/28/20  2:07 AM   Result Value Ref Range    Phosphorus 2.5 2.5 - 4.9 MG/DL   GLUCOSE, POC    Collection Time: 04/28/20  5:48 AM   Result Value Ref Range    Glucose (POC) 59 (L) 70 - 110 mg/dL   GLUCOSE, POC    Collection Time: 04/28/20  6:00 AM   Result Value Ref Range    Glucose (POC) 99 70 - 110 mg/dL   POTASSIUM    Collection Time: 04/28/20 10:55 AM   Result Value Ref Range    Potassium 3.7 3.5 - 5.5 mmol/L   GLUCOSE, POC    Collection Time: 04/28/20 11:56 AM   Result Value Ref Range    Glucose (POC) 67 (L) 70 - 110 mg/dL   GLUCOSE, POC    Collection Time: 04/28/20 12:37 PM   Result Value Ref Range    Glucose (POC) 86 70 - 110 mg/dL           No results for input(s): FIO2I, IFO2, HCO3I, IHCO3, HCOPOC, PCO2I, PCOPOC, IPHI, PHI, PHPOC, PO2I, PO2POC in the last 72 hours. No lab exists for component: IPOC2    Imaging:  I have personally reviewed the patients radiographs and have reviewed the reports:    IMPRESSION:   85yoM with HTN, CHF, and CKD, admitted 4/21 with CP and Heart block, was ruled out for ACS but then developed a GIB and ICU delirium      PLAN:   Respiratory:  - no active issues    Cardiovascular:  1. CP (resolved); Heart block: appreciate cards consult  2. CHF: TTE showed EF 35-40% with grade 2 diastolic dysfunction  3. Shock: think this is most likely hypovolemic; less likely septic as no fever or leukocytosis and cultures all negative. Hgb 8.5>>7.7 slow downward drift likely dilutional as re-equilibrates. Do not suspect continued bleeding. Willl start some albumin IV runs; wean levophed as tolerated. Renal:  1. CKD Stage 3: patient is currently at his baseline  2. Hyperchloridemia; Hypernatremia: was on D5W but developed SOB and Wheezing with Pox to 80's, likely fluid overload in setting of CHF; Na 151>>147>>148; Cl 119>>122.  Continue to monitor     Infectious Disease:  - no fever at all since admission, no leukocytosis currently, all cultures remain no growth. S/p 7 days Zosyn (stoped 4/27). Endocrine:  No active issues; SSI PRN    GI:  1. GIB: of unclear etiology but appears to have resolved. No rectal bleeding overnight or today per RN. GI planned to do an EGD today but cancelled the case due to patient being back on pressors; question if we even need the EGD now that bleeding has stopped. Continue IV PPI. Retart Nectar thick clear liquid diet. Transfuse for Hgb < 7.0. Off TPN    Hematologic:  1. Anemia: due to GIB as above; SCD's for GI prophylaxis    Neurologic:  1. Mild ICU delirium:   - continue seroquel 25mg qHS; QTc 475 at baseline. Avoid opiates and benzos. Keep lights on during day, and minimize interruptions as night. Would benefit in transferring to a room with a window if possible. Best Practice:  · Sepsis Bundle per Hospital Protocol  · Glycemic control; avoid Hypoglycemia  · IHI ICU Bundles:  ·  Central Line Bundle Followed  and Mojica Bundle Followed    · Stress ulcer prophylaxis. PPI   · DVT prophylaxis. SCD's  · Need for Lines, mojica assessed. · Restraints need. · Palliative care evaluation. · Code Status: FULL        Total of  60 min critical care time spent at bedside during the course of care providing evaluation,management and care decisions and ordering appropriate treatment related to critical care problems exclusive of procedures. The reason for providing this level of medical care for this critically ill patient was due a critical illness that impaired one or more vital organ systems such that there was a high probability of imminent or life threatening deterioration in the patients condition. This care involved high complexity decision making to assess, manipulate, and support vital system functions, to treat this degree vital organ system failure and to prevent further life threatening deterioration of the patients condition.       Cesario Barajas MD  Pulmonary & Critical Care

## 2020-04-28 NOTE — CDMP QUERY
Patient admitted with Hypotension and Hypothermia. If possible, please document in progress notes and d/c summary  the condition found to be the cause of pt's Hypotension and Hypothermia. ? Hypotension and Hypothermia  due to . .. ? Other Explanation of clinical findings ? Clinically Undetermined (no explanation for clinical findings) The medical record reflects the following: 
 
   Risk Factors: Left BBB, Sinoatrial node dysfunction , Hypothyroidism, Cardiomyopathy, Bradycardia, Intermittant atrial fib, multiple other chronic health conditions Clinical Indicators: Admitted with Hypotension Treatment: Vasopressors, cardiology w/u Thank you Dr. Filippo Sinha, Fili Alexander RN, CCDS 
[employer] [email or web address]

## 2020-04-28 NOTE — PROGRESS NOTES
1900- Bedside shift change report given to Gwendolyn Mike RN (oncoming nurse) by Tunde Francisco RN (offgoing nurse). Report included the following information SBAR, Kardex, Intake/Output, MAR, Accordion, Recent Results and Cardiac Rhythm AFIB w/ PVCs. Received patient resting in bed with eyes closed. Discussed labs with off-going nurse. Hgb 6.8. Order in for type & screen as previous type & screen . Will discuss further with hospitalist.     Florentin Clear- Spoke to Dr. Stephan Vigil via telephone. New orders received for 1 unit PRBC at this time. Pre-treatment with acetaminophen and famotidine. - Spoke to patients sonMichele, via telephone. Updated on patient status and plan of care. Son aware of improved mental status and pending blood transfusion due to low H&H.     - PRBC transfusion initiated. Dual sign-of by Radha Whitaker RN. Will continue to monitor. 2200- Patient tolerating blood transfusion well. No signs of reaction at this time. VSS will continue to monitor. 2358- Transfusion complete. No signs of reaction. Patient tolerated well. H&H to be redrawn at 0400.     0000- Reassessment completed no new changes noted. VSS at this time. Levophed turned off. Will continue to monitor. 0100- Patient had one medium BM, watery dark green. Patient denies abdominal pain at this time. VSS. Will continue to monitor. 0400- Reassessment completed. No changes noted. Patient maintaining SBP >90 & MAP >60 with Levo off. Patient resting in bed with eyes closed. Appears to be comfortable. VSS.     0804- Discussed patients 0400 H&H with hospitalist. Hgb 7.6, no transfusion orders received at this time. H&H redraw in 12 hours. 0600- POC resulted with sugar of 73. Hypoglycemia protocol initiated, D10 bolus given 125mL. 4278- Left voicemail with son, Michele Bowens to give update on patient. 0730- Bedside shift change report given to Tunde Francisco RN (oncoming nurse) by Gwendolyn Mike RN (offgoing nurse).  Report included the following information SBAR, ED Summary, Intake/Output, MAR, Accordion, Recent Results and Cardiac Rhythm AFIB.

## 2020-04-28 NOTE — PROGRESS NOTES
INTERIM UPDATE - 1918 EST on 4/28/2020    Nursing Staff reports that Patient's Hgb returned with a result of 6.8. Nursing Staff reports that a Type & Screen has been ordered. Plan:  Transfuse 1 unit of PRBCs with Pre-Treatment of Acetaminophen and Famotidine and repeat H&H after Transfusion is complete.

## 2020-04-28 NOTE — PROGRESS NOTES
INTERIM UPDATE - 0608 EST on 4/27/2020    Nursing Staff reports that Patient has Blood Pressure of 84/35 mm Hg (MAP 52) after initiating Елена Hugger for Hypothermia. Nursing Staff reports that Patient was taken off of Pressors earlier today and the order has been discontinued. Plan:  RESUME IV Norepinephrine gtt as needed for a Target MAP of 60-65. Check Lactic Acid.

## 2020-04-28 NOTE — PROGRESS NOTES
SLP in chart, with attempt for visit today. Pt is currently NPO for procedure today. SLP will follow up next day following EGD.      Thank you for this referral,  Juarez Black MA CCC-SLP

## 2020-04-29 LAB
ABO + RH BLD: NORMAL
ANION GAP SERPL CALC-SCNC: 5 MMOL/L (ref 3–18)
BLD PROD TYP BPU: NORMAL
BLOOD GROUP ANTIBODIES SERPL: NORMAL
BPU ID: NORMAL
BUN SERPL-MCNC: 29 MG/DL (ref 7–18)
BUN/CREAT SERPL: 21 (ref 12–20)
CALCIUM SERPL-MCNC: 8.2 MG/DL (ref 8.5–10.1)
CALLED TO:,BCALL1: NORMAL
CHLORIDE SERPL-SCNC: 123 MMOL/L (ref 100–111)
CO2 SERPL-SCNC: 21 MMOL/L (ref 21–32)
CREAT SERPL-MCNC: 1.35 MG/DL (ref 0.6–1.3)
CROSSMATCH RESULT,%XM: NORMAL
ERYTHROCYTE [DISTWIDTH] IN BLOOD BY AUTOMATED COUNT: 16.2 % (ref 11.6–14.5)
GLUCOSE BLD STRIP.AUTO-MCNC: 110 MG/DL (ref 70–110)
GLUCOSE BLD STRIP.AUTO-MCNC: 141 MG/DL (ref 70–110)
GLUCOSE BLD STRIP.AUTO-MCNC: 73 MG/DL (ref 70–110)
GLUCOSE BLD STRIP.AUTO-MCNC: 80 MG/DL (ref 70–110)
GLUCOSE BLD STRIP.AUTO-MCNC: 88 MG/DL (ref 70–110)
GLUCOSE SERPL-MCNC: 63 MG/DL (ref 74–99)
HCT VFR BLD AUTO: 23.3 % (ref 36–48)
HGB BLD-MCNC: 7.6 G/DL (ref 13–16)
MAGNESIUM SERPL-MCNC: 2.1 MG/DL (ref 1.6–2.6)
MCH RBC QN AUTO: 31.4 PG (ref 24–34)
MCHC RBC AUTO-ENTMCNC: 32.6 G/DL (ref 31–37)
MCV RBC AUTO: 96.3 FL (ref 74–97)
PHOSPHATE SERPL-MCNC: 2.5 MG/DL (ref 2.5–4.9)
PLATELET # BLD AUTO: 191 K/UL (ref 135–420)
PMV BLD AUTO: 10.5 FL (ref 9.2–11.8)
POTASSIUM SERPL-SCNC: 3.5 MMOL/L (ref 3.5–5.5)
POTASSIUM SERPL-SCNC: 3.8 MMOL/L (ref 3.5–5.5)
RBC # BLD AUTO: 2.42 M/UL (ref 4.7–5.5)
SODIUM SERPL-SCNC: 149 MMOL/L (ref 136–145)
SPECIMEN EXP DATE BLD: NORMAL
STATUS OF UNIT,%ST: NORMAL
UNIT DIVISION, %UDIV: 0
WBC # BLD AUTO: 6.6 K/UL (ref 4.6–13.2)

## 2020-04-29 PROCEDURE — 74011250637 HC RX REV CODE- 250/637: Performed by: INTERNAL MEDICINE

## 2020-04-29 PROCEDURE — 74011250636 HC RX REV CODE- 250/636: Performed by: INTERNAL MEDICINE

## 2020-04-29 PROCEDURE — 82962 GLUCOSE BLOOD TEST: CPT

## 2020-04-29 PROCEDURE — 74011000250 HC RX REV CODE- 250: Performed by: INTERNAL MEDICINE

## 2020-04-29 PROCEDURE — 65660000000 HC RM CCU STEPDOWN

## 2020-04-29 PROCEDURE — 74011000258 HC RX REV CODE- 258: Performed by: INTERNAL MEDICINE

## 2020-04-29 PROCEDURE — 83735 ASSAY OF MAGNESIUM: CPT

## 2020-04-29 PROCEDURE — 74011250637 HC RX REV CODE- 250/637: Performed by: HOSPITALIST

## 2020-04-29 PROCEDURE — 85027 COMPLETE CBC AUTOMATED: CPT

## 2020-04-29 PROCEDURE — 92526 ORAL FUNCTION THERAPY: CPT

## 2020-04-29 PROCEDURE — 84132 ASSAY OF SERUM POTASSIUM: CPT

## 2020-04-29 PROCEDURE — 74011000250 HC RX REV CODE- 250: Performed by: HOSPITALIST

## 2020-04-29 PROCEDURE — 74011250637 HC RX REV CODE- 250/637: Performed by: PHYSICIAN ASSISTANT

## 2020-04-29 PROCEDURE — 84100 ASSAY OF PHOSPHORUS: CPT

## 2020-04-29 PROCEDURE — 97530 THERAPEUTIC ACTIVITIES: CPT

## 2020-04-29 PROCEDURE — 77010033678 HC OXYGEN DAILY

## 2020-04-29 PROCEDURE — P9045 ALBUMIN (HUMAN), 5%, 250 ML: HCPCS | Performed by: INTERNAL MEDICINE

## 2020-04-29 PROCEDURE — C9113 INJ PANTOPRAZOLE SODIUM, VIA: HCPCS | Performed by: INTERNAL MEDICINE

## 2020-04-29 PROCEDURE — 97162 PT EVAL MOD COMPLEX 30 MIN: CPT

## 2020-04-29 RX ORDER — ALBUMIN HUMAN 50 G/1000ML
25 SOLUTION INTRAVENOUS ONCE
Status: COMPLETED | OUTPATIENT
Start: 2020-04-29 | End: 2020-04-29

## 2020-04-29 RX ORDER — SODIUM,POTASSIUM PHOSPHATES 280-250MG
1 POWDER IN PACKET (EA) ORAL ONCE
Status: DISCONTINUED | OUTPATIENT
Start: 2020-04-29 | End: 2020-04-29

## 2020-04-29 RX ORDER — DEXTROSE MONOHYDRATE 50 MG/ML
50 INJECTION, SOLUTION INTRAVENOUS CONTINUOUS
Status: DISCONTINUED | OUTPATIENT
Start: 2020-04-29 | End: 2020-05-07

## 2020-04-29 RX ORDER — PANTOPRAZOLE SODIUM 40 MG/1
40 TABLET, DELAYED RELEASE ORAL
Status: DISCONTINUED | OUTPATIENT
Start: 2020-04-30 | End: 2020-05-06

## 2020-04-29 RX ADMIN — TAMSULOSIN HYDROCHLORIDE 0.4 MG: 0.4 CAPSULE ORAL at 08:06

## 2020-04-29 RX ADMIN — SUCRALFATE 1 G: 1 TABLET ORAL at 08:06

## 2020-04-29 RX ADMIN — QUETIAPINE FUMARATE 25 MG: 25 TABLET ORAL at 22:08

## 2020-04-29 RX ADMIN — SUCRALFATE 1 G: 1 TABLET ORAL at 17:50

## 2020-04-29 RX ADMIN — LEVOTHYROXINE SODIUM 100 MCG: 0.03 TABLET ORAL at 06:14

## 2020-04-29 RX ADMIN — ATORVASTATIN CALCIUM 40 MG: 20 TABLET, FILM COATED ORAL at 22:08

## 2020-04-29 RX ADMIN — SUCRALFATE 1 G: 1 TABLET ORAL at 22:08

## 2020-04-29 RX ADMIN — ALBUMIN (HUMAN) 25 G: 12.5 INJECTION, SOLUTION INTRAVENOUS at 08:06

## 2020-04-29 RX ADMIN — SUCRALFATE 1 G: 1 TABLET ORAL at 13:37

## 2020-04-29 RX ADMIN — SODIUM CHLORIDE 40 MG: 9 INJECTION, SOLUTION INTRAMUSCULAR; INTRAVENOUS; SUBCUTANEOUS at 08:05

## 2020-04-29 RX ADMIN — MELATONIN 2 TABLET: at 08:06

## 2020-04-29 RX ADMIN — POTASSIUM BICARBONATE 20 MEQ: 782 TABLET, EFFERVESCENT ORAL at 06:14

## 2020-04-29 RX ADMIN — DEXTROSE MONOHYDRATE 125 ML: 10 INJECTION, SOLUTION INTRAVENOUS at 06:02

## 2020-04-29 RX ADMIN — DEXTROSE MONOHYDRATE 50 ML/HR: 5 INJECTION, SOLUTION INTRAVENOUS at 08:07

## 2020-04-29 NOTE — PROGRESS NOTES
[facility]    Assessment Tracking Number: [id number] Status: Successfully Processed    Assessment Date: 12/28/2019    Arben Paget Information:   Screener's Name: [address] [place] [address] [facility]  NPI: [id number] Provider Name: Khai Genao [address] [place] [address] [facility]     Copy of UAI from media uploaded into [address] [place] [address] RN    Outcomes Manager

## 2020-04-29 NOTE — PROGRESS NOTES
Internal Medicine Progress Note    Patient's Name: Michael Mosley  Admit Date: 4/21/2020  Length of Stay: 8      Assessment/Plan     CHELA/Stephanie Oro 1106 Problems    Diagnosis Date Noted    Gastrointestinal bleeding 04/23/2020    Hypoglycemia 04/21/2020    HTN (hypertension) 04/21/2020    Left bundle branch block 04/21/2020    Sinoatrial node dysfunction ([address] 04/21/2020    Dysphagia 04/08/2020    Cardiomyopathy ([address] 02/20/2020     LVEF 35-40% by previous TTE.  Grade II diastolic dysfunction 81/25/9775     Per previous TTE.  Hypothyroidism 02/20/2020    Bradycardia 02/19/2020    Hypotension 02/19/2020    Hypothermia 12/23/2019    Chronic renal failure, stage 3 (moderate) ([facility]) 12/23/2019    SIRS (systemic inflammatory response syndrome) ([facility]) 12/23/2019   Hypotension and Hypothermia Likely due to Hypovolemic shock 2/2 GI bleed    - Afebrile, no white count  - Cont to observe off antibx  - Cont IV fluids  - Cont IV PPI  - EGD cancelled 2/2 hypotension with plans now for medical mgmt only  - Hgb stable today s/p 1 unit  - Cont to trend H&H and transfuse PRN  - Appreciate cardio, no plans for pacer with heart block  - Sodium up some  - Encourage free water intake  - Cr likely at baseline  - Replete lytes PRN  - Cont acceptable home medications for chronic conditions   - DVT protocol    I have personally reviewed all pertinent labs and films that have officially resulted over the last 24 hours. I have personally checked for all pending labs that are awaiting final results.     Subjective     Pt s/e @ bedside  No major events overnight  Off levo x 24 h  Hungry  Doing well otherwise  Denies CP or SOB    Objective     Visit Vitals  /70 (BP 1 Location: Right arm, BP Patient Position: At rest)   Pulse 62   Temp 98 °F (36.7 °C)   Resp 14   Ht 5' 7\" (1.702 m)   Wt 76 kg (167 lb 9.6 oz)   SpO2 100%   BMI 26.25 kg/m²       Physical Exam:  General Appearance: NAD, conversant  Lungs: CTA with normal respiratory effort  CV: RRR, no m/r/g  Abdomen: soft, non-tender, normal bowel sounds  Extremities: no cyanosis, no peripheral edema  Neuro: No focal deficits, motor/sensory intact    Lab/Data Reviewed:  CMP:   Lab Results   Component Value Date/Time     (H) 04/29/2020 03:58 AM    K 3.8 04/29/2020 09:54 AM     (H) 04/29/2020 03:58 AM    CO2 21 04/29/2020 03:58 AM    AGAP 5 04/29/2020 03:58 AM    GLU 63 (L) 04/29/2020 03:58 AM    BUN 29 (H) 04/29/2020 03:58 AM    CREA 1.35 (H) 04/29/2020 03:58 AM    GFRAA >60 04/29/2020 03:58 AM    GFRNA 50 (L) 04/29/2020 03:58 AM    CA 8.2 (L) 04/29/2020 03:58 AM    MG 2.1 04/29/2020 03:58 AM    PHOS 2.5 04/29/2020 03:58 AM     CBC:   Lab Results   Component Value Date/Time    WBC 6.6 04/29/2020 03:58 AM    HGB 7.6 (L) 04/29/2020 03:58 AM    HCT 23.3 (L) 04/29/2020 03:58 AM     04/29/2020 03:58 AM       Imaging Reviewed:  No results found.     Medications Reviewed:  Current Facility-Administered Medications   Medication Dose Route Frequency    dextrose 5% infusion  50 mL/hr IntraVENous CONTINUOUS    0.9% sodium chloride infusion 250 mL  250 mL IntraVENous PRN    insulin lispro (HUMALOG) injection   SubCUTAneous Q6H    dextrose 10% infusion 125-250 mL  125-250 mL IntraVENous PRN    QUEtiapine (SEROquel) tablet 25 mg  25 mg Oral QHS    lidocaine 4 % patch 1 Patch  1 Patch TransDERmal Q24H    haloperidol lactate (HALDOL) injection 2 mg  2 mg IntraMUSCular Q6H PRN    glucose chewable tablet 16 g  4 Tab Oral PRN    glucagon (GLUCAGEN) injection 1 mg  1 mg IntraMUSCular PRN    levothyroxine (SYNTHROID) tablet 100 mcg  100 mcg Oral 6am    sodium chloride (NS) flush 5-10 mL  5-10 mL IntraVENous PRN    [Held by provider] aspirin delayed-release tablet 81 mg  81 mg Oral DAILY    cholecalciferol (VITAMIN D3) (1000 Units /25 mcg) tablet 2 Tab  2,000 Units Oral DAILY    pantoprazole (PROTONIX) 40 mg in 0.9% sodium chloride 10 mL injection  40 mg IntraVENous Q12H  atorvastatin (LIPITOR) tablet 40 mg  40 mg Oral QHS    sucralfate (CARAFATE) tablet 1 g  1 g Oral QID    tamsulosin (FLOMAX) capsule 0.4 mg  0.4 mg Oral DAILY    [Held by provider] heparin (porcine) injection 5,000 Units  5,000 Units SubCUTAneous Q8H           Won Doe DO  Internal Medicine, Hospitalist  Pager: [phone]  [address] [place] [address]

## 2020-04-29 NOTE — PROGRESS NOTES
Problem: Mobility Impaired (Adult and Pediatric)  Goal: *Acute Goals and Plan of Care (Insert Text)  Description: Physical Therapy Goals  Initiated 4/29/2020 and to be accomplished within 7 day(s)  1. Patient will move from supine to sit and sit to supine  in bed with supervision/set-up. 2.  Patient will transfer from bed to chair and chair to bed with supervision/set-up using the least restrictive device. 3.  Patient will perform sit to stand with supervision/set-up. 4.  Patient will ambulate with supervision/set-up for 50 feet with the least restrictive device. Outcome: Progressing Towards Goal   PHYSICAL THERAPY EVALUATION    Patient: Martha Clark (42 y.o. male)  Date: 4/29/2020  Primary Diagnosis: Hypothermia [T68. XXXA]  Procedure(s) (LRB):  ESOPHAGOGASTRODUODENOSCOPY (EGD) (N/A)     Precautions: Fall  PLOF: Mod I  ASSESSMENT :  Mod A x2 for supine to sit. Seated EOB with supervision to mod A for balance. Lean to right in sitting requires mod A 75% of the time; reports baseline. Reaching tasks in sitting with min/mod A for balance. Mod A x2 for sit to stand. Standing at ww with mod A for upright, 1 minute. Returned to seated with min A x2. Max A x2 for sit to supine. Educated on need for RN assistance with mobility; verbalized understanding. Call bell in reach. Patient will benefit from skilled intervention to address the above impairments.   Patient's rehabilitation potential is considered to be Fair  Factors which may influence rehabilitation potential include:   []         None noted  []         Mental ability/status  [x]         Medical condition  []         Home/family situation and support systems  []         Safety awareness  []         Pain tolerance/management  []         Other:      PLAN :  Recommendations and Planned Interventions:   [x]           Bed Mobility Training             [x]    Neuromuscular Re-Education  [x]           Transfer Training                   [] Orthotic/Prosthetic Training  [x]           Gait Training                          []    Modalities  [x]           Therapeutic Exercises           []    Edema Management/Control  [x]           Therapeutic Activities            [x]    Family Training/Education  [x]           Patient Education  []           Other (comment):    Frequency/Duration: Patient will be followed by physical therapy 3-5 times a week to address goals. Discharge Recommendations: Khai Ramsey  Further Equipment Recommendations for Discharge: TBD next level of care     SUBJECTIVE:   Patient stated Thank you.     OBJECTIVE DATA SUMMARY:     Past Medical History:   Diagnosis Date    Difficulty urinating     Elevated PSA     Hematuria, unspecified     Hypercholesterolemia     Hypertension     Incomplete bladder emptying     Urinary retention     UTI (urinary tract infection)      Past Surgical History:   Procedure Laterality Date    APPENDECTOMY      EGD  2/7/2014         HX TURP  6/13/16    HX TURP       Barriers to Learning/Limitations: None  Compensate with: Visual Cues, Verbal Cues, Tactile Cues and Kinesthetic Cues    Home Situation:  Home Situation  Home Environment: Private residence  One/Two Story Residence: One story  Living Alone: No  Support Systems: Family member(s)  Patient Expects to be Discharged to[de-identified] Private residence  Current DME Used/Available at Home: Rubie Mcardle, rolling    Critical Behavior:  Neurologic State: Alert  Orientation Level: Oriented to person  Cognition: Follows commands     Psychosocial  Patient Behaviors: Cooperative  Purposeful Interaction: Yes  Caring Interventions: Reassure    Strength:    Manual Muscle Testing (LE)         R     L    Hip Flexion:   4/5  4/5  Knee EXT:   4/5  4/5  Knee FLEX:   4/5  4/5  Ankle DF:   4/5  4/5  _________________________________________________   Range Of Motion:  BLE AROM WFL  Functional Mobility:  Bed Mobility:  Supine to Sit: Moderate assistance;Assist x2  Sit to Supine: Maximum assistance;Assist x2  Transfers:  Sit to Stand: Moderate assistance;Assist x2  Stand to Sit: Minimum assistance;Assist x2  Balance:   Sitting: Impaired  Sitting - Static: Fair (occasional)  Sitting - Dynamic: Fair (occasional)  Standing: Impaired  Standing - Static: Poor  Neuro Re-education:  Seated EOB 8 minutes  Standing 1 minute    Pain:  Pain level pre-treatment: 0/10   Pain level post-treatment: 0/10     Activity Tolerance:   Fair    After treatment:   []         Patient left in no apparent distress sitting up in chair  [x]         Patient left in no apparent distress in bed  [x]         Call bell left within reach  [x]         Nursing notified  []         Caregiver present  []         Bed alarm activated  []         SCDs applied    COMMUNICATION/EDUCATION:   [x]         Role of physical therapy and plan of care in the acute care setting. [x]         Fall prevention education was provided and the patient/caregiver indicated understanding. [x]         Patient/family have participated as able in goal setting and plan of care. []         Patient/family agree to work toward stated goals and plan of care. []         Patient understands intent and goals of therapy, but is neutral about his/her participation. []         Patient is unable to participate in goal setting/plan of care: ongoing with therapy staff.     Thank you for this referral.  Lu Cuellar, PT   Time Calculation: 25 mins    Eval Complexity: History: MEDIUM  Complexity : 1-2 comorbidities / personal factors will impact the outcome/ POC Exam:MEDIUM Complexity : 3 Standardized tests and measures addressing body structure, function, activity limitation and / or participation in recreation  Presentation: MEDIUM Complexity : Evolving with changing characteristics  Clinical Decision Making:Medium Complexity    Clinical judgement; ROM, MMT, functional mobility Overall Complexity:MEDIUM

## 2020-04-29 NOTE — PROGRESS NOTES
Gastrointestinal Progress Note    Patient Name: Jordy Ross    WMJOG'Q Date: 4/29/2020    Admit Date: 4/21/2020    Subjective:     Jordy Ross is a 80 y.o. Male with admission for possible sepsis and arrythmia. During admission he was noted to have blood with bowel movements. He had drop in HGB to 8. He had anticoagulation held. He has history of duodenal ulcer and esophageal ulcer on EGD in distant past.  He reports no NSAIDS. He had no bleeding noted over the weekend. HGB remains stable at 7.8. He is having some mental status changes today. He had been weaned off pressors. He is being evaluated and treated medically by cardiology. No abdominal pain. No nausea or vomiting. He was to have endoscopy yesterday but had worsening Blood pressure over night and back on pressors. They have again been weened off. He has still has had no further bleeding and no abdominal pain. No other complaints.             Current Facility-Administered Medications   Medication Dose Route Frequency    dextrose 5% infusion  50 mL/hr IntraVENous CONTINUOUS    0.9% sodium chloride infusion 250 mL  250 mL IntraVENous PRN    insulin lispro (HUMALOG) injection   SubCUTAneous Q6H    dextrose 10% infusion 125-250 mL  125-250 mL IntraVENous PRN    QUEtiapine (SEROquel) tablet 25 mg  25 mg Oral QHS    lidocaine 4 % patch 1 Patch  1 Patch TransDERmal Q24H    haloperidol lactate (HALDOL) injection 2 mg  2 mg IntraMUSCular Q6H PRN    glucose chewable tablet 16 g  4 Tab Oral PRN    glucagon (GLUCAGEN) injection 1 mg  1 mg IntraMUSCular PRN    levothyroxine (SYNTHROID) tablet 100 mcg  100 mcg Oral 6am    sodium chloride (NS) flush 5-10 mL  5-10 mL IntraVENous PRN    [Held by provider] aspirin delayed-release tablet 81 mg  81 mg Oral DAILY    cholecalciferol (VITAMIN D3) (1000 Units /25 mcg) tablet 2 Tab  2,000 Units Oral DAILY    pantoprazole (PROTONIX) 40 mg in 0.9% sodium chloride 10 mL injection  40 mg IntraVENous Q12H    atorvastatin (LIPITOR) tablet 40 mg  40 mg Oral QHS    sucralfate (CARAFATE) tablet 1 g  1 g Oral QID    tamsulosin (FLOMAX) capsule 0.4 mg  0.4 mg Oral DAILY    [Held by provider] heparin (porcine) injection 5,000 Units  5,000 Units SubCUTAneous Q8H          Objective:     Physical Exam:    Visit Vitals  /70 (BP 1 Location: Right arm, BP Patient Position: At rest)   Pulse 62   Temp 98 °F (36.7 °C)   Resp 14   Ht 5' 7\" (1.702 m)   Wt 76 kg (167 lb 9.6 oz)   SpO2 100%   BMI 26.25 kg/m²     General appearance: alert, cooperative, no distress, appears stated age  Abdomen: soft, non-tender. Bowel sounds normal. No masses,  no organomegaly    Data Review:    Labs: Results:       Chemistry Recent Labs     04/29/20  0954 04/29/20 0358 04/28/20 1715 04/28/20  0207 04/27/20  1652 04/27/20  0349   GLU  --  63*  --   --  81  --  201*   NA  --  149*  --   --  148* 148* 147*   K 3.8 3.5 3.9   < > 3.7  --  3.5   CL  --  123*  --   --  122*  --  119*   CO2  --  21  --   --  20*  --  21   BUN  --  29*  --   --  33*  --  30*   CREA  --  1.35*  --   --  1.37*  --  1.42*   CA  --  8.2*  --   --  7.9*  --  7.7*   AGAP  --  5  --   --  6  --  7   BUCR  --  21*  --   --  24*  --  21*    < > = values in this interval not displayed. CBC w/Diff Recent Labs     04/29/20 0358 04/28/20 1715 04/28/20  0207   WBC 6.6 7.7 10.6   RBC 2.42* 2.14* 2.38*   HGB 7.6* 6.8* 7.7*   HCT 23.3* 20.6* 22.6*    205 196      Coagulation No results for input(s): PTP, INR, APTT, INREXT, INREXT in the last 72 hours. Liver Enzymes No results for input(s): TP, ALB, TBIL, AP, SGOT, ALT in the last 72 hours. No lab exists for component: DBIL       Assessment:   1. GI bleeding:  Mr. Griselda Sellers is an 80 yr old male who was admitted for sepsis and noted to have arrythmia. He was requiring pressors until now. He had GI bleeding while on anticoagulation.   He has history of peptic ulcer disease in the distant past.  He was unstable for endoscopy during evaluation on Friday but improved and off of pressors. He has had no further bleeding but there is concern for need for anticoagulation. We were planning on EGD today but pressure worsened and requiring pressor agents again. But still no signs of rebleeding and HGB stable. I would not recommend EGD at this time. I recommend treating medically. Continue Protonix and follow clinically for signs of rebleeding. Recommendation:   1. Continue protonix  2. Follow H/H and transfuse as needed  3. Observe for signs of re-bleeding. 4. Continue diet. 5. Will sign off for now, please call if any signs of rebleeding or if any questions.        Milagro Torres MD  April 29, 2020

## 2020-04-29 NOTE — PROGRESS NOTES
Problem: Dysphagia (Adult)  Goal: *Acute Goals and Plan of Care (Insert Text)  Description: Recommendations:  Diet: Puree diet with NECTAR thick clear liquid diet, as medically appropriate  Ice chips for pleasure after oral care  Meds: Via IV or crushed in puree  Aspiration Precautions  Oral Care TID  Feeder  Small sips/bites  No straws    Goals:  Patient will:  1. Tolerate PO trials with 0 s/s overt distress in 4/5 trials  2. Utilize compensatory swallow strategies/maneuvers (decrease bite/sip, size/rate, alt. liq/sol) with min cues in 4/5 trials  3. Complete an objective swallow study (i.e., MBSS) to assess swallow integrity, r/o aspiration, and determine of safest LRD, min A      4/29/2020 0847 by CAMRON Uriostegui  Outcome: Progressing Towards Goal  [facility] [address] TREATMENT    Patient: Earline Mccabe (27 y.o. male)  Date: 4/29/2020  Diagnosis: Hypothermia [T68. XXXA]   Hypothermia  Procedure(s) (LRB):  ESOPHAGOGASTRODUODENOSCOPY (EGD) (N/A)    Precautions: Aspiration Risk; Fall  PLOF: Per H&P    ASSESSMENT:  Pt seen for  follow dysphagia intervention/management during lunch to assess tolerance of diet texture. Likewise to earlier this date. Pt displays poor endurance and easily fatigues with meal. Pt motivated to attempt feeding himself mech soft/ NTL lunch. Pt exhibits impaired bolus acceptance with spillage, prolonged time warranted for ineffective oral prep, resulting in bilateral pocketing of mech soft trials. Max A warranted to clear pocketing with spoon by SLP following pt's unsuccessful attempts of liquid washes x2, tongue sweeps and tsp of puree. Intermittent coughing noted during tx session suspected to be premature spillage of solids, posing increased aspiration risk. SLP recs downgrade to puree with Nectar thick liquids via cup sip.  ST will continue to follow with administration of advanced textures to improve diet tolerance to reduce risk of aspiration and ensure adequate nutrition/hydration. D/W RN, [employer]; verbalized understanding. White board updated. Progression toward goals:  []         Improving appropriately and progressing toward goals  [x]         Improving slowly and progressing toward goals  []         Not making progress toward goals and plan of care will be adjusted     PLAN:  Recommendations and Planned Interventions:  Diet: Puree diet with NECTAR thick clear liquid diet, as medically appropriate  Ice chips for pleasure after oral care  Meds: Via IV or crushed in puree  Aspiration Precautions  Oral Care TID  Feeder  Small sips/bites  No straws  Patient continues to benefit from skilled intervention to address the above impairments. Continue treatment per established plan of care. Discharge Recommendations:  Skilled Nursing Facility     SUBJECTIVE:   Patient stated Thank you. OBJECTIVE:   Cognitive and Communication Status:  Neurologic State: Alert  Orientation Level: Oriented to person  Cognition: Follows commands  Perception: Appears intact  Perseveration: Perseverates during conversation  Safety/Judgement: Decreased awareness of environment  Dysphagia Treatment:  Oral Assessment:  Oral Assessment  Labial: Decreased seal  Dentition: Limited  Oral Hygiene: Poor  Lingual: Decreased rate, Decreased strength, Incoordinated  Velum: No impairment  Mandible: No impairment  P.O.  Trials:   Patient Position: 45   Vocal quality prior to P.O.: Breathy, Fatigue   Consistency Presented: Mechanical soft, Nectar thick liquid, Puree   How Presented: SLP-fed/presented       Bolus Acceptance: No impairment   Bolus Formation/Control: Impaired   Type of Impairment: Delayed, Lip closure, Mastication, Piecemeal   Propulsion: Delayed (# of seconds), Tongue pumping   Oral Residue: 10-50% of bolus   Initiation of Swallow: Delayed (# of seconds)   Laryngeal Elevation: Weak   Aspiration Signs/Symptoms: Watery eyes, Weak cough   Pharyngeal Phase Characteristics: Easily fatigued , Multiple swallows, Poor endurance, Suspected pharyngeal residue   Effective Modifications: Alternate liquids/solids, Cup/sip, Spoon, Small sips and bites, Other (comment)   Cues for Modifications: Moderate         Oral Phase Severity: Moderate   Pharyngeal Phase Severity : Moderate-severe   Oral Motor Exercises:                                                                                                                                                                                                                             Exercises:  Laryngeal Exercises:                                                                                                                                     PAIN:  Pain level pre-treatment: 0/10   Pain level post-treatment: 0/10   Pain Intervention(s): Medication (see MAR); Rest, Ice, Repositioning  Response to intervention: Nurse notified, See doc flow    After treatment:   []              Patient left in no apparent distress sitting up in chair  [x]              Patient left in no apparent distress in bed  [x]              Call bell left within reach  [x]              Nursing notified  []              Family present  []              Caregiver present  []              Bed alarm activated      COMMUNICATION/EDUCATION:   [x] Aspiration precautions; swallow safety; compensatory techniques  []        Patient unable to participate in education; education ongoing with staff  []  Posted safety precautions in patient's room.   [] Oral-motor/laryngeal strengthening exercises      CAMRON Lehman  Time Calculation: 20 mins

## 2020-04-29 NOTE — PROGRESS NOTES
Attempted to call son, Desmond Wick, to let him aware that the patient has transferred to [phone]. I left a VM, and will update this note when he calls back.

## 2020-04-29 NOTE — PROGRESS NOTES
Plan at this time is home with hh but will need PT/OT when appropriate to evaluate needs. If SNF will need auth from [facility] [facility]. Patient has DME walker and W/C at home. Have spoken with patient's son Daphne Rodrigez [phone] He stated that he would perfer him to go back to rehab before back to home. He has selected [facility] and  [facility]. If for some reason he can not get placement at one of those he will take him back home. He has been in and out of rehabs back home since December. He is fully aware of how [employer] works. Patient would need auth from insurance. Will need PT/OT evaluation. He was using [facility] prior to admission. Son stated that he has applied for Medicaid but is over income.

## 2020-04-29 NOTE — PROGRESS NOTES
OT orders received. Active bedrest orders. Please update activity orders as appropriate to maximize patient safety and participation in functional mobility.     Mariama Torres [address] 87 OTR/L  [phone]

## 2020-04-29 NOTE — PROGRESS NOTES
PT orders received and chart reviewed. Active bedrest orders. Please update activity orders as appropriate to maximize patient safety and participation in functional mobility.

## 2020-04-29 NOTE — PROGRESS NOTES
Problem: Discharge Planning  Goal: *Discharge to safe environment  Outcome: Progressing Towards Goal     Problem: Falls - Risk of  Goal: *Absence of Falls  Description: Document Leafy Alston Fall Risk and appropriate interventions in the flowsheet. Outcome: Progressing Towards Goal  Note: Fall Risk Interventions:  Mobility Interventions: Bed/chair exit alarm    Mentation Interventions: Bed/chair exit alarm, Adequate sleep, hydration, pain control, Room close to nurse's station, Reorient patient, Update white board    Medication Interventions: Bed/chair exit alarm    Elimination Interventions: Bed/chair exit alarm, Call light in reach              Problem: Patient Education: Go to Patient Education Activity  Goal: Patient/Family Education  Outcome: Progressing Towards Goal     Problem: Pressure Injury - Risk of  Goal: *Prevention of pressure injury  Description: Document Alex Scale and appropriate interventions in the flowsheet. Outcome: Progressing Towards Goal  Note: Pressure Injury Interventions:  Sensory Interventions: Assess changes in LOC, Assess need for specialty bed, Keep linens dry and wrinkle-free, Turn and reposition approx. every two hours (pillows and wedges if needed)    Moisture Interventions: Absorbent underpads, Internal/External urinary devices    Activity Interventions: Pressure redistribution bed/mattress(bed type)    Mobility Interventions: Pressure redistribution bed/mattress (bed type), Turn and reposition approx.  every two hours(pillow and wedges)    Nutrition Interventions: Document food/fluid/supplement intake    Friction and Shear Interventions: Apply protective barrier, creams and emollients, Foam dressings/transparent film/skin sealants                Problem: Patient Education: Go to Patient Education Activity  Goal: Patient/Family Education  Outcome: Progressing Towards Goal     Problem: Altered nutrition  Goal: *Adequate nutrition  Outcome: Progressing Towards Goal     Problem: Pain  Goal: *Control of Pain  Outcome: Progressing Towards Goal  Goal: *PALLIATIVE CARE:  Alleviation of Pain  Outcome: Progressing Towards Goal     Problem: Tissue Perfusion - Cardiopulmonary, Altered  Goal: *Optimize tissue perfusion  Outcome: Progressing Towards Goal  Goal: *Absence of hypoxia  Outcome: Progressing Towards Goal     Problem: Patient Education: Go to Patient Education Activity  Goal: Patient/Family Education  Outcome: Progressing Towards Goal     Problem: Delirium Treatment  Goal: *Level of consciousness restored to baseline  Outcome: Progressing Towards Goal  Goal: *Level of environmental perceptions restored to baseline  Outcome: Progressing Towards Goal  Goal: *Sensory perception restored to baseline  Outcome: Progressing Towards Goal  Goal: *Emotional stability restored to baseline  Outcome: Progressing Towards Goal  Goal: *Functional assessment restored to baseline  Outcome: Progressing Towards Goal  Goal: *Absence of falls  Outcome: Progressing Towards Goal  Goal: *Will remain free of delirium, CAM Score negative  Outcome: Progressing Towards Goal  Goal: *Cognitive status will be restored to baseline  Outcome: Progressing Towards Goal  Goal: Interventions  Outcome: Progressing Towards Goal     Problem: Patient Education: Go to Patient Education Activity  Goal: Patient/Family Education  Outcome: Progressing Towards Goal     Problem: Patient Education: Go to Patient Education Activity  Goal: Patient/Family Education  Outcome: Progressing Towards Goal

## 2020-04-29 NOTE — PROGRESS NOTES
Problem: Dysphagia (Adult)  Goal: *Acute Goals and Plan of Care (Insert Text)  Description: Recommendations:  Diet: Mech soft with NECTAR thick clear liquid diet, as medically appropriate  Ice chips for pleasure after oral care    Meds: Via IV or crushed in puree  Aspiration Precautions  Oral Care TID  Feeder  Small sips/bites  No straws    Goals:  Patient will:  1. Tolerate PO trials with 0 s/s overt distress in 4/5 trials  2. Utilize compensatory swallow strategies/maneuvers (decrease bite/sip, size/rate, alt. liq/sol) with min cues in 4/5 trials  3. Complete an objective swallow study (i.e., MBSS) to assess swallow integrity, r/o aspiration, and determine of safest LRD, min A    Outcome: Progressing Towards Goal   SPEECH LANGUAGE PATHOLOGY DYSPHAGIA TREATMENT    Patient: Earline Mccabe (18 y.o. male)  Date: 4/29/2020  Diagnosis: Hypothermia [T68. XXXA]   Hypothermia  Procedure(s) (LRB):  ESOPHAGOGASTRODUODENOSCOPY (EGD) (N/A)    Precautions: aspiration risk  PLOF:Per H&P     ASSESSMENT:  SLP spoke with RN Todd Woo, prior to dysphagia management/ intervention this date. Per RN, pt warranted pressors,( but now off) so EGD not completed yesterday and notes he may no longer need EGD in general. GI advanced diet yesterday. Seen at bedside this morning with breakfast tray @ > 45* Meds given by RN in presence of SLP with no difficulty. SLP fed pt breakfast. Pt  tolerating sips of NTLs and tsps puree with delayed swallow per observation/ palpation and reduced respiration::swallow coordination. Pt exhibited reduced bolus manipulation/formation with prolonged mastication of scrambled eggs throughout oral cavity. Pt unable to re-collect given min cues;  warranting liquid washes x3 and tsp of puree x2 to assist with clearing mod oral residue. Overall he is generally weak and Fatigues easily during meal, increasing his risk for aspiration. Delayed weak cough x1 with solids observed. >25% of breakfast consumed.  D/W RN, Cait Preston. Will attempt to f/u @ lunch meal to see is oral manipulation is consistent or vs crumbly eggs posed difficulty. RN verbalized understanding. Progression toward goals:  []         Improving appropriately and progressing toward goals  [x]         Improving slowly and progressing toward goals  []         Not making progress toward goals and plan of care will be adjusted     PLAN:  Recommendations and Planned Interventions:  Diet: Mech soft with NECTAR thick clear liquid diet, as medically appropriate  Ice chips for pleasure after oral care    Meds: Via IV or crushed in puree  Aspiration Precautions  Oral Care TID  Feeder  Small sips/bites  No straws    Patient continues to benefit from skilled intervention to address the above impairments. Continue treatment per established plan of care. Discharge Recommendations:  Skilled Nursing Facility     SUBJECTIVE:   Patient stated It's too hard. OBJECTIVE:   Cognitive and Communication Status:  Neurologic State: Alert  Orientation Level: Oriented to person, Disoriented to time, Disoriented to place, Oriented to situation  Cognition: Follows commands  Perception: Appears intact  Perseveration: Perseverates during conversation  Safety/Judgement: Decreased awareness of environment  Dysphagia Treatment:  Oral Assessment:  Oral Assessment  Labial: Decreased seal  Dentition: Limited  Oral Hygiene: Poor  Lingual: Decreased rate, Decreased strength, Incoordinated  Velum: No impairment  Mandible: No impairment  P.O.  Trials:   Patient Position: 45   Vocal quality prior to P.O.: Breathy, Fatigue   Consistency Presented: Mechanical soft, Nectar thick liquid, Puree   How Presented: SLP-fed/presented       Bolus Acceptance: No impairment   Bolus Formation/Control: Impaired   Type of Impairment: Delayed, Lip closure, Mastication, Piecemeal   Propulsion: Delayed (# of seconds), Tongue pumping   Oral Residue: 10-50% of bolus   Initiation of Swallow: Delayed (# of seconds)   Laryngeal Elevation: Weak   Aspiration Signs/Symptoms: Watery eyes, Weak cough   Pharyngeal Phase Characteristics: Easily fatigued , Multiple swallows, Poor endurance, Suspected pharyngeal residue   Effective Modifications: Alternate liquids/solids, Cup/sip, Spoon, Small sips and bites, Other (comment)   Cues for Modifications: Moderate         Oral Phase Severity: Moderate   Pharyngeal Phase Severity : Moderate-severe   Oral Motor Exercises:                                                                                                                                                                                                                             Exercises:  Laryngeal Exercises:                                                                                                PAIN:  Pain level pre-treatment: 0/10   Pain level post-treatment: 0/10   Pain Intervention(s): Medication (see MAR); Rest, Ice, Repositioning  Response to intervention: Nurse notified, See doc flow    After treatment:   []              Patient left in no apparent distress sitting up in chair  [x]              Patient left in no apparent distress in bed  [x]              Call bell left within reach  [x]              Nursing notified  []              Family present  []              Caregiver present  [x]              Bed alarm activated      COMMUNICATION/EDUCATION:   [x] Aspiration precautions; swallow safety; compensatory techniques  []        Patient unable to participate in education; education ongoing with staff  []  Posted safety precautions in patient's room.   [] Oral-motor/laryngeal strengthening exercises      CAMRON Miramontes  Time Calculation:30 mins

## 2020-04-29 NOTE — PROGRESS NOTES
[facility]  Pulmonary, Critical Care, and Sleep Medicine    Name: Rylee Yee MRN: [id number]   : 1934 Hospital: [address] [place] [address]   Date: 2020  Consulted By:      Pulmonary & Critical Care      Subjective/History:   Patient is a 80 y.o. male with HTN, CHF, CKD, admitted  with CP and Heart block, was ruled out for ACS but then developed a GIB, requiring 3u pRBC and continues on IV PPI BID. The GIB has improved and now only had occasional dark tarry stools per RN. Hgb stable in 7's. GI is considering EGD. Patient developed some ICU delirium which has improved over the past few days per RN. This AM patient is spontaneously awake yet calm, obeys questions, is oriented to person and year, thinks he is at [place]. Says he feels a little confused. Remains off levophed, continues on TPN. Had planned to place NG tube but then patient passed for nectar thickened liquids per Speech on  AM.     Overnight: Received 1u pRBC overnight for Hgb 6.8 with Hgb improving to 7.6. Weaned off Levophed at [address] [place] [address] remained borderline low overnight 40cc/hr. No further GIB per RN. Patient much less delirious today, ate breakfast well and is AAOx3    Past Medical History:   Diagnosis Date    Difficulty urinating     Elevated PSA     Hematuria, unspecified     Hypercholesterolemia     Hypertension     Incomplete bladder emptying     Urinary retention     UTI (urinary tract infection)         Prior to Admission medications    Medication Sig Start Date End Date Taking? Authorizing Provider   levothyroxine (SYNTHROID) 50 mcg tablet Take 1 Tab by mouth every morning. 4/10/20  Yes Maria C Gonzalez PA   cholecalciferol (VITAMIN D3) (1000 Units /25 mcg) tablet Take 2 Tabs by mouth daily. 20  Yes Ariana Fraction H, DO   simvastatin (ZOCOR) 40 mg tablet Take 1 Tab by mouth nightly.  20  Yes Ariana Fraction H, DO   aspirin delayed-release 81 mg tablet Take 1 Tab by mouth daily. 2/21/20  Yes Margarita PAREDES,    omeprazole (PRILOSEC) 20 mg capsule Take 1 Cap by mouth daily. 12/16/19  Yes Neel Mobley MD   allopurinol (ZYLOPRIM) 300 mg tablet Take 300 mg by mouth daily. Yes Juan Carlos, MD Griselda   pantoprazole (PROTONIX) 40 mg tablet  2/22/16  Yes Provider, Historical   tamsulosin (FLOMAX) 0.4 mg capsule take 1 capsule by mouth once daily AFTER DINNER 3/12/15  Yes Larry Zendejas MD   acetaminophen (TYLENOL EXTRA STRENGTH) 500 mg tablet Take 2 Tabs by mouth every six (6) hours as needed for Pain. 12/16/19   Neel Mobley MD   sucralfate (CARAFATE) 1 gram tablet Take 1 Tab by mouth four (4) times daily.  12/16/19   Neel Mobley MD     Current Facility-Administered Medications   Medication Dose Route Frequency    dextrose 5% infusion  50 mL/hr IntraVENous CONTINUOUS    insulin lispro (HUMALOG) injection   SubCUTAneous Q6H    QUEtiapine (SEROquel) tablet 25 mg  25 mg Oral QHS    NOREPINephrine (LEVOPHED) 8 mg in 0.9% NS 250ml infusion  0.5-30 mcg/min IntraVENous TITRATE    lidocaine 4 % patch 1 Patch  1 Patch TransDERmal Q24H    levothyroxine (SYNTHROID) tablet 100 mcg  100 mcg Oral 6am    [Held by provider] aspirin delayed-release tablet 81 mg  81 mg Oral DAILY    cholecalciferol (VITAMIN D3) (1000 Units /25 mcg) tablet 2 Tab  2,000 Units Oral DAILY    pantoprazole (PROTONIX) 40 mg in 0.9% sodium chloride 10 mL injection  40 mg IntraVENous Q12H    atorvastatin (LIPITOR) tablet 40 mg  40 mg Oral QHS    sucralfate (CARAFATE) tablet 1 g  1 g Oral QID    tamsulosin (FLOMAX) capsule 0.4 mg  0.4 mg Oral DAILY    [Held by provider] heparin (porcine) injection 5,000 Units  5,000 Units SubCUTAneous Q8H     Allergies   Allergen Reactions    Amlodipine Swelling and Itching    Bactrim [Sulfamethoprim Ds] Rash    Lisinopril Other (comments)     Acute renal failure    Ciprofloxacin Rash and Itching      Social History     Tobacco Use    Smoking status: Never Smoker    Smokeless tobacco: Never Used   Substance Use Topics    Alcohol use: Yes     Alcohol/week: 2.0 standard drinks     Types: 2 Cans of beer per week     Comment: Occasional      Family History   Problem Relation Age of Onset    Cancer Father     Alzheimer Mother     Heart defect Brother       Review of Systems:  A comprehensive review of systems was negative except for that written in the HPI. Objective:   Vital Signs:    Visit Vitals  /53   Pulse (!) 58   Temp 97.4 °F (36.3 °C)   Resp 14   Ht 5' 7\" (1.702 m)   Wt 76 kg (167 lb 9.6 oz)   SpO2 100%   BMI 26.25 kg/m²       O2 Device: Nasal cannula   O2 Flow Rate (L/min): 2 l/min   Temp (24hrs), Av °F (36.7 °C), Min:97 °F (36.1 °C), Max:99.4 °F (37.4 °C)       Intake/Output:   Last shift:       07 -  1900  In: -   Out: 200 [Urine:200]  Last 3 shifts: 1901 -  0700  In: 554.8 [I.V.:254.8]  Out: 1377 [Urine:1505]    Intake/Output Summary (Last 24 hours) at 2020 1113  Last data filed at 2020 1030  Gross per 24 hour   Intake 534.44 ml   Output 1155 ml   Net -620.56 ml     Physical Exam:   General:  Elderly male, Awake/Alert, cooperative, NAD   HEENT:   NCAT, PERRL, OP clear, MM moist    Neck: Supple, trachea midline. Lungs:   Symmetrical chest rise; good AE bilat; CTAB; no wheezes/rhonchi/rales noted   Heart:  RRR, S1, S2 normal, no m/r/g   Abdomen:   Soft, non-tender. Bowel sounds Hypoactive. Extremities: Extremities normal, atraumatic, no cyanosis or edema. Pulses: 2+ and symmetric all extremities. Skin: Skin color, texture, turgor normal. No rashes or lesions   Neurologic: Grossly nonfocal, Awake, oriented to person, place, and year; slept well overnight per RN.     Devices:      Data:     Recent Results (from the past 24 hour(s))   GLUCOSE, POC    Collection Time: 20 11:56 AM   Result Value Ref Range    Glucose (POC) 67 (L) 70 - 110 mg/dL   GLUCOSE, POC    Collection Time: 20 12:37 PM   Result Value Ref Range    Glucose (POC) 86 70 - 110 mg/dL   CBC W/O DIFF    Collection Time: 04/28/20  5:15 PM   Result Value Ref Range    WBC 7.7 4.6 - 13.2 K/uL    RBC 2.14 (L) 4.70 - 5.50 M/uL    HGB 6.8 (L) 13.0 - 16.0 g/dL    HCT 20.6 (L) 36.0 - 48.0 %    MCV 96.3 74.0 - 97.0 FL    MCH 31.8 24.0 - 34.0 PG    MCHC 33.0 31.0 - 37.0 g/dL    RDW 16.9 (H) 11.6 - 14.5 %    PLATELET 108 745 - 326 K/uL    MPV 10.6 9.2 - 11.8 FL   POTASSIUM    Collection Time: 04/28/20  5:15 PM   Result Value Ref Range    Potassium 3.9 3.5 - 5.5 mmol/L   GLUCOSE, POC    Collection Time: 04/28/20  5:51 PM   Result Value Ref Range    Glucose (POC) 78 70 - 110 mg/dL   TYPE & SCREEN    Collection Time: 04/28/20  7:38 PM   Result Value Ref Range    Crossmatch Expiration 05/01/2020     ABO/Rh(D) A POSITIVE     Antibody screen NEG     CALLED TO:        Miners RN [id number], AT 2107, ON 04/28/2020, TO [facility]    Unit number [id number]     Blood component type RC LR     Unit division 00     Status of unit TRANSFUSED     Crossmatch result Compatible    GLUCOSE, POC    Collection Time: 04/29/20 12:09 AM   Result Value Ref Range    Glucose (POC) 80 70 - 468 mg/dL   METABOLIC PANEL, BASIC    Collection Time: 04/29/20  3:58 AM   Result Value Ref Range    Sodium 149 (H) 136 - 145 mmol/L    Potassium 3.5 3.5 - 5.5 mmol/L    Chloride 123 (H) 100 - 111 mmol/L    CO2 21 21 - 32 mmol/L    Anion gap 5 3.0 - 18 mmol/L    Glucose 63 (L) 74 - 99 mg/dL    BUN 29 (H) 7.0 - 18 MG/DL    Creatinine 1.35 (H) 0.6 - 1.3 MG/DL    BUN/Creatinine ratio 21 (H) 12 - 20      GFR est AA >60 >60 ml/min/1.73m2    GFR est non-AA 50 (L) >60 ml/min/1.73m2    Calcium 8.2 (L) 8.5 - 10.1 MG/DL   CBC W/O DIFF    Collection Time: 04/29/20  3:58 AM   Result Value Ref Range    WBC 6.6 4.6 - 13.2 K/uL    RBC 2.42 (L) 4.70 - 5.50 M/uL    HGB 7.6 (L) 13.0 - 16.0 g/dL    HCT 23.3 (L) 36.0 - 48.0 %    MCV 96.3 74.0 - 97.0 FL    MCH 31.4 24.0 - 34.0 PG    MCHC 32.6 31.0 - 37.0 g/dL    RDW 16.2 (H) 11.6 - 14.5 %    PLATELET 150 447 - 481 K/uL    MPV 10.5 9.2 - 11.8 FL   MAGNESIUM    Collection Time: 04/29/20  3:58 AM   Result Value Ref Range    Magnesium 2.1 1.6 - 2.6 mg/dL   PHOSPHORUS    Collection Time: 04/29/20  3:58 AM   Result Value Ref Range    Phosphorus 2.5 2.5 - 4.9 MG/DL   GLUCOSE, POC    Collection Time: 04/29/20  5:57 AM   Result Value Ref Range    Glucose (POC) 73 70 - 110 mg/dL   GLUCOSE, POC    Collection Time: 04/29/20  6:11 AM   Result Value Ref Range    Glucose (POC) 141 (H) 70 - 110 mg/dL   POTASSIUM    Collection Time: 04/29/20  9:54 AM   Result Value Ref Range    Potassium 3.8 3.5 - 5.5 mmol/L           No results for input(s): FIO2I, IFO2, HCO3I, IHCO3, HCOPOC, PCO2I, PCOPOC, IPHI, PHI, PHPOC, PO2I, PO2POC in the last 72 hours. No lab exists for component: IPOC2    Imaging:  I have personally reviewed the patients radiographs and have reviewed the reports:    IMPRESSION:   85yoM with HTN, CHF, and CKD, admitted 4/21 with CP and Heart block, was ruled out for ACS but then developed a GIB and ICU delirium      PLAN:   Respiratory:  - no active issues    Cardiovascular:  1. CP (resolved); Heart block: appreciate cards consult  2. CHF: TTE showed EF 35-40% with grade 2 diastolic dysfunction  3. Shock (resolved): think this is most likely hypovolemic; less likely septic as no fever or leukocytosis and cultures all negative. Hgb slow downward drift likely dilutional as re-equilibrates. Do not suspect continued bleeding. Weaned off levophed overnight but UOP remains low so will give another Albumin IV bolus today. - Since patient is off vasopressors, he is stable to transfer out of the ICU to telemetry. PCCM will now sign off. Renal:  1. CKD Stage 3: patient is currently at his baseline  2. Hyperchloridemia; Hypernatremia: was on D5W @ 100 but developed SOB and Wheezing with Pox to 80's, likely fluid overload in setting of CHF; Na 151>>147>>148>>149; Cl 119>>122>>123.  Both Sodium and Chloride creeping up again. Patient is eating some but not much. Also somewhat hypoglycemic. Will restart D5W but only at 50cc/hr. Continue to monitor   3. Hypokalemia: repleted gently    Infectious Disease:  - no fever at all since admission, no leukocytosis currently, all cultures remain no growth. S/p 7 days Zosyn (stoped 4/27). Endocrine:  No active issues; SSI PRN    GI:  1. GIB: of unclear etiology but appears to have resolved. No further rectal bleeding on 4/27 PM, or any 4/28 or this AM per RN. Wuestion if we even need the EGD now that bleeding has stopped. Continue IV PPI. Continue Nectar thick clear liquid diet. Transfuse for Hgb < 7.0. Did receive 1u pRBC overnight but think this was reequilibration of Hgb as no further bleeding noted. Hgb improved 6.8>>7.6 following transfusion. Hematologic:  1. Anemia: due to GIB as above; SCD's for GI prophylaxis    Neurologic:  1. Mild ICU delirium (improved):  - delirium much improved today   - continue seroquel 25mg qHS; QTc 475 at baseline. Avoid opiates and benzos. Keep lights on during day, and minimize interruptions as night. Would benefit in transferring to a room with a window if possible. Best Practice:  · Sepsis Bundle per Hospital Protocol  · Glycemic control; avoid Hypoglycemia  · IHI ICU Bundles:  ·  Central Line Bundle Followed  and Mojica Bundle Followed    · Stress ulcer prophylaxis. PPI   · DVT prophylaxis. SCD's  · Need for Lines, mojica assessed. · Restraints need. · Palliative care evaluation. · Code Status: FULL        Total of  60 min critical care time spent at bedside during the course of care providing evaluation,management and care decisions and ordering appropriate treatment related to critical care problems exclusive of procedures.   The reason for providing this level of medical care for this critically ill patient was due a critical illness that impaired one or more vital organ systems such that there was a high probability of imminent or life threatening deterioration in the patients condition. This care involved high complexity decision making to assess, manipulate, and support vital system functions, to treat this degree vital organ system failure and to prevent further life threatening deterioration of the patients condition.       Fernie Martini MD  Pulmonary & Critical Care

## 2020-04-29 NOTE — PROGRESS NOTES
CARDIOLOGY Progress Note    Patient: Olga Carranza  MR #: [id number]    Assessment/Plan:     Hospital Problems  Date Reviewed: 4/6/2020          Codes Class Noted POA    Hypoglycemia. ICD-10-CM: E16.2  ICD-9-CM: 251.2  4/21/2020 Unknown    Mental status changes, delirium. Defer to primary and ICU team      History of HTN (hypertension)  Back on vasopressors today ICD-10-CM: I10  ICD-9-CM: 401.9  4/21/2020 Unknown        Left bundle branch block,  He is known to have a long first-degree AV block and markedly reduced P wave amplitude. His rhythm has been noteworthy for frequent ectopics and short runs of NSVT during hospitalization. Intermittent atrial fibrillation  Heparin discontinued because of lower GI bleed. GI team managing. Today appears to be back in sinus rhythm    Nuclear stress test  No significant ongoing ischemia    Patient with left bundle branch block, intermittent atrial fibrillation and bradycardia. Likely has sinus node dysfunction as well. He continues to be a suboptimal candidate for pacemaker at this time. ICD-10-CM: I44.7  ICD-9-CM: 426.3  4/21/2020 Unknown      GI bleed: Transfusion as needed. GI team following. Defer management to GI team. Possible ? EGD per GI team when stable. Sinoatrial node dysfunction (HCC) ICD-10-CM: I49.5  ICD-9-CM: 427.81  4/21/2020 Unknown        Dysphagia ICD-10-CM: R13.10  ICD-9-CM: 787.20  4/8/2020 Yes        Cardiomyopathy ([address] ICD-10-CM: I42.9  ICD-9-CM: 425.4  2/20/2020 Yes    Overview Signed 2/20/2020 12:40 AM by Raynalshanell Button E, DO     LVEF 35-40% by previous TTE. Grade II diastolic dysfunction [id number]: I51.9  ICD-9-CM: 429.9  2/20/2020 Yes    Overview Signed 2/20/2020 12:41 AM by Ana Nova, DO     Per previous TTE.              Hypothyroidism ICD-10-CM: E03.9  ICD-9-CM: 244.9  2/20/2020 Yes        Bradycardia ICD-10-CM: R00.1  ICD-9-CM: 427.89  2/19/2020 Yes        Hypotension ICD-10-CM: I95.9  ICD-9-CM: 458.9 2/19/2020 Yes        * (Principal) Hypothermia ICD-10-CM: T68. Velvet Sour  ICD-9-CM: 991.6  12/23/2019 Unknown        Chronic renal failure, stage 3 (moderate) (HCC) ICD-10-CM: N18.3  ICD-9-CM: 585.3  12/23/2019 Yes        SIRS (systemic inflammatory response syndrome) ([facility] ICD-10-CM: R65.10  ICD-9-CM: 995.90  12/23/2019 Yes    Gastrointestinal bleeding, GI following        Subjective     Appears to be more alert today. According to nursing staff, still episodes of confusion and alertness according to nurse no overnight bleeding noted. History of Present Illness  Jazmyne Santana is a 80 y.o. man that presented to the [place] [facility] ED early this morning with complaints of chest tightness. The symptoms that the patient was experiencing were similar to a earlier admission to this hospital several weeks ago. At that time he was found to have an elevated TSH. He was also having episodic hypothermia. He was started on thyroid replacement therapy at a low dose of 50 mcg daily. He had some episodic severe bradycardia and so his beta-blocker was discontinued. He seemed to improved with those measures and was discharged for follow-up. On presentation to the ED, the patient was hypotensive. He did not respond well to intravenous fluid resuscitation. He was started on Levophed by Dr. Randy Rodriguez in the ED. His initial serum troponin was 0.03. TSH remains elevated at 4.64 compared to the 7.65 on 4/6/2020 when the patient was started on Synthroid.         Past Medical History  Past Medical History:   Diagnosis Date    Difficulty urinating     Elevated PSA     Hematuria, unspecified     Hypercholesterolemia     Hypertension     Incomplete bladder emptying     Urinary retention     UTI (urinary tract infection)          Meds  Current Facility-Administered Medications   Medication Dose Route Frequency    dextrose 5% infusion  50 mL/hr IntraVENous CONTINUOUS    0.9% sodium chloride infusion 250 mL  250 mL IntraVENous PRN  0.9% sodium chloride infusion 250 mL  250 mL IntraVENous PRN    insulin lispro (HUMALOG) injection   SubCUTAneous Q6H    dextrose 10% infusion 125-250 mL  125-250 mL IntraVENous PRN    QUEtiapine (SEROquel) tablet 25 mg  25 mg Oral QHS    NOREPINephrine (LEVOPHED) 8 mg in 0.9% NS 250ml infusion  0.5-30 mcg/min IntraVENous TITRATE    lidocaine 4 % patch 1 Patch  1 Patch TransDERmal Q24H    haloperidol lactate (HALDOL) injection 2 mg  2 mg IntraMUSCular Q6H PRN    0.9% sodium chloride infusion 250 mL  250 mL IntraVENous PRN    glucose chewable tablet 16 g  4 Tab Oral PRN    glucagon (GLUCAGEN) injection 1 mg  1 mg IntraMUSCular PRN    levothyroxine (SYNTHROID) tablet 100 mcg  100 mcg Oral 6am    sodium chloride (NS) flush 5-10 mL  5-10 mL IntraVENous PRN    [Held by provider] aspirin delayed-release tablet 81 mg  81 mg Oral DAILY    cholecalciferol (VITAMIN D3) (1000 Units /25 mcg) tablet 2 Tab  2,000 Units Oral DAILY    pantoprazole (PROTONIX) 40 mg in 0.9% sodium chloride 10 mL injection  40 mg IntraVENous Q12H    atorvastatin (LIPITOR) tablet 40 mg  40 mg Oral QHS    sucralfate (CARAFATE) tablet 1 g  1 g Oral QID    tamsulosin (FLOMAX) capsule 0.4 mg  0.4 mg Oral DAILY    [Held by provider] heparin (porcine) injection 5,000 Units  5,000 Units SubCUTAneous Q8H         Allergies  Allergies   Allergen Reactions    Amlodipine Swelling and Itching    Bactrim [Sulfamethoprim Ds] Rash    Lisinopril Other (comments)     Acute renal failure    Ciprofloxacin Rash and Itching           Family History     Family History   Problem Relation Age of Onset    Cancer Father     Alzheimer Mother     Heart defect Brother          Review of systems    Unobtainable at this time      Physical Exam  Visit Vitals  /53   Pulse (!) 58   Temp 97.4 °F (36.3 °C)   Resp 14   Ht 5' 7\" (1.702 m)   Wt 167 lb 9.6 oz (76 kg)   SpO2 100%   BMI 26.25 kg/m²       Joe Rivera is who is asleep but awakens easily . Head is normocephalic and atraumatic. Trachea appears midline with no noted thyromegaly. There is no JVD. Decreased breath sound at base of the lungs. No obvious rales noted  Cardiac examination revealed regular rhythm no obvious murmur   abdomen is soft and nontender. Extremities are without significant edema. Skin is warm and dry. Diagnostic Tests  Labs:   Recent Labs     04/29/20  0358 04/28/20  1715 04/28/20  0207   WBC 6.6 7.7 10.6   HGB 7.6* 6.8* 7.7*   HCT 23.3* 20.6* 22.6*    205 196     Recent Labs     04/29/20  0358 04/28/20  1715 04/28/20  1055 04/28/20  0207 04/27/20  1652 04/27/20  0349   *  --   --  148* 148* 147*   K 3.5 3.9 3.7 3.7  --  3.5   *  --   --  122*  --  119*   CO2 21  --   --  20*  --  21   GLU 63*  --   --  81  --  201*   BUN 29*  --   --  33*  --  30*   CREA 1.35*  --   --  1.37*  --  1.42*   CA 8.2*  --   --  7.9*  --  7.7*   MG 2.1  --   --  2.1  --  1.9   PHOS 2.5  --   --  2.5  --  2.6       No results for input(s): TROIQ, CPK, CKMB in the last 72 hours.   Last Lipid:  No results found for: CHOL, CHOLX, CHLST, CHOLV, HDL, HDLP, LDL, LDLC, DLDLP, TGLX, TRIGL, TRIGP, CHHD, CHHDX

## 2020-04-30 LAB
ANION GAP SERPL CALC-SCNC: 7 MMOL/L (ref 3–18)
BASOPHILS # BLD: 0 K/UL (ref 0–0.1)
BASOPHILS NFR BLD: 0 % (ref 0–2)
BUN SERPL-MCNC: 27 MG/DL (ref 7–18)
BUN/CREAT SERPL: 21 (ref 12–20)
CALCIUM SERPL-MCNC: 7.8 MG/DL (ref 8.5–10.1)
CHLORIDE SERPL-SCNC: 122 MMOL/L (ref 100–111)
CO2 SERPL-SCNC: 20 MMOL/L (ref 21–32)
CREAT SERPL-MCNC: 1.26 MG/DL (ref 0.6–1.3)
DIFFERENTIAL METHOD BLD: ABNORMAL
EOSINOPHIL # BLD: 0.4 K/UL (ref 0–0.4)
EOSINOPHIL NFR BLD: 5 % (ref 0–5)
ERYTHROCYTE [DISTWIDTH] IN BLOOD BY AUTOMATED COUNT: 17.1 % (ref 11.6–14.5)
GLUCOSE BLD STRIP.AUTO-MCNC: 107 MG/DL (ref 70–110)
GLUCOSE BLD STRIP.AUTO-MCNC: 116 MG/DL (ref 70–110)
GLUCOSE BLD STRIP.AUTO-MCNC: 186 MG/DL (ref 70–110)
GLUCOSE BLD STRIP.AUTO-MCNC: 82 MG/DL (ref 70–110)
GLUCOSE BLD STRIP.AUTO-MCNC: 96 MG/DL (ref 70–110)
GLUCOSE SERPL-MCNC: 81 MG/DL (ref 74–99)
HCT VFR BLD AUTO: 26.2 % (ref 36–48)
HGB BLD-MCNC: 8.8 G/DL (ref 13–16)
LYMPHOCYTES # BLD: 1 K/UL (ref 0.9–3.6)
LYMPHOCYTES NFR BLD: 12 % (ref 21–52)
MAGNESIUM SERPL-MCNC: 2 MG/DL (ref 1.6–2.6)
MCH RBC QN AUTO: 32 PG (ref 24–34)
MCHC RBC AUTO-ENTMCNC: 33.6 G/DL (ref 31–37)
MCV RBC AUTO: 95.3 FL (ref 74–97)
MONOCYTES # BLD: 0.4 K/UL (ref 0.05–1.2)
MONOCYTES NFR BLD: 5 % (ref 3–10)
NEUTS SEG # BLD: 6.5 K/UL (ref 1.8–8)
NEUTS SEG NFR BLD: 78 % (ref 40–73)
PHOSPHATE SERPL-MCNC: 2.1 MG/DL (ref 2.5–4.9)
PLATELET # BLD AUTO: 226 K/UL (ref 135–420)
PMV BLD AUTO: 10.4 FL (ref 9.2–11.8)
POTASSIUM SERPL-SCNC: 3.8 MMOL/L (ref 3.5–5.5)
RBC # BLD AUTO: 2.75 M/UL (ref 4.7–5.5)
SODIUM SERPL-SCNC: 149 MMOL/L (ref 136–145)
WBC # BLD AUTO: 8.3 K/UL (ref 4.6–13.2)

## 2020-04-30 PROCEDURE — 83735 ASSAY OF MAGNESIUM: CPT

## 2020-04-30 PROCEDURE — 92526 ORAL FUNCTION THERAPY: CPT

## 2020-04-30 PROCEDURE — 74011636637 HC RX REV CODE- 636/637: Performed by: INTERNAL MEDICINE

## 2020-04-30 PROCEDURE — 74011250637 HC RX REV CODE- 250/637: Performed by: INTERNAL MEDICINE

## 2020-04-30 PROCEDURE — 74011250637 HC RX REV CODE- 250/637: Performed by: HOSPITALIST

## 2020-04-30 PROCEDURE — 36415 COLL VENOUS BLD VENIPUNCTURE: CPT

## 2020-04-30 PROCEDURE — 87635 SARS-COV-2 COVID-19 AMP PRB: CPT

## 2020-04-30 PROCEDURE — 85025 COMPLETE CBC W/AUTO DIFF WBC: CPT

## 2020-04-30 PROCEDURE — 77010033678 HC OXYGEN DAILY

## 2020-04-30 PROCEDURE — 80048 BASIC METABOLIC PNL TOTAL CA: CPT

## 2020-04-30 PROCEDURE — 84100 ASSAY OF PHOSPHORUS: CPT

## 2020-04-30 PROCEDURE — 97167 OT EVAL HIGH COMPLEX 60 MIN: CPT

## 2020-04-30 PROCEDURE — 74011000250 HC RX REV CODE- 250: Performed by: HOSPITALIST

## 2020-04-30 PROCEDURE — 82962 GLUCOSE BLOOD TEST: CPT

## 2020-04-30 PROCEDURE — 97530 THERAPEUTIC ACTIVITIES: CPT

## 2020-04-30 PROCEDURE — 74011250636 HC RX REV CODE- 250/636: Performed by: INTERNAL MEDICINE

## 2020-04-30 PROCEDURE — 74011250637 HC RX REV CODE- 250/637: Performed by: PHYSICIAN ASSISTANT

## 2020-04-30 PROCEDURE — 65660000000 HC RM CCU STEPDOWN

## 2020-04-30 PROCEDURE — 97535 SELF CARE MNGMENT TRAINING: CPT

## 2020-04-30 RX ORDER — INSULIN LISPRO 100 [IU]/ML
INJECTION, SOLUTION INTRAVENOUS; SUBCUTANEOUS
Status: DISCONTINUED | OUTPATIENT
Start: 2020-04-30 | End: 2020-05-11 | Stop reason: HOSPADM

## 2020-04-30 RX ADMIN — INSULIN LISPRO 2 UNITS: 100 INJECTION, SOLUTION INTRAVENOUS; SUBCUTANEOUS at 13:17

## 2020-04-30 RX ADMIN — SUCRALFATE 1 G: 1 TABLET ORAL at 18:01

## 2020-04-30 RX ADMIN — SUCRALFATE 1 G: 1 TABLET ORAL at 21:54

## 2020-04-30 RX ADMIN — SUCRALFATE 1 G: 1 TABLET ORAL at 08:37

## 2020-04-30 RX ADMIN — MELATONIN 2 TABLET: at 08:37

## 2020-04-30 RX ADMIN — PANTOPRAZOLE SODIUM 40 MG: 40 TABLET, DELAYED RELEASE ORAL at 08:37

## 2020-04-30 RX ADMIN — ATORVASTATIN CALCIUM 40 MG: 20 TABLET, FILM COATED ORAL at 21:54

## 2020-04-30 RX ADMIN — LEVOTHYROXINE SODIUM 100 MCG: 0.03 TABLET ORAL at 06:10

## 2020-04-30 RX ADMIN — TAMSULOSIN HYDROCHLORIDE 0.4 MG: 0.4 CAPSULE ORAL at 08:37

## 2020-04-30 RX ADMIN — DEXTROSE MONOHYDRATE 50 ML/HR: 5 INJECTION, SOLUTION INTRAVENOUS at 04:11

## 2020-04-30 RX ADMIN — SUCRALFATE 1 G: 1 TABLET ORAL at 13:01

## 2020-04-30 RX ADMIN — QUETIAPINE FUMARATE 25 MG: 25 TABLET ORAL at 21:54

## 2020-04-30 NOTE — PROGRESS NOTES
CARDIOLOGY Progress Note    Patient: Vikas Pollack  MR #: [id number]    Assessment/Plan:     Hospital Problems  Date Reviewed: 4/6/2020          Codes Class Noted POA    Hypoglycemia. ICD-10-CM: E16.2  ICD-9-CM: 251.2  4/21/2020 Unknown    Mental status changes, delirium. Improved      HTN (hypertension)   ICD-10-CM: I10  ICD-9-CM: 401.9  4/21/2020 Unknown        Left bundle branch block,  He is known to have a long first-degree AV block and markedly reduced P wave amplitude. His rhythm has been noteworthy for frequent ectopics and short runs of NSVT during hospitalization. Telemetry today shows sinus bradycardia with long first-degree AV block and left bundle branch block configuration. Intermittent atrial fibrillation  Heparin was discontinued because of lower GI bleed. Nuclear stress test  No significant ongoing ischemia    Patient with left bundle branch block, intermittent atrial fibrillation and bradycardia. Likely has sinus node dysfunction as well. May need permanent pacemaker prior to discharge however he would like to hold off if he could. He has remained hemodynamically stable at this time. Will continue to have discussion with the patient over next couple days regarding this matter   ICD-10-CM: I44.7  ICD-9-CM: 426.3  4/21/2020 Unknown      GI bleed: Transfusion as needed. GI team following. Defer management to GI team. Possible ? EGD per GI team when stable. Sinoatrial node dysfunction (HCC) ICD-10-CM: I49.5  ICD-9-CM: 427.81  4/21/2020 Unknown        Dysphagia ICD-10-CM: R13.10  ICD-9-CM: 787.20  4/8/2020 Yes        Cardiomyopathy ([address] ICD-10-CM: I42.9  ICD-9-CM: 425.4  2/20/2020 Yes    Overview Signed 2/20/2020 12:40 AM by Gaston KRAUSE, DO     LVEF 35-40% by previous TTE. Grade II diastolic dysfunction [id number]: I51.9  ICD-9-CM: 429.9  2/20/2020 Yes    Overview Signed 2/20/2020 12:41 AM by Lakesha King, DO     Per previous TTE.              Hypothyroidism ICD-10-CM: E03.9  ICD-9-CM: 244.9  2/20/2020 Yes        Bradycardia ICD-10-CM: R00.1  ICD-9-CM: 427.89  2/19/2020 Yes        Hypotension ICD-10-CM: I95.9  ICD-9-CM: 458.9  2/19/2020 Yes        * (Principal) Hypothermia ICD-10-CM: T68. Tami Gottron  ICD-9-CM: 991.6  12/23/2019 Unknown        Chronic renal failure, stage 3 (moderate) (HCC) ICD-10-CM: N18.3  ICD-9-CM: 585.3  12/23/2019 Yes        SIRS (systemic inflammatory response syndrome) ([facility] ICD-10-CM: R65.10  ICD-9-CM: 995.90  12/23/2019 Yes    Gastrointestinal bleeding, GI following        Subjective     Patient was transferred back to ICU yesterday for hypothermia. According to nursing report in ICU, did not find any significant hypothermia. Patient denies any chest pain or chest tightness    History of Present Illness  Braulio Tomlin is a 80 y.o. man that presented to the [place] [facility] ED early this morning with complaints of chest tightness. The symptoms that the patient was experiencing were similar to a earlier admission to this hospital several weeks ago. At that time he was found to have an elevated TSH. He was also having episodic hypothermia. He was started on thyroid replacement therapy at a low dose of 50 mcg daily. He had some episodic severe bradycardia and so his beta-blocker was discontinued. He seemed to improved with those measures and was discharged for follow-up. On presentation to the ED, the patient was hypotensive. He did not respond well to intravenous fluid resuscitation. He was started on Levophed by Dr. Hershel Halsted in the ED. His initial serum troponin was 0.03. TSH remains elevated at 4.64 compared to the 7.65 on 4/6/2020 when the patient was started on Synthroid.         Past Medical History  Past Medical History:   Diagnosis Date    Difficulty urinating     Elevated PSA     Hematuria, unspecified     Hypercholesterolemia     Hypertension     Incomplete bladder emptying     Urinary retention     UTI (urinary tract infection) Meds  Current Facility-Administered Medications   Medication Dose Route Frequency    dextrose 5% infusion  50 mL/hr IntraVENous CONTINUOUS    pantoprazole (PROTONIX) tablet 40 mg  40 mg Oral ACB    0.9% sodium chloride infusion 250 mL  250 mL IntraVENous PRN    insulin lispro (HUMALOG) injection   SubCUTAneous Q6H    dextrose 10% infusion 125-250 mL  125-250 mL IntraVENous PRN    QUEtiapine (SEROquel) tablet 25 mg  25 mg Oral QHS    lidocaine 4 % patch 1 Patch  1 Patch TransDERmal Q24H    haloperidol lactate (HALDOL) injection 2 mg  2 mg IntraMUSCular Q6H PRN    glucose chewable tablet 16 g  4 Tab Oral PRN    glucagon (GLUCAGEN) injection 1 mg  1 mg IntraMUSCular PRN    levothyroxine (SYNTHROID) tablet 100 mcg  100 mcg Oral 6am    sodium chloride (NS) flush 5-10 mL  5-10 mL IntraVENous PRN    [Held by provider] aspirin delayed-release tablet 81 mg  81 mg Oral DAILY    cholecalciferol (VITAMIN D3) (1000 Units /25 mcg) tablet 2 Tab  2,000 Units Oral DAILY    atorvastatin (LIPITOR) tablet 40 mg  40 mg Oral QHS    sucralfate (CARAFATE) tablet 1 g  1 g Oral QID    tamsulosin (FLOMAX) capsule 0.4 mg  0.4 mg Oral DAILY    [Held by provider] heparin (porcine) injection 5,000 Units  5,000 Units SubCUTAneous Q8H         Allergies  Allergies   Allergen Reactions    Amlodipine Swelling and Itching    Bactrim [Sulfamethoprim Ds] Rash    Lisinopril Other (comments)     Acute renal failure    Ciprofloxacin Rash and Itching           Family History     Family History   Problem Relation Age of Onset    Cancer Father     Alzheimer Mother     Heart defect Brother      Physical Exam  Visit Vitals  /75   Pulse 72   Temp 97.7 °F (36.5 °C)   Resp 18   Ht 5' 7\" (1.702 m)   Wt 75.2 kg (165 lb 11.2 oz)   SpO2 93%   BMI 25.95 kg/m²       Earline Mccabe is who is asleep but awakens easily . Head is normocephalic and atraumatic. Trachea appears midline with no noted thyromegaly. There is no JVD. Decreased breath sound at base of the lungs. No obvious rales noted  Cardiac examination revealed regular rhythm no obvious murmur   abdomen is soft and nontender. Trace lower extremity edema  Skin is warm and dry. Diagnostic Tests  Labs:   Recent Labs     04/30/20 0345 04/29/20 0358 04/28/20  1715   WBC 8.3 6.6 7.7   HGB 8.8* 7.6* 6.8*   HCT 26.2* 23.3* 20.6*    191 205     Recent Labs     04/30/20  0345 04/29/20  0954 04/29/20  0358  04/28/20  0207   *  --  149*  --  148*   K 3.8 3.8 3.5   < > 3.7   *  --  123*  --  122*   CO2 20*  --  21  --  20*   GLU 81  --  63*  --  81   BUN 27*  --  29*  --  33*   CREA 1.26  --  1.35*  --  1.37*   CA 7.8*  --  8.2*  --  7.9*   MG 2.0  --  2.1  --  2.1   PHOS 2.1*  --  2.5  --  2.5    < > = values in this interval not displayed. No results for input(s): TROIQ, CPK, CKMB in the last 72 hours.   Last Lipid:  No results found for: CHOL, CHOLX, CHLST, CHOLV, HDL, HDLP, LDL, LDLC, DLDLP, TGLX, TRIGL, TRIGP, CHHD, CHHDX

## 2020-04-30 NOTE — PROGRESS NOTES
Problem: Discharge Planning  Goal: *Discharge to safe environment  Outcome: Progressing Towards Goal     Problem: Falls - Risk of  Goal: *Absence of Falls  Description: Document Prasanth Lazo Fall Risk and appropriate interventions in the flowsheet. Outcome: Progressing Towards Goal  Note: Fall Risk Interventions:  Mobility Interventions: Communicate number of staff needed for ambulation/transfer, Patient to call before getting OOB, Strengthening exercises (ROM-active/passive)    Mentation Interventions: Adequate sleep, hydration, pain control, Bed/chair exit alarm, Door open when patient unattended, More frequent rounding, Reorient patient, Room close to nurse's station, Toileting rounds, Update white board    Medication Interventions: Bed/chair exit alarm, Patient to call before getting OOB    Elimination Interventions: Bed/chair exit alarm, Call light in reach, Patient to call for help with toileting needs              Problem: Patient Education: Go to Patient Education Activity  Goal: Patient/Family Education  Outcome: Progressing Towards Goal     Problem: Pressure Injury - Risk of  Goal: *Prevention of pressure injury  Description: Document Alex Scale and appropriate interventions in the flowsheet. Outcome: Progressing Towards Goal  Note: Pressure Injury Interventions:  Sensory Interventions: Maintain/enhance activity level, Keep linens dry and wrinkle-free, Minimize linen layers, Monitor skin under medical devices, Turn and reposition approx. every two hours (pillows and wedges if needed)    Moisture Interventions: Apply protective barrier, creams and emollients, Maintain skin hydration (lotion/cream), Minimize layers, Moisture barrier    Activity Interventions: Increase time out of bed, Pressure redistribution bed/mattress(bed type), PT/OT evaluation    Mobility Interventions: HOB 30 degrees or less, Pressure redistribution bed/mattress (bed type), PT/OT evaluation, Turn and reposition approx.  every two hours(pillow and wedges)    Nutrition Interventions: Document food/fluid/supplement intake    Friction and Shear Interventions: Apply protective barrier, creams and emollients, Foam dressings/transparent film/skin sealants, HOB 30 degrees or less                Problem: Patient Education: Go to Patient Education Activity  Goal: Patient/Family Education  Outcome: Progressing Towards Goal     Problem: Altered nutrition  Goal: *Adequate nutrition  Outcome: Progressing Towards Goal     Problem: Pain  Goal: *Control of Pain  Outcome: Progressing Towards Goal  Goal: *PALLIATIVE CARE:  Alleviation of Pain  Outcome: Progressing Towards Goal     Problem: Tissue Perfusion - Cardiopulmonary, Altered  Goal: *Optimize tissue perfusion  Outcome: Progressing Towards Goal  Goal: *Absence of hypoxia  Outcome: Progressing Towards Goal     Problem: Patient Education: Go to Patient Education Activity  Goal: Patient/Family Education  Outcome: Progressing Towards Goal     Problem: Delirium Treatment  Goal: *Level of consciousness restored to baseline  Outcome: Progressing Towards Goal  Goal: *Level of environmental perceptions restored to baseline  Outcome: Progressing Towards Goal  Goal: *Sensory perception restored to baseline  Outcome: Progressing Towards Goal  Goal: *Emotional stability restored to baseline  Outcome: Progressing Towards Goal  Goal: *Functional assessment restored to baseline  Outcome: Progressing Towards Goal  Goal: *Absence of falls  Outcome: Progressing Towards Goal  Goal: *Will remain free of delirium, CAM Score negative  Outcome: Progressing Towards Goal  Goal: *Cognitive status will be restored to baseline  Outcome: Progressing Towards Goal  Goal: Interventions  Outcome: Progressing Towards Goal     Problem: Patient Education: Go to Patient Education Activity  Goal: Patient/Family Education  Outcome: Progressing Towards Goal     Problem: Patient Education: Go to Patient Education Activity  Goal: Patient/Family Education  Outcome: Progressing Towards Goal     Problem: Patient Education: Go to Patient Education Activity  Goal: Patient/Family Education  Outcome: Progressing Towards Goal     Problem: Patient Education: Go to Patient Education Activity  Goal: Patient/Family Education  Outcome: Progressing Towards Goal

## 2020-04-30 NOTE — PROGRESS NOTES
No concern for CoVID-19 in patient, but SNF wants negative lab prior to acceptance.  Will send off to satisfy criteria    Jossue Huffman, DO  Internal Medicine, Hospitalist  Pager: [facility]

## 2020-04-30 NOTE — PROGRESS NOTES
Received patient from [facility]. Pt asleep in bed. Easy to arouse. Denies pain, no distress noted. Bed locked in lowest position. Frequently used items and call light within reach. 2154: bedtime meds administered. Pt returned to sleep. franchesca continue to monitor. 2353. Reassessed pt . Pt sleeping. Not easily aroused. Pt is able to follow commands when awake but goes right back to sleep. Visit Vitals  /64   Pulse (!) 51   Temp (!) 93.7 °F (34.3 °C)   Resp 20   Ht 5' 7\" (1.702 m)   Wt 73.5 kg (162 lb)   SpO2 94%   BMI 25.37 kg/m²     2355: received telephone call from [facility]. Pt Nila Cassette as baseline but intermittently dips into rate of 30s. Dr Iris Rose paged to inform. Awaiting call back. 0005: Dr Iris Rose updated via telephone about pt status as documented above. Awaiting new orders. 0014: recieved orders to transfer pt to stepdown. 0020: ICU RN Nabeel Marcum at bedside. States she knows the patient and they have had better luck measuring his temperature with a temporal thermometer rather than a rectal or axillary one. Reassessed with a temporal thermometer, pt is 96.5. But HR is still in the rate of 30s-40s. She will update Dr Iris Rose. Awaiting plan of care from Dr Iris Rose. 9847: Pt to remain on [place]: Received order for PRN atropine if pt rate is sustained below 35 BPM.    0410: pt reassessed with temporal scanner from ICU. Reading is 97. 5. however, four different temperature measuring devices including a hand held device brought by Nabeel Marcum ICU RN consistently show pt's temperature to be in the range of 93-94. Communicated to Nabeel Marcum day shift will need this temporal scanner in order to assess pt. she verbalized understanding. Notably, pt does not feel cold to the touch. Dr Iris Rose has been updated on this pt's current condition. 5575: pt transferred to ICU.    0700: updated pt son Claretha Mcardle on pt location.

## 2020-04-30 NOTE — PROGRESS NOTES
Problem: Mobility Impaired (Adult and Pediatric)  Goal: *Acute Goals and Plan of Care (Insert Text)  Description: Physical Therapy Goals  Initiated 4/29/2020 and to be accomplished within 7 day(s)  1. Patient will move from supine to sit and sit to supine  in bed with supervision/set-up. 2.  Patient will transfer from bed to chair and chair to bed with supervision/set-up using the least restrictive device. 3.  Patient will perform sit to stand with supervision/set-up. 4.  Patient will ambulate with supervision/set-up for 50 feet with the least restrictive device. PHYSICAL THERAPY TREATMENT    Patient: Nuha Bond (20 y.o. male)  Date: 4/30/2020  Diagnosis: Hypothermia [T68. XXXA]   Hypothermia  Procedure(s) (LRB):  ESOPHAGOGASTRODUODENOSCOPY (EGD) (N/A)    Precautions: Fall  PLOF: MOD I    ASSESSMENT:  Patient is seating up in chair and agreeable to participate in physical therapy services. Cleared with nurse, Marlyn Callahan, for completing functional mobility. Pt completed therapeutic exercises sitting up in chair and static/dynamic sitting balance. Required mod A x 2 for sit<>stand transfer and pull to stand strategy. Demonstrated decreased safety with stand to sit transfer secondary to limited UE use for lowering to recliner. He was educated to use UE for reaching back to chair versus bilateral arms of RW. He was able to stand with RW for 1 minute with cueing for upright posture. Pt instructed if when he would like to transfer back to bed to obtain assistance via call bell. Pt denies pain pre/post treatment session and left in recliner. Call bell within reach and all needs within reach.       Progression toward goals:   []      Improving appropriately and progressing toward goals  [x]      Improving slowly and progressing toward goals  []      Not making progress toward goals and plan of care will be adjusted     PLAN:  Patient continues to benefit from skilled intervention to address the above impairments. Continue treatment per established plan of care. Discharge Recommendations:  Khai Ramsey  Further Equipment Recommendations for Discharge:  bedside commode, portable oxygen, and rolling walker     SUBJECTIVE:   Patient stated \"I just did PT with the other guillremo but I am up for more\"    OBJECTIVE DATA SUMMARY:   Critical Behavior:  Neurologic State: Alert  Orientation Level: Oriented X4  Cognition: Follows commands  Safety/Judgement: Fall prevention  Functional Mobility Training:  Bed Mobility:  Rolling: Moderate assistance;Assist x2  Supine to Sit: Assist x2; Moderate assistance     Scooting: Maximum assistance;Assist x2         Transfers:  Sit to Stand: Moderate assistance;Assist x2  Stand to Sit: Moderate assistance;Assist x2        Bed to Chair: Moderate assistance;Assist x2                Balance:  Sitting: Impaired  Sitting - Static: Fair (occasional)  Sitting - Dynamic: Fair (occasional)  Standing: With support;Pull to stand; Impaired  Standing - Static: Fair  Standing - Dynamic : Not tested              Therapeutic Exercises:         EXERCISE   Sets   Reps   Active Active Assist   Passive Self ROM   Comments   Ankle Pumps 1 10  [x] [] [] []    Quad Sets/Glut Sets    [] [] [] [] Hold for 5 secs   Hamstring Sets   [] [] [] []    Short Arc Quads   [] [] [] []    Heel Slides 1 10 [x] [] [] []    Straight Leg Raises   [] [] [] []    Hip Add   [] [] [] [] Hold for 5 secs, w/ pillow squeeze   Long Arc Quads 1 10 [x] [] [] []    Seated Marching   [] [] [] []    Standing Marching   [] [] [] []       [] [] [] []        Pain:  Pain level pre-treatment: 0/10  Pain level post-treatment: 0/10   Pain Intervention(s): Medication (see MAR); Rest,  Response to intervention: NurseRut notified    Activity Tolerance:   FAIR  Please refer to the flowsheet for vital signs taken during this treatment.   After treatment:   [x] Patient left in no apparent distress sitting up in chair  [] Patient left in no apparent distress in bed  [x] Call bell left within reach  [x] Nursing notified  [] Caregiver present  [] Bed alarm activated  [] SCDs applied      COMMUNICATION/EDUCATION:   [x]         Role of Physical Therapy in the acute care setting. [x]         Fall prevention education was provided and the patient/caregiver indicated understanding. [x]         Patient/family have participated as able in working toward goals and plan of care. [x]         Patient/family agree to work toward stated goals and plan of care. []         Patient understands intent and goals of therapy, but is neutral about his/her participation.   []         Patient is unable to participate in stated goals/plan of care: ongoing with therapy staff.  []         Other:        Ceola Goldmann   Time Calculation: 15 mins

## 2020-04-30 NOTE — PROGRESS NOTES
Problem: Self Care Deficits Care Plan (Adult)  Goal: *Acute Goals and Plan of Care (Insert Text)  Description: Occupational Therapy Goals  Initiated 4/30/2020 within 7 day(s). 1.  Patient will perform grooming with modified independence seated edge of bed with good sitting balance. 2.  Patient will perform bathing with modified independence. 3.  Patient will perform upper body dressing and lower body dressing with modified independence. 4.  Patient will perform bedside commode transfers with modified independence using RW. 5.  Patient will perform all aspects of toileting with modified independence. 6.  Patient will participate in upper extremity therapeutic exercise/activities with modified independence for 10 minutes. 7.  Patient will utilize energy conservation techniques during functional activities with min verbal cues. Prior Level of Function: Mod I with assist as needed by son for ADLs, Mod I using w/c and RW for functional mobility   Outcome: Progressing Towards Goal   OCCUPATIONAL THERAPY EVALUATION    Patient: Joe Rivera (17 y.o. male)  Date: 4/30/2020  Primary Diagnosis: Hypothermia [T68. XXXA]  Procedure(s) (LRB):  ESOPHAGOGASTRODUODENOSCOPY (EGD) (N/A)     Precautions:   Fall  PLOF: see above    ASSESSMENT :  Nursing/Dolores cleared for pt to participate in OT evaluation and tx session. Based on the objective data described below, the patient presents with decreased strength BUE MMT 3+/5, BUE AROM WFL, poor functional activity tolerance , Balance: sitting static fair, sitting dynamic fair-, standing static fair-, standing dynamic poor and poor safety awareness, which is inhibiting ability to perform ADLs and functional transfers independently. Based upon clinical judgement, patient requires assist with functional mobility e. g.Mod A x 2 with bed mobility for supine to sit, simulated bedside commode transfer with bed-> recliner transfer with mod A x 2 stand pivot transfer with verbal cues for correct hand and feet placement, UB and LB bathing with Max A, UB dress with Max A, LB dress with Dep, toileting with Dep, grooming with CGA and self feeding with set up. Patient educated on role of OT and POC; patient verbalized understanding. Pt. Instructed on safety awareness with importance to utilize call bell to request assist with functional transfers to prevent falls, patient verbalized understanding and provided accurate demonstration. Dry erase communication board updated for accuracy w/ carryover for toileting: bed pan. Patient will benefit from skilled intervention to address the above impairments. Patient's rehabilitation potential is considered to be Good  Factors which may influence rehabilitation potential include:   []             None noted  []             Mental ability/status  []             Medical condition  []             Home/family situation and support systems  [x]             Safety awareness  []             Pain tolerance/management  []             Other:      PLAN :  Recommendations and Planned Interventions:   [x]               Self Care Training                  [x]      Therapeutic Activities  [x]               Functional Mobility Training   []      Cognitive Retraining  [x]               Therapeutic Exercises           [x]      Endurance Activities  [x]               Balance Training                    [x]      Neuromuscular Re-Education  []               Visual/Perceptual Training     [x]      Home Safety Training  [x]               Patient Education                   [x]      Family Training/Education  []               Other (comment):    Frequency/Duration: Patient will be followed by occupational therapy 1-2x/day, 3-5 times a week to address goals.   Discharge Recommendations: Khai Ramsey  Further Equipment Recommendations for Discharge: bedside commode, walker, and rolling walker     SUBJECTIVE:   Patient stated I use my w/c to get to the kitchen.     OBJECTIVE DATA SUMMARY:     Past Medical History:   Diagnosis Date    Difficulty urinating     Elevated PSA     Hematuria, unspecified     Hypercholesterolemia     Hypertension     Incomplete bladder emptying     Urinary retention     UTI (urinary tract infection)      Past Surgical History:   Procedure Laterality Date    APPENDECTOMY      EGD  2/7/2014         HX TURP  6/13/16    HX TURP       Barriers to Learning/Limitations: None  Compensate with: visual, verbal, tactile, kinesthetic cues/model    Home Situation:   Home Situation  Home Environment: Private residence  # Steps to Enter: 3  Rails to Enter: Yes  Hand Rails : Bilateral  One/Two Story Residence: Two story, live on 1st floor  Living Alone: No  Support Systems: Family member(s)  Patient Expects to be Discharged to[de-identified] Private residence  Current DME Used/Available at Home: Collin Kody, [place], Collin Kody, rollator, Wheelchair, [address] chair, Raised toilet seat, Safety frame [employer], Grab bars, Commode, bedside  Tub or Shower Type: Shower  [x]  Right hand dominant   []  Left hand dominant    Cognitive/Behavioral Status:  Neurologic State: Alert  Orientation Level: Oriented X4  Cognition: Follows commands  Safety/Judgement: Fall prevention    Skin:  redness noted periarea-barrier cream applied  Edema: minimal in BUEs    Vision/Perceptual:  appears intact    Coordination: BUE  Coordination: Within functional limits(BUEs)  Fine Motor Skills-Upper: Left Intact; Right Intact    Gross Motor Skills-Upper: Left Intact; Right Intact    Balance:  Sitting: Impaired  Sitting - Static: Fair (occasional)  Sitting - Dynamic: Fair (occasional)(-)  Standing: Impaired;Pull to stand  Standing - Static: Fair(-)  Standing - Dynamic : Poor    Strength: BUE  Strength:  Within functional limits(BUEs 3+/5)    Tone & Sensation: BUE  Tone: Normal(BUEs)    Range of Motion: BUE  AROM: Within functional limits(BUEs)    Functional Mobility and Transfers for ADLs:  Bed Mobility:  Rolling: Moderate assistance;Assist x2  Supine to Sit: Assist x2; Moderate assistance     Scooting: Maximum assistance;Assist x2  Transfers:  Sit to Stand: Maximum assistance;Assist x2  Stand to Sit: Maximum assistance;Assist x2     Bed to Chair: Moderate assistance;Assist x2  ADL Assessment:   Feeding: Setup    Oral Facial Hygiene/Grooming: Contact guard assistance    Bathing: Maximum assistance    Upper Body Dressing: Maximum assistance    Lower Body Dressing: Total assistance    Cognitive Retraining  Safety/Judgement: Fall prevention    Pain:  Pain level pre-treatment: 0/10   Pain level post-treatment: 0/10   Pain Intervention(s): Medication (see MAR); Rest, Ice, Repositioning   Response to intervention: Nurse notified, See doc flow    Activity Tolerance:   poor  Please refer to the flowsheet for vital signs taken during this treatment. After treatment:   [x] Patient left in no apparent distress sitting up in chair  [] Patient left in no apparent distress in bed  [x] Call bell left within reach  [x] Nursing notified  [] Caregiver present  [] Bed alarm activated    COMMUNICATION/EDUCATION:   [x] Role of Occupational Therapy in the acute care setting  [x] Home safety education was provided and the patient/caregiver indicated understanding. [x] Patient/family have participated as able in goal setting and plan of care. [x] Patient/family agree to work toward stated goals and plan of care. [] Patient understands intent and goals of therapy, but is neutral about his/her participation. [] Patient is unable to participate in goal setting and plan of care. Thank you for this referral.  Benny Puente  Time Calculation: 60 mins    Eval Complexity: History: HIGH Complexity : Extensive review of history including physical, cognitive and psychosocial history ;    Examination: HIGH Complexity : 5 or more performance deficits relating to physical, cognitive , or psychosocial skils that result in activity limitations and / or participation restrictions; Decision Making:HIGH Complexity : Patient presents with comorbidities that affect occupational performance.  Signifigant modification of tasks or assistance (eg, physical or verbal) with assessment (s) is necessary to enable patient to complete evaluation

## 2020-04-30 NOTE — PROGRESS NOTES
Problem: Dysphagia (Adult)  Goal: *Acute Goals and Plan of Care (Insert Text)  Description: Recommendations:  Diet: Puree diet with NECTAR thick clear liquid diet, as medically appropriate  Ice chips for pleasure after oral care  Meds: Via IV or crushed in puree  Aspiration Precautions  Oral Care TID  Feeder  Small sips/bites  No straws    Goals:  Patient will:  1. Tolerate PO trials with 0 s/s overt distress in 4/5 trials  2. Utilize compensatory swallow strategies/maneuvers (decrease bite/sip, size/rate, alt. liq/sol) with min cues in 4/5 trials  3. Complete an objective swallow study (i.e., MBSS) to assess swallow integrity, r/o aspiration, and determine of safest LRD, min A      Outcome: Progressing Towards Goal   SPEECH LANGUAGE PATHOLOGY DYSPHAGIA TREATMENT    Patient: Jamshid Cardona (25 y.o. male)  Date: 4/30/2020  Diagnosis: Hypothermia [T68. XXXA]   Hypothermia  Procedure(s) (LRB):  ESOPHAGOGASTRODUODENOSCOPY (EGD) (N/A)    Precautions:  Aspiration risk (puree/nectar)Fall  PLOF: Per H&P    ASSESSMENT:  Pt seen sitting up in recliner. Pt alert/more awake today. He engaged in conversation throughout session; continued weak and wet VQ noted at baseline. SLP facilitated effortful swallows x10 given mod cues with Nectar thick liquids; delayed weak cough x1. Additionally, SLP facilitated hard /k/ words x26 with Dangelo/reps to increase airway protection, which improved pt's VQ to be less wet sounding at baseline. Improved volitional throat clear witnessed this date. Pt education re: purpose/benefits of exercises and importance of throat clear/ cough to reduce wet VQ. Pt demo'd carryover. Upon, SLPs departure, pt remained in recliner resting taking small sips of NTLs from cup. ST will continue to follow.       Progression toward goals:  []         Improving appropriately and progressing toward goals  [x]         Improving slowly and progressing toward goals  []         Not making progress toward goals and plan of care will be adjusted     PLAN:  Recommendations and Planned Interventions:  Diet: Puree diet with NECTAR thick clear liquid diet, as medically appropriate  Ice chips for pleasure after oral care  Meds: Via IV or crushed in puree  Aspiration Precautions  Oral Care TID  Feeder  Small sips/bites  No straws  Patient continues to benefit from skilled intervention to address the above impairments. Continue treatment per established plan of care. Discharge Recommendations:  Home Health and [address] [place] [address]:   Patient stated You're so sweet. OBJECTIVE:   Cognitive and Communication Status:  Neurologic State: Alert  Orientation Level: Oriented X4  Cognition: Follows commands  Perception: Appears intact  Perseveration: No perseveration noted  Safety/Judgement: Fall prevention  Dysphagia Treatment:  Oral Assessment:  Oral Assessment  Labial: Decreased seal  Dentition: Limited  Oral Hygiene: Poor  Lingual: Decreased rate, Decreased strength, Incoordinated  Velum: No impairment  Mandible: No impairment  P.O. Trials:   Patient Position: 45   Vocal quality prior to P.O.: Breathy, Fatigue   Consistency Presented: Mechanical soft, Nectar thick liquid, Puree   How Presented: SLP-fed/presented       Bolus Acceptance: No impairment   Bolus Formation/Control: Impaired   Type of Impairment: Delayed, Lip closure, Mastication, Piecemeal   Propulsion: Delayed (# of seconds), Tongue pumping   Oral Residue: 10-50% of bolus   Initiation of Swallow: Delayed (# of seconds)   Laryngeal Elevation: Weak   Aspiration Signs/Symptoms: Watery eyes, Weak cough   Pharyngeal Phase Characteristics: Easily fatigued , Multiple swallows, Poor endurance, Suspected pharyngeal residue   Effective Modifications: Alternate liquids/solids, Cup/sip, Spoon, Small sips and bites, Other (comment)   Cues for Modifications:  Moderate         Oral Phase Severity: Moderate   Pharyngeal Phase Severity : Moderate-severe   Oral Motor Exercises: Exercises:  Laryngeal Exercises:               PAIN:  Pain level pre-treatment: 0/10   Pain level post-treatment: 0/10   Pain Intervention(s): Medication (see MAR); Rest, Ice, Repositioning  Response to intervention: Nurse notified, See doc flow; cooperative    After treatment:   [x]              Patient left in no apparent distress sitting up in chair  []              Patient left in no apparent distress in bed  [x]              Call bell left within reach  []              Nursing notified  []              Family present  []              Caregiver present  []              Bed alarm activated      COMMUNICATION/EDUCATION:   [x] Aspiration precautions; swallow safety; compensatory techniques  []        Patient unable to participate in education; education ongoing with staff  [x]  Posted safety precautions in patient's room.   [x] Oral-motor/laryngeal strengthening exercises      CAMRON Martinez  Time Calculation: 17 mins

## 2020-04-30 NOTE — PROGRESS NOTES
NUTRITION FOLLOW-UP/PLAN OF CARE    RECOMMENDATIONS:   1. Pureed (NDD1) 2 g Na Diet with NTL (2); NO Straw per SLP added SF CIB BID to supplement energy needs  2. Monitor labs, weight and document all PO intake  3. Feeding assist/set up with encouragement     GOALS:   1. Progressing/Ongoing: PO intake meets >75% of protein/calorie needs by 5/5      ASSESSMENT:   Wt status is classified as overweight per Body mass index is 25.95 kg/m². Fair to good PO intake. Added nutritional supplements. Labs noted. BG range () over the past 24 hours. Pt w/ hypernatremia, hypophosphatemia and elevated BUN/Cr; GFR (54). Nutrition recommendations listed. RD to follow. SUBJECTIVE/OBJECTIVE:   (4/30): Pt admitted for hypothermia and transferred from ICU to  yesterday. Familiar with patient from admission earlier this month; noted to have recent weight loss and dysphagia at that time. Pt received TPN on (4/27) per previous RD note. SLP following: on (4/30) recommendations for Puree diet with NECTAR thick clear liquid diet, as medically appropriate  Ice chips for pleasure after oral care. Pt seen in room this afternoon OOB in chair at lunch. Pt feeding himself and doing well with meal intake today. Reports he is feeling better and would like to have that chocolate drink again. Placed orders for SF CIB BID to supplement energy needs. Also encouraged to increase fluid intake of NT water for hydration. Unable to verify current weight during visit today. Will continue to monitor.      Information Obtained From:   [x] Chart Review  [x] Patient  [] Family/Caregiver  [x] Nurse/Physician   [] Patient Rounds/Interdisciplinary Meeting    Diet: Pureed (NDD1) 2 g Na Diet with NTL (2)  Patient Vitals for the past 100 hrs:   % Diet Eaten   04/30/20 0852 60 %   04/29/20 1830 100 %     Medications: [x] Reviewed   IVF: D5W @50 mL/hr (204 kcal/day)  Lipitor, Vit D3, Humalog, Synthroid, Protonix, Carafate  Encounter Diagnoses ICD-10-CM ICD-9-CM   1. Hypothermia, initial encounter T68. XXXA 991.6   2. Hypoglycemia E16.2 251.2   3. General weakness R53.1 780.79   4. Acute chest pain R07.9 786.50   5. Hx of aspiration pneumonitis Z87.09 V12.69   6. Hypercarbia R06.89 786.09   7. Anemia, unspecified type D64.9 285.9   8.  Urinary tract infection with hematuria, site unspecified N39.0 599.0    R31.9 599.70     Past Medical History:   Diagnosis Date    Difficulty urinating     Elevated PSA     Hematuria, unspecified     Hypercholesterolemia     Hypertension     Incomplete bladder emptying     Urinary retention     UTI (urinary tract infection)      Labs:    Lab Results   Component Value Date/Time    Sodium 149 (H) 04/30/2020 03:45 AM    Potassium 3.8 04/30/2020 03:45 AM    Chloride 122 (H) 04/30/2020 03:45 AM    CO2 20 (L) 04/30/2020 03:45 AM    Anion gap 7 04/30/2020 03:45 AM    Glucose 81 04/30/2020 03:45 AM    BUN 27 (H) 04/30/2020 03:45 AM    Creatinine 1.26 04/30/2020 03:45 AM    Calcium 7.8 (L) 04/30/2020 03:45 AM    Magnesium 2.0 04/30/2020 03:45 AM    Phosphorus 2.1 (L) 04/30/2020 03:45 AM    Albumin 2.1 (L) 04/23/2020 04:17 PM     Anthropometrics: BMI (calculated): 25.9   Last 3 Recorded Weights in this Encounter    04/27/20 1300 04/28/20 1515 04/29/20 2210   Weight: 74 kg (163 lb 2.3 oz) 76 kg (167 lb 9.6 oz) 75.2 kg (165 lb 11.2 oz)      Ht Readings from Last 1 Encounters:   04/22/20 5' 7\" (1.702 m)       Documented Weight History:  Weight Metrics 4/29/2020 4/8/2020 2/19/2020 12/28/2019 12/16/2019 2/12/2019 9/10/2018   Weight 165 lb 11.2 oz 151 lb 6.4 oz 160 lb 167 lb 8.8 oz 150 lb 184 lb 180 lb   BMI 25.95 kg/m2 23.71 kg/m2 25.06 kg/m2 25.48 kg/m2 22.81 kg/m2 27.98 kg/m2 27.37 kg/m2       [x]  Weight Loss PTA  []  Weight Gain  [x]  Weight Stable   []  New wt n/a on record     Estimated Nutrition Needs:   7039 Kcals/day  Protein (g): 68 g    Nutrition Problems Identified:   [] Suboptimal PO intake   [] Food Allergies  [x] Difficulty chewing/swallowing/poor dentition  [] Constipation/Diarrhea   [] Nausea/Vomiting   [] None  [] Other:     Plan:   [x] Therapeutic Diet  []  Obtained/adjusted food preferences/tolerances and/or snacks options   [x]  Supplements added   [x] SLP following for feeding techniques  []  HS snack added   [x]  Modify diet texture   []  Modify diet for food allergies   []  Educate patient   []  Assist with menu selection   [x]  Monitor PO intake on meal rounds   [x]  Continue inpatient monitoring and intervention   [x]  Participated in discharge planning/Interdisciplinary rounds/Team meetings   []  Other:     Education Needs:   [] Not appropriate for teaching at this time due to:   [x] Identified and addressed    Nutrition Monitoring and Evaluation:   [x] Continue inpatient monitoring and interventions    [] Other:     Mor Mata

## 2020-04-30 NOTE — PROGRESS NOTES
Problem: Discharge Planning  Goal: *Discharge to safe environment  Outcome: Progressing Towards Goal     Problem: Falls - Risk of  Goal: *Absence of Falls  Description: Document Scooby Wallaceer Fall Risk and appropriate interventions in the flowsheet. Outcome: Progressing Towards Goal  Note: Fall Risk Interventions:  Mobility Interventions: Communicate number of staff needed for ambulation/transfer, Bed/chair exit alarm    Mentation Interventions: Bed/chair exit alarm, Door open when patient unattended, More frequent rounding, Reorient patient, Update white board    Medication Interventions: Bed/chair exit alarm, Evaluate medications/consider consulting pharmacy    Elimination Interventions: Bed/chair exit alarm, Call light in reach, Patient to call for help with toileting needs, Toileting schedule/hourly rounds              Problem: Patient Education: Go to Patient Education Activity  Goal: Patient/Family Education  Outcome: Progressing Towards Goal     Problem: Pressure Injury - Risk of  Goal: *Prevention of pressure injury  Description: Document Alex Scale and appropriate interventions in the flowsheet. Outcome: Progressing Towards Goal  Note: Pressure Injury Interventions:  Sensory Interventions: Maintain/enhance activity level, Minimize linen layers, Monitor skin under medical devices, Turn and reposition approx.  every two hours (pillows and wedges if needed)    Moisture Interventions: Absorbent underpads, Apply protective barrier, creams and emollients, Internal/External urinary devices    Activity Interventions: Increase time out of bed, Pressure redistribution bed/mattress(bed type), PT/OT evaluation    Mobility Interventions: HOB 30 degrees or less, PT/OT evaluation    Nutrition Interventions: Document food/fluid/supplement intake    Friction and Shear Interventions: Apply protective barrier, creams and emollients, Foam dressings/transparent film/skin sealants                Problem: Patient Education: Go to Patient Education Activity  Goal: Patient/Family Education  Outcome: Progressing Towards Goal     Problem: Pain  Goal: *Control of Pain  Outcome: Progressing Towards Goal  Goal: *PALLIATIVE CARE:  Alleviation of Pain  Outcome: Progressing Towards Goal     Problem: Tissue Perfusion - Cardiopulmonary, Altered  Goal: *Optimize tissue perfusion  Outcome: Progressing Towards Goal  Goal: *Absence of hypoxia  Outcome: Progressing Towards Goal     Problem: Patient Education: Go to Patient Education Activity  Goal: Patient/Family Education  Outcome: Progressing Towards Goal     Problem: Delirium Treatment  Goal: *Level of consciousness restored to baseline  Outcome: Progressing Towards Goal  Goal: *Level of environmental perceptions restored to baseline  Outcome: Progressing Towards Goal  Goal: *Sensory perception restored to baseline  Outcome: Progressing Towards Goal  Goal: *Emotional stability restored to baseline  Outcome: Progressing Towards Goal  Goal: *Functional assessment restored to baseline  Outcome: Progressing Towards Goal  Goal: *Absence of falls  Outcome: Progressing Towards Goal  Goal: *Will remain free of delirium, CAM Score negative  Outcome: Progressing Towards Goal  Goal: *Cognitive status will be restored to baseline  Outcome: Progressing Towards Goal  Goal: Interventions  Outcome: Progressing Towards Goal     Problem: Patient Education: Go to Patient Education Activity  Goal: Patient/Family Education  Outcome: Progressing Towards Goal     Problem: Patient Education: Go to Patient Education Activity  Goal: Patient/Family Education  Outcome: Progressing Towards Goal

## 2020-04-30 NOTE — PROGRESS NOTES
Problem: Discharge Planning  Goal: *Discharge to safe environment  Outcome: Progressing Towards Goal    Plan SNF    Has accepting bed at [facility] and rehab. Spoke with MD and he will let me know if close to d/c. Received call from [facility] they are requesting a neg Covid test prior to admission.

## 2020-04-30 NOTE — ROUTINE PROCESS
0730:  Bedside and Verbal shift change report given to Francesco Warren RN (oncoming nurse) by Presley Landaverde RN (offgoing nurse). Report included the following information SBAR, Kardex, MAR and Recent Results. 8127: AM meds given, patient tolerated well. 0930:  Staff at bedside bathing patient. 1037:  Up in chair, watching tv. 1230:  Up in chair eating lunch. 1317:  Due meds administered 1801:  Due meds administered 1930:  Bedside and Verbal shift change report given to Elma Card RN (oncoming nurse) by Francesco Warren RN (offgoing nurse). Report included the following information SBAR, Kardex, MAR and Recent Results.

## 2020-04-30 NOTE — PROGRESS NOTES
Internal Medicine Progress Note    Patient's Name: Kristan Fowler  Admit Date: 4/21/2020  Length of Stay: 9      Assessment/Plan     C/Stephanie Oro 1106 Problems    Diagnosis Date Noted    Gastrointestinal bleeding 04/23/2020    Hypoglycemia 04/21/2020    HTN (hypertension) 04/21/2020    Left bundle branch block 04/21/2020    Sinoatrial node dysfunction ([facility] [address] 04/21/2020    Dysphagia 04/08/2020    Cardiomyopathy ([facility] [address] 02/20/2020     LVEF 35-40% by previous TTE.  Grade II diastolic dysfunction 93/14/4498     Per previous TTE.  Hypothyroidism 02/20/2020    Bradycardia 02/19/2020    Hypotension 02/19/2020    Hypothermia 12/23/2019    Chronic renal failure, stage 3 (moderate) ([facility]) 12/23/2019    SIRS (systemic inflammatory response syndrome) ([facility]) 12/23/2019   Hypotension and Hypothermia Likely due to Hypovolemic shock 2/2 GI bleed    - Afebrile, no white count  - Cont to observe off antibx  - Cont D5W, increased  - Encourage free water intake  - Hgb stable  - Cont PO PPI  - Cont med mgmt for GI bleed  - Appreciate cardio, no plans for pacer with heart block  - Cr likely at baseline  - Replete lytes PRN  - PT/OT  - Cont acceptable home medications for chronic conditions   - DVT protocol    I have personally reviewed all pertinent labs and films that have officially resulted over the last 24 hours. I have personally checked for all pending labs that are awaiting final results.     Subjective     Pt s/e @ bedside  No major events overnight  Doing well  No complaints  Denies CP or SOB    Objective     Visit Vitals  /75   Pulse 72   Temp 97.7 °F (36.5 °C)   Resp 18   Ht 5' 7\" (1.702 m)   Wt 75.2 kg (165 lb 11.2 oz)   SpO2 93%   BMI 25.95 kg/m²       Physical Exam:  General Appearance: NAD, conversant  Lungs: CTA with normal respiratory effort  CV: RRR, no m/r/g  Abdomen: soft, non-tender, normal bowel sounds  Extremities: no cyanosis, no peripheral edema  Neuro: No focal deficits, motor/sensory intact    Lab/Data Reviewed:  CMP:   Lab Results   Component Value Date/Time     (H) 04/30/2020 03:45 AM    K 3.8 04/30/2020 03:45 AM     (H) 04/30/2020 03:45 AM    CO2 20 (L) 04/30/2020 03:45 AM    AGAP 7 04/30/2020 03:45 AM    GLU 81 04/30/2020 03:45 AM    BUN 27 (H) 04/30/2020 03:45 AM    CREA 1.26 04/30/2020 03:45 AM    GFRAA >60 04/30/2020 03:45 AM    GFRNA 54 (L) 04/30/2020 03:45 AM    CA 7.8 (L) 04/30/2020 03:45 AM    MG 2.0 04/30/2020 03:45 AM    PHOS 2.1 (L) 04/30/2020 03:45 AM     CBC:   Lab Results   Component Value Date/Time    WBC 8.3 04/30/2020 03:45 AM    HGB 8.8 (L) 04/30/2020 03:45 AM    HCT 26.2 (L) 04/30/2020 03:45 AM     04/30/2020 03:45 AM       Imaging Reviewed:  No results found.     Medications Reviewed:  Current Facility-Administered Medications   Medication Dose Route Frequency    dextrose 5% infusion  50 mL/hr IntraVENous CONTINUOUS    pantoprazole (PROTONIX) tablet 40 mg  40 mg Oral ACB    0.9% sodium chloride infusion 250 mL  250 mL IntraVENous PRN    insulin lispro (HUMALOG) injection   SubCUTAneous Q6H    dextrose 10% infusion 125-250 mL  125-250 mL IntraVENous PRN    QUEtiapine (SEROquel) tablet 25 mg  25 mg Oral QHS    lidocaine 4 % patch 1 Patch  1 Patch TransDERmal Q24H    haloperidol lactate (HALDOL) injection 2 mg  2 mg IntraMUSCular Q6H PRN    glucose chewable tablet 16 g  4 Tab Oral PRN    glucagon (GLUCAGEN) injection 1 mg  1 mg IntraMUSCular PRN    levothyroxine (SYNTHROID) tablet 100 mcg  100 mcg Oral 6am    sodium chloride (NS) flush 5-10 mL  5-10 mL IntraVENous PRN    [Held by provider] aspirin delayed-release tablet 81 mg  81 mg Oral DAILY    cholecalciferol (VITAMIN D3) (1000 Units /25 mcg) tablet 2 Tab  2,000 Units Oral DAILY    atorvastatin (LIPITOR) tablet 40 mg  40 mg Oral QHS    sucralfate (CARAFATE) tablet 1 g  1 g Oral QID    tamsulosin (FLOMAX) capsule 0.4 mg  0.4 mg Oral DAILY    [Held by provider] heparin (porcine) injection 5,000 Units  5,000 Units SubCUTAneous Q8H           Prisca Arizmendi DO  Internal Medicine, Hospitalist  Pager: [phone]  [address] [place] [address]

## 2020-04-30 NOTE — PROGRESS NOTES
1900  -- Bedside, Verbal and Written shift change report given to [address] (oncoming nurse) by Lorena Burciaga nurse). Allergy band placed on pt's wrist. Report included the following information SBAR, Kardex, Intake/Output, MAR and Recent Results.       2208 -- PM medications administered, pt tolerated with ease, will continue to monitor.     0015 -- Shift reassessment, pt condition unchanged, will continue to monitor.      0415 --  Shift reassessment, pt condition unchanged, will continue to monitor.        0700  -- Bedside, Verbal and Written shift change report given to [id number] nurse) by KARINA (offgoing nurse). Report included the following information SBAR, Kardex, Intake/Output, MAR and Recent Results. Skin assessment completed.

## 2020-05-01 LAB
ANION GAP SERPL CALC-SCNC: 6 MMOL/L (ref 3–18)
BASOPHILS # BLD: 0 K/UL (ref 0–0.1)
BASOPHILS NFR BLD: 0 % (ref 0–2)
BUN SERPL-MCNC: 24 MG/DL (ref 7–18)
BUN/CREAT SERPL: 23 (ref 12–20)
CALCIUM SERPL-MCNC: 7.8 MG/DL (ref 8.5–10.1)
CHLORIDE SERPL-SCNC: 120 MMOL/L (ref 100–111)
CO2 SERPL-SCNC: 22 MMOL/L (ref 21–32)
CREAT SERPL-MCNC: 1.04 MG/DL (ref 0.6–1.3)
DIFFERENTIAL METHOD BLD: ABNORMAL
EOSINOPHIL # BLD: 0.5 K/UL (ref 0–0.4)
EOSINOPHIL NFR BLD: 7 % (ref 0–5)
ERYTHROCYTE [DISTWIDTH] IN BLOOD BY AUTOMATED COUNT: 17.2 % (ref 11.6–14.5)
GLUCOSE BLD STRIP.AUTO-MCNC: 120 MG/DL (ref 70–110)
GLUCOSE BLD STRIP.AUTO-MCNC: 142 MG/DL (ref 70–110)
GLUCOSE BLD STRIP.AUTO-MCNC: 144 MG/DL (ref 70–110)
GLUCOSE SERPL-MCNC: 96 MG/DL (ref 74–99)
HCT VFR BLD AUTO: 24.5 % (ref 36–48)
HGB BLD-MCNC: 8.2 G/DL (ref 13–16)
LYMPHOCYTES # BLD: 0.6 K/UL (ref 0.9–3.6)
LYMPHOCYTES NFR BLD: 9 % (ref 21–52)
MAGNESIUM SERPL-MCNC: 2 MG/DL (ref 1.6–2.6)
MCH RBC QN AUTO: 31.9 PG (ref 24–34)
MCHC RBC AUTO-ENTMCNC: 33.5 G/DL (ref 31–37)
MCV RBC AUTO: 95.3 FL (ref 74–97)
MONOCYTES # BLD: 0.2 K/UL (ref 0.05–1.2)
MONOCYTES NFR BLD: 3 % (ref 3–10)
NEUTS SEG # BLD: 5.2 K/UL (ref 1.8–8)
NEUTS SEG NFR BLD: 81 % (ref 40–73)
PHOSPHATE SERPL-MCNC: 2.2 MG/DL (ref 2.5–4.9)
PLATELET # BLD AUTO: 229 K/UL (ref 135–420)
PMV BLD AUTO: 10 FL (ref 9.2–11.8)
POTASSIUM SERPL-SCNC: 3.4 MMOL/L (ref 3.5–5.5)
RBC # BLD AUTO: 2.57 M/UL (ref 4.7–5.5)
SODIUM SERPL-SCNC: 148 MMOL/L (ref 136–145)
WBC # BLD AUTO: 6.5 K/UL (ref 4.6–13.2)

## 2020-05-01 PROCEDURE — 74011250636 HC RX REV CODE- 250/636: Performed by: HOSPITALIST

## 2020-05-01 PROCEDURE — 84100 ASSAY OF PHOSPHORUS: CPT

## 2020-05-01 PROCEDURE — 74011250637 HC RX REV CODE- 250/637: Performed by: PHYSICIAN ASSISTANT

## 2020-05-01 PROCEDURE — 97112 NEUROMUSCULAR REEDUCATION: CPT

## 2020-05-01 PROCEDURE — 65660000000 HC RM CCU STEPDOWN

## 2020-05-01 PROCEDURE — 74011000250 HC RX REV CODE- 250: Performed by: HOSPITALIST

## 2020-05-01 PROCEDURE — 83735 ASSAY OF MAGNESIUM: CPT

## 2020-05-01 PROCEDURE — 97535 SELF CARE MNGMENT TRAINING: CPT

## 2020-05-01 PROCEDURE — 77010033678 HC OXYGEN DAILY

## 2020-05-01 PROCEDURE — 97110 THERAPEUTIC EXERCISES: CPT

## 2020-05-01 PROCEDURE — 74011250637 HC RX REV CODE- 250/637: Performed by: HOSPITALIST

## 2020-05-01 PROCEDURE — 85025 COMPLETE CBC W/AUTO DIFF WBC: CPT

## 2020-05-01 PROCEDURE — 82962 GLUCOSE BLOOD TEST: CPT

## 2020-05-01 PROCEDURE — 74011250637 HC RX REV CODE- 250/637: Performed by: INTERNAL MEDICINE

## 2020-05-01 PROCEDURE — 80048 BASIC METABOLIC PNL TOTAL CA: CPT

## 2020-05-01 RX ORDER — POTASSIUM CHLORIDE 20 MEQ/1
40 TABLET, EXTENDED RELEASE ORAL
Status: COMPLETED | OUTPATIENT
Start: 2020-05-01 | End: 2020-05-01

## 2020-05-01 RX ORDER — POTASSIUM CHLORIDE 7.45 MG/ML
10 INJECTION INTRAVENOUS
Status: COMPLETED | OUTPATIENT
Start: 2020-05-01 | End: 2020-05-01

## 2020-05-01 RX ORDER — ATROPINE SULFATE 0.1 MG/ML
1 INJECTION INTRAVENOUS
Status: DISCONTINUED | OUTPATIENT
Start: 2020-05-01 | End: 2020-05-11 | Stop reason: HOSPADM

## 2020-05-01 RX ADMIN — SUCRALFATE 1 G: 1 TABLET ORAL at 09:17

## 2020-05-01 RX ADMIN — SUCRALFATE 1 G: 1 TABLET ORAL at 12:05

## 2020-05-01 RX ADMIN — QUETIAPINE FUMARATE 25 MG: 25 TABLET ORAL at 21:41

## 2020-05-01 RX ADMIN — PANTOPRAZOLE SODIUM 40 MG: 40 TABLET, DELAYED RELEASE ORAL at 09:15

## 2020-05-01 RX ADMIN — DEXTROSE MONOHYDRATE 75 ML/HR: 5 INJECTION, SOLUTION INTRAVENOUS at 01:15

## 2020-05-01 RX ADMIN — POTASSIUM CHLORIDE 10 MEQ: 7.46 INJECTION, SOLUTION INTRAVENOUS at 12:46

## 2020-05-01 RX ADMIN — ATORVASTATIN CALCIUM 40 MG: 20 TABLET, FILM COATED ORAL at 21:41

## 2020-05-01 RX ADMIN — SUCRALFATE 1 G: 1 TABLET ORAL at 18:05

## 2020-05-01 RX ADMIN — MELATONIN 2 TABLET: at 09:16

## 2020-05-01 RX ADMIN — SUCRALFATE 1 G: 1 TABLET ORAL at 21:41

## 2020-05-01 RX ADMIN — TAMSULOSIN HYDROCHLORIDE 0.4 MG: 0.4 CAPSULE ORAL at 09:17

## 2020-05-01 RX ADMIN — POTASSIUM CHLORIDE 10 MEQ: 7.46 INJECTION, SOLUTION INTRAVENOUS at 13:59

## 2020-05-01 RX ADMIN — DEXTROSE MONOHYDRATE 100 ML/HR: 5 INJECTION, SOLUTION INTRAVENOUS at 18:05

## 2020-05-01 RX ADMIN — LEVOTHYROXINE SODIUM 100 MCG: 0.03 TABLET ORAL at 05:15

## 2020-05-01 RX ADMIN — POTASSIUM CHLORIDE 10 MEQ: 7.46 INJECTION, SOLUTION INTRAVENOUS at 15:00

## 2020-05-01 RX ADMIN — POTASSIUM CHLORIDE 40 MEQ: 1500 TABLET, EXTENDED RELEASE ORAL at 09:17

## 2020-05-01 NOTE — ROUTINE PROCESS
TRANSFER - OUT REPORT: 
 
[facility] 
([facility] on Matthew Yap  being transferred to [phone] 
(unit) for routine progression of care Report consisted of patients Situation, Background, Assessment and  
Recommendations(SBAR). Information from the following report(s) SBAR, Kardex, Intake/Output and MAR was reviewed with the receiving nurse. Lines:  
Triple Lumen 04/21/20 Right Internal jugular (Active) Central Line Being Utilized Yes 5/1/2020 12:00 PM  
Criteria for Appropriate Use Limited/no vessel suitable for conventional peripheral access 5/1/2020 12:00 PM  
Site Assessment Clean, dry, & intact 5/1/2020 12:00 PM  
Infiltration Assessment 0 5/1/2020 12:00 PM  
Affected Extremity/Extremities Color distal to insertion site pink (or appropriate for race) 5/1/2020 12:00 PM  
Date of Last Dressing Change 04/27/20 5/1/2020  8:00 AM  
Dressing Status Clean, dry, & intact 5/1/2020 12:00 PM  
Dressing Type Disk with Chlorhexadine gluconate (CHG); Tape;Transparent 5/1/2020  8:00 AM  
Action Taken Open ports on tubing capped 5/1/2020  8:00 AM  
Proximal Hub Color/Line Status White 5/1/2020  8:00 AM  
Positive Blood Return (Medial Site) Yes 5/1/2020  8:00 AM  
Medial Hub Color/Line Status Blue 5/1/2020  8:00 AM  
Positive Blood Return (Lateral Site) Yes 5/1/2020  8:00 AM  
Distal Hub Color/Line Status Brown 5/1/2020  8:00 AM  
Positive Blood Return (Site #3) Yes 5/1/2020  8:00 AM  
External Catheter Length (cm) 0 centimeters 4/30/2020  8:00 AM  
Alcohol Cap Used Yes 5/1/2020  8:00 AM  
   
Peripheral IV 04/21/20 Left Antecubital (Active) Site Assessment Clean, dry, & intact 5/1/2020 12:00 PM  
Phlebitis Assessment 0 5/1/2020 12:00 PM  
Infiltration Assessment 0 5/1/2020 12:00 PM  
Dressing Status Clean, dry, & intact 5/1/2020 12:00 PM  
Dressing Type Tape;Transparent 5/1/2020 12:00 PM  
Hub Color/Line Status Green 5/1/2020 12:00 PM  
 Action Taken Open ports on tubing capped 5/1/2020 12:00 PM  
Alcohol Cap Used Yes 5/1/2020 12:00 PM  
  
 
Opportunity for questions and clarification was provided. Patient transported with: 
 O2 @ 2 liters Registered Nurse

## 2020-05-01 NOTE — PROGRESS NOTES
Internal Medicine Progress Note    Patient's Name: Mich Mccormick  Admit Date: 4/21/2020  Length of Stay: 8      Assessment/Plan     CHELA/Stephanie Oro 1106 Problems    Diagnosis Date Noted    Gastrointestinal bleeding 04/23/2020    Hypoglycemia 04/21/2020    HTN (hypertension) 04/21/2020    Left bundle branch block 04/21/2020    Sinoatrial node dysfunction ([address] 04/21/2020    Dysphagia 04/08/2020    Cardiomyopathy ([facility] [address] 02/20/2020     LVEF 35-40% by previous TTE.  Grade II diastolic dysfunction 93/14/3536     Per previous TTE.  Hypothyroidism 02/20/2020    Bradycardia 02/19/2020    Hypotension 02/19/2020    Hypothermia 12/23/2019    Chronic renal failure, stage 3 (moderate) ([facility]) 12/23/2019    SIRS (systemic inflammatory response syndrome) ([facility]) 12/23/2019   Hypotension and Hypothermia Likely due to Hypovolemic shock 2/2 GI bleed    - Episode of hypothermia unclear as repeat in unit have been normal  - Afebrile, no white count  - Cont to observe off antibx  - Cont D5W, increased to 100  - Encourage free water intake  - Hgb stable  - Cont PO PPI  - Cont med mgmt for GI bleed  - Appreciate cardio, no plans for pacer with heart block  - Cr likely at baseline  - Replete lytes PRN  - PT/OT  - Awaiting CoVID-19 test results for SNF  - Cont acceptable home medications for chronic conditions   - DVT protocol    I have personally reviewed all pertinent labs and films that have officially resulted over the last 24 hours. I have personally checked for all pending labs that are awaiting final results.     Subjective     Pt s/e @ bedside  Events overnight noted  Episode of hypothermia again unclear etiology  Looks good otherwise and has no complaints  Denies CP or SOB    Objective     Visit Vitals  /57   Pulse (!) 55   Temp 97.3 °F (36.3 °C)   Resp 14   Ht 5' 7\" (1.702 m)   Wt 73.5 kg (162 lb)   SpO2 100%   BMI 25.37 kg/m²       Physical Exam:  General Appearance: NAD, conversant  Lungs: CTA with normal respiratory effort  CV: RRR, no m/r/g  Abdomen: soft, non-tender, normal bowel sounds  Extremities: no cyanosis, no peripheral edema  Neuro: No focal deficits, motor/sensory intact    Lab/Data Reviewed:  CMP:   Lab Results   Component Value Date/Time     (H) 05/01/2020 03:45 AM    K 3.4 (L) 05/01/2020 03:45 AM     (H) 05/01/2020 03:45 AM    CO2 22 05/01/2020 03:45 AM    AGAP 6 05/01/2020 03:45 AM    GLU 96 05/01/2020 03:45 AM    BUN 24 (H) 05/01/2020 03:45 AM    CREA 1.04 05/01/2020 03:45 AM    GFRAA >60 05/01/2020 03:45 AM    GFRNA >60 05/01/2020 03:45 AM    CA 7.8 (L) 05/01/2020 03:45 AM    MG 2.0 05/01/2020 03:45 AM    PHOS 2.2 (L) 05/01/2020 03:45 AM     CBC:   Lab Results   Component Value Date/Time    WBC 6.5 05/01/2020 03:45 AM    HGB 8.2 (L) 05/01/2020 03:45 AM    HCT 24.5 (L) 05/01/2020 03:45 AM     05/01/2020 03:45 AM       Imaging Reviewed:  No results found.     Medications Reviewed:  Current Facility-Administered Medications   Medication Dose Route Frequency    atropine injection 1 mg  1 mg IntraVENous Q5MIN PRN    insulin lispro (HUMALOG) injection   SubCUTAneous AC&HS    dextrose 5% infusion  75 mL/hr IntraVENous CONTINUOUS    pantoprazole (PROTONIX) tablet 40 mg  40 mg Oral ACB    0.9% sodium chloride infusion 250 mL  250 mL IntraVENous PRN    dextrose 10% infusion 125-250 mL  125-250 mL IntraVENous PRN    QUEtiapine (SEROquel) tablet 25 mg  25 mg Oral QHS    lidocaine 4 % patch 1 Patch  1 Patch TransDERmal Q24H    haloperidol lactate (HALDOL) injection 2 mg  2 mg IntraMUSCular Q6H PRN    glucose chewable tablet 16 g  4 Tab Oral PRN    glucagon (GLUCAGEN) injection 1 mg  1 mg IntraMUSCular PRN    levothyroxine (SYNTHROID) tablet 100 mcg  100 mcg Oral 6am    sodium chloride (NS) flush 5-10 mL  5-10 mL IntraVENous PRN    [Held by provider] aspirin delayed-release tablet 81 mg  81 mg Oral DAILY    cholecalciferol (VITAMIN D3) (1000 Units /25 mcg) tablet 2 Tab 2,000 Units Oral DAILY    atorvastatin (LIPITOR) tablet 40 mg  40 mg Oral QHS    sucralfate (CARAFATE) tablet 1 g  1 g Oral QID    tamsulosin (FLOMAX) capsule 0.4 mg  0.4 mg Oral DAILY    [Held by provider] heparin (porcine) injection 5,000 Units  5,000 Units SubCUTAneous Q8H           Sirisha Galvin DO  Internal Medicine, Hospitalist  Pager: [phone]  [address] [place] [address]

## 2020-05-01 NOTE — PROGRESS NOTES
Problem: Discharge Planning  Goal: *Discharge to safe environment  Outcome: Progressing Towards Goal     Plan SNF     Has accepting bed at [facility] and rehab.   COVID test pending as facility requested neg screening.

## 2020-05-01 NOTE — PROGRESS NOTES
Problem: Self Care Deficits Care Plan (Adult)  Goal: *Acute Goals and Plan of Care (Insert Text)  Description: Occupational Therapy Goals  Initiated 4/30/2020 within 7 day(s). 1.  Patient will perform grooming with modified independence seated edge of bed with good sitting balance. 2.  Patient will perform bathing with modified independence. 3.  Patient will perform upper body dressing and lower body dressing with modified independence. 4.  Patient will perform bedside commode transfers with modified independence using RW. 5.  Patient will perform all aspects of toileting with modified independence. 6.  Patient will participate in upper extremity therapeutic exercise/activities with modified independence for 10 minutes. 7.  Patient will utilize energy conservation techniques during functional activities with min verbal cues. Prior Level of Function: Mod I with assist as needed by son for ADLs, Mod I using RW for functional mobility   Outcome: Not Progressing Towards Goal   OCCUPATIONAL THERAPY TREATMENT    Patient: Braulio Tomlin (83 y.o. male)  Date: 5/1/2020  Diagnosis: Hypothermia [T68. XXXA]   Hypothermia  Procedure(s) (LRB):  ESOPHAGOGASTRODUODENOSCOPY (EGD) (N/A)    Precautions: Fall  PLOF: see above  Chart, occupational therapy assessment, plan of care, and goals were reviewed. ASSESSMENT:  Nursing/Veena cleared pt to participate in OT tx. Patient noted with BUE edema, nursing report patient having difficulty with self feeding. Sitting upright in bed for safe swallow: Dep with set up of meal, dep with beverage retrieval and placement into R hand, Mod A with hand over hand assist to bring cup to his mouth with verbal cues to increase AROM of RUE shoulder and elbow flexion, dep with set up of spoon into R hand, Mod A hand over hand assist to bring utensil to mouth to eat. Patient resting in bed, nursing present at end of tx session.      Progression toward goals:  []          Improving appropriately and progressing toward goals  []          Improving slowly and progressing toward goals  [x]          Not making progress toward goals and plan of care will be adjusted     PLAN:  Patient continues to benefit from skilled intervention to address the above impairments. Continue treatment per established plan of care. Discharge Recommendations:  Khai Ramsey  Further Equipment Recommendations for Discharge:  bedside commode, rolling walker, and wheelchair      SUBJECTIVE:   Patient stated I'll have some more of that chocolate drink.     OBJECTIVE DATA SUMMARY:   Cognitive/Behavioral Status:  Neurologic State: Drowsy, Eyes open to voice  Orientation Level: Oriented to person, Oriented to time, Disoriented to place  Cognition: Follows commands  Safety/Judgement: Fall prevention  Pain:  Pain level pre-treatment: 0/10   Pain level post-treatment: 0/10  Pain Intervention(s): Medication (see MAR); Rest, Ice, Repositioning   Response to intervention: Nurse notified, See doc flow    Activity Tolerance:    poor  Please refer to the flowsheet for vital signs taken during this treatment. After treatment:   []  Patient left in no apparent distress sitting up in chair  [x]  Patient left in no apparent distress in bed  [x]  Call bell left within reach  [x]  Nursing notified  []  Caregiver present  []  Bed alarm activated    COMMUNICATION/EDUCATION:   [x] Role of Occupational Therapy in the acute care setting  [x] Home safety education was provided and the patient/caregiver indicated understanding. [x] Patient/family have participated as able in working towards goals and plan of care. [x] Patient/family agree to work toward stated goals and plan of care. [] Patient understands intent and goals of therapy, but is neutral about his/her participation. [] Patient is unable to participate in goal setting and plan of care.       Thank you for this referral.  Meena Sanchez  Time Calculation: 29 mins

## 2020-05-01 NOTE — PROGRESS NOTES
Problem: Discharge Planning  Goal: *Discharge to safe environment  Outcome: Progressing Towards Goal     Problem: Falls - Risk of  Goal: *Absence of Falls  Description: Document Patsy Blood Fall Risk and appropriate interventions in the flowsheet. Outcome: Progressing Towards Goal  Note: Fall Risk Interventions:  Mobility Interventions: Patient to call before getting OOB, PT Consult for mobility concerns    Mentation Interventions: Adequate sleep, hydration, pain control, Bed/chair exit alarm, Door open when patient unattended    Medication Interventions: Patient to call before getting OOB, Teach patient to arise slowly    Elimination Interventions: Toileting schedule/hourly rounds, Urinal in reach              Problem: Patient Education: Go to Patient Education Activity  Goal: Patient/Family Education  Outcome: Progressing Towards Goal     Problem: Pressure Injury - Risk of  Goal: *Prevention of pressure injury  Description: Document Alex Scale and appropriate interventions in the flowsheet.   Outcome: Progressing Towards Goal  Note: Pressure Injury Interventions:  Sensory Interventions: Check visual cues for pain, Discuss PT/OT consult with provider, Float heels, Keep linens dry and wrinkle-free, Maintain/enhance activity level    Moisture Interventions: Limit adult briefs, Maintain skin hydration (lotion/cream)    Activity Interventions: Pressure redistribution bed/mattress(bed type), PT/OT evaluation, Increase time out of bed    Mobility Interventions: HOB 30 degrees or less, Pressure redistribution bed/mattress (bed type), PT/OT evaluation    Nutrition Interventions: Offer support with meals,snacks and hydration, Document food/fluid/supplement intake    Friction and Shear Interventions: Lift team/patient mobility team, HOB 30 degrees or less, Foam dressings/transparent film/skin sealants, Minimize layers                Problem: Patient Education: Go to Patient Education Activity  Goal: Patient/Family Education  Outcome: Progressing Towards Goal     Problem: Altered nutrition  Goal: *Adequate nutrition  Outcome: Progressing Towards Goal     Problem: Pain  Goal: *Control of Pain  Outcome: Progressing Towards Goal  Goal: *PALLIATIVE CARE:  Alleviation of Pain  Outcome: Progressing Towards Goal     Problem: Tissue Perfusion - Cardiopulmonary, Altered  Goal: *Optimize tissue perfusion  Outcome: Progressing Towards Goal  Goal: *Absence of hypoxia  Outcome: Progressing Towards Goal     Problem: Patient Education: Go to Patient Education Activity  Goal: Patient/Family Education  Outcome: Progressing Towards Goal     Problem: Delirium Treatment  Goal: *Level of consciousness restored to baseline  Outcome: Progressing Towards Goal  Goal: *Level of environmental perceptions restored to baseline  Outcome: Progressing Towards Goal  Goal: *Sensory perception restored to baseline  Outcome: Progressing Towards Goal  Goal: *Emotional stability restored to baseline  Outcome: Progressing Towards Goal  Goal: *Functional assessment restored to baseline  Outcome: Progressing Towards Goal  Goal: *Absence of falls  Outcome: Progressing Towards Goal  Goal: *Will remain free of delirium, CAM Score negative  Outcome: Progressing Towards Goal  Goal: *Cognitive status will be restored to baseline  Outcome: Progressing Towards Goal  Goal: Interventions  Outcome: Progressing Towards Goal     Problem: Patient Education: Go to Patient Education Activity  Goal: Patient/Family Education  Outcome: Progressing Towards Goal     Problem: Patient Education: Go to Patient Education Activity  Goal: Patient/Family Education  Outcome: Progressing Towards Goal     Problem: Patient Education: Go to Patient Education Activity  Goal: Patient/Family Education  Outcome: Progressing Towards Goal     Problem: Patient Education: Go to Patient Education Activity  Goal: Patient/Family Education  Outcome: Progressing Towards Goal

## 2020-05-01 NOTE — ROUTINE PROCESS
[id number] called by nursing supervisor about transfer of pt from [facility] to stepdown due to pt temp being hypothermic. Went to see pt. Pt sleeping very soundly, temp checked with temporal scanner, 96.5 F. Pt bradycardic in the 40s sometimes dipping into the 30s but not sustaining in the 30s. Demonstrated for primary RN and [facility] charge RN how to use the temporal scanner and what the pt temp was. This RN paged Dr. Marquita Sanchez and informed him of temporal temp 96.5 F and MD cancelled transfer and said to monitor temp Q4h. This RN spoke with primary RN and relayed orders. Will continue to monitor. 0350- when up to take pt temp. Axillary temp only measuring 94.7 with ICU hand held device. Temporal temp was 97. 5. pt feels warm. VSS. Informed primary RN that the ICU charge can come take pt temp Q4h with temporal thermometer. 3371- call received from nursing supervisor that pt was transferring to ICU for martell hugger. 2974- pt received from 3S in bed, connected to monitor. Pt temp 97. 1. no martell hugger needed. Pt was found to be incontinent of urine and stool. Pt bathed and new bed pads placed. 0700- pt incont of urine. Pt cleaned and bedpad changed and zinc cream applied to excoriated areas. 0730- Bedside and Verbal shift change report given to [place] (oncoming nurse) by Luz Marina Moore RN  
 (offgoing nurse). Report included the following information SBAR, Kardex, Intake/Output, MAR, Recent Results and Cardiac Rhythm NSR with 1st degree AVB and BBB.

## 2020-05-01 NOTE — PROGRESS NOTES
INTERIM UPDATE - 0010 EST on 5/01/2020    Nursing Staff reports that Patient's Temperature was 93.6°F and 94. 0°F when checked rectally to confirm. Heart Rate is 30s-40s bpm, Blood Pressure 103/59 mm Hg, and SpO2 93% on 2 L/min O2 via NC. Assessment:  Mild Hypothermia    Plan:  Transfer Patient to Stepdown Unit for [facility] treatment. INTERIM UPDATE - [id number] EST on 5/01/2020    ICU Nursing Staff demonstrated that Patient's Temperature was not low and indicated that improper rectal temperature measuring device was used. Plan:  Cancel Transfer to Stepdown Unit; Patient will remain on [facility] Telemetry Unit. Will write order for PRN Atropine if Patient's heart rate sustains <35 bpm or exhibits signs of symptomatic bradycardia. Nursing Staff is aware to call Physician On-Call if Atropine is administered. INTERIM UPDATE - 0451 EST on 5/01/2020    Nursing Staff reports that all but the ICU Nurse's thermometer are returning hypothermic readings. Plan:  Transfer Patient to [facility] Unit for [facility], as necessary.

## 2020-05-01 NOTE — DIABETES MGMT
NUTRITIONAL RE-ASSESSMENT GLYCEMIC CONTROL/ PLAN OF CARE     Aren Smoke           80 y.o.           4/21/2020                 1. Hypothermia, initial encounter    2. Hypoglycemia    3. General weakness    4. Acute chest pain    5. Hx of aspiration pneumonitis    6. Hypercarbia    7. Anemia, unspecified type    8. Urinary tract infection with hematuria, site unspecified       INTERVENTIONS/PLAN:   1. Pureed,  2  Gram Na diet with nectar thick liquids; [place] thick [place] [employer] Breakfast supplement daily   2. Monitor po intake, labs and weights. ASSESSMENT:   Pt is  101% ideal weight with BMI = 25.4 kg/m2 (normal weight classification). Noted at prior admission pt had had an unintentional 33 lb weight loss over prior 10 months and his po intake was poor. Prior TPN (4/27/20), now receiving po and tolerating. Needs assistance with meals. Net weight (+) 5.5 kg since 4/22/20. Nutrition Diagnoses:   Difficulty chewing due to dysphagia as evidenced by SLP assessment and orders for pureed diet, NTL. SUBJECTIVE/OBJECTIVE:     Diet: pureed with nectar thick liquids, 2 gram Na, feeder; [place] [employer] Breakfast daily (nectar thick) - per RN pt took 50% breakfast this morning without difficutly.      Patient Vitals for the past 100 hrs:   % Diet Eaten   05/01/20 0900 50 %   04/30/20 1600 0 %   04/30/20 1317 75 %   04/30/20 0852 60 %   04/29/20 1830 100 %       Medications: [x]                Reviewed   Most Recent POC Glucose:   Recent Labs     05/01/20  0345 04/30/20  0345 04/29/20  0358   GLU 96 81 63*      Labs:     Lab Results   Component Value Date/Time    Sodium 148 (H) 05/01/2020 03:45 AM    Potassium 3.4 (L) 05/01/2020 03:45 AM    Chloride 120 (H) 05/01/2020 03:45 AM    CO2 22 05/01/2020 03:45 AM    Anion gap 6 05/01/2020 03:45 AM    Glucose 96 05/01/2020 03:45 AM    BUN 24 (H) 05/01/2020 03:45 AM    Creatinine 1.04 05/01/2020 03:45 AM    Calcium 7.8 (L) 05/01/2020 03:45 AM    Magnesium 2.0 05/01/2020 03:45 AM    Phosphorus 2.2 (L) 05/01/2020 03:45 AM    Albumin 2.1 (L) 04/23/2020 04:17 PM   prealbumin 9.6    Anthropometrics: IBW : 67.1 kg (148 lb), % IBW (Calculated): 101.35 %, BMI (calculated): 25.4  Wt Readings from Last 1 Encounters:   04/30/20 73.5 kg (162 lb)     4/22/20 admission weight - 68 kg    Last 3 Recorded Weights in this Encounter    04/28/20 1515 04/29/20 2210 04/30/20 2353   Weight: 76 kg (167 lb 9.6 oz) 75.2 kg (165 lb 11.2 oz) 73.5 kg (162 lb)     Ht Readings from Last 1 Encounters:   04/22/20 5' 7\" (1.702 m)     Estimated Nutrition Needs:  1852 Kcals/day, Protein (g): 68 g Fluid (ml): 1800 ml  Based on:   [x]          Actual BW    []          ABW   []            Adjusted BW         Nutrition Interventions:     Nectar thick [place] Instant Breakfast supplement daily     Goal:   Blood glucose will be within target range of  mg/dL by 4/30/20. Provision of adequate nutrition (= or > 75% estimated needs) by 5/2/20. Weight maintenance (+/- 1-2 kg) by 5/5/20.         Nutrition Monitoring and Evaluation      [x]     Monitor po intake on meal rounds  [x]     Continue inpatient monitoring and intervention  []     Other:    Nutrition Risk:  []   High     [x]  Moderate    []  Minimal/Uncompromised    Marylu Bowman RD, CDE   Office:  [address] [place] [address] Pager:  [phone]

## 2020-05-01 NOTE — PROGRESS NOTES
Speech Therapy:  Attempted to see pt this afternoon, however, pt was sleeping soundly at 2:55 (last pt of day).   Will f/u next business day    Mateo Garcia, [address]

## 2020-05-01 NOTE — PROGRESS NOTES
Problem: Mobility Impaired (Adult and Pediatric)  Goal: *Acute Goals and Plan of Care (Insert Text)  Description: Physical Therapy Goals  Initiated 4/29/2020 and to be accomplished within 7 day(s)  1. Patient will move from supine to sit and sit to supine  in bed with supervision/set-up. 2.  Patient will transfer from bed to chair and chair to bed with supervision/set-up using the least restrictive device. 3.  Patient will perform sit to stand with supervision/set-up. 4.  Patient will ambulate with supervision/set-up for 50 feet with the least restrictive device. Outcome: Progressing Towards Goal   PHYSICAL THERAPY TREATMENT    Patient: Russell Albert (40 y.o. male)  Date: 5/1/2020  Diagnosis: Hypothermia [T68. XXXA]   Hypothermia  Procedure(s) (LRB):  ESOPHAGOGASTRODUODENOSCOPY (EGD) (N/A)    Precautions: Fall  PLOF: Mod I  ASSESSMENT:  Max A for supine to sit with HOB elevated. Improved sitting balance this session; min A/supervision for upright. Uses handrail on RUE for support with HOB elevated. Maintained seated EOB; 8 minutes. Completed BLE/BUE AROM at EOB as noted below. Unable to perform reaching tasks to face for ADLs. Max A for sit to supine with HOB elevated. Total A x2 for scooting up in bed. Seated in bed with HOB elevated. Educated on need for RN assistance with mobility; verbalized understanding. Call bell in reach. BP pre mobility 122/61  BP post mobility 116/57    Progression toward goals:   []      Improving appropriately and progressing toward goals  [x]      Improving slowly and progressing toward goals  []      Not making progress toward goals and plan of care will be adjusted     PLAN:  Patient continues to benefit from skilled intervention to address the above impairments. Continue treatment per established plan of care.   Discharge Recommendations:  [facility]  Further Equipment Recommendations for Discharge:  TBD next level of care     SUBJECTIVE: Patient stated Sure.     OBJECTIVE DATA SUMMARY:   Critical Behavior:  Neurologic State: Eyes open to voice  Orientation Level: Oriented to person  Cognition: Follows commands     Psychosocial  Patient Behaviors: Cooperative  Functional Mobility:  Bed Mobility:  Supine to Sit: Maximum assistance  Sit to Supine: Maximum assistance  Scooting: Total assistance;Assist x2  Balance:   Sitting: Impaired  Sitting - Static: Fair (occasional)  Sitting - Dynamic: Fair (occasional)  Neuro Re-Education:  Seated EOB 8 minutes  Therapeutic Exercises:   BLE AROM knee extension, hip flexion, ankle pumps 2x10  BUE AAROM shoulder flexion x10    Pain:  Pain level pre-treatment: 0/10  Pain level post-treatment: 0/10     Activity Tolerance:   Fair    After treatment:   [] Patient left in no apparent distress sitting up in chair  [x] Patient left in no apparent distress in bed  [x] Call bell left within reach  [x] Nursing notified  [] Caregiver present  [] Bed alarm activated  [] SCDs applied      COMMUNICATION/EDUCATION:   [x]         Role of physical therapy in the acute care setting. [x]         Fall prevention education was provided and the patient/caregiver indicated understanding. [x]         Patient/family have participated as able in working toward goals and plan of care. [x]         Patient/family agree to work toward stated goals and plan of care. []         Patient understands intent and goals of therapy, but is neutral about his/her participation. []         Patient is unable to participate in stated goals/plan of care: ongoing with therapy staff.       Bartolome Sam, PT   Time Calculation: 24 mins

## 2020-05-02 LAB
ANION GAP SERPL CALC-SCNC: 4 MMOL/L (ref 3–18)
BUN SERPL-MCNC: 22 MG/DL (ref 7–18)
BUN/CREAT SERPL: 22 (ref 12–20)
CALCIUM SERPL-MCNC: 7.4 MG/DL (ref 8.5–10.1)
CHLORIDE SERPL-SCNC: 119 MMOL/L (ref 100–111)
CO2 SERPL-SCNC: 21 MMOL/L (ref 21–32)
CREAT SERPL-MCNC: 1 MG/DL (ref 0.6–1.3)
GLUCOSE BLD STRIP.AUTO-MCNC: 107 MG/DL (ref 70–110)
GLUCOSE BLD STRIP.AUTO-MCNC: 121 MG/DL (ref 70–110)
GLUCOSE BLD STRIP.AUTO-MCNC: 139 MG/DL (ref 70–110)
GLUCOSE BLD STRIP.AUTO-MCNC: 90 MG/DL (ref 70–110)
GLUCOSE SERPL-MCNC: 104 MG/DL (ref 74–99)
MAGNESIUM SERPL-MCNC: 1.9 MG/DL (ref 1.6–2.6)
PHOSPHATE SERPL-MCNC: 2 MG/DL (ref 2.5–4.9)
POTASSIUM SERPL-SCNC: 4.2 MMOL/L (ref 3.5–5.5)
SARS-COV-2, COV2NT: NOT DETECTED
SODIUM SERPL-SCNC: 144 MMOL/L (ref 136–145)

## 2020-05-02 PROCEDURE — 74011250637 HC RX REV CODE- 250/637: Performed by: HOSPITALIST

## 2020-05-02 PROCEDURE — 77010033678 HC OXYGEN DAILY

## 2020-05-02 PROCEDURE — 84100 ASSAY OF PHOSPHORUS: CPT

## 2020-05-02 PROCEDURE — 97535 SELF CARE MNGMENT TRAINING: CPT

## 2020-05-02 PROCEDURE — 77030040829 HC CATH EXT URINE MDII -B

## 2020-05-02 PROCEDURE — 80048 BASIC METABOLIC PNL TOTAL CA: CPT

## 2020-05-02 PROCEDURE — 74011000250 HC RX REV CODE- 250: Performed by: HOSPITALIST

## 2020-05-02 PROCEDURE — 83735 ASSAY OF MAGNESIUM: CPT

## 2020-05-02 PROCEDURE — 65660000000 HC RM CCU STEPDOWN

## 2020-05-02 PROCEDURE — 74011250636 HC RX REV CODE- 250/636: Performed by: HOSPITALIST

## 2020-05-02 PROCEDURE — 36415 COLL VENOUS BLD VENIPUNCTURE: CPT

## 2020-05-02 PROCEDURE — 74011250637 HC RX REV CODE- 250/637: Performed by: INTERNAL MEDICINE

## 2020-05-02 PROCEDURE — 74011250637 HC RX REV CODE- 250/637: Performed by: PHYSICIAN ASSISTANT

## 2020-05-02 PROCEDURE — 82962 GLUCOSE BLOOD TEST: CPT

## 2020-05-02 PROCEDURE — 77030019905 HC CATH URETH INTMIT MDII -A

## 2020-05-02 RX ADMIN — MELATONIN 2 TABLET: at 09:21

## 2020-05-02 RX ADMIN — DEXTROSE MONOHYDRATE 100 ML/HR: 5 INJECTION, SOLUTION INTRAVENOUS at 05:53

## 2020-05-02 RX ADMIN — PANTOPRAZOLE SODIUM 40 MG: 40 TABLET, DELAYED RELEASE ORAL at 09:21

## 2020-05-02 RX ADMIN — LEVOTHYROXINE SODIUM 100 MCG: 0.03 TABLET ORAL at 05:53

## 2020-05-02 RX ADMIN — TAMSULOSIN HYDROCHLORIDE 0.4 MG: 0.4 CAPSULE ORAL at 09:28

## 2020-05-02 RX ADMIN — DEXTROSE MONOHYDRATE 100 ML/HR: 5 INJECTION, SOLUTION INTRAVENOUS at 17:13

## 2020-05-02 RX ADMIN — SUCRALFATE 1 G: 1 TABLET ORAL at 09:21

## 2020-05-02 RX ADMIN — SUCRALFATE 1 G: 1 TABLET ORAL at 13:54

## 2020-05-02 NOTE — PROGRESS NOTES
Problem: Falls - Risk of  Goal: *Absence of Falls  Description: Document Scooby Alexander Fall Risk and appropriate interventions in the flowsheet.   Outcome: Progressing Towards Goal  Note: Fall Risk Interventions:  Mobility Interventions: Communicate number of staff needed for ambulation/transfer, Patient to call before getting OOB    Mentation Interventions: Door open when patient unattended, Evaluate medications/consider consulting pharmacy, Reorient patient    Medication Interventions: Evaluate medications/consider consulting pharmacy, Patient to call before getting OOB    Elimination Interventions: Call light in reach, Patient to call for help with toileting needs    History of Falls Interventions: Door open when patient unattended, Evaluate medications/consider consulting pharmacy         Problem: Patient Education: Go to Patient Education Activity  Goal: Patient/Family Education  Outcome: Progressing Towards Goal     Problem: Pain  Goal: *Control of Pain  Outcome: Progressing Towards Goal     Problem: Tissue Perfusion - Cardiopulmonary, Altered  Goal: *Absence of hypoxia  Outcome: Progressing Towards Goal

## 2020-05-02 NOTE — PROGRESS NOTES
Problem: Self Care Deficits Care Plan (Adult)  Goal: *Acute Goals and Plan of Care (Insert Text)  Description: Occupational Therapy Goals  Initiated 4/30/2020 within 7 day(s). 1.  Patient will perform grooming with modified independence seated edge of bed with good sitting balance. 2.  Patient will perform bathing with modified independence. 3.  Patient will perform upper body dressing and lower body dressing with modified independence. 4.  Patient will perform bedside commode transfers with modified independence using RW. 5.  Patient will perform all aspects of toileting with modified independence. 6.  Patient will participate in upper extremity therapeutic exercise/activities with modified independence for 10 minutes. 7.  Patient will utilize energy conservation techniques during functional activities with min verbal cues. Prior Level of Function: Mod I with assist as needed by son for ADLs, Mod I using RW for functional mobility   Outcome: Progressing Towards Goal   OCCUPATIONAL THERAPY TREATMENT    Patient: Jazmyne Santana (18 y.o. male)  Date: 5/2/2020  Diagnosis: Hypothermia [T68. XXXA]   Hypothermia  Procedure(s) (LRB):  ESOPHAGOGASTRODUODENOSCOPY (EGD) (N/A)    Precautions: Fall  PLOF: see above    Chart, occupational therapy assessment, plan of care, and goals were reviewed. ASSESSMENT:  Nursing/Cathering cleared pt to participate in OT tx. Patient presents supine in bed, reports discomfort d/t poor bed positioning I.e. scooted down towards footboard. Verbal cues for correct hand placement onto bed rails, flex b/l LEs and utilized bridge technique to scoot up towards head of bed w/ Max A. Patient requesting bedpan for BM, Max A rolling R<->L w/ vc's for hand placement onto bed rails to assist, dep toilet hygeine. Patient performed self feeding with head of bed elevated for safe swallow, pillow splinting under BUEs for elevation to grade reaching with BUEs easier.  Following set up, pt able to use RUE for retrieval of food with spoon and NTL cup, bring to mouth to eat/drink w/ Supv/set up, min spillage noted. Followed self feeding, SBA for washing face and Max A w/ UB dress to doff soiled hospital gown and don new hospital gown. call bell within reach & pt verbalized understanding to utilize for assist e.g. functional transfers in order to prevent falls. Progression toward goals:  []          Improving appropriately and progressing toward goals  [x]          Improving slowly and progressing toward goals  []          Not making progress toward goals and plan of care will be adjusted     PLAN:  Patient continues to benefit from skilled intervention to address the above impairments. Continue treatment per established plan of care. Discharge Recommendations:  Skilled Nursing Facility  Further Equipment Recommendations for Discharge:  to be determined as progress is made with therapy     SUBJECTIVE:   Patient stated I'm feeling a little better now.  after eating lunch    OBJECTIVE DATA SUMMARY:   Cognitive/Behavioral Status:  Neurologic State: Alert  Orientation Level: Disoriented to time, Disoriented to place  Cognition: Follows commands  Safety/Judgement: Fall prevention    Functional Mobility and Transfers for ADLs:   Bed Mobility:  Rolling: Maximum assistance;Assist x1    Pain:  Pain level pre-treatment: 0/10   Pain level post-treatment: 0/10  Pain Intervention(s): Medication (see MAR); Rest, Ice, Repositioning   Response to intervention: Nurse notified, See doc flow    Activity Tolerance:    poor  Please refer to the flowsheet for vital signs taken during this treatment.   After treatment:   []  Patient left in no apparent distress sitting up in chair  [x]  Patient left in no apparent distress in bed  [x]  Call bell left within reach  [x]  Nursing notified  []  Caregiver present  []  Bed alarm activated    COMMUNICATION/EDUCATION:   [x] Role of Occupational Therapy in the acute care setting  [x] Home safety education was provided and the patient/caregiver indicated understanding. [x] Patient/family have participated as able in working towards goals and plan of care. [x] Patient/family agree to work toward stated goals and plan of care. [] Patient understands intent and goals of therapy, but is neutral about his/her participation. [] Patient is unable to participate in goal setting and plan of care.       Thank you for this referral.  Judy Sanchez  Time Calculation: 45 mins

## 2020-05-02 NOTE — PROGRESS NOTES
Internal Medicine Progress Note    Patient's Name: Olga Carranza  Admit Date: 4/21/2020  Length of Stay: 6      Assessment/Plan     C/Stephanie Oro 1106 Problems    Diagnosis Date Noted    Gastrointestinal bleeding 04/23/2020    Hypoglycemia 04/21/2020    HTN (hypertension) 04/21/2020    Left bundle branch block 04/21/2020    Sinoatrial node dysfunction ([facility] 04/21/2020    Dysphagia 04/08/2020    Cardiomyopathy ([facility] 02/20/2020     LVEF 35-40% by previous TTE.  Grade II diastolic dysfunction 68/55/8820     Per previous TTE.  Hypothyroidism 02/20/2020    Bradycardia 02/19/2020    Hypotension 02/19/2020    Hypothermia 12/23/2019    Chronic renal failure, stage 3 (moderate) ([facility]) 12/23/2019    SIRS (systemic inflammatory response syndrome) ([facility]) 12/23/2019   Hypotension and Hypothermia Likely due to Hypovolemic shock 2/2 GI bleed    - Afebrile, no white count  - Cont to observe off antibx  - Will stop D5W as Na back WNL  - Encourage free water intake  - Hgb stable  - Cont PO PPI  - Cont med mgmt for GI bleed  - Appreciate cardio, no plans for pacer with heart block  - Cr likely at baseline  - Replete lytes PRN  - PT/OT  - Awaiting CoVID-19 test results for SNF  - Cont acceptable home medications for chronic conditions   - DVT protocol    I have personally reviewed all pertinent labs and films that have officially resulted over the last 24 hours. I have personally checked for all pending labs that are awaiting final results.     Subjective     Pt s/e @ bedside  No major events overnight  Doing well w/ no complaints  Denies CP or SOB    Objective     Visit Vitals  /71 (BP 1 Location: Right arm, BP Patient Position: At rest)   Pulse 68   Temp 97.8 °F (36.6 °C)   Resp 16   Ht 5' 7\" (1.702 m)   Wt 76.7 kg (169 lb)   SpO2 97%   BMI 26.47 kg/m²       Physical Exam:  General Appearance: NAD, conversant  Lungs: CTA with normal respiratory effort  CV: RRR, no m/r/g  Abdomen: soft, non-tender, normal bowel sounds  Extremities: no cyanosis, no peripheral edema  Neuro: No focal deficits, motor/sensory intact    Lab/Data Reviewed:  CMP:   Lab Results   Component Value Date/Time     05/02/2020 03:00 AM    K 4.2 05/02/2020 03:00 AM     (H) 05/02/2020 03:00 AM    CO2 21 05/02/2020 03:00 AM    AGAP 4 05/02/2020 03:00 AM     (H) 05/02/2020 03:00 AM    BUN 22 (H) 05/02/2020 03:00 AM    CREA 1.00 05/02/2020 03:00 AM    GFRAA >60 05/02/2020 03:00 AM    GFRNA >60 05/02/2020 03:00 AM    CA 7.4 (L) 05/02/2020 03:00 AM    MG 1.9 05/02/2020 03:00 AM    PHOS 2.0 (L) 05/02/2020 03:00 AM     CBC:   No results found for: WBC, HGB, HGBEXT, HCT, HCTEXT, PLT, PLTEXT, HGBEXT, HCTEXT, PLTEXT    Imaging Reviewed:  No results found.     Medications Reviewed:  Current Facility-Administered Medications   Medication Dose Route Frequency    atropine injection 1 mg  1 mg IntraVENous Q5MIN PRN    insulin lispro (HUMALOG) injection   SubCUTAneous AC&HS    dextrose 5% infusion  100 mL/hr IntraVENous CONTINUOUS    pantoprazole (PROTONIX) tablet 40 mg  40 mg Oral ACB    0.9% sodium chloride infusion 250 mL  250 mL IntraVENous PRN    dextrose 10% infusion 125-250 mL  125-250 mL IntraVENous PRN    QUEtiapine (SEROquel) tablet 25 mg  25 mg Oral QHS    lidocaine 4 % patch 1 Patch  1 Patch TransDERmal Q24H    haloperidol lactate (HALDOL) injection 2 mg  2 mg IntraMUSCular Q6H PRN    glucose chewable tablet 16 g  4 Tab Oral PRN    glucagon (GLUCAGEN) injection 1 mg  1 mg IntraMUSCular PRN    levothyroxine (SYNTHROID) tablet 100 mcg  100 mcg Oral 6am    sodium chloride (NS) flush 5-10 mL  5-10 mL IntraVENous PRN    [Held by provider] aspirin delayed-release tablet 81 mg  81 mg Oral DAILY    cholecalciferol (VITAMIN D3) (1000 Units /25 mcg) tablet 2 Tab  2,000 Units Oral DAILY    atorvastatin (LIPITOR) tablet 40 mg  40 mg Oral QHS    sucralfate (CARAFATE) tablet 1 g  1 g Oral QID    tamsulosin (FLOMAX) capsule 0.4 mg  0.4 mg Oral DAILY    [Held by provider] heparin (porcine) injection 5,000 Units  5,000 Units SubCUTAneous Q8H           Prisca Arizmendi DO  Internal Medicine, Hospitalist  Pager: [phone]  [address] [place] [address]

## 2020-05-02 NOTE — PROGRESS NOTES
Patient received in bed awake. Patient A&O x3. Denies pain. No distress noted. Frequently use items within reach. Bed locked in low position. Call bell within reach and Patient verbalized understanding of use for assistance and needs. [address] [facility] (Telemetry nurse) called to inform this nurse of Patient having episode of sinus [place]oac[place] 43 to 50, non sustaining. This nurse to Patient's bedside. Patient denies having chest pain or pain; behavior indicators negative. VS temp 98.0, HR 64, R 16, /68, sat's 98% RA. Call baxter w/in reach. Dr. Bettie Cason was called; awaiting call back. 36- Dr. Bettie Cason was called again to inform of SB episode. Awaiting call back. 80- Dr. Bettie Cason return call and made aware of today's episode of SB as documented above; voiced understanding said \"he's got a problem with that, that's chronic. \"

## 2020-05-02 NOTE — ROUTINE PROCESS
Bedside and Verbal shift change report given to  Andrés Tran, RN (oncoming nurse) by Elizabeth Vail RN, BSN (offgoing nurse). Report given with SBAR, Kardex, Intake/Output, MAR and Recent Results.

## 2020-05-02 NOTE — PROGRESS NOTES
[facility] and in bed. Denies pain but complains of weakness/fatigue. Pt states he just wants to sleep. No distress noted. Bed locked in lowest position. Frequently used items and call light within reach. Incontinence care performed. Pt resting quietly in bed. 2000: spoke on telephone with Desmond Mtz pt's son. He has questions about his father's status. Questions answered satisfactorily. 2141: night meds administered. Pt resting quietly in bed. Uneventful night for pt. Pt rested and was treated per eMAR. No further complaints and no distress noted. 0600: incontinence care performed. Pt returned to sleep.    0720: Bedside shift change report given to Edgardo Aqq[address] (oncoming nurse) by Sharon Lung RN (offgoing nurse). Report included the following information SBAR, Intake/Output, MAR and Quality Measures. Opportunity for questions and clarification provided.

## 2020-05-02 NOTE — PROGRESS NOTES
Problem: Discharge Planning  Goal: *Discharge to safe environment  Outcome: Progressing Towards Goal     Problem: Falls - Risk of  Goal: *Absence of Falls  Description: Document Earma Fanta Fall Risk and appropriate interventions in the flowsheet. Outcome: Progressing Towards Goal  Note: Fall Risk Interventions:  Mobility Interventions: Patient to call before getting OOB, PT Consult for mobility concerns    Mentation Interventions: Adequate sleep, hydration, pain control, More frequent rounding, Reorient patient    Medication Interventions: Teach patient to arise slowly, Patient to call before getting OOB    Elimination Interventions: Toileting schedule/hourly rounds, Stay With Me (per policy)              Problem: Patient Education: Go to Patient Education Activity  Goal: Patient/Family Education  Outcome: Progressing Towards Goal     Problem: Pressure Injury - Risk of  Goal: *Prevention of pressure injury  Description: Document Alex Scale and appropriate interventions in the flowsheet.   Outcome: Progressing Towards Goal  Note: Pressure Injury Interventions:  Sensory Interventions: Avoid rigorous massage over bony prominences, Check visual cues for pain, Keep linens dry and wrinkle-free    Moisture Interventions: Check for incontinence Q2 hours and as needed, Limit adult briefs    Activity Interventions: Pressure redistribution bed/mattress(bed type)    Mobility Interventions: HOB 30 degrees or less, Pressure redistribution bed/mattress (bed type), PT/OT evaluation    Nutrition Interventions: Discuss nutritional consult with provider, Document food/fluid/supplement intake    Friction and Shear Interventions: Lift sheet, HOB 30 degrees or less, Minimize layers                Problem: Patient Education: Go to Patient Education Activity  Goal: Patient/Family Education  Outcome: Progressing Towards Goal     Problem: Altered nutrition  Goal: *Adequate nutrition  Outcome: Progressing Towards Goal     Problem: Pain  Goal: *Control of Pain  Outcome: Progressing Towards Goal  Goal: *PALLIATIVE CARE:  Alleviation of Pain  Outcome: Progressing Towards Goal     Problem: Tissue Perfusion - Cardiopulmonary, Altered  Goal: *Optimize tissue perfusion  Outcome: Progressing Towards Goal  Goal: *Absence of hypoxia  Outcome: Progressing Towards Goal     Problem: Patient Education: Go to Patient Education Activity  Goal: Patient/Family Education  Outcome: Progressing Towards Goal     Problem: Delirium Treatment  Goal: *Level of consciousness restored to baseline  Outcome: Progressing Towards Goal  Goal: *Level of environmental perceptions restored to baseline  Outcome: Progressing Towards Goal  Goal: *Sensory perception restored to baseline  Outcome: Progressing Towards Goal  Goal: *Emotional stability restored to baseline  Outcome: Progressing Towards Goal  Goal: *Functional assessment restored to baseline  Outcome: Progressing Towards Goal  Goal: *Absence of falls  Outcome: Progressing Towards Goal  Goal: *Will remain free of delirium, CAM Score negative  Outcome: Progressing Towards Goal  Goal: *Cognitive status will be restored to baseline  Outcome: Progressing Towards Goal  Goal: Interventions  Outcome: Progressing Towards Goal     Problem: Patient Education: Go to Patient Education Activity  Goal: Patient/Family Education  Outcome: Progressing Towards Goal     Problem: Patient Education: Go to Patient Education Activity  Goal: Patient/Family Education  Outcome: Progressing Towards Goal     Problem: Patient Education: Go to Patient Education Activity  Goal: Patient/Family Education  Outcome: Progressing Towards Goal     Problem: Patient Education: Go to Patient Education Activity  Goal: Patient/Family Education  Outcome: Progressing Towards Goal

## 2020-05-03 LAB
ANION GAP SERPL CALC-SCNC: 4 MMOL/L (ref 3–18)
BUN SERPL-MCNC: 21 MG/DL (ref 7–18)
BUN/CREAT SERPL: 23 (ref 12–20)
CALCIUM SERPL-MCNC: 7.5 MG/DL (ref 8.5–10.1)
CHLORIDE SERPL-SCNC: 118 MMOL/L (ref 100–111)
CO2 SERPL-SCNC: 22 MMOL/L (ref 21–32)
CREAT SERPL-MCNC: 0.92 MG/DL (ref 0.6–1.3)
GLUCOSE BLD STRIP.AUTO-MCNC: 120 MG/DL (ref 70–110)
GLUCOSE BLD STRIP.AUTO-MCNC: 178 MG/DL (ref 70–110)
GLUCOSE BLD STRIP.AUTO-MCNC: 91 MG/DL (ref 70–110)
GLUCOSE SERPL-MCNC: 73 MG/DL (ref 74–99)
MAGNESIUM SERPL-MCNC: 1.8 MG/DL (ref 1.6–2.6)
PHOSPHATE SERPL-MCNC: 2.4 MG/DL (ref 2.5–4.9)
POTASSIUM SERPL-SCNC: 4.1 MMOL/L (ref 3.5–5.5)
SODIUM SERPL-SCNC: 144 MMOL/L (ref 136–145)

## 2020-05-03 PROCEDURE — 74011250637 HC RX REV CODE- 250/637: Performed by: HOSPITALIST

## 2020-05-03 PROCEDURE — 77010033678 HC OXYGEN DAILY

## 2020-05-03 PROCEDURE — 77030040829 HC CATH EXT URINE MDII -B

## 2020-05-03 PROCEDURE — 84100 ASSAY OF PHOSPHORUS: CPT

## 2020-05-03 PROCEDURE — 83735 ASSAY OF MAGNESIUM: CPT

## 2020-05-03 PROCEDURE — 74011636637 HC RX REV CODE- 636/637: Performed by: HOSPITALIST

## 2020-05-03 PROCEDURE — 82962 GLUCOSE BLOOD TEST: CPT

## 2020-05-03 PROCEDURE — 74011250637 HC RX REV CODE- 250/637: Performed by: INTERNAL MEDICINE

## 2020-05-03 PROCEDURE — 36415 COLL VENOUS BLD VENIPUNCTURE: CPT

## 2020-05-03 PROCEDURE — 74011250637 HC RX REV CODE- 250/637: Performed by: PHYSICIAN ASSISTANT

## 2020-05-03 PROCEDURE — 80048 BASIC METABOLIC PNL TOTAL CA: CPT

## 2020-05-03 PROCEDURE — 65660000000 HC RM CCU STEPDOWN

## 2020-05-03 PROCEDURE — 74011250636 HC RX REV CODE- 250/636: Performed by: HOSPITALIST

## 2020-05-03 RX ADMIN — LEVOTHYROXINE SODIUM 100 MCG: 0.03 TABLET ORAL at 07:02

## 2020-05-03 RX ADMIN — SUCRALFATE 1 G: 1 TABLET ORAL at 22:59

## 2020-05-03 RX ADMIN — QUETIAPINE FUMARATE 25 MG: 25 TABLET ORAL at 22:59

## 2020-05-03 RX ADMIN — PANTOPRAZOLE SODIUM 40 MG: 40 TABLET, DELAYED RELEASE ORAL at 11:26

## 2020-05-03 RX ADMIN — DEXTROSE MONOHYDRATE 100 ML/HR: 5 INJECTION, SOLUTION INTRAVENOUS at 21:10

## 2020-05-03 RX ADMIN — SUCRALFATE 1 G: 1 TABLET ORAL at 17:27

## 2020-05-03 RX ADMIN — TAMSULOSIN HYDROCHLORIDE 0.4 MG: 0.4 CAPSULE ORAL at 11:25

## 2020-05-03 RX ADMIN — SUCRALFATE 1 G: 1 TABLET ORAL at 14:49

## 2020-05-03 RX ADMIN — INSULIN LISPRO 2 UNITS: 100 INJECTION, SOLUTION INTRAVENOUS; SUBCUTANEOUS at 14:49

## 2020-05-03 RX ADMIN — DEXTROSE MONOHYDRATE 100 ML/HR: 5 INJECTION, SOLUTION INTRAVENOUS at 11:27

## 2020-05-03 RX ADMIN — ATORVASTATIN CALCIUM 40 MG: 20 TABLET, FILM COATED ORAL at 22:59

## 2020-05-03 RX ADMIN — MELATONIN 2 TABLET: at 11:26

## 2020-05-03 RX ADMIN — SUCRALFATE 1 G: 1 TABLET ORAL at 11:25

## 2020-05-03 NOTE — PROGRESS NOTES
Bedside and Verbal shift change report given to TiffaneiRN (oncoming nurse) by Kadeem Ayala (offgoing nurse). Report included the following information SBAR, Kardex, MAR and Recent Results.        [id number] Laura Fernández  Son [place] called stated he wants to speak to case management    1400 patient is resting in bed ate 100% of lunch        1900 gave report to [facility] and cleaned up patient he had a large BM,

## 2020-05-03 NOTE — PROGRESS NOTES
Problem: Discharge Planning  Goal: *Discharge to safe environment  Outcome: Progressing Towards Goal     Problem: Falls - Risk of  Goal: *Absence of Falls  Description: Document Jonel Naranjo Fall Risk and appropriate interventions in the flowsheet.   Outcome: Progressing Towards Goal  Note: Fall Risk Interventions:  Mobility Interventions: Communicate number of staff needed for ambulation/transfer, Patient to call before getting OOB    Mentation Interventions: Door open when patient unattended, Evaluate medications/consider consulting pharmacy, Reorient patient    Medication Interventions: Evaluate medications/consider consulting pharmacy, Patient to call before getting OOB    Elimination Interventions: Call light in reach, Patient to call for help with toileting needs    History of Falls Interventions: Door open when patient unattended, Evaluate medications/consider consulting pharmacy         Problem: Patient Education: Go to Patient Education Activity  Goal: Patient/Family Education  Outcome: Progressing Towards Goal     Problem: Patient Education: Go to Patient Education Activity  Goal: Patient/Family Education  Outcome: Progressing Towards Goal     Problem: Pain  Goal: *Control of Pain  Outcome: Progressing Towards Goal     Problem: Tissue Perfusion - Cardiopulmonary, Altered  Goal: *Optimize tissue perfusion  Outcome: Progressing Towards Goal  Goal: *Absence of hypoxia  Outcome: Progressing Towards Goal     Problem: Delirium Treatment  Goal: *Level of consciousness restored to baseline  Outcome: Progressing Towards Goal  Goal: *Level of environmental perceptions restored to baseline  Outcome: Progressing Towards Goal  Goal: *Sensory perception restored to baseline  Outcome: Progressing Towards Goal  Goal: *Emotional stability restored to baseline  Outcome: Progressing Towards Goal  Goal: *Functional assessment restored to baseline  Outcome: Progressing Towards Goal  Goal: *Absence of falls  Outcome: Progressing Towards Goal  Goal: *Will remain free of delirium, CAM Score negative  Outcome: Progressing Towards Goal  Goal: *Cognitive status will be restored to baseline  Outcome: Progressing Towards Goal  Goal: Interventions  Outcome: Progressing Towards Goal

## 2020-05-03 NOTE — ROUTINE PROCESS
1930 Bedside and Verbal shift change  Received from Rodrigo Benitez RN (outgoing nurse), to ONELIA Parish (oncoming)  Pt. Is AOX 4. IV patent and infusing well, Pt. denies  pain at this time. Report included the following information SBAR, Kardex, Procedure Summary, Intake/Output, MAR, Recent Lab Results, and  Cardiac Rhythm @ NSR c 1avb bbb. Will resume care and monitor Pt. Condition. Pt. Educated on call bell when in need of help and assistance. Pt. verbalized understanding. verbalized 2000  Pt. Head to toe Assessment Done and documented. Pt. Given back care, bath, concepcion/incontinent care, change linen, gown, pads and applied cream to excoriated concepcion area. Pt. Had large water brown stool. 2042  Dr Marquita Sanchez notified of Pt. Temp. 2130  Pt. Made no complaints. 2230  Pt. Made no complaints. 0000 notified Dr. Marquita Sanchez of Pt. 94.5 with mews score of 3. MD made no order. 0125  Pt. Changed incontinent pad. Small amount of BM. 0300  Pt. Resting with eyes closed, easily awaken. 0430  Pt. BM X1 large amouth/loose, voided, incontinent care done, applied barrier cream to concepcion area. 0630  Pt. Able to rest and sleep in between care. 0710  Pt. BM X1 loose moderate amount. Concepcion care done. Temp @96 per rectal 
 
 
Verbal and bedside Shift changed report given to Genevieve RANDLE (oncoming RN) on Pt. Condition. Report consisted of patients Situation, History, Activities, intake/output,  Background, Assessment and Recommendations(SBAR). Information from the following report(s) Kardex, order Summary, Lab results and MAR was reviewed with the receiving nurse. Opportunity for questions and clarification was provided.

## 2020-05-03 NOTE — PROGRESS NOTES
Internal Medicine Progress Note    Patient's Name: Nuha Bond  Admit Date: 4/21/2020  Length of Stay: 12      Assessment/Plan     C/Stephanie Oro 1106 Problems    Diagnosis Date Noted    Gastrointestinal bleeding 04/23/2020    Hypoglycemia 04/21/2020    HTN (hypertension) 04/21/2020    Left bundle branch block 04/21/2020    Sinoatrial node dysfunction ([facility] [address] 04/21/2020    Dysphagia 04/08/2020    Cardiomyopathy ([facility] [address] 02/20/2020     LVEF 35-40% by previous TTE.  Grade II diastolic dysfunction 68/62/5553     Per previous TTE.  Hypothyroidism 02/20/2020    Bradycardia 02/19/2020    Hypotension 02/19/2020    Hypothermia 12/23/2019    Chronic renal failure, stage 3 (moderate) ([facility]) 12/23/2019    SIRS (systemic inflammatory response syndrome) ([facility]) 12/23/2019   Hypotension and Hypothermia Likely due to Hypovolemic shock 2/2 GI bleed    - At this time, no explanation for hypothermia at night, TSH normal  - Afebrile, no white count  - Cont to observe off antibx  - Cont to encourage free water intake  - Hgb stable  - Cont PO PPI  - Cont med mgmt for GI bleed  - Appreciate cardio, no plans for pacer with heart block  - Cr at baseline  - Replete lytes PRN  - PT/OT  - CoVID-19 neg  - Awaiting placement for SNF  - Cont acceptable home medications for chronic conditions   - DVT protocol    I have personally reviewed all pertinent labs and films that have officially resulted over the last 24 hours. I have personally checked for all pending labs that are awaiting final results.     Subjective     Pt s/e @ bedside  Events overnight noted  Another episode of hypothermia, resolved with rewarming  Doing great with no complaints  Denies CP or SOB    Objective     Visit Vitals  /72 (BP 1 Location: Left arm)   Pulse 77   Temp 97.4 °F (36.3 °C)   Resp 17   Ht 5' 7\" (1.702 m)   Wt 76.7 kg (169 lb)   SpO2 95%   BMI 26.47 kg/m²       Physical Exam:  General Appearance: NAD, conversant  Lungs: CTA with normal respiratory effort  CV: RRR, no m/r/g  Abdomen: soft, non-tender, normal bowel sounds  Extremities: no cyanosis, no peripheral edema  Neuro: No focal deficits, motor/sensory intact    Lab/Data Reviewed:  CMP:   Lab Results   Component Value Date/Time     05/03/2020 04:07 AM    K 4.1 05/03/2020 04:07 AM     (H) 05/03/2020 04:07 AM    CO2 22 05/03/2020 04:07 AM    AGAP 4 05/03/2020 04:07 AM    GLU 73 (L) 05/03/2020 04:07 AM    BUN 21 (H) 05/03/2020 04:07 AM    CREA 0.92 05/03/2020 04:07 AM    GFRAA >60 05/03/2020 04:07 AM    GFRNA >60 05/03/2020 04:07 AM    CA 7.5 (L) 05/03/2020 04:07 AM    MG 1.8 05/03/2020 04:07 AM    PHOS 2.4 (L) 05/03/2020 04:07 AM     CBC:   No results found for: WBC, HGB, HGBEXT, HCT, HCTEXT, PLT, PLTEXT, HGBEXT, HCTEXT, PLTEXT    Imaging Reviewed:  No results found.     Medications Reviewed:  Current Facility-Administered Medications   Medication Dose Route Frequency    atropine injection 1 mg  1 mg IntraVENous Q5MIN PRN    insulin lispro (HUMALOG) injection   SubCUTAneous AC&HS    dextrose 5% infusion  100 mL/hr IntraVENous CONTINUOUS    pantoprazole (PROTONIX) tablet 40 mg  40 mg Oral ACB    0.9% sodium chloride infusion 250 mL  250 mL IntraVENous PRN    dextrose 10% infusion 125-250 mL  125-250 mL IntraVENous PRN    QUEtiapine (SEROquel) tablet 25 mg  25 mg Oral QHS    lidocaine 4 % patch 1 Patch  1 Patch TransDERmal Q24H    haloperidol lactate (HALDOL) injection 2 mg  2 mg IntraMUSCular Q6H PRN    glucose chewable tablet 16 g  4 Tab Oral PRN    glucagon (GLUCAGEN) injection 1 mg  1 mg IntraMUSCular PRN    levothyroxine (SYNTHROID) tablet 100 mcg  100 mcg Oral 6am    sodium chloride (NS) flush 5-10 mL  5-10 mL IntraVENous PRN    [Held by provider] aspirin delayed-release tablet 81 mg  81 mg Oral DAILY    cholecalciferol (VITAMIN D3) (1000 Units /25 mcg) tablet 2 Tab  2,000 Units Oral DAILY    atorvastatin (LIPITOR) tablet 40 mg  40 mg Oral QHS    sucralfate (CARAFATE) tablet 1 g  1 g Oral QID    tamsulosin (FLOMAX) capsule 0.4 mg  0.4 mg Oral DAILY    [Held by provider] heparin (porcine) injection 5,000 Units  5,000 Units SubCUTAneous Q8H           Santiago Gutiérrez DO  Internal Medicine, Hospitalist  Pager: [phone]  [address] [place] [address]

## 2020-05-03 NOTE — ROUTINE PROCESS
Provided nurse with warm blankets and hot packs to treat hypothermia. Discussed patients condition with Dr. Tri Vela

## 2020-05-03 NOTE — PROGRESS NOTES
Cardiac Electrophysiology Consult Note  [facility] - Consult Note    Consultation request by Yamini Naik MD for advice/opinion related to evaluating Hypothermia [T68. XXXA]    Date of  Admission: 4/21/2020  6:28 AM   Primary Care Physician:  Kasandra Medley MD     Assessment:     -Irreversible symptomatic bradycardia, intermittent heart rate remains 40. Continues despite improvement in hypothermia, stopping BB, and treatment of thyroid disorder  -First degree AV block  -LBBB, chronic  -CAD 4/6/20, no PE  -Chronic systolic heart failure  -Nonischemic cardiomyopathy with EF 45% 4/22/20, unchanged from 12/2019  -Pharm nuc 4/22/20, no ischemia  -Hypothermia resolved  -GERD on PPI  -Thyroid disorder on replacement  -Concern for sepsis initially, s/p covid rule-out, off antbx, no fevers/leukocytosis  -h/o GIB, stable    Primary cardiologist Dr. Vin Vera:     Continue to monitor. Patient wants to try and avoid pacemaker. History of Present Illness: This is a 80 y.o. male admitted for Hypothermia [T68. XXXA]. Patient complains of:    Admitted 4/21/20 with dizziness, bradycardic. Hospital course reviewed and hypothermia/thyroid treated. Remains bradycardic to 40 at times with 1st AV block/LBBB, cannot excluded intermittent complete heart block by telemetry.     Review of Symptoms:  Except as stated above include:  Constitutional:  Dizzy, fatigue  Ears, nose, throat:  Negative  Respiratory:  negative  Cardiovascular:  negative  Gastrointestinal: negative  Genitourinary:  negative  Musculoskeletal:  Negative  Neurological:  Negative  Dermatological:  Negative  Hematological: Negative  Endocrinological: Negative  Allergy: Negative  Psychological:  Negative       Past Medical History:     Past Medical History:   Diagnosis Date    Difficulty urinating     Elevated PSA     Hematuria, unspecified     Hypercholesterolemia     Hypertension     Incomplete bladder emptying  Urinary retention     UTI (urinary tract infection)          Social History:     Social History     Socioeconomic History    Marital status:      Spouse name: Not on file    Number of children: Not on file    Years of education: Not on file    Highest education level: Not on file   Occupational History    Occupation:  thirty years   Tobacco Use    Smoking status: Never Smoker    Smokeless tobacco: Never Used   Substance and Sexual Activity    Alcohol use: Yes     Alcohol/week: 2.0 standard drinks     Types: 2 Cans of beer per week     Comment: Occasional    Drug use: No        Family History:     Family History   Problem Relation Age of Onset    Cancer Father     Alzheimer Mother     Heart defect Brother         Medications:      Allergies   Allergen Reactions    Amlodipine Swelling and Itching    Bactrim [Sulfamethoprim Ds] Rash    Lisinopril Other (comments)     Acute renal failure    Ciprofloxacin Rash and Itching        Current Facility-Administered Medications   Medication Dose Route Frequency    aspirin chewable tablet 81 mg  81 mg Oral DAILY    atropine injection 1 mg  1 mg IntraVENous Q5MIN PRN    insulin lispro (HUMALOG) injection   SubCUTAneous AC&HS    dextrose 5% infusion  100 mL/hr IntraVENous CONTINUOUS    pantoprazole (PROTONIX) tablet 40 mg  40 mg Oral ACB    0.9% sodium chloride infusion 250 mL  250 mL IntraVENous PRN    dextrose 10% infusion 125-250 mL  125-250 mL IntraVENous PRN    QUEtiapine (SEROquel) tablet 25 mg  25 mg Oral QHS    lidocaine 4 % patch 1 Patch  1 Patch TransDERmal Q24H    haloperidol lactate (HALDOL) injection 2 mg  2 mg IntraMUSCular Q6H PRN    glucose chewable tablet 16 g  4 Tab Oral PRN    glucagon (GLUCAGEN) injection 1 mg  1 mg IntraMUSCular PRN    levothyroxine (SYNTHROID) tablet 100 mcg  100 mcg Oral 6am    sodium chloride (NS) flush 5-10 mL  5-10 mL IntraVENous PRN    cholecalciferol (VITAMIN D3) (1000 Units /25 mcg) tablet 2 Tab  2,000 Units Oral DAILY    atorvastatin (LIPITOR) tablet 40 mg  40 mg Oral QHS    sucralfate (CARAFATE) tablet 1 g  1 g Oral QID    tamsulosin (FLOMAX) capsule 0.4 mg  0.4 mg Oral DAILY    [Held by provider] heparin (porcine) injection 5,000 Units  5,000 Units SubCUTAneous Q8H         Physical Exam:     Visit Vitals  /56   Pulse 76   Temp 98.2 °F (36.8 °C)   Resp 20   Ht 5' 7\" (1.702 m)   Wt 77.4 kg (170 lb 9.6 oz)   SpO2 98%   BMI 26.72 kg/m²     BP Readings from Last 3 Encounters:   05/05/20 122/56   04/09/20 110/70   02/21/20 110/52     Pulse Readings from Last 3 Encounters:   05/05/20 76   04/09/20 65   02/21/20 66     Wt Readings from Last 3 Encounters:   05/05/20 77.4 kg (170 lb 9.6 oz)   04/08/20 68.7 kg (151 lb 6.4 oz)   02/19/20 72.6 kg (160 lb)       General:  alert, cooperative, no distress, appears stated age  Neck:  nontender  Lungs:  clear to auscultation bilaterally  Heart:  regular rate and rhythm, S1, S2 normal, no murmur, click, rub or gallop  Abdomen:  abdomen is soft without significant tenderness, masses, organomegaly or guarding  Extremities:  extremities normal, atraumatic, no cyanosis or edema  Skin: Warm and dry.  no hyperpigmentation, vitiligo, or suspicious lesions  Neuro: alert, oriented x3, affect appropriate, no focal neurological deficits, moves all extremities well, no involuntary movements, reflexes at knee and ankle intact  Psych: non focal     Data Review:     Recent Labs     05/05/20  0505   WBC 4.9   HGB 9.3*   HCT 28.3*        Recent Labs     05/05/20  0505 05/04/20  0340 05/03/20  0407    145 144   K 4.2 4.1 4.1   * 117* 118*   CO2 25 22 22   GLU 74 76 73*   BUN 21* 20* 21*   CREA 1.02 0.90 0.92   CA 7.8* 7.3* 7.5*   MG 1.8 1.7 1.8   PHOS 2.7 2.4* 2.4*   ALB 1.8*  --   --    SGOT 25  --   --    ALT 19  --   --    INR 1.3*  --   --        Results for orders placed or performed during the hospital encounter of 04/21/20   EKG, 12 LEAD, INITIAL   Result Value Ref Range    Ventricular Rate 75 BPM    QRS Duration 164 ms    Q-T Interval 470 ms    QTC Calculation (Bezet) 524 ms    Calculated R Axis -64 degrees    Calculated T Axis 113 degrees    Diagnosis       Can not rule out underlying A.fib vs. Idioventricular rhythm  Left axis deviation  Left bundle branch block  Inferior infarct , age undetermined  T wave abnormality, consider lateral ischemia  Abnormal ECG  When compared with ECG of 21-APR-2020 06:24,  Wide QRS rhythm has replaced Sinus rhythm  Confirmed by Zay Worthy ([id number]) on 4/22/2020 9:19:11 AM         All Cardiac Markers in the last 24 hours:  No results found for: CPK, CK, CKMMB, CKMB, RCK3, CKMBT, CKNDX, CKND1, TELLY, TROPT, TROIQ, JOSE, TROPT, TNIPOC, BNP, BNPP    Last Lipid:  No results found for: CHOL, CHOLX, CHLST, CHOLV, HDL, HDLP, LDL, LDLC, DLDLP, TGLX, TRIGL, TRIGP, CHHD, CHHDX    Signed By: Becca Iverson MD     May 5, 2020

## 2020-05-03 NOTE — PROGRESS NOTES
INTERIM UPDATE - 2042 EST on 5/02/2020    Nursing Staff calls to report that a Temperature cannot be obtained on this Patient who had similar difficulties previously and who was sent to [facility] Stepdown unit previously this admission for Hypothermia (and had experienced Hypothermia upon or near admission). Nursing Staff reports that Patient has just had a BM. Plan:  Nursing Staff was advised to obtain a Rectal Temperature, despite Patient having defecated recently. ICU Nursing Staff was sent to measure Patient's Temperature with a Temporal Scanne/Thermometer. Nursing Supervisor was informed of difficulty obtaining temperature and was advised to provide instructional and educational information regarding temperature-measuring techniques to applicable Nursing Staff to ensure accuracy and efficiency of this Patient's Temperature measuring.

## 2020-05-04 LAB
ANION GAP SERPL CALC-SCNC: 6 MMOL/L (ref 3–18)
BUN SERPL-MCNC: 20 MG/DL (ref 7–18)
BUN/CREAT SERPL: 22 (ref 12–20)
CALCIUM SERPL-MCNC: 7.3 MG/DL (ref 8.5–10.1)
CHLORIDE SERPL-SCNC: 117 MMOL/L (ref 100–111)
CO2 SERPL-SCNC: 22 MMOL/L (ref 21–32)
CREAT SERPL-MCNC: 0.9 MG/DL (ref 0.6–1.3)
GLUCOSE BLD STRIP.AUTO-MCNC: 101 MG/DL (ref 70–110)
GLUCOSE BLD STRIP.AUTO-MCNC: 103 MG/DL (ref 70–110)
GLUCOSE BLD STRIP.AUTO-MCNC: 129 MG/DL (ref 70–110)
GLUCOSE BLD STRIP.AUTO-MCNC: 99 MG/DL (ref 70–110)
GLUCOSE SERPL-MCNC: 76 MG/DL (ref 74–99)
MAGNESIUM SERPL-MCNC: 1.7 MG/DL (ref 1.6–2.6)
PHOSPHATE SERPL-MCNC: 2.4 MG/DL (ref 2.5–4.9)
POTASSIUM SERPL-SCNC: 4.1 MMOL/L (ref 3.5–5.5)
SODIUM SERPL-SCNC: 145 MMOL/L (ref 136–145)

## 2020-05-04 PROCEDURE — 83735 ASSAY OF MAGNESIUM: CPT

## 2020-05-04 PROCEDURE — 74011250637 HC RX REV CODE- 250/637: Performed by: INTERNAL MEDICINE

## 2020-05-04 PROCEDURE — 74011250637 HC RX REV CODE- 250/637: Performed by: HOSPITALIST

## 2020-05-04 PROCEDURE — 80048 BASIC METABOLIC PNL TOTAL CA: CPT

## 2020-05-04 PROCEDURE — 74011250636 HC RX REV CODE- 250/636: Performed by: HOSPITALIST

## 2020-05-04 PROCEDURE — 74011000250 HC RX REV CODE- 250: Performed by: HOSPITALIST

## 2020-05-04 PROCEDURE — 84100 ASSAY OF PHOSPHORUS: CPT

## 2020-05-04 PROCEDURE — 36415 COLL VENOUS BLD VENIPUNCTURE: CPT

## 2020-05-04 PROCEDURE — 82962 GLUCOSE BLOOD TEST: CPT

## 2020-05-04 PROCEDURE — 97535 SELF CARE MNGMENT TRAINING: CPT

## 2020-05-04 PROCEDURE — 92526 ORAL FUNCTION THERAPY: CPT

## 2020-05-04 PROCEDURE — 74011250637 HC RX REV CODE- 250/637: Performed by: PHYSICIAN ASSISTANT

## 2020-05-04 PROCEDURE — 65660000000 HC RM CCU STEPDOWN

## 2020-05-04 RX ORDER — GUAIFENESIN 100 MG/5ML
81 LIQUID (ML) ORAL DAILY
Status: DISCONTINUED | OUTPATIENT
Start: 2020-05-04 | End: 2020-05-11 | Stop reason: HOSPADM

## 2020-05-04 RX ADMIN — PANTOPRAZOLE SODIUM 40 MG: 40 TABLET, DELAYED RELEASE ORAL at 06:52

## 2020-05-04 RX ADMIN — TAMSULOSIN HYDROCHLORIDE 0.4 MG: 0.4 CAPSULE ORAL at 08:58

## 2020-05-04 RX ADMIN — DEXTROSE MONOHYDRATE 100 ML/HR: 5 INJECTION, SOLUTION INTRAVENOUS at 06:52

## 2020-05-04 RX ADMIN — ASPIRIN 81 MG 81 MG: 81 TABLET ORAL at 10:20

## 2020-05-04 RX ADMIN — DEXTROSE MONOHYDRATE 100 ML/HR: 5 INJECTION, SOLUTION INTRAVENOUS at 17:13

## 2020-05-04 RX ADMIN — SUCRALFATE 1 G: 1 TABLET ORAL at 17:17

## 2020-05-04 RX ADMIN — SUCRALFATE 1 G: 1 TABLET ORAL at 22:11

## 2020-05-04 RX ADMIN — MELATONIN 2 TABLET: at 08:58

## 2020-05-04 RX ADMIN — QUETIAPINE FUMARATE 25 MG: 25 TABLET ORAL at 22:12

## 2020-05-04 RX ADMIN — LEVOTHYROXINE SODIUM 100 MCG: 0.03 TABLET ORAL at 06:52

## 2020-05-04 RX ADMIN — SUCRALFATE 1 G: 1 TABLET ORAL at 08:58

## 2020-05-04 RX ADMIN — SUCRALFATE 1 G: 1 TABLET ORAL at 13:48

## 2020-05-04 RX ADMIN — ATORVASTATIN CALCIUM 40 MG: 20 TABLET, FILM COATED ORAL at 22:12

## 2020-05-04 NOTE — PROGRESS NOTES
Internal Medicine Progress Note        NAME: Nuha Bond   :  1934  MRM:  [id number]    Date/Time: 2020        ASSESSMENT/PLAN:        #  Dysrhythmia , heart block (LBBB ,long first degree and wide QRS), ho   Sinoatrial node dysfunction  and mild elevation of trop level. Stabilized initially in ICU. given heart rate down to 40 with 1st AV block and LBBB,   plan pacemaker on  by cardiology NOTED   History of  Cardiomyopathy  LVEF 35-40% by previous TTE and Grade II diastolic dysfunction. had short runs of NSVT. Anola Ruff testing with inferior and inferoseptal fixed defect more consistent with infarction,  no significant ongoing ischemia    # Acute delirium. Acute confusional state. Treat medical problems . Restrain as needed. Correct s. Na  level  Haldol used  per PCCM team    #  SIRS. Hypothermia , Hypotension and bradycardia. Unclear source of infection o nadmission. Sepsis work-up started in the emergency room. Treated with Abx vanco and zosyn. Hypotension and Hypothermia Likely due to Hypovolemic shock 2/2 GI bleed   TSH normal  Appreciate PCCM input. Warming blankets Used initially  Needed iv pressors   Blood and urine CS NTD  Repeat UA. # GIB presented as Melena , anemia. GI consulted : 1. Avoid NSAIDs 2. Keep NPO  3. Recommend IV protonix. 4. Currently unstable for sedated endoscopy, will follow clinically and when appropriate will plan for endoscopy. Avoid anticoagulation     # Anemia due to likely acute blood loss . Maintain Hb jacinto than 7, transfuse as needed. # Hypoglycemia. Monitor blood sugar and treat accordingly. BSL stable  On D5 now  . #  Chronic renal failure, stage 3 (moderate) ([facility] [address] (2019). S.cr stable   Monitor Renal function and other labs as indicated. Avoid nephrotoxins , iv Contrast, NSAID. Renally dosing medications. Monitor urine out put. Serum creatinine   improved    # Hypernatremia. Change IVF to D5.   Trended down  Follow-up   TSH normal     # ho   Hypothyroidism . On replacement. Checked TSH , T4     #  Dysphagia (4/8/2020)    Encouraged on PO. Episode  of aspiration again 4/24  am SLP to see again  TPN to be started by ICU team. Diet ordered PO later     #   HTN (hypertension) (4/21/2020)      -DVT prophylaxis : Heparin.   - Code Status : Full          Lab Review:     No results for input(s): WBC, HGB, HCT, PLT, HGBEXT, HCTEXT, PLTEXT, HGBEXT, HCTEXT, PLTEXT in the last 72 hours. Recent Labs     05/04/20  0340 05/03/20  0407 05/02/20  0300    144 144   K 4.1 4.1 4.2   * 118* 119*   CO2 22 22 21   GLU 76 73* 104*   BUN 20* 21* 22*   CREA 0.90 0.92 1.00   CA 7.3* 7.5* 7.4*   MG 1.7 1.8 1.9   PHOS 2.4* 2.4* 2.0*     Lab Results   Component Value Date/Time    Glucose (POC) 99 05/04/2020 07:44 AM    Glucose (POC) 91 05/03/2020 08:57 PM    Glucose (POC) 120 (H) 05/03/2020 05:25 PM    Glucose (POC) 178 (H) 05/03/2020 01:24 PM    Glucose (POC) 107 05/02/2020 10:05 PM    Glucose (POC) 96 10/10/2014 09:28 PM    Glucose (POC) 186 (H) 10/10/2014 03:48 PM     No results for input(s): PH, PCO2, PO2, HCO3, FIO2 in the last 72 hours. No results for input(s): INR, INREXT, INREXT in the last 72 hours.     Lab Results   Component Value Date/Time    Specimen Description: [facility] 02/06/2014 05:45 PM     Lab Results   Component Value Date/Time    Culture result: No growth (<1,000 CFU/ML) 04/22/2020 09:20 AM    Culture result: NO GROWTH 6 DAYS 04/21/2020 08:15 AM    Culture result: NO GROWTH 6 DAYS 04/21/2020 08:00 AM       All Cardiac Markers in the last 24 hours:   No results found for: CPK, CK, CKMMB, CKMB, RCK3, CKMBT, CKNDX, CKND1, TELLY, TROPT, TROIQ, JOSE, TROPT, TNIPOC, BNP, BNPP       Intervals noted      Subjective:     Chief Complaint:      Not talking , helping him with OT to sit for bathing, too weak   ROS: unable to obtain   no much verbal out put           Objective:     Vitals:  Last 24hrs VS reviewed since prior progress note. Most recent are:    Visit Vitals  /64   Pulse 64   Temp 97.7 °F (36.5 °C)   Resp 20   Ht 5' 7\" (1.702 m)   Wt 76.7 kg (169 lb)   SpO2 95%   BMI 26.47 kg/m²     SpO2 Readings from Last 6 Encounters:   05/04/20 95%   04/09/20 98%   02/21/20 92%   01/02/20 96%   12/16/19 97%   09/23/18 99%    O2 Flow Rate (L/min): 2 l/min       Intake/Output Summary (Last 24 hours) at 5/4/2020 0948  Last data filed at 5/4/2020 0909  Gross per 24 hour   Intake 1400 ml   Output    Net 1400 ml          Physical Exam:   Gen:  Comfortable,    in no acute distress. Appear stated age, Well-developed   HEENT:  Head atraumatic, normocephalic ,  moist mucous membranes. Neck:   Trachea midline , No apparent JVD, Supple, without masses, thyroid non-tender  Resp:  No accessory muscle use,Bilateral BS present, clear breath sounds without wheezes rales or rhonchi  Card:  normal S1, S2 without Gallop . Mild lower leg peripheral edema. Abd:  Soft, non-tender, non-distended, bowel sounds are present . Chen  Musc:  No cyanosis or clubbing. Skin:  No rashes or ulcers, skin turgor is good. Neuro: NOT TALKING,  no clear area of focal motor weakness, does not follows commands appropriately.         Telemetry reviewed:   normal sinus rhythm    Medications Reviewed: (see below)    Lab Data Reviewed: (see below)    ______________________________________________________________________    Medications:     Current Facility-Administered Medications   Medication Dose Route Frequency    aspirin chewable tablet 81 mg  81 mg Oral DAILY    atropine injection 1 mg  1 mg IntraVENous Q5MIN PRN    insulin lispro (HUMALOG) injection   SubCUTAneous AC&HS    dextrose 5% infusion  100 mL/hr IntraVENous CONTINUOUS    pantoprazole (PROTONIX) tablet 40 mg  40 mg Oral ACB    0.9% sodium chloride infusion 250 mL  250 mL IntraVENous PRN    dextrose 10% infusion 125-250 mL  125-250 mL IntraVENous PRN    QUEtiapine (SEROquel) tablet 25 mg  25 mg Oral QHS    lidocaine 4 % patch 1 Patch  1 Patch TransDERmal Q24H    haloperidol lactate (HALDOL) injection 2 mg  2 mg IntraMUSCular Q6H PRN    glucose chewable tablet 16 g  4 Tab Oral PRN    glucagon (GLUCAGEN) injection 1 mg  1 mg IntraMUSCular PRN    levothyroxine (SYNTHROID) tablet 100 mcg  100 mcg Oral 6am    sodium chloride (NS) flush 5-10 mL  5-10 mL IntraVENous PRN    cholecalciferol (VITAMIN D3) (1000 Units /25 mcg) tablet 2 Tab  2,000 Units Oral DAILY    atorvastatin (LIPITOR) tablet 40 mg  40 mg Oral QHS    sucralfate (CARAFATE) tablet 1 g  1 g Oral QID    tamsulosin (FLOMAX) capsule 0.4 mg  0.4 mg Oral DAILY    [Held by provider] heparin (porcine) injection 5,000 Units  5,000 Units SubCUTAneous Q8H          Total time spent with patient: 35 minutes                  Care Plan discussed with: Patient, Care Manager, Nursing Staff and >50% of time spent in counseling and coordination of care    Discussed:  Care Plan    Prophylaxis:  SCD's    Disposition:  Home w/Family           This document in whole or part of it has been produced using voice recognition software. Unrecognized errors in transcription may be present.     Attending Physician: Wilmer Frost MD

## 2020-05-04 NOTE — PROGRESS NOTES
Assumed care from [facility]. Patient is awake and alert, VSS, denies any pain. Incontinent care completed. Tolerated well. Will continue to monitor. 0000> Patient remains stable. Temp WNL. 0400> No distress noted. 0630> R IJ dressing completed per protocol. Tolerated well. Bedside and Verbal shift change report given to Suma Natarajan RN (oncoming nurse) by Светлана Clemons RN (offgoing nurse). Report included the following information SBAR, Kardex, Intake/Output, MAR and Recent Results.

## 2020-05-04 NOTE — PROGRESS NOTES
Problem: Dysphagia (Adult)  Goal: *Acute Goals and Plan of Care (Insert Text)  Description: Recommendations:  Diet: Puree diet with NECTAR thick clear liquid diet, as medically appropriate  Ice chips for pleasure after oral care  Meds: Via IV or crushed in puree  Aspiration Precautions  Oral Care TID  Feeder  Small sips/bites  No straws    Goals:  Patient will:  1. Tolerate PO trials with 0 s/s overt distress in 4/5 trials  2. Utilize compensatory swallow strategies/maneuvers (decrease bite/sip, size/rate, alt. liq/sol) with min cues in 4/5 trials  3. Complete an objective swallow study (i.e., MBSS) to assess swallow integrity, r/o aspiration, and determine of safest LRD, min A      Outcome: Progressing Towards Goal    SPEECH LANGUAGE PATHOLOGY DYSPHAGIA TREATMENT    Patient: Matthew Yap (38 y.o. male)  Date: 5/4/2020  Diagnosis: Hypothermia [T68. XXXA]   Hypothermia  Procedure(s) (LRB):  ESOPHAGOGASTRODUODENOSCOPY (EGD) (N/A)    Precautions: Aspiration Risk;  Fall  PLOF: Per H&P    ASSESSMENT:  Seen at bedside this date for dysphagia management/ intervention. Pt pleasant and cooperative. PO trials of mech soft with NTLs given. Prolonged but function mastication with + oral clearance with solids; no pocketing or s/sx of aspiration/ penetration witnessed. NTLs tolerated via tsp without s/sx of aspiration/ penetration. Facilitated hard /k/ words with Dangelo to promote airway protections. Diet advanced to mech soft with Nectar thick liquids. D/W RN, Veronica Tavares. ST will continue to follow.      Progression toward goals:  []         Improving appropriately and progressing toward goals  [x]         Improving slowly and progressing toward goals  []         Not making progress toward goals and plan of care will be adjusted     PLAN:  Recommendations and Planned Interventions:  Diet: mech soft with NECTAR thick clear liquid diet, as medically appropriate  Ice chips for pleasure after oral care  Meds: Via IV or crushed in puree  Aspiration Precautions  Oral Care TID  Feeder  Small sips/bites  No straws  Patient continues to benefit from skilled intervention to address the above impairments. Continue treatment per established plan of care. Discharge Recommendations:  Skilled Nursing Facility     SUBJECTIVE:   Patient stated I'll do whatever is best for me. OBJECTIVE:   Cognitive and Communication Status:  Neurologic State: Alert  Orientation Level: Oriented X4  Cognition: Follows commands  Perception: Appears intact  Perseveration: No perseveration noted  Safety/Judgement: Fall prevention  Dysphagia Treatment:  Oral Assessment:  Oral Assessment  Labial: Decreased seal  Dentition: Limited  Oral Hygiene: Poor  Lingual: Decreased rate, Decreased strength, Incoordinated  Velum: No impairment  Mandible: No impairment  P.O. Trials:   Patient Position: 45   Vocal quality prior to P.O.: Breathy, Fatigue   Consistency Presented: Mechanical soft, Nectar thick liquid, Puree   How Presented: SLP-fed/presented       Bolus Acceptance: No impairment   Bolus Formation/Control: Impaired   Type of Impairment: Delayed, Lip closure, Mastication, Piecemeal   Propulsion: Delayed (# of seconds), Tongue pumping   Oral Residue: 10-50% of bolus   Initiation of Swallow: Delayed (# of seconds)   Laryngeal Elevation: Weak   Aspiration Signs/Symptoms: Watery eyes, Weak cough   Pharyngeal Phase Characteristics: Easily fatigued , Multiple swallows, Poor endurance, Suspected pharyngeal residue   Effective Modifications: Alternate liquids/solids, Cup/sip, Spoon, Small sips and bites, Other (comment)   Cues for Modifications:  Moderate         Oral Phase Severity: Moderate   Pharyngeal Phase Severity : Moderate-severe   Oral Motor Exercises:                                                                   Exercises:  Laryngeal Exercises:                      PAIN:  Pain level pre-treatment: 0/10   Pain level post-treatment: 0/10   Pain Intervention(s): Medication (see MAR); Rest, Ice, Repositioning  Response to intervention: Nurse notified, See doc flow    After treatment:   []              Patient left in no apparent distress sitting up in chair  [x]              Patient left in no apparent distress in bed  [x]              Call bell left within reach  [x]              Nursing notified  []              Family present  []              Caregiver present  []              Bed alarm activated      COMMUNICATION/EDUCATION:   [x] Aspiration precautions; swallow safety; compensatory techniques  []        Patient unable to participate in education; education ongoing with staff  [x]  Posted safety precautions in patient's room.   [x] Oral-motor/laryngeal strengthening exercises      Rhea Barnes SLP  Time Calculation:12mins

## 2020-05-04 NOTE — PROGRESS NOTES
Chart reviewed. Plan remains SNF, has been accepted to [facility]. Updates uploaded into [facility]. Brooke Coreas RN,ext [phone].

## 2020-05-04 NOTE — ROUTINE PROCESS
0730:  Bedside and Verbal shift change report given to Cierra Ariza RN (oncoming nurse) by Katie Bird RN (offgoing nurse). Report included the following information SBAR, Kardex, MAR and Recent Results.

## 2020-05-04 NOTE — PROGRESS NOTES
CARDIOLOGY Progress Note    Patient: Ursula Carbone  MR #: [id number]    Assessment/Plan:     Patient today tells me that he is okay with pacemaker if needed. Considering his significant sinus bradycardia despite appropriate management with Synthroid and left bundle branch block and significant first-degree block, he has a high risk of developing advanced heart block and syncope. I discussed with son and patient in detail. I discussed with son over the phone. Patient still lives fairly independent life at home. He walks with a walker. He was working up until a year and a half ago. He does not appear to have any contraindication to pacemaker    I discussed with Dr. Levon Llanos and plan is to have permanent pacemaker placement tomorrow  Risk, benefit, alternatives and complication of procedure discussed with the patient and also son over the phone. After lengthy discussion, plan is to put pacemaker in the morning    Hospital Problems  Date Reviewed: 4/6/2020          Codes Class Noted POA    Hypoglycemia. ,  Resolved ICD-10-CM: E16.2  ICD-9-CM: 251.2  4/21/2020 Unknown    Mental status changes, delirium. Improved and normalized      HTN (hypertension)   ICD-10-CM: I10  ICD-9-CM: 401.9  4/21/2020 Unknown        Left bundle branch block,  He is known to have a long first-degree AV block and markedly reduced P wave amplitude. His rhythm has been noteworthy for frequent ectopics and short runs of NSVT during hospitalization. Telemetry today shows sinus bradycardia with long first-degree AV block and left bundle branch block configuration. Intermittent atrial fibrillation  Heparin was discontinued because of lower GI bleed. Nuclear stress test  No significant ongoing ischemia    Patient with left bundle branch block, intermittent atrial fibrillation and bradycardia. Likely has sinus node dysfunction as well. May need permanent pacemaker prior to discharge however he would like to hold off if he could. He has remained hemodynamically stable at this time. Will continue to have discussion with the patient over next couple days regarding this matter   ICD-10-CM: I44.7  ICD-9-CM: 426.3  4/21/2020 Unknown      GI bleed: Transfusion as needed. Sinoatrial node dysfunction (HCC) ICD-10-CM: I49.5  ICD-9-CM: 427.81  4/21/2020 Unknown        Dysphagia ICD-10-CM: R13.10  ICD-9-CM: 787.20  4/8/2020 Yes        Cardiomyopathy ([facility] [place] ICD-10-CM: I42.9  ICD-9-CM: 425.4  2/20/2020 Yes    Overview Signed 2/20/2020 12:40 AM by Enid KRAUSE DO     LVEF 35-40% by previous TTE. Grade II diastolic dysfunction VLQ-53-POND: I51.9  ICD-9-CM: 429.9  2/20/2020 Yes    Overview Signed 2/20/2020 12:41 AM by Ana Nova DO     Per previous TTE. Hypothyroidism ICD-10-CM: E03.9  ICD-9-CM: 244.9  2/20/2020 Yes        Bradycardia ICD-10-CM: R00.1  ICD-9-CM: 427.89  2/19/2020 Yes        Hypotension ICD-10-CM: I95.9  ICD-9-CM: 458.9  2/19/2020 Yes        * (Principal) Hypothermia ICD-10-CM: T68. [place]  ICD-9-CM: 991.6  12/23/2019 Unknown        Chronic renal failure, stage 3 (moderate) (HCC) ICD-10-CM: N18.3  ICD-9-CM: 585.3  12/23/2019 Yes        SIRS (systemic inflammatory response syndrome) ([facility] [place] ICD-10-CM: R65.10  ICD-9-CM: 995.90  12/23/2019 Yes    Gastrointestinal bleeding, GI following        Subjective     Patient was transferred back to ICU yesterday for hypothermia. According to nursing report in ICU, did not find any significant hypothermia. Patient denies any chest pain or chest tightness    History of Present Illness  Olga Carranza is a 80 y.o. man that presented to the [facility] ED early this morning with complaints of chest tightness. The symptoms that the patient was experiencing were similar to a earlier admission to this hospital several weeks ago. At that time he was found to have an elevated TSH. He was also having episodic hypothermia.   He was started on thyroid replacement therapy at a low dose of 50 mcg daily. He had some episodic severe bradycardia and so his beta-blocker was discontinued. He seemed to improved with those measures and was discharged for follow-up. On presentation to the ED, the patient was hypotensive. He did not respond well to intravenous fluid resuscitation. He was started on Levophed by Dr. Marlin Little in the ED. His initial serum troponin was 0.03. TSH remains elevated at 4.64 compared to the 7.65 on 4/6/2020 when the patient was started on Synthroid.         Past Medical History  Past Medical History:   Diagnosis Date    Difficulty urinating     Elevated PSA     Hematuria, unspecified     Hypercholesterolemia     Hypertension     Incomplete bladder emptying     Urinary retention     UTI (urinary tract infection)          Meds  Current Facility-Administered Medications   Medication Dose Route Frequency    atropine injection 1 mg  1 mg IntraVENous Q5MIN PRN    insulin lispro (HUMALOG) injection   SubCUTAneous AC&HS    dextrose 5% infusion  100 mL/hr IntraVENous CONTINUOUS    pantoprazole (PROTONIX) tablet 40 mg  40 mg Oral ACB    0.9% sodium chloride infusion 250 mL  250 mL IntraVENous PRN    dextrose 10% infusion 125-250 mL  125-250 mL IntraVENous PRN    QUEtiapine (SEROquel) tablet 25 mg  25 mg Oral QHS    lidocaine 4 % patch 1 Patch  1 Patch TransDERmal Q24H    haloperidol lactate (HALDOL) injection 2 mg  2 mg IntraMUSCular Q6H PRN    glucose chewable tablet 16 g  4 Tab Oral PRN    glucagon (GLUCAGEN) injection 1 mg  1 mg IntraMUSCular PRN    levothyroxine (SYNTHROID) tablet 100 mcg  100 mcg Oral 6am    sodium chloride (NS) flush 5-10 mL  5-10 mL IntraVENous PRN    [Held by provider] aspirin delayed-release tablet 81 mg  81 mg Oral DAILY    cholecalciferol (VITAMIN D3) (1000 Units /25 mcg) tablet 2 Tab  2,000 Units Oral DAILY    atorvastatin (LIPITOR) tablet 40 mg  40 mg Oral QHS    sucralfate (CARAFATE) tablet 1 g  1 g Oral QID    tamsulosin (FLOMAX) capsule 0.4 mg  0.4 mg Oral DAILY    [Held by provider] heparin (porcine) injection 5,000 Units  5,000 Units SubCUTAneous Q8H         Allergies  Allergies   Allergen Reactions    Amlodipine Swelling and Itching    Bactrim [Sulfamethoprim Ds] Rash    Lisinopril Other (comments)     Acute renal failure    Ciprofloxacin Rash and Itching           Family History     Family History   Problem Relation Age of Onset    Cancer Father     Alzheimer Mother     Heart defect Brother      Physical Exam  Visit Vitals  /64   Pulse 64   Temp 97.7 °F (36.5 °C)   Resp 20   Ht 5' 7\" (1.702 m)   Wt 169 lb (76.7 kg)   SpO2 95%   BMI 26.47 kg/m²       Lorna Friedman is who is asleep but awakens easily . Head is normocephalic and atraumatic. Trachea appears midline with no noted thyromegaly. There is no JVD. Decreased breath sound at base of the lungs. No obvious rales noted  Cardiac examination revealed regular rhythm no obvious murmur   abdomen is soft and nontender. Trace lower extremity edema  Skin is warm and dry. Diagnostic Tests  Labs:   No results for input(s): WBC, HGB, HCT, PLT, HGBEXT, HCTEXT, PLTEXT, HGBEXT, HCTEXT, PLTEXT in the last 72 hours. Recent Labs     05/04/20  0340 05/03/20  0407 05/02/20  0300    144 144   K 4.1 4.1 4.2   * 118* 119*   CO2 22 22 21   GLU 76 73* 104*   BUN 20* 21* 22*   CREA 0.90 0.92 1.00   CA 7.3* 7.5* 7.4*   MG 1.7 1.8 1.9   PHOS 2.4* 2.4* 2.0*       No results for input(s): TROIQ, CPK, CKMB in the last 72 hours.   Last Lipid:  No results found for: CHOL, CHOLX, CHLST, CHOLV, HDL, HDLP, LDL, LDLC, DLDLP, TGLX, TRIGL, TRIGP, CHHD, CHHDX    Times spent > 30 minutes

## 2020-05-04 NOTE — PROGRESS NOTES
Problem: Self Care Deficits Care Plan (Adult)  Goal: *Acute Goals and Plan of Care (Insert Text)  Description: Occupational Therapy Goals  Initiated 4/30/2020 within 7 day(s). 1.  Patient will perform grooming with modified independence seated edge of bed with good sitting balance. 2.  Patient will perform bathing with modified independence. 3.  Patient will perform upper body dressing and lower body dressing with modified independence. 4.  Patient will perform bedside commode transfers with modified independence using RW. 5.  Patient will perform all aspects of toileting with modified independence. 6.  Patient will participate in upper extremity therapeutic exercise/activities with modified independence for 10 minutes. 7.  Patient will utilize energy conservation techniques during functional activities with min verbal cues. Prior Level of Function: Mod I with assist as needed by son for ADLs, Mod I using RW for functional mobility   Outcome: Progressing Towards Goal   OCCUPATIONAL THERAPY TREATMENT    Patient: Fernie Robles (55 y.o. male)  Date: 5/4/2020  Diagnosis: Hypothermia [T68. XXXA]   Hypothermia  Procedure(s) (LRB):  ESOPHAGOGASTRODUODENOSCOPY (EGD) (N/A)    Precautions: Fall  PLOF: see above    Chart, occupational therapy assessment, plan of care, and goals were reviewed. ASSESSMENT:  Nursing/Kelley cleared for pt to participate in OT tx session. Patient alert and agreeable to sit edge of bed to perform grooming tasks. Noted, condom catheter leaking resulting in soiled bed george and hospital gown-nursing notified. Bed mobility: Mod A supine to sit edge of bed with head of bed elevated to grade easier, vc's for correct hand placement to assist. Patient initially with poor+ static sitting balance, improved to Fair- with vc's cues for upright posture d/t lateral lean, pt able to provide return demonstration with consistent cues, wash face with CGA seated edge of bed.  Sit to stand w/ Mod A, vc's for correct hand placement to push up from bed versus pulling up on walker, with additional time, pt able to side step w/ Mod A in prep for optimal bed positioning. Bed mobility: Max A sit to supine, dep x 2 scooting up towards head of bed, pt reports feeling comfortable and reports fatigue s/p functional mobility performed. call bell within reach & pt verbalized understanding to utilize for assist e.g. functional transfers in order to prevent falls. Progression toward goals:  []          Improving appropriately and progressing toward goals  [x]          Improving slowly and progressing toward goals  []          Not making progress toward goals and plan of care will be adjusted     PLAN:  Patient continues to benefit from skilled intervention to address the above impairments. Continue treatment per established plan of care. Discharge Recommendations:  Khai Ramsey  Further Equipment Recommendations for Discharge:  bedside commode, rolling walker, and wheelchair      SUBJECTIVE:   Patient stated Belinda Arellano did a lot today.     OBJECTIVE DATA SUMMARY:   Cognitive/Behavioral Status:  Neurologic State: Alert  Orientation Level: Oriented X4  Cognition: Follows commands  Safety/Judgement: Fall prevention    Functional Mobility and Transfers for ADLs:   Bed Mobility:     Supine to Sit: Moderate assistance;Assist x1  Sit to Supine: Maximum assistance;Assist x1      Transfers:  Sit to Stand: Assist x1; Moderate assistance  Stand to Sit: Assist x1; Moderate assistance    Balance:  Sitting: Impaired  Sitting - Static: Fair (occasional)  Sitting - Dynamic: Fair (occasional)(-)  Standing: With support;Pull to stand; Impaired  Standing - Static: Poor  Standing - Dynamic : Poor    Pain:  Pain level pre-treatment: 0/10   Pain level post-treatment: 0/10  Pain Intervention(s): Medication (see MAR);  Rest, Ice, Repositioning   Response to intervention: Nurse notified, See doc flow    Activity Tolerance:    poor  Please refer to the flowsheet for vital signs taken during this treatment. After treatment:   []  Patient left in no apparent distress sitting up in chair  [x]  Patient left in no apparent distress in bed  [x]  Call bell left within reach  [x]  Nursing notified  []  Caregiver present  []  Bed alarm activated    COMMUNICATION/EDUCATION:   [x] Role of Occupational Therapy in the acute care setting  [x] Home safety education was provided and the patient/caregiver indicated understanding. [x] Patient/family have participated as able in working towards goals and plan of care. [x] Patient/family agree to work toward stated goals and plan of care. [] Patient understands intent and goals of therapy, but is neutral about his/her participation. [] Patient is unable to participate in goal setting and plan of care.       Thank you for this referral.  Ophelia Sanchez  Time Calculation: 23 mins

## 2020-05-04 NOTE — PROGRESS NOTES
Cardiac Electrophysiology Note:  Discussed with Dr. Loredo Speaker, given heart rate down to 40 with 1st AV block and LBBB, will plan pacemaker tomorrow. NPO after MN. He has discussed with patient and son.

## 2020-05-04 NOTE — PROGRESS NOTES
4665: 1st PT attempt. OT working with patient. Will follow up. 2nd PT attempt (PM): Per chart review, plans for pacemaker tomorrow. Holding mobility at this time. Will follow up post procedure as appropriate.

## 2020-05-05 ENCOUNTER — APPOINTMENT (OUTPATIENT)
Dept: GENERAL RADIOLOGY | Age: 85
DRG: 981 | End: 2020-05-05
Attending: INTERNAL MEDICINE
Payer: MEDICARE

## 2020-05-05 PROBLEM — Z95.0 PACEMAKER: Status: ACTIVE | Noted: 2020-05-05

## 2020-05-05 LAB
ALBUMIN SERPL-MCNC: 1.8 G/DL (ref 3.4–5)
ALBUMIN/GLOB SERPL: 0.7 {RATIO} (ref 0.8–1.7)
ALP SERPL-CCNC: 266 U/L (ref 45–117)
ALT SERPL-CCNC: 19 U/L (ref 16–61)
ANION GAP SERPL CALC-SCNC: 3 MMOL/L (ref 3–18)
APPEARANCE UR: CLEAR
AST SERPL-CCNC: 25 U/L (ref 10–38)
BACTERIA URNS QL MICRO: ABNORMAL /HPF
BASOPHILS # BLD: 0 K/UL (ref 0–0.1)
BASOPHILS NFR BLD: 0 % (ref 0–2)
BILIRUB DIRECT SERPL-MCNC: 0.1 MG/DL (ref 0–0.2)
BILIRUB SERPL-MCNC: 0.4 MG/DL (ref 0.2–1)
BILIRUB UR QL: NEGATIVE
BUN SERPL-MCNC: 21 MG/DL (ref 7–18)
BUN/CREAT SERPL: 21 (ref 12–20)
CALCIUM SERPL-MCNC: 7.8 MG/DL (ref 8.5–10.1)
CHLORIDE SERPL-SCNC: 117 MMOL/L (ref 100–111)
CO2 SERPL-SCNC: 25 MMOL/L (ref 21–32)
COLOR UR: YELLOW
CREAT SERPL-MCNC: 1.02 MG/DL (ref 0.6–1.3)
DIFFERENTIAL METHOD BLD: ABNORMAL
EOSINOPHIL # BLD: 0.4 K/UL (ref 0–0.4)
EOSINOPHIL NFR BLD: 8 % (ref 0–5)
EPITH CASTS URNS QL MICRO: ABNORMAL /LPF (ref 0–5)
ERYTHROCYTE [DISTWIDTH] IN BLOOD BY AUTOMATED COUNT: 16.5 % (ref 11.6–14.5)
GLOBULIN SER CALC-MCNC: 2.6 G/DL (ref 2–4)
GLUCOSE BLD STRIP.AUTO-MCNC: 138 MG/DL (ref 70–110)
GLUCOSE BLD STRIP.AUTO-MCNC: 68 MG/DL (ref 70–110)
GLUCOSE BLD STRIP.AUTO-MCNC: 68 MG/DL (ref 70–110)
GLUCOSE BLD STRIP.AUTO-MCNC: 76 MG/DL (ref 70–110)
GLUCOSE BLD STRIP.AUTO-MCNC: 81 MG/DL (ref 70–110)
GLUCOSE BLD STRIP.AUTO-MCNC: 85 MG/DL (ref 70–110)
GLUCOSE SERPL-MCNC: 74 MG/DL (ref 74–99)
GLUCOSE UR STRIP.AUTO-MCNC: NEGATIVE MG/DL
HCT VFR BLD AUTO: 28.3 % (ref 36–48)
HGB BLD-MCNC: 9.3 G/DL (ref 13–16)
HGB UR QL STRIP: NEGATIVE
INR PPP: 1.3 (ref 0.8–1.2)
KETONES UR QL STRIP.AUTO: NEGATIVE MG/DL
LEUKOCYTE ESTERASE UR QL STRIP.AUTO: ABNORMAL
LIPASE SERPL-CCNC: 217 U/L (ref 73–393)
LYMPHOCYTES # BLD: 1.1 K/UL (ref 0.9–3.6)
LYMPHOCYTES NFR BLD: 22 % (ref 21–52)
MAGNESIUM SERPL-MCNC: 1.8 MG/DL (ref 1.6–2.6)
MCH RBC QN AUTO: 31.1 PG (ref 24–34)
MCHC RBC AUTO-ENTMCNC: 32.9 G/DL (ref 31–37)
MCV RBC AUTO: 94.6 FL (ref 74–97)
MONOCYTES # BLD: 0.3 K/UL (ref 0.05–1.2)
MONOCYTES NFR BLD: 6 % (ref 3–10)
MUCOUS THREADS URNS QL MICRO: ABNORMAL /LPF
NEUTS SEG # BLD: 3.2 K/UL (ref 1.8–8)
NEUTS SEG NFR BLD: 64 % (ref 40–73)
NITRITE UR QL STRIP.AUTO: NEGATIVE
PH UR STRIP: 5 [PH] (ref 5–8)
PHOSPHATE SERPL-MCNC: 2.7 MG/DL (ref 2.5–4.9)
PLATELET # BLD AUTO: 191 K/UL (ref 135–420)
PMV BLD AUTO: 9.4 FL (ref 9.2–11.8)
POTASSIUM SERPL-SCNC: 4.2 MMOL/L (ref 3.5–5.5)
PROT SERPL-MCNC: 4.4 G/DL (ref 6.4–8.2)
PROT UR STRIP-MCNC: NEGATIVE MG/DL
PROTHROMBIN TIME: 16.1 SEC (ref 11.5–15.2)
RBC # BLD AUTO: 2.99 M/UL (ref 4.7–5.5)
RBC #/AREA URNS HPF: ABNORMAL /HPF (ref 0–5)
SODIUM SERPL-SCNC: 145 MMOL/L (ref 136–145)
SP GR UR REFRACTOMETRY: 1.02 (ref 1–1.03)
UROBILINOGEN UR QL STRIP.AUTO: 0.2 EU/DL (ref 0.2–1)
WBC # BLD AUTO: 4.9 K/UL (ref 4.6–13.2)
WBC URNS QL MICRO: ABNORMAL /HPF (ref 0–4)

## 2020-05-05 PROCEDURE — C1751 CATH, INF, PER/CENT/MIDLINE: HCPCS

## 2020-05-05 PROCEDURE — 74011000250 HC RX REV CODE- 250: Performed by: INTERNAL MEDICINE

## 2020-05-05 PROCEDURE — 84100 ASSAY OF PHOSPHORUS: CPT

## 2020-05-05 PROCEDURE — 02HK3JZ INSERTION OF PACEMAKER LEAD INTO RIGHT VENTRICLE, PERCUTANEOUS APPROACH: ICD-10-PCS | Performed by: INTERNAL MEDICINE

## 2020-05-05 PROCEDURE — 33207 INSERT HEART PM VENTRICULAR: CPT | Performed by: INTERNAL MEDICINE

## 2020-05-05 PROCEDURE — 74011250637 HC RX REV CODE- 250/637: Performed by: HOSPITALIST

## 2020-05-05 PROCEDURE — 71045 X-RAY EXAM CHEST 1 VIEW: CPT

## 2020-05-05 PROCEDURE — 99153 MOD SED SAME PHYS/QHP EA: CPT | Performed by: INTERNAL MEDICINE

## 2020-05-05 PROCEDURE — 77010033678 HC OXYGEN DAILY

## 2020-05-05 PROCEDURE — 77030002996 HC SUT SLK J&J -A: Performed by: INTERNAL MEDICINE

## 2020-05-05 PROCEDURE — C1898 LEAD, PMKR, OTHER THAN TRANS: HCPCS | Performed by: INTERNAL MEDICINE

## 2020-05-05 PROCEDURE — 85025 COMPLETE CBC W/AUTO DIFF WBC: CPT

## 2020-05-05 PROCEDURE — 83690 ASSAY OF LIPASE: CPT

## 2020-05-05 PROCEDURE — 36415 COLL VENOUS BLD VENIPUNCTURE: CPT

## 2020-05-05 PROCEDURE — 80076 HEPATIC FUNCTION PANEL: CPT

## 2020-05-05 PROCEDURE — 74011250636 HC RX REV CODE- 250/636: Performed by: INTERNAL MEDICINE

## 2020-05-05 PROCEDURE — 77030018673: Performed by: INTERNAL MEDICINE

## 2020-05-05 PROCEDURE — 65660000000 HC RM CCU STEPDOWN

## 2020-05-05 PROCEDURE — 74011250637 HC RX REV CODE- 250/637: Performed by: INTERNAL MEDICINE

## 2020-05-05 PROCEDURE — A4565 SLINGS: HCPCS | Performed by: INTERNAL MEDICINE

## 2020-05-05 PROCEDURE — 74011250636 HC RX REV CODE- 250/636: Performed by: HOSPITALIST

## 2020-05-05 PROCEDURE — 80048 BASIC METABOLIC PNL TOTAL CA: CPT

## 2020-05-05 PROCEDURE — 83735 ASSAY OF MAGNESIUM: CPT

## 2020-05-05 PROCEDURE — C1786 PMKR, SINGLE, RATE-RESP: HCPCS | Performed by: INTERNAL MEDICINE

## 2020-05-05 PROCEDURE — 74011250637 HC RX REV CODE- 250/637: Performed by: PHYSICIAN ASSISTANT

## 2020-05-05 PROCEDURE — 77030012935 HC DRSG AQUACEL BMS -B: Performed by: INTERNAL MEDICINE

## 2020-05-05 PROCEDURE — 77030028698 HC BLD TISS PLSM MEDT -D: Performed by: INTERNAL MEDICINE

## 2020-05-05 PROCEDURE — 77030019580 HC CBL PACE MEDT -B: Performed by: INTERNAL MEDICINE

## 2020-05-05 PROCEDURE — 0JH604Z INSERTION OF PACEMAKER, SINGLE CHAMBER INTO CHEST SUBCUTANEOUS TISSUE AND FASCIA, OPEN APPROACH: ICD-10-PCS | Performed by: INTERNAL MEDICINE

## 2020-05-05 PROCEDURE — 77030002933 HC SUT MCRYL J&J -A: Performed by: INTERNAL MEDICINE

## 2020-05-05 PROCEDURE — 81001 URINALYSIS AUTO W/SCOPE: CPT

## 2020-05-05 PROCEDURE — 74011636320 HC RX REV CODE- 636/320: Performed by: INTERNAL MEDICINE

## 2020-05-05 PROCEDURE — 99152 MOD SED SAME PHYS/QHP 5/>YRS: CPT | Performed by: INTERNAL MEDICINE

## 2020-05-05 PROCEDURE — 85610 PROTHROMBIN TIME: CPT

## 2020-05-05 PROCEDURE — 82962 GLUCOSE BLOOD TEST: CPT

## 2020-05-05 PROCEDURE — 77030031139 HC SUT VCRL2 J&J -A: Performed by: INTERNAL MEDICINE

## 2020-05-05 PROCEDURE — C1893 INTRO/SHEATH, FIXED,NON-PEEL: HCPCS | Performed by: INTERNAL MEDICINE

## 2020-05-05 DEVICE — LEAD 407658 CAPSUREFIX NOVUS US MRI
Type: IMPLANTABLE DEVICE | Status: FUNCTIONAL
Brand: CAPSUREFIX NOVUS MRI™ SURESCAN™

## 2020-05-05 DEVICE — IPG W1SR01 AZURE XT SR MRI WL USA
Type: IMPLANTABLE DEVICE | Status: FUNCTIONAL
Brand: AZURE™ XT SR MRI SURESCAN™

## 2020-05-05 RX ORDER — SODIUM CHLORIDE 9 MG/ML
INJECTION, SOLUTION INTRAVENOUS
Status: COMPLETED | OUTPATIENT
Start: 2020-05-05 | End: 2020-05-05

## 2020-05-05 RX ORDER — FENTANYL CITRATE 50 UG/ML
INJECTION, SOLUTION INTRAMUSCULAR; INTRAVENOUS AS NEEDED
Status: DISCONTINUED | OUTPATIENT
Start: 2020-05-05 | End: 2020-05-05 | Stop reason: HOSPADM

## 2020-05-05 RX ORDER — OXYCODONE AND ACETAMINOPHEN 5; 325 MG/1; MG/1
1 TABLET ORAL
Status: DISCONTINUED | OUTPATIENT
Start: 2020-05-05 | End: 2020-05-11 | Stop reason: HOSPADM

## 2020-05-05 RX ORDER — CEFAZOLIN SODIUM 2 G/50ML
2 SOLUTION INTRAVENOUS ONCE
Status: COMPLETED | OUTPATIENT
Start: 2020-05-05 | End: 2020-05-05

## 2020-05-05 RX ORDER — MIDAZOLAM HYDROCHLORIDE 1 MG/ML
INJECTION, SOLUTION INTRAMUSCULAR; INTRAVENOUS AS NEEDED
Status: DISCONTINUED | OUTPATIENT
Start: 2020-05-05 | End: 2020-05-05 | Stop reason: HOSPADM

## 2020-05-05 RX ORDER — LIDOCAINE HYDROCHLORIDE 10 MG/ML
INJECTION INFILTRATION; PERINEURAL AS NEEDED
Status: DISCONTINUED | OUTPATIENT
Start: 2020-05-05 | End: 2020-05-05 | Stop reason: HOSPADM

## 2020-05-05 RX ORDER — CEFAZOLIN SODIUM 2 G/50ML
2 SOLUTION INTRAVENOUS EVERY 8 HOURS
Status: COMPLETED | OUTPATIENT
Start: 2020-05-05 | End: 2020-05-06

## 2020-05-05 RX ADMIN — ATORVASTATIN CALCIUM 40 MG: 20 TABLET, FILM COATED ORAL at 22:15

## 2020-05-05 RX ADMIN — ASPIRIN 81 MG 81 MG: 81 TABLET ORAL at 11:23

## 2020-05-05 RX ADMIN — LEVOTHYROXINE SODIUM 100 MCG: 0.03 TABLET ORAL at 06:48

## 2020-05-05 RX ADMIN — MELATONIN 2 TABLET: at 11:23

## 2020-05-05 RX ADMIN — PANTOPRAZOLE SODIUM 40 MG: 40 TABLET, DELAYED RELEASE ORAL at 11:23

## 2020-05-05 RX ADMIN — QUETIAPINE FUMARATE 25 MG: 25 TABLET ORAL at 22:15

## 2020-05-05 RX ADMIN — CEFAZOLIN 2 G: 10 INJECTION, POWDER, FOR SOLUTION INTRAVENOUS at 16:41

## 2020-05-05 RX ADMIN — CEFAZOLIN 2 G: 10 INJECTION, POWDER, FOR SOLUTION INTRAVENOUS at 10:10

## 2020-05-05 RX ADMIN — SUCRALFATE 1 G: 1 TABLET ORAL at 22:15

## 2020-05-05 RX ADMIN — SUCRALFATE 1 G: 1 TABLET ORAL at 18:20

## 2020-05-05 RX ADMIN — Medication 10 ML: at 13:28

## 2020-05-05 RX ADMIN — SUCRALFATE 1 G: 1 TABLET ORAL at 11:23

## 2020-05-05 RX ADMIN — TAMSULOSIN HYDROCHLORIDE 0.4 MG: 0.4 CAPSULE ORAL at 11:23

## 2020-05-05 RX ADMIN — DEXTROSE MONOHYDRATE 100 ML/HR: 5 INJECTION, SOLUTION INTRAVENOUS at 03:33

## 2020-05-05 NOTE — PROGRESS NOTES
Speech Therapy:    Attempted to see pt for follow up ST intervention/management. Currently NPO for Scheduled  pacemaker placement    Will follow up  Next date, as medically appropriate.      Jose M King MA CCC-SLP   Speech Language Pathologist

## 2020-05-05 NOTE — PROGRESS NOTES
Internal Medicine Progress Note        NAME: Martha Clark   :  1934  MRM:  [id number]    Date/Time: 2020        ASSESSMENT/PLAN:        #  Dysrhythmia , heart block (LBBB ,long first degree and wide QRS), ho   Sinoatrial node dysfunction  and mild elevation of trop level. Stabilized initially in ICU. given heart rate down to 40 with 1st AV block and LBBB,  S/P  implantation of single chamber Medtronic pacemaker . History of  Cardiomyopathy  LVEF 35-40% by previous TTE and Grade II diastolic dysfunction. had short runs of NSVT. Eleno Paddy testing with inferior and inferoseptal fixed defect more consistent with infarction,  no significant ongoing ischemia    -post pacemaker care per cardiology team.     # Acute delirium. Acute confusional state. Treat medical problems . Restrain as needed. Correct s. Na  level  Haldol used  per PCCM team    #  SIRS. Hypothermia , Hypotension and bradycardia. Unclear source of infection o nadmission. Sepsis work-up started in the emergency room. Treated with Abx vanco and zosyn. Hypotension and Hypothermia Likely due to Hypovolemic shock 2/2 GI bleed   TSH normal  Appreciate PCCM input. Warming blankets Used initially  Needed iv pressors   Blood and urine CS NTD  Repeat UA. # GIB presented as Melena , anemia. GI consulted. Avoid NSAIDs. Protonix. .    # Anemia due to likely acute blood loss . Maintain Hb jacinto than 7, transfuse as needed. # Hypoglycemia. Monitor blood sugar and treat accordingly. BSL stable  On D5 now  . #  Chronic renal failure, stage 3 (moderate) ([facility] [address] (2019). S.cr stable   Monitor Renal function and other labs as indicated. Avoid nephrotoxins , iv Contrast, NSAID. Renally dosing medications. Monitor urine out put. Serum creatinine   improved    # Hypernatremia. Change IVF to D5. Trended down  Follow-up   TSH normal     # ho   Hypothyroidism . On replacement.  Checked TSH , T4     #  Dysphagia (4/8/2020)    Encouraged on PO. Episode  of aspiration again 4/24  am SLP followed him  TPN  In ICU. #   HTN (hypertension) (4/21/2020)      -DVT prophylaxis : Heparin.   - Code Status : Full          Lab Review:     Recent Labs     05/05/20  0505   WBC 4.9   HGB 9.3*   HCT 28.3*        Recent Labs     05/05/20  0505 05/04/20  0340 05/03/20  0407    145 144   K 4.2 4.1 4.1   * 117* 118*   CO2 25 22 22   GLU 74 76 73*   BUN 21* 20* 21*   CREA 1.02 0.90 0.92   CA 7.8* 7.3* 7.5*   MG 1.8 1.7 1.8   PHOS 2.7 2.4* 2.4*   ALB 1.8*  --   --    TBILI 0.4  --   --    SGOT 25  --   --    ALT 19  --   --    INR 1.3*  --   --      Lab Results   Component Value Date/Time    Glucose (POC) 81 05/05/2020 11:53 AM    Glucose (POC) 76 05/05/2020 07:32 AM    Glucose (POC) 129 (H) 05/04/2020 09:46 PM    Glucose (POC) 103 05/04/2020 04:38 PM    Glucose (POC) 101 05/04/2020 11:25 AM    Glucose (POC) 96 10/10/2014 09:28 PM    Glucose (POC) 186 (H) 10/10/2014 03:48 PM     No results for input(s): PH, PCO2, PO2, HCO3, FIO2 in the last 72 hours. Recent Labs     05/05/20  0505   INR 1.3*       Lab Results   Component Value Date/Time    Specimen Description: [facility] 02/06/2014 05:45 PM     Lab Results   Component Value Date/Time    Culture result: No growth (<1,000 CFU/ML) 04/22/2020 09:20 AM    Culture result: NO GROWTH 6 DAYS 04/21/2020 08:15 AM    Culture result: NO GROWTH 6 DAYS 04/21/2020 08:00 AM       All Cardiac Markers in the last 24 hours:   No results found for: CPK, CK, CKMMB, CKMB, RCK3, CKMBT, CKNDX, CKND1, TELLY, TROPT, TROIQ, JOSE, TROPT, TNIPOC, BNP, BNPP       Intervals noted      Subjective:     Chief Complaint:      Back from procedure  Sleepy  Nodding he is ok    ROS: unable to obtain           Objective:     Vitals:  Last 24hrs VS reviewed since prior progress note.  Most recent are:    Visit Vitals  /56 (BP 1 Location: Right arm, BP Patient Position: At rest)   Pulse (!) 59   Temp 98.2 °F (36.8 °C)   Resp 17   Ht 5' 7\" (1.702 m)   Wt 77.4 kg (170 lb 9.6 oz)   SpO2 94%   BMI 26.72 kg/m²     SpO2 Readings from Last 6 Encounters:   05/05/20 94%   04/09/20 98%   02/21/20 92%   01/02/20 96%   12/16/19 97%   09/23/18 99%    O2 Flow Rate (L/min): 2 l/min       Intake/Output Summary (Last 24 hours) at 5/5/2020 1221  Last data filed at 5/4/2020 1713  Gross per 24 hour   Intake 480 ml   Output    Net 480 ml          Physical Exam:   Gen:  Comfortable,    in no acute distress. Appear stated age, Well-developed   HEENT:  Head atraumatic, normocephalic ,  moist mucous membranes. Neck:   Trachea midline , No apparent JVD, Supple, without masses, thyroid non-tender  Resp:  No accessory muscle use,Bilateral BS present, clear breath sounds without wheezes rales or rhonchi  Card:  normal S1, S2 without Gallop . Mild lower leg peripheral edema. Abd:  Soft, non-tender, non-distended, bowel sounds are present . Hien Dye Musc:  No cyanosis or clubbing. Skin:  No rashes or ulcers, skin turgor is good. Neuro:SLEEPY ,  no clear area of focal motor weakness, does not follows commands appropriately.         Telemetry reviewed:   normal sinus rhythm    Medications Reviewed: (see below)    Lab Data Reviewed: (see below)    ______________________________________________________________________    Medications:     Current Facility-Administered Medications   Medication Dose Route Frequency    aspirin chewable tablet 81 mg  81 mg Oral DAILY    atropine injection 1 mg  1 mg IntraVENous Q5MIN PRN    insulin lispro (HUMALOG) injection   SubCUTAneous AC&HS    dextrose 5% infusion  50 mL/hr IntraVENous CONTINUOUS    pantoprazole (PROTONIX) tablet 40 mg  40 mg Oral ACB    0.9% sodium chloride infusion 250 mL  250 mL IntraVENous PRN    dextrose 10% infusion 125-250 mL  125-250 mL IntraVENous PRN    QUEtiapine (SEROquel) tablet 25 mg  25 mg Oral QHS    lidocaine 4 % patch 1 Patch  1 Patch TransDERmal Q24H    haloperidol lactate (HALDOL) injection 2 mg  2 mg IntraMUSCular Q6H PRN    glucose chewable tablet 16 g  4 Tab Oral PRN    glucagon (GLUCAGEN) injection 1 mg  1 mg IntraMUSCular PRN    levothyroxine (SYNTHROID) tablet 100 mcg  100 mcg Oral 6am    sodium chloride (NS) flush 5-10 mL  5-10 mL IntraVENous PRN    cholecalciferol (VITAMIN D3) (1000 Units /25 mcg) tablet 2 Tab  2,000 Units Oral DAILY    atorvastatin (LIPITOR) tablet 40 mg  40 mg Oral QHS    sucralfate (CARAFATE) tablet 1 g  1 g Oral QID    tamsulosin (FLOMAX) capsule 0.4 mg  0.4 mg Oral DAILY    [Held by provider] heparin (porcine) injection 5,000 Units  5,000 Units SubCUTAneous Q8H          Total time spent with patient: 35 minutes                  Care Plan discussed with: Patient, Care Manager, Nursing Staff and >50% of time spent in counseling and coordination of care    Discussed:  Care Plan    Prophylaxis:  SCD's    Disposition:  Home w/Family           This document in whole or part of it has been produced using voice recognition software. Unrecognized errors in transcription may be present.     Attending Physician: Reggie Metz MD

## 2020-05-05 NOTE — PROGRESS NOTES
NUTRITION FOLLOW-UP/PLAN OF CARE    RECOMMENDATIONS:   1. Resume Mech Soft Diet with NTL (2); NO Straw per SLP added SF CIB BID to supplement energy needs (diet liberalized to promote increased PO intake)  2. Monitor labs, weight and document all PO intake  3. Feeding assist/set up with encouragement     GOALS:   1. Progressing/Ongoing: PO intake meets >75% of protein/calorie needs by 5/10    ASSESSMENT:   Wt status is classified as overweight per Body mass index is 26.72 kg/m². Fair to good PO intake overall. Continue nutritional supplements. Labs noted. Nutrition recommendations listed. RD to follow. SUBJECTIVE/OBJECTIVE:   (5/5): SLP following: on (5/4) recommendations for mech soft with NECTAR thick clear liquid diet. Last BM on (5/3) per I/Os. Per cardio: s/p Successful implantation of single chamber Medtronic pacemaker on (5/5). Pt seen in room resting after just returned from procedure. Per d/w Ronaldo Embs, no change in SLP recommendations so diet was adjusted to meet last filed progress note. Previously encouraged to increase fluid intake of NT water. Last BM on (5/3) per I/Os. Weights have bee variable this admission. Will continue to monitor. (4/30): Pt admitted for hypothermia and transferred from ICU to  yesterday. Familiar with patient from admission earlier this month; noted to have recent weight loss and dysphagia at that time. Pt received TPN on (4/27) per previous RD note. SLP following: on (4/30) recommendations for Puree diet with NECTAR thick clear liquid diet, as medically appropriate  Ice chips for pleasure after oral care. Pt seen in room this afternoon OOB in chair at lunch. Pt feeding himself and doing well with meal intake today. Reports he is feeling better and would like to have that chocolate drink again. Placed orders for SF CIB BID to supplement energy needs. Also encouraged to increase fluid intake of NT water for hydration.  Unable to verify current weight during visit today. Will continue to monitor. Information Obtained From:   [x] Chart Review  [x] Patient  [] Family/Caregiver  [x] Nurse/Physician   [] Patient Rounds/Interdisciplinary Meeting    Diet: NPO  Patient Vitals for the past 100 hrs:   % Diet Eaten   05/04/20 1713 100 %   05/04/20 1300 75 %   05/04/20 0909 100 %   05/02/20 1013 50 %   05/01/20 1215 50 %   05/01/20 0900 50 %     Medications: [x] Reviewed   IVF: D5W @50 mL/hr (306 kcal/day)  Lipitor, Vit D3, Humalog, Synthroid, Protonix, Carafate  Encounter Diagnoses     ICD-10-CM ICD-9-CM   1. Hypothermia, initial encounter T68. XXXA 991.6   2. Hypoglycemia E16.2 251.2   3. General weakness R53.1 780.79   4. Acute chest pain R07.9 786.50   5. Hx of aspiration pneumonitis Z87.09 V12.69   6. Hypercarbia R06.89 786.09   7. Anemia, unspecified type D64.9 285.9   8. Urinary tract infection with hematuria, site unspecified N39.0 599.0    R31.9 599.70   9.  Heart block I45.9 426.9     Past Medical History:   Diagnosis Date    Difficulty urinating     Elevated PSA     Hematuria, unspecified     Hypercholesterolemia     Hypertension     Incomplete bladder emptying     Urinary retention     UTI (urinary tract infection)      Labs:    Lab Results   Component Value Date/Time    Sodium 145 05/05/2020 05:05 AM    Potassium 4.2 05/05/2020 05:05 AM    Chloride 117 (H) 05/05/2020 05:05 AM    CO2 25 05/05/2020 05:05 AM    Anion gap 3 05/05/2020 05:05 AM    Glucose 74 05/05/2020 05:05 AM    BUN 21 (H) 05/05/2020 05:05 AM    Creatinine 1.02 05/05/2020 05:05 AM    Calcium 7.8 (L) 05/05/2020 05:05 AM    Magnesium 1.8 05/05/2020 05:05 AM    Phosphorus 2.7 05/05/2020 05:05 AM    Albumin 1.8 (L) 05/05/2020 05:05 AM     Anthropometrics: BMI (calculated): 26.7   Last 3 Recorded Weights in this Encounter    04/30/20 7203 05/02/20 0553 05/05/20 0003   Weight: 73.5 kg (162 lb) 76.7 kg (169 lb) 77.4 kg (170 lb 9.6 oz)      Ht Readings from Last 1 Encounters:   04/22/20 5' 7\" (1.702 m) Documented Weight History:  Weight Metrics 5/5/2020 4/8/2020 2/19/2020 12/28/2019 12/16/2019 2/12/2019 9/10/2018   Weight 170 lb 9.6 oz 151 lb 6.4 oz 160 lb 167 lb 8.8 oz 150 lb 184 lb 180 lb   BMI 26.72 kg/m2 23.71 kg/m2 25.06 kg/m2 25.48 kg/m2 22.81 kg/m2 27.98 kg/m2 27.37 kg/m2       [x]  Weight Loss PTA  []  Weight Gain  [x]  Weight Stable   []  New wt n/a on record     Estimated Nutrition Needs:   1852 Kcals/day  Protein (g): 68 g    Nutrition Problems Identified:   [] Suboptimal PO intake   [] Food Allergies  [x] Difficulty chewing/swallowing/poor dentition  [] Constipation/Diarrhea   [] Nausea/Vomiting   [] None  [] Other:     Plan:   [x] Therapeutic Diet  []  Obtained/adjusted food preferences/tolerances and/or snacks options   [x]  Continue supplements added   [x] SLP following for feeding techniques  []  HS snack added   [x]  Modify diet texture   []  Modify diet for food allergies   []  Educate patient   []  Assist with menu selection   [x]  Monitor PO intake on meal rounds   [x]  Continue inpatient monitoring and intervention   [x]  Participated in discharge planning/Interdisciplinary rounds/Team meetings   []  Other:     Education Needs:   [] Not appropriate for teaching at this time due to:   [x] Identified and addressed    Nutrition Monitoring and Evaluation:   [x] Continue inpatient monitoring and interventions    [] Other:     Keagan Uriostegui

## 2020-05-05 NOTE — PROGRESS NOTES
Scheduled for pacemaker placement this AM. Will follow up for PT re-evaluation post procedure as medically appropriate.

## 2020-05-05 NOTE — PROCEDURES
Procedure:  -Successful implantation of single chamber Medtronic pacemaker.  -Monitor overnight with antbx. -CXR now and in AM along with interrogation.  -Needs wound check in 1 week. -I called and discussed procedure/updated son. [phone]. Thank you kindly for allowing me to participate in this patient's care. Please do not hesitate to contact me if any questions.

## 2020-05-05 NOTE — PROGRESS NOTES
Problem: Discharge Planning  Goal: *Discharge to safe environment  Outcome: Progressing Towards Goal     Problem: Falls - Risk of  Goal: *Absence of Falls  Description: Document Patsy Blood Fall Risk and appropriate interventions in the flowsheet. Outcome: Progressing Towards Goal  Note: Fall Risk Interventions:  Mobility Interventions: Bed/chair exit alarm, Patient to call before getting OOB, Strengthening exercises (ROM-active/passive)    Mentation Interventions: Adequate sleep, hydration, pain control, Bed/chair exit alarm, Door open when patient unattended, Reorient patient, Room close to nurse's station, Toileting rounds, Update white board    Medication Interventions: Patient to call before getting OOB, Teach patient to arise slowly    Elimination Interventions: Bed/chair exit alarm, Call light in reach, Toileting schedule/hourly rounds, Urinal in reach    History of Falls Interventions: Bed/chair exit alarm, Door open when patient unattended, Room close to nurse's station         Problem: Patient Education: Go to Patient Education Activity  Goal: Patient/Family Education  Outcome: Progressing Towards Goal     Problem: Pressure Injury - Risk of  Goal: *Prevention of pressure injury  Description: Document Alex Scale and appropriate interventions in the flowsheet. Outcome: Progressing Towards Goal  Note: Pressure Injury Interventions:  Sensory Interventions: Keep linens dry and wrinkle-free, Maintain/enhance activity level, Minimize linen layers, Monitor skin under medical devices, Turn and reposition approx.  every two hours (pillows and wedges if needed)    Moisture Interventions: Internal/External urinary devices, Maintain skin hydration (lotion/cream), Minimize layers, Moisture barrier, Offer toileting Q_hr, Apply protective barrier, creams and emollients    Activity Interventions: Increase time out of bed, Pressure redistribution bed/mattress(bed type), PT/OT evaluation    Mobility Interventions: HOB 30 degrees or less, Pressure redistribution bed/mattress (bed type), PT/OT evaluation, Turn and reposition approx.  every two hours(pillow and wedges)    Nutrition Interventions: Document food/fluid/supplement intake    Friction and Shear Interventions: Minimize layers, HOB 30 degrees or less, Foam dressings/transparent film/skin sealants                Problem: Patient Education: Go to Patient Education Activity  Goal: Patient/Family Education  Outcome: Progressing Towards Goal     Problem: Altered nutrition  Goal: *Adequate nutrition  Outcome: Progressing Towards Goal     Problem: Pain  Goal: *Control of Pain  Outcome: Progressing Towards Goal     Problem: Tissue Perfusion - Cardiopulmonary, Altered  Goal: *Optimize tissue perfusion  Outcome: Progressing Towards Goal  Goal: *Absence of hypoxia  Outcome: Progressing Towards Goal     Problem: Patient Education: Go to Patient Education Activity  Goal: Patient/Family Education  Outcome: Progressing Towards Goal     Problem: Delirium Treatment  Goal: *Level of consciousness restored to baseline  Outcome: Progressing Towards Goal  Goal: *Level of environmental perceptions restored to baseline  Outcome: Progressing Towards Goal  Goal: *Sensory perception restored to baseline  Outcome: Progressing Towards Goal  Goal: *Emotional stability restored to baseline  Outcome: Progressing Towards Goal  Goal: *Functional assessment restored to baseline  Outcome: Progressing Towards Goal  Goal: *Absence of falls  Outcome: Progressing Towards Goal  Goal: *Will remain free of delirium, CAM Score negative  Outcome: Progressing Towards Goal  Goal: *Cognitive status will be restored to baseline  Outcome: Progressing Towards Goal  Goal: Interventions  Outcome: Progressing Towards Goal     Problem: Patient Education: Go to Patient Education Activity  Goal: Patient/Family Education  Outcome: Progressing Towards Goal     Problem: Patient Education: Go to Patient Education Activity  Goal: Patient/Family Education  Outcome: Progressing Towards Goal     Problem: Patient Education: Go to Patient Education Activity  Goal: Patient/Family Education  Outcome: Progressing Towards Goal     Problem: Patient Education: Go to Patient Education Activity  Goal: Patient/Family Education  Outcome: Progressing Towards Goal

## 2020-05-05 NOTE — PROGRESS NOTES
Cardiac Electrophysiology Progress Note  Admit Date: 4/21/2020    Assessment:     -Irreversible symptomatic bradycardia, intermittent heart rate remains 40. Continues despite improvement in hypothermia, stopping BB, and treatment of thyroid disorder  -First degree AV block  -LBBB, chronic  -CAD 4/6/20, no PE  -Chronic systolic heart failure  -Nonischemic cardiomyopathy with EF 45% 4/22/20, unchanged from 12/2019  -Pharm nuc 4/22/20, no ischemia  -Hypothermia resolved  -GERD on PPI  -Thyroid disorder on replacement  -Concern for sepsis initially, s/p covid rule-out, off antbx, no fevers/leukocytosis  -h/o GIB, stable  -Severe deconditioning     Primary cardiologist Dr. Maddie Arora:     Patient and son discussed with Dr. Stepahn Suarez yesterday, due to ongoing bradycardia, patient would like to proceed with pacemaker. Plan single chamber pacemaker implantation. Increased risk for infection discussed given patient's comorbidities and prolonged hospitalization. No evidence infection at this time. I called and talked with son at [phone], Jenice Duane, prior to starting procedure and answered questions. Pertinent risks, benefits, alternatives discussed. Plan moderate sedation if anesthesiologist is not available. Risks include emergent intubation as well as possibly inability to intubate. There can be exacerbation of lung and heart disease including but not limited to heart failure and COPD/asthma. Risks include but are not limited to acute and chronic pain, infection, bleeding, deep venous thrombosis with chronic swelling of extremity, pulmonary embolism, anesthesia reactions, pneumothorax, hemothorax, emergent open heart surgery, need for repeat surgery to replace/reposition leads, and death. There can be a prolonged hospitalization with brain damage and reduced or compromised long term mobility.   By stating these are possible risks, this does not exclude the potential for additional risks not named here.  All questions answered. If not MRI compatible device, I discussed inability to have future MRI scans but CT scans are acceptable. Subjective:     No new complaints.      Objective:      Patient Vitals for the past 8 hrs:   Temp Pulse Resp BP SpO2   05/05/20 0800 98.2 °F (36.8 °C) 76 20 122/56 98 %   05/05/20 0623 97.9 °F (36.6 °C) 64 18 117/63 98 %         Patient Vitals for the past 96 hrs:   Weight   05/05/20 0003 77.4 kg (170 lb 9.6 oz)   05/02/20 0553 76.7 kg (169 lb)                    Intake/Output Summary (Last 24 hours) at 5/5/2020 8259  Last data filed at 5/4/2020 1713  Gross per 24 hour   Intake 680 ml   Output    Net 680 ml       Physical Exam:  General:  alert, cooperative, no distress, appears stated age  Neck:  nontender  Lungs:  clear to auscultation bilaterally  Heart:  regular rate and rhythm, S1, S2 normal, no murmur, click, rub or gallop  Abdomen:  abdomen is soft without significant tenderness, masses, organomegaly or guarding  Extremities:  extremities normal, atraumatic, no cyanosis or edema    Data Review:     Labs: Results:       Chemistry Recent Labs     05/05/20  0505 05/04/20  0340 05/03/20  0407   GLU 74 76 73*    145 144   K 4.2 4.1 4.1   * 117* 118*   CO2 25 22 22   BUN 21* 20* 21*   CREA 1.02 0.90 0.92   CA 7.8* 7.3* 7.5*   MG 1.8 1.7 1.8   PHOS 2.7 2.4* 2.4*   AGAP 3 6 4   BUCR 21* 22* 23*   *  --   --    TP 4.4*  --   --    ALB 1.8*  --   --    GLOB 2.6  --   --    AGRAT 0.7*  --   --       CBC w/Diff Recent Labs     05/05/20  0505   WBC 4.9   RBC 2.99*   HGB 9.3*   HCT 28.3*      GRANS 64   LYMPH 22   EOS 8*      Cardiac Enzymes No results found for: CPK, CK, CKMMB, CKMB, RCK3, CKMBT, CKNDX, CKND1, TELLY, TROPT, TROIQ, JOSE, TROPT, TNIPOC, BNP, BNPP   Coagulation Recent Labs     05/05/20  0505   PTP 16.1*   INR 1.3*       Lipid Panel No results found for: CHOL, CHOLPOCT, CHOLX, CHLST, CHOLV, 636208, HDL, HDLP, LDL, LDLC, DLDLP, 981916, VLDLC, VLDL, TGLX, TRIGL, TRIGP, TGLPOCT, CHHD, CHHDX   BNP No results found for: BNP, BNPP, XBNPT   Liver Enzymes Recent Labs     05/05/20  0505   TP 4.4*   ALB 1.8*   *   SGOT 25      Digoxin    Thyroid Studies Lab Results   Component Value Date/Time    T4, Total 11.5 02/19/2020 09:28 AM    TSH 4.64 (H) 04/21/2020 06:36 AM          Signed By: Caitlyn Casanova MD     May 5, 2020

## 2020-05-05 NOTE — ROUTINE PROCESS
0725:  Bedside and Verbal shift change report given to Cierra Ariza RN (oncoming nurse) by JER Avelar (offgoing nurse). Report included the following information SBAR, Kardex, MAR and Recent Results. Patient AOx3-4, on telemetry, resting comfortably in bed. NPO since MN for pacemaker placement.

## 2020-05-05 NOTE — PROGRESS NOTES
Patient scheduled for Pacemaker placement this date. Will follow up for OT re-evaluation 5/6/2020 as medically appropriate.     Mariama Gotti Guido 87 OTR/L  [phone]

## 2020-05-06 ENCOUNTER — APPOINTMENT (OUTPATIENT)
Dept: GENERAL RADIOLOGY | Age: 85
DRG: 981 | End: 2020-05-06
Attending: INTERNAL MEDICINE
Payer: MEDICARE

## 2020-05-06 ENCOUNTER — ANESTHESIA (OUTPATIENT)
Dept: ENDOSCOPY | Age: 85
DRG: 981 | End: 2020-05-06
Payer: MEDICARE

## 2020-05-06 ENCOUNTER — ANESTHESIA EVENT (OUTPATIENT)
Dept: ENDOSCOPY | Age: 85
DRG: 981 | End: 2020-05-06
Payer: MEDICARE

## 2020-05-06 LAB
ANION GAP SERPL CALC-SCNC: 4 MMOL/L (ref 3–18)
BASOPHILS # BLD: 0 K/UL (ref 0–0.1)
BASOPHILS NFR BLD: 0 % (ref 0–2)
BUN SERPL-MCNC: 20 MG/DL (ref 7–18)
BUN/CREAT SERPL: 20 (ref 12–20)
CALCIUM SERPL-MCNC: 7.3 MG/DL (ref 8.5–10.1)
CHLORIDE SERPL-SCNC: 116 MMOL/L (ref 100–111)
CO2 SERPL-SCNC: 24 MMOL/L (ref 21–32)
CREAT SERPL-MCNC: 1.02 MG/DL (ref 0.6–1.3)
DIFFERENTIAL METHOD BLD: ABNORMAL
EOSINOPHIL # BLD: 0.5 K/UL (ref 0–0.4)
EOSINOPHIL NFR BLD: 10 % (ref 0–5)
ERYTHROCYTE [DISTWIDTH] IN BLOOD BY AUTOMATED COUNT: 16.5 % (ref 11.6–14.5)
GLUCOSE BLD STRIP.AUTO-MCNC: 119 MG/DL (ref 70–110)
GLUCOSE BLD STRIP.AUTO-MCNC: 63 MG/DL (ref 70–110)
GLUCOSE BLD STRIP.AUTO-MCNC: 69 MG/DL (ref 70–110)
GLUCOSE BLD STRIP.AUTO-MCNC: 70 MG/DL (ref 70–110)
GLUCOSE BLD STRIP.AUTO-MCNC: 71 MG/DL (ref 70–110)
GLUCOSE BLD STRIP.AUTO-MCNC: 84 MG/DL (ref 70–110)
GLUCOSE BLD STRIP.AUTO-MCNC: 89 MG/DL (ref 70–110)
GLUCOSE SERPL-MCNC: 90 MG/DL (ref 74–99)
HCT VFR BLD AUTO: 29 % (ref 36–48)
HGB BLD-MCNC: 9.4 G/DL (ref 13–16)
LYMPHOCYTES # BLD: 0.8 K/UL (ref 0.9–3.6)
LYMPHOCYTES NFR BLD: 16 % (ref 21–52)
MAGNESIUM SERPL-MCNC: 1.7 MG/DL (ref 1.6–2.6)
MCH RBC QN AUTO: 30.8 PG (ref 24–34)
MCHC RBC AUTO-ENTMCNC: 32.4 G/DL (ref 31–37)
MCV RBC AUTO: 95.1 FL (ref 74–97)
MONOCYTES # BLD: 0.2 K/UL (ref 0.05–1.2)
MONOCYTES NFR BLD: 4 % (ref 3–10)
NEUTS SEG # BLD: 3.3 K/UL (ref 1.8–8)
NEUTS SEG NFR BLD: 70 % (ref 40–73)
PHOSPHATE SERPL-MCNC: 2.9 MG/DL (ref 2.5–4.9)
PLATELET # BLD AUTO: 204 K/UL (ref 135–420)
PMV BLD AUTO: 10 FL (ref 9.2–11.8)
POTASSIUM SERPL-SCNC: 4 MMOL/L (ref 3.5–5.5)
RBC # BLD AUTO: 3.05 M/UL (ref 4.7–5.5)
SODIUM SERPL-SCNC: 144 MMOL/L (ref 136–145)
WBC # BLD AUTO: 4.8 K/UL (ref 4.6–13.2)

## 2020-05-06 PROCEDURE — 88342 IMHCHEM/IMCYTCHM 1ST ANTB: CPT

## 2020-05-06 PROCEDURE — 97530 THERAPEUTIC ACTIVITIES: CPT

## 2020-05-06 PROCEDURE — 74011250636 HC RX REV CODE- 250/636: Performed by: NURSE ANESTHETIST, CERTIFIED REGISTERED

## 2020-05-06 PROCEDURE — 74011250637 HC RX REV CODE- 250/637: Performed by: PHYSICIAN ASSISTANT

## 2020-05-06 PROCEDURE — 36415 COLL VENOUS BLD VENIPUNCTURE: CPT

## 2020-05-06 PROCEDURE — 74011000258 HC RX REV CODE- 258: Performed by: NURSE ANESTHETIST, CERTIFIED REGISTERED

## 2020-05-06 PROCEDURE — 80048 BASIC METABOLIC PNL TOTAL CA: CPT

## 2020-05-06 PROCEDURE — 76040000007: Performed by: INTERNAL MEDICINE

## 2020-05-06 PROCEDURE — C1726 CATH, BAL DIL, NON-VASCULAR: HCPCS | Performed by: INTERNAL MEDICINE

## 2020-05-06 PROCEDURE — 85025 COMPLETE CBC W/AUTO DIFF WBC: CPT

## 2020-05-06 PROCEDURE — 74011000250 HC RX REV CODE- 250: Performed by: NURSE ANESTHETIST, CERTIFIED REGISTERED

## 2020-05-06 PROCEDURE — 74011250637 HC RX REV CODE- 250/637: Performed by: INTERNAL MEDICINE

## 2020-05-06 PROCEDURE — 82962 GLUCOSE BLOOD TEST: CPT

## 2020-05-06 PROCEDURE — 97168 OT RE-EVAL EST PLAN CARE: CPT

## 2020-05-06 PROCEDURE — 74011250636 HC RX REV CODE- 250/636: Performed by: INTERNAL MEDICINE

## 2020-05-06 PROCEDURE — 88305 TISSUE EXAM BY PATHOLOGIST: CPT

## 2020-05-06 PROCEDURE — 65660000000 HC RM CCU STEPDOWN

## 2020-05-06 PROCEDURE — 0DB68ZX EXCISION OF STOMACH, VIA NATURAL OR ARTIFICIAL OPENING ENDOSCOPIC, DIAGNOSTIC: ICD-10-PCS | Performed by: INTERNAL MEDICINE

## 2020-05-06 PROCEDURE — 84100 ASSAY OF PHOSPHORUS: CPT

## 2020-05-06 PROCEDURE — 83735 ASSAY OF MAGNESIUM: CPT

## 2020-05-06 PROCEDURE — 74011250636 HC RX REV CODE- 250/636: Performed by: HOSPITALIST

## 2020-05-06 PROCEDURE — 0D748ZZ DILATION OF ESOPHAGOGASTRIC JUNCTION, VIA NATURAL OR ARTIFICIAL OPENING ENDOSCOPIC: ICD-10-PCS | Performed by: INTERNAL MEDICINE

## 2020-05-06 PROCEDURE — 71046 X-RAY EXAM CHEST 2 VIEWS: CPT

## 2020-05-06 RX ORDER — FUROSEMIDE 20 MG/1
20 TABLET ORAL DAILY
Status: DISCONTINUED | OUTPATIENT
Start: 2020-05-07 | End: 2020-05-11 | Stop reason: HOSPADM

## 2020-05-06 RX ORDER — SODIUM CHLORIDE, SODIUM LACTATE, POTASSIUM CHLORIDE, CALCIUM CHLORIDE 600; 310; 30; 20 MG/100ML; MG/100ML; MG/100ML; MG/100ML
25 INJECTION, SOLUTION INTRAVENOUS CONTINUOUS
Status: CANCELLED | OUTPATIENT
Start: 2020-05-06

## 2020-05-06 RX ORDER — PROPOFOL 10 MG/ML
INJECTION, EMULSION INTRAVENOUS AS NEEDED
Status: DISCONTINUED | OUTPATIENT
Start: 2020-05-06 | End: 2020-05-06 | Stop reason: HOSPADM

## 2020-05-06 RX ORDER — SODIUM CHLORIDE, SODIUM LACTATE, POTASSIUM CHLORIDE, CALCIUM CHLORIDE 600; 310; 30; 20 MG/100ML; MG/100ML; MG/100ML; MG/100ML
INJECTION, SOLUTION INTRAVENOUS
Status: DISCONTINUED | OUTPATIENT
Start: 2020-05-06 | End: 2020-05-06 | Stop reason: HOSPADM

## 2020-05-06 RX ORDER — SODIUM CHLORIDE 9 MG/ML
500 INJECTION, SOLUTION INTRAVENOUS ONCE
Status: COMPLETED | OUTPATIENT
Start: 2020-05-07 | End: 2020-05-06

## 2020-05-06 RX ORDER — PANTOPRAZOLE SODIUM 40 MG/1
40 TABLET, DELAYED RELEASE ORAL
Status: DISCONTINUED | OUTPATIENT
Start: 2020-05-06 | End: 2020-05-11 | Stop reason: HOSPADM

## 2020-05-06 RX ORDER — EPHEDRINE SULFATE/0.9% NACL/PF 50 MG/5 ML
SYRINGE (ML) INTRAVENOUS AS NEEDED
Status: DISCONTINUED | OUTPATIENT
Start: 2020-05-06 | End: 2020-05-06 | Stop reason: HOSPADM

## 2020-05-06 RX ORDER — GLYCOPYRROLATE 0.2 MG/ML
INJECTION INTRAMUSCULAR; INTRAVENOUS AS NEEDED
Status: DISCONTINUED | OUTPATIENT
Start: 2020-05-06 | End: 2020-05-06 | Stop reason: HOSPADM

## 2020-05-06 RX ORDER — LIDOCAINE HYDROCHLORIDE 20 MG/ML
INJECTION, SOLUTION EPIDURAL; INFILTRATION; INTRACAUDAL; PERINEURAL AS NEEDED
Status: DISCONTINUED | OUTPATIENT
Start: 2020-05-06 | End: 2020-05-06 | Stop reason: HOSPADM

## 2020-05-06 RX ADMIN — Medication 10 MG: at 14:42

## 2020-05-06 RX ADMIN — PROPOFOL 50 MG: 10 INJECTION, EMULSION INTRAVENOUS at 14:34

## 2020-05-06 RX ADMIN — SODIUM CHLORIDE 500 ML: 900 INJECTION, SOLUTION INTRAVENOUS at 23:57

## 2020-05-06 RX ADMIN — SODIUM CHLORIDE, SODIUM LACTATE, POTASSIUM CHLORIDE, AND CALCIUM CHLORIDE: 600; 310; 30; 20 INJECTION, SOLUTION INTRAVENOUS at 14:26

## 2020-05-06 RX ADMIN — PROPOFOL 50 MG: 10 INJECTION, EMULSION INTRAVENOUS at 14:38

## 2020-05-06 RX ADMIN — PHENYLEPHRINE HYDROCHLORIDE 100 MCG: 10 INJECTION INTRAVENOUS at 15:03

## 2020-05-06 RX ADMIN — CEFAZOLIN 2 G: 10 INJECTION, POWDER, FOR SOLUTION INTRAVENOUS at 02:36

## 2020-05-06 RX ADMIN — DEXTROSE MONOHYDRATE 50 ML/HR: 5 INJECTION, SOLUTION INTRAVENOUS at 19:37

## 2020-05-06 RX ADMIN — PHENYLEPHRINE HYDROCHLORIDE 100 MCG: 10 INJECTION INTRAVENOUS at 14:52

## 2020-05-06 RX ADMIN — LIDOCAINE HYDROCHLORIDE 60 MG: 20 INJECTION, SOLUTION INTRAVENOUS at 14:34

## 2020-05-06 RX ADMIN — DEXTROSE MONOHYDRATE 50 ML/HR: 5 INJECTION, SOLUTION INTRAVENOUS at 01:09

## 2020-05-06 RX ADMIN — PHENYLEPHRINE HYDROCHLORIDE 100 MCG: 10 INJECTION INTRAVENOUS at 15:05

## 2020-05-06 RX ADMIN — LEVOTHYROXINE SODIUM 100 MCG: 0.03 TABLET ORAL at 05:46

## 2020-05-06 RX ADMIN — QUETIAPINE FUMARATE 25 MG: 25 TABLET ORAL at 21:59

## 2020-05-06 RX ADMIN — PANTOPRAZOLE SODIUM 40 MG: 40 TABLET, DELAYED RELEASE ORAL at 17:18

## 2020-05-06 RX ADMIN — PHENYLEPHRINE HYDROCHLORIDE 100 MCG: 10 INJECTION INTRAVENOUS at 14:49

## 2020-05-06 RX ADMIN — ATORVASTATIN CALCIUM 40 MG: 20 TABLET, FILM COATED ORAL at 21:59

## 2020-05-06 RX ADMIN — GLYCOPYRROLATE 0.1 MG: 0.2 INJECTION INTRAMUSCULAR; INTRAVENOUS at 14:45

## 2020-05-06 NOTE — ANESTHESIA PREPROCEDURE EVALUATION
Relevant Problems   No relevant active problems       Anesthetic History   No history of anesthetic complications            Review of Systems / Medical History  Patient summary reviewed, nursing notes reviewed and pertinent labs reviewed    Pulmonary          Shortness of breath         Neuro/Psych   Within defined limits           Cardiovascular    Hypertension        Dysrhythmias   Pacemaker         GI/Hepatic/Renal         Renal disease: CRI and ARF       Endo/Other      Hypothyroidism       Other Findings   Comments: Acute renal failure (HCC)  Elevated PSA  Urinary retention  Guaiac + stool  Neoplastic disease  Syncope and collapse  Head injury  Forehead contusion  Wrist abrasion, infected, left, initial encounter  Wrist sprain, left, initial encounter  Abdominal pain  Hypothermia  Transaminitis  Chronic renal failure, stage 3 (moderate) (HCC)  SIRS (systemic inflammatory response syndrome) (HCC)  Gastritis  Duodenitis  Sustained ventricular tachycardia (HCC)  UTI (urinary tract infection)  Acute renal failure (ARF) (HCC)  Bradycardia  Hypotension  Cardiomyopathy (HCC)  Grade II diastolic dysfunction  Hypothyroidism  Right lower lobe pneumonia (HCC)  Chronic combined systolic and diastolic congestive heart failure (HCC)  Other chest pain  Dysphagia  Hypoglycemia  HTN (hypertension)  Left bundle branch block  Sinoatrial node dysfunction (HCC)  Gastrointestinal bleeding  Pacemaker             Physical Exam    Airway  Mallampati: II  TM Distance: 4 - 6 cm  Neck ROM: normal range of motion, short neck        Cardiovascular    Rhythm: regular  Rate: normal         Dental      Comments: MOST TEETH MISSING, NONE LOOSE   Pulmonary  Breath sounds clear to auscultation               Abdominal  Abdominal exam normal       Other Findings            Anesthetic Plan    ASA: 4  Anesthesia type: general and MAC          Induction: Intravenous  Anesthetic plan and risks discussed with: Patient

## 2020-05-06 NOTE — PROGRESS NOTES
Problem: Self Care Deficits Care Plan (Adult)  Goal: *Acute Goals and Plan of Care (Insert Text)  Description: Occupational Therapy Goals    Re-evaluation 5/6/2020  1. Patient will perform grooming with modified independence seated edge of bed with good sitting balance. 2.  Patient will perform bathing with modified independence. 3.  Patient will perform upper body dressing and lower body dressing with modified independence . 4. Patient will perform bedside commode transfers with modified independence using LRAD. 5.  Patient will perform all aspects of toileting with modified independence. 6. Patient will recall pacemaker precautions with 100% accuracy and don/doff sling with Mod I.   7.  Patient will utilize energy conservation techniques during functional activities with min verbal cues. Initiated 4/30/2020 within 7 day(s). 1.  Patient will perform grooming with modified independence seated edge of bed with good sitting balance. 2.  Patient will perform bathing with modified independence. 3.  Patient will perform upper body dressing and lower body dressing with modified independence. 4.  Patient will perform bedside commode transfers with modified independence using RW. 5.  Patient will perform all aspects of toileting with modified independence. 6.  Patient will participate in upper extremity therapeutic exercise/activities with modified independence for 10 minutes. 7.  Patient will utilize energy conservation techniques during functional activities with min verbal cues. Prior Level of Function: Mod I with assist as needed by son for ADLs, Mod I using RW for functional mobility    Outcome: Progressing Towards Goal     OCCUPATIONAL THERAPY RE-EVALUATION    Patient: Keira Giron (30 y.o. male)  Date: 5/6/2020  Primary Diagnosis: Hypothermia [T68. XXXA]  Procedure(s) (LRB):   Insert Ppm Single Ventricular (N/A) 1 Day Post-Op   Precautions:   Fall, Other (comment), Aspiration(Pacemaker precautions, sling LUE)  PLOF: see above    ASSESSMENT :  Nursing/Kelley cleared pt to participate in OT tx. Re-evaluation d/t new Pacemaker placement 5/5/2020. Patient presents sleeping, easily awakens. Patient reports no pain, unable to clearly state Pacemaker precautions, OTR instructed: keep left arm below shoulder, use sling, pt responded \"I won't move it. \" Patient currently NPO, awaiting EGD. Patient agreeable to wash face, SBA using RUE. Call bell within reach & pt verbalized understanding to utilize for assist e.g. functional transfers in order to prevent falls. Patient will benefit from skilled intervention to address the above impairments. Patient's rehabilitation potential is considered to be Good  Factors which may influence rehabilitation potential include:   [x]             None noted  []             Mental ability/status  []             Medical condition  []             Home/family situation and support systems  []             Safety awareness  []             Pain tolerance/management  []             Other:      PLAN :  Recommendations and Planned Interventions:   [x]               Self Care Training                  [x]      Therapeutic Activities  [x]               Functional Mobility Training   []      Cognitive Retraining  [x]               Therapeutic Exercises           []      Endurance Activities  [x]               Balance Training                    [x]      Neuromuscular Re-Education  []               Visual/Perceptual Training     [x]      Home Safety Training  [x]               Patient Education                   [x]      Family Training/Education  []               Other (comment):    Frequency/Duration: Patient will be followed by occupational therapy 3 times a week to address goals.   Discharge Recommendations: Khai Ramsey  Further Equipment Recommendations for Discharge: bedside commode, rolling walker, and wheelchair      SUBJECTIVE:   Patient stated Is there any way I can get my hair cut here? I'm starting to look kind of scruffy.     OBJECTIVE DATA SUMMARY:   Hospital course since last seen and reason for reevaluation:   Past Medical History:   Diagnosis Date    Difficulty urinating     Elevated PSA     Hematuria, unspecified     Hypercholesterolemia     Hypertension     Incomplete bladder emptying     Urinary retention     UTI (urinary tract infection)      Past Surgical History:   Procedure Laterality Date    APPENDECTOMY      EGD  2/7/2014         HX TURP  6/13/16    HX TURP      HI INS NEW/RPLC PRM PACEMAKER W/TRANSV ELTRD VENTR N/A 5/5/2020    Insert Ppm Single Ventricular performed by Zoila Loomis MD at [address] LAB     Barriers to Learning/Limitations: None  Compensate with: visual, verbal, tactile, kinesthetic cues/model    Home Situation:   Home Situation  Home Environment: Private residence  # Steps to Enter: 3  Rails to Enter: Yes  Hand Rails : Bilateral  One/Two Story Residence: Two story, live on 1st floor  Living Alone: No  Support Systems: Family member(s)  Patient Expects to be Discharged to[de-identified] Private residence  Current DME Used/Available at Home: Josh Ortiz, Wander Mccloud, Walker, rollator, Wheelchair, [address] [employer] chair, Raised toilet seat, Safety frame [employer], Grab bars, Commode, bedside  Tub or Shower Type: Shower  [x]  Right hand dominant   []  Left hand dominant    Cognitive/Behavioral Status:  Neurologic State: Alert  Orientation Level: Oriented X4  Cognition: Follows commands  Safety/Judgement: Fall prevention    Skin: L chest region s/p Pacemaker placement 5/5/2020  Edema: none noted      Vision/Perceptual:  appears intact    Coordination: BUE  Coordination: (RUE intact for self care tasks, LUE NT-in sling)      Strength: BUE,  RUE 3+/5, LUE Not tested  Tone & Sensation: BUE intact    Range of Motion: BUE  AROM: (RUE intact, LUE in sling NT d/t new pacemaker placement)     Toilet Transfer : Other (comment)(deferred d/t pt fatigue )     ADL Assessment: Feeding: Other (comment)(currently NPO awaiting EGD)    Oral Facial Hygiene/Grooming: Stand-by assistance    Bathing: Maximum assistance    Upper Body Dressing: Maximum assistance    Lower Body Dressing: Total assistance    Toileting: Total assistance    Cognitive Retraining  Safety/Judgement: Fall prevention    Pain:  Pain level pre-treatment: 0/10   Pain level post-treatment: 0/10  Pain Intervention(s): Medication (see MAR); Rest, Ice, Repositioning   Response to intervention: Nurse notified, See doc flow    Activity Tolerance:   poor  Please refer to the flowsheet for vital signs taken during this treatment. After treatment:   [] Patient left in no apparent distress sitting up in chair  [x] Patient left in no apparent distress in bed  [x] Call bell left within reach  [x] Nursing notified  [] Caregiver present  [] Bed alarm activated    COMMUNICATION/EDUCATION:   [x] Role of Occupational Therapy in the acute care setting  [x] Home safety education was provided and the patient/caregiver indicated understanding. [x] Patient/family have participated as able in goal setting and plan of care. [x] Patient/family agree to work toward stated goals and plan of care. [] Patient understands intent and goals of therapy, but is neutral about his/her participation. [] Patient is unable to participate in goal setting and plan of care.     Thank you for this referral.  Cally Sanchez  Time Calculation: 12 mins

## 2020-05-06 NOTE — PROGRESS NOTES
1935  Bedside, Verbal and Written shift change report given to [facility] IRIS RN (oncoming nurse) by Rhys Godinez RN (offgoing nurse). Report included the following information SBAR, Kardex, Intake/Output, MAR and Recent Results. Patient alert and oriented x4. Denies pain. D5 @ 75 infusing without difficulty into brown port of central line. 2000 Patient incontinent of urine, cleaned and changed. Excoriation to nahomy area and buttock, ointment applied. 2159 PM medications administered, pt tolerated with ease, will continue to monitor. 2310 BP 80/49, Dr. Yuval De La Rosa notified. New order to infuse NS bolus. 0000 Shift reassessment, pt condition unchanged, will continue to monitor. 4840 Blood pressure 120/63 after bolus. 0400  Shift reassessment, pt sleeping between care, will continue to monitor. 0700 Bedside, Verbal and Written shift change report given to Cristal (oncoming nurse) by [facility] IRIS RN (offgoing nurse). Report included the following information SBAR, Kardex, Intake/Output, MAR and Recent Results. Skin assessment completed.

## 2020-05-06 NOTE — H&P
History and Physical    Fernie Brandon  1934  [id number]  [id number]    Pre-Procedure Diagnosis:  GI bleed      Evaluation of past illnesses, surgeries, or injuries:   YES  Past Medical History:   Diagnosis Date    Difficulty urinating     Elevated PSA     Hematuria, unspecified     Hypercholesterolemia     Hypertension     Incomplete bladder emptying     Urinary retention     UTI (urinary tract infection)      Past Surgical History:   Procedure Laterality Date    APPENDECTOMY      EGD  2/7/2014         HX TURP  6/13/16    HX TURP      HI INS NEW/RPLC PRM PACEMAKER W/TRANSV ELTRD VENTR N/A 5/5/2020    Insert Ppm Single Ventricular performed by Diane Denise MD at [facility] CATH LAB       Allergies: Allergies   Allergen Reactions    Amlodipine Swelling and Itching    Bactrim [Sulfamethoprim Ds] Rash    Lisinopril Other (comments)     Acute renal failure    Ciprofloxacin Rash and Itching       Previous reactions to sedation/analgesia?   NO    Review of current medications, supplement, herbals and nutraceuticals complete:  YES  Current Facility-Administered Medications   Medication Dose Route Frequency Provider Last Rate Last Dose    [START ON 5/7/2020] furosemide (LASIX) tablet 20 mg  20 mg Oral DAILY Alfredo Saucedo MD        oxyCODONE-acetaminophen (PERCOCET) 5-325 mg per tablet 1 Tab  1 Tab Oral Q4H PRN Jesús Ortega MD        aspirin chewable tablet 81 mg  81 mg Oral DAILY Alfredo Saucedo MD   81 mg at 05/05/20 1123    atropine injection 1 mg  1 mg IntraVENous Q5MIN PRN Nadine Lang DO        insulin lispro (HUMALOG) injection   SubCUTAneous AC&HS Renetta Eis H, DO   Stopped at 05/03/20 1727    dextrose 5% infusion  50 mL/hr IntraVENous CONTINUOUS Arnulfo Marrufo MD 50 mL/hr at 05/06/20 0109 50 mL/hr at 05/06/20 0109    pantoprazole (PROTONIX) tablet 40 mg  40 mg Oral ACB Renetta Eis H, DO   40 mg at 05/05/20 1123    0.9% sodium chloride infusion 250 mL  250 mL IntraVENous PRN Tran Perea DO        dextrose 10% infusion 125-250 mL  125-250 mL IntraVENous PRN Amaris Durham MD   125 mL at 04/29/20 0602    QUEtiapine (SEROquel) tablet 25 mg  25 mg Oral QHS Amaris Durham MD   25 mg at 05/05/20 2215    lidocaine 4 % patch 1 Patch  1 Patch TransDERmal Q24H Melina Brito MD   Stopped at 05/05/20 0800    haloperidol lactate (HALDOL) injection 2 mg  2 mg IntraMUSCular Q6H PRN Elma Savage PA-C   2 mg at 04/25/20 2130    glucose chewable tablet 16 g  4 Tab Oral PRN Melina Brito MD        glucagon [facility]) injection 1 mg  1 mg IntraMUSCular PRN Melina Brito MD        levothyroxine (SYNTHROID) tablet 100 mcg  100 mcg Oral Merryl ASPEN Pace   100 mcg at 05/06/20 8183    sodium chloride (NS) flush 5-10 mL  5-10 mL IntraVENous PRN Atlee Hamman, MD   10 mL at 05/05/20 1328    cholecalciferol (VITAMIN D3) (1000 Units /25 mcg) tablet 2 Tab  2,000 Units Oral DAILY Atlee Hamman, MD   2 Tab at 05/05/20 1123    atorvastatin (LIPITOR) tablet 40 mg  40 mg Oral QHS Atlee Hamman, MD   40 mg at 05/05/20 2215    sucralfate (CARAFATE) tablet 1 g  1 g Oral QID Atlee Hamman, MD   1 g at 05/05/20 2215    tamsulosin (FLOMAX) capsule 0.4 mg  0.4 mg Oral DAILY Atlee Hamman, MD   0.4 mg at 05/05/20 1123    [Held by provider] heparin (porcine) injection 5,000 Units  5,000 Units SubCUTAneous Q8H Atlee Hamman, MD   5,000 Units at 04/23/20 0015          Pertinent labs reviewed? YES    History of substance abuse?   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   Social Needs    Financial resource strain: Not on file    Food insecurity     Worry: Not on file     Inability: Not on file   [employer] needs     Medical: Not on file     Non-medical: Not on file   Tobacco Use    Smoking status: Never Smoker    Smokeless tobacco: Never Used   Substance and Sexual Activity    Alcohol use: Yes     Alcohol/week: 2.0 standard drinks     Types: 2 Cans of beer per week     Comment: Occasional    Drug use: No    Sexual activity: Not on file   Lifestyle    Physical activity     Days per week: Not on file     Minutes per session: Not on file    Stress: Not on file   Relationships    Social connections     Talks on phone: Not on file     Gets together: Not on file     Attends Church service: Not on file     Active member of club or organization: Not on file     Attends meetings of clubs or organizations: Not on file     Relationship status: Not on file    Intimate partner violence     Fear of current or ex partner: Not on file     Emotionally abused: Not on file     Physically abused: Not on file     Forced sexual activity: Not on file   Other Topics Concern    Not on file   Social History Narrative    Not on file       Cardiac Status:  WNL    Mental Status:  WNL     Pulmonary Status:  WNL    NPO:  5-8    Assessment/Impression: Hx of melena, suspected upper Gi bleed.      Plan of treatment: ADDISON Negro MD  5/6/2020  2:30 PM

## 2020-05-06 NOTE — PROCEDURES
Endoscopy Procedure Note    Patient: Vikas Pollack MRN: [id number]  SSN: [id number]    YOB: 1934  Age: 80 y.o. Sex: male      Date/Time:  5/6/2020 2:49 PM    Esophagogastroduodenoscopy (EGD) Procedure Note    Procedure: Esophagogastroduodenoscopy with balloon dilation of esophageal stricture and gastric biopsies. IMPRESSION:   1. 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 2. Small, sliding, hiatus hernia. 3. Non-specific, non-erosive gastritis. Biopsies taken from the body and antrum to rule out H pylori infection. 4. Large, deep ulcer at apex of bulb with no high-risk stigmata of bleeding. RECOMMENDATIONS:  1. -Await pathology. 2. -Pantoprazole 40 bid. 3. -No NSAIDs. 4. -Outpatient follow-up. Indication: Melena  :  Rena Stack MD  Referring Provider:   Aba Sanford MD  History: The history and physical exam were reviewed and updated. Endoscope: [id number]  Extent of Exam: second portion of the duodenum  ASA: Per Anesthesia  Anethesia/Sedation:  MAC anesthesia    Description of the procedure: The procedure was discussed with the patient including risks, benefits, alternatives including risks of iv sedation, bleeding, perforation and aspiration. A safety timeout was performed. The patient was placed in the left lateral decubitus position. A bite block was placed. The patient was given incremental doses of intravenous sedation until moderate sedation was achieved. The patients vital signs were monitored at all times including heart rate/rhythm, blood pressure and oxygen saturation. The endoscope was then passed under direct visualization to the second portion of the duodenum. The endoscope was then slowly withdrawn while visualizing the mucosa. In the stomach a retroflexion was performed and gastric fundus and cardia visualized. The endoscope was then slowly withdrawn. The patient was then transferred to recovery in stable condition. Findings:    Esophagus: The esophageal mucosa without ulcers, erosions, inflammatory changes, changes of Richardson's esophagus, strictures, or mass lesions. Stomach: The gastric mucosa was without ulcers, erosions, vascular, or mass lesions. The gastric folds were normal. There was no retained food in the stomach. Patchy erythema was seen in the antrum of the stomach. Biopsies were taken to rule out H pylori infection. The pylorus was not deformed. Duodenum: The duodenum mucosa was without ulcers, erosions, inflammatory changes, vascular or mass lesions. Therapies:  esophageal dilation with 18-20 mm CRE balloon    Specimens:   ID Type Source Tests Collected by Time Destination   1 : bx body antrum r/o H. pylori Preservative Stomach, Antrum  Jarad Wilson MD 5/6/2020 6933 Pathology               Complications:   None; patient tolerated the procedure well.     EBL:Minimal    Discharge disposition:  Home in the company of  when able to ambulate    Jesusita Low MD  May 6, 2020  2:49 PM

## 2020-05-06 NOTE — PROGRESS NOTES
completed follow up visit with and Spiritual assessment of patient  In room 3010 and offered Pastoral care support  Once again. Patient  had many complaints including back pain but he said the staff were doing the best they can to help him get better.   Chaplains will continue to follow and will provide pastoral care on an as needed/requested basis    Chaplain Hernandez Hoffmann   Board Certified [address] [place] [address]   [phone]

## 2020-05-06 NOTE — PROGRESS NOTES
PHYSICAL THERAPY RE-EVALUATION    Patient: Olga Carranza (36 y.o. male)  Date: 5/6/2020  Primary Diagnosis: Hypothermia [T68. XXXA]  Procedure(s) (LRB): Insert Ppm Single Ventricular (N/A) 1 Day Post-Op   Precautions:   Fall  PLOF: Mod I    ASSESSMENT:  Nurse consulted prior to re-evaluation and stated patient was appropriate. Pt required bed pan usage at onset of treatment. He required assistance of Rolling: Moderate assistance and Assist x2, Supine to Sit: Moderate assistance;Assist x2. Upon sitting EOB he required constant support secondary to poor static balance. Was able to sit for 3 minutes before returning to bed. Pt continues to benefit from skilled therapy to address functional limitations Educated on need for RN assistance with mobility; verbalized understanding. Call bell in reach. Patient will benefit from skilled intervention to address the above impairments. Patient's rehabilitation potential is considered to be Fair  Factors which may influence rehabilitation potential include:   []         None noted  [x]         Mental ability/status  []         Medical condition  []         Home/family situation and support systems  []         Safety awareness  []         Pain tolerance/management  []         Other:      PLAN :  Recommendations and Planned Interventions:   [x]           Bed Mobility Training             [x]    Neuromuscular Re-Education  [x]           Transfer Training                   []    Orthotic/Prosthetic Training  [x]           Gait Training                          []    Modalities  [x]           Therapeutic Exercises           []    Edema Management/Control  [x]           Therapeutic Activities            []    Family Training/Education  [x]           Patient Education  []           Other (comment):    Frequency/Duration: Patient will be followed by physical therapy 3-5 times a week to address goals.   Discharge Recommendations: [facility] Recommendations for Discharge: TBD next level of care     SUBJECTIVE:   Patient stated I need to use the bed pan.     OBJECTIVE DATA SUMMARY:   Hospital course since last seen and reason for re-evaluation:   Past Medical History:   Diagnosis Date    Difficulty urinating     Elevated PSA     Hematuria, unspecified     Hypercholesterolemia     Hypertension     Incomplete bladder emptying     Urinary retention     UTI (urinary tract infection)      Past Surgical History:   Procedure Laterality Date    APPENDECTOMY      EGD  2/7/2014         HX TURP  6/13/16    HX TURP      OR INS NEW/RPLC PRM PACEMAKER W/TRANSV ELTRD VENTR N/A 5/5/2020    Insert Ppm Single Ventricular performed by Estefany Jacobsen MD at [address] LAB     Barriers to Learning/Limitations: no  Home Situation:   Home Situation  Home Environment: Private residence  # Steps to Enter: 3  Rails to Enter: Yes  Hand Rails : Bilateral  One/Two Story Residence: Two story, live on 1st floor  Living Alone: No  Support Systems: Family member(s)  Patient Expects to be Discharged to[de-identified] Private residence  Current DME Used/Available at Home: [employer], rolling, Walker, rollator, Wheelchair, [address] chair, Raised toilet seat, Safety frame [employer], Grab bars, Commode, bedside  Tub or Shower Type: Shower  Critical Behavior:  Neurologic State: Alert  Orientation Level: Oriented X4  Cognition: Follows commands     Psychosocial  Patient Behaviors: Calm; Cooperative     Range Of Motion:  AROM: Generally decreased, functional     Functional Mobility:  Bed Mobility:  Rolling: Moderate assistance;Assist x2  Supine to Sit: Moderate assistance;Assist x2  Sit to Supine:  Moderate assistance;Assist x2    Balance:   Sitting: Impaired  Sitting - Static: Poor (constant support)      Neuro-Re-Education:  Seated with Fair Support: 3 minutes    Therapeutic Exercises:   Seated Marches and LAQ for 10 repetitions    Pain:  Pain level pre-treatment: 0/10   Pain level post-treatment: 0/10   Pain Intervention(s) : Medication (see MAR); Rest  Response to intervention: Nurse notified, See doc flow    Activity Tolerance:   Fair    Please refer to the flowsheet for vital signs taken during this treatment. After treatment:   []         Patient left in no apparent distress sitting up in chair  [x]         Patient left in no apparent distress in bed  [x]         Call bell left within reach  [x]         Nursing notified  []         Caregiver present  []         Bed alarm activated  []         SCDs applied    COMMUNICATION/EDUCATION:   [x]         Role of Physical Therapy in the acute care setting. [x]         Fall prevention education was provided and the patient/caregiver indicated understanding. [x]         Patient/family have participated as able in goal setting and plan of care. []         Patient/family agree to work toward stated goals and plan of care. []         Patient understands intent and goals of therapy, but is neutral about his/her participation. []         Patient is unable to participate in goal setting/plan of care: ongoing with therapy staff.  []         Other:     Thank you for this referral.  Carmen Stairs   Time Calculation: 20 mins

## 2020-05-06 NOTE — PROGRESS NOTES
Speech Therapy:    10:30: 1st attempt Pt NPO For EGD this date. Attempted to see for exercises only however on bedpan and then working with PT. SLP will attempt to see later this date/ tomorrow. 14:34: 2nd Attempt  Pt KADEN for EGD. Slp will attempt next date.       Clive Durbin CCC-SLP

## 2020-05-06 NOTE — ANESTHESIA POSTPROCEDURE EVALUATION
Procedure(s):  ESOPHAGOGASTRODUODENOSCOPY (EGD) with bx, dilation. MAC    Anesthesia Post Evaluation      Multimodal analgesia: multimodal analgesia used between 6 hours prior to anesthesia start to PACU discharge  Patient location: [facility]. Level of consciousness: awake  Pain score: 0  Airway patency: patent  Anesthetic complications: no  Cardiovascular status: stable  Respiratory status: room air  Hydration status: acceptable  Post anesthesia nausea and vomiting:  none      No vitals data found for the desired time range.

## 2020-05-06 NOTE — ROUTINE PROCESS
Bedside shift change report given to Dolores (oncoming nurse) by Kelley(offgoing nurse). Report included the following information Kardex, Procedure Summary, MAR, Recent Results and Cardiac Rhythm V paced. 1707 BS 69. Asymptomatic. Apple sauce given x2. Tray ordered. 1710 Gluc 70 x 2. 
 
1730 Eating dinner. 1900 Awake watching tv. 
 
1920 Bedside shift change report given to [address] [place] [address] (oncoming nurse) by [facility] (offgoing nurse). Report included the following information Kardex, Procedure Summary, Intake/Output, MAR, Recent Results and Cardiac Rhythm vpaced.

## 2020-05-06 NOTE — PROGRESS NOTES
80 start with cardiac Electrophysiology Progress Note  Admit Date: 4/21/2020    Assessment:     -Irreversible symptomatic bradycardia, intermittent heart rate remains 40. Continues despite improvement in hypothermia, stopping BB, and treatment of thyroid disorder  -First degree AV block  -LBBB, chronic  -CAD 4/6/20, no PE  -Chronic systolic heart failure  -Nonischemic cardiomyopathy with EF 45% 4/22/20, unchanged from 12/2019  -Pharm nuc 4/22/20, no ischemia  -Hypothermia resolved  -GERD on PPI  -Thyroid disorder on replacement  -Concern for sepsis initially, s/p covid rule-out, off antbx, no fevers/leukocytosis  -h/o GIB, stable  -Severe deconditioning     Primary cardiologist Dr. Luis Paulters:     Status post permanent pacemaker placement yesterday without any complication  Chest x-ray to be done today to make sure no pneumothorax  Telemetry monitor showed intermittent paced rhythm  Continue aspirin, statin  BP borderline curerntly to start coreg but would consider for LV dysfunction ( as now has PPM) as outpatient. No further cardiac work up planned at this time unless clinical status changes. Call us back if needed. Will be available as needed. Will need to follow up in cardiology clinic in 1 weeks after discharge. Thank you. Subjective:     No new complaints.  Denies CP or SOB    Objective:      Patient Vitals for the past 8 hrs:   Temp Pulse Resp BP SpO2   05/06/20 0824 98 °F (36.7 °C) 61 16 105/67 98 %   05/06/20 0515 97.7 °F (36.5 °C) 60 16 130/60 100 %         Patient Vitals for the past 96 hrs:   Weight   05/06/20 0109 170 lb 10.2 oz (77.4 kg)   05/05/20 0003 170 lb 9.6 oz (77.4 kg)                    Intake/Output Summary (Last 24 hours) at 5/6/2020 1007  Last data filed at 5/6/2020 0659  Gross per 24 hour   Intake 1250 ml   Output 650 ml   Net 600 ml       Physical Exam:  General:  alert, cooperative, no distress, appears stated age  Neck:  nontender  Lungs:  clear to auscultation bilaterally  Heart:  regular rate and rhythm, S1, S2 normal, no murmur, click, rub or gallop  Abdomen:  abdomen is soft without significant tenderness, masses, organomegaly or guarding  Extremities:  extremities normal, atraumatic, no cyanosis or edema    Data Review:     Labs: Results:       Chemistry Recent Labs     05/06/20  0340 05/05/20  0505 05/04/20  0340   GLU 90 74 76    145 145   K 4.0 4.2 4.1   * 117* 117*   CO2 24 25 22   BUN 20* 21* 20*   CREA 1.02 1.02 0.90   CA 7.3* 7.8* 7.3*   MG 1.7 1.8 1.7   PHOS 2.9 2.7 2.4*   AGAP 4 3 6   BUCR 20 21* 22*   AP  --  266*  --    TP  --  4.4*  --    ALB  --  1.8*  --    GLOB  --  2.6  --    AGRAT  --  0.7*  --       CBC w/Diff Recent Labs     05/06/20  0340 05/05/20  0505   WBC 4.8 4.9   RBC 3.05* 2.99*   HGB 9.4* 9.3*   HCT 29.0* 28.3*    191   GRANS 70 64   LYMPH 16* 22   EOS 10* 8*      Cardiac Enzymes No results found for: CPK, CK, CKMMB, CKMB, RCK3, CKMBT, CKNDX, CKND1, TELLY, TROPT, TROIQ, JOSE, TROPT, TNIPOC, BNP, BNPP   Coagulation Recent Labs     05/05/20  0505   PTP 16.1*   INR 1.3*       Lipid Panel No results found for: CHOL, CHOLPOCT, CHOLX, CHLST, CHOLV, 255995, HDL, HDLP, LDL, LDLC, DLDLP, 453000, VLDLC, VLDL, TGLX, TRIGL, TRIGP, TGLPOCT, CHHD, CHHDX   BNP No results found for: BNP, BNPP, XBNPT   Liver Enzymes Recent Labs     05/05/20  0505   TP 4.4*   ALB 1.8*   *   SGOT 25      Digoxin    Thyroid Studies Lab Results   Component Value Date/Time    T4, Total 11.5 02/19/2020 09:28 AM    TSH 4.64 (H) 04/21/2020 06:36 AM          Signed By: Luis Hernandez MD     May 6, 2020

## 2020-05-07 LAB
GLUCOSE BLD STRIP.AUTO-MCNC: 104 MG/DL (ref 70–110)
GLUCOSE BLD STRIP.AUTO-MCNC: 106 MG/DL (ref 70–110)
GLUCOSE BLD STRIP.AUTO-MCNC: 126 MG/DL (ref 70–110)
GLUCOSE BLD STRIP.AUTO-MCNC: 99 MG/DL (ref 70–110)

## 2020-05-07 PROCEDURE — 74011250637 HC RX REV CODE- 250/637: Performed by: INTERNAL MEDICINE

## 2020-05-07 PROCEDURE — 74011250637 HC RX REV CODE- 250/637: Performed by: PHYSICIAN ASSISTANT

## 2020-05-07 PROCEDURE — 92526 ORAL FUNCTION THERAPY: CPT

## 2020-05-07 PROCEDURE — 74011250636 HC RX REV CODE- 250/636: Performed by: INTERNAL MEDICINE

## 2020-05-07 PROCEDURE — 97535 SELF CARE MNGMENT TRAINING: CPT

## 2020-05-07 PROCEDURE — 97530 THERAPEUTIC ACTIVITIES: CPT

## 2020-05-07 PROCEDURE — 82962 GLUCOSE BLOOD TEST: CPT

## 2020-05-07 PROCEDURE — 65660000000 HC RM CCU STEPDOWN

## 2020-05-07 PROCEDURE — 74011000250 HC RX REV CODE- 250: Performed by: HOSPITALIST

## 2020-05-07 RX ORDER — CLOTRIMAZOLE AND BETAMETHASONE DIPROPIONATE 10; .64 MG/G; MG/G
CREAM TOPICAL
Qty: 15 G | Refills: 0 | Status: SHIPPED | OUTPATIENT
Start: 2020-05-07 | End: 2020-07-13

## 2020-05-07 RX ORDER — SODIUM CHLORIDE 9 MG/ML
500 INJECTION, SOLUTION INTRAVENOUS ONCE
Status: COMPLETED | OUTPATIENT
Start: 2020-05-07 | End: 2020-05-07

## 2020-05-07 RX ORDER — FUROSEMIDE 20 MG/1
20 TABLET ORAL DAILY
Qty: 30 TAB | Refills: 0 | Status: SHIPPED | OUTPATIENT
Start: 2020-05-07 | End: 2020-06-06

## 2020-05-07 RX ORDER — PANTOPRAZOLE SODIUM 40 MG/1
40 TABLET, DELAYED RELEASE ORAL
Qty: 60 TAB | Refills: 0 | Status: SHIPPED | OUTPATIENT
Start: 2020-05-07 | End: 2020-06-06

## 2020-05-07 RX ORDER — LIDOCAINE 4 G/100G
PATCH TOPICAL
Qty: 1 PACKAGE | Refills: 0 | Status: SHIPPED | OUTPATIENT
Start: 2020-05-08

## 2020-05-07 RX ORDER — CLOTRIMAZOLE AND BETAMETHASONE DIPROPIONATE 10; .64 MG/G; MG/G
CREAM TOPICAL 2 TIMES DAILY
Status: DISCONTINUED | OUTPATIENT
Start: 2020-05-07 | End: 2020-05-11 | Stop reason: HOSPADM

## 2020-05-07 RX ADMIN — QUETIAPINE FUMARATE 25 MG: 25 TABLET ORAL at 21:00

## 2020-05-07 RX ADMIN — ATORVASTATIN CALCIUM 40 MG: 20 TABLET, FILM COATED ORAL at 21:00

## 2020-05-07 RX ADMIN — CLOTRIMAZOLE AND BETAMETHASONE DIPROPIONATE: 10; .5 CREAM TOPICAL at 17:34

## 2020-05-07 RX ADMIN — DEXTROSE MONOHYDRATE 50 ML/HR: 5 INJECTION, SOLUTION INTRAVENOUS at 06:21

## 2020-05-07 RX ADMIN — PANTOPRAZOLE SODIUM 40 MG: 40 TABLET, DELAYED RELEASE ORAL at 17:34

## 2020-05-07 RX ADMIN — FUROSEMIDE 20 MG: 20 TABLET ORAL at 10:07

## 2020-05-07 RX ADMIN — MELATONIN 2 TABLET: at 10:08

## 2020-05-07 RX ADMIN — LEVOTHYROXINE SODIUM 100 MCG: 0.03 TABLET ORAL at 06:13

## 2020-05-07 RX ADMIN — ASPIRIN 81 MG 81 MG: 81 TABLET ORAL at 10:08

## 2020-05-07 RX ADMIN — TAMSULOSIN HYDROCHLORIDE 0.4 MG: 0.4 CAPSULE ORAL at 10:08

## 2020-05-07 RX ADMIN — PANTOPRAZOLE SODIUM 40 MG: 40 TABLET, DELAYED RELEASE ORAL at 06:31

## 2020-05-07 RX ADMIN — SODIUM CHLORIDE 500 ML: 900 INJECTION, SOLUTION INTRAVENOUS at 14:40

## 2020-05-07 NOTE — PROGRESS NOTES
Received call back from [facility] in admissions, they take him today without additional covid test.  [facility] transport set up for 1500. Met with pt & made him aware, he is agreeable. Called pt's son, Lakeshia Gtz, & made him aware, he is agreeable to transfer. Nursing made aware. Brooke Coreas,RN,ext [id number]. Patient and/or next of kin has been given the [place] Important Message From Medicare About Your Rights\" letter and all questions were answered.        Care Management Interventions  PCP Verified by CM: Yes(Kristan Rodriguez)  Mode of Transport at Discharge: [facility]  Transition of Care Consult (CM Consult): SNF  Partner SNF: No  Reason Why Partner SNF Not Chosen: Friend/family recommendation  Discharge Durable Medical Equipment: No  Physical Therapy Consult: Yes  Occupational Therapy Consult: Yes  Speech Therapy Consult: Yes  Current Support Network: (lives with son)  Confirm Follow Up Transport: Family  The Plan for Transition of Care is Related to the Following Treatment Goals : rehab  The Patient and/or Patient Representative was Provided with a Choice of Provider and Agrees with the Discharge Plan?: Yes  Name of the Patient Representative Who was Provided with a Choice of Provider and Agrees with the Discharge Plan: Jessica Ortiz  Freedom of Choice List was Provided with Basic Dialogue that Supports the Patient's Individualized Plan of Care/Goals, Treatment Preferences and Shares the Quality Data Associated with the Providers?: Yes  Discharge Location  Discharge Placement: Skilled nursing facility([address])

## 2020-05-07 NOTE — PROGRESS NOTES
Internal Medicine Progress Note        NAME: Rylee Yee   :  1934  MRM:  [id number]    Date/Time: 2020      LATE ENTRY FOR SERVICE ON 2020    ASSESSMENT/PLAN:          #  Dysrhythmia , heart block (LBBB ,long first degree and wide QRS), ho   Sinoatrial node dysfunction  and mild elevation of trop level. Stabilized initially in ICU. given heart rate down to 40 with 1st AV block and LBBB,  S/P  implantation of single chamber Medtronic pacemaker . History of  Cardiomyopathy  LVEF 35-40% by previous TTE and Grade II diastolic dysfunction. had short runs of NSVT. Corlis Ulysses testing with inferior and inferoseptal fixed defect more consistent with infarction,  no significant ongoing ischemia    -post pacemaker care per cardiology team.     # Acute delirium. Acute confusional state. Treat medical problems . Restrain as needed. Correct s. Na  level  Haldol used  per PCCM team    #  SIRS. Hypothermia , Hypotension and bradycardia. Unclear source of infection o nadmission. Sepsis work-up started in the emergency room. Treated with Abx vanco and zosyn. Hypotension and Hypothermia Likely due to Hypovolemic shock 2/2 GI bleed   TSH normal  Appreciate PCCM input. Warming blankets Used initially  Needed iv pressors   Blood and urine CS NTD  Repeat UA. # GIB presented as Melena , anemia. GI consulted. Avoid NSAIDs. Protonix. .  Dw GI , for EGD. # Anemia due to likely acute blood loss . Maintain Hb jacinto than 7, transfuse as needed. # Hypoglycemia. Monitor blood sugar and treat accordingly. BSL stable  Treated with D5 ivf , due to low po intake. Dc ivf and encourage po intake. .     #  Chronic renal failure, stage 3 (moderate) ([address] (2019). S.cr stable   Monitor Renal function and other labs as indicated. Avoid nephrotoxins , iv Contrast, NSAID. Renally dosing medications. Monitor urine out put. Serum creatinine   improved    # Hypernatremia. Change IVF to D5. Trended down  Follow-up   TSH normal     # ho   Hypothyroidism . On replacement. Checked TSH , T4     #  Dysphagia (4/8/2020)    Encouraged on PO. Episode  of aspiration again 4/24  am SLP followed him  TPN  In ICU. #   HTN (hypertension) (4/21/2020)      -DVT prophylaxis : Heparin.   - Code Status : Full          Lab Review:     Recent Labs     05/06/20  0340 05/05/20  0505   WBC 4.8 4.9   HGB 9.4* 9.3*   HCT 29.0* 28.3*    191     Recent Labs     05/06/20  0340 05/05/20  0505    145   K 4.0 4.2   * 117*   CO2 24 25   GLU 90 74   BUN 20* 21*   CREA 1.02 1.02   CA 7.3* 7.8*   MG 1.7 1.8   PHOS 2.9 2.7   ALB  --  1.8*   TBILI  --  0.4   SGOT  --  25   ALT  --  19   INR  --  1.3*     Lab Results   Component Value Date/Time    Glucose (POC) 106 05/07/2020 07:51 AM    Glucose (POC) 119 (H) 05/06/2020 08:38 PM    Glucose (POC) 70 05/06/2020 05:41 PM    Glucose (POC) 71 05/06/2020 05:26 PM    Glucose (POC) 69 (L) 05/06/2020 05:07 PM    Glucose (POC) 96 10/10/2014 09:28 PM    Glucose (POC) 186 (H) 10/10/2014 03:48 PM     No results for input(s): PH, PCO2, PO2, HCO3, FIO2 in the last 72 hours. Recent Labs     05/05/20  0505   INR 1.3*       Lab Results   Component Value Date/Time    Specimen Description: [facility] 02/06/2014 05:45 PM     Lab Results   Component Value Date/Time    Culture result: No growth (<1,000 CFU/ML) 04/22/2020 09:20 AM    Culture result: NO GROWTH 6 DAYS 04/21/2020 08:15 AM    Culture result: NO GROWTH 6 DAYS 04/21/2020 08:00 AM       All Cardiac Markers in the last 24 hours:   No results found for: CPK, CK, CKMMB, CKMB, RCK3, CKMBT, CKNDX, CKND1, TELLY, TROPT, TROIQ, JOSE, TROPT, TNIPOC, BNP, BNPP       Intervals noted      Subjective:     Chief Complaint:      Deny complain  No pains   Will eat better    ROS:      Deny pains, N/V/D/CP/SOB/Fever       Objective:     Vitals:  Last 24hrs VS reviewed since prior progress note.  Most recent are:    Visit Vitals  /55   Pulse (!) 57   Temp 96.1 °F (35.6 °C)   Resp 18   Ht 5' 7\" (1.702 m)   Wt 77.4 kg (170 lb 10.2 oz)   SpO2 95%   BMI 26.73 kg/m²     SpO2 Readings from Last 6 Encounters:   05/07/20 95%   04/09/20 98%   02/21/20 92%   01/02/20 96%   12/16/19 97%   09/23/18 99%    O2 Flow Rate (L/min): 2 l/min       Intake/Output Summary (Last 24 hours) at 5/7/2020 1123  Last data filed at 5/7/2020 2584  Gross per 24 hour   Intake 1730.84 ml   Output    Net 1730.84 ml          Physical Exam:   Gen:  Comfortable,    in no acute distress. Appear stated age, Well-developed   HEENT:  Head atraumatic, normocephalic ,  moist mucous membranes. Neck:   Trachea midline , No apparent JVD, Supple, without masses, thyroid non-tender  Resp:  No accessory muscle use,Bilateral BS present, clear breath sounds without wheezes rales or rhonchi  Card:  normal S1, S2 without Gallop . Mild lower leg peripheral edema. Abd:  Soft, non-tender, non-distended, bowel sounds are present . Navin Connie Musc:  No cyanosis or clubbing. Skin:  No rashes or ulcers, skin turgor is good. Neuro:SLEEPY ,  no clear area of focal motor weakness, does not follows commands appropriately.         Telemetry reviewed:   normal sinus rhythm    Medications Reviewed: (see below)    Lab Data Reviewed: (see below)    ______________________________________________________________________    Medications:     Current Facility-Administered Medications   Medication Dose Route Frequency    furosemide (LASIX) tablet 20 mg  20 mg Oral DAILY    pantoprazole (PROTONIX) tablet 40 mg  40 mg Oral ACB&D    oxyCODONE-acetaminophen (PERCOCET) 5-325 mg per tablet 1 Tab  1 Tab Oral Q4H PRN    aspirin chewable tablet 81 mg  81 mg Oral DAILY    atropine injection 1 mg  1 mg IntraVENous Q5MIN PRN    insulin lispro (HUMALOG) injection   SubCUTAneous AC&HS    0.9% sodium chloride infusion 250 mL  250 mL IntraVENous PRN    dextrose 10% infusion 125-250 mL  125-250 mL IntraVENous PRN    QUEtiapine (SEROquel) tablet 25 mg  25 mg Oral QHS    lidocaine 4 % patch 1 Patch  1 Patch TransDERmal Q24H    haloperidol lactate (HALDOL) injection 2 mg  2 mg IntraMUSCular Q6H PRN    glucose chewable tablet 16 g  4 Tab Oral PRN    glucagon (GLUCAGEN) injection 1 mg  1 mg IntraMUSCular PRN    levothyroxine (SYNTHROID) tablet 100 mcg  100 mcg Oral 6am    sodium chloride (NS) flush 5-10 mL  5-10 mL IntraVENous PRN    cholecalciferol (VITAMIN D3) (1000 Units /25 mcg) tablet 2 Tab  2,000 Units Oral DAILY    atorvastatin (LIPITOR) tablet 40 mg  40 mg Oral QHS    tamsulosin (FLOMAX) capsule 0.4 mg  0.4 mg Oral DAILY    [Held by provider] heparin (porcine) injection 5,000 Units  5,000 Units SubCUTAneous Q8H          Total time spent with patient: 25 minutes                  Care Plan discussed with: Patient, Care Manager, Nursing Staff and >50% of time spent in counseling and coordination of care    Discussed:  Care Plan    Prophylaxis:  SCD's    Disposition:  Home w/Family           This document in whole or part of it has been produced using voice recognition software. Unrecognized errors in transcription may be present.     Attending Physician: Olena Moreno MD

## 2020-05-07 NOTE — DISCHARGE SUMMARY
Discharge Summary      [facility] - [facility] service    Patient ID:  Olga Carranza, 80 y.o., male  : 1934    Admit Date: 2020  Discharge Date:  2020  Length of stay: 16 day(s)    PCP:  Alexys Oliva MD    Chief Complaint   Patient presents with    Chest Pain      Discharge Diagnosis:     Hospital Problems  Date Reviewed: 2020          Codes Class Noted POA    * (Principal) Pacemaker ICD-10-CM: Z95.0  ICD-9-CM: V45.01  2020 Yes    Overview Signed 2020  9:51 AM by David Lewis MD     Medtronic pacemaker 20             Gastrointestinal bleeding ICD-10-CM: K92.2  ICD-9-CM: 578.9  2020 Yes        Hypoglycemia ICD-10-CM: E16.2  ICD-9-CM: 251.2  2020 Yes        HTN (hypertension) ICD-10-CM: I10  ICD-9-CM: 401.9  2020 Yes        Left bundle branch block ICD-10-CM: I44.7  ICD-9-CM: 426.3  2020 Yes        Sinoatrial node dysfunction ([address] ICD-10-CM: I49.5  ICD-9-CM: 427.81  2020 Yes        Dysphagia ICD-10-CM: R13.10  ICD-9-CM: 787.20  2020 Yes        Cardiomyopathy ([address] ICD-10-CM: I42.9  ICD-9-CM: 425.4  2020 Yes    Overview Signed 2020 12:40 AM by Enid KRAUSE DO     LVEF 35-40% by previous TTE. Grade II diastolic dysfunction ZOC-90-RC: I51.9  ICD-9-CM: 429.9  2020 Yes    Overview Signed 2020 12:41 AM by Ana Nova DO     Per previous TTE. Hypothyroidism ICD-10-CM: E03.9  ICD-9-CM: 244.9  2020 Yes        Bradycardia ICD-10-CM: R00.1  ICD-9-CM: 427.89  2020 Yes        Hypotension ICD-10-CM: I95.9  ICD-9-CM: 458.9  2020 Yes        Hypothermia ICD-10-CM: T68. XXXA  ICD-9-CM: 991.6  2019 Yes        Chronic renal failure, stage 3 (moderate) (HCC) ICD-10-CM: N18.3  ICD-9-CM: 585.3  2019 Yes        SIRS (systemic inflammatory response syndrome) ([facility] ICD-10-CM: R65.10  ICD-9-CM: 995.90  2019 Yes              Discharge instructions:    #  Discharge Diet: Diet: Cardiac, mechanical soft. # Discharge activity and restrictions: Activity as tolerated and PT/OT Eval and Treat    # Follow-up appointments: Follow-up Information     Follow up With Specialties Details Why Justine Soares MD Nephrology In 3 days  [facility]  [phone]         cardiology clinic per cardiology team             HPI on Admission (per admitting physician):  Mr. Aliza Stephens is a 80 y.o.  male who is admitted for hypotension and hypothermia. Mr. Aliza Stephens with past medical history as noted below , presented to the Emergency Department today  complaining of above. Triage and ER notes noted. Patient recently discharged from this hospital for aspiration pneumonia . He report to me he had chest pain started at midnight. This morning his son called EMS because of the chest pain. On upon inquiring he stated the pain is achy and at my waist line and not the chest and all crossover. In the ER his temperature was 92 and blood pressure was very low, started on IV fluid then Levophed started with the placement of central line. Chest x-ray done and urine analysis was negative. He is given hydrocortisone dose and check his thyroid function was better than the last time as a started on replacement in the recent admission here. He normally weak and walks with a walker, he stops driving. He denied really any other symptoms no headache no cough no phlegm no fever no urinary or bowel movement problem no nausea no vomiting no tummy pains no other pains. Also in the ER developed wide-complex QRS on the monitor and given IV magnesium and cardiology consulted, Dr. Samara Parra will see him. For further details and initial management please refer to H/P. Hospital Course:      Feeling good , ate well this morning. Willing to go to rehab. Offer no complains or pains.      By Problems :     #  Dysrhythmia , heart block (LBBB ,long first degree and wide QRS), ho   Sinoatrial node dysfunction  and mild elevation of trop level. Stabilized initially in ICU. given heart rate down to 40 with 1st AV block and LBBB,  S/P  implantation of single chamber Medtronic pacemaker 5/5. History of  Cardiomyopathy  LVEF 35-40% by previous TTE and Grade II diastolic dysfunction. had short runs of NSVT.  Lexiscan Myoview testing with inferior and inferoseptal fixed defect more consistent with infarction,  no significant ongoing ischemia       # Acute delirium. Acute confusional state. Treat medical problems . Restrain as needed used in ICU. Correct s. Na  level. Haldol used  per PCCM team     #  SIRS. Hypothermia , Hypotension and bradycardia. Unclear source of infection on admission. Sepsis work-up started in the emergency room. Treated with Abx vanco and zosyn. Hypotension and Hypothermia Likely due to Hypovolemic shock 2/2 GI bleed. TSH normal. Appreciate PCCM input. Warming blankets Used initially. Needed iv pressors . Blood and urine CS NTD. Repeat UA. Currently off Antibiotic.     # GIB presented as Melena , anemia. GIB noted in ICU. GI consulted. Avoid NSAIDs. Protonix. . Dw GI, went for EGD as below .      # Anemia due to likely acute blood loss . Maintain Hb jacinto than 7, transfuse as needed.      # Hypoglycemia. Monitor blood sugar and treat accordingly. BSL stable currently , need monitoring. Treated with D5 ivf , due to low po intake. Dc ivf and encourage po intake. .      #  Chronic renal failure, stage 3 (moderate) ([facility] (12/23/2019). S.cr stable currently. Monitor Renal function and other labs as indicated. Avoid nephrotoxins , iv Contrast, NSAID. Renally dosing medications. Monitor urine out put. Serum creatinine   improved     # Hypernatremia. Change IVF to D5. Trended down. Follow-up. TSH normal      # ho   Hypothyroidism . On replacement. Checked TSH , T4.      #  Dysphagia (4/8/2020)  as above. Encouraged on PO.   Episode  of aspiration again 4/24  am SLP followed him. Short period of TPN  In ICU.      #   HTN (hypertension) (4/21/2020) control BP. Discharge condition:  improved and stable    Disposition:  SNF    Procedures:   Procedure(s):  ESOPHAGOGASTRODUODENOSCOPY (EGD) with bx, dilation  IMPRESSION:   1. 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 2. Small, sliding, hiatus hernia. 3. Non-specific, non-erosive gastritis. Biopsies taken from the body and antrum to rule out H pylori infection. 4. Large, deep ulcer at apex of bulb with no high-risk stigmata of bleeding.      RECOMMENDATIONS:  1. -Await pathology. 2. -Pantoprazole 40 bid. 3. -No NSAIDs. 4. -Outpatient follow-up. Consultants: GI  IP CONSULT TO INTENSIVIST  IP CONSULT TO GASTROENTEROLOGY  IP CONSULT TO CARDIOLOGY    Physical Exam on Discharge:  Visit Vitals  /55   Pulse (!) 57   Temp 96.1 °F (35.6 °C)   Resp 18   Ht 5' 7\" (1.702 m)   Wt 77.4 kg (170 lb 10.2 oz)   SpO2 95%   BMI 26.73 kg/m²   Gen:  Comfortable,    in no acute distress.  Appear stated age, Well-developed   HEENT:  Head atraumatic, normocephalic ,  moist mucous membranes. Neck:   Trachea midline , No apparent JVD, Supple, without masses, thyroid non-tender  Resp:  No accessory muscle use,Bilateral BS present, clear breath sounds without wheezes rales or rhonchi  Card:  normal S1, S2 without Gallop .   2 + lower leg peripheral edema. Abd:  Soft, non-tender, non-distended, bowel sounds are present . Shaaron Money Musc: L shoulder sling ( per cardiology instructions)   No cyanosis or clubbing. Skin:  No rashes or ulcers, skin turgor is good. Neuro: Alert and coherent , appropriate mood. no clear area of focal motor weakness,  follows commands appropriately.     Hospitalization and discharge instructions d/c in details with : patient , consult  , Nursing/CM     Discharge medications :  Current Discharge Medication List      START taking these medications    Details   furosemide (LASIX) 20 mg tablet Take 1 Tab by mouth daily for 30 days. Qty: 30 Tab, Refills: 0      lidocaine 4 % patch Apply to low back prn for back pains  Qty: 1 Package, Refills: 0         CONTINUE these medications which have CHANGED    Details   pantoprazole (PROTONIX) 40 mg tablet Take 1 Tab by mouth Before breakfast and dinner for 30 days. Qty: 60 Tab, Refills: 0         CONTINUE these medications which have NOT CHANGED    Details   levothyroxine (SYNTHROID) 50 mcg tablet Take 1 Tab by mouth every morning. Qty: 30 Tab, Refills: 0      cholecalciferol (VITAMIN D3) (1000 Units /25 mcg) tablet Take 2 Tabs by mouth daily. Qty: 30 Tab, Refills: 0      simvastatin (ZOCOR) 40 mg tablet Take 1 Tab by mouth nightly. Qty: 30 Tab, Refills: 0      aspirin delayed-release 81 mg tablet Take 1 Tab by mouth daily. Qty: 30 Tab, Refills: 0      allopurinol (ZYLOPRIM) 300 mg tablet Take 300 mg by mouth daily. tamsulosin (FLOMAX) 0.4 mg capsule take 1 capsule by mouth once daily AFTER DINNER  Qty: 90 Cap, Refills: 3      acetaminophen (TYLENOL EXTRA STRENGTH) 500 mg tablet Take 2 Tabs by mouth every six (6) hours as needed for Pain.   Qty: 50 Tab, Refills: 0         STOP taking these medications       omeprazole (PRILOSEC) 20 mg capsule Comments:   Reason for Stopping:         sucralfate (CARAFATE) 1 gram tablet Comments:   Reason for Stopping:                   Most Recent Labs:       Recent Labs     05/06/20  0340 05/05/20  0505   WBC 4.8 4.9   HGB 9.4* 9.3*   HCT 29.0* 28.3*    191     Recent Labs     05/06/20  0340 05/05/20  0505    145   K 4.0 4.2   * 117*   CO2 24 25   GLU 90 74   BUN 20* 21*   CREA 1.02 1.02   CA 7.3* 7.8*   MG 1.7 1.8   PHOS 2.9 2.7   ALB  --  1.8*   TBILI  --  0.4   SGOT  --  25   ALT  --  19   INR  --  1.3*     Lab Results   Component Value Date/Time    Glucose (POC) 106 05/07/2020 07:51 AM    Glucose (POC) 119 (H) 05/06/2020 08:38 PM    Glucose (POC) 70 05/06/2020 05:41 PM    Glucose (POC) 71 05/06/2020 05:26 PM    Glucose (POC) 69 (L) 05/06/2020 05:07 PM    Glucose (POC) 96 10/10/2014 09:28 PM    Glucose (POC) 186 (H) 10/10/2014 03:48 PM     No results for input(s): PH, PCO2, PO2, HCO3, FIO2 in the last 72 hours. Recent Labs     05/05/20  0505   INR 1.3*       Lab Results   Component Value Date/Time    Specimen Description: [facility] 02/06/2014 05:45 PM     Lab Results   Component Value Date/Time    Culture result: No growth (<1,000 CFU/ML) 04/22/2020 09:20 AM    Culture result: NO GROWTH 6 DAYS 04/21/2020 08:15 AM    Culture result: NO GROWTH 6 DAYS 04/21/2020 08:00 AM       All Cardiac Markers in the last 24 hours: No results found for: CPK, CK, CKMMB, CKMB, RCK3, CKMBT, CKNDX, CKND1, TELLY, TROPT, TROIQ, JOSE, TROPT, TNIPOC, BNP, BNPP    XR Results:  Results from Hospital Encounter encounter on 04/21/20   XR CHEST PORT    Narrative EXAM: XR CHEST PORT    CLINICAL INDICATION/HISTORY: s/p device implant  -Additional: None    COMPARISON: 4/27/2020    TECHNIQUE: Portable frontal view of the chest    _______________    FINDINGS:    SUPPORT DEVICES: Interval placement of left chest wall AICD/pacemaker. HEART AND MEDIASTINUM: Cardiomegaly. LUNGS AND PLEURAL SPACES: Central vascular congestion with interstitial and  alveolar edema. Moderate bilateral effusions. _______________      Impression IMPRESSION:    Cardiomegaly with interstitial and alveolar edema. Moderate bilateral effusions. CT Results:  Results from [place] encounter on 04/06/20   CTA CHEST W OR W WO CONT    Addendum Addendum: One or more dose reduction techniques were used on this CT:  automated exposure control, adjustment of the mAs and/or kVp according to patient  size, and iterative reconstruction techniques. The specific techniques used on  this CT exam have been documented in the patient's electronic medical record.   Digital Imaging and Communications in Medicine (DICOM) format image data  are available to nonaffiliated external healthcare facilities or entities on a secure, media free, reciprocally searchable basis with patient  authorization for at least a 12-month period after this study. Paul Randolph MD 4/6/2020  4:48 PM          Narrative EXAM: CTA Chest    INDICATION: Shortness of breath. Possible PE. Chest pain. COMPARISON: 12/16/2019    TECHNIQUE: Axial CT imaging from the thoracic inlet through the diaphragm with  intravenous contrast utilizing CTA study for pulmonary artery evaluation. Coronal and sagittal MIP reformations were generated at a separate workstation. _______________    FINDINGS:    EXAM QUALITY: Overall exam quality is adequate. Pulmonary arterial enhancement  is adequate. Respiratory motion artifact is present, limiting evaluation. PULMONARY ARTERIES: No convincing evidence of pulmonary embolism. MEDIASTINUM: Normal heart size. No evidence of right heart strain. Aorta is  unremarkable. No pericardial effusion. LYMPH NODES: No enlarged mediastinal or hilar nodes by size criteria. AIRWAY: Unremarkable. LUNGS: Right lung base patchy consolidation. PLEURA: Small right and trace left pleural effusions. [place] UPPER ABDOMEN: Visualized upper abdomen is unremarkable. .    OTHER: No acute or aggressive osseous abnormalities identified. _______________      Impression IMPRESSION:    1. No evidence of pulmonary embolism. 2.  Patchy right lung base consolidation. Small right and trace left pleural  effusions. MRI Results:  No results found for this or any previous visit. Nuclear Medicine Results:  No results found for this or any previous visit. US Results:  Results from [place] encounter on 04/21/20   US GALLBLADDER    Narrative EXAM: Limited right upper quadrant abdominal ultrasound    INDICATION: Elevated LFTs. History of gallstones. COMPARISON: CT abdomen and pelvis 12/16/2019.     TECHNIQUE: Real-time abdomen/right upper quadrant sonography in multiple planes  was performed with image documentation. Grayscale, color flow Doppler imaging,  and velocity spectral waveform analysis of the portal vein was performed (duplex  imaging). _______________    FINDINGS: Limited study secondary to patient cooperation. LIVER: Mildly heterogeneous echotexture. No focal mass Color flow Doppler and  velocity spectral waveform analysis of the portal vein shows normal  (hepatopetal) direction of flow. Melody Sharper BILIARY SYSTEM: No intrahepatic biliary dilatation. Common bile duct is normal  in caliber measuring 0.4 cm. GALLBLADDER: Cholelithiasis. No pericholecystic fluid. RIGHT KIDNEY: 10.4 cm in length. No hydronephrosis or renal mass. No visible  calculi. Lower pole 0.8 cm. PANCREAS: Visualized portions unremarkable. Body and tail obscured. IVC: Visualized portions are unremarkable in appearance. OTHER: No free intraperitoneal fluid.    _______________      Impression IMPRESSION:    Limited study. Cholelithiasis. Mildly heterogeneous hepatic echotexture. IR Results:  No results found for this or any previous visit. VAS/US Results:  Results from [place] encounter on 01/30/18   DUPLEX CAROTID BILATERAL       Total discharge time 42 minutes of which more than 50 % spent on counseling and coordination of care. This document in whole or part of it has been produced using voice recognition software. Unrecognized errors in transcription may be present. Carolina Valles MD  Hospitalist  [facility]. medical group. Hospitalist Division.   May 7, 2020  11:27 AM

## 2020-05-07 NOTE — PROGRESS NOTES
Problem: Dysphagia (Adult)  Goal: *Acute Goals and Plan of Care (Insert Text)  Description: Recommendations:  Diet: Puree diet with NECTAR thick liquid diet  Ice chips for pleasure after oral care, per pt request  Meds: Via IV or crushed in puree  Aspiration Precautions  Oral Care TID  Feeder  Small sips/bites  No straws    Goals:  Patient will:  1. Tolerate PO trials with 0 s/s overt distress in 4/5 trials  2. Utilize compensatory swallow strategies/maneuvers (decrease bite/sip, size/rate, alt. liq/sol) with min cues in 4/5 trials  3. Complete an objective swallow study (i.e., MBSS) to assess swallow integrity, r/o aspiration, and determine of safest LRD, min A       Outcome: Progressing Towards Goal   SPEECH LANGUAGE PATHOLOGY DYSPHAGIA TREATMENT    Patient: Fernie Robles (63 y.o. male)  Date: 5/7/2020  Diagnosis: Hypothermia [T68. XXXA]   Pacemaker  Procedure(s) (LRB):  ESOPHAGOGASTRODUODENOSCOPY (EGD) with bx, dilation (N/A) 1 Day Post-Op  Precautions:  Fall, Other (comment), Aspiration(Pacemaker precautions, sling LUE)  PLOF: Per H&P    ASSESSMENT:  Pt seen at b/s this morning for dysphagia management/intervention. Pt s/p EGD yesterday and pacemaker placement earlier this week. Pt continuest to have wet VQ at baseline. Pt voicing no concerns or pain at this time. Pt's RN, Mark Bravo reports good po intake at breakfast this morning given current diet. SLP facilitated supraglottic swallows + NTLs via cup sip x10 reps with mod verbal cues for sequencing. Pt exhibited delayed cough x2 during exercise and reduced respiration[de-identified] swallow coordination. Pt education re: comp strategies to improve endurance, ie: small sips/bites, frequent breaks, purse lip breathing, etc; pt receptive. Imitation of hard/k/ words facilitated to promote airway protection with mod cues for more forceful /k/ given reps/model. SLP will continue to follow.        Progression toward goals:  []         Improving appropriately and progressing toward goals  [x]         Improving slowly and progressing toward goals  []         Not making progress toward goals and plan of care will be adjusted     PLAN:  Recommendations and Planned Interventions:  Diet: Mech soft (ground meat) with NECTAR thick liquid diet  Ice chips for pleasure after oral care, per pt request  Meds: Via IV or crushed in puree  Aspiration Precautions  Oral Care TID  Small sips/bites  No straws    Patient continues to benefit from skilled intervention to address the above impairments. Continue treatment per established plan of care. Discharge Recommendations:  Skilled Nursing Facility     SUBJECTIVE:   Patient stated I'm supposed to going to rehab. I need to talk to my son about that. OBJECTIVE:   Cognitive and Communication Status:  Neurologic State: Alert, Confused  Orientation Level: Oriented X4  Cognition: Follows commands  Perception: Appears intact  Perseveration: No perseveration noted  Safety/Judgement: Fall prevention  Dysphagia Treatment:  Oral Assessment:  Oral Assessment  Labial: Decreased seal  Dentition: Limited  Oral Hygiene: Poor  Lingual: Decreased rate, Decreased strength, Incoordinated  Velum: No impairment  Mandible: No impairment  P.O. Trials:   Patient Position: 45   Vocal quality prior to P.O.: Breathy, Fatigue   Consistency Presented: Mechanical soft, Nectar thick liquid, Puree   How Presented: SLP-fed/presented       Bolus Acceptance: No impairment   Bolus Formation/Control: Impaired   Type of Impairment: Delayed, Lip closure, Mastication, Piecemeal   Propulsion: Delayed (# of seconds), Tongue pumping   Oral Residue: 10-50% of bolus   Initiation of Swallow: Delayed (# of seconds)   Laryngeal Elevation: Weak   Aspiration Signs/Symptoms: Watery eyes, Weak cough   Pharyngeal Phase Characteristics: Easily fatigued , Multiple swallows, Poor endurance, Suspected pharyngeal residue   Effective Modifications:  Alternate liquids/solids, Cup/sip, Spoon, Small sips and bites, Other (comment)   Cues for Modifications: Moderate         Oral Phase Severity: Moderate   Pharyngeal Phase Severity : Moderate-severe   Oral Motor Exercises:                                                                                                                                                                                                                             Exercises:  Laryngeal Exercises:                           PAIN:  Pain level pre-treatment: 0/10   Pain level post-treatment: 0/10   Pain Intervention(s): Medication (see MAR); Rest, Ice, Repositioning  Response to intervention: Nurse notified, See doc flow    After treatment:   []              Patient left in no apparent distress sitting up in chair  [x]              Patient left in no apparent distress in bed  [x]              Call bell left within reach  [x]              Nursing notified  []              Family present  []              Caregiver present  []              Bed alarm activated      COMMUNICATION/EDUCATION:   [x] Aspiration precautions; swallow safety; compensatory techniques  []        Patient unable to participate in education; education ongoing with staff  [x]  Posted safety precautions in patient's room.   [x] Oral-motor/laryngeal strengthening exercises      CAMRON Miramontes  Time Calculation: 17 mins

## 2020-05-07 NOTE — PROGRESS NOTES
1900 Bedside, Verbal and Written shift change report given to Alyson Ku (oncoming nurse) by Sarina Dickinson RN (offgoing nurse). Report included the following information SBAR, Kardex, Intake/Output, MAR and Recent Results. Denies pain. 2100 PM medications administered, pt tolerated with ease, will continue to monitor. 0000 Shift reassessment, pt condition unchanged, will continue to monitor. [address] Rect[address]. Hot packs x4 applied and additional blankets. 0245 NS 500ml bolus completed. VS 94.5 (r), 103/60, 58, 16, 95% on 2L via NC.      0400  Shift reassessment, pt sleeping between care, will continue to monitor. 0700 Bedside, Verbal and Written shift change report given to Cristal (oncoming nurse) by Nini Schmidt RN (offgoing nurse). Report included the following information SBAR, Kardex, Intake/Output, MAR and Recent Results. Skin assessment completed.

## 2020-05-07 NOTE — PROGRESS NOTES
Problem: Discharge Planning  Goal: *Discharge to safe environment  Outcome: Progressing Towards Goal     Problem: Falls - Risk of  Goal: *Absence of Falls  Description: Document Earma Fanta Fall Risk and appropriate interventions in the flowsheet. Outcome: Progressing Towards Goal  Note: Fall Risk Interventions:  Mobility Interventions: Bed/chair exit alarm, Patient to call before getting OOB    Mentation Interventions: Door open when patient unattended, More frequent rounding    Medication Interventions: Patient to call before getting OOB, Teach patient to arise slowly, Bed/chair exit alarm    Elimination Interventions: Bed/chair exit alarm, Call light in reach, Toilet paper/wipes in reach, Toileting schedule/hourly rounds    History of Falls Interventions: Bed/chair exit alarm, Room close to nurse's station, Door open when patient unattended         Problem: Pressure Injury - Risk of  Goal: *Prevention of pressure injury  Description: Document Alex Scale and appropriate interventions in the flowsheet.   Outcome: Progressing Towards Goal  Note: Pressure Injury Interventions:  Sensory Interventions: Assess changes in LOC, Check visual cues for pain    Moisture Interventions: Absorbent underpads, Apply protective barrier, creams and emollients, Maintain skin hydration (lotion/cream)    Activity Interventions: Pressure redistribution bed/mattress(bed type)    Mobility Interventions: Pressure redistribution bed/mattress (bed type), HOB 30 degrees or less    Nutrition Interventions: Document food/fluid/supplement intake, Offer support with meals,snacks and hydration    Friction and Shear Interventions: Apply protective barrier, creams and emollients, HOB 30 degrees or less, Minimize layers                Problem: Pain  Goal: *Control of Pain  Outcome: Progressing Towards Goal     Problem: Tissue Perfusion - Cardiopulmonary, Altered  Goal: *Optimize tissue perfusion  Outcome: Progressing Towards Goal

## 2020-05-07 NOTE — PROGRESS NOTES
Received message from admissions @ [facility], stating they need a new covid neg test as it's been greater than 5 days. Dr. Anali Velez made aware & will order. Sent message back to [place] re above & asked if have new neg covid can pt transfer on the weekend, awaiting response. Brooke CoreasRN,ext [phone].

## 2020-05-07 NOTE — PROGRESS NOTES
Informed by pt's nurse that his discharge is cancelled due to low BP. Called [facility] & changed  until tomorrow @ [address] pt aware & called his son & made him aware. Brooke Coreas RN,ext [phone].

## 2020-05-07 NOTE — PROGRESS NOTES
Patient MEWs score of 4 @ 1937 d/t hypthermia rectal temp 94.2 F. Pt bundled with hot packs and blankets to increase core temp. 2200 temp 94.5F rectal and hot packs refreshed. 2315 temp increased to 95.0 F but pt now hypotensive with BP 80/49 MEWs score decreased to 2. MD Bauer notified of patient condition. Order received for 500cc bolus NS, with second order to infuse additional 500cc bolus of NS if SBP remain <100 after first bolus. 0045 /63, temp 95.0 F, MEWs score 0 after 1st 500cc NS bolus. Will continue to monitor.

## 2020-05-07 NOTE — PROGRESS NOTES
Problem: Mobility Impaired (Adult and Pediatric)  Goal: *Acute Goals and Plan of Care (Insert Text)  Description: Physical Therapy Goals  Reviewed 5/62020  Initiated 4/29/2020 and to be accomplished within 7 day(s)  1. Patient will move from supine to sit and sit to supine  in bed with supervision/set-up. 2.  Patient will transfer from bed to chair and chair to bed with supervision/set-up using the least restrictive device. 3.  Patient will perform sit to stand with supervision/set-up. 4.  Patient will ambulate with supervision/set-up for 50 feet with the least restrictive device. Outcome: Progressing Towards Goal   PHYSICAL THERAPY TREATMENT    Patient: Olga Carranza (81 y.o. male)  Date: 5/7/2020  Diagnosis: Hypothermia [T68. XXXA]   Pacemaker  Procedure(s) (LRB):  ESOPHAGOGASTRODUODENOSCOPY (EGD) with bx, dilation (N/A) 1 Day Post-Op  Precautions: Fall, Other (comment), Aspiration(Pacemaker precautions, sling LUE)  PLOF: Mod I    ASSESSMENT:  Radha Rockwell notified therapy to only perform bed treatment. Pt completed LE therapeutic exercise to address functional limitations. He demonstrated decreased AROM with LE exercises and for the second set of exercises completed AAROM to address mobility/strength deficits. He reported no pain during session and was in a calm mood. Nurse was notified of pt response and pt was left with call bell within reach and in bed. Progression toward goals:     []      Improving appropriately and progressing toward goals  [x]      Improving slowly and progressing toward goals  []      Not making progress toward goals and plan of care will be adjusted     PLAN:  Patient continues to benefit from skilled intervention to address the above impairments. Continue treatment per established plan of care.   Discharge Recommendations:  Khai Ramsey  Further Equipment Recommendations for Discharge:  bedside commode, rolling walker and N/A     SUBJECTIVE:   Patient stated I am up for moving some. Ill do what I can.     OBJECTIVE DATA SUMMARY:   Critical Behavior:  Neurologic State: Alert, Confused  Orientation Level: Oriented X4  Cognition: Follows commands  Safety/Judgement: Fall prevention      Range Of Motion:   AROM: Generally decreased, functional      Therapeutic Exercises:         EXERCISE   Sets   Reps   Active Active Assist   Passive Self ROM   Comments   Ankle Pumps 2 10  [x] [x] [] []    Quad Sets/Glut Sets 2 10  [x] [x] [] [] Hold for 5 secs   Hamstring Sets   [] [] [] []    Short Arc Quads   [] [] [] []    Heel Slides 2 10 [x] [x] [] []    Straight Leg Raises 1 10 [x] [x] [] []    Hip Add   [] [] [] [] Hold for 5 secs, w/ pillow squeeze   Long Arc Quads   [] [] [] []    Seated Marching   [] [] [] []    Standing Marching   [] [] [] []       [] [] [] []        Pain:  Pain level pre-treatment: 0/10  Pain level post-treatment: 0/10   Pain Intervention(s): Medication (see MAR); Rest  Response to intervention: Nurse notified, See doc flow    Activity Tolerance:   Fair  Please refer to the flowsheet for vital signs taken during this treatment. After treatment:   [] Patient left in no apparent distress sitting up in chair  [x] Patient left in no apparent distress in bed  [x] Call bell left within reach  [x] Nursing notified  [] Caregiver present  [] Bed alarm activated  [] SCDs applied      COMMUNICATION/EDUCATION:   [x]         Role of Physical Therapy in the acute care setting. [x]         Fall prevention education was provided and the patient/caregiver indicated understanding. [x]         Patient/family have participated as able in working toward goals and plan of care. []         Patient/family agree to work toward stated goals and plan of care. []         Patient understands intent and goals of therapy, but is neutral about his/her participation. []         Patient is unable to participate in stated goals/plan of care: ongoing with therapy staff.   [] Other:        Jesica Celeste   Time Calculation: 10 mins

## 2020-05-07 NOTE — DISCHARGE INSTRUCTIONS
Discharge instructions from Cardiologist    Disposition:  Will need follow-up with device/wound check in 7 days in the office. REMOVE DRESSING FROM PACEMAKER INCISION IN 7 DAYS. Restrictions: For affected arm:  No lifting greater than 10 lbs or lifting elbow above shoulder for 4 weeks. Keep incision clean and dry for a total of 72 hours after procedure. Remove dressing in 7 days if not already removed. No hot tubs or pools for 2 weeks. OK to shower with \"pat\" dry incision after 72 hours. No driving ideally for 1 week due to concern for airbag, minimize for 1 additional week. DISCHARGE SUMMARY from Nurse    PATIENT INSTRUCTIONS:    After general anesthesia or intravenous sedation, for 24 hours or while taking prescription Narcotics:  · Limit your activities  · Do not drive and operate hazardous machinery  · Do not make important personal or business decisions  · Do  not drink alcoholic beverages  · If you have not urinated within 8 hours after discharge, please contact your surgeon on call. Report the following to your surgeon:  · Excessive pain, swelling, redness or odor of or around the surgical area  · Temperature over 100.5  · Nausea and vomiting lasting longer than 4 hours or if unable to take medications  · Any signs of decreased circulation or nerve impairment to extremity: change in color, persistent  numbness, tingling, coldness or increase pain  · Any questions    What to do at Home:  Recommended activity: Activity as tolerated and PT/OT Eval and Treat    If you experience any of the following symptoms chest pain, palpitations, fever, or chills, please follow up with rehab staff/emergency room. *  Please give a list of your current medications to your Primary Care Provider. *  Please update this list whenever your medications are discontinued, doses are      changed, or new medications (including over-the-counter products) are added.     *  Please carry medication information at all times in case of emergency situations. These are general instructions for a healthy lifestyle:    No smoking/ No tobacco products/ Avoid exposure to second hand smoke  Surgeon General's Warning:  Quitting smoking now greatly reduces serious risk to your health. Obesity, smoking, and sedentary lifestyle greatly increases your risk for illness    A healthy diet, regular physical exercise & weight monitoring are important for maintaining a healthy lifestyle    You may be retaining fluid if you have a history of heart failure or if you experience any of the following symptoms:  Weight gain of 3 pounds or more overnight or 5 pounds in a week, increased swelling in our hands or feet or shortness of breath while lying flat in bed. Please call your doctor as soon as you notice any of these symptoms; do not wait until your next office visit. The discharge information has been reviewed with the patient. The patient verbalized understanding. Discharge medications reviewed with the patient and appropriate educational materials and side effects teaching were provided. Patient discharged without removing armband and transfered to another healthcare acute, sub acute , or extended care facility. Informed of privacy risks if armband lost or stolen.

## 2020-05-07 NOTE — PROGRESS NOTES
Problem: Self Care Deficits Care Plan (Adult)  Goal: *Acute Goals and Plan of Care (Insert Text)  Description: Occupational Therapy Goals    Re-evaluation 5/6/2020  1. Patient will perform grooming with modified independence seated edge of bed with good sitting balance. 2.  Patient will perform bathing with modified independence. 3.  Patient will perform upper body dressing and lower body dressing with modified independence . 4. Patient will perform bedside commode transfers with modified independence using LRAD. 5.  Patient will perform all aspects of toileting with modified independence. 6. Patient will recall pacemaker precautions with 100% accuracy and don/doff sling with Mod I.   7.  Patient will utilize energy conservation techniques during functional activities with min verbal cues. Initiated 4/30/2020 within 7 day(s). 1.  Patient will perform grooming with modified independence seated edge of bed with good sitting balance. 2.  Patient will perform bathing with modified independence. 3.  Patient will perform upper body dressing and lower body dressing with modified independence. 4.  Patient will perform bedside commode transfers with modified independence using RW. 5.  Patient will perform all aspects of toileting with modified independence. 6.  Patient will participate in upper extremity therapeutic exercise/activities with modified independence for 10 minutes. 7.  Patient will utilize energy conservation techniques during functional activities with min verbal cues. Prior Level of Function: Mod I with assist as needed by son for ADLs, Mod I using RW for functional mobility    Outcome: Not Progressing Towards Goal   OCCUPATIONAL THERAPY TREATMENT    Patient: Vikas Pollack (26 y.o. male)  Date: 5/7/2020  Diagnosis: Hypothermia [T68. XXXA]   Pacemaker  Procedure(s) (LRB):  ESOPHAGOGASTRODUODENOSCOPY (EGD) with bx, dilation (N/A) 1 Day Post-Op  Precautions: Fall, Other (comment), Aspiration(Pacemaker precautions, sling LUE)  PLOF: see above    Chart, occupational therapy assessment, plan of care, and goals were reviewed. ASSESSMENT:  Nursing/Aidee cleared for pt to participate in OT tx session at bed level d/t low BP. Patient pleasant and no c/o pain. LUE sling adjusted-nursing notified. Patient washed face w/ SBA and combed hair w/ Min A, dep to doff and don slipper socks, pt requesting washing upper part back w/ dep. Call bell within reach & pt verbalized understanding to utilize for assist e.g. functional transfers in order to prevent falls. Progression toward goals:  []          Improving appropriately and progressing toward goals  []          Improving slowly and progressing toward goals  [x]          Not making progress toward goals and plan of care will be adjusted     PLAN:  Patient continues to benefit from skilled intervention to address the above impairments. Continue treatment per established plan of care. Discharge Recommendations:  Khai Ramsey  Further Equipment Recommendations for Discharge:  bedside commode and wheelchair      SUBJECTIVE:   Patient stated My blood pressure has been low.     OBJECTIVE DATA SUMMARY:   Cognitive/Behavioral Status:  Neurologic State: Alert, Confused  Orientation Level: Oriented X4  Cognition: Follows commands  Safety/Judgement: Fall prevention    Pain:  Pain level pre-treatment: 0/10   Pain level post-treatment: 0/10  Pain Intervention(s): Medication (see MAR); Rest, Ice, Repositioning   Response to intervention: Nurse notified, See doc flow    Activity Tolerance:    poor  Please refer to the flowsheet for vital signs taken during this treatment.   After treatment:   []  Patient left in no apparent distress sitting up in chair  [x]  Patient left in no apparent distress in bed  [x]  Call bell left within reach  [x]  Nursing notified  []  Caregiver present  []  Bed alarm activated    COMMUNICATION/EDUCATION:   [x] Role of Occupational Therapy in the acute care setting  [x] Home safety education was provided and the patient/caregiver indicated understanding. [x] Patient/family have participated as able in working towards goals and plan of care. [x] Patient/family agree to work toward stated goals and plan of care. [] Patient understands intent and goals of therapy, but is neutral about his/her participation. [] Patient is unable to participate in goal setting and plan of care.       Thank you for this referral.  Smooth Sanchez  Time Calculation: 12 mins

## 2020-05-08 ENCOUNTER — APPOINTMENT (OUTPATIENT)
Dept: NON INVASIVE DIAGNOSTICS | Age: 85
DRG: 981 | End: 2020-05-08
Attending: INTERNAL MEDICINE
Payer: MEDICARE

## 2020-05-08 LAB
ANION GAP SERPL CALC-SCNC: 4 MMOL/L (ref 3–18)
APPEARANCE UR: CLEAR
BACTERIA URNS QL MICRO: ABNORMAL /HPF
BASOPHILS # BLD: 0 K/UL (ref 0–0.1)
BASOPHILS NFR BLD: 0 % (ref 0–2)
BILIRUB UR QL: NEGATIVE
BUN SERPL-MCNC: 19 MG/DL (ref 7–18)
BUN/CREAT SERPL: 16 (ref 12–20)
CALCIUM SERPL-MCNC: 7.7 MG/DL (ref 8.5–10.1)
CHLORIDE SERPL-SCNC: 116 MMOL/L (ref 100–111)
CO2 SERPL-SCNC: 23 MMOL/L (ref 21–32)
COLOR UR: YELLOW
CREAT SERPL-MCNC: 1.21 MG/DL (ref 0.6–1.3)
DIFFERENTIAL METHOD BLD: ABNORMAL
ECHO IVC SNIFF: 1.91 CM
ECHO TV REGURGITANT MAX VELOCITY: 270.8 CM/S
ECHO TV REGURGITANT PEAK GRADIENT: 29.3 MMHG
EOSINOPHIL # BLD: 0 K/UL (ref 0–0.4)
EOSINOPHIL NFR BLD: 0 % (ref 0–5)
EPITH CASTS URNS QL MICRO: NEGATIVE /LPF (ref 0–5)
ERYTHROCYTE [DISTWIDTH] IN BLOOD BY AUTOMATED COUNT: 16.2 % (ref 11.6–14.5)
GLUCOSE BLD STRIP.AUTO-MCNC: 103 MG/DL (ref 70–110)
GLUCOSE BLD STRIP.AUTO-MCNC: 131 MG/DL (ref 70–110)
GLUCOSE BLD STRIP.AUTO-MCNC: 136 MG/DL (ref 70–110)
GLUCOSE BLD STRIP.AUTO-MCNC: 142 MG/DL (ref 70–110)
GLUCOSE SERPL-MCNC: 132 MG/DL (ref 74–99)
GLUCOSE UR STRIP.AUTO-MCNC: NEGATIVE MG/DL
HCT VFR BLD AUTO: 32 % (ref 36–48)
HGB BLD-MCNC: 10.5 G/DL (ref 13–16)
HGB UR QL STRIP: NEGATIVE
KETONES UR QL STRIP.AUTO: NEGATIVE MG/DL
LACTATE SERPL-SCNC: 1.7 MMOL/L (ref 0.4–2)
LEUKOCYTE ESTERASE UR QL STRIP.AUTO: ABNORMAL
LYMPHOCYTES # BLD: 0.4 K/UL (ref 0.9–3.6)
LYMPHOCYTES NFR BLD: 8 % (ref 21–52)
MCH RBC QN AUTO: 31 PG (ref 24–34)
MCHC RBC AUTO-ENTMCNC: 32.8 G/DL (ref 31–37)
MCV RBC AUTO: 94.4 FL (ref 74–97)
MONOCYTES # BLD: 0.1 K/UL (ref 0.05–1.2)
MONOCYTES NFR BLD: 1 % (ref 3–10)
NEUTS SEG # BLD: 4.5 K/UL (ref 1.8–8)
NEUTS SEG NFR BLD: 91 % (ref 40–73)
NITRITE UR QL STRIP.AUTO: NEGATIVE
PH UR STRIP: 5.5 [PH] (ref 5–8)
PLATELET # BLD AUTO: 202 K/UL (ref 135–420)
PMV BLD AUTO: 9.9 FL (ref 9.2–11.8)
POTASSIUM SERPL-SCNC: 4.2 MMOL/L (ref 3.5–5.5)
PROT UR STRIP-MCNC: NEGATIVE MG/DL
RBC # BLD AUTO: 3.39 M/UL (ref 4.7–5.5)
RBC #/AREA URNS HPF: ABNORMAL /HPF (ref 0–5)
SODIUM SERPL-SCNC: 143 MMOL/L (ref 136–145)
SP GR UR REFRACTOMETRY: 1.01 (ref 1–1.03)
UROBILINOGEN UR QL STRIP.AUTO: 0.2 EU/DL (ref 0.2–1)
WBC # BLD AUTO: 5 K/UL (ref 4.6–13.2)
WBC URNS QL MICRO: ABNORMAL /HPF (ref 0–4)

## 2020-05-08 PROCEDURE — 74011250637 HC RX REV CODE- 250/637: Performed by: INTERNAL MEDICINE

## 2020-05-08 PROCEDURE — 80048 BASIC METABOLIC PNL TOTAL CA: CPT

## 2020-05-08 PROCEDURE — 36415 COLL VENOUS BLD VENIPUNCTURE: CPT

## 2020-05-08 PROCEDURE — 82962 GLUCOSE BLOOD TEST: CPT

## 2020-05-08 PROCEDURE — 85025 COMPLETE CBC W/AUTO DIFF WBC: CPT

## 2020-05-08 PROCEDURE — 74011250636 HC RX REV CODE- 250/636: Performed by: HOSPITALIST

## 2020-05-08 PROCEDURE — 74011250636 HC RX REV CODE- 250/636: Performed by: INTERNAL MEDICINE

## 2020-05-08 PROCEDURE — 65660000000 HC RM CCU STEPDOWN

## 2020-05-08 PROCEDURE — 81001 URINALYSIS AUTO W/SCOPE: CPT

## 2020-05-08 PROCEDURE — 74011000250 HC RX REV CODE- 250: Performed by: HOSPITALIST

## 2020-05-08 PROCEDURE — 92526 ORAL FUNCTION THERAPY: CPT

## 2020-05-08 PROCEDURE — 97535 SELF CARE MNGMENT TRAINING: CPT

## 2020-05-08 PROCEDURE — 74011000250 HC RX REV CODE- 250

## 2020-05-08 PROCEDURE — 93308 TTE F-UP OR LMTD: CPT

## 2020-05-08 PROCEDURE — 97110 THERAPEUTIC EXERCISES: CPT

## 2020-05-08 PROCEDURE — 83605 ASSAY OF LACTIC ACID: CPT

## 2020-05-08 PROCEDURE — 87635 SARS-COV-2 COVID-19 AMP PRB: CPT

## 2020-05-08 PROCEDURE — 74011250637 HC RX REV CODE- 250/637: Performed by: PHYSICIAN ASSISTANT

## 2020-05-08 PROCEDURE — 87040 BLOOD CULTURE FOR BACTERIA: CPT

## 2020-05-08 RX ORDER — WATER FOR INJECTION,STERILE
VIAL (ML) INJECTION
Status: COMPLETED
Start: 2020-05-08 | End: 2020-05-08

## 2020-05-08 RX ORDER — SODIUM CHLORIDE 9 MG/ML
500 INJECTION, SOLUTION INTRAVENOUS ONCE
Status: COMPLETED | OUTPATIENT
Start: 2020-05-08 | End: 2020-05-08

## 2020-05-08 RX ORDER — SODIUM CHLORIDE, SODIUM LACTATE, POTASSIUM CHLORIDE, CALCIUM CHLORIDE 600; 310; 30; 20 MG/100ML; MG/100ML; MG/100ML; MG/100ML
75 INJECTION, SOLUTION INTRAVENOUS CONTINUOUS
Status: DISCONTINUED | OUTPATIENT
Start: 2020-05-08 | End: 2020-05-09

## 2020-05-08 RX ADMIN — CLOTRIMAZOLE AND BETAMETHASONE DIPROPIONATE: 10; .5 CREAM TOPICAL at 10:00

## 2020-05-08 RX ADMIN — ATORVASTATIN CALCIUM 40 MG: 20 TABLET, FILM COATED ORAL at 21:55

## 2020-05-08 RX ADMIN — ALTEPLASE 2 MG: 2.2 INJECTION, POWDER, LYOPHILIZED, FOR SOLUTION INTRAVENOUS at 14:20

## 2020-05-08 RX ADMIN — WATER 10 ML: 1 INJECTION INTRAMUSCULAR; INTRAVENOUS; SUBCUTANEOUS at 16:49

## 2020-05-08 RX ADMIN — PANTOPRAZOLE SODIUM 40 MG: 40 TABLET, DELAYED RELEASE ORAL at 16:48

## 2020-05-08 RX ADMIN — PANTOPRAZOLE SODIUM 40 MG: 40 TABLET, DELAYED RELEASE ORAL at 06:41

## 2020-05-08 RX ADMIN — LEVOTHYROXINE SODIUM 100 MCG: 0.03 TABLET ORAL at 05:14

## 2020-05-08 RX ADMIN — SODIUM CHLORIDE, SODIUM LACTATE, POTASSIUM CHLORIDE, AND CALCIUM CHLORIDE 75 ML/HR: 600; 310; 30; 20 INJECTION, SOLUTION INTRAVENOUS at 12:37

## 2020-05-08 RX ADMIN — QUETIAPINE FUMARATE 25 MG: 25 TABLET ORAL at 21:55

## 2020-05-08 RX ADMIN — CLOTRIMAZOLE AND BETAMETHASONE DIPROPIONATE: 10; .5 CREAM TOPICAL at 18:30

## 2020-05-08 RX ADMIN — MELATONIN 2 TABLET: at 09:45

## 2020-05-08 RX ADMIN — SODIUM CHLORIDE 500 ML: 900 INJECTION, SOLUTION INTRAVENOUS at 05:53

## 2020-05-08 RX ADMIN — TAMSULOSIN HYDROCHLORIDE 0.4 MG: 0.4 CAPSULE ORAL at 09:45

## 2020-05-08 RX ADMIN — SODIUM CHLORIDE 500 ML: 900 INJECTION, SOLUTION INTRAVENOUS at 03:00

## 2020-05-08 RX ADMIN — ASPIRIN 81 MG 81 MG: 81 TABLET ORAL at 09:45

## 2020-05-08 NOTE — PROGRESS NOTES
Problem: Mobility Impaired (Adult and Pediatric)  Goal: *Acute Goals and Plan of Care (Insert Text)  Description: Physical Therapy Goals  Reviewed 5/62020  Initiated 4/29/2020 and to be accomplished within 7 day(s)  1. Patient will move from supine to sit and sit to supine  in bed with supervision/set-up. 2.  Patient will transfer from bed to chair and chair to bed with supervision/set-up using the least restrictive device. 3.  Patient will perform sit to stand with supervision/set-up. 4.  Patient will ambulate with supervision/set-up for 50 feet with the least restrictive device. Outcome: Progressing Towards Goal   PHYSICAL THERAPY TREATMENT    Patient: Aren Tran (89 y.o. male)  Date: 5/8/2020  Diagnosis: Hypothermia [T68. XXXA]   Pacemaker  Procedure(s) (LRB):  ESOPHAGOGASTRODUODENOSCOPY (EGD) with bx, dilation (N/A) 2 Days Post-Op  Precautions: Fall, Aspiration  PLOF: Mod I  ASSESSMENT:  Noted to be incontinent of large amount of urine. Min A for rolling to change pads. Sling on LUE. Completed supine BLE exercises AROM with verbal/tactile cues. Educated on completion of BLE AROM throughout day; verbalizes understanding. Motivated to transition care to rehab facility. Remained seated in bed with HOB elevated at end of session. Call bell in reach. Progression toward goals:   []      Improving appropriately and progressing toward goals  [x]      Improving slowly and progressing toward goals  []      Not making progress toward goals and plan of care will be adjusted     PLAN:  Patient continues to benefit from skilled intervention to address the above impairments. Continue treatment per established plan of care. Discharge Recommendations:  Skilled Nursing Facility  Further Equipment Recommendations for Discharge:  TBD next level of care     SUBJECTIVE:   Patient stated I think we've worked together before.     OBJECTIVE DATA SUMMARY:   Critical Behavior:  Neurologic State: Alert  Orientation Level: Oriented to person;Oriented to place     Psychosocial  Patient Behaviors: Cooperative  Functional Mobility:  Bed Mobility:  Rolling: Minimum assistance    Therapeutic Exercises:   BLE AROM hip/knee flexion, ankle pumps, hip abduction, knee extension x10    Pain:  Pain level pre-treatment: 0/10  Pain level post-treatment: 0/10     Activity Tolerance:   Fair    After treatment:   [] Patient left in no apparent distress sitting up in chair  [x] Patient left in no apparent distress in bed  [x] Call bell left within reach  [x] Nursing notified  [] Caregiver present  [] Bed alarm activated  [] SCDs applied      COMMUNICATION/EDUCATION:   [x]         Role of physical therapy in the acute care setting. [x]         Fall prevention education was provided and the patient/caregiver indicated understanding. [x]         Patient/family have participated as able in working toward goals and plan of care. [x]         Patient/family agree to work toward stated goals and plan of care. []         Patient understands intent and goals of therapy, but is neutral about his/her participation. []         Patient is unable to participate in stated goals/plan of care: ongoing with therapy staff.       Harpal Dural, PT   Time Calculation: 12 mins

## 2020-05-08 NOTE — PROGRESS NOTES
Problem: Dysphagia (Adult)  Goal: *Acute Goals and Plan of Care (Insert Text)  Description: Recommendations:  Diet:[facility] soft (ground meat)  NECTAR thick liquid diet  Ice chips for pleasure after oral care, per pt request  Meds: Via IV or crushed in puree  Aspiration Precautions  Oral Care TID  Small sips/bites  No straws    Goals:  Patient will:  1. Tolerate PO trials with 0 s/s overt distress in 4/5 trials  2. Utilize compensatory swallow strategies/maneuvers (decrease bite/sip, size/rate, alt. liq/sol) with min cues in 4/5 trials  3. Complete an objective swallow study (i.e., MBSS) to assess swallow integrity, r/o aspiration, and determine of safest LRD, min A        Outcome: Progressing Towards Goal   SPEECH LANGUAGE PATHOLOGY DYSPHAGIA TREATMENT    Patient: Earline Mccabe (61 y.o. male)  Date: 5/8/2020  Diagnosis: Hypothermia [T68. XXXA]   Pacemaker  Procedure(s) (LRB):  ESOPHAGOGASTRODUODENOSCOPY (EGD) with bx, dilation (N/A) 2 Days Post-Op  Precautions:  Fall, Other (comment), Aspiration(Pacemaker precautions, sling LUE)  PLOF:Per H&P    ASSESSMENT:  Pt seen at breakfast meal for dysphagia management/intervention. Pt alert and pleasant, denying pain. Pt reports he will be going to rehab soon. Pt tolerating current diet; trials of regular side administered, in which pt demo'd prolonged oral prep with labored mastication, weak laryngeal elevation, but + oral clearance with multiple piecemeal swallows. No overt s/sx of aspiration witnessed. Pt's VQ noted to stronger,less breathy/wet. Po tolerated ice chips without any s/sx of aspiration. Thin liquids via cup x2 provided with skilled instruction to utilize effortful swallow + re-swallow as efficient as he could without habitual oral swish; delayed cough x1 observed without change in VQ; pt has hx of SILENT aspiration of thin liquids with NO improvement +chin tuck.  Current diet rec to be continued, despite medical gains, pt's laryngeal elevation/excursion still very weak per observation/palpation. Pt remains motivated. Pt would benefit from continued skilled ST following d/c. Progression toward goals:  []         Improving appropriately and progressing toward goals  [x]         Improving slowly and progressing toward goals  []         Not making progress toward goals and plan of care will be adjusted     PLAN:  Recommendations and Planned Interventions:  Diet:[facility] soft (ground meat)  NECTAR thick liquid diet  Ice chips for pleasure after oral care, per pt request  Meds: Via IV or crushed in puree  Aspiration Precautions  Oral Care TID  Small sips/bites  No straws  Patient continues to benefit from skilled intervention to address the above impairments. Continue treatment per established plan of care. Discharge Recommendations:  Skilled Nursing Facility     SUBJECTIVE:   Patient stated I wish I could run out of here. OBJECTIVE:   Cognitive and Communication Status:  Neurologic State: Eyes open spontaneously  Orientation Level: Oriented X4  Cognition: Follows commands  Perception: Appears intact  Perseveration: No perseveration noted  Safety/Judgement: Fall prevention  Dysphagia Treatment:  Oral Assessment:  Oral Assessment  Labial: Decreased seal  Dentition: Limited  Oral Hygiene: Poor  Lingual: Decreased rate, Decreased strength, Incoordinated  Velum: No impairment  Mandible: No impairment  P.O.  Trials:   Patient Position: 45   Vocal quality prior to P.O.: Breathy, Fatigue   Consistency Presented: Mechanical soft, Nectar thick liquid, Puree   How Presented: SLP-fed/presented       Bolus Acceptance: No impairment   Bolus Formation/Control: Impaired   Type of Impairment: Delayed, Lip closure, Mastication, Piecemeal   Propulsion: Delayed (# of seconds), Tongue pumping   Oral Residue: 10-50% of bolus   Initiation of Swallow: Delayed (# of seconds)   Laryngeal Elevation: Weak   Aspiration Signs/Symptoms: Watery eyes, Weak cough   Pharyngeal Phase Characteristics: Easily fatigued , Multiple swallows, Poor endurance, Suspected pharyngeal residue   Effective Modifications: Alternate liquids/solids, Cup/sip, Spoon, Small sips and bites, Other (comment)   Cues for Modifications: Moderate         Oral Phase Severity: Moderate   Pharyngeal Phase Severity : Moderate-severe   Oral Motor Exercises:                                                                                                                                                                                                                             Exercises:  Laryngeal Exercises:                                                                                                                                     PAIN  Pain level pre-treatment: 0/10   Pain level post-treatment: 0/10   Pain Intervention(s): Medication (see MAR); Rest, Ice, Repositioning  Response to intervention: Nurse notified, See doc flow    After treatment:   []              Patient left in no apparent distress sitting up in chair  [x]              Patient left in no apparent distress in bed  [x]              Call bell left within reach  [x]              Nursing notified  []              Family present  []              Caregiver present  []              Bed alarm activated      COMMUNICATION/EDUCATION:   [x] Aspiration precautions; swallow safety; compensatory techniques  [x]        Patient unable to participate in education; education ongoing with staff  [x]  Posted safety precautions in patient's room.   [x] Oral-motor/laryngeal strengthening exercises      Aruna Hinojosa SLP  Time Calculation: 22 mins

## 2020-05-08 NOTE — PROGRESS NOTES
Problem: Falls - Risk of  Goal: *Absence of Falls  Description: Document Miles Sol Fall Risk and appropriate interventions in the flowsheet. Outcome: Progressing Towards Goal  Note: Fall Risk Interventions:  Mobility Interventions: Bed/chair exit alarm, Patient to call before getting OOB    Mentation Interventions: Door open when patient unattended, More frequent rounding    Medication Interventions: Patient to call before getting OOB, Teach patient to arise slowly, Bed/chair exit alarm    Elimination Interventions: Bed/chair exit alarm, Call light in reach, Toilet paper/wipes in reach, Toileting schedule/hourly rounds    History of Falls Interventions: Bed/chair exit alarm, Room close to nurse's station, Door open when patient unattended         Problem: Pressure Injury - Risk of  Goal: *Prevention of pressure injury  Description: Document Alex Scale and appropriate interventions in the flowsheet.   Outcome: Progressing Towards Goal  Note: Pressure Injury Interventions:  Sensory Interventions: Assess changes in LOC, Check visual cues for pain    Moisture Interventions: Absorbent underpads, Apply protective barrier, creams and emollients, Maintain skin hydration (lotion/cream)    Activity Interventions: Pressure redistribution bed/mattress(bed type)    Mobility Interventions: Pressure redistribution bed/mattress (bed type), HOB 30 degrees or less    Nutrition Interventions: Document food/fluid/supplement intake, Offer support with meals,snacks and hydration    Friction and Shear Interventions: Apply protective barrier, creams and emollients, HOB 30 degrees or less, Minimize layers                Problem: Pain  Goal: *Control of Pain  Outcome: Progressing Towards Goal     Problem: Tissue Perfusion - Cardiopulmonary, Altered  Goal: *Optimize tissue perfusion  Outcome: Progressing Towards Goal  Goal: *Absence of hypoxia  Outcome: Progressing Towards Goal

## 2020-05-08 NOTE — PROGRESS NOTES
Pt accepted to [facility] once new covid test is back & negative. They can take him on the weekend if test neg. [facility] medical transport is set up for will call for Saturday. Envelope & PCS form on chart. Brooke Coreas RN,ext.   Jessica

## 2020-05-08 NOTE — PROGRESS NOTES
Problem: Self Care Deficits Care Plan (Adult)  Goal: *Acute Goals and Plan of Care (Insert Text)  Description: Occupational Therapy Goals    Re-evaluation 5/6/2020  1. Patient will perform grooming with modified independence seated edge of bed with good sitting balance. 2.  Patient will perform bathing with modified independence. 3.  Patient will perform upper body dressing and lower body dressing with modified independence . 4. Patient will perform bedside commode transfers with modified independence using LRAD. 5.  Patient will perform all aspects of toileting with modified independence. 6. Patient will recall pacemaker precautions with 100% accuracy and don/doff sling with Mod I.   7.  Patient will utilize energy conservation techniques during functional activities with min verbal cues. Initiated 4/30/2020 within 7 day(s). 1.  Patient will perform grooming with modified independence seated edge of bed with good sitting balance. 2.  Patient will perform bathing with modified independence. 3.  Patient will perform upper body dressing and lower body dressing with modified independence. 4.  Patient will perform bedside commode transfers with modified independence using RW. 5.  Patient will perform all aspects of toileting with modified independence. 6.  Patient will participate in upper extremity therapeutic exercise/activities with modified independence for 10 minutes. 7.  Patient will utilize energy conservation techniques during functional activities with min verbal cues. Prior Level of Function: Mod I with assist as needed by son for ADLs, Mod I using RW for functional mobility    Outcome: Progressing Towards Goal   OCCUPATIONAL THERAPY TREATMENT    Patient: Braulio Tomlin (25 y.o. male)  Date: 5/8/2020  Diagnosis: Hypothermia [T68. XXXA]   Pacemaker  Procedure(s) (LRB):  ESOPHAGOGASTRODUODENOSCOPY (EGD) with bx, dilation (N/A) 2 Days Post-Op  Precautions: Fall, Other (comment), Aspiration(Pacemaker precautions, sling LUE)  PLOF: see above  Chart, occupational therapy assessment, plan of care, and goals were reviewed. ASSESSMENT:  Nursing/Aidee cleared for pt to participate in OT tx session at bed level d/t low BP. Patient agreeable to participate in washing hair via rinseless shampoo cap, towel dry and combing hair with Max A, washed face with SBA. Patient's LUE sling re-adjusted for correct fit. Patient comfortable, no c/o pain lying in bed with head of bed elevated. call bell within reach & pt verbalized understanding to utilize for assist e.g. functional transfers in order to prevent falls. Progression toward goals:  []          Improving appropriately and progressing toward goals  [x]          Improving slowly and progressing toward goals  []          Not making progress toward goals and plan of care will be adjusted     PLAN:  Patient continues to benefit from skilled intervention to address the above impairments. Continue treatment per established plan of care. Discharge Recommendations:  Skilled Nursing Facility  Further Equipment Recommendations for Discharge:  bedside commode and wheelchair      SUBJECTIVE:   Patient stated I feel better now.  following grooming tasks    OBJECTIVE DATA SUMMARY:   Cognitive/Behavioral Status:  Neurologic State: Eyes open spontaneously  Orientation Level: Oriented X4  Cognition: Follows commands  Safety/Judgement: Fall prevention    Pain:  Pain level pre-treatment: 0/10   Pain level post-treatment: 0/10  Pain Intervention(s): Medication (see MAR); Rest, Ice, Repositioning   Response to intervention: Nurse notified, See doc flow    Activity Tolerance:    poor  Please refer to the flowsheet for vital signs taken during this treatment.   After treatment:   []  Patient left in no apparent distress sitting up in chair  [x]  Patient left in no apparent distress in bed  [x]  Call bell left within reach  [x]  Nursing notified  []  Caregiver present  []  Bed alarm activated    COMMUNICATION/EDUCATION:   [x] Role of Occupational Therapy in the acute care setting  [x] Home safety education was provided and the patient/caregiver indicated understanding. [x] Patient/family have participated as able in working towards goals and plan of care. [x] Patient/family agree to work toward stated goals and plan of care. [] Patient understands intent and goals of therapy, but is neutral about his/her participation. [] Patient is unable to participate in goal setting and plan of care.       Thank you for this referral.  Gayathri Sanchez  Time Calculation: 9 mins

## 2020-05-08 NOTE — PROGRESS NOTES
Patient MEWs score 4 d/t hypthermia 93.8 rectal and hypotension 90/49. MD Bauer notified. Rewarming started with heat packs and bundling, order received for 500cc bolus of NS to repeat once if SBP remains <100    0245 reassessment: MEWs score decreased to 3 with persistent but improving hypothermia 94.5 rectally. Will continue with heat packs and bundling. 0530 MEWs remain 3 with temp increased to 95.5 but pt hypotensive with BP 98/54. 2nd bolus 500cc NS bolus given. Will continue to monitor. 0630 MEWs decreased to 1.  Temp 95.5, /55

## 2020-05-08 NOTE — PROGRESS NOTES
80 start with cardiac Electrophysiology Progress Note  Admit Date: 4/21/2020    Assessment:     -Irreversible symptomatic bradycardia, intermittent heart rate remains 40. Continues despite improvement in hypothermia, stopping BB, and treatment of thyroid disorder.   -Hypothermia and hypotension: Frequent episode of this during this admission and also in the past.  Unclear etiology. Less likely be to act in nature  -Status post pacemaker placement on May 15, 2020  -First degree AV block  -LBBB, chronic  -CAD 4/6/20, no PE  -Chronic systolic heart failure  -Nonischemic cardiomyopathy with EF 45% 4/22/20, unchanged from 12/2019  -Pharm nuc 4/22/20, no ischemia  -Hypothermia resolved  -GERD on PPI  -Thyroid disorder on replacement  -Concern for sepsis initially, s/p covid rule-out, off antbx, no fevers/leukocytosis  -h/o GIB, stable  -Severe deconditioning     Primary cardiologist Dr. Singer Aus:     Status post permanent pacemaker placement May 5, 2020 without any complication  Patient had episode of hypothermia and hypotension, asymptomatic this morning so cardiology asked to comment  Patient currently asymptomatic and denies any complaint. Eating lunch  Input and output recording is inaccurate however doubt patient is fluid depleted  Agree to hold Lasix at this time  Patient is not on any antihypertensive and would avoid at this time  Pacemaker site without any hematoma or bleeding  Echo, limited order chest to rule out late pericardial effusion after pacemaker. If unremarkable, no further cardiac work-up is planned at this time. Doubt his recurrent hypothermia and hypotensive episode are cardiac in origin. Sepsis work-up has been initiated  We will follow peripherally. Please call as needed    Subjective:     No new complaints.  Denies CP or SOB    Objective:      Patient Vitals for the past 8 hrs:   Temp Pulse Resp BP SpO2   05/08/20 1159 95.5 °F (35.3 °C) 68 16 105/63 96 %   05/08/20 0806 96.5 °F (35.8 °C) 66 16 96/57 97 %   05/08/20 0643 95.5 °F (35.3 °C) 70 16 105/55 97 %   05/08/20 0541 95.5 °F (35.3 °C) 63 16 98/54 95 %   05/08/20 0501 (!) 92.7 °F (33.7 °C) 63 16 90/53          Patient Vitals for the past 96 hrs:   Weight   05/08/20 0501 182 lb 8.7 oz (82.8 kg)   05/07/20 2055 169 lb 14.4 oz (77.1 kg)   05/06/20 0109 170 lb 10.2 oz (77.4 kg)   05/05/20 0003 170 lb 9.6 oz (77.4 kg)                    Intake/Output Summary (Last 24 hours) at 5/8/2020 1257  Last data filed at 5/8/2020 1046  Gross per 24 hour   Intake 1660 ml   Output    Net 1660 ml       Physical Exam:  General:  alert, cooperative, no distress, appears stated age  Neck:  nontender  Lungs: Slight decreased breath sound at base  Heart:  regular rate and rhythm, S1, S2 normal, no murmur, click, rub or gallop  Abdomen:  abdomen is soft without significant tenderness, masses, organomegaly or guarding  Extremities: 1+ edema  Pacemaker site without any hematoma or bleeding    Data Review:     Labs: Results:       Chemistry Recent Labs     05/08/20  0930 05/06/20  0340   * 90    144   K 4.2 4.0   * 116*   CO2 23 24   BUN 19* 20*   CREA 1.21 1.02   CA 7.7* 7.3*   MG  --  1.7   PHOS  --  2.9   AGAP 4 4   BUCR 16 20      CBC w/Diff Recent Labs     05/08/20  0930 05/06/20  0340   WBC 5.0 4.8   RBC 3.39* 3.05*   HGB 10.5* 9.4*   HCT 32.0* 29.0*    204   GRANS 91* 70   LYMPH 8* 16*   EOS 0 10*      Cardiac Enzymes No results found for: CPK, CK, CKMMB, CKMB, RCK3, CKMBT, CKNDX, CKND1, TELLY, TROPT, TROIQ, JOSE, TROPT, TNIPOC, BNP, BNPP   Coagulation No results for input(s): PTP, INR, APTT, INREXT, INREXT in the last 72 hours.     Lipid Panel No results found for: CHOL, CHOLPOCT, CHOLX, CHLST, CHOLV, [id number], HDL, HDLP, LDL, LDLC, DLDLP, [id number], VLDLC, VLDL, TGLX, TRIGL, TRIGP, TGLPOCT, CHHD, CHHDX   BNP No results found for: BNP, BNPP, XBNPT   Liver Enzymes No results for input(s): TP, ALB, TBIL, AP, SGOT, GPT in the last 72 hours.    No lab exists for component: DBIL   Digoxin    Thyroid Studies Lab Results   Component Value Date/Time    T4, Total 11.5 02/19/2020 09:28 AM    TSH 4.64 (H) 04/21/2020 06:36 AM          Signed By: Miroslava Farrell MD     May 8, 2020

## 2020-05-08 NOTE — PROGRESS NOTES
Problem: Falls - Risk of  Goal: *Absence of Falls  Description: Document Linda Starch Fall Risk and appropriate interventions in the flowsheet. Outcome: Progressing Towards Goal  Note: Fall Risk Interventions:  Mobility Interventions: Patient to call before getting OOB    Mentation Interventions: Door open when patient unattended    Medication Interventions: Patient to call before getting OOB, Teach patient to arise slowly    Elimination Interventions: Call light in reach, Patient to call for help with toileting needs    History of Falls Interventions: Door open when patient unattended         Problem: Pressure Injury - Risk of  Goal: *Prevention of pressure injury  Description: Document Alex Scale and appropriate interventions in the flowsheet.   Outcome: Progressing Towards Goal  Note: Pressure Injury Interventions:  Sensory Interventions: Assess changes in LOC    Moisture Interventions: Absorbent underpads, Apply protective barrier, creams and emollients    Activity Interventions: Pressure redistribution bed/mattress(bed type)    Mobility Interventions: Pressure redistribution bed/mattress (bed type)    Nutrition Interventions: Document food/fluid/supplement intake, Offer support with meals,snacks and hydration    Friction and Shear Interventions: Apply protective barrier, creams and emollients, Lift sheet                Problem: Altered nutrition  Goal: *Adequate nutrition  Outcome: Progressing Towards Goal     Problem: Pain  Goal: *Control of Pain  Outcome: Progressing Towards Goal     Problem: Tissue Perfusion - Cardiopulmonary, Altered  Goal: *Optimize tissue perfusion  Outcome: Progressing Towards Goal     Problem: Patient Education: Go to Patient Education Activity  Goal: Patient/Family Education  Outcome: Progressing Towards Goal     Problem: Patient Education: Go to Patient Education Activity  Goal: Patient/Family Education  Outcome: Progressing Towards Goal

## 2020-05-08 NOTE — PROGRESS NOTES
Internal Medicine Progress Note        NAME: Nuha Bond   :  1934  MRM:  [id number]    Date/Time: 2020        ASSESSMENT/PLAN:  Hold  discharge. # Hypothermia and hypotension. Unclear cause. Warming . IVF  Blood cultures . LA . Cbc . Bmp  . UA   Ivf boluses. Maintain BP. Hold Lasix. No BP meds for now. Inform cardiology    # Skin fungal infection , possible chemical dermatitis from incontinence. Area in perineal and groin. Lotrisone cream BID. #  Dysrhythmia , heart block (LBBB ,long first degree and wide QRS), ho   Sinoatrial node dysfunction  and mild elevation of trop level. Stabilized initially in ICU. given heart rate down to 40 with 1st AV block and LBBB,  S/P  implantation of single chamber Med[employer]onic pacemaker . History of  Cardiomyopathy  LVEF 35-40% by previous TTE and Grade II diastolic dysfunction.  had short runs of NSVT.  Lexiscan Myoview testing with inferior and inferoseptal fixed defect more consistent with infarction,  no significant ongoing ischemia       # Acute delirium. Acute confusional state.  Treat medical problems . Restrain as needed used in ICU. Correct s. Na  level. Haldol used  per PCCM team     #  SIRS.  Hypothermia , Hypotension and bradycardia. Unclear source of infection on admission. Sepsis work-up started in the emergency room.  Treated with Abx vanco and zosyn.   Hypotension and Hypothermia Likely due to Hypovolemic shock 2/2 GI bleed.  TSH normal. Appreciate PCCM input. Warming blankets Used initially. Needed iv pressors . Blood and urine CS NTD. Repeat UA. Currently off Antibiotic.     # GIB presented as Melena , anemia. GIB noted in ICU. GI consulted. Avoid NSAIDs.  Protonix. . Dw GI, went for EGD as below .      # Anemia due to likely acute blood loss . Maintain Hb jacinto than 7, transfuse as needed.      # Hypoglycemia. Monitor blood sugar and treat accordingly. BSL stable currently , need monitoring.   Treated with D5 ivf , due to low po intake. Dc ivf and encourage po intake.   .      #  Chronic renal failure, stage 3 (moderate) ([facility] [address] (12/23/2019). S.cr stable currently.  Monitor Renal function and other labs as indicated. Avoid nephrotoxins , iv Contrast, NSAID. Renally dosing medications. Monitor urine out put. Serum creatinine   improved     # Hypernatremia. Change IVF to D5.  Trended down. Follow-up.   TSH normal      # ho   Hypothyroidism . On replacement. Checked TSH , T4.      #  Dysphagia (4/8/2020)  as above. Encouraged on PO. Episode  of aspiration again 4/24  am SLP followed him. Short period of TPN  In ICU.      #   HTN (hypertension) (4/21/2020) control BP.    -Code status :    Lab Review:     Recent Labs     05/06/20  0340   WBC 4.8   HGB 9.4*   HCT 29.0*        Recent Labs     05/06/20  0340      K 4.0   *   CO2 24   GLU 90   BUN 20*   CREA 1.02   CA 7.3*   MG 1.7   PHOS 2.9     Lab Results   Component Value Date/Time    Glucose (POC) 103 05/08/2020 07:57 AM    Glucose (POC) 104 05/07/2020 10:10 PM    Glucose (POC) 99 05/07/2020 04:27 PM    Glucose (POC) 126 (H) 05/07/2020 11:49 AM    Glucose (POC) 106 05/07/2020 07:51 AM    Glucose (POC) 96 10/10/2014 09:28 PM    Glucose (POC) 186 (H) 10/10/2014 03:48 PM     No results for input(s): PH, PCO2, PO2, HCO3, FIO2 in the last 72 hours. No results for input(s): INR, INREXT in the last 72 hours.     Lab Results   Component Value Date/Time    Specimen Description: [facility] 02/06/2014 05:45 PM     Lab Results   Component Value Date/Time    Culture result: No growth (<1,000 CFU/ML) 04/22/2020 09:20 AM    Culture result: NO GROWTH 6 DAYS 04/21/2020 08:15 AM    Culture result: NO GROWTH 6 DAYS 04/21/2020 08:00 AM       All Cardiac Markers in the last 24 hours: No results found for: CPK, CK, CKMMB, CKMB, RCK3, CKMBT, CKNDX, CKND1, TELLY, TROPT, TROIQ, JOSE, TROPT, TNIPOC, BNP, BNPP       Intervals noted   Pt s/e @ bedside     Subjective:     Chief Complaint:      Offer no complain eating well  Noted urine incontinance    ROS:  (bold if positive,otherwise negative)    Fever/chills ,  Dysuria   Cough , Sputum , SOB/HERRMANN , Chest Pain     Diarrhea ,Nausea/Vomit , Abd Pain , Constipation    Tolerating Diet                Objective:     Vitals:  Last 24hrs VS reviewed since prior progress note. Most recent are:    Visit Vitals  BP 96/57 (BP 1 Location: Right arm, BP Patient Position: Head of bed elevated (Comment degrees))   Pulse 66   Temp 96.5 °F (35.8 °C)   Resp 16   Ht 5' 7\" (1.702 m)   Wt 82.8 kg (182 lb 8.7 oz)   SpO2 97%   BMI 28.59 kg/m²     SpO2 Readings from Last 6 Encounters:   05/08/20 97%   04/09/20 98%   02/21/20 92%   01/02/20 96%   12/16/19 97%   09/23/18 99%    O2 Flow Rate (L/min): 2 l/min       Intake/Output Summary (Last 24 hours) at 5/8/2020 0222  Last data filed at 5/7/2020 2115  Gross per 24 hour   Intake 1660 ml   Output    Net 1660 ml          Physical Exam:   Gen:  Comfortable,    in no acute distress.  Appear stated age, Well-developed   HEENT:  Head atraumatic, normocephalic ,  moist mucous membranes. Neck:   Trachea midline , No apparent JVD, Supple, without masses, thyroid non-tender  Resp:  No accessory muscle use,Bilateral BS present, clear breath sounds without wheezes rales or rhonchi  Card:  normal S1, S2 without Gallop .   2-3  + lower leg peripheral edema. Abd:  Soft, non-tender, non-distended, bowel sounds are present . Nicko Sanchez Musc: L shoulder sling ( per cardiology instructions)   No cyanosis or clubbing. Skin:  No rashes or ulcers, skin turgor is good. Neuro: Alert and coherent , appropriate mood.   no clear area of focal motor weakness,  follows commands appropriately.      Telemetry reviewed:   normal sinus rhythm,  not pacing     Medications Reviewed: (see below)    Lab Data Reviewed: (see below)    ______________________________________________________________________    Medications:     Current Facility-Administered Medications   Medication Dose Route Frequency    alteplase (CATHFLO) injection 2 mg  2 mg InterCATHeter ONCE    clotrimazole-betamethasone (LOTRISONE) 1-0.05 % cream   Topical BID    furosemide (LASIX) tablet 20 mg  20 mg Oral DAILY    pantoprazole (PROTONIX) tablet 40 mg  40 mg Oral ACB&D    oxyCODONE-acetaminophen (PERCOCET) 5-325 mg per tablet 1 Tab  1 Tab Oral Q4H PRN    aspirin chewable tablet 81 mg  81 mg Oral DAILY    atropine injection 1 mg  1 mg IntraVENous Q5MIN PRN    insulin lispro (HUMALOG) injection   SubCUTAneous AC&HS    0.9% sodium chloride infusion 250 mL  250 mL IntraVENous PRN    dextrose 10% infusion 125-250 mL  125-250 mL IntraVENous PRN    QUEtiapine (SEROquel) tablet 25 mg  25 mg Oral QHS    lidocaine 4 % patch 1 Patch  1 Patch TransDERmal Q24H    haloperidol lactate (HALDOL) injection 2 mg  2 mg IntraMUSCular Q6H PRN    glucose chewable tablet 16 g  4 Tab Oral PRN    glucagon (GLUCAGEN) injection 1 mg  1 mg IntraMUSCular PRN    levothyroxine (SYNTHROID) tablet 100 mcg  100 mcg Oral 6am    sodium chloride (NS) flush 5-10 mL  5-10 mL IntraVENous PRN    cholecalciferol (VITAMIN D3) (1000 Units /25 mcg) tablet 2 Tab  2,000 Units Oral DAILY    atorvastatin (LIPITOR) tablet 40 mg  40 mg Oral QHS    tamsulosin (FLOMAX) capsule 0.4 mg  0.4 mg Oral DAILY    [Held by provider] heparin (porcine) injection 5,000 Units  5,000 Units SubCUTAneous Q8H          Total time spent with patient: 35 minutes                  Care Plan discussed with: Patient, Care Manager, Nursing Staff, Consultant/Specialist and >50% of time spent in counseling and coordination of care    Discussed:  Care Plan    Prophylaxis:  SCD's    Disposition:  SNF/LTC           This document in whole or part of it has been produced using voice recognition software. Unrecognized errors in transcription may be present.     Attending Physician: Raven Locke MD

## 2020-05-08 NOTE — ROUTINE PROCESS
0730 -- Bedside, Verbal and Written shift change report received by Rocky Dowd (oncoming nurse) by Drew(offgoing nurse). Allergy band placed on pt's wrist. Report included the following information SBAR, Kardex, Intake/Output, MAR and Recent Results. 0820-Assessment completed, call bell within reach, no distress noted, b/p better. 1005 -- AM  medications administered, pt tolerated with ease, will continue to monitor. 1200 -- Shift reassessment, pt condition unchanged, will continue to monitor. 1402-noted, b/p low, Dr. Juve Devine informed of b/p orders lsq127 ml bolus. Discharged on hold. 1542-b/p better after bolus given. 1600 --  Shift reassessment, pt condition unchanged, will continue to monitor. 1920 -- Bedside, Verbal and Written shift change report given to Drew(oncoming nurse) by Rocky Dowd (offgoing nurse). Report included the following information SBAR, Kardex, Intake/Output, MAR and Recent Results. Skin assessment completed.

## 2020-05-08 NOTE — PROGRESS NOTES
NUTRITION FOLLOW-UP/PLAN OF CARE    RECOMMENDATIONS:   1. Mech Soft Diet with NTL (2); NO Straw per SLP added SF CIB BID to supplement energy needs (diet liberalized to promote increased PO intake)  2. Monitor labs, weight and document all PO intake  3. Feeding set up      GOALS:   1. Progressing/Ongoing: PO intake meets >75% of protein/calorie needs by 5/13    ASSESSMENT:   Wt status is classified as overweight per Body mass index is 28.51 kg/m². Good PO intake overall. Continue nutritional supplements. Labs noted. Nutrition recommendations listed. RD to follow. SUBJECTIVE/OBJECTIVE:   (5/8): Pt seen in room this afternoon at lunch. Appetite/PO intake is good and Pt doing well with meals and nutritional supplements. No c/o GI distress and last BM this morning. SLP following:  Continue Mech soft (ground meat)  NECTAR thick liquid diet. Encouraged adequate PO intake and will monitor.     (5/5): SLP following: on (5/4) recommendations for mech soft with NECTAR thick clear liquid diet. Last BM on (5/3) per I/Os. Per cardio: s/p Successful implantation of single chamber Medtronic pacemaker on (5/5). Pt seen in room resting after just returned from procedure. Per d/w Gabby Wolfe, no change in SLP recommendations so diet was adjusted to meet last filed progress note. Previously encouraged to increase fluid intake of NT water. Last BM on (5/3) per I/Os. Weights have bee variable this admission. Will continue to monitor. (4/30): Pt admitted for hypothermia and transferred from ICU to  yesterday. Familiar with patient from admission earlier this month; noted to have recent weight loss and dysphagia at that time. Pt received TPN on (4/27) per previous RD note. SLP following: on (4/30) recommendations for Puree diet with NECTAR thick clear liquid diet, as medically appropriate  Ice chips for pleasure after oral care. Pt seen in room this afternoon OOB in chair at lunch.  Pt feeding himself and doing well with meal intake today. Reports he is feeling better and would like to have that chocolate drink again. Placed orders for SF CIB BID to supplement energy needs. Also encouraged to increase fluid intake of NT water for hydration. Unable to verify current weight during visit today. Will continue to monitor. Information Obtained From:   [x] Chart Review  [x] Patient  [] Family/Caregiver  [x] Nurse/Physician   [] Patient Rounds/Interdisciplinary Meeting    Diet: [facility] Soft Diet with NTL (2)  Patient Vitals for the past 100 hrs:   % Diet Eaten   05/08/20 1046 75 %   05/07/20 1830 50 %   05/07/20 1300 50 %   05/07/20 0952 25 %   05/05/20 1010 0 %   05/04/20 1713 100 %   05/04/20 1300 75 %     Medications: [x] Reviewed   IVF: LR @75 mL/hr   Lipitor, Vit D3, Humalog, Synthroid, Protonix,   Encounter Diagnoses     ICD-10-CM ICD-9-CM   1. Hypothermia, initial encounter T68. XXXA 991.6   2. Hypoglycemia E16.2 251.2   3. General weakness R53.1 780.79   4. Acute chest pain R07.9 786.50   5. Hx of aspiration pneumonitis Z87.09 V12.69   6. Hypercarbia R06.89 786.09   7. Anemia, unspecified type D64.9 285.9   8. Urinary tract infection with hematuria, site unspecified N39.0 599.0    R31.9 599.70   9.  Heart block I45.9 426.9     Past Medical History:   Diagnosis Date    Difficulty urinating     Elevated PSA     Hematuria, unspecified     Hypercholesterolemia     Hypertension     Incomplete bladder emptying     Urinary retention     UTI (urinary tract infection)      Labs:    Lab Results   Component Value Date/Time    Sodium 143 05/08/2020 09:30 AM    Potassium 4.2 05/08/2020 09:30 AM    Chloride 116 (H) 05/08/2020 09:30 AM    CO2 23 05/08/2020 09:30 AM    Anion gap 4 05/08/2020 09:30 AM    Glucose 132 (H) 05/08/2020 09:30 AM    BUN 19 (H) 05/08/2020 09:30 AM    Creatinine 1.21 05/08/2020 09:30 AM    Calcium 7.7 (L) 05/08/2020 09:30 AM    Magnesium 1.7 05/06/2020 03:40 AM    Phosphorus 2.9 05/06/2020 03:40 AM    Albumin 1.8 (L) 05/05/2020 05:05 AM     Anthropometrics: BMI (calculated): 28.6   Last 3 Recorded Weights in this Encounter    05/07/20 2055 05/08/20 0501 05/08/20 1432   Weight: 77.1 kg (169 lb 14.4 oz) 82.8 kg (182 lb 8.7 oz) 82.6 kg (182 lb)      Ht Readings from Last 1 Encounters:   05/08/20 5' 7\" (1.702 m)       Documented Weight History:  Weight Metrics 5/8/2020 4/8/2020 2/19/2020 12/28/2019 12/16/2019 2/12/2019 9/10/2018   Weight 182 lb 151 lb 6.4 oz 160 lb 167 lb 8.8 oz 150 lb 184 lb 180 lb   BMI 28.51 kg/m2 23.71 kg/m2 25.06 kg/m2 25.48 kg/m2 22.81 kg/m2 27.98 kg/m2 27.37 kg/m2       [x]  Weight Loss PTA  []  Weight Gain  [x]  Weight Stable   []  New wt n/a on record     Estimated Nutrition Needs:   1852 Kcals/day  Protein (g): 68 g    Nutrition Problems Identified:   [] Suboptimal PO intake   [] Food Allergies  [x] Difficulty chewing/swallowing/poor dentition  [] Constipation/Diarrhea   [] Nausea/Vomiting   [] None  [] Other:     Plan:   [x] Therapeutic Diet  []  Obtained/adjusted food preferences/tolerances and/or snacks options   [x]  Continue supplements added   [x] SLP following for feeding techniques  []  HS snack added   [x]  Modify diet texture   []  Modify diet for food allergies   []  Educate patient   []  Assist with menu selection   [x]  Monitor PO intake on meal rounds   [x]  Continue inpatient monitoring and intervention   [x]  Participated in discharge planning/Interdisciplinary rounds/Team meetings   []  Other:     Education Needs:   [] Not appropriate for teaching at this time due to:   [x] Identified and addressed    Nutrition Monitoring and Evaluation:   [x] Continue inpatient monitoring and interventions    [] Other:     Jeanette Marker

## 2020-05-08 NOTE — ROUTINE PROCESS
0710 -- Bedside, Verbal and Written shift change report received by Urbano Arriaga (oncoming nurse) by Drew(offgoing nurse). Allergy band placed on pt's wrist. Report included the following information SBAR, Kardex, Intake/Output, MAR and Recent Results. 0830-Assessment completed, call bell within reach, no distress noted. 0945 -- AM  medications administered, pt tolerated with ease, will continue to monitor. 1200-- Shift reassessment, pt condition unchanged, will continue to monitor. 1600 --  Shift reassessment, pt condition unchanged, will continue to monitor. 1930-- Bedside, Verbal and Written shift change report given to Davon (oncoming nurse) by Urbano Rosenbaumch (offgoing nurse). Report included the following information SBAR, Kardex, Intake/Output, MAR and Recent Results. Skin assessment completed.

## 2020-05-09 LAB
ALBUMIN SERPL-MCNC: 1.8 G/DL (ref 3.4–5)
ALBUMIN/GLOB SERPL: 0.8 {RATIO} (ref 0.8–1.7)
ALP SERPL-CCNC: 266 U/L (ref 45–117)
ALT SERPL-CCNC: 19 U/L (ref 16–61)
ANION GAP SERPL CALC-SCNC: 6 MMOL/L (ref 3–18)
AST SERPL-CCNC: 34 U/L (ref 10–38)
BASOPHILS # BLD: 0 K/UL (ref 0–0.1)
BASOPHILS NFR BLD: 0 % (ref 0–2)
BILIRUB DIRECT SERPL-MCNC: 0.1 MG/DL (ref 0–0.2)
BILIRUB SERPL-MCNC: 0.3 MG/DL (ref 0.2–1)
BUN SERPL-MCNC: 20 MG/DL (ref 7–18)
BUN/CREAT SERPL: 19 (ref 12–20)
CALCIUM SERPL-MCNC: 7.6 MG/DL (ref 8.5–10.1)
CHLORIDE SERPL-SCNC: 116 MMOL/L (ref 100–111)
CO2 SERPL-SCNC: 23 MMOL/L (ref 21–32)
CREAT SERPL-MCNC: 1.08 MG/DL (ref 0.6–1.3)
DIFFERENTIAL METHOD BLD: ABNORMAL
EOSINOPHIL # BLD: 0 K/UL (ref 0–0.4)
EOSINOPHIL NFR BLD: 0 % (ref 0–5)
ERYTHROCYTE [DISTWIDTH] IN BLOOD BY AUTOMATED COUNT: 16.2 % (ref 11.6–14.5)
GLOBULIN SER CALC-MCNC: 2.4 G/DL (ref 2–4)
GLUCOSE BLD STRIP.AUTO-MCNC: 100 MG/DL (ref 70–110)
GLUCOSE BLD STRIP.AUTO-MCNC: 115 MG/DL (ref 70–110)
GLUCOSE BLD STRIP.AUTO-MCNC: 115 MG/DL (ref 70–110)
GLUCOSE BLD STRIP.AUTO-MCNC: 124 MG/DL (ref 70–110)
GLUCOSE SERPL-MCNC: 106 MG/DL (ref 74–99)
HCT VFR BLD AUTO: 26.6 % (ref 36–48)
HGB BLD-MCNC: 8.7 G/DL (ref 13–16)
INR PPP: 1.4 (ref 0.8–1.2)
LYMPHOCYTES # BLD: 0.6 K/UL (ref 0.9–3.6)
LYMPHOCYTES NFR BLD: 9 % (ref 21–52)
MAGNESIUM SERPL-MCNC: 1.8 MG/DL (ref 1.6–2.6)
MCH RBC QN AUTO: 30.5 PG (ref 24–34)
MCHC RBC AUTO-ENTMCNC: 32.7 G/DL (ref 31–37)
MCV RBC AUTO: 93.3 FL (ref 74–97)
MONOCYTES # BLD: 0.1 K/UL (ref 0.05–1.2)
MONOCYTES NFR BLD: 2 % (ref 3–10)
NEUTS SEG # BLD: 5.4 K/UL (ref 1.8–8)
NEUTS SEG NFR BLD: 89 % (ref 40–73)
PHOSPHATE SERPL-MCNC: 2.7 MG/DL (ref 2.5–4.9)
PLATELET # BLD AUTO: 209 K/UL (ref 135–420)
PMV BLD AUTO: 10.1 FL (ref 9.2–11.8)
POTASSIUM SERPL-SCNC: 4.2 MMOL/L (ref 3.5–5.5)
PROT SERPL-MCNC: 4.2 G/DL (ref 6.4–8.2)
PROTHROMBIN TIME: 16.5 SEC (ref 11.5–15.2)
RBC # BLD AUTO: 2.85 M/UL (ref 4.7–5.5)
SODIUM SERPL-SCNC: 145 MMOL/L (ref 136–145)
WBC # BLD AUTO: 6.1 K/UL (ref 4.6–13.2)

## 2020-05-09 PROCEDURE — 74011250637 HC RX REV CODE- 250/637: Performed by: PHYSICIAN ASSISTANT

## 2020-05-09 PROCEDURE — 77010033678 HC OXYGEN DAILY

## 2020-05-09 PROCEDURE — 84100 ASSAY OF PHOSPHORUS: CPT

## 2020-05-09 PROCEDURE — 74011250637 HC RX REV CODE- 250/637: Performed by: INTERNAL MEDICINE

## 2020-05-09 PROCEDURE — 80048 BASIC METABOLIC PNL TOTAL CA: CPT

## 2020-05-09 PROCEDURE — 83735 ASSAY OF MAGNESIUM: CPT

## 2020-05-09 PROCEDURE — 74011250636 HC RX REV CODE- 250/636: Performed by: INTERNAL MEDICINE

## 2020-05-09 PROCEDURE — 65660000000 HC RM CCU STEPDOWN

## 2020-05-09 PROCEDURE — 77030040392 HC DRSG OPTIFOAM MDII -A

## 2020-05-09 PROCEDURE — 36415 COLL VENOUS BLD VENIPUNCTURE: CPT

## 2020-05-09 PROCEDURE — 85610 PROTHROMBIN TIME: CPT

## 2020-05-09 PROCEDURE — 80076 HEPATIC FUNCTION PANEL: CPT

## 2020-05-09 PROCEDURE — 85025 COMPLETE CBC W/AUTO DIFF WBC: CPT

## 2020-05-09 PROCEDURE — 74011000250 HC RX REV CODE- 250: Performed by: HOSPITALIST

## 2020-05-09 PROCEDURE — 82962 GLUCOSE BLOOD TEST: CPT

## 2020-05-09 RX ADMIN — ATORVASTATIN CALCIUM 40 MG: 20 TABLET, FILM COATED ORAL at 21:24

## 2020-05-09 RX ADMIN — QUETIAPINE FUMARATE 25 MG: 25 TABLET ORAL at 21:24

## 2020-05-09 RX ADMIN — MELATONIN 2 TABLET: at 09:29

## 2020-05-09 RX ADMIN — ASPIRIN 81 MG 81 MG: 81 TABLET ORAL at 09:30

## 2020-05-09 RX ADMIN — CLOTRIMAZOLE AND BETAMETHASONE DIPROPIONATE: 10; .5 CREAM TOPICAL at 17:11

## 2020-05-09 RX ADMIN — TAMSULOSIN HYDROCHLORIDE 0.4 MG: 0.4 CAPSULE ORAL at 09:30

## 2020-05-09 RX ADMIN — SODIUM CHLORIDE, SODIUM LACTATE, POTASSIUM CHLORIDE, AND CALCIUM CHLORIDE 75 ML/HR: 600; 310; 30; 20 INJECTION, SOLUTION INTRAVENOUS at 00:20

## 2020-05-09 RX ADMIN — CLOTRIMAZOLE AND BETAMETHASONE DIPROPIONATE: 10; .5 CREAM TOPICAL at 09:31

## 2020-05-09 RX ADMIN — HEPARIN SODIUM 5000 UNITS: 5000 INJECTION INTRAVENOUS; SUBCUTANEOUS at 17:11

## 2020-05-09 RX ADMIN — LEVOTHYROXINE SODIUM 100 MCG: 0.03 TABLET ORAL at 05:52

## 2020-05-09 RX ADMIN — PANTOPRAZOLE SODIUM 40 MG: 40 TABLET, DELAYED RELEASE ORAL at 09:30

## 2020-05-09 RX ADMIN — PANTOPRAZOLE SODIUM 40 MG: 40 TABLET, DELAYED RELEASE ORAL at 17:10

## 2020-05-09 NOTE — PROGRESS NOTES
Discharge/Transition Planning    Care Management following and chart reviewed.  Pt can go to [address] with neg COVID test      Marilu Nieto RN BSN  Outcomes Manager  Pager # [phone]

## 2020-05-09 NOTE — PROGRESS NOTES
Internal Medicine Progress Note        NAME: Nuha Bond   :  1934  MRM:  [id number]    Date/Time: 2020        ASSESSMENT/PLAN:  COVID repeat is  pending     # Hypothermia and hypotension. Unclear cause. Improved with Warming and  IVF. Seen by cardiology. TTE checked. Per cardiology Continue holding lasix for now. Blood cultures NTD  . LA normal  . Cbc . Bmp  . UA   Ivf boluses. Maintain BP. No BP meds for now. re evaluated by cardiology    # Skin fungal infection , possible chemical dermatitis from incontinence. Area in perineal and groin. Lotrisone cream BID. #  Dysrhythmia , heart block (LBBB ,long first degree and wide QRS), ho   Sinoatrial node dysfunction  and mild elevation of trop level. Stabilized initially in ICU. given heart rate down to 40 with 1st AV block and LBBB,  S/P  implantation of single chamber Medtronic pacemaker . History of  Cardiomyopathy  LVEF 35-40% by previous TTE and Grade II diastolic dysfunction.  had short runs of NSVT.  Lexiscan Myoview testing with inferior and inferoseptal fixed defect more consistent with infarction,  no significant ongoing ischemia       # Acute delirium. Acute confusional state.  Treat medical problems . Restrain as needed used in ICU. Correct s. Na  level. Haldol used  per PCCM team     #  SIRS.  Hypothermia , Hypotension and bradycardia. Unclear source of infection on admission. Sepsis work-up started in the emergency room.  Treated with Abx vanco and zosyn.   Hypotension and Hypothermia Likely due to Hypovolemic shock 2/2 GI bleed.  TSH normal. Appreciate PCCM input. Warming blankets Used initially. Needed iv pressors . Blood and urine CS NTD. Repeat UA. Currently off Antibiotic.     # GIB presented as Melena , anemia. GIB noted in ICU. GI consulted. Avoid NSAIDs.  Protonix. . Dw GI, went for EGD as below .      # Anemia due to likely acute blood loss . Maintain Hb jacinto than 7, transfuse as needed.      # Hypoglycemia. Monitor blood sugar and treat accordingly. BSL stable currently , need monitoring. Treated with D5 ivf , due to low po intake. Dc ivf and encourage po intake.   .      #  Chronic renal failure, stage 3 (moderate) ([facility] [address] (12/23/2019). S.cr stable currently.  Monitor Renal function and other labs as indicated. Avoid nephrotoxins , iv Contrast, NSAID. Renally dosing medications. Monitor urine out put. Serum creatinine   improved     # Hypernatremia. Change IVF to D5.  Trended down. Follow-up.   TSH normal      # ho   Hypothyroidism . On replacement. Checked TSH , T4.      #  Dysphagia (4/8/2020)  as above. Encouraged on PO. Episode  of aspiration again 4/24  am SLP followed him. Short period of TPN  In ICU.     SLP Recommendations: Diet:[facility] soft (ground meat)  NECTAR thick liquid diet Ice chips for pleasure after oral care, per pt request Meds: Via IV or crushed in puree  Aspiration Precautions Oral Care TID Small sips/bites No straws    #   HTN (hypertension) (4/21/2020) control BP. As  Above          Lab Review:     Recent Labs     05/09/20  0350 05/08/20  0930   WBC 6.1 5.0   HGB 8.7* 10.5*   HCT 26.6* 32.0*    202     Recent Labs     05/09/20  0350 05/08/20  0930    143   K 4.2 4.2   * 116*   CO2 23 23   * 132*   BUN 20* 19*   CREA 1.08 1.21   CA 7.6* 7.7*   MG 1.8  --    PHOS 2.7  --    ALB 1.8*  --    TBILI 0.3  --    SGOT 34  --    ALT 19  --    INR 1.4*  --      Lab Results   Component Value Date/Time    Glucose (POC) 100 05/09/2020 07:46 AM    Glucose (POC) 136 (H) 05/08/2020 09:45 PM    Glucose (POC) 142 (H) 05/08/2020 03:59 PM    Glucose (POC) 131 (H) 05/08/2020 11:51 AM    Glucose (POC) 103 05/08/2020 07:57 AM    Glucose (POC) 96 10/10/2014 09:28 PM    Glucose (POC) 186 (H) 10/10/2014 03:48 PM     No results for input(s): PH, PCO2, PO2, HCO3, FIO2 in the last 72 hours.   Recent Labs     05/09/20  0350   INR 1.4*       Lab Results   Component Value Date/Time    Specimen Description: CLEAN [facility] 02/06/2014 05:45 PM     Lab Results   Component Value Date/Time    Culture result: NO GROWTH AFTER 21 HOURS 05/08/2020 09:30 AM    Culture result: NO GROWTH AFTER 21 HOURS 05/08/2020 08:20 AM    Culture result: No growth (<1,000 CFU/ML) 04/22/2020 09:20 AM       All Cardiac Markers in the last 24 hours: No results found for: CPK, CK, CKMMB, CKMB, RCK3, CKMBT, CKNDX, CKND1, TELLY, TROPT, TROIQ, JOSE, TROPT, TNIPOC, BNP, BNPP       Intervals noted   Pt s/e @ bedside     Subjective:     Chief Complaint:      Want to liberate diet want salt and seasoning to eat better , diet liberated for salt as has low BP anyway  Deny other complains  No Pains CP/SOB    ROS:  (bold if positive,otherwise negative)    Fever/chills ,  Dysuria   Cough , Sputum , SOB/HERRMANN , Chest Pain     Diarrhea ,Nausea/Vomit , Abd Pain , Constipation    Tolerating Diet                Objective:     Vitals:  Last 24hrs VS reviewed since prior progress note. Most recent are:    Visit Vitals  /56 (BP 1 Location: Right arm, BP Patient Position: At rest)   Pulse 71   Temp 97.6 °F (36.4 °C)   Resp 16   Ht 5' 7\" (1.702 m)   Wt 82.6 kg (182 lb)   SpO2 97%   BMI 28.51 kg/m²     SpO2 Readings from Last 6 Encounters:   05/09/20 97%   04/09/20 98%   02/21/20 92%   01/02/20 96%   12/16/19 97%   09/23/18 99%    O2 Flow Rate (L/min): 2 l/min       Intake/Output Summary (Last 24 hours) at 5/9/2020 5136  Last data filed at 5/9/2020 9821  Gross per 24 hour   Intake 2097.5 ml   Output 600 ml   Net 1497.5 ml          Physical Exam:   Gen:  Comfortable,    in no acute distress.  Appear stated age, Well-developed   HEENT:  Head atraumatic, normocephalic ,  moist mucous membranes. Neck:   Trachea midline , No apparent JVD, Supple, without masses, thyroid non-tender  Resp:  No accessory muscle use,Bilateral BS present, clear breath sounds without wheezes rales or rhonchi  Card:  normal S1, S2 without Gallop .  3  + lower leg peripheral edema.   Abd:  Soft, non-tender, non-distended, bowel sounds are present . Natasha Graven Musc: L shoulder sling ( per cardiology instructions)   No cyanosis or clubbing. Skin:  No rashes or ulcers, skin turgor is good. Neuro: Alert and coherent , appropriate mood.   no clear area of focal motor weakness,  follows commands appropriately.      Telemetry reviewed:   normal sinus rhythm,  not pacing     Medications Reviewed: (see below)    Lab Data Reviewed: (see below)    ______________________________________________________________________    Medications:     Current Facility-Administered Medications   Medication Dose Route Frequency    lactated Ringers infusion  75 mL/hr IntraVENous CONTINUOUS    clotrimazole-betamethasone (LOTRISONE) 1-0.05 % cream   Topical BID    [Held by provider] furosemide (LASIX) tablet 20 mg  20 mg Oral DAILY    pantoprazole (PROTONIX) tablet 40 mg  40 mg Oral ACB&D    oxyCODONE-acetaminophen (PERCOCET) 5-325 mg per tablet 1 Tab  1 Tab Oral Q4H PRN    aspirin chewable tablet 81 mg  81 mg Oral DAILY    atropine injection 1 mg  1 mg IntraVENous Q5MIN PRN    insulin lispro (HUMALOG) injection   SubCUTAneous AC&HS    0.9% sodium chloride infusion 250 mL  250 mL IntraVENous PRN    dextrose 10% infusion 125-250 mL  125-250 mL IntraVENous PRN    QUEtiapine (SEROquel) tablet 25 mg  25 mg Oral QHS    lidocaine 4 % patch 1 Patch  1 Patch TransDERmal Q24H    haloperidol lactate (HALDOL) injection 2 mg  2 mg IntraMUSCular Q6H PRN    glucose chewable tablet 16 g  4 Tab Oral PRN    glucagon (GLUCAGEN) injection 1 mg  1 mg IntraMUSCular PRN    levothyroxine (SYNTHROID) tablet 100 mcg  100 mcg Oral 6am    sodium chloride (NS) flush 5-10 mL  5-10 mL IntraVENous PRN    cholecalciferol (VITAMIN D3) (1000 Units /25 mcg) tablet 2 Tab  2,000 Units Oral DAILY    atorvastatin (LIPITOR) tablet 40 mg  40 mg Oral QHS    tamsulosin (FLOMAX) capsule 0.4 mg  0.4 mg Oral DAILY    heparin (porcine) injection 5,000 Units 5,000 Units SubCUTAneous Q8H          Total time spent with patient: 35 minutes                  Care Plan discussed with: Patient, Care Manager, Nursing Staff, Consultant/Specialist and >50% of time spent in counseling and coordination of care    Discussed:  Care Plan    Prophylaxis:  SCD's    Disposition:  SNF/LTC           This document in whole or part of it has been produced using voice recognition software. Unrecognized errors in transcription may be present.     Attending Physician: Yesy Bolanos MD

## 2020-05-09 NOTE — PROGRESS NOTES
Problem: Falls - Risk of  Goal: *Absence of Falls  Description: Document Leta Martin Fall Risk and appropriate interventions in the flowsheet. Outcome: Progressing Towards Goal  Note: Fall Risk Interventions:  Mobility Interventions: Communicate number of staff needed for ambulation/transfer    Mentation Interventions: Adequate sleep, hydration, pain control    Medication Interventions: Evaluate medications/consider consulting pharmacy, Patient to call before getting OOB    Elimination Interventions: Patient to call for help with toileting needs    History of Falls Interventions: Door open when patient unattended         Problem: Pressure Injury - Risk of  Goal: *Prevention of pressure injury  Description: Document Alex Scale and appropriate interventions in the flowsheet.   Outcome: Progressing Towards Goal  Note: Pressure Injury Interventions:  Sensory Interventions: Assess changes in LOC    Moisture Interventions: Absorbent underpads    Activity Interventions: Pressure redistribution bed/mattress(bed type)    Mobility Interventions: Pressure redistribution bed/mattress (bed type)    Nutrition Interventions: Document food/fluid/supplement intake    Friction and Shear Interventions: Apply protective barrier, creams and emollients                Problem: Pain  Goal: *Control of Pain  Outcome: Progressing Towards Goal

## 2020-05-09 NOTE — PROGRESS NOTES
1930--Bedside and Verbal shift change report given to Lavell RANDLE (oncoming nurse) by Cristal (offgoing nurse). Report included the following information SBAR, Kardex, Intake/Output, MAR and Recent Results. Pt in bed resting quietly, no distress noted. Frequently used items within reach. 2000-- Pt incontinent of stool, pt cleaned, bed bad changed. 2200--Meds administered, pt tolerated well. 9205-- Post residual bladder scan is 114. Pt voided 250mls. 0712--Bedside and Verbal shift change report given to Cristal (oncoming nurse) by Rachna Palomino RN (offgoing nurse). Report included the following information SBAR, Kardex, Intake/Output and MAR.

## 2020-05-09 NOTE — ROUTINE PROCESS
0720-- Bedside, Verbal and Written shift change report received by [facility] [employer] (oncoming nurse) by Valeri(offgoing nurse). Allergy band placed on pt's wrist. Report included the following information SBAR, Kardex, Intake/Output, MAR and Recent Results. 0930-- Assessment completed, call bell within reach, no distress noted. AM  medications administered, pt tolerated with ease, will continue to monitor. 1200 -- Shift reassessment, pt condition unchanged, will continue to monitor. 1500 -noted a skin tear to left upper arm, cleaned and applied dressing. 1600 --  Shift reassessment, pt condition unchanged, will continue to monitor. 1920 -- Bedside, Verbal and Written shift change report given to Betty(oncoming nurse) by [facility] (offgoing nurse). Report included the following information SBAR, Kardex, Intake/Output, MAR and Recent Results. Skin assessment completed.

## 2020-05-09 NOTE — PROGRESS NOTES
[facility]  -  Progress Note      Patient: Kristan Fowler MRN: [id number]  SSN: [id number]    YOB: 1934  Age: 80 y.o. Sex: male      Admit Date: 4/21/2020    Assessment:     Hospital Problems  Date Reviewed: 4/6/2020          Codes Class Noted POA    * (Principal) Pacemaker ICD-10-CM: Z95.0  ICD-9-CM: V45.01  5/5/2020 Yes    Overview Signed 5/5/2020  9:51 AM by Rika Ames MD     Medtronic pacemaker 5/5/20             Gastrointestinal bleeding ICD-10-CM: K92.2  ICD-9-CM: 578.9  4/23/2020 Yes        Hypoglycemia ICD-10-CM: E16.2  ICD-9-CM: 251.2  4/21/2020 Yes        HTN (hypertension) ICD-10-CM: I10  ICD-9-CM: 401.9  4/21/2020 Yes        Left bundle branch block ICD-10-CM: I44.7  ICD-9-CM: 426.3  4/21/2020 Yes        [facility]at[facility] node dysfunction ([facility] ICD-10-CM: I49.5  ICD-9-CM: 427.81  4/21/2020 Yes        Dysphagia ICD-10-CM: R13.10  ICD-9-CM: 787.20  4/8/2020 Yes        Cardiomyopathy ([facility] ICD-10-CM: I42.9  ICD-9-CM: 425.4  2/20/2020 Yes    Overview Signed 2/20/2020 12:40 AM by Ibis KRAUSE DO     LVEF 35-40% by previous TTE. Grade II diastolic dysfunction [id number]: I51.9  ICD-9-CM: 429.9  2/20/2020 Yes    Overview Signed 2/20/2020 12:41 AM by Rosetta King DO     Per previous TTE. Hypothyroidism ICD-10-CM: E03.9  ICD-9-CM: 244.9  2/20/2020 Yes        Bradycardia ICD-10-CM: R00.1  ICD-9-CM: 427.89  2/19/2020 Yes        Hypotension ICD-10-CM: I95.9  ICD-9-CM: 458.9  2/19/2020 Yes        Hypothermia ICD-10-CM: T68. Annemarie Arias  ICD-9-CM: 991.6  12/23/2019 Yes        Chronic renal failure, stage 3 (moderate) (HCC) ICD-10-CM: N18.3  ICD-9-CM: 585.3  12/23/2019 Yes        SIRS (systemic inflammatory response syndrome) (HCC) ICD-10-CM: R65.10  ICD-9-CM: 995.90  12/23/2019 Yes            --Irreversible symptomatic bradycardia, status post permanent pacemaker this admission.  -Hypothermia and hypotension: Frequent episode of this during this admission and also in the past.    Suspect due to suboptimally treated hypothyroidism. -LBBB, chronic  -CAD 4/6/20, no PE  -Chronic systolic heart failure. Volume status appears to be stable.  -Nonischemic cardiomyopathy with EF 45% 4/22/20, this appears chronic. Repeat limited echocardiogram done yesterday was unchanged without evidence of new pericardial effusion.  -Pharm nuc 4/22/20, no ischemia  -Hypothermia resolved  -GERD on PPI  -Thyroid disorder on replacement  -Concern for sepsis initially, s/p covid rule-out, off antbx, no fevers/leukocytosis  -h/o GIB, stable  -Severe deconditioning     Primary cardiologist Dr. Ryan Tobin:     Continue thyroid replacement. Patient will need close follow-up with an endocrinologist upon discharge. Stable from a cardiac standpoint for discharge  I would send him out on a low-dose of furosemide such as 20 mg daily. He can follow-up in the office in 3 to 4 weeks. Subjective:     No new complaints. Feeling better.     Objective:      Patient Vitals for the past 8 hrs:   Temp Pulse Resp BP SpO2   05/09/20 0757 97.6 °F (36.4 °C) 71 16 107/56 97 %   05/09/20 0343 97.4 °F (36.3 °C) 87 16 101/55 95 %         Patient Vitals for the past 96 hrs:   Weight   05/08/20 1432 82.6 kg (182 lb)   05/08/20 0501 82.8 kg (182 lb 8.7 oz)   05/07/20 2055 77.1 kg (169 lb 14.4 oz)   05/06/20 0109 77.4 kg (170 lb 10.2 oz)         Intake/Output Summary (Last 24 hours) at 5/9/2020 1024  Last data filed at 5/9/2020 4365  Gross per 24 hour   Intake 2097.5 ml   Output 600 ml   Net 1497.5 ml       Physical Exam:  General:  fatigued, cooperative, no distress, appears stated age  Neck:  no JVD  Lungs:  clear to auscultation bilaterally  Heart:  regular rate and rhythm  Abdomen:  abdomen is soft without significant tenderness, masses, organomegaly or guarding  Extremities:  edema trace to 1+ bilateral    Data Review:     Labs: Results:       Chemistry Recent Labs     05/09/20  0350 05/08/20  0930   * 132*    143   K 4.2 4.2   * 116*   CO2 23 23   BUN 20* 19*   CREA 1.08 1.21   CA 7.6* 7.7*   MG 1.8  --    PHOS 2.7  --    AGAP 6 4   BUCR 19 16   *  --    TP 4.2*  --    ALB 1.8*  --    GLOB 2.4  --    AGRAT 0.8  --       CBC w/Diff Recent Labs     05/09/20  0350 05/08/20  0930   WBC 6.1 5.0   RBC 2.85* 3.39*   HGB 8.7* 10.5*   HCT 26.6* 32.0*    202   GRANS 89* 91*   LYMPH 9* 8*   EOS 0 0      Cardiac Enzymes No results found for: CPK, CK, CKMMB, CKMB, RCK3, CKMBT, CKNDX, CKND1, TELLY, TROPT, TROIQ, JOSE, TROPT, TNIPOC, BNP, BNPP   Coagulation Recent Labs     05/09/20  0350   PTP 16.5*   INR 1.4*       Lipid Panel No results found for: CHOL, CHOLPOCT, CHOLX, CHLST, CHOLV, 147319, HDL, HDLP, LDL, LDLC, DLDLP, 344440, VLDLC, VLDL, TGLX, TRIGL, TRIGP, TGLPOCT, CHHD, CHHDX   BNP No results found for: BNP, BNPP, XBNPT   Liver Enzymes Recent Labs     05/09/20  0350   TP 4.2*   ALB 1.8*   *   SGOT 34      Digoxin    Thyroid Studies Lab Results   Component Value Date/Time    T4, Total 11.5 02/19/2020 09:28 AM    TSH 4.64 (H) 04/21/2020 06:36 AM

## 2020-05-09 NOTE — PROGRESS NOTES
Problem: Discharge Planning  Goal: *Discharge to safe environment  Outcome: Progressing Towards Goal     Problem: Falls - Risk of  Goal: *Absence of Falls  Description: Document Clydene Bone Fall Risk and appropriate interventions in the flowsheet. Outcome: Progressing Towards Goal  Note: Fall Risk Interventions:  Mobility Interventions: Communicate number of staff needed for ambulation/transfer, Patient to call before getting OOB    Mentation Interventions: Adequate sleep, hydration, pain control, Evaluate medications/consider consulting pharmacy    Medication Interventions: Evaluate medications/consider consulting pharmacy    Elimination Interventions: Patient to call for help with toileting needs, Call light in reach, Toileting schedule/hourly rounds    History of Falls Interventions: Door open when patient unattended         Problem: Patient Education: Go to Patient Education Activity  Goal: Patient/Family Education  Outcome: Progressing Towards Goal     Problem: Pressure Injury - Risk of  Goal: *Prevention of pressure injury  Description: Document Alex Scale and appropriate interventions in the flowsheet.   Outcome: Progressing Towards Goal  Note: Pressure Injury Interventions:  Sensory Interventions: Assess changes in LOC, Keep linens dry and wrinkle-free, Pressure redistribution bed/mattress (bed type)    Moisture Interventions: Absorbent underpads, Apply protective barrier, creams and emollients, Minimize layers, Moisture barrier    Activity Interventions: Pressure redistribution bed/mattress(bed type)    Mobility Interventions: Pressure redistribution bed/mattress (bed type), HOB 30 degrees or less    Nutrition Interventions: Document food/fluid/supplement intake    Friction and Shear Interventions: Apply protective barrier, creams and emollients, Foam dressings/transparent film/skin sealants                Problem: Patient Education: Go to Patient Education Activity  Goal: Patient/Family Education  Outcome: Progressing Towards Goal     Problem: Altered nutrition  Goal: *Adequate nutrition  Outcome: Progressing Towards Goal     Problem: Pain  Goal: *Control of Pain  Outcome: Progressing Towards Goal     Problem: Tissue Perfusion - Cardiopulmonary, Altered  Goal: *Optimize tissue perfusion  Outcome: Progressing Towards Goal  Goal: *Absence of hypoxia  Outcome: Progressing Towards Goal     Problem: Patient Education: Go to Patient Education Activity  Goal: Patient/Family Education  Outcome: Progressing Towards Goal     Problem: Delirium Treatment  Goal: *Level of consciousness restored to baseline  Outcome: Progressing Towards Goal  Goal: *Level of environmental perceptions restored to baseline  Outcome: Progressing Towards Goal  Goal: *Sensory perception restored to baseline  Outcome: Progressing Towards Goal  Goal: *Emotional stability restored to baseline  Outcome: Progressing Towards Goal  Goal: *Functional assessment restored to baseline  Outcome: Progressing Towards Goal  Goal: *Absence of falls  Outcome: Progressing Towards Goal  Goal: *Will remain free of delirium, CAM Score negative  Outcome: Progressing Towards Goal  Goal: *Cognitive status will be restored to baseline  Outcome: Progressing Towards Goal  Goal: Interventions  Outcome: Progressing Towards Goal     Problem: Patient Education: Go to Patient Education Activity  Goal: Patient/Family Education  Outcome: Progressing Towards Goal     Problem: Patient Education: Go to Patient Education Activity  Goal: Patient/Family Education  Outcome: Progressing Towards Goal     Problem: Patient Education: Go to Patient Education Activity  Goal: Patient/Family Education  Outcome: Progressing Towards Goal     Problem: Patient Education: Go to Patient Education Activity  Goal: Patient/Family Education  Outcome: Progressing Towards Goal

## 2020-05-09 NOTE — PROGRESS NOTES
Problem: Falls - Risk of  Goal: *Absence of Falls  Description: Document Scooby Alexander Fall Risk and appropriate interventions in the flowsheet. Outcome: Progressing Towards Goal  Note: Fall Risk Interventions:  Mobility Interventions: Patient to call before getting OOB    Mentation Interventions: Door open when patient unattended    Medication Interventions: Patient to call before getting OOB    Elimination Interventions: Call light in reach, Patient to call for help with toileting needs    History of Falls Interventions: Door open when patient unattended         Problem: Pressure Injury - Risk of  Goal: *Prevention of pressure injury  Description: Document Alex Scale and appropriate interventions in the flowsheet.   Outcome: Progressing Towards Goal  Note: Pressure Injury Interventions:  Sensory Interventions: Assess changes in LOC    Moisture Interventions: Absorbent underpads    Activity Interventions: Pressure redistribution bed/mattress(bed type)    Mobility Interventions: Pressure redistribution bed/mattress (bed type)    Nutrition Interventions: Document food/fluid/supplement intake    Friction and Shear Interventions: Apply protective barrier, creams and emollients, Foam dressings/transparent film/skin sealants                Problem: Pain  Goal: *Control of Pain  Outcome: Progressing Towards Goal

## 2020-05-09 NOTE — PROGRESS NOTES
Received patient from [facility]. Pt awake and in bed. Denies pain at this time. Bed locked in lowest position. Frequently used items and call light within reach. 2130. Pt has large formed bowel movement. Care performed. Pt bathed and linen changed. Resting quietly in bed. 0025: pt has a very large nose bleed. Hemostasis achieved after 5-10 minutes. Pt coughing as nose bleed was occurring, hemoptysis noted . supplemental oxygen is humidified    Paged Dr. Linda Espinoza to update. Awaiting call back. 0149: pt experiencing another episode of copious nose bleed. Dr Linda Espinoza updated via telephone. Awaiting new orders. 0602: Afrin administered for nose bleed per order. Will continue to monitor. 0730: Bedside shift change report given to Lesley Hall (oncoming nurse) by Lala Schuster RN (offgoing nurse). Report included the following information SBAR, Intake/Output, MAR and Quality Measures. Opportunity for questions and clarification provided.

## 2020-05-10 LAB
ANION GAP SERPL CALC-SCNC: 2 MMOL/L (ref 3–18)
BASOPHILS # BLD: 0 K/UL (ref 0–0.1)
BASOPHILS NFR BLD: 0 % (ref 0–2)
BUN SERPL-MCNC: 22 MG/DL (ref 7–18)
BUN/CREAT SERPL: 21 (ref 12–20)
CALCIUM SERPL-MCNC: 7.8 MG/DL (ref 8.5–10.1)
CHLORIDE SERPL-SCNC: 117 MMOL/L (ref 100–111)
CO2 SERPL-SCNC: 26 MMOL/L (ref 21–32)
CREAT SERPL-MCNC: 1.07 MG/DL (ref 0.6–1.3)
DIFFERENTIAL METHOD BLD: ABNORMAL
EOSINOPHIL # BLD: 0.1 K/UL (ref 0–0.4)
EOSINOPHIL NFR BLD: 1 % (ref 0–5)
ERYTHROCYTE [DISTWIDTH] IN BLOOD BY AUTOMATED COUNT: 16.5 % (ref 11.6–14.5)
GLUCOSE BLD STRIP.AUTO-MCNC: 87 MG/DL (ref 70–110)
GLUCOSE BLD STRIP.AUTO-MCNC: 95 MG/DL (ref 70–110)
GLUCOSE BLD STRIP.AUTO-MCNC: 95 MG/DL (ref 70–110)
GLUCOSE BLD STRIP.AUTO-MCNC: 96 MG/DL (ref 70–110)
GLUCOSE SERPL-MCNC: 80 MG/DL (ref 74–99)
HCT VFR BLD AUTO: 24.7 % (ref 36–48)
HGB BLD-MCNC: 8 G/DL (ref 13–16)
INR PPP: 1.3 (ref 0.8–1.2)
LYMPHOCYTES # BLD: 0.7 K/UL (ref 0.9–3.6)
LYMPHOCYTES NFR BLD: 11 % (ref 21–52)
MAGNESIUM SERPL-MCNC: 1.7 MG/DL (ref 1.6–2.6)
MCH RBC QN AUTO: 30.7 PG (ref 24–34)
MCHC RBC AUTO-ENTMCNC: 32.4 G/DL (ref 31–37)
MCV RBC AUTO: 94.6 FL (ref 74–97)
MONOCYTES # BLD: 0.3 K/UL (ref 0.05–1.2)
MONOCYTES NFR BLD: 4 % (ref 3–10)
NEUTS SEG # BLD: 5.3 K/UL (ref 1.8–8)
NEUTS SEG NFR BLD: 84 % (ref 40–73)
PHOSPHATE SERPL-MCNC: 2.6 MG/DL (ref 2.5–4.9)
PLATELET # BLD AUTO: 217 K/UL (ref 135–420)
PMV BLD AUTO: 10 FL (ref 9.2–11.8)
POTASSIUM SERPL-SCNC: 4.1 MMOL/L (ref 3.5–5.5)
PROTHROMBIN TIME: 16.2 SEC (ref 11.5–15.2)
RBC # BLD AUTO: 2.61 M/UL (ref 4.7–5.5)
SARS-COV-2, COV2NT: NOT DETECTED
SODIUM SERPL-SCNC: 145 MMOL/L (ref 136–145)
T4 FREE SERPL-MCNC: 1 NG/DL (ref 0.7–1.5)
TSH SERPL DL<=0.05 MIU/L-ACNC: 1.3 UIU/ML (ref 0.36–3.74)
WBC # BLD AUTO: 6.3 K/UL (ref 4.6–13.2)

## 2020-05-10 PROCEDURE — 85025 COMPLETE CBC W/AUTO DIFF WBC: CPT

## 2020-05-10 PROCEDURE — 80048 BASIC METABOLIC PNL TOTAL CA: CPT

## 2020-05-10 PROCEDURE — 83735 ASSAY OF MAGNESIUM: CPT

## 2020-05-10 PROCEDURE — 74011000250 HC RX REV CODE- 250: Performed by: HOSPITALIST

## 2020-05-10 PROCEDURE — 85610 PROTHROMBIN TIME: CPT

## 2020-05-10 PROCEDURE — 74011250637 HC RX REV CODE- 250/637

## 2020-05-10 PROCEDURE — 84439 ASSAY OF FREE THYROXINE: CPT

## 2020-05-10 PROCEDURE — 84443 ASSAY THYROID STIM HORMONE: CPT

## 2020-05-10 PROCEDURE — 74011250637 HC RX REV CODE- 250/637: Performed by: INTERNAL MEDICINE

## 2020-05-10 PROCEDURE — 84100 ASSAY OF PHOSPHORUS: CPT

## 2020-05-10 PROCEDURE — 82962 GLUCOSE BLOOD TEST: CPT

## 2020-05-10 PROCEDURE — 97530 THERAPEUTIC ACTIVITIES: CPT

## 2020-05-10 PROCEDURE — 65660000000 HC RM CCU STEPDOWN

## 2020-05-10 PROCEDURE — 77010033678 HC OXYGEN DAILY

## 2020-05-10 PROCEDURE — 74011250637 HC RX REV CODE- 250/637: Performed by: PHYSICIAN ASSISTANT

## 2020-05-10 PROCEDURE — 74011250636 HC RX REV CODE- 250/636: Performed by: INTERNAL MEDICINE

## 2020-05-10 RX ORDER — OXYMETAZOLINE HCL 0.05 %
2 SPRAY, NON-AEROSOL (ML) NASAL
Status: DISCONTINUED | OUTPATIENT
Start: 2020-05-10 | End: 2020-05-11 | Stop reason: HOSPADM

## 2020-05-10 RX ORDER — OXYMETAZOLINE HCL 0.05 %
SPRAY, NON-AEROSOL (ML) NASAL
Status: COMPLETED
Start: 2020-05-10 | End: 2020-05-10

## 2020-05-10 RX ADMIN — CLOTRIMAZOLE AND BETAMETHASONE DIPROPIONATE: 10; .5 CREAM TOPICAL at 08:35

## 2020-05-10 RX ADMIN — PANTOPRAZOLE SODIUM 40 MG: 40 TABLET, DELAYED RELEASE ORAL at 08:34

## 2020-05-10 RX ADMIN — Medication 2 SPRAY: at 06:02

## 2020-05-10 RX ADMIN — TAMSULOSIN HYDROCHLORIDE 0.4 MG: 0.4 CAPSULE ORAL at 08:34

## 2020-05-10 RX ADMIN — ATORVASTATIN CALCIUM 40 MG: 20 TABLET, FILM COATED ORAL at 21:24

## 2020-05-10 RX ADMIN — OXYMETAZOLINE HCL 2 SPRAY: 0.05 SPRAY NASAL at 06:02

## 2020-05-10 RX ADMIN — LEVOTHYROXINE SODIUM 100 MCG: 0.03 TABLET ORAL at 06:06

## 2020-05-10 RX ADMIN — QUETIAPINE FUMARATE 25 MG: 25 TABLET ORAL at 21:24

## 2020-05-10 RX ADMIN — CLOTRIMAZOLE AND BETAMETHASONE DIPROPIONATE: 10; .5 CREAM TOPICAL at 17:30

## 2020-05-10 RX ADMIN — ASPIRIN 81 MG 81 MG: 81 TABLET ORAL at 08:34

## 2020-05-10 RX ADMIN — PANTOPRAZOLE SODIUM 40 MG: 40 TABLET, DELAYED RELEASE ORAL at 17:29

## 2020-05-10 RX ADMIN — HEPARIN SODIUM 5000 UNITS: 5000 INJECTION INTRAVENOUS; SUBCUTANEOUS at 00:05

## 2020-05-10 RX ADMIN — MELATONIN 2 TABLET: at 08:34

## 2020-05-10 NOTE — PROGRESS NOTES
completed follow up visit with and Spiritual assessment of patient .  Chaplains will continue to follow and will provide pastoral care on an as needed/requested basis    cinthia Hoffmann   Board Certified [address] [place] [address]   [phone]

## 2020-05-10 NOTE — PROGRESS NOTES
Problem: Mobility Impaired (Adult and Pediatric)  Goal: *Acute Goals and Plan of Care (Insert Text)  Description: Physical Therapy Goals  Reviewed 5/62020  Initiated 4/29/2020 and to be accomplished within 7 day(s)  1. Patient will move from supine to sit and sit to supine  in bed with supervision/set-up. 2.  Patient will transfer from bed to chair and chair to bed with supervision/set-up using the least restrictive device. 3.  Patient will perform sit to stand with supervision/set-up. 4.  Patient will ambulate with supervision/set-up for 50 feet with the least restrictive device. Outcome: Progressing Towards Goal   PHYSICAL THERAPY TREATMENT    Patient: Soila Doshi (54 y.o. male)  Date: 5/10/2020  Diagnosis: Hypothermia [T68. XXXA]   Pacemaker  Procedure(s) (LRB):  ESOPHAGOGASTRODUODENOSCOPY (EGD) with bx, dilation (N/A) 4 Days Post-Op  Precautions: Fall, Other (comment), Aspiration(Pacemaker precautions, sling LUE)  PLOF: Mod I     ASSESSMENT:  Nurse, Caitlin Ken informed to continue with bed exercises only. Pt completed therapeutic exercises for LE mobility to improve strength, ROM and coordination. He demonstrated fair tolerance to these exercises. DPT assisted Nurse with toileting changes and bed mobility. He required mod A x 2 for rolling during. Continuing to educate pt on benefits of BLE AROM throughout the day. Pt notified to reach Nursing for mobility and call bell left within reach. Progression toward goals:   []      Improving appropriately and progressing toward goals  [x]      Improving slowly and progressing toward goals  []      Not making progress toward goals and plan of care will be adjusted     PLAN:  Patient continues to benefit from skilled intervention to address the above impairments. Continue treatment per established plan of care.   Discharge Recommendations:  Khai Ramsey  Further Equipment Recommendations for Discharge:  TBD next level of care SUBJECTIVE:   Patient stated Candance Herd you come back in a few minutes.     OBJECTIVE DATA SUMMARY:   Critical Behavior:  Neurologic State: Alert, Appropriate for age  Orientation Level: Oriented X4  Cognition: Follows commands  Safety/Judgement: Fall prevention  Functional Mobility Training:  Bed Mobility:   Rolling Mod A x 2                Therapeutic Exercises:         EXERCISE   Sets   Reps   Active Active Assist   Passive Self ROM   Comments   Ankle Pumps 1 20  [x] [] [] []    Quad Sets/Glut Sets 1 10  [x] [] [] [] Hold for 5 secs   Hamstring Sets   [] [] [] []    Short Arc Quads   [] [] [] []    Heel Slides 1 10 [x] [] [] []    Straight Leg Raises   [x] [] [] []    Hip Add   [] [] [] [] Hold for 5 secs, w/ pillow squeeze                                               Pain:  Pain level pre-treatment: 0/10  Pain level post-treatment: 0/10   Pain Intervention(s): Medication (see MAR); Rest,  Response to intervention: Nurse notified, See doc flow    Activity Tolerance:   Fair  Please refer to the flowsheet for vital signs taken during this treatment. After treatment:   [] Patient left in no apparent distress sitting up in chair  [x] Patient left in no apparent distress in bed  [x] Call bell left within reach  [x] Nursing notified  [] Caregiver present  [] Bed alarm activated  [] SCDs applied      COMMUNICATION/EDUCATION:   [x]         Role of Physical Therapy in the acute care setting. [x]         Fall prevention education was provided and the patient/caregiver indicated understanding. [x]         Patient/family have participated as able in working toward goals and plan of care. []         Patient/family agree to work toward stated goals and plan of care. []         Patient understands intent and goals of therapy, but is neutral about his/her participation.   []         Patient is unable to participate in stated goals/plan of care: ongoing with therapy staff.  []         Other:        Delmar Law Calculation: 12 mins

## 2020-05-10 NOTE — PROGRESS NOTES
[id number] - Notified Dr. Kamran Bai that pt is having more nosebleeds with blood clots the size of a ping pong ball. Okay'd rhino rocket. Pt vital signs stable. Inserted rhino rocket to right nare. Will continue to monitor. 65 - Notified Dr. Kamran Bai of pt's nosebleed, orders receives to hold aspirin until nosebleeds are no longer recurrent, MD aware last dose given on 5/10/2020. Will continue to monitor.

## 2020-05-10 NOTE — PROGRESS NOTES
Received patient from [facility]. Pt awake and in bed. Complaints of nasal discomfort. No distress noted. Bed locked in lowest position. Frequently used items and call light within reach. 2124: night med administration complete. See eMAR for more details    2327: reassesment. No changes. incontinence care. Pt bathed. Resting quietly in bed. Uneventful night for pt. Pt rested and was treated per eMAR. No further complaints and no distress noted. 8964: Bedside shift change report given to [address] [place] [address] (oncoming nurse) by Moe Baker RN (offgoing nurse). Report included the following information SBAR, Intake/Output, MAR and Quality Measures. Opportunity for questions and clarification provided.

## 2020-05-10 NOTE — PROGRESS NOTES
Called for 2nd nose bleed this am, first one stopped after 5-10 min. Advised to hold pressure. I have ordered afrin. INR is pending but was 1.4 yesterday. I have stopped the heparin. Please humidify the air in NC to help prevent bleeds.

## 2020-05-10 NOTE — PROGRESS NOTES
Problem: Discharge Planning  Goal: *Discharge to safe environment  Outcome: Progressing Towards Goal     Problem: Falls - Risk of  Goal: *Absence of Falls  Description: Document Earma Fanta Fall Risk and appropriate interventions in the flowsheet. Outcome: Progressing Towards Goal  Note: Fall Risk Interventions:  Mobility Interventions: Patient to call before getting OOB, PT Consult for mobility concerns, Strengthening exercises (ROM-active/passive), Utilize walker, cane, or other assistive device    Mentation Interventions: Door open when patient unattended, Increase mobility, More frequent rounding, Reorient patient, Toileting rounds    Medication Interventions: Patient to call before getting OOB, Teach patient to arise slowly    Elimination Interventions: Toilet paper/wipes in reach, Toileting schedule/hourly rounds, Call light in reach    History of Falls Interventions: Investigate reason for fall, Room close to nurse's station, Door open when patient unattended, Vital signs minimum Q4HRs X 24 hrs (comment for end date)         Problem: Patient Education: Go to Patient Education Activity  Goal: Patient/Family Education  Outcome: Progressing Towards Goal     Problem: Pressure Injury - Risk of  Goal: *Prevention of pressure injury  Description: Document Alex Scale and appropriate interventions in the flowsheet.   Outcome: Progressing Towards Goal  Note: Pressure Injury Interventions:  Sensory Interventions: Keep linens dry and wrinkle-free, Minimize linen layers, Maintain/enhance activity level    Moisture Interventions: Limit adult briefs, Maintain skin hydration (lotion/cream), Minimize layers, Moisture barrier, Offer toileting Q_hr    Activity Interventions: PT/OT evaluation, Pressure redistribution bed/mattress(bed type), Increase time out of bed    Mobility Interventions: HOB 30 degrees or less, Pressure redistribution bed/mattress (bed type), PT/OT evaluation    Nutrition Interventions: Document food/fluid/supplement intake, Offer support with meals,snacks and hydration    Friction and Shear Interventions: Lift sheet, Lift team/patient mobility team, Minimize layers                Problem: Patient Education: Go to Patient Education Activity  Goal: Patient/Family Education  Outcome: Progressing Towards Goal     Problem: Altered nutrition  Goal: *Adequate nutrition  Outcome: Progressing Towards Goal     Problem: Pain  Goal: *Control of Pain  Outcome: Progressing Towards Goal     Problem: Tissue Perfusion - Cardiopulmonary, Altered  Goal: *Optimize tissue perfusion  Outcome: Progressing Towards Goal  Goal: *Absence of hypoxia  Outcome: Progressing Towards Goal     Problem: Patient Education: Go to Patient Education Activity  Goal: Patient/Family Education  Outcome: Progressing Towards Goal     Problem: Delirium Treatment  Goal: *Level of consciousness restored to baseline  Outcome: Progressing Towards Goal  Goal: *Level of environmental perceptions restored to baseline  Outcome: Progressing Towards Goal  Goal: *Sensory perception restored to baseline  Outcome: Progressing Towards Goal  Goal: *Emotional stability restored to baseline  Outcome: Progressing Towards Goal  Goal: *Functional assessment restored to baseline  Outcome: Progressing Towards Goal  Goal: *Absence of falls  Outcome: Progressing Towards Goal  Goal: *Will remain free of delirium, CAM Score negative  Outcome: Progressing Towards Goal  Goal: *Cognitive status will be restored to baseline  Outcome: Progressing Towards Goal  Goal: Interventions  Outcome: Progressing Towards Goal     Problem: Patient Education: Go to Patient Education Activity  Goal: Patient/Family Education  Outcome: Progressing Towards Goal     Problem: Patient Education: Go to Patient Education Activity  Goal: Patient/Family Education  Outcome: Progressing Towards Goal     Problem: Patient Education: Go to Patient Education Activity  Goal: Patient/Family Education  Outcome: Progressing Towards Goal     Problem: Patient Education: Go to Patient Education Activity  Goal: Patient/Family Education  Outcome: Progressing Towards Goal

## 2020-05-10 NOTE — PROGRESS NOTES
Internal Medicine Progress Note        NAME: Dariela Rome   :  1934  MRM:  [id number]    Date/Time: 5/10/2020        ASSESSMENT/PLAN:  COVID repeat is  Negative. x2 negative now. Can go to SNF when bed available. resume Lasix 20 mg daily as BP up now. # Hypothermia and hypotension. Unclear cause. Improved with Warming and  IVF. Seen by cardiology. TTE checked. Blood cultures NTD  . LA normal  . Cbc . Bmp  . UA   Ivf boluses. Maintain BP. No BP meds for now. re evaluated by cardiology    # Skin fungal infection , possible chemical dermatitis from incontinence. Area in perineal and groin. Lotrisone cream BID. #  Dysrhythmia , heart block (LBBB ,long first degree and wide QRS), ho   Sinoatrial node dysfunction  and mild elevation of trop level. Stabilized initially in ICU. given heart rate down to 40 with 1st AV block and LBBB,  S/P  implantation of single chamber Medtronic pacemaker . History of  Cardiomyopathy  LVEF 35-40% by previous TTE and Grade II diastolic dysfunction.  had short runs of NSVT.  Lexiscan Myoview testing with inferior and inferoseptal fixed defect more consistent with infarction,  no significant ongoing ischemia       # Acute delirium. Acute confusional state.  Treat medical problems . Restrain as needed used in ICU. Correct s. Na  level. Haldol used  per PCCM team     #  SIRS.  Hypothermia , Hypotension and bradycardia. Unclear source of infection on admission. Sepsis work-up started in the emergency room.  Treated with Abx vanco and zosyn.   Hypotension and Hypothermia Likely due to Hypovolemic shock 2/2 GI bleed.  TSH normal. Appreciate PCCM input. Warming blankets Used initially. Needed iv pressors . Blood and urine CS NTD. Repeat UA. Currently off Antibiotic.     # GIB presented as Melena , anemia. GIB noted in ICU. GI consulted. Avoid NSAIDs.  Protonix. . Dw GI, went for EGD as below .      # Anemia due to likely acute blood loss .  Maintain Hb jacinto than 7, transfuse as needed.      # Hypoglycemia. Monitor blood sugar and treat accordingly. BSL stable currently , need monitoring. Treated with D5 ivf , due to low po intake. Dc ivf and encourage po intake.   .      #  Chronic renal failure, stage 3 (moderate) ([facility] [address] (12/23/2019). S.cr stable currently.  Monitor Renal function and other labs as indicated. Avoid nephrotoxins , iv Contrast, NSAID. Renally dosing medications. Monitor urine out put. Serum creatinine   improved     # Hypernatremia. Change IVF to D5.  Trended down. Follow-up.   TSH normal      # ho   Hypothyroidism . On replacement. Checked TSH , T4.      #  Dysphagia (4/8/2020)  as above. Encouraged on PO. Episode  of aspiration again 4/24  am SLP followed him. Short period of TPN  In ICU.     SLP Recommendations: Diet:[facility] soft (ground meat)  NECTAR thick liquid diet Ice chips for pleasure after oral care, per pt request Meds: Via IV or crushed in puree  Aspiration Precautions Oral Care TID Small sips/bites No straws    #   HTN (hypertension) (4/21/2020) control BP.  As  Above          Lab Review:     Recent Labs     05/10/20  0430 05/09/20  0350 05/08/20  0930   WBC 6.3 6.1 5.0   HGB 8.0* 8.7* 10.5*   HCT 24.7* 26.6* 32.0*    209 202     Recent Labs     05/10/20  0430 05/09/20  0350 05/08/20  0930    145 143   K 4.1 4.2 4.2   * 116* 116*   CO2 26 23 23   GLU 80 106* 132*   BUN 22* 20* 19*   CREA 1.07 1.08 1.21   CA 7.8* 7.6* 7.7*   MG 1.7 1.8  --    PHOS 2.6 2.7  --    ALB  --  1.8*  --    TBILI  --  0.3  --    SGOT  --  34  --    ALT  --  19  --    INR 1.3* 1.4*  --      Lab Results   Component Value Date/Time    Glucose (POC) 95 05/10/2020 07:15 AM    Glucose (POC) 115 (H) 05/09/2020 08:54 PM    Glucose (POC) 124 (H) 05/09/2020 04:26 PM    Glucose (POC) 115 (H) 05/09/2020 11:20 AM    Glucose (POC) 100 05/09/2020 07:46 AM    Glucose (POC) 96 10/10/2014 09:28 PM    Glucose (POC) 186 (H) 10/10/2014 03:48 PM     No results for input(s): PH, PCO2, PO2, HCO3, FIO2 in the last 72 hours. Recent Labs     05/10/20  0430 05/09/20  0350   INR 1.3* 1.4*       Lab Results   Component Value Date/Time    Specimen Description: [facility] 02/06/2014 05:45 PM     Lab Results   Component Value Date/Time    Culture result: NO GROWTH 2 DAYS 05/08/2020 09:30 AM    Culture result: NO GROWTH 2 DAYS 05/08/2020 08:20 AM    Culture result: No growth (<1,000 CFU/ML) 04/22/2020 09:20 AM       All Cardiac Markers in the last 24 hours: No results found for: CPK, CK, CKMMB, CKMB, RCK3, CKMBT, CKNDX, CKND1, TELLY, TROPT, TROIQ, JOSE, TROPT, TNIPOC, BNP, BNPP       Intervals noted   Pt s/e @ bedside     Subjective:     Chief Complaint:      Eating well  Offer no complain  Slept well  Deny CP/SOB / N/V/D. Fever etc.         Tolerating Diet                Objective:     Vitals:  Last 24hrs VS reviewed since prior progress note. Most recent are:    Visit Vitals  /64   Pulse 62   Temp 97.7 °F (36.5 °C)   Resp 16   Ht 5' 7\" (1.702 m)   Wt 79.2 kg (174 lb 11.2 oz)   SpO2 100%   BMI 27.36 kg/m²     SpO2 Readings from Last 6 Encounters:   05/10/20 100%   04/09/20 98%   02/21/20 92%   01/02/20 96%   12/16/19 97%   09/23/18 99%    O2 Flow Rate (L/min): 2 l/min       Intake/Output Summary (Last 24 hours) at 5/10/2020 0941  Last data filed at 5/10/2020 0830  Gross per 24 hour   Intake 600 ml   Output    Net 600 ml          Physical Exam:   Gen:  Comfortable,    in no acute distress.  Appear stated age, Well-developed   HEENT:  Head atraumatic, normocephalic ,  moist mucous membranes. Neck:   Trachea midline , No apparent JVD, Supple, without masses, thyroid non-tender  Resp:  No accessory muscle use,Bilateral BS present, clear breath sounds without wheezes rales or rhonchi  Card:  normal S1, S2 without Gallop .  3  + lower leg peripheral edema. Abd:  Soft, non-tender, non-distended, bowel sounds are present . Edgar Hathaway Musc:   No cyanosis or clubbing.   Skin:  No rashes or ulcers, skin turgor is good. Neuro: Alert and coherent , appropriate mood.   no clear area of focal motor weakness,  follows commands appropriately.       Medications Reviewed: (see below)    Lab Data Reviewed: (see below)    ______________________________________________________________________    Medications:     Current Facility-Administered Medications   Medication Dose Route Frequency    oxymetazoline (AFRIN) 0.05 % nasal spray 2 Spray  2 Spray Both Nostrils BID PRN    clotrimazole-betamethasone (LOTRISONE) 1-0.05 % cream   Topical BID    [Held by provider] furosemide (LASIX) tablet 20 mg  20 mg Oral DAILY    pantoprazole (PROTONIX) tablet 40 mg  40 mg Oral ACB&D    oxyCODONE-acetaminophen (PERCOCET) 5-325 mg per tablet 1 Tab  1 Tab Oral Q4H PRN    aspirin chewable tablet 81 mg  81 mg Oral DAILY    atropine injection 1 mg  1 mg IntraVENous Q5MIN PRN    insulin lispro (HUMALOG) injection   SubCUTAneous AC&HS    0.9% sodium chloride infusion 250 mL  250 mL IntraVENous PRN    dextrose 10% infusion 125-250 mL  125-250 mL IntraVENous PRN    QUEtiapine (SEROquel) tablet 25 mg  25 mg Oral QHS    lidocaine 4 % patch 1 Patch  1 Patch TransDERmal Q24H    haloperidol lactate (HALDOL) injection 2 mg  2 mg IntraMUSCular Q6H PRN    glucose chewable tablet 16 g  4 Tab Oral PRN    glucagon (GLUCAGEN) injection 1 mg  1 mg IntraMUSCular PRN    levothyroxine (SYNTHROID) tablet 100 mcg  100 mcg Oral 6am    sodium chloride (NS) flush 5-10 mL  5-10 mL IntraVENous PRN    cholecalciferol (VITAMIN D3) (1000 Units /25 mcg) tablet 2 Tab  2,000 Units Oral DAILY    atorvastatin (LIPITOR) tablet 40 mg  40 mg Oral QHS    tamsulosin (FLOMAX) capsule 0.4 mg  0.4 mg Oral DAILY          Total time spent with patient: 25 minutes                  Care Plan discussed with: Patient, Care Manager, Nursing Staff, Consultant/Specialist and >50% of time spent in counseling and coordination of care    Discussed:  Care Plan    Prophylaxis: SCD's    Disposition:  SNF/LTC           This document in whole or part of it has been produced using voice recognition software. Unrecognized errors in transcription may be present.     Attending Physician: Rashad Barnett MD

## 2020-05-10 NOTE — PROGRESS NOTES
1210 -- Nurse to patient's room, patient's nose bleeding with clots of bleed, cleaned up and eating lunch. 1428 -- Charge nurse at the bedside, patient's nose is bleeding profusely and now vomiting up blood one clot the size of a ping pong ball, primary nurse is advised to go to ED and get a rhino rocket and call Dr. Mary Carmen Murray.

## 2020-05-11 VITALS
OXYGEN SATURATION: 97 % | TEMPERATURE: 97.7 F | WEIGHT: 176 LBS | HEIGHT: 67 IN | BODY MASS INDEX: 27.62 KG/M2 | RESPIRATION RATE: 20 BRPM | DIASTOLIC BLOOD PRESSURE: 72 MMHG | SYSTOLIC BLOOD PRESSURE: 125 MMHG | HEART RATE: 60 BPM

## 2020-05-11 LAB
ANION GAP SERPL CALC-SCNC: 3 MMOL/L (ref 3–18)
BASOPHILS # BLD: 0 K/UL (ref 0–0.1)
BASOPHILS NFR BLD: 0 % (ref 0–2)
BUN SERPL-MCNC: 29 MG/DL (ref 7–18)
BUN/CREAT SERPL: 27 (ref 12–20)
CALCIUM SERPL-MCNC: 8 MG/DL (ref 8.5–10.1)
CHLORIDE SERPL-SCNC: 117 MMOL/L (ref 100–111)
CO2 SERPL-SCNC: 26 MMOL/L (ref 21–32)
CREAT SERPL-MCNC: 1.08 MG/DL (ref 0.6–1.3)
DIFFERENTIAL METHOD BLD: ABNORMAL
EOSINOPHIL # BLD: 0.3 K/UL (ref 0–0.4)
EOSINOPHIL NFR BLD: 3 % (ref 0–5)
ERYTHROCYTE [DISTWIDTH] IN BLOOD BY AUTOMATED COUNT: 16.6 % (ref 11.6–14.5)
GLUCOSE BLD STRIP.AUTO-MCNC: 123 MG/DL (ref 70–110)
GLUCOSE BLD STRIP.AUTO-MCNC: 126 MG/DL (ref 70–110)
GLUCOSE SERPL-MCNC: 83 MG/DL (ref 74–99)
HCT VFR BLD AUTO: 25.5 % (ref 36–48)
HGB BLD-MCNC: 8.2 G/DL (ref 13–16)
INR PPP: 1.3 (ref 0.8–1.2)
LYMPHOCYTES # BLD: 1.1 K/UL (ref 0.9–3.6)
LYMPHOCYTES NFR BLD: 13 % (ref 21–52)
MAGNESIUM SERPL-MCNC: 1.8 MG/DL (ref 1.6–2.6)
MCH RBC QN AUTO: 30.6 PG (ref 24–34)
MCHC RBC AUTO-ENTMCNC: 32.2 G/DL (ref 31–37)
MCV RBC AUTO: 95.1 FL (ref 74–97)
MONOCYTES # BLD: 0.5 K/UL (ref 0.05–1.2)
MONOCYTES NFR BLD: 5 % (ref 3–10)
NEUTS SEG # BLD: 7 K/UL (ref 1.8–8)
NEUTS SEG NFR BLD: 79 % (ref 40–73)
PHOSPHATE SERPL-MCNC: 2.7 MG/DL (ref 2.5–4.9)
PLATELET # BLD AUTO: 244 K/UL (ref 135–420)
PMV BLD AUTO: 9.7 FL (ref 9.2–11.8)
POTASSIUM SERPL-SCNC: 4.2 MMOL/L (ref 3.5–5.5)
PROTHROMBIN TIME: 15.8 SEC (ref 11.5–15.2)
RBC # BLD AUTO: 2.68 M/UL (ref 4.7–5.5)
SODIUM SERPL-SCNC: 146 MMOL/L (ref 136–145)
WBC # BLD AUTO: 8.8 K/UL (ref 4.6–13.2)

## 2020-05-11 PROCEDURE — 83735 ASSAY OF MAGNESIUM: CPT

## 2020-05-11 PROCEDURE — 74011250637 HC RX REV CODE- 250/637: Performed by: PHYSICIAN ASSISTANT

## 2020-05-11 PROCEDURE — 85025 COMPLETE CBC W/AUTO DIFF WBC: CPT

## 2020-05-11 PROCEDURE — 82962 GLUCOSE BLOOD TEST: CPT

## 2020-05-11 PROCEDURE — 80048 BASIC METABOLIC PNL TOTAL CA: CPT

## 2020-05-11 PROCEDURE — 74011000250 HC RX REV CODE- 250: Performed by: HOSPITALIST

## 2020-05-11 PROCEDURE — 84100 ASSAY OF PHOSPHORUS: CPT

## 2020-05-11 PROCEDURE — 92526 ORAL FUNCTION THERAPY: CPT

## 2020-05-11 PROCEDURE — 85610 PROTHROMBIN TIME: CPT

## 2020-05-11 PROCEDURE — 74011250637 HC RX REV CODE- 250/637: Performed by: INTERNAL MEDICINE

## 2020-05-11 PROCEDURE — 97535 SELF CARE MNGMENT TRAINING: CPT

## 2020-05-11 RX ADMIN — TAMSULOSIN HYDROCHLORIDE 0.4 MG: 0.4 CAPSULE ORAL at 08:54

## 2020-05-11 RX ADMIN — MELATONIN 2 TABLET: at 08:54

## 2020-05-11 RX ADMIN — CLOTRIMAZOLE AND BETAMETHASONE DIPROPIONATE: 10; .5 CREAM TOPICAL at 08:54

## 2020-05-11 RX ADMIN — PANTOPRAZOLE SODIUM 40 MG: 40 TABLET, DELAYED RELEASE ORAL at 08:54

## 2020-05-11 RX ADMIN — ASPIRIN 81 MG 81 MG: 81 TABLET ORAL at 08:54

## 2020-05-11 RX ADMIN — FUROSEMIDE 20 MG: 20 TABLET ORAL at 08:54

## 2020-05-11 RX ADMIN — LEVOTHYROXINE SODIUM 100 MCG: 0.03 TABLET ORAL at 05:23

## 2020-05-11 NOTE — ROUTINE PROCESS
5788 Received report from Janine. Patient alert and oriented. 1300 Right nare nasal pack/ rhino racket removed. No bleeding noted. Patient on 3 L/NC. Will monitor. 1400 Patient for discharge to  [address]. Report given to Silvino Fajardo at [phone]. All queries answered. Patient pick-up time is 3 PM.

## 2020-05-11 NOTE — PROGRESS NOTES
Problem: Discharge Planning  Goal: *Discharge to safe environment  Outcome: Progressing Towards Goal     Problem: Falls - Risk of  Goal: *Absence of Falls  Description: Document Danney Mess Fall Risk and appropriate interventions in the flowsheet. Outcome: Progressing Towards Goal  Note: Fall Risk Interventions:  Mobility Interventions: Patient to call before getting OOB, PT Consult for mobility concerns    Mentation Interventions: Bed/chair exit alarm, Door open when patient unattended, Increase mobility, More frequent rounding    Medication Interventions: Teach patient to arise slowly, Patient to call before getting OOB    Elimination Interventions: Call light in reach    History of Falls Interventions: Evaluate medications/consider consulting pharmacy, Door open when patient unattended, Vital signs minimum Q4HRs X 24 hrs (comment for end date)         Problem: Patient Education: Go to Patient Education Activity  Goal: Patient/Family Education  Outcome: Progressing Towards Goal     Problem: Pressure Injury - Risk of  Goal: *Prevention of pressure injury  Description: Document Alex Scale and appropriate interventions in the flowsheet.   Outcome: Progressing Towards Goal  Note: Pressure Injury Interventions:  Sensory Interventions: Discuss PT/OT consult with provider, Keep linens dry and wrinkle-free, Maintain/enhance activity level, Minimize linen layers    Moisture Interventions: Limit adult briefs, Maintain skin hydration (lotion/cream), Minimize layers, Moisture barrier, Offer toileting Q_hr    Activity Interventions: Pressure redistribution bed/mattress(bed type), PT/OT evaluation, Increase time out of bed    Mobility Interventions: HOB 30 degrees or less, Pressure redistribution bed/mattress (bed type), PT/OT evaluation    Nutrition Interventions: Offer support with meals,snacks and hydration, Document food/fluid/supplement intake    Friction and Shear Interventions: Lift sheet, Lift team/patient mobility team, Minimize layers                Problem: Patient Education: Go to Patient Education Activity  Goal: Patient/Family Education  Outcome: Progressing Towards Goal     Problem: Altered nutrition  Goal: *Adequate nutrition  Outcome: Progressing Towards Goal     Problem: Pain  Goal: *Control of Pain  Outcome: Progressing Towards Goal     Problem: Tissue Perfusion - Cardiopulmonary, Altered  Goal: *Optimize tissue perfusion  Outcome: Progressing Towards Goal  Goal: *Absence of hypoxia  Outcome: Progressing Towards Goal     Problem: Patient Education: Go to Patient Education Activity  Goal: Patient/Family Education  Outcome: Progressing Towards Goal     Problem: Delirium Treatment  Goal: *Level of consciousness restored to baseline  Outcome: Progressing Towards Goal  Goal: *Level of environmental perceptions restored to baseline  Outcome: Progressing Towards Goal  Goal: *Sensory perception restored to baseline  Outcome: Progressing Towards Goal  Goal: *Emotional stability restored to baseline  Outcome: Progressing Towards Goal  Goal: *Functional assessment restored to baseline  Outcome: Progressing Towards Goal  Goal: *Absence of falls  Outcome: Progressing Towards Goal  Goal: *Will remain free of delirium, CAM Score negative  Outcome: Progressing Towards Goal  Goal: *Cognitive status will be restored to baseline  Outcome: Progressing Towards Goal  Goal: Interventions  Outcome: Progressing Towards Goal     Problem: Patient Education: Go to Patient Education Activity  Goal: Patient/Family Education  Outcome: Progressing Towards Goal     Problem: Patient Education: Go to Patient Education Activity  Goal: Patient/Family Education  Outcome: Progressing Towards Goal     Problem: Patient Education: Go to Patient Education Activity  Goal: Patient/Family Education  Outcome: Progressing Towards Goal     Problem: Patient Education: Go to Patient Education Activity  Goal: Patient/Family Education  Outcome: Progressing Towards Goal

## 2020-05-11 NOTE — PROGRESS NOTES
Problem: Dysphagia (Adult)  Goal: *Acute Goals and Plan of Care (Insert Text)  Description: Recommendations:  Diet:Mech soft (ground meat)  NECTAR thick liquid diet  Ice chips for pleasure after oral care, per pt request  Meds: Via IV or crushed in puree  Aspiration Precautions  Oral Care TID  Small sips/bites  No straws    Goals:  Patient will:  1. Tolerate PO trials with 0 s/s overt distress in 4/5 trials  2. Utilize compensatory swallow strategies/maneuvers (decrease bite/sip, size/rate, alt. liq/sol) with min cues in 4/5 trials  3. Complete an objective swallow study (i.e., MBSS) to assess swallow integrity, r/o aspiration, and determine of safest LRD, min A        Outcome: Progressing Towards Goal   SPEECH LANGUAGE PATHOLOGY DYSPHAGIA TREATMENT    Patient: Jamshid Cardona (89 y.o. male)  Date: 5/11/2020  Diagnosis: Hypothermia [T68. XXXA]   Pacemaker  Procedure(s) (LRB):  ESOPHAGOGASTRODUODENOSCOPY (EGD) with bx, dilation (N/A) 5 Days Post-Op  Precautions:  Fall, Other (comment), Aspiration(Pacemaker precautions, sling LUE)  PLOF: Per H&P    ASSESSMENT:  Pt seen at beside this date for dysphagia management/ intervention. Pt sitting > 60* in bed accepting po trials of dental soft with NTLs. Pt exhibiting prolonged/labored mastication, however through oral clearance with emilee use of liquid wash (NTLs). No overt s/sx of aspiration/penetration assessed. Pt would benefit from continued skilled  ST for possible diet advancement with consistent tolerance of advanced texture in order to meet nutritional/hydration needs.      Progression toward goals:  []         Improving appropriately and progressing toward goals  [x]         Improving slowly and progressing toward goals  []         Not making progress toward goals and plan of care will be adjusted     PLAN:  Recommendations and Planned Interventions:  Diet: Mech soft (ground meat);  NECTAR thick liquid diet  Ice chips for pleasure after oral care, per pt request  Meds: Via IV or crushed in puree  Aspiration Precautions  Oral Care TID  Small sips/bites  No straws  Patient continues to benefit from skilled intervention to address the above impairments. Continue treatment per established plan of care. Discharge Recommendations:  Skilled Nursing Facility     SUBJECTIVE:   Patient stated I'm supposed to leave today. OBJECTIVE:   Cognitive and Communication Status:  Neurologic State: Alert, Eyes open spontaneously  Orientation Level: Oriented X4  Cognition: Follows commands  Perception: Appears intact  Perseveration: No perseveration noted  Safety/Judgement: Fall prevention  Dysphagia Treatment:  Oral Assessment:  Oral Assessment  Labial: Decreased seal  Dentition: Limited  Oral Hygiene: Poor  Lingual: Decreased rate, Decreased strength, Incoordinated  Velum: No impairment  Mandible: No impairment  P.O. Trials:   Patient Position: 45   Vocal quality prior to P.O.: Breathy, Fatigue   Consistency Presented: Mechanical soft, Nectar thick liquid, Puree   How Presented: SLP-fed/presented       Bolus Acceptance: No impairment   Bolus Formation/Control: Impaired   Type of Impairment: Delayed, Lip closure, Mastication, Piecemeal   Propulsion: Delayed (# of seconds), Tongue pumping   Oral Residue: 10-50% of bolus   Initiation of Swallow: Delayed (# of seconds)   Laryngeal Elevation: Weak   Aspiration Signs/Symptoms: Watery eyes, Weak cough   Pharyngeal Phase Characteristics: Easily fatigued , Multiple swallows, Poor endurance, Suspected pharyngeal residue   Effective Modifications: Alternate liquids/solids, Cup/sip, Spoon, Small sips and bites, Other (comment)   Cues for Modifications: Moderate         Oral Phase Severity: Moderate   Pharyngeal Phase Severity : Moderate-severe   Oral Motor Exercises:     Exercises:  Laryngeal Exercises:    PAIN:  Pain level pre-treatment: 0/10   Pain level post-treatment: 0/10   Pain Intervention(s): Medication (see MAR);  Rest, Ice, Repositioning  Response to intervention: Nurse notified, See doc flow    After treatment:   []              Patient left in no apparent distress sitting up in chair  [x]              Patient left in no apparent distress in bed  [x]              Call bell left within reach  [x]              Nursing notified  []              Family present  []              Caregiver present  []              Bed alarm activated      COMMUNICATION/EDUCATION:   [x] Aspiration precautions; swallow safety; compensatory techniques  []        Patient unable to participate in education; education ongoing with staff  [x]  Posted safety precautions in patient's room.   [] Oral-motor/laryngeal strengthening exercises      CAMRON Fam  Time Calculation: 13 mins

## 2020-05-11 NOTE — PROGRESS NOTES
Problem: Self Care Deficits Care Plan (Adult)  Goal: *Acute Goals and Plan of Care (Insert Text)  Description: Occupational Therapy Goals    Re-evaluation 5/6/2020  1. Patient will perform grooming with modified independence seated edge of bed with good sitting balance. 2.  Patient will perform bathing with modified independence. 3.  Patient will perform upper body dressing and lower body dressing with modified independence . 4. Patient will perform bedside commode transfers with modified independence using LRAD. 5.  Patient will perform all aspects of toileting with modified independence. 6. Patient will recall pacemaker precautions with 100% accuracy and don/doff sling with Mod I.   7.  Patient will utilize energy conservation techniques during functional activities with min verbal cues. Initiated 4/30/2020 within 7 day(s). 1.  Patient will perform grooming with modified independence seated edge of bed with good sitting balance. 2.  Patient will perform bathing with modified independence. 3.  Patient will perform upper body dressing and lower body dressing with modified independence. 4.  Patient will perform bedside commode transfers with modified independence using RW. 5.  Patient will perform all aspects of toileting with modified independence. 6.  Patient will participate in upper extremity therapeutic exercise/activities with modified independence for 10 minutes. 7.  Patient will utilize energy conservation techniques during functional activities with min verbal cues. Prior Level of Function: Mod I with assist as needed by son for ADLs, Mod I using RW for functional mobility    Outcome: Not Progressing Towards Goal   OCCUPATIONAL THERAPY TREATMENT    Patient: Abel Hunter (65 y.o. male)  Date: 5/11/2020  Diagnosis: Hypothermia [T68. XXXA]   Pacemaker  Procedure(s) (LRB):  ESOPHAGOGASTRODUODENOSCOPY (EGD) with bx, dilation (N/A) 5 Days Post-Op  Precautions: Fall, Other (comment), Aspiration(Pacemaker precautions, sling LUE)  PLOF: see above  Chart, occupational therapy assessment, plan of care, and goals were reviewed. ASSESSMENT:  Nursing/RN cleared for pt to participate in OT tx session at bed level d/t low BP. Patient sitting up in bed, alert and no c/o pain. Patient noted with mild R hand edema, FM intact. Patient participated in RUE nonresistive AROM to decrease edema, pt tolerated well x10 reps for shoulder flexion/ext, elbow flex/ext, pronation/supination, wrist flex/ext, digits and thumb gross serial opposition prior to grooming activity: combing hair with Mod I. Nursing notified of RUE elevated pillow support to decrease mild edema in R hand, c/o itchy back with total assist to wash with bath wipe while sitting upright in bed with good relief, c/o itchy scalp with issue of comb & patient combing hair Mod I with report of good relief. Call bell within reach & pt verbalized understanding to utilize for assist e.g. functional transfers in order to prevent falls. Progression toward goals:  []          Improving appropriately and progressing toward goals  []          Improving slowly and progressing toward goals  [x]          Not making progress toward goals and plan of care will be adjusted     PLAN:  Patient continues to benefit from skilled intervention to address the above impairments. Continue treatment per established plan of care. Discharge Recommendations:  [facility]  Further Equipment Recommendations for Discharge:  wheelchair      SUBJECTIVE:   Patient stated My back and scalp itches.     OBJECTIVE DATA SUMMARY:   Cognitive/Behavioral Status:  Neurologic State: Alert, Eyes open spontaneously  Orientation Level: Oriented X4  Cognition: Follows commands  Safety/Judgement: Fall prevention    Pain:  Pain level pre-treatment: 0/10   Pain level post-treatment: 0/10  Pain Intervention(s): Medication (see MAR);  Rest, Ice, Repositioning   Response to intervention: Nurse notified, See doc flow    Activity Tolerance:    poor  Please refer to the flowsheet for vital signs taken during this treatment. After treatment:   []  Patient left in no apparent distress sitting up in chair  [x]  Patient left in no apparent distress in bed  [x]  Call bell left within reach  [x]  Nursing notified  []  Caregiver present  []  Bed alarm activated    COMMUNICATION/EDUCATION:   [x] Role of Occupational Therapy in the acute care setting  [x] Home safety education was provided and the patient/caregiver indicated understanding. [x] Patient/family have participated as able in working towards goals and plan of care. [x] Patient/family agree to work toward stated goals and plan of care. [] Patient understands intent and goals of therapy, but is neutral about his/her participation. [] Patient is unable to participate in goal setting and plan of care.       Thank you for this referral.  Meena Sanchez  Time Calculation: 10 mins

## 2020-05-11 NOTE — PROGRESS NOTES
1322: Attempted PT session. Declines bed level exercises. Reports discharge to rehab later today; confirmed with CM.

## 2020-05-11 NOTE — PROGRESS NOTES
Discharge instructions provided to pt on behalf of primary nurse [facility]. Triple lumen catheter removed from RIJ and held pressure for 10 minutes. No complications present. Telemetry monitor removed. Plan for medical transport to  [address]. ETA 1500.

## 2020-05-11 NOTE — PROGRESS NOTES
Problem: Falls - Risk of  Goal: *Absence of Falls  Description: Document Amandeep Manzano Fall Risk and appropriate interventions in the flowsheet. Outcome: Progressing Towards Goal  Note: Fall Risk Interventions:  Mobility Interventions: Patient to call before getting OOB    Mentation Interventions: Door open when patient unattended    Medication Interventions: Patient to call before getting OOB    Elimination Interventions: Call light in reach    History of Falls Interventions: Door open when patient unattended         Problem: Pressure Injury - Risk of  Goal: *Prevention of pressure injury  Description: Document Alex Scale and appropriate interventions in the flowsheet. Outcome: Progressing Towards Goal  Note: Pressure Injury Interventions:  Sensory Interventions: Assess changes in LOC, Turn and reposition approx. every two hours (pillows and wedges if needed)    Moisture Interventions: Internal/External urinary devices, Absorbent underpads    Activity Interventions: Pressure redistribution bed/mattress(bed type)    Mobility Interventions: HOB 30 degrees or less, Turn and reposition approx.  every two hours(pillow and wedges)    Nutrition Interventions: Document food/fluid/supplement intake    Friction and Shear Interventions: HOB 30 degrees or less                Problem: Pain  Goal: *Control of Pain  Outcome: Progressing Towards Goal

## 2020-05-11 NOTE — PROGRESS NOTES
Pt to transfer to [facility] today [facility] @ 1500. Met with pt, made him aware, agreeable to transfer. Called his son, Araceli Rm, made him aware of above, agreeable to transfer. Nursing made aware of above. Patient and/or next of kin has been given and has signed the [facility] Outpatient Observation  Notification letter and all questions answered. Copy of this notice given to patient and copy placed on chart. Patient and/or next of kin has been given the Outpatient Observation Information and Notification letter and all questions answered. Brooke Coreas RN,ext [phone].       Care Management Interventions  PCP Verified by CM: Yes(Jonel Rodriguez)  Mode of Transport at Discharge: BLS  Transition of Care Consult (CM Consult): SNF  Partner SNF: No  Reason Why Partner SNF Not Chosen: Friend/family recommendation  Discharge Durable Medical Equipment: No  Physical Therapy Consult: Yes  Occupational Therapy Consult: Yes  Speech Therapy Consult: Yes  Current Support Network: (lives with son)  Confirm Follow Up Transport: Family  The Plan for Transition of Care is Related to the Following Treatment Goals : rehab  The Patient and/or Patient Representative was Provided with a Choice of Provider and Agrees with the Discharge Plan?: Yes  Name of the Patient Representative Who was Provided with a Choice of Provider and Agrees with the Discharge Plan: Ashleigh Thornton, Araceli Rm  Freedom of Choice List was Provided with Basic Dialogue that Supports the Patient's Individualized Plan of Care/Goals, Treatment Preferences and Shares the Quality Data Associated with the Providers?: Yes  Discharge Location  Discharge Placement: Skilled nursing facility([address])

## 2020-05-11 NOTE — DISCHARGE SUMMARY
Discharge Summary      [facility] - \A Chronology of [place] Hospitals\"" service    Patient ID:  Rylee Yee, 80 y.o., male  : 1934    Admit Date: 2020  Discharge Date:  2020  Length of stay: 20 day(s)    PCP:  Lorena Morley MD    Chief Complaint   Patient presents with    Chest Pain      Discharge Diagnosis:     Hospital Problems  Date Reviewed: 2020          Codes Class Noted POA    * (Principal) Pacemaker ICD-10-CM: Z95.0  ICD-9-CM: V45.01  2020 Yes    Overview Signed 2020  9:51 AM by Natalie Patel MD     Medtronic pacemaker 20             Gastrointestinal bleeding ICD-10-CM: K92.2  ICD-9-CM: 578.9  2020 Yes        Hypoglycemia ICD-10-CM: E16.2  ICD-9-CM: 251.2  2020 Yes        HTN (hypertension) ICD-10-CM: I10  ICD-9-CM: 401.9  2020 Yes        Left bundle branch block ICD-10-CM: I44.7  ICD-9-CM: 426.3  2020 Yes        Sinoatrial node dysfunction ([facility] ICD-10-CM: I49.5  ICD-9-CM: 427.81  2020 Yes        Dysphagia ICD-10-CM: R13.10  ICD-9-CM: 787.20  2020 Yes        Cardiomyopathy ([facility] ICD-10-CM: I42.9  ICD-9-CM: 425.4  2020 Yes    Overview Signed 2020 12:40 AM by Rashaun KRAUSE DO     LVEF 35-40% by previous TTE. Grade II diastolic dysfunction QWC-29-PA: I51.9  ICD-9-CM: 429.9  2020 Yes    Overview Signed 2020 12:41 AM by Mey Siegel DO     Per previous TTE. Hypothyroidism ICD-10-CM: E03.9  ICD-9-CM: 244.9  2020 Yes        Bradycardia ICD-10-CM: R00.1  ICD-9-CM: 427.89  2020 Yes        Hypotension ICD-10-CM: I95.9  ICD-9-CM: 458.9  2020 Yes        Hypothermia ICD-10-CM: T68. XXXA  ICD-9-CM: 991.6  2019 Yes        Chronic renal failure, stage 3 (moderate) (HCC) ICD-10-CM: N18.3  ICD-9-CM: 585.3  2019 Yes        SIRS (systemic inflammatory response syndrome) ([facility] ICD-10-CM: R65.10  ICD-9-CM: 995.90  2019 Yes              Discharge instructions:    #  Discharge Diet: Diet: Cardiac, mechanical soft. # Discharge activity and restrictions: Activity as tolerated and PT/OT Eval and Treat    # Follow-up appointments: Follow-up Information     Follow up With Specialties Details Why Contact Info    Alexys Oliva MD Nephrology  Follow up once discharged from rehab [address] Floyd Bright MD Cardiology, Internal Medicine  Follow up in 1 week in cardiology clinic per cardiology team [facility]  [facility] [phone] [facility]  Follow up with staff MD on arrival [address] [place] [address]  [phone]            HPI on Admission (per admitting physician):  Mr. Tharon Kanner is a 80 y.o.  male who is admitted for hypotension and hypothermia. Mr. Tharon Kanner with past medical history as noted below , presented to the Emergency Department today  complaining of above. Triage and ER notes noted. Patient recently discharged from this hospital for aspiration pneumonia . He report to me he had chest pain started at midnight. This morning his son called EMS because of the chest pain. On upon inquiring he stated the pain is achy and at my waist line and not the chest and all crossover. In the ER his temperature was 92 and blood pressure was very low, started on IV fluid then Levophed started with the placement of central line. Chest x-ray done and urine analysis was negative. He is given hydrocortisone dose and check his thyroid function was better than the last time as a started on replacement in the recent admission here. He normally weak and walks with a walker, he stops driving. He denied really any other symptoms no headache no cough no phlegm no fever no urinary or bowel movement problem no nausea no vomiting no tummy pains no other pains.   Also in the ER developed wide-complex QRS on the monitor and given IV magnesium and cardiology consulted, Dr. Camila Leong will see him. For further details and initial management please refer to H/P. Hospital Course:      Feeling good , ate well this morning. Willing to go to rehab. Offer no complains or pains. By Problems :     #  Dysrhythmia , heart block (LBBB ,long first degree and wide QRS), ho   Sinoatrial node dysfunction  and mild elevation of trop level. Stabilized initially in ICU. given heart rate down to 40 with 1st AV block and LBBB,  S/P  implantation of single chamber Medtronic pacemaker 5/5. History of  Cardiomyopathy  LVEF 35-40% by previous TTE and Grade II diastolic dysfunction. had short runs of NSVT.  Lexiscan Myoview testing with inferior and inferoseptal fixed defect more consistent with infarction,  no significant ongoing ischemia       # Acute delirium. Acute confusional state. Treat medical problems . Restrain as needed used in ICU. Correct s. Na  level. Haldol used  per PCCM team. Resolved      #  SIRS. Hypothermia , Hypotension and bradycardia. Unclear source of infection on admission. Sepsis work-up started in the emergency room. Treated with Abx vanco and zosyn. Hypotension and Hypothermia Likely due to Hypovolemic shock 2/2 GI bleed. TSH normal. Appreciate PCCM input. Warming blankets Used initially. Needed iv pressors . Blood and urine CS NTD. Repeat UA. Currently off Antibiotic.     # GIB presented as Melena , anemia. GIB noted in ICU. GI consulted. Avoid NSAIDs. Protonix. . Dw GI, went for EGD as below . Pathology shows benign findings, chronic inflammation     # Anemia due to likely acute blood loss . Maintain Hb jacinto than 7, transfuse as needed. Stable at discharge     # Hypoglycemia. Monitor blood sugar and treat accordingly. BSL stable currently , need monitoring. Treated with D5 ivf , due to low po intake. Dc ivf and encourage po intake.  Tolerating diet, no further issues  .      #  Chronic renal failure, stage 3 (moderate) ([facility] (12/23/2019). S.cr stable currently. Monitor Renal function and other labs as indicated. Avoid nephrotoxins , iv Contrast, NSAID. Renally dosing medications. Monitor urine out put. Serum creatinine   improved and stable     # Hypernatremia. Change IVF to D5. Trended down. Follow-up. TSH normal. Cont to encourage free water intake      # ho   Hypothyroidism . On replacement. Checked TSH , T4.      #  Dysphagia (4/8/2020)  as above. Encouraged on PO. Episode  of aspiration again 4/24  am SLP followed him. Short period of TPN  In ICU.      #   HTN (hypertension) (4/21/2020) control BP. # Nose bleed - Heparin stopped. Nasal packed with removal no further bleeding at discharge. COVID-19 RULED OUT TWICE    Discharge condition:  improved and stable    Disposition:  SNF    Procedures:   Single chamber pacemaker 05/05    Procedure(s):  ESOPHAGOGASTRODUODENOSCOPY (EGD) with bx, dilation  IMPRESSION:   1. 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 2. Small, sliding, hiatus hernia. 3. Non-specific, non-erosive gastritis. Biopsies taken from the body and antrum to rule out H pylori infection. 4. Large, deep ulcer at apex of bulb with no high-risk stigmata of bleeding.      RECOMMENDATIONS:  1. -Await pathology. 2. -Pantoprazole 40 bid. 3. -No NSAIDs. 4. -Outpatient follow-up. Consultants: GI  IP CONSULT TO INTENSIVIST  IP CONSULT TO GASTROENTEROLOGY  IP CONSULT TO CARDIOLOGY    Physical Exam on Discharge:  Visit Vitals  /78 (BP 1 Location: Right arm, BP Patient Position: At rest)   Pulse 67   Temp 97.4 °F (36.3 °C)   Resp 20   Ht 5' 7\" (1.702 m)   Wt 79.8 kg (176 lb)   SpO2 98%   BMI 27.57 kg/m²   Gen:  Comfortable,    in no acute distress.  Appear stated age, Well-developed   HEENT:  Head atraumatic, normocephalic ,  moist mucous membranes.   Neck:   Trachea midline , No apparent JVD, Supple, without masses, thyroid non-tender  Resp:  No accessory muscle use,Bilateral BS present, clear breath sounds without wheezes rales or rhonchi  Card:  normal S1, S2 without Gallop .   2 + lower leg peripheral edema. Abd:  Soft, non-tender, non-distended, bowel sounds are present . Nicko Luo Musc: L shoulder sling ( per cardiology instructions)   No cyanosis or clubbing. Skin:  No rashes or ulcers, skin turgor is good. Neuro: Alert and coherent , appropriate mood. no clear area of focal motor weakness,  follows commands appropriately. Hospitalization and discharge instructions d/c in details with : patient , consult  , Nursing/CM     Discharge medications :  Current Discharge Medication List      START taking these medications    Details   furosemide (LASIX) 20 mg tablet Take 1 Tab by mouth daily for 30 days. Qty: 30 Tab, Refills: 0      lidocaine 4 % patch Apply to low back prn for back pains  Qty: 1 Package, Refills: 0      clotrimazole-betamethasone (LOTRISONE) topical cream APPLY TO GROIN AND PERINEAL AREAS FOR 2 WEEKS  Qty: 15 g, Refills: 0         CONTINUE these medications which have CHANGED    Details   pantoprazole (PROTONIX) 40 mg tablet Take 1 Tab by mouth Before breakfast and dinner for 30 days. Qty: 60 Tab, Refills: 0         CONTINUE these medications which have NOT CHANGED    Details   levothyroxine (SYNTHROID) 50 mcg tablet Take 1 Tab by mouth every morning. Qty: 30 Tab, Refills: 0      cholecalciferol (VITAMIN D3) (1000 Units /25 mcg) tablet Take 2 Tabs by mouth daily. Qty: 30 Tab, Refills: 0      simvastatin (ZOCOR) 40 mg tablet Take 1 Tab by mouth nightly. Qty: 30 Tab, Refills: 0      aspirin delayed-release 81 mg tablet Take 1 Tab by mouth daily. Qty: 30 Tab, Refills: 0      allopurinol (ZYLOPRIM) 300 mg tablet Take 300 mg by mouth daily. tamsulosin (FLOMAX) 0.4 mg capsule take 1 capsule by mouth once daily AFTER DINNER  Qty: 90 Cap, Refills: 3      acetaminophen (TYLENOL EXTRA STRENGTH) 500 mg tablet Take 2 Tabs by mouth every six (6) hours as needed for Pain.   Qty: 50 Tab, Refills: 0         STOP taking these medications       omeprazole (PRILOSEC) 20 mg capsule Comments:   Reason for Stopping:         sucralfate (CARAFATE) 1 gram tablet Comments:   Reason for Stopping:                   Most Recent Labs:       Recent Labs     05/11/20  0030 05/10/20  0430 05/09/20  0350   WBC 8.8 6.3 6.1   HGB 8.2* 8.0* 8.7*   HCT 25.5* 24.7* 26.6*    217 209     Recent Labs     05/11/20  0030 05/10/20  0430 05/09/20  0350   * 145 145   K 4.2 4.1 4.2   * 117* 116*   CO2 26 26 23   GLU 83 80 106*   BUN 29* 22* 20*   CREA 1.08 1.07 1.08   CA 8.0* 7.8* 7.6*   MG 1.8 1.7 1.8   PHOS 2.7 2.6 2.7   ALB  --   --  1.8*   TBILI  --   --  0.3   SGOT  --   --  34   ALT  --   --  19   INR 1.3* 1.3* 1.4*     Lab Results   Component Value Date/Time    Glucose (POC) 126 (H) 05/11/2020 11:04 AM    Glucose (POC) 123 (H) 05/11/2020 07:36 AM    Glucose (POC) 96 05/10/2020 08:51 PM    Glucose (POC) 87 05/10/2020 04:06 PM    Glucose (POC) 95 05/10/2020 11:33 AM    Glucose (POC) 96 10/10/2014 09:28 PM    Glucose (POC) 186 (H) 10/10/2014 03:48 PM     No results for input(s): PH, PCO2, PO2, HCO3, FIO2 in the last 72 hours.   Recent Labs     05/11/20  0030 05/10/20  0430 05/09/20  0350   INR 1.3* 1.3* 1.4*       Lab Results   Component Value Date/Time    Specimen Description: [facility] 02/06/2014 05:45 PM     Lab Results   Component Value Date/Time    Culture result: NO GROWTH 3 DAYS 05/08/2020 09:30 AM    Culture result: NO GROWTH 3 DAYS 05/08/2020 08:20 AM    Culture result: No growth (<1,000 CFU/ML) 04/22/2020 09:20 AM       All Cardiac Markers in the last 24 hours: No results found for: CPK, CK, CKMMB, CKMB, RCK3, CKMBT, CKNDX, CKND1, TELLY, TROPT, TROIQ, JOSE, TROPT, TNIPOC, BNP, BNPP    XR Results:  Results from Hospital Encounter encounter on 04/21/20   XR CHEST PORT    Narrative EXAM: XR CHEST PORT    CLINICAL INDICATION/HISTORY: s/p device implant  -Additional: None    COMPARISON: 4/27/2020    TECHNIQUE: Portable frontal view of the chest    _______________    FINDINGS:    SUPPORT DEVICES: Interval placement of left chest wall AICD/pacemaker. HEART AND MEDIASTINUM: Cardiomegaly. LUNGS AND PLEURAL SPACES: Central vascular congestion with interstitial and  alveolar edema. Moderate bilateral effusions. _______________      Impression IMPRESSION:    Cardiomegaly with interstitial and alveolar edema. Moderate bilateral effusions. CT Results:  Results from [place] encounter on 04/06/20   CTA CHEST W OR W WO CONT    Addendum Addendum: One or more dose reduction techniques were used on this CT:  automated exposure control, adjustment of the mAs and/or kVp according to patient  size, and iterative reconstruction techniques. The specific techniques used on  this CT exam have been documented in the patient's electronic medical record. Digital Imaging and Communications in Medicine (DICOM) format image data  are available to nonaffiliated external healthcare facilities or entities on a secure, media free, reciprocally searchable basis with patient  authorization for at least a 12-month period after this study. Sreekanth Chen MD 4/6/2020  4:48 PM          Narrative EXAM: CTA Chest    INDICATION: Shortness of breath. Possible PE. Chest pain. COMPARISON: 12/16/2019    TECHNIQUE: Axial CT imaging from the thoracic inlet through the diaphragm with  intravenous contrast utilizing CTA study for pulmonary artery evaluation. Coronal and sagittal MIP reformations were generated at a separate workstation. _______________    FINDINGS:    EXAM QUALITY: Overall exam quality is adequate. Pulmonary arterial enhancement  is adequate. Respiratory motion artifact is present, limiting evaluation. PULMONARY ARTERIES: No convincing evidence of pulmonary embolism. MEDIASTINUM: Normal heart size. No evidence of right heart strain. Aorta is  unremarkable.  No pericardial effusion. LYMPH NODES: No enlarged mediastinal or hilar nodes by size criteria. AIRWAY: Unremarkable. LUNGS: Right lung base patchy consolidation. PLEURA: Small right and trace left pleural effusions. Kathrene Bolk UPPER ABDOMEN: Visualized upper abdomen is unremarkable. .    OTHER: No acute or aggressive osseous abnormalities identified. _______________      Impression IMPRESSION:    1. No evidence of pulmonary embolism. 2.  Patchy right lung base consolidation. Small right and trace left pleural  effusions. MRI Results:  No results found for this or any previous visit. Nuclear Medicine Results:  No results found for this or any previous visit. US Results:  Results from [place] encounter on 04/21/20   US GALLBLADDER    Narrative EXAM: Limited right upper quadrant abdominal ultrasound    INDICATION: Elevated LFTs. History of gallstones. COMPARISON: CT abdomen and pelvis 12/16/2019. TECHNIQUE: Real-time abdomen/right upper quadrant sonography in multiple planes  was performed with image documentation. Grayscale, color flow Doppler imaging,  and velocity spectral waveform analysis of the portal vein was performed (duplex  imaging). _______________    FINDINGS: Limited study secondary to patient cooperation. LIVER: Mildly heterogeneous echotexture. No focal mass Color flow Doppler and  velocity spectral waveform analysis of the portal vein shows normal  (hepatopetal) direction of flow. Asya Galvan BILIARY SYSTEM: No intrahepatic biliary dilatation. Common bile duct is normal  in caliber measuring 0.4 cm. GALLBLADDER: Cholelithiasis. No pericholecystic fluid. RIGHT KIDNEY: 10.4 cm in length. No hydronephrosis or renal mass. No visible  calculi. Lower pole 0.8 cm. PANCREAS: Visualized portions unremarkable. Body and tail obscured. IVC: Visualized portions are unremarkable in appearance.     OTHER: No free intraperitoneal fluid.    _______________      Impression IMPRESSION:    Limited study. Cholelithiasis. Mildly heterogeneous hepatic echotexture. IR Results:  No results found for this or any previous visit. VAS/US Results:  Results from [place] encounter on 01/30/18   DUPLEX CAROTID BILATERAL       Total discharge time 42 minutes of which more than 50 % spent on counseling and coordination of care. This document in whole or part of it has been produced using voice recognition software. Unrecognized errors in transcription may be present.     Mil Calderon, DO  Internal Medicine, Hospitalist  Pager: [phone] [facility]

## 2020-06-26 NOTE — PROGRESS NOTES
0900: RT responded to RRT on the floors. Patient had on a non-rebreather @ 15 lpm and SpO2 100%. Breath sounds are clear and equal bilaterally. RT switched patient to 5 lpm nasal cannula before transfer to ICU. Susanna Pacheco

## 2020-07-06 ENCOUNTER — APPOINTMENT (OUTPATIENT)
Dept: GENERAL RADIOLOGY | Age: 85
DRG: 871 | End: 2020-07-06
Attending: EMERGENCY MEDICINE
Payer: MEDICARE

## 2020-07-06 ENCOUNTER — HOSPITAL ENCOUNTER (INPATIENT)
Age: 85
LOS: 7 days | Discharge: HOME HEALTH | DRG: 871 | End: 2020-07-13
Attending: EMERGENCY MEDICINE | Admitting: HOSPITALIST
Payer: MEDICARE

## 2020-07-06 DIAGNOSIS — E86.0 DEHYDRATION: ICD-10-CM

## 2020-07-06 DIAGNOSIS — E87.6 HYPOKALEMIA: ICD-10-CM

## 2020-07-06 DIAGNOSIS — R63.0 ANOREXIA: ICD-10-CM

## 2020-07-06 DIAGNOSIS — N39.0 URINARY TRACT INFECTION WITHOUT HEMATURIA, SITE UNSPECIFIED: ICD-10-CM

## 2020-07-06 DIAGNOSIS — T68.XXXA HYPOTHERMIA, INITIAL ENCOUNTER: Primary | ICD-10-CM

## 2020-07-06 PROBLEM — A41.9 SEPSIS (HCC): Status: ACTIVE | Noted: 2020-07-06

## 2020-07-06 LAB
ALBUMIN SERPL-MCNC: 2.3 G/DL (ref 3.4–5)
ALBUMIN/GLOB SERPL: 0.6 {RATIO} (ref 0.8–1.7)
ALP SERPL-CCNC: 139 U/L (ref 45–117)
ALT SERPL-CCNC: 19 U/L (ref 16–61)
ANION GAP SERPL CALC-SCNC: 5 MMOL/L (ref 3–18)
APPEARANCE UR: CLEAR
AST SERPL-CCNC: 28 U/L (ref 10–38)
BACTERIA URNS QL MICRO: ABNORMAL /HPF
BASOPHILS # BLD: 0 K/UL (ref 0–0.1)
BASOPHILS NFR BLD: 0 % (ref 0–2)
BILIRUB SERPL-MCNC: 0.4 MG/DL (ref 0.2–1)
BILIRUB UR QL: NEGATIVE
BUN SERPL-MCNC: 40 MG/DL (ref 7–18)
BUN/CREAT SERPL: 33 (ref 12–20)
CALCIUM SERPL-MCNC: 8.9 MG/DL (ref 8.5–10.1)
CHLORIDE SERPL-SCNC: 113 MMOL/L (ref 100–111)
CK MB CFR SERPL CALC: 15.4 % (ref 0–4)
CK MB SERPL-MCNC: 9.4 NG/ML (ref 5–25)
CK SERPL-CCNC: 61 U/L (ref 39–308)
CO2 SERPL-SCNC: 29 MMOL/L (ref 21–32)
COLOR UR: YELLOW
CREAT SERPL-MCNC: 1.2 MG/DL (ref 0.6–1.3)
DIFFERENTIAL METHOD BLD: ABNORMAL
EOSINOPHIL # BLD: 0.2 K/UL (ref 0–0.4)
EOSINOPHIL NFR BLD: 3 % (ref 0–5)
EPITH CASTS URNS QL MICRO: ABNORMAL /LPF (ref 0–5)
ERYTHROCYTE [DISTWIDTH] IN BLOOD BY AUTOMATED COUNT: 16.9 % (ref 11.6–14.5)
GLOBULIN SER CALC-MCNC: 3.6 G/DL (ref 2–4)
GLUCOSE BLD STRIP.AUTO-MCNC: 71 MG/DL (ref 70–110)
GLUCOSE SERPL-MCNC: 55 MG/DL (ref 74–99)
GLUCOSE UR STRIP.AUTO-MCNC: NEGATIVE MG/DL
HCT VFR BLD AUTO: 27.9 % (ref 36–48)
HGB BLD-MCNC: 9.3 G/DL (ref 13–16)
HGB UR QL STRIP: NEGATIVE
KETONES UR QL STRIP.AUTO: NEGATIVE MG/DL
LACTATE SERPL-SCNC: 0.8 MMOL/L (ref 0.4–2)
LEUKOCYTE ESTERASE UR QL STRIP.AUTO: ABNORMAL
LYMPHOCYTES # BLD: 0.8 K/UL (ref 0.9–3.6)
LYMPHOCYTES NFR BLD: 10 % (ref 21–52)
MCH RBC QN AUTO: 30.7 PG (ref 24–34)
MCHC RBC AUTO-ENTMCNC: 33.3 G/DL (ref 31–37)
MCV RBC AUTO: 92.1 FL (ref 74–97)
MONOCYTES # BLD: 0.3 K/UL (ref 0.05–1.2)
MONOCYTES NFR BLD: 5 % (ref 3–10)
NEUTS SEG # BLD: 6.3 K/UL (ref 1.8–8)
NEUTS SEG NFR BLD: 82 % (ref 40–73)
NITRITE UR QL STRIP.AUTO: POSITIVE
PH UR STRIP: 5 [PH] (ref 5–8)
PLATELET # BLD AUTO: 176 K/UL (ref 135–420)
PMV BLD AUTO: 11.1 FL (ref 9.2–11.8)
POTASSIUM SERPL-SCNC: 3.1 MMOL/L (ref 3.5–5.5)
PROT SERPL-MCNC: 5.9 G/DL (ref 6.4–8.2)
PROT UR STRIP-MCNC: NEGATIVE MG/DL
RBC # BLD AUTO: 3.03 M/UL (ref 4.7–5.5)
RBC #/AREA URNS HPF: ABNORMAL /HPF (ref 0–5)
SODIUM SERPL-SCNC: 147 MMOL/L (ref 136–145)
SP GR UR REFRACTOMETRY: 1.02 (ref 1–1.03)
T4 FREE SERPL-MCNC: 1.6 NG/DL (ref 0.7–1.5)
TROPONIN I SERPL-MCNC: 0.04 NG/ML (ref 0–0.04)
TSH SERPL DL<=0.05 MIU/L-ACNC: 0.3 UIU/ML (ref 0.36–3.74)
UROBILINOGEN UR QL STRIP.AUTO: 0.2 EU/DL (ref 0.2–1)
WBC # BLD AUTO: 7.6 K/UL (ref 4.6–13.2)
WBC URNS QL MICRO: ABNORMAL /HPF (ref 0–4)

## 2020-07-06 PROCEDURE — 74011250636 HC RX REV CODE- 250/636: Performed by: INTERNAL MEDICINE

## 2020-07-06 PROCEDURE — 82962 GLUCOSE BLOOD TEST: CPT

## 2020-07-06 PROCEDURE — 87040 BLOOD CULTURE FOR BACTERIA: CPT

## 2020-07-06 PROCEDURE — 65610000006 HC RM INTENSIVE CARE

## 2020-07-06 PROCEDURE — 74011250636 HC RX REV CODE- 250/636: Performed by: HOSPITALIST

## 2020-07-06 PROCEDURE — 96375 TX/PRO/DX INJ NEW DRUG ADDON: CPT

## 2020-07-06 PROCEDURE — 74011000258 HC RX REV CODE- 258: Performed by: EMERGENCY MEDICINE

## 2020-07-06 PROCEDURE — 93005 ELECTROCARDIOGRAM TRACING: CPT

## 2020-07-06 PROCEDURE — 74011000250 HC RX REV CODE- 250: Performed by: INTERNAL MEDICINE

## 2020-07-06 PROCEDURE — 81001 URINALYSIS AUTO W/SCOPE: CPT

## 2020-07-06 PROCEDURE — 83605 ASSAY OF LACTIC ACID: CPT

## 2020-07-06 PROCEDURE — 87077 CULTURE AEROBIC IDENTIFY: CPT

## 2020-07-06 PROCEDURE — 71045 X-RAY EXAM CHEST 1 VIEW: CPT

## 2020-07-06 PROCEDURE — 74011000258 HC RX REV CODE- 258: Performed by: HOSPITALIST

## 2020-07-06 PROCEDURE — 87086 URINE CULTURE/COLONY COUNT: CPT

## 2020-07-06 PROCEDURE — 74011250637 HC RX REV CODE- 250/637: Performed by: EMERGENCY MEDICINE

## 2020-07-06 PROCEDURE — 74011250637 HC RX REV CODE- 250/637: Performed by: HOSPITALIST

## 2020-07-06 PROCEDURE — 74011250636 HC RX REV CODE- 250/636: Performed by: EMERGENCY MEDICINE

## 2020-07-06 PROCEDURE — 85025 COMPLETE CBC W/AUTO DIFF WBC: CPT

## 2020-07-06 PROCEDURE — 80053 COMPREHEN METABOLIC PANEL: CPT

## 2020-07-06 PROCEDURE — 99285 EMERGENCY DEPT VISIT HI MDM: CPT

## 2020-07-06 PROCEDURE — 87186 SC STD MICRODIL/AGAR DIL: CPT

## 2020-07-06 PROCEDURE — 84439 ASSAY OF FREE THYROXINE: CPT

## 2020-07-06 PROCEDURE — 82550 ASSAY OF CK (CPK): CPT

## 2020-07-06 PROCEDURE — 96374 THER/PROPH/DIAG INJ IV PUSH: CPT

## 2020-07-06 PROCEDURE — 65660000001 HC RM ICU INTERMED STEPDOWN

## 2020-07-06 PROCEDURE — 84443 ASSAY THYROID STIM HORMONE: CPT

## 2020-07-06 RX ORDER — LEVOTHYROXINE SODIUM 25 UG/1
50 TABLET ORAL
Status: DISCONTINUED | OUTPATIENT
Start: 2020-07-07 | End: 2020-07-13 | Stop reason: HOSPADM

## 2020-07-06 RX ORDER — ATORVASTATIN CALCIUM 20 MG/1
20 TABLET, FILM COATED ORAL
Status: DISCONTINUED | OUTPATIENT
Start: 2020-07-06 | End: 2020-07-13 | Stop reason: HOSPADM

## 2020-07-06 RX ORDER — VANCOMYCIN/0.9 % SOD CHLORIDE 1.5G/250ML
1500 PLASTIC BAG, INJECTION (ML) INTRAVENOUS ONCE
Status: DISCONTINUED | OUTPATIENT
Start: 2020-07-06 | End: 2020-07-06

## 2020-07-06 RX ORDER — POTASSIUM CHLORIDE 20 MEQ/1
40 TABLET, EXTENDED RELEASE ORAL
Status: COMPLETED | OUTPATIENT
Start: 2020-07-06 | End: 2020-07-06

## 2020-07-06 RX ORDER — TAMSULOSIN HYDROCHLORIDE 0.4 MG/1
0.4 CAPSULE ORAL
Status: DISCONTINUED | OUTPATIENT
Start: 2020-07-06 | End: 2020-07-13 | Stop reason: HOSPADM

## 2020-07-06 RX ORDER — VANCOMYCIN/0.9 % SOD CHLORIDE 1.5G/250ML
1500 PLASTIC BAG, INJECTION (ML) INTRAVENOUS ONCE
Status: COMPLETED | OUTPATIENT
Start: 2020-07-06 | End: 2020-07-06

## 2020-07-06 RX ORDER — SODIUM CHLORIDE 9 MG/ML
50 INJECTION, SOLUTION INTRAVENOUS CONTINUOUS
Status: DISCONTINUED | OUTPATIENT
Start: 2020-07-06 | End: 2020-07-12

## 2020-07-06 RX ORDER — SODIUM CHLORIDE 0.9 % (FLUSH) 0.9 %
5-10 SYRINGE (ML) INJECTION AS NEEDED
Status: DISCONTINUED | OUTPATIENT
Start: 2020-07-06 | End: 2020-07-13 | Stop reason: HOSPADM

## 2020-07-06 RX ORDER — ALLOPURINOL 100 MG/1
300 TABLET ORAL DAILY
Status: DISCONTINUED | OUTPATIENT
Start: 2020-07-07 | End: 2020-07-13 | Stop reason: HOSPADM

## 2020-07-06 RX ORDER — ONDANSETRON 2 MG/ML
4 INJECTION INTRAMUSCULAR; INTRAVENOUS
Status: DISCONTINUED | OUTPATIENT
Start: 2020-07-06 | End: 2020-07-13 | Stop reason: HOSPADM

## 2020-07-06 RX ORDER — ASPIRIN 81 MG/1
81 TABLET ORAL DAILY
Status: DISCONTINUED | OUTPATIENT
Start: 2020-07-07 | End: 2020-07-13 | Stop reason: HOSPADM

## 2020-07-06 RX ORDER — ACETAMINOPHEN 325 MG/1
650 TABLET ORAL
Status: DISCONTINUED | OUTPATIENT
Start: 2020-07-06 | End: 2020-07-13 | Stop reason: HOSPADM

## 2020-07-06 RX ORDER — NOREPINEPHRINE BIT/0.9 % NACL 8 MG/250ML
.5-3 INFUSION BOTTLE (ML) INTRAVENOUS
Status: DISCONTINUED | OUTPATIENT
Start: 2020-07-06 | End: 2020-07-08

## 2020-07-06 RX ORDER — MAGNESIUM SULFATE HEPTAHYDRATE 40 MG/ML
2 INJECTION, SOLUTION INTRAVENOUS
Status: COMPLETED | OUTPATIENT
Start: 2020-07-06 | End: 2020-07-06

## 2020-07-06 RX ORDER — HEPARIN SODIUM 5000 [USP'U]/ML
5000 INJECTION, SOLUTION INTRAVENOUS; SUBCUTANEOUS EVERY 8 HOURS
Status: DISCONTINUED | OUTPATIENT
Start: 2020-07-06 | End: 2020-07-13 | Stop reason: HOSPADM

## 2020-07-06 RX ADMIN — SODIUM CHLORIDE 905 ML: 900 INJECTION, SOLUTION INTRAVENOUS at 10:06

## 2020-07-06 RX ADMIN — TAMSULOSIN HYDROCHLORIDE 0.4 MG: 0.4 CAPSULE ORAL at 18:59

## 2020-07-06 RX ADMIN — HEPARIN SODIUM 5000 UNITS: 5000 INJECTION INTRAVENOUS; SUBCUTANEOUS at 19:00

## 2020-07-06 RX ADMIN — VANCOMYCIN HYDROCHLORIDE 1500 MG: 10 INJECTION, POWDER, LYOPHILIZED, FOR SOLUTION INTRAVENOUS at 21:00

## 2020-07-06 RX ADMIN — SODIUM CHLORIDE 1000 ML: 900 INJECTION, SOLUTION INTRAVENOUS at 10:06

## 2020-07-06 RX ADMIN — MAGNESIUM SULFATE HEPTAHYDRATE 2 G: 40 INJECTION, SOLUTION INTRAVENOUS at 11:16

## 2020-07-06 RX ADMIN — ATORVASTATIN CALCIUM 20 MG: 20 TABLET, FILM COATED ORAL at 21:00

## 2020-07-06 RX ADMIN — POTASSIUM CHLORIDE 40 MEQ: 1500 TABLET, EXTENDED RELEASE ORAL at 11:16

## 2020-07-06 RX ADMIN — SODIUM CHLORIDE 50 ML/HR: 900 INJECTION, SOLUTION INTRAVENOUS at 18:55

## 2020-07-06 RX ADMIN — SODIUM CHLORIDE 250 ML: 900 INJECTION, SOLUTION INTRAVENOUS at 23:25

## 2020-07-06 RX ADMIN — CEFTRIAXONE 1 G: 1 INJECTION, POWDER, FOR SOLUTION INTRAMUSCULAR; INTRAVENOUS at 11:16

## 2020-07-06 RX ADMIN — CEFTRIAXONE 2 G: 2 INJECTION, POWDER, FOR SOLUTION INTRAMUSCULAR; INTRAVENOUS at 19:05

## 2020-07-06 RX ADMIN — NOREPINEPHRINE BITARTRATE 5 MCG/MIN: 1 INJECTION INTRAVENOUS at 23:25

## 2020-07-06 NOTE — H&P
2 [facility]  Hospitalist Division      History & Physical    Patient: Meggan Leggett MRN: [id number]  [facility]: [id number]    YOB: 1934  Age: 80 y.o. Sex: male    DOA: 7/6/2020 LOS:  LOS: 0 days        DOA: 7/6/2020        Assessment/Plan     Active Problems:    Sepsis ([address] (7/6/2020)        Plan:  1. Sepsislikely secondary to urinary tract infection, hypothermia, IV fluids, IV antibiotics, admit to stepdown unit. 2. UTI IV antibiotics, urine culture sent, awaiting culture and sensitivity results. Has had multiple admissions to the hospital secondary to UTI. 3. History of BPHcontinue Flomax  4. Hypothermia likely from sepsis, Елена hugger, admitted to stepdown. 5. History of goutcontinue allopurinol  6. History of hyperlipidemiacontinue Lipitor  7. History of hypothyroidismcontinue Synthroid  8. CKD 2monitor creatinine  9. History of chronic systolic congestive heart failuregentle hydration  10. History of coronary artery disease with recent permanent pacemaker placement. DVT prophylaxisHeparin  Full code                HPI:     Meggan Leggett is a 80 y.o. male who presented to the emergency room secondary to generalized weakness. Patient is a poor historian and mentions that he lives with his son who is able to help him with his activities of daily living. Patient mentions he uses a walker at home but for the last 3 to 4 days he has been progressively getting weaker to the extent that he was unable to use the walker this morning. Please note that ER notes were also reviewed for further information and it appears that the patient was just discharged from rehab 3 weeks ago. 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   Past medical history hypothyroidism, CKD 2, BPH, recurrent UTI, recent GI bleed,  Given patient's current presentation he is being admitted to the hospital for further evaluation and treatment. Past Medical History:   Diagnosis Date    Difficulty urinating     Elevated PSA     Hematuria, unspecified     Hypercholesterolemia     Hypertension     Incomplete bladder emptying     Urinary retention     UTI (urinary tract infection)        Past Surgical History:   Procedure Laterality Date    APPENDECTOMY      EGD  2/7/2014         HX TURP  6/13/16    HX TURP      VA INS NEW/RPLC PRM PACEMAKER W/TRANSV ELTRD VENTR N/A 5/5/2020    Insert Ppm Single Ventricular performed by Halie Voss MD at [facility]       Family History   Problem Relation Age of Onset    Cancer Father     Alzheimer Mother     Heart defect Brother        Social History     Socioeconomic History    Marital status:      Spouse name: Not on file    Number of children: Not on file    Years of education: Not on file    Highest education level: Not on file   Occupational History    Occupation:  thirty years   Tobacco Use    Smoking status: Never Smoker    Smokeless tobacco: Never Used   Substance and Sexual Activity    Alcohol use: Yes     Alcohol/week: 2.0 standard drinks     Types: 2 Cans of beer per week     Comment: Occasional    Drug use: No       Prior to Admission medications    Medication Sig Start Date End Date Taking? Authorizing Provider   lidocaine 4 % patch Apply to low back prn for back pains 5/8/20   Misael Hsu MD   clotrimazole-betamethasone (LOTRISONE) topical cream APPLY TO GROIN AND PERINEAL AREAS FOR 2 WEEKS 5/7/20   Misael Hsu MD   levothyroxine (SYNTHROID) 50 mcg tablet Take 1 Tab by mouth every morning. 4/10/20   Pilar Gonzalez PA   cholecalciferol (VITAMIN D3) (1000 Units /25 mcg) tablet Take 2 Tabs by mouth daily. 2/21/20   Lynalthea Buck DO   simvastatin (ZOCOR) 40 mg tablet Take 1 Tab by mouth nightly. 2/21/20   Lyndol Schkorey, DO   aspirin delayed-release 81 mg tablet Take 1 Tab by mouth daily.  2/21/20   Deyanira Hawk Ez PAREDES DO   acetaminophen (TYLENOL EXTRA STRENGTH) 500 mg tablet Take 2 Tabs by mouth every six (6) hours as needed for Pain. 12/16/19   Prince Jayjay MD   allopurinol (ZYLOPRIM) 300 mg tablet Take 300 mg by mouth daily. Griselda Rangel MD   tamsulosin (FLOMAX) 0.4 mg capsule take 1 capsule by mouth once daily AFTER DINNER 3/12/15   Javed Inman MD       Allergies   Allergen Reactions    Amlodipine Swelling and Itching    Bactrim [Sulfamethoprim Ds] Rash    Lisinopril Other (comments)     Acute renal failure    Ciprofloxacin Rash and Itching       Review of Systems  Review of systems not obtained due to patient factors. Physical Exam:      Visit Vitals  BP 99/54   Pulse 61   Temp (!) 93.4 °F (34.1 °C)   Resp 13   Ht 5' 7\" (1.702 m)   Wt 63.5 kg (140 lb)   SpO2 92%   BMI 21.93 kg/m²       Physical Exam:    Gen: In general, this is a poorly nourished male in no acute distress  HEENT: Sclerae nonicteric. Oral mucous membranes dry. Dentition poor  Neck: Supple with midline trachea. CV: RRR without murmur or rub appreciated. Resp:Respirations are unlabored without use of accessory muscles. Lung fields B/L without wheezes or rhonchi. Abd: Soft, nontender, nondistended. Extrem: Extremities are warm, without cyanosis or clubbing. No pitting pretibial edema  Skin: Warm, no visible rashes. Neuro: Patient is alert, oriented, and cooperative. No obvious focal defects. Moves all 4 extremities.     Labs Reviewed:    Recent Results (from the past 24 hour(s))   CBC WITH AUTOMATED DIFF    Collection Time: 07/06/20  9:07 AM   Result Value Ref Range    WBC 7.6 4.6 - 13.2 K/uL    RBC 3.03 (L) 4.70 - 5.50 M/uL    HGB 9.3 (L) 13.0 - 16.0 g/dL    HCT 27.9 (L) 36.0 - 48.0 %    MCV 92.1 74.0 - 97.0 FL    MCH 30.7 24.0 - 34.0 PG    MCHC 33.3 31.0 - 37.0 g/dL    RDW 16.9 (H) 11.6 - 14.5 %    PLATELET 963 484 - 061 K/uL    MPV 11.1 9.2 - 11.8 FL    NEUTROPHILS 82 (H) 40 - 73 %    LYMPHOCYTES 10 (L) 21 - 52 %    MONOCYTES 5 3 - 10 %    EOSINOPHILS 3 0 - 5 %    BASOPHILS 0 0 - 2 %    ABS. NEUTROPHILS 6.3 1.8 - 8.0 K/UL    ABS. LYMPHOCYTES 0.8 (L) 0.9 - 3.6 K/UL    ABS. MONOCYTES 0.3 0.05 - 1.2 K/UL    ABS. EOSINOPHILS 0.2 0.0 - 0.4 K/UL    ABS. BASOPHILS 0.0 0.0 - 0.1 K/UL    DF AUTOMATED     METABOLIC PANEL, COMPREHENSIVE    Collection Time: 07/06/20  9:07 AM   Result Value Ref Range    Sodium 147 (H) 136 - 145 mmol/L    Potassium 3.1 (L) 3.5 - 5.5 mmol/L    Chloride 113 (H) 100 - 111 mmol/L    CO2 29 21 - 32 mmol/L    Anion gap 5 3.0 - 18 mmol/L    Glucose 55 (L) 74 - 99 mg/dL    BUN 40 (H) 7.0 - 18 MG/DL    Creatinine 1.20 0.6 - 1.3 MG/DL    BUN/Creatinine ratio 33 (H) 12 - 20      GFR est AA >60 >60 ml/min/1.73m2    GFR est non-AA 58 (L) >60 ml/min/1.73m2    Calcium 8.9 8.5 - 10.1 MG/DL    Bilirubin, total 0.4 0.2 - 1.0 MG/DL    ALT (SGPT) 19 16 - 61 U/L    AST (SGOT) 28 10 - 38 U/L    Alk.  phosphatase 139 (H) 45 - 117 U/L    Protein, total 5.9 (L) 6.4 - 8.2 g/dL    Albumin 2.3 (L) 3.4 - 5.0 g/dL    Globulin 3.6 2.0 - 4.0 g/dL    A-G Ratio 0.6 (L) 0.8 - 1.7     URINALYSIS W/ RFLX MICROSCOPIC    Collection Time: 07/06/20  9:07 AM   Result Value Ref Range    Color YELLOW      Appearance CLEAR      Specific gravity 1.017 1.005 - 1.030      pH (UA) 5.0 5.0 - 8.0      Protein Negative NEG mg/dL    Glucose Negative NEG mg/dL    Ketone Negative NEG mg/dL    Bilirubin Negative NEG      Blood Negative NEG      Urobilinogen 0.2 0.2 - 1.0 EU/dL    Nitrites Positive (A) NEG      Leukocyte Esterase MODERATE (A) NEG     CARDIAC PANEL,(CK, CKMB & TROPONIN)    Collection Time: 07/06/20  9:07 AM   Result Value Ref Range    CK - MB 9.4 (H) <3.6 ng/ml    CK-MB Index 15.4 (H) 0.0 - 4.0 %    CK 61 39 - 308 U/L    Troponin-I, QT 0.04 0.0 - 0.045 NG/ML   TSH 3RD GENERATION    Collection Time: 07/06/20  9:07 AM   Result Value Ref Range    TSH 0.30 (L) 0.36 - 3.74 uIU/mL   LACTIC ACID    Collection Time: 07/06/20  9:07 AM   Result Value Ref Range    Lactic acid 0.8 0.4 - 2.0 MMOL/L   URINE MICROSCOPIC ONLY    Collection Time: 07/06/20  9:07 AM   Result Value Ref Range    WBC 21 to 35 0 - 4 /hpf    RBC 0 to 2 0 - 5 /hpf    Epithelial cells FEW 0 - 5 /lpf    Bacteria 3+ (A) NEG /hpf       Imaging Reviewed:    CXR - reviewed         Sandoval Yao MD  7/6/2020, 12:12 PM

## 2020-07-06 NOTE — REMOTE MONITORING
Spoke with primary RN regarding sepsis bundle and abx. Call back number [phone], thank you!   Signed By: Leonela Singh RN, [facility]    July 6, 2020

## 2020-07-06 NOTE — PROGRESS NOTES
Problem: Discharge Planning  Goal: *Discharge to safe environment  Outcome: Progressing Towards Goal    PLAN: Resume home health vs SNF (if recommended)  SNF choices: #1 [facility]     Reason for Admission:   Sepsis                   RUR Score:    N/A                 Plan for utilizing home health: Active with [address]       PCP: First and Last name:  Leonela Sanabria   Name of Practice:    Are you a current patient: Yes/No:    Approximate date of last visit:  Pt indicated Friday July 3   Can you participate in a virtual visit with your PCP:                     Current Advanced Directive/Advance Care Plan:  He thinks so. Wants son to be decision maker which legally he would be since  and is only child                         Transition of Care Plan: Resume home health vs SNF (if recommended)  SNF choices: #1 [facility]         Met with patient at ED bedside. Verified face sheet information which is correct. Patient has designated__his son__  to participate in his/her discharge plan and to receive any needed information and gave permission for this writer to call and talk with him. Name: Jonh Paredes  Phone number:  [phone]      Pt lives in his own home and his son and dtr-I-law live there to help care for patient. DME: FWW, 4WW, W/C(for outside use), built in shower chair, BSC    Pt was ambulatory with his walker within the house. He used the W/C outside of the home. He can shower himself with family standing by. His son drives him to MD appts, pays his bills. His dtr-I-l does the cooking and cleaning. Pt is able to take his meds on his own. Pt had a recent SNF stay at [facility] and would return if recommended. He is currently active with [address] and agrees for that to resume if [facility] is recommended. Spoke with son Kassandra Post.  He is in agreement with SNF or Home Health, whichever is recommended. He said after discharge from [facility] he was doing really well. Emailed son SNF/HH lists and FOC's for both to [email or web address]. Placed in [facility] and [address] [place] [address]. Son indicated he is applying for Medicaid for his father for personal care services. Informed him that we can screen him here at the hospital for the personal care. He was in agreement.  PLAN :SNF vs HH    Care Management Interventions  PCP Verified by CM: Yes(Dr Acuña)  Last Visit to PCP: 07/03/20  Mode of Transport at Discharge: (Pt indicates \"his son\")  Transition of Care Consult (CM Consult): Discharge Planning  Current Support Network: Own Home(son and dtr-i-law live with pt and assist in his care)  Confirm Follow Up Transport: Family(son takes to medical appts)  The Plan for Transition of Care is Related to the Following Treatment Goals : resolution of symptoms  The Patient and/or Patient Representative was Provided with a Choice of Provider and Agrees with the Discharge Plan?: Yes  Name of the Patient Representative Who was Provided with a Choice of Provider and Agrees with the Discharge Plan: Dylan Coleman of Choice List was Provided with Basic Dialogue that Supports the Patient's Individualized Plan of Care/Goals, Treatment Preferences and Shares the Quality Data Associated with the Providers?: Yes  Discharge Location  Discharge Placement: (SNF vs Home Health)    Ramsey Haddad RN    Outcomes Manager  [phone]  [phone]

## 2020-07-06 NOTE — PROGRESS NOTES
1808 TRANSFER - IN REPORT:    Verbal report received from Jonny Juarez RN (name) on Milad Simon  being received from ED (unit) for routine progression of care      Report consisted of patients Situation, Background, Assessment and   Recommendations(SBAR). Information from the following report(s) SBAR, Kardex, [facility] and Recent Results was reviewed with the receiving nurse. Opportunity for questions and clarification was provided. Assessment completed upon patients arrival to unit and care assumed. 1835 pt A&O x 4; VSS on room air; oral temp 97.7  1925 Bedside shift change report given to Doylene Alpers, RN (oncoming nurse) by Aide Saravia RN (offgoing nurse). Report included the following information SBAR, Kardex, Intake/Output, MAR, Recent Results and Cardiac Rhythm NSR.

## 2020-07-06 NOTE — ED PROVIDER NOTES
Date: 7/6/2020  Patient Name: Amado Kee    History of Presenting Illness       History Provided By: Patient, EMS      HPI/Chief Complaint: (Context):who presents with chief complaint of generalized weakness and fatigue and progressively worsening and unable to ambulate. Patient states he just got out of rehab 3 weeks ago he was doing better and was able to walk and now he is not able to do so. Patient states similar to prior episodes  Patient does not feel weak on one side or the other does not have any fever cough congestion no chest pain or shortness of breath no abdominal pain no bowel bladder incontinence no trouble urinating no bloody stool no focal deficits no seizure history  Patient states they try to call the primary care physician but were asked to come to the emergency department      No pain  No radiation of symptoms  No acute alleviating or exacerbating factors  Patient said he just Having generalized fatigue. ---  I discussed the information with EMS on their arrival to the emergency department patient had recently been to rehab and was fatigued and weak and a family called [de-identified] office and they were told to come to the emergency department. There is no acute weakness or chest pain or shortness of breath or focal deficits that are new per the EMS providers this morning this.   --Patient's triage note is reviewed at 7:23 AM  Patient ambulance arrival  Patient's allergies amlodipine Bactrim lisinopril ciprofloxacin  Home medications Tylenol allopurinol clotrimazole, Synthroid, Zocor, Flomax  Medical history includes hypercholesterolemia hypertension UTI surgical history is for appendectomy, TURP social history is no smoking occasional alcohol no drugs  Patient's chart review shows that patient was seen for anemia on 5/29/2020 patient was admitted on 4/21/2020 for hypothermia patient's discharge diagnosis was pacemaker, GI bleed, hypoglycemia, hypertension, left bundle branch block, dysphagia, cardiomyopathy, EF of 35 to 40%,        PCP: Patricia Oliver MD    Current Facility-Administered Medications   Medication Dose Route Frequency Provider Last Rate Last Dose    sodium chloride (NS) flush 5-10 mL  5-10 mL IntraVENous PRN June Wheeler MD        cefTRIAXone (ROCEPHIN) 1 g in 0.9% sodium chloride (MBP/ADV) 50 mL MBP  1 g IntraVENous Q24H June Wheeler  mL/hr at 07/06/20 1116 1 g at 07/06/20 1116    vancomycin (VANCOCIN) 1500 mg in  ml infusion  1,500 mg IntraVENous Tim Almaguer MD         Current Outpatient Medications   Medication Sig Dispense Refill    lidocaine 4 % patch Apply to low back prn for back pains 1 Package 0    clotrimazole-betamethasone (LOTRISONE) topical cream APPLY TO GROIN AND PERINEAL AREAS FOR 2 WEEKS 15 g 0    levothyroxine (SYNTHROID) 50 mcg tablet Take 1 Tab by mouth every morning. 30 Tab 0    cholecalciferol (VITAMIN D3) (1000 Units /25 mcg) tablet Take 2 Tabs by mouth daily. 30 Tab 0    simvastatin (ZOCOR) 40 mg tablet Take 1 Tab by mouth nightly. 30 Tab 0    aspirin delayed-release 81 mg tablet Take 1 Tab by mouth daily. 30 Tab 0    acetaminophen (TYLENOL EXTRA STRENGTH) 500 mg tablet Take 2 Tabs by mouth every six (6) hours as needed for Pain. 50 Tab 0    allopurinol (ZYLOPRIM) 300 mg tablet Take 300 mg by mouth daily.       tamsulosin (FLOMAX) 0.4 mg capsule take 1 capsule by mouth once daily AFTER DINNER 90 Cap 3       Past History     Past Medical History:  Past Medical History:   Diagnosis Date    Difficulty urinating     Elevated PSA     Hematuria, unspecified     Hypercholesterolemia     Hypertension     Incomplete bladder emptying     Urinary retention     UTI (urinary tract infection)        Past Surgical History:  Past Surgical History:   Procedure Laterality Date    APPENDECTOMY      EGD  2/7/2014         HX TURP  6/13/16    HX TURP      UT INS NEW/RPLC PRM PACEMAKER W/TRANSV ELTRD VENTR N/A 5/5/2020    Insert Ppm Single Ventricular performed by Tano Choi MD at [address] [facility]       Family History:  Family History   Problem Relation Age of Onset    Cancer Father     Alzheimer Mother     Heart defect Brother        Social History:  Social History     Tobacco Use    Smoking status: Never Smoker    Smokeless tobacco: Never Used   Substance Use Topics    Alcohol use: Yes     Alcohol/week: 2.0 standard drinks     Types: 2 Cans of beer per week     Comment: Occasional    Drug use: No       Allergies: Allergies   Allergen Reactions    Amlodipine Swelling and Itching    Bactrim [Sulfamethoprim Ds] Rash    Lisinopril Other (comments)     Acute renal failure    Ciprofloxacin Rash and Itching         Review of Systems   Review of Systems   Constitutional: Positive for appetite change and fatigue. Negative for activity change and fever. HENT: Negative for congestion and rhinorrhea. Eyes: Negative for visual disturbance. Respiratory: Negative for shortness of breath. Cardiovascular: Negative for chest pain and palpitations. Gastrointestinal: Negative for abdominal pain, diarrhea, nausea and vomiting. Genitourinary: Negative for dysuria and hematuria. Musculoskeletal: Negative for arthralgias, back pain, joint swelling, myalgias and neck pain. Skin: Negative for rash. Neurological: Negative for dizziness, tremors, seizures, syncope, facial asymmetry, speech difficulty, weakness, light-headedness, numbness and headaches. Psychiatric/Behavioral: Negative for agitation. All other systems reviewed and are negative. Physical Exam     Physical Exam  Vitals signs and nursing note reviewed. Constitutional:       Appearance: He is well-developed. HENT:      Head: Normocephalic and atraumatic. Eyes:      Conjunctiva/sclera: Conjunctivae normal.      Pupils: Pupils are equal, round, and reactive to light.    Neck:      Musculoskeletal: Normal range of motion and neck supple. Cardiovascular:      Rate and Rhythm: Normal rate and regular rhythm. Pulmonary:      Effort: Pulmonary effort is normal.      Breath sounds: Normal breath sounds. Abdominal:      General: Bowel sounds are normal.      Palpations: Abdomen is soft. Musculoskeletal: Normal range of motion. Lymphadenopathy:      Cervical: No cervical adenopathy. Skin:     General: Skin is warm. Neurological:      Mental Status: He is alert. GCS: GCS eye subscore is 4. GCS verbal subscore is 5. GCS motor subscore is 6. Cranial Nerves: Cranial nerves are intact. Sensory: Sensation is intact. Motor: Motor function is intact. Coordination: Romberg sign negative. Coordination normal.         Medical Decision Making   I am the first provider for this patient. I reviewed the vital signs, available nursing notes, past medical history, past surgical history, family history and social history. Provider Notes (Medical Decision Making): Patient presents with generalized fatigue difficulty ambulating deconditioning  No acute head injury no headache no focal deficits no sign of stroke or seizure  No chest pain no exertional symptoms no sign or symptoms of MI  Patient's urine will be checked for UTI  Patient's thyroid function and magnesium will be checked as well  I will get a chest x-ray to make sure there is no obvious pneumonia although patient has no cough congestion and no sign of coronavirus  I will also get PT and rehab involved in this patient's care and case management I will call her primary care physician as well is unclear of further intervention that I can do in the emergency department I will continue to look for any source of patient's deconditioning. I will start to hydrate patient as well    7:41 AM  I have been made aware the patient is hypothermic will start Елена hugger on this patient start the sepsis protocol as well.     Vital Signs-Reviewed the patient's vital signs.    Pulse Oximetry Analysis -98%, room air, normal    Patient's EKG time 1655, 71 heart rate, first-degree AV block 3 and 34 ms, QTC of 510 ms left 9  Left bundle branch block is appreciated. Today's EKG is compared with April 2020 EKG, patient's wide-complex tachycardia is appreciated on that EKG as well patient is QTC widening is appreciated on that EKG morphology is similar no acute morphological changes that can be appreciated on today's EKG. Rhythm: 71, sinus. Vitals:    07/06/20 1330 07/06/20 1345 07/06/20 1400 07/06/20 1415   BP: (!) 84/50 100/48 93/52 105/48   Pulse: 61 61 60 62   Resp: 11 12 16 11   Temp: (!) 95.9 °F (35.5 °C) (!) 96.1 °F (35.6 °C) (!) 96.3 °F (35.7 °C) (!) 96.5 °F (35.8 °C)   SpO2: 93% 95% 95% 95%   Weight:       Height:           Records Reviewed: Nursing Notes    ED Course:   ED Course as of Jul 06 1702   Mon Jul 06, 2020   1043 Patient's thyroid function/TSH is low   TSH(!): 0.30 [AS]   1043 Patient's troponin is normalPatient's nitrate and leukocyte Estrace positivePatient is hemoglobin is 9.3 and is stable at that number. Patient's urine shows 21-35 WBCsPatient's potassium is 3.1 and low and I will replace thatPatient appears to be dehydrated as wellPatient's urine culture is pending   Troponin-I, Qt.: 0.04 [AS]   8660 x right-sided pleural effusions appreciated unclear of acute pathology  There is been some chronic finding as well patient currently being treated for sepsis I will continue the monitoring and treatment for the patient.      [AS]   7741 Reviewed the x-ray from May 2020. The pleural effusion/consolidation on the right base is not new.   Patient does not have a cough or shortness of breathPatient is generalized weakness and fatiguePatient does have a UTII will proceed by treating this patient for UTI with RocephinJoe is aware and is started the line I will continue to monitor the patient he is not hypoxic or tachypneic in the emergency department I will discussed with the hospitalist as well. [AS]   9169 Case discussed with hospitalist, Rola Sousa  He is aware of patient's anorexia weakness fatigue hypothermia and UTIHe is accepted the patient and will find a bed for the patient.    [AS]      ED Course User Index  [AS] Malini Urbina MD       For Hospitalized Patients:    1.  Hospitalization Decision Time:  The decision to hospitalize the patient was made by Dr. Elana Honeycutt This Encounter   [address] ED (REQUIRED)     Standing Status:   Standing     Number of Occurrences:   1    CULTURE, URINE     Standing Status:   Standing     Number of Occurrences:   1     Order Specific Question:   Reason for Culture     Answer:   Pelvic pain    CULTURE, BLOOD     Standing Status:   Standing     Number of Occurrences:   1    CULTURE, BLOOD     Standing Status:   Standing     Number of Occurrences:   1    XR CHEST PORT     Standing Status:   Standing     Number of Occurrences:   1     Order Specific Question:   Reason for Exam     Answer:   Fatigue, weakness, concern for pneumonia    CBC WITH AUTOMATED DIFF     Standing Status:   Standing     Number of Occurrences:   1    METABOLIC PANEL, COMPREHENSIVE     Standing Status:   Standing     Number of Occurrences:   1    URINALYSIS W/ RFLX MICROSCOPIC     Standing Status:   Standing     Number of Occurrences:   1    CARDIAC PANEL,(CK, CKMB & TROPONIN)     Standing Status:   Standing     Number of Occurrences:   1    TSH 3RD GENERATION     Standing Status:   Standing     Number of Occurrences:   1    T4, FREE     Standing Status:   Standing     Number of Occurrences:   1    LACTIC ACID, PLASMA     Standing Status:   Standing     Number of Occurrences:   1    LACTIC ACID     2 hours after initial If initial Lactate is > 1.7     Standing Status:   Standing     Number of Occurrences:   1    URINE MICROSCOPIC ONLY     Standing Status:   Standing     Number of Occurrences:   1    DIET CARDIAC Regular     Standing Status:   Standing     Number of Occurrences:   1     Order Specific Question:   Texture: Answer:   Regular    CARDIAC MONITORING     Standing Status:   Standing     Number of Occurrences:   1     Order Specific Question:   Type: Answer:   Bedside    VITAL SIGNS - PER UNIT ROUTINE     PER UNIT ROUTINE     Standing Status:   Standing     Number of Occurrences:   1    STRICT I & O     Standing Status:   Standing     Number of Occurrences:   1    NEUROLOGIC STATUS ASSESSMENT - PER UNIT ROUTINE     PER UNIT ROUTINE     Standing Status:   Standing     Number of Occurrences:   1    GLUCOSE, POC     Standing Status:   Standing     Number of Occurrences:   1    EKG, 12 LEAD, INITIAL     Standing Status:   Standing     Number of Occurrences:   1     Order Specific Question:   Reason for Exam:     Answer:   Fatigue, weak, concern for silent MI    SALINE LOCK IV ONE TIME STAT     Standing Status:   Standing     Number of Occurrences:   1    DEBBIE HUGGER     Standing Status:   Standing     Number of Occurrences:   1    sodium chloride (NS) flush 5-10 mL    FOLLOWED BY Linked Order Group     sodium chloride 0.9 % bolus infusion 1,000 mL      This panel was generated from single order of a weight-based dose of 30 mL/kg, Target volume of 1905 mL (30 mL/kg × 63.5 kg (Weight as of Mon Jul 6, 2020 0707) = 1,905 mL), Rate of 999 mL/hr or as specified by the physician.  sodium chloride 0.9 % bolus infusion 905 mL      This panel was generated from single order of a weight-based dose of 30 mL/kg, Target volume of 1905 mL (30 mL/kg × 63.5 kg (Weight as of Mon Jul 6, 2020 0707) = 1,905 mL), Rate of 999 mL/hr or as specified by the physician.      cefTRIAXone (ROCEPHIN) 1 g in 0.9% sodium chloride (MBP/ADV) 50 mL MBP     Order Specific Question:   Antibiotic Indications     Answer:   Urinary Tract Infection     Order Specific Question:   UTI duration of therapy     Answer: Other    potassium chloride (K-DUR, KLOR-CON) SR tablet 40 mEq    magnesium sulfate 2 g/50 ml IVPB (premix or compounded)    vancomycin (VANCOCIN) 1500 mg in  ml infusion     Order Specific Question:   Antibiotic Indications     Answer:   Sepsis of Unknown Etiology    IP CONSULT TO HOSPITALIST     Standing Status:   Standing     Number of Occurrences:   1     Order Specific Question:   Reason for Consult: Answer:   TO ADMIT     Comments:   Hypothermia, UTI, right pleural effusion. Order Specific Question:   Did you call or speak to the consulting provider? Answer:   No     Order Specific Question:   Consult To     Answer:   Admit and manage     Order Specific Question:   Schedule When? Answer:   TODAY    INITIAL PHYSICIAN ORDER: INPATIENT Stepdown; 9. Other (further clarification in H&P documentation)     Standing Status:   Standing     Number of Occurrences:   1     Order Specific Question:   Status: Answer:   INPATIENT [101]     Order Specific Question:   Type of Bed     Answer:   Stepdown [17]     Order Specific Question:   Inpatient Hospitalization Certified Necessary for the Following Reasons     Answer:   9.  Other (further clarification in H&P documentation)     Order Specific Question:   Admitting Diagnosis     Answer:   Sepsis [facility]) [[id number]]     Order Specific Question:   Admitting Physician     Answer:   Juarez Booker [[id number]]     Order Specific Question:   Attending Physician     Answer:   Juarez Booker [[id number]]     Order Specific Question:   Estimated Length of Stay     Answer:   3-4 Midnights     Order Specific Question:   Discharge Plan:     Answer:   Mauricio 64 -     Recent Results (from the past 12 hour(s))   CBC WITH AUTOMATED DIFF    Collection Time: 07/06/20  9:07 AM   Result Value Ref Range    WBC 7.6 4.6 - 13.2 K/uL    RBC 3.03 (L) 4.70 - 5.50 M/uL    HGB 9.3 (L) 13.0 - 16.0 g/dL    HCT 27.9 (L) 36.0 - 48.0 %    MCV 92.1 74.0 - 97.0 FL    MCH 30.7 24.0 - 34.0 PG    MCHC 33.3 31.0 - 37.0 g/dL    RDW 16.9 (H) 11.6 - 14.5 %    PLATELET 754 967 - 024 K/uL    MPV 11.1 9.2 - 11.8 FL    NEUTROPHILS 82 (H) 40 - 73 %    LYMPHOCYTES 10 (L) 21 - 52 %    MONOCYTES 5 3 - 10 %    EOSINOPHILS 3 0 - 5 %    BASOPHILS 0 0 - 2 %    ABS. NEUTROPHILS 6.3 1.8 - 8.0 K/UL    ABS. LYMPHOCYTES 0.8 (L) 0.9 - 3.6 K/UL    ABS. MONOCYTES 0.3 0.05 - 1.2 K/UL    ABS. EOSINOPHILS 0.2 0.0 - 0.4 K/UL    ABS. BASOPHILS 0.0 0.0 - 0.1 K/UL    DF AUTOMATED     METABOLIC PANEL, COMPREHENSIVE    Collection Time: 07/06/20  9:07 AM   Result Value Ref Range    Sodium 147 (H) 136 - 145 mmol/L    Potassium 3.1 (L) 3.5 - 5.5 mmol/L    Chloride 113 (H) 100 - 111 mmol/L    CO2 29 21 - 32 mmol/L    Anion gap 5 3.0 - 18 mmol/L    Glucose 55 (L) 74 - 99 mg/dL    BUN 40 (H) 7.0 - 18 MG/DL    Creatinine 1.20 0.6 - 1.3 MG/DL    BUN/Creatinine ratio 33 (H) 12 - 20      GFR est AA >60 >60 ml/min/1.73m2    GFR est non-AA 58 (L) >60 ml/min/1.73m2    Calcium 8.9 8.5 - 10.1 MG/DL    Bilirubin, total 0.4 0.2 - 1.0 MG/DL    ALT (SGPT) 19 16 - 61 U/L    AST (SGOT) 28 10 - 38 U/L    Alk.  phosphatase 139 (H) 45 - 117 U/L    Protein, total 5.9 (L) 6.4 - 8.2 g/dL    Albumin 2.3 (L) 3.4 - 5.0 g/dL    Globulin 3.6 2.0 - 4.0 g/dL    A-G Ratio 0.6 (L) 0.8 - 1.7     URINALYSIS W/ RFLX MICROSCOPIC    Collection Time: 07/06/20  9:07 AM   Result Value Ref Range    Color YELLOW      Appearance CLEAR      Specific gravity 1.017 1.005 - 1.030      pH (UA) 5.0 5.0 - 8.0      Protein Negative NEG mg/dL    Glucose Negative NEG mg/dL    Ketone Negative NEG mg/dL    Bilirubin Negative NEG      Blood Negative NEG      Urobilinogen 0.2 0.2 - 1.0 EU/dL    Nitrites Positive (A) NEG      Leukocyte Esterase MODERATE (A) NEG     CARDIAC PANEL,(CK, CKMB & TROPONIN)    Collection Time: 07/06/20  9:07 AM   Result Value Ref Range    CK - MB 9.4 (H) <3.6 ng/ml    CK-MB Index 15.4 (H) 0.0 - 4.0 %    CK 61 39 - 308 U/L Troponin-I, QT 0.04 0.0 - 0.045 NG/ML   TSH 3RD GENERATION    Collection Time: 07/06/20  9:07 AM   Result Value Ref Range    TSH 0.30 (L) 0.36 - 3.74 uIU/mL   T4, FREE    Collection Time: 07/06/20  9:07 AM   Result Value Ref Range    T4, Free 1.6 (H) 0.7 - 1.5 NG/DL   LACTIC ACID    Collection Time: 07/06/20  9:07 AM   Result Value Ref Range    Lactic acid 0.8 0.4 - 2.0 MMOL/L   URINE MICROSCOPIC ONLY    Collection Time: 07/06/20  9:07 AM   Result Value Ref Range    WBC 21 to 35 0 - 4 /hpf    RBC 0 to 2 0 - 5 /hpf    Epithelial cells FEW 0 - 5 /lpf    Bacteria 3+ (A) NEG /hpf   GLUCOSE, POC    Collection Time: 07/06/20 12:55 PM   Result Value Ref Range    Glucose (POC) 71 70 - 110 mg/dL   EKG, 12 LEAD, INITIAL    Collection Time: 07/06/20  4:55 PM   Result Value Ref Range    Ventricular Rate 71 BPM    Atrial Rate 71 BPM    P-R Interval 334 ms    QRS Duration 174 ms    Q-T Interval 470 ms    QTC Calculation (Bezet) 510 ms    Calculated P Axis 52 degrees    Calculated R Axis 1 degrees    Calculated T Axis 156 degrees    Diagnosis       Sinus rhythm with 1st degree AV block with occasional ventricular-paced   complexes and with occasional premature ventricular complexes  Left bundle branch block  Abnormal ECG  When compared with ECG of 22-APR-2020 12:27,  Electronic ventricular pacemaker has replaced Sinus rhythm         Radiologic Studies -   XR CHEST PORT   Final Result   IMPRESSION:  Persistent or recurrent right basilar consolidation, atelectasis or   pneumonia. There may be trace right pleural effusion. CT Results  (Last 48 hours)    None        CXR Results  (Last 48 hours)               07/06/20 0750  XR CHEST PORT Final result    Impression:  IMPRESSION:  Persistent or recurrent right basilar consolidation, atelectasis or   pneumonia. There may be trace right pleural effusion. Narrative:  EXAM:  PORTABLE CHEST       INDICATION:  Fatigue and weakness.        TECHNIQUE:  Portable, erect AP view. COMPARISON:  05/06/2020       ____________________       FINDINGS:         SUPPORT DEVICES: None. HEART AND MEDIASTINUM: Stable position of left subclavian single lead pacer. Cardiac silhouette is within normal limits for technique. Normal caliber   thoracic aorta. LUNGS AND PLEURAL SPACES: Lungs are mildly hypoinflated. Persistent or recurrent   opacity at the right lung base, progressed more laterally. No pulmonary edema or   pneumothorax. There may be trace right pleural effusion. Resolving left pleural   effusion. BONY THORAX AND SOFT TISSUES: No acute osseous abnormality. ____________________                     Discharge     Clinical Impression:   1. Hypothermia, initial encounter    2. Urinary tract infection without hematuria, site unspecified    3. Hypokalemia    4. Dehydration    5.  Anorexia        Disposition:  Admit    It should be noted that I will be the provider of record for this patient  Georgina Nicholas MD      Follow-up Information    None         Current Discharge Medication List

## 2020-07-06 NOTE — PROGRESS NOTES
1900: TRANSFER - IN REPORT:    Verbal report received from [facility] RN(name) on Merrick Sharma  being received from ER(unit) for routine progression of care      Report consisted of patients Situation, Background, Assessment and   Recommendations(SBAR). Information from the following report(s) SBAR, Kardex, Intake/Output, MAR, Accordion and Quality Measures was reviewed with the receiving nurse. Opportunity for questions and clarification was provided. Assessment completed upon patients arrival to unit and care assumed.

## 2020-07-06 NOTE — ED NOTES
TRANSFER - ED to INPATIENT REPORT:    SBAR report made available to receiving floor on this patient being transferred to [facility] ICU [id number])  for routine progression of care       Admitting diagnosis Sepsis ([address] [A41.9]    Information from the following report(s) SBAR, ED Summary, MAR and Recent Results was made available to receiving floor. Lines:   Peripheral IV 07/06/20 Right Forearm (Active)        Opportunity for questions and clarification was provided.       Patient is oriented to time, place, person and situation   Patient has temp mojica and non-ambulatory     Valuables transported with patient     Patient transported with:   Monitor  Registered Nurse    MAP (Monitor): (!) 62 =Monitored (most recent)  Vitals w/ MEWS Score (last day)     Date/Time MEWS Score Pulse Resp Temp BP Level of Consciousness SpO2    07/06/20 1415    62  11  (!) 96.5 °F (35.8 °C)  105/48    95 %    07/06/20 1400    60  16  (!) 96.3 °F (35.7 °C)  93/52    95 %    07/06/20 1345    61  12  (!) 96.1 °F (35.6 °C)  100/48    95 %    07/06/20 1330    61  11  (!) 95.9 °F (35.5 °C)  (!) 84/50    93 %    07/06/20 1315    62  12  (!) 95.7 °F (35.4 °C)  (!) 88/46    95 %    07/06/20 1300    60  16  (!) 95.5 °F (35.3 °C)  (!) 84/53    97 %    07/06/20 1245    60  11  (!) 95.3 °F (35.2 °C)  (!) 82/52    96 %    07/06/20 1230    61  12  (!) 95 °F (35 °C)  (!) 79/45    94 %    07/06/20 1215    60  11  (!) 94.8 °F (34.9 °C)  (!) 85/54    95 %    07/06/20 1200    60  15  (!) 94.6 °F (34.8 °C)  (!) 79/52    96 %    07/06/20 1145    63  17  (!) 94.4 °F (34.7 °C)  115/56    97 %    07/06/20 1130    61  12  (!) 94.2 °F (34.6 °C)  102/52    97 %    07/06/20 0930    61  13  (!) 93.4 °F (34.1 °C)      92 %    07/06/20 0915    61  11  (!) 93.3 °F (34.1 °C)      97 %    07/06/20 0900    62  10  (!) 93.1 °F (33.9 °C)      98 %    07/06/20 0845    61  14  (!) 92.9 °F (33.8 °C)      99 %    07/06/20 0830    63  10    99/54   93 %    07/06/20 0815    60  15    109/54    97 %    07/06/20 0800    61  10    90/60    97 %    07/06/20 0745    65  19    132/45    98 %    07/06/20 0730    62  19    128/61    98 %    07/06/20 0715    63  11    110/71    98 %    07/06/20 0707  3  62  16  (!) 92.4 °F (33.6 °C)  115/49  Alert  98 %    07/06/20 0700    63  15    122/63    98 %

## 2020-07-07 LAB
ALBUMIN SERPL-MCNC: 2.3 G/DL (ref 3.4–5)
ALBUMIN/GLOB SERPL: 0.6 {RATIO} (ref 0.8–1.7)
ALP SERPL-CCNC: 188 U/L (ref 45–117)
ALT SERPL-CCNC: 21 U/L (ref 16–61)
ANION GAP SERPL CALC-SCNC: 6 MMOL/L (ref 3–18)
APTT PPP: 47.3 SEC (ref 23–36.4)
AST SERPL-CCNC: 39 U/L (ref 10–38)
BILIRUB SERPL-MCNC: 0.3 MG/DL (ref 0.2–1)
BUN SERPL-MCNC: 33 MG/DL (ref 7–18)
BUN/CREAT SERPL: 27 (ref 12–20)
CALCIUM SERPL-MCNC: 8.3 MG/DL (ref 8.5–10.1)
CHLORIDE SERPL-SCNC: 118 MMOL/L (ref 100–111)
CHOLEST SERPL-MCNC: 89 MG/DL
CO2 SERPL-SCNC: 24 MMOL/L (ref 21–32)
CREAT SERPL-MCNC: 1.23 MG/DL (ref 0.6–1.3)
ERYTHROCYTE [DISTWIDTH] IN BLOOD BY AUTOMATED COUNT: 17.1 % (ref 11.6–14.5)
GLOBULIN SER CALC-MCNC: 3.6 G/DL (ref 2–4)
GLUCOSE BLD STRIP.AUTO-MCNC: 143 MG/DL (ref 70–110)
GLUCOSE SERPL-MCNC: 62 MG/DL (ref 74–99)
HCT VFR BLD AUTO: 29.8 % (ref 36–48)
HDLC SERPL-MCNC: 39 MG/DL (ref 40–60)
HDLC SERPL: 2.3 {RATIO} (ref 0–5)
HGB BLD-MCNC: 9.9 G/DL (ref 13–16)
LDLC SERPL CALC-MCNC: 31.6 MG/DL (ref 0–100)
LIPID PROFILE,FLP: ABNORMAL
MCH RBC QN AUTO: 30.7 PG (ref 24–34)
MCHC RBC AUTO-ENTMCNC: 33.2 G/DL (ref 31–37)
MCV RBC AUTO: 92.5 FL (ref 74–97)
PLATELET # BLD AUTO: 226 K/UL (ref 135–420)
PMV BLD AUTO: 12.3 FL (ref 9.2–11.8)
POTASSIUM SERPL-SCNC: 3.7 MMOL/L (ref 3.5–5.5)
PROT SERPL-MCNC: 5.9 G/DL (ref 6.4–8.2)
RBC # BLD AUTO: 3.22 M/UL (ref 4.7–5.5)
SODIUM SERPL-SCNC: 148 MMOL/L (ref 136–145)
T4 FREE SERPL-MCNC: 1.6 NG/DL (ref 0.7–1.5)
TRIGL SERPL-MCNC: 92 MG/DL (ref ?–150)
VLDLC SERPL CALC-MCNC: 18.4 MG/DL
WBC # BLD AUTO: 9.8 K/UL (ref 4.6–13.2)

## 2020-07-07 PROCEDURE — 74011250637 HC RX REV CODE- 250/637: Performed by: HOSPITALIST

## 2020-07-07 PROCEDURE — 65610000006 HC RM INTENSIVE CARE

## 2020-07-07 PROCEDURE — 77010033678 HC OXYGEN DAILY

## 2020-07-07 PROCEDURE — 85730 THROMBOPLASTIN TIME PARTIAL: CPT

## 2020-07-07 PROCEDURE — 80061 LIPID PANEL: CPT

## 2020-07-07 PROCEDURE — 74011250636 HC RX REV CODE- 250/636: Performed by: HOSPITALIST

## 2020-07-07 PROCEDURE — 74011000258 HC RX REV CODE- 258: Performed by: HOSPITALIST

## 2020-07-07 PROCEDURE — 80053 COMPREHEN METABOLIC PANEL: CPT

## 2020-07-07 PROCEDURE — 85027 COMPLETE CBC AUTOMATED: CPT

## 2020-07-07 PROCEDURE — 84439 ASSAY OF FREE THYROXINE: CPT

## 2020-07-07 PROCEDURE — 82962 GLUCOSE BLOOD TEST: CPT

## 2020-07-07 RX ADMIN — SODIUM CHLORIDE 50 ML/HR: 900 INJECTION, SOLUTION INTRAVENOUS at 14:50

## 2020-07-07 RX ADMIN — SODIUM CHLORIDE 750 MG: 900 INJECTION, SOLUTION INTRAVENOUS at 14:49

## 2020-07-07 RX ADMIN — HEPARIN SODIUM 5000 UNITS: 5000 INJECTION INTRAVENOUS; SUBCUTANEOUS at 12:32

## 2020-07-07 RX ADMIN — CEFTRIAXONE 2 G: 2 INJECTION, POWDER, FOR SOLUTION INTRAMUSCULAR; INTRAVENOUS at 18:25

## 2020-07-07 RX ADMIN — TAMSULOSIN HYDROCHLORIDE 0.4 MG: 0.4 CAPSULE ORAL at 17:25

## 2020-07-07 RX ADMIN — ATORVASTATIN CALCIUM 20 MG: 20 TABLET, FILM COATED ORAL at 21:10

## 2020-07-07 RX ADMIN — ALLOPURINOL 300 MG: 100 TABLET ORAL at 09:13

## 2020-07-07 RX ADMIN — LEVOTHYROXINE SODIUM 50 MCG: 0.03 TABLET ORAL at 05:30

## 2020-07-07 RX ADMIN — HEPARIN SODIUM 5000 UNITS: 5000 INJECTION INTRAVENOUS; SUBCUTANEOUS at 18:25

## 2020-07-07 RX ADMIN — ASPIRIN 81 MG: 81 TABLET, FILM COATED ORAL at 09:13

## 2020-07-07 RX ADMIN — HEPARIN SODIUM 5000 UNITS: 5000 INJECTION INTRAVENOUS; SUBCUTANEOUS at 03:59

## 2020-07-07 NOTE — PROGRESS NOTES
Spoke with pt's son Aamir Green & updated him of pt's condition & current Rx Plan. Also asked about Advanced directives & code status - he mentioned that the pt does not want to be resuscitated & if in the event his heart stopped or he stopped breathing - he does not want his chest crushed or a breathing tube - will proceed with DNR.

## 2020-07-07 NOTE — PROGRESS NOTES
Problem: Falls - Risk of  Goal: *Absence of Falls  Description: Document [place] Fall Risk and appropriate interventions in the flowsheet. Outcome: Progressing Towards Goal  Note: Fall Risk Interventions:  Mobility Interventions: Bed/chair exit alarm              Elimination Interventions: Bed/chair exit alarm, Call light in reach, Patient to call for help with toileting needs, Toileting schedule/hourly rounds    History of Falls Interventions: Bed/chair exit alarm, Room close to nurse's station         Problem: Patient Education: Go to Patient Education Activity  Goal: Patient/Family Education  Outcome: Progressing Towards Goal     Problem: Discharge Planning  Goal: *Discharge to safe environment  Outcome: Progressing Towards Goal     Problem: Pressure Injury - Risk of  Goal: *Prevention of pressure injury  Description: Document Alex Scale and appropriate interventions in the flowsheet. Outcome: Progressing Towards Goal  Note: Pressure Injury Interventions:  Sensory Interventions: Assess changes in LOC, Check visual cues for pain, Turn and reposition approx. every two hours (pillows and wedges if needed), Minimize linen layers, Keep linens dry and wrinkle-free    Moisture Interventions: Absorbent underpads, Check for incontinence Q2 hours and as needed, Maintain skin hydration (lotion/cream), Minimize layers    Activity Interventions: Increase time out of bed    Mobility Interventions: HOB 30 degrees or less, Turn and reposition approx.  every two hours(pillow and wedges), Pressure redistribution bed/mattress (bed type)    Nutrition Interventions: Document food/fluid/supplement intake    Friction and Shear Interventions: Foam dressings/transparent film/skin sealants, HOB 30 degrees or less, Minimize layers                Problem: Patient Education: Go to Patient Education Activity  Goal: Patient/Family Education  Outcome: Progressing Towards Goal     Problem: Pain  Goal: *Control of Pain  Outcome: Progressing Towards Goal  Goal: *PALLIATIVE CARE:  Alleviation of Pain  Outcome: Progressing Towards Goal     Problem:  Body Temperature -  Risk of, Imbalanced  Goal: *Absence of heat stress or hyperthermia signs and symptoms  Outcome: Progressing Towards Goal  Goal: *Absence of cold stress or hypothermia signs and symptoms  Outcome: Progressing Towards Goal     Problem: General Medical Care Plan  Goal: *Vital signs within specified parameters  Outcome: Progressing Towards Goal  Goal: *Labs within defined limits  Outcome: Progressing Towards Goal  Goal: *Absence of infection signs and symptoms  Outcome: Progressing Towards Goal  Goal: *Optimal pain control at patient's stated goal  Outcome: Progressing Towards Goal  Goal: *Skin integrity maintained  Outcome: Progressing Towards Goal  Goal: *Fluid volume balance  Outcome: Progressing Towards Goal  Goal: *Optimize nutritional status  Outcome: Progressing Towards Goal  Goal: *Anxiety reduced or absent  Outcome: Progressing Towards Goal  Goal: *Progressive mobility and function (eg: ADL's)  Outcome: Progressing Towards Goal

## 2020-07-07 NOTE — PROGRESS NOTES
Problem: Falls - Risk of  Goal: *Absence of Falls  Description: Document Halie Dear Fall Risk and appropriate interventions in the flowsheet. Outcome: Progressing Towards Goal  Note: Fall Risk Interventions: bed is locked and in lowest position, side rails up, call bell inreach, gripper socks on  Mobility Interventions: Bed/chair exit alarm, Patient to call before getting OOB, Strengthening exercises (ROM-active/passive)              Elimination Interventions: Bed/chair exit alarm, Call light in reach    History of Falls Interventions: Bed/chair exit alarm, Door open when patient unattended         Problem: Patient Education: Go to Patient Education Activity  Goal: Patient/Family Education  Outcome: Progressing Towards Goal     Problem: Discharge Planning  Goal: *Discharge to safe environment  Outcome: Progressing Towards Goal     Problem: Pressure Injury - Risk of  Goal: *Prevention of pressure injury  Description: Document Alex Scale and appropriate interventions in the flowsheet. Outcome: Progressing Towards Goal  Note: Pressure Injury Interventions: frequent repositioning, adequate hydration, use of pillows and wedges, use of mepilex foam and barier cream  Sensory Interventions: Assess changes in LOC, Float heels, Minimize linen layers, Turn and reposition approx. every two hours (pillows and wedges if needed)    Moisture Interventions: Absorbent underpads, Apply protective barrier, creams and emollients, Internal/External urinary devices, Minimize layers    Activity Interventions: Assess need for specialty bed, Pressure redistribution bed/mattress(bed type)    Mobility Interventions: Assess need for specialty bed, Pressure redistribution bed/mattress (bed type), Turn and reposition approx.  every two hours(pillow and wedges)    Nutrition Interventions: Document food/fluid/supplement intake, Discuss nutritional consult with provider    Friction and Shear Interventions: HOB 30 degrees or less, Lift sheet, Transferring/repositioning devices                Problem: Pressure Injury - Risk of  Goal: *Prevention of pressure injury  Description: Document Alex Scale and appropriate interventions in the flowsheet. Outcome: Progressing Towards Goal  Note: Pressure Injury Interventions: frequent repositioning, adequate hydration, use of pillows and wedges, use of mepilex foam and barier cream  Sensory Interventions: Assess changes in LOC, Float heels, Minimize linen layers, Turn and reposition approx. every two hours (pillows and wedges if needed)    Moisture Interventions: Absorbent underpads, Apply protective barrier, creams and emollients, Internal/External urinary devices, Minimize layers    Activity Interventions: Assess need for specialty bed, Pressure redistribution bed/mattress(bed type)    Mobility Interventions: Assess need for specialty bed, Pressure redistribution bed/mattress (bed type), Turn and reposition approx.  every two hours(pillow and wedges)    Nutrition Interventions: Document food/fluid/supplement intake, Discuss nutritional consult with provider    Friction and Shear Interventions: HOB 30 degrees or less, Lift sheet, Transferring/repositioning devices                Problem: Patient Education: Go to Patient Education Activity  Goal: Patient/Family Education  Outcome: Progressing Towards Goal

## 2020-07-07 NOTE — PROGRESS NOTES
INTERIM UPDATE - 2317 EST on 7/06/2020    Nursing Staff calls to report that Patient's Blood Pressures have been 106/45 mm Hg (MAP ~60). Patient has received 2 L IV fluids resuscitation and weighs 67.3 kg. Patient has diagnosis of Sepsis. Notably, Patient has Cardiomyopathy and LVEF 35-40%. No current Oxygen requirement noted on Vitals. Plan:  Bolus IV fluids  mL. Transfer to ICU level of care. Standby to initiate IV Norepinephrine gtt for this Patient with a Target MAP >65. Ordered [employer] with parameters to apply if Temperature is <= 95.5°F and to HOLD/REMOVE if Temperature is >= 98.0°F.

## 2020-07-07 NOTE — PROGRESS NOTES
Problem: Falls - Risk of  Goal: *Absence of Falls  Description: Document Danni Mejia Fall Risk and appropriate interventions in the flowsheet. Outcome: Progressing Towards Goal  Note: Fall Risk Interventions:  Mobility Interventions: Bed/chair exit alarm, Patient to call before getting OOB, Strengthening exercises (ROM-active/passive)              Elimination Interventions: Bed/chair exit alarm, Call light in reach    History of Falls Interventions: Bed/chair exit alarm, Door open when patient unattended         Problem: Patient Education: Go to Patient Education Activity  Goal: Patient/Family Education  Outcome: Progressing Towards Goal     Problem: Discharge Planning  Goal: *Discharge to safe environment  Outcome: Progressing Towards Goal     Problem: Pressure Injury - Risk of  Goal: *Prevention of pressure injury  Description: Document Alex Scale and appropriate interventions in the flowsheet. Outcome: Progressing Towards Goal  Note: Pressure Injury Interventions:  Sensory Interventions: Assess changes in LOC, Float heels, Minimize linen layers, Turn and reposition approx. every two hours (pillows and wedges if needed)    Moisture Interventions: Absorbent underpads, Apply protective barrier, creams and emollients, Internal/External urinary devices, Minimize layers    Activity Interventions: Assess need for specialty bed, Pressure redistribution bed/mattress(bed type)    Mobility Interventions: Assess need for specialty bed, Pressure redistribution bed/mattress (bed type), Turn and reposition approx.  every two hours(pillow and wedges)    Nutrition Interventions: Document food/fluid/supplement intake, Discuss nutritional consult with provider    Friction and Shear Interventions: HOB 30 degrees or less, Lift sheet, Transferring/repositioning devices                Problem: Patient Education: Go to Patient Education Activity  Goal: Patient/Family Education  Outcome: Progressing Towards Goal     Problem: Pain  Goal: *Control of Pain  Outcome: Progressing Towards Goal  Goal: *PALLIATIVE CARE:  Alleviation of Pain  Outcome: Progressing Towards Goal     Problem:  Body Temperature -  Risk of, Imbalanced  Goal: *Absence of heat stress or hyperthermia signs and symptoms  Outcome: Progressing Towards Goal  Goal: *Absence of cold stress or hypothermia signs and symptoms  Outcome: Progressing Towards Goal

## 2020-07-07 NOTE — CDMP QUERY
Pt admitted with Sepsis and UTI. Pt noted to have hypotension requiring IV fluid boluses and pressor support. If possible, please document in the progress notes and discharge summary if you are evaluating and/or treating any of the following: 
 
 
=>   Sepsis  with septic shock  
=>  Sepsis with hypovolemic shock 
=>  Sepsis with neurogenic shock 
=>  other explanation of clinical findings, please specify  
=>  clinically unable to determine The medical record reflects the following: 
   Risk Factors: 81 yo male admitted with sepsis and UTI Clinical Indicators: BP low in ED 70/43  MAP 49  to 65/41 MAP 47 Per Vital Sign log Treatment: IV Normal Saline boluses, . Norepinephrine drip, ICU care Thank you for your time, 
Santiago Brown RN [facility] [phone]

## 2020-07-07 NOTE — PROGRESS NOTES
attempted to conduct an initial consultation and Spiritual Assessment for Daniel De Guzman, who is a 80 y. o.,male. Patients Primary Language is: [place].    According to the patients EMR Quaker Affiliation is: [facility].     The reason the Patient came to the hospital is:   Patient Active Problem List    Diagnosis Date Noted    Sepsis ([address] 07/06/2020    Pacemaker 05/05/2020    Gastrointestinal bleeding 04/23/2020    Hypoglycemia 04/21/2020    HTN (hypertension) 04/21/2020    Left bundle branch block 04/21/2020    Sinoatrial node dysfunction ([address] 04/21/2020    Dysphagia 04/08/2020    Chronic combined systolic and diastolic congestive heart failure ([address] 04/07/2020    Other chest pain 04/07/2020    Right lower lobe pneumonia 04/06/2020    Cardiomyopathy ([address] 02/20/2020    Grade II diastolic dysfunction 49/68/4947    Hypothyroidism 02/20/2020    UTI (urinary tract infection) 02/19/2020    Acute renal failure (ARF) ([address] 02/19/2020    Bradycardia 02/19/2020    Hypotension 02/19/2020    Sustained ventricular tachycardia ([address] 12/28/2019    Duodenitis 12/24/2019    Abdominal pain 12/23/2019    Hypothermia 12/23/2019    Transaminitis 12/23/2019    Chronic renal failure, stage 3 (moderate) ([address] 12/23/2019    SIRS (systemic inflammatory response syndrome) ([address] 12/23/2019    Gastritis 12/23/2019    Syncope and collapse 01/30/2018    Head injury 01/30/2018    Forehead contusion 01/30/2018    Wrist abrasion, infected, left, initial encounter 01/30/2018    Wrist sprain, left, initial encounter 01/30/2018    Acute renal failure ([address] 02/06/2014    Elevated PSA 02/06/2014    Urinary retention 02/06/2014    Guaiac + stool 02/06/2014    Neoplastic disease 01/02/2013        The  provided the following Interventions:   attempted to Initiate a relationship of care and support with patient in room 2601 and found the patient resting peacefully at this time and therefore unable to communicate now. Provided information about Spiritual Care Services. Offered prayer and assurance of continued prayers on patients behalf. .    Assessment:  Patient does not have any Rastafarian/cultural needs that will affect patients preferences in health care. There are no further spiritual or Rastafarian issues which require Spiritual Care Services interventions at this time. Plan:  Chaplains will continue to follow and will provide pastoral care on an as needed/requested basis    . Manish Elias   Spiritual Care   [phone]

## 2020-07-07 NOTE — PROGRESS NOTES
1900: Bedside and Verbal shift change report given to Ying December  (oncoming nurse) by Minetta Riedel (offgoing nurse). Report included the following information SBAR, Kardex, Intake/Output, MAR, Accordion and Quality Measures. 2000: Assessment completed, pt resting in bed. Axillary temp 97.7    2200: Meds given, no changes noted    0000: Reassessment, no changes noted. Axillary temp 97.7     0200: pt resting, no s/s of distress     0400: reassessment no changes noted, axillary temp 97.7    0700: Bedside and Verbal shift change report given to [employer] (oncoming nurse) by Ying December  (offgoing nurse). Report included the following information SBAR, Kardex, Intake/Output, MAR, Accordion, Recent Results and Quality Measures.

## 2020-07-07 NOTE — PROGRESS NOTES
[facility]  Hospitalist Division    Daily progress Note    Patient: Yolanda Haney MRN: [id number]  [facility]: [id number]    YOB: 1934  Age: 80 y.o. Sex: male    DOA: 7/6/2020 LOS:  LOS: 1 day                      Assessment/Plan     Active Problems:    Sepsis ([address] (7/6/2020)      1. Sepsislikely secondary to urinary tract infection, hypothermia, IV fluids, IV antibiotics, admit to stepdown unit. 2. Patient noted to be hypotensive overnight and was started on IV Levophed, he is at a low dose and we are trying to titrate it to off   3. UTI IV antibiotics, urine culture sent, awaiting culture and sensitivity results. Has had multiple admissions to the hospital secondary to UTI. 4. History of BPHcontinue Flomax  5. Hypothermia likely from sepsis, temperature has improved, continue to monitor  6. History of goutcontinue allopurinol  7. History of hyperlipidemiacontinue Lipitor  8. History of hypothyroidismcontinue Synthroid  9. CKD 2monitor creatinine  10. History of chronic systolic congestive heart failuregentle hydration  11. History of coronary artery disease with recent permanent pacemaker placement. DVT prophylaxisHeparin  DNR         Subjective:     CC:  Lying in bed , NAD , overnight events noted - pt started on Levophed for low BP     Objective:        Visit Vitals  /57 (BP 1 Location: Left arm)   Pulse 75   Temp 98.5 °F (36.9 °C)   Resp 11   Ht 5' 7\" (1.702 m)   Wt 67.3 kg (148 lb 6.4 oz)   SpO2 96%   BMI 23.24 kg/m²           Physical Exam:  General appearance: Sleepy  no distress, appears stated age  Neck: supple, symmetrical, trachea midline, no adenopathy, thyroid: not enlarged, symmetric, no tenderness/mass/nodules, no carotid bruit and no JVD  Lungs: Decreased BS in bases   Heart: regular rate and rhythm, S1, S2 normal, no murmur, click, rub or gallop  Abdomen: soft, non-tender.  Bowel sounds normal. No masses,  no organomegaly  Pulses: 2+ and symmetric  Skin: Skin color, texture, turgor normal. No rashes or lesions        Intake and Output:  Current Shift:  No intake/output data recorded. Last three shifts:  07/05 1901 - 07/07 0700  In: 1178.5 [I.V.:1178.5]  Out: 200 [Urine:1240]    Recent Results (from the past 24 hour(s))   EKG, 12 LEAD, INITIAL    Collection Time: 07/06/20  4:55 PM   Result Value Ref Range    Ventricular Rate 71 BPM    Atrial Rate 71 BPM    P-R Interval 334 ms    QRS Duration 174 ms    Q-T Interval 470 ms    QTC Calculation (Bezet) 510 ms    Calculated P Axis 52 degrees    Calculated R Axis 1 degrees    Calculated T Axis 156 degrees    Diagnosis       Sinus rhythm with 1st degree AV block with occasional ventricular-paced   complexes and with occasional premature ventricular complexes  Left bundle branch block  Abnormal ECG  When compared with ECG of 22-APR-2020 12:27,  Electronic ventricular pacemaker has replaced Sinus rhythm     CULTURE, BLOOD    Collection Time: 07/06/20  7:00 PM   Result Value Ref Range    Special Requests: PERIPHERAL      Culture result: NO GROWTH AFTER 10 HOURS     METABOLIC PANEL, COMPREHENSIVE    Collection Time: 07/07/20  4:00 AM   Result Value Ref Range    Sodium 148 (H) 136 - 145 mmol/L    Potassium 3.7 3.5 - 5.5 mmol/L    Chloride 118 (H) 100 - 111 mmol/L    CO2 24 21 - 32 mmol/L    Anion gap 6 3.0 - 18 mmol/L    Glucose 62 (L) 74 - 99 mg/dL    BUN 33 (H) 7.0 - 18 MG/DL    Creatinine 1.23 0.6 - 1.3 MG/DL    BUN/Creatinine ratio 27 (H) 12 - 20      GFR est AA >60 >60 ml/min/1.73m2    GFR est non-AA 56 (L) >60 ml/min/1.73m2    Calcium 8.3 (L) 8.5 - 10.1 MG/DL    Bilirubin, total 0.3 0.2 - 1.0 MG/DL    ALT (SGPT) 21 16 - 61 U/L    AST (SGOT) 39 (H) 10 - 38 U/L    Alk.  phosphatase 188 (H) 45 - 117 U/L    Protein, total 5.9 (L) 6.4 - 8.2 g/dL    Albumin 2.3 (L) 3.4 - 5.0 g/dL    Globulin 3.6 2.0 - 4.0 g/dL    A-G Ratio 0.6 (L) 0.8 - 1.7     CBC W/O DIFF    Collection Time: 07/07/20  4:00 AM   Result Value Ref Range    WBC 9.8 4.6 - 13.2 K/uL    RBC 3.22 (L) 4.70 - 5.50 M/uL    HGB 9.9 (L) 13.0 - 16.0 g/dL    HCT 29.8 (L) 36.0 - 48.0 %    MCV 92.5 74.0 - 97.0 FL    MCH 30.7 24.0 - 34.0 PG    MCHC 33.2 31.0 - 37.0 g/dL    RDW 17.1 (H) 11.6 - 14.5 %    PLATELET 885 138 - 574 K/uL    MPV 12.3 (H) 9.2 - 11.8 FL   PTT    Collection Time: 07/07/20  4:00 AM   Result Value Ref Range    aPTT 47.3 (H) 23.0 - 36.4 SEC   GLUCOSE, POC    Collection Time: 07/07/20 12:45 PM   Result Value Ref Range    Glucose (POC) 143 (H) 70 - 110 mg/dL         Current Facility-Administered Medications:     sodium chloride (NS) flush 5-10 mL, 5-10 mL, IntraVENous, PRN, Heather Hubbard MD    levothyroxine (SYNTHROID) tablet 50 mcg, 50 mcg, Oral, 6am, Heather Hubbard MD, 50 mcg at 07/07/20 0530    atorvastatin (LIPITOR) tablet 20 mg, 20 mg, Oral, QHS, Heather Hubbard MD, 20 mg at 07/06/20 2100    tamsulosin (FLOMAX) capsule 0.4 mg, 0.4 mg, Oral, PCD, Heather Hubbard MD, 0.4 mg at 07/06/20 1859    aspirin delayed-release tablet 81 mg, 81 mg, Oral, DAILY, Heather Hubbard MD, 81 mg at 07/07/20 0913    allopurinoL (ZYLOPRIM) tablet 300 mg, 300 mg, Oral, DAILY, Heather Hubbard MD, 300 mg at 07/07/20 0913    acetaminophen (TYLENOL) tablet 650 mg, 650 mg, Oral, Q6H PRN, Heather Hubbard MD    ondansetron [facility]) injection 4 mg, 4 mg, IntraVENous, Q6H PRN, Heather Hubbard MD    0.9% sodium chloride infusion, 50 mL/hr, IntraVENous, CONTINUOUS, Heather Hubbard MD, Last Rate: 50 mL/hr at 07/06/20 1855, 50 mL/hr at 07/06/20 1855    heparin (porcine) injection 5,000 Units, 5,000 Units, SubCUTAneous, Q8H, Heather Hubbard MD, 5,000 Units at 07/07/20 1232    cefTRIAXone (ROCEPHIN) 2 g in 0.9% sodium chloride (MBP/ADV) 50 mL MBP, 2 g, IntraVENous, Q24H, Heather Hubbard MD, Last Rate: 100 mL/hr at 07/06/20 1905, 2 g at 07/06/20 1905    [START ON 7/8/2020] VANCOMYCIN INFORMATION NOTE, , Other, ONCE, Gladis Chandler, MD    vancomycin (VANCOCIN) 750 mg in 0.9% sodium chloride 250 mL IVPB, 750 mg, IntraVENous, Q18H, Shanna Libman, MD    VANCOMYCIN INFORMATION NOTE 1 Each, 1 Each, Other, Rx Dosing/Monitoring, Shanna Libman, MD    NOREPINephrine (LEVOPHED) 8 mg in 0.9% NS 250ml infusion, 0.5-30 mcg/min, IntraVENous, TITRATE, Cristino Perea, DO, Last Rate: 3.8 mL/hr at 07/07/20 1416, 2 mcg/min at 07/07/20 1416    Lab Results   Component Value Date/Time    Glucose 62 (L) 07/07/2020 04:00 AM    Glucose 55 (L) 07/06/2020 09:07 AM    Glucose 83 05/11/2020 12:30 AM    Glucose 80 05/10/2020 04:30 AM    Glucose 106 (H) 05/09/2020 03:50 AM                  John Fontana MD  7/7/2020, 2:35 PM

## 2020-07-08 LAB
ALBUMIN SERPL-MCNC: 1.9 G/DL (ref 3.4–5)
ALBUMIN/GLOB SERPL: 0.5 {RATIO} (ref 0.8–1.7)
ALP SERPL-CCNC: 169 U/L (ref 45–117)
ALT SERPL-CCNC: 20 U/L (ref 16–61)
ANION GAP SERPL CALC-SCNC: 9 MMOL/L (ref 3–18)
AST SERPL-CCNC: 41 U/L (ref 10–38)
ATRIAL RATE: 71 BPM
BASOPHILS # BLD: 0 K/UL (ref 0–0.1)
BASOPHILS NFR BLD: 0 % (ref 0–2)
BILIRUB SERPL-MCNC: 0.3 MG/DL (ref 0.2–1)
BUN SERPL-MCNC: 29 MG/DL (ref 7–18)
BUN/CREAT SERPL: 27 (ref 12–20)
CALCIUM SERPL-MCNC: 8.3 MG/DL (ref 8.5–10.1)
CALCULATED P AXIS, ECG09: 52 DEGREES
CALCULATED R AXIS, ECG10: 1 DEGREES
CALCULATED T AXIS, ECG11: 156 DEGREES
CHLORIDE SERPL-SCNC: 116 MMOL/L (ref 100–111)
CO2 SERPL-SCNC: 22 MMOL/L (ref 21–32)
CREAT SERPL-MCNC: 1.06 MG/DL (ref 0.6–1.3)
DATE LAST DOSE: NORMAL
DIAGNOSIS, 93000: NORMAL
DIFFERENTIAL METHOD BLD: ABNORMAL
EOSINOPHIL # BLD: 0.4 K/UL (ref 0–0.4)
EOSINOPHIL NFR BLD: 7 % (ref 0–5)
ERYTHROCYTE [DISTWIDTH] IN BLOOD BY AUTOMATED COUNT: 17.1 % (ref 11.6–14.5)
GLOBULIN SER CALC-MCNC: 3.5 G/DL (ref 2–4)
GLUCOSE SERPL-MCNC: 59 MG/DL (ref 74–99)
HCT VFR BLD AUTO: 28.8 % (ref 36–48)
HGB BLD-MCNC: 9.5 G/DL (ref 13–16)
LYMPHOCYTES # BLD: 0.7 K/UL (ref 0.9–3.6)
LYMPHOCYTES NFR BLD: 13 % (ref 21–52)
MCH RBC QN AUTO: 30.4 PG (ref 24–34)
MCHC RBC AUTO-ENTMCNC: 33 G/DL (ref 31–37)
MCV RBC AUTO: 92.3 FL (ref 74–97)
MONOCYTES # BLD: 0.4 K/UL (ref 0.05–1.2)
MONOCYTES NFR BLD: 7 % (ref 3–10)
NEUTS SEG # BLD: 3.9 K/UL (ref 1.8–8)
NEUTS SEG NFR BLD: 73 % (ref 40–73)
P-R INTERVAL, ECG05: 334 MS
PLATELET # BLD AUTO: 188 K/UL (ref 135–420)
PMV BLD AUTO: 11.6 FL (ref 9.2–11.8)
POTASSIUM SERPL-SCNC: 3.8 MMOL/L (ref 3.5–5.5)
PROT SERPL-MCNC: 5.4 G/DL (ref 6.4–8.2)
Q-T INTERVAL, ECG07: 470 MS
QRS DURATION, ECG06: 174 MS
QTC CALCULATION (BEZET), ECG08: 510 MS
RBC # BLD AUTO: 3.12 M/UL (ref 4.7–5.5)
REPORTED DOSE,DOSE: NORMAL UNITS
REPORTED DOSE/TIME,TMG: 1500
SODIUM SERPL-SCNC: 147 MMOL/L (ref 136–145)
VANCOMYCIN TROUGH SERPL-MCNC: 19.2 UG/ML (ref 10–20)
VENTRICULAR RATE, ECG03: 71 BPM
WBC # BLD AUTO: 5.3 K/UL (ref 4.6–13.2)

## 2020-07-08 PROCEDURE — 97112 NEUROMUSCULAR REEDUCATION: CPT

## 2020-07-08 PROCEDURE — 80053 COMPREHEN METABOLIC PANEL: CPT

## 2020-07-08 PROCEDURE — 97162 PT EVAL MOD COMPLEX 30 MIN: CPT

## 2020-07-08 PROCEDURE — 77010033678 HC OXYGEN DAILY

## 2020-07-08 PROCEDURE — 97535 SELF CARE MNGMENT TRAINING: CPT

## 2020-07-08 PROCEDURE — 74011000258 HC RX REV CODE- 258: Performed by: HOSPITALIST

## 2020-07-08 PROCEDURE — 85025 COMPLETE CBC W/AUTO DIFF WBC: CPT

## 2020-07-08 PROCEDURE — 80202 ASSAY OF VANCOMYCIN: CPT

## 2020-07-08 PROCEDURE — 74011250636 HC RX REV CODE- 250/636: Performed by: HOSPITALIST

## 2020-07-08 PROCEDURE — 97166 OT EVAL MOD COMPLEX 45 MIN: CPT

## 2020-07-08 PROCEDURE — 74011250637 HC RX REV CODE- 250/637: Performed by: HOSPITALIST

## 2020-07-08 PROCEDURE — 65660000000 HC RM CCU STEPDOWN

## 2020-07-08 RX ADMIN — HEPARIN SODIUM 5000 UNITS: 5000 INJECTION INTRAVENOUS; SUBCUTANEOUS at 03:12

## 2020-07-08 RX ADMIN — SODIUM CHLORIDE 50 ML/HR: 900 INJECTION, SOLUTION INTRAVENOUS at 08:34

## 2020-07-08 RX ADMIN — LEVOTHYROXINE SODIUM 50 MCG: 0.03 TABLET ORAL at 05:36

## 2020-07-08 RX ADMIN — ASPIRIN 81 MG: 81 TABLET, FILM COATED ORAL at 08:34

## 2020-07-08 RX ADMIN — HEPARIN SODIUM 5000 UNITS: 5000 INJECTION INTRAVENOUS; SUBCUTANEOUS at 11:01

## 2020-07-08 RX ADMIN — TAMSULOSIN HYDROCHLORIDE 0.4 MG: 0.4 CAPSULE ORAL at 18:00

## 2020-07-08 RX ADMIN — ATORVASTATIN CALCIUM 20 MG: 20 TABLET, FILM COATED ORAL at 21:46

## 2020-07-08 RX ADMIN — CEFTRIAXONE 2 G: 2 INJECTION, POWDER, FOR SOLUTION INTRAMUSCULAR; INTRAVENOUS at 19:48

## 2020-07-08 RX ADMIN — HEPARIN SODIUM 5000 UNITS: 5000 INJECTION INTRAVENOUS; SUBCUTANEOUS at 19:48

## 2020-07-08 RX ADMIN — ALLOPURINOL 300 MG: 100 TABLET ORAL at 08:34

## 2020-07-08 NOTE — PROGRESS NOTES
1410 Report received on behalf of primary nurse Nafisa Oswald from [facility] in ICU using SBAR and [facility]. Pt to be transferred to 2207.

## 2020-07-08 NOTE — PROGRESS NOTES
Problem: Self Care Deficits Care Plan (Adult)  Goal: *Acute Goals and Plan of Care (Insert Text)  Description: Occupational Therapy Goals  Initiated 7/8/2020 within 7 day(s). 1.  Patient will perform grooming with modified independence. 2.  Patient will perform bathing with modified independence. 3.  Patient will perform upper body dressing and lower body dressing with modified independence using adaptive equipment. 4.  Patient will perform bedside commode transfers with modified independence using RW. 5.  Patient will perform all aspects of toileting with modified independence. 6.  Patient will participate in upper extremity therapeutic exercise/activities with modified independence for 8 minutes. 7.  Patient will utilize energy conservation techniques during functional activities with min verbal cues. Prior Level of Function: Supv with bathing/showers, Mod I w/ dressing, Mod I using RW in home, uses w/c in community     Outcome: Progressing Towards Goal   OCCUPATIONAL THERAPY EVALUATION    Patient: Louisa Galvez (9 y.o. male)  Date: 7/8/2020  Primary Diagnosis: [facility]epsis ([facility] [A41.9]        Precautions:  Fall  PLOF: see above  ASSESSMENT :  Nursing/cleared for pt to participate in OT evaluation and tx session. Based on the objective data described below, the patient presents with impaired strength, balance, functional activity tolerance and safety awareness, which is inhibiting independence for ADLs and functional mobility. Mod A supine to sit edge of bed, cga scooting. Mod A sit to stand, Min A using RW for bedside commode transfer. Patient unable to void-nursing notified. Sit to stand from bedside commode w/ mod A, stand step using RW with Min A from bedside commode to edge of bed. Max A sit to supine. Call bell within reach & pt verbalized understanding and provided return demonstration to utilize for assist e.g. functional transfers in order to prevent falls.    Patient will benefit from skilled intervention to address the above impairments. Patient's rehabilitation potential is considered to be Good  Factors which may influence rehabilitation potential include:   [x]             None noted  []             Mental ability/status  []             Medical condition  []             Home/family situation and support systems  []             Safety awareness  []             Pain tolerance/management  []             Other:      PLAN :  Recommendations and Planned Interventions:   [x]               Self Care Training                  [x]      Therapeutic Activities  [x]               Functional Mobility Training   []      Cognitive Retraining  [x]               Therapeutic Exercises           [x]      Endurance Activities  [x]               Balance Training                    []      Neuromuscular Re-Education  []               Visual/Perceptual Training     [x]      Home Safety Training  [x]               Patient Education                   [x]      Family Training/Education  []               Other (comment):    Frequency/Duration: Patient will be followed by occupational therapy 3 times a week to address goals. Discharge Recommendations: Home Health  Further Equipment Recommendations for Discharge: DME available: w/c, RW, rollator, shower chair, grab bars, bedside commode     SUBJECTIVE:   Patient stated I'm very weak.     OBJECTIVE DATA SUMMARY:     Past Medical History:   Diagnosis Date    Difficulty urinating     Elevated PSA     Hematuria, unspecified     Hypercholesterolemia     Hypertension     Incomplete bladder emptying     Urinary retention     UTI (urinary tract infection)      Past Surgical History:   Procedure Laterality Date    APPENDECTOMY      EGD  2/7/2014         HX TURP  6/13/16    HX TURP      [place] INS NEW/RPLC PRM PACEMAKER W/TRANSV ELTRD VENTR N/A 5/5/2020    Insert Ppm Single Ventricular performed by Howard Christian MD at [address] [place] [address] CATH LAB     Barriers to Learning/Limitations: None  Compensate with: visual, verbal, tactile, kinesthetic cues/model    Home Situation:   Home Situation  Home Environment: Private residence  # Steps to Enter: 0  Wheelchair Ramp: Yes  One/Two Story Residence: Two story, live on 1st floor  Living Alone: No  Support Systems: Child(darryl), Family member(s)  Patient Expects to be Discharged to[de-identified] Private residence  Current DME Used/Available at Home: Commode, bedside, Grab bars, Shower chair, Walker, rolling, Wheelchair, [employer], rollator  Tub or Shower Type: Shower  []  Right hand dominant   []  Left hand dominant    Cognitive/Behavioral Status:  Neurologic State: Alert  Orientation Level: Oriented X4  Cognition: Follows commands  Safety/Judgement: Fall prevention    Skin: bruising BUEs  Edema:  minimal noted in b/l hands/digits    Vision/Perceptual:  appears intact    Coordination: BUE  Coordination: Within functional limits(BUEs)  Fine Motor Skills-Upper: Left Intact; Right Intact    Gross Motor Skills-Upper: Left Intact; Right Intact    Balance:  Sitting: Impaired  Sitting - Static: Fair (occasional)  Sitting - Dynamic: Fair (occasional)  Standing: Impaired;Pull to stand; With support  Standing - Static: Fair  Standing - Dynamic : Fair(-)    Strength: BUE  Strength: Generally decreased, functional(BUEs)    Tone & Sensation: BUE  Tone: Normal(BUEs)    Range of Motion: BUE  AROM: Generally decreased, functional(BUEs)     Functional Mobility and Transfers for ADLs:  Bed Mobility:     Supine to Sit: Moderate assistance  Sit to Supine: Maximum assistance  Scooting: Contact guard assistance  Transfers:  Sit to Stand: Moderate assistance  Stand to Sit: Moderate assistance     Toilet Transfer : Minimum assistance(bedside commode)     ADL Assessment:   Feeding: Supervision;Setup    Oral Facial Hygiene/Grooming: Minimum assistance    Bathing: Maximum assistance    Upper Body Dressing: Moderate assistance    Lower Body Dressing: Total assistance    Toileting:  Moderate assistance     Cognitive Retraining  Safety/Judgement: Fall prevention    Pain:  Pain level pre-treatment: 0/10   Pain level post-treatment: 0/10   Pain Intervention(s): Medication (see MAR); Rest, Ice, Repositioning   Response to intervention: Nurse notified, See doc flow    Activity Tolerance:   poor  Please refer to the flowsheet for vital signs taken during this treatment. After treatment:   [] Patient left in no apparent distress sitting up in chair  [x] Patient left in no apparent distress in bed  [x] Call bell left within reach  [x] Nursing notified  [] Caregiver present  [] Bed alarm activated    COMMUNICATION/EDUCATION:   [x] Role of Occupational Therapy in the acute care setting  [x] Home safety education was provided and the patient/caregiver indicated understanding. [x] Patient/family have participated as able in goal setting and plan of care. [x] Patient/family agree to work toward stated goals and plan of care. [] Patient understands intent and goals of therapy, but is neutral about his/her participation. [] Patient is unable to participate in goal setting and plan of care. Thank you for this referral.  Julius Fletcher  Time Calculation: 10 mins    Eval Complexity: History: MEDIUM Complexity : Expanded review of history including physical, cognitive and psychosocial  history ; Examination: MEDIUM Complexity : 3-5 performance deficits relating to physical, cognitive , or psychosocial skils that result in activity limitations and / or participation restrictions; Decision Making:MEDIUM Complexity : Patient may present with comorbidities that affect occupational performnce.  Miniml to moderate modification of tasks or assistance (eg, physical or verbal ) with assesment(s) is necessary to enable patient to complete evaluation

## 2020-07-08 NOTE — PROGRESS NOTES
Pharmacy Dosing Services: Vancomycin    Indication: Sepsis of Unknown Etiology    Day of therapy: 3 / X    Other Antimicrobials (Include dose, start day & day of therapy):  Ceftriaxone  Current Antimicrobial Therapy (168h ago, onward)      Ordered     Start Stop    20  VANCOMYCIN INFORMATION NOTE  Other,   ONCE      20 0800 20  vancomycin (VANCOCIN) 750 mg in 0.9% sodium chloride 250 mL IVPB  750 mg,   IntraVENous,   EVERY 18 HOURS      20 1500 --    20  VANCOMYCIN INFORMATION NOTE 1 Each  1 Each,   Other,   RX DOSING/MONITORING      20 --    20  vancomycin (VANCOCIN) 1500 mg in  ml infusion  1,500 mg,   IntraVENous,   ONCE      20 0859    20 1846  cefTRIAXone (ROCEPHIN) 2 g in 0.9% sodium chloride (MBP/ADV) 50 mL MBP  2 g,   IntraVENous,   EVERY 24 HOURS      20 1900 20 1859              Loading dose (date given): 1500 mg  Current Maintenance dose: 750 mg IV every 18 hours    Goal Vancomycin Level: Vancomycin Trough: 15 - 20 mcg/mL (most infections)    Vancomycin Level (if drawn):   Recent Labs     20  0833   VANCT 19.2         Pt Weight Weight: 67.4 kg (148 lb 9.6 oz)   Temperature Temp: 97.6 °F (36.4 °C)   Temp (72hrs), Av.1 °F (36.2 °C), Min:92.4 °F (33.6 °C), Max:99.3 °F (37.4 °C)     HR Pulse (Heart Rate): 61   BP BP: 108/64     Recent Labs     20  0833 20  0400 20  0907   CREA 1.06 1.23 1.20   BUN 29* 33* 40*   WBC 5.3 9.8 7.6     Estimated Creatinine Clearance Estimated Creatinine Clearance: 47.6 mL/min (based on SCr of 1.06 mg/dL).   Estimated Creatinine Clearance (using IBW): 47.6 mL/min      CAPD, Hemodialysis or Renal Replacement Therapy: N/A  Renal function stable? (unstable defined as SCr increase of 0.5 mg/dL or > 50% increase from baseline, whichever is greater) (Y/N): Y     Significant Cultures:   Results       Procedure Component Value Units Date/Time    CULTURE, BLOOD [[id number]] Collected:  07/06/20 1900    Order Status:  Completed Specimen:  Blood Updated:  07/08/20 0528     Special Requests: PERIPHERAL        Culture result: NO GROWTH 2 DAYS       CULTURE, URINE [[id number]]  (Abnormal) Collected:  07/06/20 0907    Order Status:  Completed Specimen:  Cath Urine Updated:  07/08/20 1102     Special Requests: NO SPECIAL REQUESTS        [place] Count --        >100,000  COLONIES/mL       Culture result:       LACTOSE FERMENTING GRAM NEGATIVE RODS          CULTURE, BLOOD [[id number]] Collected:  07/06/20 0907    Order Status:  Completed Specimen:  Blood Updated:  07/08/20 0528     Special Requests: --        NO SPECIAL REQUESTS  RIGHT       Culture result: NO GROWTH 2 DAYS                 Regimen assessment:   - Renal function remains stable  - Patient trough returned at 19.2 prior to 2nd MD. Extrapolated trough can be expected to be 25.2 at current dose (750 mg q18h). Patient had not yet received his 0900 dose today (7/8). Administration frequency was extended to q24h. The next dose will now be tomorrow AM at 0900. - Will obtain trough after 2 more MD.   - Urine cultures grew GNR. NGTD blood cultures. Continue to monitor. Maintenance dose: 750 mg every 24 hours  Next scheduled level: 07/11/20 @ 0830       Pharmacy will follow daily and adjust medications as appropriate for renal function and/or serum levels.     Thank you,  DC Borjas  Clinical Pharmacist  [phone]

## 2020-07-08 NOTE — PROGRESS NOTES
(5030) Received report from Marta Dhaliwal, [facility]. Assumed care of patient. Whiteboard updated. VSS. Will continue to monitor. (1100) Patient's son updated on care and questions answered. (0245) TRANSFER - OUT REPORT:    Verbal report given to JER Morelos for primary RN (name) on Amado Party  being transferred to [facility] (unit) for routine progression of care       Report consisted of patients Situation, Background, Assessment and   Recommendations(SBAR). Information from the following report(s) SBAR, Intake/Output, MAR, Recent Results, Cardiac Rhythm NSR V-paced BBB and Alarm Parameters  was reviewed with the receiving nurse. Lines:   Peripheral IV 07/06/20 Right Forearm (Active)   Site Assessment Clean, dry, & intact 7/8/2020  1:00 PM   Phlebitis Assessment 0 7/8/2020  1:00 PM   Infiltration Assessment 0 7/8/2020  1:00 PM   Dressing Status Clean, dry, & intact 7/8/2020 12:00 PM   Dressing Type Transparent 7/8/2020 12:00 PM   Hub Color/Line Status Pink; Infusing;Patent 7/8/2020 12:00 PM   Action Taken Open ports on tubing capped 7/8/2020 12:00 PM   Alcohol Cap Used Yes 7/8/2020 12:00 PM       Peripheral IV 07/06/20 Right Wrist (Active)   Site Assessment Clean, dry, & intact 7/8/2020 12:00 PM   Phlebitis Assessment 0 7/8/2020 12:00 PM   Infiltration Assessment 0 7/8/2020 12:00 PM   Dressing Status Clean, dry, & intact 7/8/2020 12:00 PM   Dressing Type Transparent 7/8/2020 12:00 PM   Hub Color/Line Status Pink;Flushed;Patent;Capped 7/8/2020 12:00 PM   Action Taken Open ports on tubing capped 7/8/2020 12:00 PM   Alcohol Cap Used Yes 7/8/2020 12:00 PM        Opportunity for questions and clarification was provided.       Patient transported with:   [employer] eRx sent

## 2020-07-08 NOTE — PROGRESS NOTES
Physical Exam  Skin:     General: Skin is cool and dry. Capillary Refill: Capillary refill takes less than 2 seconds. Primary Nurse Arianna Reis RN and Krysten Tipton RN performed a dual skin assessment on this patient Impairment noted- see wound doc flow sheet Alex score is 14.

## 2020-07-08 NOTE — PROGRESS NOTES
Chart reviewed. Plan remains home health VS SNF when medically stable. Will need PT & OT evals when medically appropriate to assess for disposition needs, thanks. Brooke Coreas RN,ext [phone].

## 2020-07-08 NOTE — PROGRESS NOTES
Problem: Falls - Risk of  Goal: *Absence of Falls  Description: Document Alok Nobles Fall Risk and appropriate interventions in the flowsheet. Outcome: Progressing Towards Goal  Note: Fall Risk Interventions:  Mobility Interventions: Bed/chair exit alarm         Medication Interventions: Bed/chair exit alarm, Evaluate medications/consider consulting pharmacy    Elimination Interventions: Bed/chair exit alarm, Call light in reach    History of Falls Interventions: Bed/chair exit alarm, Door open when patient unattended   Problem: Patient Education: Go to Patient Education Activity  Goal: Patient/Family Education  Outcome: Progressing Towards Goal     Problem: Pressure Injury - Risk of  Goal: *Prevention of pressure injury  Description: Document Alex Scale and appropriate interventions in the flowsheet. Outcome: Progressing Towards Goal  Note: Pressure Injury Interventions:  Sensory Interventions: Assess changes in LOC    Moisture Interventions: Apply protective barrier, creams and emollients, Absorbent underpads    Activity Interventions: Pressure redistribution bed/mattress(bed type)    Mobility Interventions: HOB 30 degrees or less, Pressure redistribution bed/mattress (bed type)    Nutrition Interventions: Document food/fluid/supplement intake    Friction and Shear Interventions: HOB 30 degrees or less, Foam dressings/transparent film/skin sealants       Problem: Patient Education: Go to Patient Education Activity  Goal: Patient/Family Education  Outcome: Progressing Towards Goal     Problem: Pain  Goal: *Control of Pain  Outcome: Progressing Towards Goal  Goal: *PALLIATIVE CARE:  Alleviation of Pain  Outcome: Progressing Towards Goal     Problem:  Body Temperature -  Risk of, Imbalanced  Goal: *Absence of heat stress or hyperthermia signs and symptoms  Outcome: Progressing Towards Goal  Goal: *Absence of cold stress or hypothermia signs and symptoms  Outcome: Progressing Towards Goal     Problem: General Medical Care Plan  Goal: *Vital signs within specified parameters  Outcome: Progressing Towards Goal  Goal: *Labs within defined limits  Outcome: Progressing Towards Goal  Goal: *Absence of infection signs and symptoms  Outcome: Progressing Towards Goal  Goal: *Optimal pain control at patient's stated goal  Outcome: Progressing Towards Goal  Goal: *Skin integrity maintained  Outcome: Progressing Towards Goal  Goal: *Fluid volume balance  Outcome: Progressing Towards Goal  Goal: *Optimize nutritional status  Outcome: Progressing Towards Goal  Goal: *Anxiety reduced or absent  Outcome: Progressing Towards Goal  Goal: *Progressive mobility and function (eg: ADL's)  Outcome: Progressing Towards Goal

## 2020-07-08 NOTE — PROGRESS NOTES
Problem: Mobility Impaired (Adult and Pediatric)  Goal: *Acute Goals and Plan of Care (Insert Text)  Description: Physical Therapy Goals  Initiated 7/8/2020 and to be accomplished within 7 day(s)  1. Patient will move from supine to sit and sit to supine  in bed with supervision/set-up. 2.  Patient will transfer from bed to chair and chair to bed with supervision/set-up using the least restrictive device. 3.  Patient will perform sit to stand with supervision/set-up. 4.  Patient will ambulate with supervision/set-up for 50 feet with the least restrictive device. Outcome: Progressing Towards Goal   PHYSICAL THERAPY EVALUATION    Patient: Khalida Portillo (35 y.o. male)  Date: 7/8/2020  Primary Diagnosis: Sepsis ([facility] [A41.9]  Precautions: Fall  PLOF: Mod I  ASSESSMENT :  Mod A for supine to sit. Seated EOB with BUE support and supervision for balance. Mod A x2 for sit to stand. Poor standing balance; posterior lean and flexed posture. Returned to seated EOB. Max A x2 for sit to supine d/t lines. Educated on need for RN assistance with mobility; verbalized understanding. Call bell in reach. Prior to admission, lives with son and daughter-in-law who provide PRN assist. Mod I for amb with ww in home. Has multiple walkers, wheelchair, bedside comm de, and shower chair. Patient will benefit from skilled intervention to address the above impairments.   Patient's rehabilitation potential is considered to be Fair  Factors which may influence rehabilitation potential include:   []         None noted  []         Mental ability/status  [x]         Medical condition  []         Home/family situation and support systems  []         Safety awareness  []         Pain tolerance/management  []         Other:      PLAN :  Recommendations and Planned Interventions:   [x]           Bed Mobility Training             [x]    Neuromuscular Re-Education  [x]           Transfer Training                   [] Orthotic/Prosthetic Training  [x]           Gait Training                          []    Modalities  [x]           Therapeutic Exercises           []    Edema Management/Control  [x]           Therapeutic Activities            [x]    Family Training/Education  [x]           Patient Education  []           Other (comment):    Frequency/Duration: Patient will be followed by physical therapy 3-5 times a week to address goals. Discharge Recommendations: [facility]  Further Equipment Recommendations for Discharge: TBD next level of care     SUBJECTIVE:   Patient stated You want to stand?     OBJECTIVE DATA SUMMARY:     Past Medical History:   Diagnosis Date    Difficulty urinating     Elevated PSA     Hematuria, unspecified     Hypercholesterolemia     Hypertension     Incomplete bladder emptying     Urinary retention     UTI (urinary tract infection)      Past Surgical History:   Procedure Laterality Date    APPENDECTOMY      EGD  2/7/2014         HX TURP  6/13/16    HX TURP      [place] [facility] NEW/RPLC PRM PACEMAKER W/TRANSV ELTRD VENTR N/A 5/5/2020    Insert Ppm Single Ventricular performed by Francy Rahman MD at [address] LAB     Barriers to Learning/Limitations: None  Compensate with: Visual Cues, Verbal Cues, Tactile Cues and Kinesthetic Cues    Home Situation:  Home Situation  Home Environment: Private residence  # Steps to Enter: 0  Wheelchair Ramp: Yes  One/Two Story Residence: Two story, live on 1st floor  Living Alone: No  Support Systems: Child(darryl), Family member(s)  Patient Expects to be Discharged to[de-identified] Private residence  Current DME Used/Available at Home: Commode, bedside, Grab bars, Shower chair, Walker, rolling, Wheelchair, Walker, rollator  Tub or Shower Type: Shower    Critical Behavior:  Neurologic State: Alert  Orientation Level: Oriented to person  Cognition: Follows commands  Safety/Judgement: Fall prevention  Psychosocial  Patient Behaviors: Cooperative  Strength:    Manual Muscle Testing (LE)         R     L    Hip Flexion:   4/5 4/5  Knee EXT:   4/5 4/5  Knee FLEX:   4/5 4/5  Ankle DF:   4/5 4/5  _________________________________________________   Range Of Motion:  BLE AROM WFL  Functional Mobility:  Bed Mobility:  Supine to Sit: Moderate assistance  Sit to Supine: Maximum assistance;Assist x2  Transfers:  Sit to Stand: Moderate assistance;Assist x2  Stand to Sit: Moderate assistance;Assist x2  Balance:   Sitting: Impaired  Sitting - Static: Fair (occasional)  Sitting - Dynamic: Fair (occasional)  Standing: Impaired  Standing - Static: Poor  Standing - Dynamic : Fair(-)  Neuro Re-education:  Seated EOB 5 minutes  Standing 1 minute    Pain:  Pain level pre-treatment: 0/10   Pain level post-treatment: 0/10     Activity Tolerance:   Fair    After treatment:   []         Patient left in no apparent distress sitting up in chair  [x]         Patient left in no apparent distress in bed  [x]         Call bell left within reach  [x]         Nursing notified  []         Caregiver present  []         Bed alarm activated  []         SCDs applied    COMMUNICATION/EDUCATION:   [x]         Role of physical therapy and plan of care in the acute care setting. [x]         Fall prevention education was provided and the patient/caregiver indicated understanding. [x]         Patient/family have participated as able in goal setting and plan of care. []         Patient/family agree to work toward stated goals and plan of care. []         Patient understands intent and goals of therapy, but is neutral about his/her participation. []         Patient is unable to participate in goal setting/plan of care: ongoing with therapy staff.     Thank you for this referral.  Beatrice Manley, PT   Time Calculation: 21 mins    Eval Complexity: History: MEDIUM  Complexity : 1-2 comorbidities / personal factors will impact the outcome/ POC Exam:MEDIUM Complexity : 3 Standardized tests and measures addressing body structure, function, activity limitation and / or participation in recreation  Presentation: MEDIUM Complexity : Evolving with changing characteristics  Clinical Decision Making:Medium Complexity    Clinical judgement; ROM, MMT, functional mobility Overall Complexity:MEDIUM

## 2020-07-09 LAB
ANION GAP SERPL CALC-SCNC: 6 MMOL/L (ref 3–18)
BACTERIA SPEC CULT: ABNORMAL
BASOPHILS # BLD: 0 K/UL (ref 0–0.1)
BASOPHILS NFR BLD: 0 % (ref 0–2)
BUN SERPL-MCNC: 27 MG/DL (ref 7–18)
BUN/CREAT SERPL: 26 (ref 12–20)
CALCIUM SERPL-MCNC: 8.5 MG/DL (ref 8.5–10.1)
CC UR VC: ABNORMAL
CHLORIDE SERPL-SCNC: 116 MMOL/L (ref 100–111)
CO2 SERPL-SCNC: 24 MMOL/L (ref 21–32)
CREAT SERPL-MCNC: 1.03 MG/DL (ref 0.6–1.3)
DIFFERENTIAL METHOD BLD: ABNORMAL
EOSINOPHIL # BLD: 0.5 K/UL (ref 0–0.4)
EOSINOPHIL NFR BLD: 9 % (ref 0–5)
ERYTHROCYTE [DISTWIDTH] IN BLOOD BY AUTOMATED COUNT: 16.9 % (ref 11.6–14.5)
GLUCOSE SERPL-MCNC: 60 MG/DL (ref 74–99)
HCT VFR BLD AUTO: 30.8 % (ref 36–48)
HGB BLD-MCNC: 9.9 G/DL (ref 13–16)
LYMPHOCYTES # BLD: 0.8 K/UL (ref 0.9–3.6)
LYMPHOCYTES NFR BLD: 15 % (ref 21–52)
MCH RBC QN AUTO: 29.5 PG (ref 24–34)
MCHC RBC AUTO-ENTMCNC: 32.1 G/DL (ref 31–37)
MCV RBC AUTO: 91.7 FL (ref 74–97)
MONOCYTES # BLD: 0.3 K/UL (ref 0.05–1.2)
MONOCYTES NFR BLD: 6 % (ref 3–10)
NEUTS SEG # BLD: 3.8 K/UL (ref 1.8–8)
NEUTS SEG NFR BLD: 70 % (ref 40–73)
PLATELET # BLD AUTO: 197 K/UL (ref 135–420)
PMV BLD AUTO: 12 FL (ref 9.2–11.8)
POTASSIUM SERPL-SCNC: 3.8 MMOL/L (ref 3.5–5.5)
RBC # BLD AUTO: 3.36 M/UL (ref 4.7–5.5)
SERVICE CMNT-IMP: ABNORMAL
SODIUM SERPL-SCNC: 146 MMOL/L (ref 136–145)
WBC # BLD AUTO: 5.4 K/UL (ref 4.6–13.2)

## 2020-07-09 PROCEDURE — 85025 COMPLETE CBC W/AUTO DIFF WBC: CPT

## 2020-07-09 PROCEDURE — 97530 THERAPEUTIC ACTIVITIES: CPT

## 2020-07-09 PROCEDURE — 77010033678 HC OXYGEN DAILY

## 2020-07-09 PROCEDURE — 80048 BASIC METABOLIC PNL TOTAL CA: CPT

## 2020-07-09 PROCEDURE — 74011250637 HC RX REV CODE- 250/637: Performed by: HOSPITALIST

## 2020-07-09 PROCEDURE — 74011250636 HC RX REV CODE- 250/636: Performed by: HOSPITALIST

## 2020-07-09 PROCEDURE — 74011000258 HC RX REV CODE- 258: Performed by: HOSPITALIST

## 2020-07-09 PROCEDURE — 36415 COLL VENOUS BLD VENIPUNCTURE: CPT

## 2020-07-09 PROCEDURE — 65660000000 HC RM CCU STEPDOWN

## 2020-07-09 RX ADMIN — HEPARIN SODIUM 5000 UNITS: 5000 INJECTION INTRAVENOUS; SUBCUTANEOUS at 19:45

## 2020-07-09 RX ADMIN — SODIUM CHLORIDE 50 ML/HR: 900 INJECTION, SOLUTION INTRAVENOUS at 17:57

## 2020-07-09 RX ADMIN — HEPARIN SODIUM 5000 UNITS: 5000 INJECTION INTRAVENOUS; SUBCUTANEOUS at 11:38

## 2020-07-09 RX ADMIN — HEPARIN SODIUM 5000 UNITS: 5000 INJECTION INTRAVENOUS; SUBCUTANEOUS at 03:35

## 2020-07-09 RX ADMIN — ASPIRIN 81 MG: 81 TABLET, FILM COATED ORAL at 09:00

## 2020-07-09 RX ADMIN — CEFTRIAXONE 2 G: 2 INJECTION, POWDER, FOR SOLUTION INTRAMUSCULAR; INTRAVENOUS at 19:45

## 2020-07-09 RX ADMIN — SODIUM CHLORIDE 750 MG: 900 INJECTION, SOLUTION INTRAVENOUS at 09:00

## 2020-07-09 RX ADMIN — TAMSULOSIN HYDROCHLORIDE 0.4 MG: 0.4 CAPSULE ORAL at 17:57

## 2020-07-09 RX ADMIN — LEVOTHYROXINE SODIUM 50 MCG: 0.03 TABLET ORAL at 06:34

## 2020-07-09 RX ADMIN — ATORVASTATIN CALCIUM 20 MG: 20 TABLET, FILM COATED ORAL at 20:52

## 2020-07-09 RX ADMIN — ALLOPURINOL 300 MG: 100 TABLET ORAL at 11:37

## 2020-07-09 NOTE — PROGRESS NOTES
[facility]  Hospitalist Division    Daily progress Note    Patient: Deyanira Lee MRN: [id number]  [facility]: [id number]    YOB: 1934  Age: 80 y.o. Sex: male    DOA: 7/6/2020 LOS:  LOS: 3 days                      Assessment/Plan     Active Problems:    Sepsis ([address] (7/6/2020)      1. Sepsislikely secondary to urinary tract infection, hypothermia, IV fluids, IV antibiotics, admit to stepdown unit. 2. Patient noted to be hypotensive overnight and was started on IV Levophed, he is at a low dose and we are trying to titrate it to off   3. UTI IV antibiotics, urine culture sent, awaiting culture and sensitivity results. Has had multiple admissions to the hospital secondary to UTI. 4. History of BPHcontinue Flomax  5. Hypothermia likely from sepsis, temperature has improved, continue to monitor  6. History of goutcontinue allopurinol  7. History of hyperlipidemiacontinue Lipitor  8. History of hypothyroidismcontinue Synthroid  9. CKD 2monitor creatinine  10. History of chronic systolic congestive heart failuregentle hydration  11. History of coronary artery disease with recent permanent pacemaker placement. DVT prophylaxisHeparin  DNR         Subjective:     CC:  Lying in bed , NAD , overnight events noted - pt started on Levophed for low BP     Objective:        Visit Vitals  /68   Pulse 60   Temp 98 °F (36.7 °C)   Resp 16   Ht 5' 7\" (1.702 m)   Wt 72.2 kg (159 lb 3.2 oz)   SpO2 96%   BMI 24.93 kg/m²           Physical Exam:  General appearance: Sleepy  no distress, appears stated age  Neck: supple, symmetrical, trachea midline, no adenopathy, thyroid: not enlarged, symmetric, no tenderness/mass/nodules, no carotid bruit and no JVD  Lungs: Decreased BS in bases   Heart: regular rate and rhythm, S1, S2 normal, no murmur, click, rub or gallop  Abdomen: soft, non-tender.  Bowel sounds normal. No masses,  no organomegaly  Pulses: 2+ and symmetric  Skin: Skin color, texture, turgor normal. No rashes or lesions        Intake and Output:  Current Shift:  No intake/output data recorded. Last three shifts:  07/07 1901 - 07/09 0700  In: 1250 [I.V.:1250]  Out: 1620 [Urine:1620]    Recent Results (from the past 24 hour(s))   CBC WITH AUTOMATED DIFF    Collection Time: 07/09/20  4:35 AM   Result Value Ref Range    WBC 5.4 4.6 - 13.2 K/uL    RBC 3.36 (L) 4.70 - 5.50 M/uL    HGB 9.9 (L) 13.0 - 16.0 g/dL    HCT 30.8 (L) 36.0 - 48.0 %    MCV 91.7 74.0 - 97.0 FL    MCH 29.5 24.0 - 34.0 PG    MCHC 32.1 31.0 - 37.0 g/dL    RDW 16.9 (H) 11.6 - 14.5 %    PLATELET 108 624 - 979 K/uL    MPV 12.0 (H) 9.2 - 11.8 FL    NEUTROPHILS 70 40 - 73 %    LYMPHOCYTES 15 (L) 21 - 52 %    MONOCYTES 6 3 - 10 %    EOSINOPHILS 9 (H) 0 - 5 %    BASOPHILS 0 0 - 2 %    ABS. NEUTROPHILS 3.8 1.8 - 8.0 K/UL    ABS. LYMPHOCYTES 0.8 (L) 0.9 - 3.6 K/UL    ABS. MONOCYTES 0.3 0.05 - 1.2 K/UL    ABS. EOSINOPHILS 0.5 (H) 0.0 - 0.4 K/UL    ABS.  BASOPHILS 0.0 0.0 - 0.1 K/UL    DF AUTOMATED     METABOLIC PANEL, BASIC    Collection Time: 07/09/20  4:35 AM   Result Value Ref Range    Sodium 146 (H) 136 - 145 mmol/L    Potassium 3.8 3.5 - 5.5 mmol/L    Chloride 116 (H) 100 - 111 mmol/L    CO2 24 21 - 32 mmol/L    Anion gap 6 3.0 - 18 mmol/L    Glucose 60 (L) 74 - 99 mg/dL    BUN 27 (H) 7.0 - 18 MG/DL    Creatinine 1.03 0.6 - 1.3 MG/DL    BUN/Creatinine ratio 26 (H) 12 - 20      GFR est AA >60 >60 ml/min/1.73m2    GFR est non-AA >60 >60 ml/min/1.73m2    Calcium 8.5 8.5 - 10.1 MG/DL         Current Facility-Administered Medications:     [START ON 7/11/2020] VANCOMYCIN INFORMATION NOTE, , Other, ONCE, Shanna Libman, MD    vancomycin (VANCOCIN) 750 mg in 0.9% sodium chloride (MBP/ADV) 250 mL ADV, 750 mg, IntraVENous, Q24H, Shanna Libman, MD, Last Rate: 166.7 mL/hr at 07/09/20 0900, 750 mg at 07/09/20 0900    sodium chloride (NS) flush 5-10 mL, 5-10 mL, IntraVENous, PRN, Shanna Libman, MD    levothyroxine (SYNTHROID) tablet 50 mcg, 50 mcg, Oral, 6am, Uziel Pendleton MD, 50 mcg at 07/09/20 0634    atorvastatin (LIPITOR) tablet 20 mg, 20 mg, Oral, QHS, Uziel Pendleton MD, 20 mg at 07/08/20 2146    tamsulosin (FLOMAX) capsule 0.4 mg, 0.4 mg, Oral, PCD, Uziel Pendleton MD, 0.4 mg at 07/09/20 1757    aspirin delayed-release tablet 81 mg, 81 mg, Oral, DAILY, Uziel Pendleton MD, 81 mg at 07/09/20 0900    allopurinoL (ZYLOPRIM) tablet 300 mg, 300 mg, Oral, DAILY, Uziel Pendleton MD, 300 mg at 07/09/20 1137    acetaminophen (TYLENOL) tablet 650 mg, 650 mg, Oral, Q6H PRN, Uziel Pendleton MD    ondansetron [facility]) injection 4 mg, 4 mg, IntraVENous, Q6H PRN, Uziel Pendleton MD    0.9% sodium chloride infusion, 50 mL/hr, IntraVENous, CONTINUOUS, Uziel Pendleton MD, Last Rate: 50 mL/hr at 07/09/20 1757, 50 mL/hr at 07/09/20 1757    heparin (porcine) injection 5,000 Units, 5,000 Units, SubCUTAneous, Q8H, Uziel Pendleton MD, 5,000 Units at 07/09/20 1138    cefTRIAXone (ROCEPHIN) 2 g in 0.9% sodium chloride (MBP/ADV) 50 mL MBP, 2 g, IntraVENous, Q24H, Uziel Pendleton MD, Last Rate: 100 mL/hr at 07/08/20 1948, 2 g at 07/08/20 1948    VANCOMYCIN INFORMATION NOTE 1 Each, 1 Each, Other, Rx Dosing/Monitoring, Uziel Pendleton MD    Lab Results   Component Value Date/Time    Glucose 60 (L) 07/09/2020 04:35 AM    Glucose 59 (L) 07/08/2020 08:33 AM    Glucose 62 (L) 07/07/2020 04:00 AM    Glucose 55 (L) 07/06/2020 09:07 AM    Glucose 83 05/11/2020 12:30 AM                  Jessie Prasad MD  7/9/2020, 2:35 PM

## 2020-07-09 NOTE — PROGRESS NOTES
1703 Report received from Yoshi Pedroza RN using SBAR and MAR. Pt currently resting in bed watching tv. Pt placed on bedpan. Call bell in reach. 1614 Report given to Axel Quiñonez using SBAR and MAR.

## 2020-07-09 NOTE — PROGRESS NOTES
7/9/2020 Pt/OT recommend snf vs HH. Patient is active  with [facility]. If snf- wants #1. [place], #2. [place], [place]. [facility], [facility].    I updated information in [facility]. I See that his Rocephen is to continue till 7/13/2020. I gave that info to snfs and home health. Case Management to follow. Rekha Mujica.  LEO Juarez  [facility]  [phone], Pager [phone]  [email or web address]

## 2020-07-09 NOTE — PROGRESS NOTES
Bedside shift report received from [address]: AOX4, on 2L 02 via NC, resting in bed, conversing over phone; no complaints voiced; shift assessment completed         : scheduled meds given    2052: due med given as scheduled; swallowed well    2245: watching tv; no complaints vpoiced    0045: kept comfortable; sheets straightened; tv turned off per request    0230: sleeping; IVF infusing well    0545: awake; bath given; gown and linens changed    0705: calmly resting; stable throughout the night    Bedside and Verbal shift change report given to Darvin Agarwal 1 (oncoming nurse) by Cecelia Mejia RN (offgoing nurse). Report included the following information SBAR, Kardex, Intake/Output, Recent Results and Cardiac Rhythm Paced.

## 2020-07-09 NOTE — CDMP QUERY
Patient admitted with Sepsis due to UTI, noted to have elevated serum sodium levels. If possible, please document in progress notes and d/c summary if you are evaluating and/or treating any of the following:

## 2020-07-09 NOTE — PROGRESS NOTES
Problem: Mobility Impaired (Adult and Pediatric)  Goal: *Acute Goals and Plan of Care (Insert Text)  Description: Physical Therapy Goals  Initiated 7/8/2020 and to be accomplished within 7 day(s)  1. Patient will move from supine to sit and sit to supine  in bed with supervision/set-up. 2.  Patient will transfer from bed to chair and chair to bed with supervision/set-up using the least restrictive device. 3.  Patient will perform sit to stand with supervision/set-up. 4.  Patient will ambulate with supervision/set-up for 50 feet with the least restrictive device. Outcome: Progressing Towards Goal   PHYSICAL THERAPY TREATMENT    Patient: Gi Molina (66 y.o. male)  Date: 7/9/2020  Diagnosis: Sepsis ([facility] [address] [A41.9]   <principal problem not specified>       Precautions: Fall  PLOF: Mod I    ASSESSMENT:    Performed Supine TE prior to transfers. Decreased command following at beginning of session. Eventually responded well to commands and cuing Mod Ax2 for supine to sit. Mod A x2 for sit to stand. Poor static and dynamic standing balance; posterior lean and flexed posture. 3 steps toward [facility] with CGA and RW for repositioning. Returned to seated EOB. Mod A x2 for sit to supine. Educated on need for RN assistance with mobility; verbalized understanding. Call bell in reach. Progression toward goals:   [x]      Improving appropriately and progressing toward goals  []      Improving slowly and progressing toward goals  []      Not making progress toward goals and plan of care will be adjusted     PLAN:  Patient continues to benefit from skilled intervention to address the above impairments. Continue treatment per established plan of care.   Discharge Recommendations:  Khai Ramsey  Further Equipment Recommendations for Discharge:  TBD next level of care     SUBJECTIVE:   Patient agreeable to PT services    OBJECTIVE DATA SUMMARY:   Critical Behavior:  Neurologic State: Alert  Orientation Level: Oriented X4  Cognition: Follows commands  Safety/Judgement: Fall prevention  Functional Mobility Training:  Bed Mobility:  Rolling: Minimum assistance  Supine to Sit: Moderate assistance;Assist x2  Sit to Supine: Maximum assistance;Assist x2  Transfers:  Sit to Stand: Moderate assistance;Assist x2  Stand to Sit: Moderate assistance;Assist x2  Balance:  Sitting: Impaired  Sitting - Static: Fair (occasional)  Sitting - Dynamic: Poor (constant support)  Standing: Impaired  Standing - Static: Poor  Standing - Dynamic : Poor  Ambulation/Gait Training:  Distance (ft): 3 Feet (ft)  Assistive Device: Walker, rolling  Ambulation - Level of Assistance: Contact guard assistance  Therapeutic Exercises:         EXERCISE   Sets   Reps   Active Active Assist   Passive Self ROM   Comments   Ankle Pumps 1 15  [] [x] [] []    Quad Sets/Glut Sets 1 10  [] [x] [] [] Hold for 5 secs   Hamstring Sets   [] [] [] []    Short Arc Quads   [] [] [] []    Heel Slides 1 10 [] [x] [] []    Straight Leg Raises 1 10 [] [x] [] []    Hip Add   [] [] [] [] Hold for 5 secs, w/ pillow squeeze   Long Arc Quads   [] [] [] []    Seated Marching   [] [] [] []    Standing Marching   [] [] [] []       [] [] [] []    Pain:  Pain level pre-treatment: 0/10  Pain level post-treatment: 0/10   Pain Intervention(s): Medication (see MAR); Rest   Response to intervention: Nurse notified, See doc flow    Activity Tolerance:   Poor  Please refer to the flowsheet for vital signs taken during this treatment. After treatment:   [] Patient left in no apparent distress sitting up in chair  [x] Patient left in no apparent distress in bed  [x] Call bell left within reach  [x] Nursing notified  [] Caregiver present  [] Bed alarm activated  [] SCDs applied      COMMUNICATION/EDUCATION:   [x]         Role of Physical Therapy in the acute care setting. [x]         Fall prevention education was provided and the patient/caregiver indicated understanding.   [x] Patient/family have participated as able in working toward goals and plan of care. []         Patient/family agree to work toward stated goals and plan of care. []         Patient understands intent and goals of therapy, but is neutral about his/her participation.   []         Patient is unable to participate in stated goals/plan of care: ongoing with therapy staff.  []         Other:        Geralene Drain   Time Calculation: 19 mins

## 2020-07-09 NOTE — PROGRESS NOTES
Problem: Falls - Risk of  Goal: *Absence of Falls  Description: Document Elizabeth Roman Fall Risk and appropriate interventions in the flowsheet. Outcome: Progressing Towards Goal  Note: Fall Risk Interventions:  Mobility Interventions: Communicate number of staff needed for ambulation/transfer         Medication Interventions: Bed/chair exit alarm, Evaluate medications/consider consulting pharmacy    Elimination Interventions: Call light in reach    History of Falls Interventions: Bed/chair exit alarm, Door open when patient unattended         Problem: Pressure Injury - Risk of  Goal: *Prevention of pressure injury  Description: Document Alex Scale and appropriate interventions in the flowsheet. Outcome: Progressing Towards Goal  Note: Pressure Injury Interventions:  Sensory Interventions: Assess changes in LOC, Keep linens dry and wrinkle-free    Moisture Interventions: Absorbent underpads, Apply protective barrier, creams and emollients    Activity Interventions: Pressure redistribution bed/mattress(bed type)    Mobility Interventions: HOB 30 degrees or less    Nutrition Interventions: Document food/fluid/supplement intake    Friction and Shear Interventions: Apply protective barrier, creams and emollients, Foam dressings/transparent film/skin sealants                Problem: Pain  Goal: *Control of Pain  Outcome: Progressing Towards Goal     Problem:  Body Temperature -  Risk of, Imbalanced  Goal: *Absence of cold stress or hypothermia signs and symptoms  Outcome: Progressing Towards Goal     Problem: General Medical Care Plan  Goal: *Vital signs within specified parameters  Outcome: Progressing Towards Goal     Problem: General Medical Care Plan  Goal: *Labs within defined limits  Outcome: Progressing Towards Goal

## 2020-07-09 NOTE — PROGRESS NOTES
Assume care of patient lying in bed with eyes closed, arouse with tactile stimuli. Alert and oriented X 4. No facial or tactile stimuli noted for pain. Bed locked in lowest position. Call light within reach and understand to use for assistance and needs. 2200 Resting quietly  In bed without complaints voiced. Medication administered    07/09/2020    0025 No complaints voiced. 0415 Resting with eyes closed. 7026 Bedside and Verbal shift change report given to JER Priest (oncoming nurse) by Naomi De La Cruz RN (offgoing nurse). Report given with SBAR, Kardex, Intake/Output, MAR and Recent Results.

## 2020-07-09 NOTE — PROGRESS NOTES
Comprehensive Nutrition Assessment    Type and Reason for Visit: Initial    Nutrition Recommendations/Plan:   - Change diet texture to dental soft solid  - Initiate Ensure Enlive BID to supplement energy needs  - Tray set up/assist  - Suggest adding a MVI      Nutrition Assessment:  Pt  admitted for sepsis and transferred from ICU to [facility] late yesterday afternoon (7/8). Pt seen in room at lunch this afternoon; observed 45% of meal consumed. Stated appetite has been variable at home. Malnutrition Assessment:  Malnutrition Status: Moderate malnutrition    Context:  Chronic illness     Findings of the 6 clinical characteristics of malnutrition:   Energy Intake:  Mild decrease in energy intake (specify)  Weight Loss:  Unable to assess     Body Fat Loss:  1 - Mild body fat loss, Buccal region   Muscle Mass Loss:  1 - Mild muscle mass loss, Clavicles (pectoralis &deltoids), Temples (temporalis)  Fluid Accumulation:  1 - Mild, Exemities   Strength:  Not performed         Estimated Daily Nutrient Needs:  Energy (kcal):  1776 kcals  Protein (g):  72-87       Fluid (ml/day):  1776    Nutrition Related Findings:  Poor dentition.  Last BM on (7/7) per I/Os      Wounds:    None       Current Nutrition Therapies:   DIET DENTAL SOFT (SOFT SOLID)  DIET NUTRITIONAL SUPPLEMENTS ([facility]) Lunch, Dinner; [employer]    Anthropometric Measures:  · Height:  5' 7\" (170.2 cm)  · Current Body Wt:  72.2 kg (159 lb 2.8 oz)   · Admission Body Wt:  140 lb    · Usual Body Wt:  160 lb     · Ideal Body Wt:  67.1 kg (148 lb):  107.5 %   · Adjusted Body Weight:   ; Weight Adjustment for: No adjustment   · Adjusted BMI:       · BMI Categories:  Normal weight (BMI 22.0-24.9) age over 72       Nutrition Diagnosis:   · Moderate malnutrition related to inadequate protein-energy intake, altered taste perception as evidenced by mild muscle loss, mild loss of subcutaneous fat, weight loss    · Inadequate oral intake related to biting/chewing (masticatory) difficulty as evidenced by poor dentition, intake 26-50%      Nutrition Interventions:   Food and/or Nutrient Delivery: Modify current diet, Start oral nutrition supplement  Nutrition Education and Counseling: Counseling initiated  Coordination of Nutrition Care: Continued inpatient monitoring, Interdisciplinary rounds, Feeding assistance/environmentat change    Goals:  PO intake will meet >75% of estimated nutritional needs in the next 5-7 days       Nutrition Monitoring and Evaluation:   Behavioral-Environmental Outcomes:    Food/Nutrient Intake Outcomes: Supplement intake, Food and nutrient intake, Diet advancement/tolerance  Physical Signs/Symptoms Outcomes: Chewing or swallowing, Biochemical data, GI status, Nutrition focused physical findings, Skin, Weight, Meal time behavior    Discharge Planning:     Too soon to determine     Electronically signed by Josué Jacinto on 7/9/2020 at 6:27 PM    Pager: [phone]

## 2020-07-09 NOTE — PROGRESS NOTES
[facility]  Hospitalist Division    Daily progress Note    Patient: Deyanira Lee MRN: [id number]  CSN: [id number]    YOB: 1934  Age: 80 y.o. Sex: male    DOA: 7/6/2020 LOS:  LOS: 3 days                      Assessment/Plan     Active Problems:    Sepsis ([address] (7/6/2020)      1. Sepsis with septic shock likely secondary to urinary tract infection, hypothermia, IV fluids, IV antibiotics, pt is doing better now - he is off pressors & will be transferred to tele   2. UTI IV antibiotics, urine culture growing Klebsiella, sensitive to Rocephin - can switch to oral Abx on discharge   3. History of BPHcontinue Flomax  4. Hypothermia likely from sepsis, temperature has improved, continue to monitor  5. History of goutcontinue allopurinol  6. History of hyperlipidemiacontinue Lipitor  7. History of hypothyroidismcontinue Synthroid  8. CKD 2monitor creatinine  9. History of chronic systolic congestive heart failuregentle hydration  10. History of coronary artery disease with recent permanent pacemaker placement. DVT prophylaxisHeparin  DNR   PT and OT eval      Subjective:     CC:  Patient improved, lying in bed, no acute distress has been transferred to telemetry    Objective:        Visit Vitals  /68   Pulse 60   Temp 98 °F (36.7 °C)   Resp 16   Ht 5' 7\" (1.702 m)   Wt 72.2 kg (159 lb 3.2 oz)   SpO2 96%   BMI 24.93 kg/m²           Physical Exam:  General appearance: Sleepy  no distress, appears stated age  Neck: supple, symmetrical, trachea midline, no adenopathy, thyroid: not enlarged, symmetric, no tenderness/mass/nodules, no carotid bruit and no JVD  Lungs: Decreased BS in bases   Heart: regular rate and rhythm, S1, S2 normal, no murmur, click, rub or gallop  Abdomen: soft, non-tender.  Bowel sounds normal. No masses,  no organomegaly  Pulses: 2+ and symmetric  Skin: Skin color, texture, turgor normal. No rashes or lesions        Intake and Output:  Current Shift:  No intake/output data recorded. Last three shifts:  07/07 1901 - 07/09 0700  In: 1250 [I.V.:1250]  Out: 1620 [Urine:1620]    Recent Results (from the past 24 hour(s))   CBC WITH AUTOMATED DIFF    Collection Time: 07/09/20  4:35 AM   Result Value Ref Range    WBC 5.4 4.6 - 13.2 K/uL    RBC 3.36 (L) 4.70 - 5.50 M/uL    HGB 9.9 (L) 13.0 - 16.0 g/dL    HCT 30.8 (L) 36.0 - 48.0 %    MCV 91.7 74.0 - 97.0 FL    MCH 29.5 24.0 - 34.0 PG    MCHC 32.1 31.0 - 37.0 g/dL    RDW 16.9 (H) 11.6 - 14.5 %    PLATELET 597 277 - 930 K/uL    MPV 12.0 (H) 9.2 - 11.8 FL    NEUTROPHILS 70 40 - 73 %    LYMPHOCYTES 15 (L) 21 - 52 %    MONOCYTES 6 3 - 10 %    EOSINOPHILS 9 (H) 0 - 5 %    BASOPHILS 0 0 - 2 %    ABS. NEUTROPHILS 3.8 1.8 - 8.0 K/UL    ABS. LYMPHOCYTES 0.8 (L) 0.9 - 3.6 K/UL    ABS. MONOCYTES 0.3 0.05 - 1.2 K/UL    ABS. EOSINOPHILS 0.5 (H) 0.0 - 0.4 K/UL    ABS.  BASOPHILS 0.0 0.0 - 0.1 K/UL    DF AUTOMATED     METABOLIC PANEL, BASIC    Collection Time: 07/09/20  4:35 AM   Result Value Ref Range    Sodium 146 (H) 136 - 145 mmol/L    Potassium 3.8 3.5 - 5.5 mmol/L    Chloride 116 (H) 100 - 111 mmol/L    CO2 24 21 - 32 mmol/L    Anion gap 6 3.0 - 18 mmol/L    Glucose 60 (L) 74 - 99 mg/dL    BUN 27 (H) 7.0 - 18 MG/DL    Creatinine 1.03 0.6 - 1.3 MG/DL    BUN/Creatinine ratio 26 (H) 12 - 20      GFR est AA >60 >60 ml/min/1.73m2    GFR est non-AA >60 >60 ml/min/1.73m2    Calcium 8.5 8.5 - 10.1 MG/DL         Current Facility-Administered Medications:     [START ON 7/11/2020] VANCOMYCIN INFORMATION NOTE, , Other, ONCE, Jessica Morales MD    vancomycin (VANCOCIN) 750 mg in 0.9% sodium chloride (MBP/ADV) 250 mL ADV, 750 mg, IntraVENous, Q24H, Jessica Morales MD, Last Rate: 166.7 mL/hr at 07/09/20 0900, 750 mg at 07/09/20 0900    sodium chloride (NS) flush 5-10 mL, 5-10 mL, IntraVENous, PRN, Jessica Morales MD    levothyroxine (SYNTHROID) tablet 50 mcg, 50 mcg, Oral, 6am, Jessica Morales MD, 50 mcg at 07/09/20 0634    atorvastatin (LIPITOR) tablet 20 mg, 20 mg, Oral, QHS, Chris Barger MD, 20 mg at 07/08/20 2146    tamsulosin (FLOMAX) capsule 0.4 mg, 0.4 mg, Oral, PCD, Chris Barger MD, 0.4 mg at 07/09/20 1757    aspirin delayed-release tablet 81 mg, 81 mg, Oral, DAILY, Chris Barger MD, 81 mg at 07/09/20 0900    allopurinoL (ZYLOPRIM) tablet 300 mg, 300 mg, Oral, DAILY, Chris Barger MD, 300 mg at 07/09/20 1137    acetaminophen (TYLENOL) tablet 650 mg, 650 mg, Oral, Q6H PRN, Chris Barger MD    ondansetron [facility]) injection 4 mg, 4 mg, IntraVENous, Q6H PRN, Chris Barger MD    0.9% sodium chloride infusion, 50 mL/hr, IntraVENous, CONTINUOUS, Chris Barger MD, Last Rate: 50 mL/hr at 07/09/20 1757, 50 mL/hr at 07/09/20 1757    heparin (porcine) injection 5,000 Units, 5,000 Units, SubCUTAneous, Q8H, Chris Barger MD, 5,000 Units at 07/09/20 1138    cefTRIAXone (ROCEPHIN) 2 g in 0.9% sodium chloride (MBP/ADV) 50 mL MBP, 2 g, IntraVENous, Q24H, Chris Barger MD, Last Rate: 100 mL/hr at 07/08/20 1948, 2 g at 07/08/20 1948    VANCOMYCIN INFORMATION NOTE 1 Each, 1 Each, Other, Rx Dosing/Monitoring, Chris Barger MD    Lab Results   Component Value Date/Time    Glucose 60 (L) 07/09/2020 04:35 AM    Glucose 59 (L) 07/08/2020 08:33 AM    Glucose 62 (L) 07/07/2020 04:00 AM    Glucose 55 (L) 07/06/2020 09:07 AM    Glucose 83 05/11/2020 12:30 AM                  Franck Mo MD  7/9/2020, 2:35 PM

## 2020-07-09 NOTE — CDMP QUERY
Pt admitted with Sepsis due to UTI . Noted documentation of \"Persistant right basilar consolidation, atelectasis or pneumonia on chest x-ray on 7/6 . If possible, please document in progress notes and discharge summary: 
 
? Chest x-ray resul ts not clinically insignificant ? Chest x-ray  is clinically significant ? Clinically unable to determine  
? Other, please specify ? Unknown Clinical Indicators: 79 yo male admitted with sepsis due to UTI, history of CHF Factors: Chest x-ray on 7/6   Persistent or recurrent right basilar consolidation, atelectasis or pneumonia. There may be trace right pleural effusion. 
  
  
Treatment: IV antibiotics, supplemental 02 Thank you for your time, 
Becca Gomez RN -[phone]

## 2020-07-10 LAB
ANION GAP SERPL CALC-SCNC: 6 MMOL/L (ref 3–18)
BASOPHILS # BLD: 0 K/UL (ref 0–0.1)
BASOPHILS NFR BLD: 0 % (ref 0–2)
BUN SERPL-MCNC: 25 MG/DL (ref 7–18)
BUN/CREAT SERPL: 25 (ref 12–20)
CALCIUM SERPL-MCNC: 8.2 MG/DL (ref 8.5–10.1)
CHLORIDE SERPL-SCNC: 117 MMOL/L (ref 100–111)
CO2 SERPL-SCNC: 24 MMOL/L (ref 21–32)
CREAT SERPL-MCNC: 0.99 MG/DL (ref 0.6–1.3)
DIFFERENTIAL METHOD BLD: ABNORMAL
EOSINOPHIL # BLD: 0.6 K/UL (ref 0–0.4)
EOSINOPHIL NFR BLD: 10 % (ref 0–5)
ERYTHROCYTE [DISTWIDTH] IN BLOOD BY AUTOMATED COUNT: 16.9 % (ref 11.6–14.5)
GLUCOSE SERPL-MCNC: 60 MG/DL (ref 74–99)
HCT VFR BLD AUTO: 30.3 % (ref 36–48)
HGB BLD-MCNC: 10.3 G/DL (ref 13–16)
LYMPHOCYTES # BLD: 0.8 K/UL (ref 0.9–3.6)
LYMPHOCYTES NFR BLD: 14 % (ref 21–52)
MCH RBC QN AUTO: 31 PG (ref 24–34)
MCHC RBC AUTO-ENTMCNC: 34 G/DL (ref 31–37)
MCV RBC AUTO: 91.3 FL (ref 74–97)
MONOCYTES # BLD: 0.3 K/UL (ref 0.05–1.2)
MONOCYTES NFR BLD: 6 % (ref 3–10)
NEUTS SEG # BLD: 4 K/UL (ref 1.8–8)
NEUTS SEG NFR BLD: 70 % (ref 40–73)
PLATELET # BLD AUTO: 203 K/UL (ref 135–420)
PMV BLD AUTO: 11.6 FL (ref 9.2–11.8)
POTASSIUM SERPL-SCNC: 4 MMOL/L (ref 3.5–5.5)
RBC # BLD AUTO: 3.32 M/UL (ref 4.7–5.5)
SODIUM SERPL-SCNC: 147 MMOL/L (ref 136–145)
WBC # BLD AUTO: 5.7 K/UL (ref 4.6–13.2)

## 2020-07-10 PROCEDURE — 85025 COMPLETE CBC W/AUTO DIFF WBC: CPT

## 2020-07-10 PROCEDURE — 65660000000 HC RM CCU STEPDOWN

## 2020-07-10 PROCEDURE — 97535 SELF CARE MNGMENT TRAINING: CPT

## 2020-07-10 PROCEDURE — 80048 BASIC METABOLIC PNL TOTAL CA: CPT

## 2020-07-10 PROCEDURE — 97530 THERAPEUTIC ACTIVITIES: CPT

## 2020-07-10 PROCEDURE — 74011250636 HC RX REV CODE- 250/636: Performed by: HOSPITALIST

## 2020-07-10 PROCEDURE — 77010033678 HC OXYGEN DAILY

## 2020-07-10 PROCEDURE — 74011250637 HC RX REV CODE- 250/637: Performed by: HOSPITALIST

## 2020-07-10 PROCEDURE — 74011000258 HC RX REV CODE- 258: Performed by: HOSPITALIST

## 2020-07-10 PROCEDURE — 36415 COLL VENOUS BLD VENIPUNCTURE: CPT

## 2020-07-10 RX ADMIN — SODIUM CHLORIDE 50 ML/HR: 900 INJECTION, SOLUTION INTRAVENOUS at 15:48

## 2020-07-10 RX ADMIN — LEVOTHYROXINE SODIUM 50 MCG: 0.03 TABLET ORAL at 05:39

## 2020-07-10 RX ADMIN — CEFTRIAXONE 2 G: 2 INJECTION, POWDER, FOR SOLUTION INTRAMUSCULAR; INTRAVENOUS at 21:31

## 2020-07-10 RX ADMIN — HEPARIN SODIUM 5000 UNITS: 5000 INJECTION INTRAVENOUS; SUBCUTANEOUS at 21:31

## 2020-07-10 RX ADMIN — HEPARIN SODIUM 5000 UNITS: 5000 INJECTION INTRAVENOUS; SUBCUTANEOUS at 05:40

## 2020-07-10 RX ADMIN — ASPIRIN 81 MG: 81 TABLET, FILM COATED ORAL at 08:41

## 2020-07-10 RX ADMIN — ATORVASTATIN CALCIUM 20 MG: 20 TABLET, FILM COATED ORAL at 21:31

## 2020-07-10 RX ADMIN — TAMSULOSIN HYDROCHLORIDE 0.4 MG: 0.4 CAPSULE ORAL at 17:16

## 2020-07-10 RX ADMIN — ALLOPURINOL 300 MG: 100 TABLET ORAL at 08:41

## 2020-07-10 RX ADMIN — SODIUM CHLORIDE 750 MG: 900 INJECTION, SOLUTION INTRAVENOUS at 08:41

## 2020-07-10 NOTE — PROGRESS NOTES
conducted a Follow up consultation and Spiritual Assessment for Derrick Palomino, who is a 80 y. o.,male. The  provided the following Interventions:  Continued the relationship of care and support. Listened empathically. Offered prayer and assurance of continued prayer on patients behalf. Chart reviewed. The following outcomes were achieved:  Patient expressed gratitude for pastoral care visit. Assessment:  There are no further spiritual or Mormon issues which require Spiritual Care Services interventions at this time. Plan:  Chaplains will continue to follow and will provide pastoral care on an as needed/requested basis.  recommends bedside caregivers page  on duty if patient shows signs of acute spiritual or emotional distress.      [address] [facility]   Staff [address] [facility]   [phone]

## 2020-07-10 NOTE — PROGRESS NOTES
Problem: Mobility Impaired (Adult and Pediatric)  Goal: *Acute Goals and Plan of Care (Insert Text)  Description: Physical Therapy Goals  Initiated 7/8/2020 and to be accomplished within 7 day(s)  1. Patient will move from supine to sit and sit to supine  in bed with supervision/set-up. 2.  Patient will transfer from bed to chair and chair to bed with supervision/set-up using the least restrictive device. 3.  Patient will perform sit to stand with supervision/set-up. 4.  Patient will ambulate with supervision/set-up for 50 feet with the least restrictive device. Outcome: Progressing Towards Goal   PHYSICAL THERAPY TREATMENT    Patient: Tommie Haider (69 y.o. male)  Date: 7/10/2020  Diagnosis: Sepsis ([address] [A41.9]   Precautions: Fall  PLOF: Mod I  ASSESSMENT:  Mod A for supine to sit. Seated EOB with good sitting balance. Max A for sit to stand. Amb 3ft with ww and min A from bed to chair. Decreased gait speed with shortened steps; requires additional time for mobility. Min A for stand to sit to chair. Seated in recliner with BLE elevated. Educated on need for RN assistance with mobility; verbalized understanding. Call bell in reach. Progression toward goals:   []      Improving appropriately and progressing toward goals  [x]      Improving slowly and progressing toward goals  []      Not making progress toward goals and plan of care will be adjusted     PLAN:  Patient continues to benefit from skilled intervention to address the above impairments. Continue treatment per established plan of care. Discharge Recommendations:  Skilled Nursing Facility  Further Equipment Recommendations for Discharge:  rolling walker     SUBJECTIVE:   Patient stated I have to do it sometime.     OBJECTIVE DATA SUMMARY:   Critical Behavior:  Neurologic State: Alert  Orientation Level: Oriented to person;Oriented to place  Cognition: Follows commands     Psychosocial  Patient Behaviors: Cooperative    Functional Mobility:  Bed Mobility:  Supine to Sit: Moderate assistance  Transfers:  Sit to Stand: Maximum assistance  Stand to Sit: Minimum assistance  Bed to Chair: Minimum assistance  Balance:   Sitting: Impaired  Sitting - Static: Fair (occasional)  Sitting - Dynamic: Fair (occasional)  Standing: Impaired  Standing - Static: Fair  Standing - Dynamic : Fair  Ambulation/Gait Training:  Distance (ft): 3 Feet (ft)   Assistive Device: Walker, rolling  Ambulation - Level of Assistance: Minimal assistance    Pain:  Pain level pre-treatment: 0/10  Pain level post-treatment: 0/10     Activity Tolerance:   Fair    After treatment:   [x] Patient left in no apparent distress sitting up in chair  [] Patient left in no apparent distress in bed  [x] Call bell left within reach  [x] Nursing notified  [] Caregiver present  [] Bed alarm activated  [] SCDs applied      COMMUNICATION/EDUCATION:   [x]         Role of physical therapy in the acute care setting. [x]         Fall prevention education was provided and the patient/caregiver indicated understanding. [x]         Patient/family have participated as able in working toward goals and plan of care. [x]         Patient/family agree to work toward stated goals and plan of care. []         Patient understands intent and goals of therapy, but is neutral about his/her participation. []         Patient is unable to participate in stated goals/plan of care: ongoing with therapy staff.       Donna Hernandez PT   Time Calculation: 14 mins

## 2020-07-10 NOTE — CDMP QUERY
Patient admitted with Sepsis and UTI, noted to have . If possible, please document in progress notes and d/c summary if you are evaluating and/or treating any of the following: 
 
 
? Gram negative sepsis ? Gram positive sepsis ? Other Explanation of clinical findings ? Clinically Undetermined (no explanation for clinical findings) The medical record reflects the following: 
 
   Risk Factors: 79 yo male Sepsis related to UTI Clinical Indicators: Per Urine C&S 7/6 with >100,000 colonies/ml Klebsiella Pneumonia 
=> Sensitive to Ceftriaxone Treatment: Rocephin, IV fluids, U/A with C&S Thank you for your time, 
Gato Dixon RN -[phone]

## 2020-07-10 NOTE — PROGRESS NOTES
Problem: Self Care Deficits Care Plan (Adult)  Goal: *Acute Goals and Plan of Care (Insert Text)  Description: Occupational Therapy Goals  Initiated 7/8/2020 within 7 day(s). 1.  Patient will perform grooming with modified independence. 2.  Patient will perform bathing with modified independence. 3.  Patient will perform upper body dressing and lower body dressing with modified independence using adaptive equipment. 4.  Patient will perform bedside commode transfers with modified independence using RW. 5.  Patient will perform all aspects of toileting with modified independence. 6.  Patient will participate in upper extremity therapeutic exercise/activities with modified independence for 8 minutes. 7.  Patient will utilize energy conservation techniques during functional activities with min verbal cues. Prior Level of Function: Supv with bathing/showers, Mod I w/ dressing, Mod I using RW in home, uses w/c in community     Outcome: Progressing Towards Goal     Problem: Patient Education: Go to Patient Education Activity  Goal: Patient/Family Education  Outcome: Progressing Towards Goal   OCCUPATIONAL THERAPY TREATMENT    Patient: Phuong Paredes (89 y.o. male)  Date: 7/10/2020  Diagnosis: Sepsis ([facility] [A41.9]   <principal problem not specified>       Precautions: Fall  Chart, occupational therapy assessment, plan of care, and goals were reviewed. ASSESSMENT:  Patient presents sitting up in recliner, agreeable to participate in grooming tasks. Mod A shampooing hair with rinse less shampoo cap, SBA towel drying hair, Mod A combing hair d/t muscle fatigue in BUEs, SBA oral hygiene and washing face. Patient report no pain, remains sitting up in recliner, call bell within reach & pt verbalized understanding following repetition with education for how to utilize for assist e.g. functional transfers in order to prevent falls.    Progression toward goals:  []          Improving appropriately and progressing toward goals  [x]          Improving slowly and progressing toward goals  []          Not making progress toward goals and plan of care will be adjusted     PLAN:  Patient continues to benefit from skilled intervention to address the above impairments. Continue treatment per established plan of care. Discharge Recommendations:  [address]  Further Equipment Recommendations for Discharge: RW, bedside commode, shower chair, grab bars     SUBJECTIVE:   Patient stated Someone said I looked like Brad Hatfield.     OBJECTIVE DATA SUMMARY:   Cognitive/Behavioral Status:  Neurologic State: Alert, Appropriate for age  Orientation Level: Oriented X4  Cognition: Appropriate decision making, Appropriate for age attention/concentration, Appropriate safety awareness, Follows commands  Safety/Judgement: Fall prevention    ADL Intervention:    Grooming  Position Performed: Seated in chair  Washing Face: Stand-by assistance  Brushing Teeth: Stand-by assistance  Brushing/Combing Hair: Moderate assistance    Pain:  Pain level pre-treatment: 0/10   Pain level post-treatment: 0/10  Pain Intervention(s): Medication (see MAR); Rest, Ice, Repositioning   Response to intervention: Nurse notified, See doc flow    Activity Tolerance:    poor  Please refer to the flowsheet for vital signs taken during this treatment. After treatment:   [x]  Patient left in no apparent distress sitting up in chair  []  Patient left in no apparent distress in bed  [x]  Call bell left within reach  [x]  Nursing notified  []  Caregiver present  []  Bed alarm activated    COMMUNICATION/EDUCATION:   [x] Role of Occupational Therapy in the acute care setting  [x] Home safety education was provided and the patient/caregiver indicated understanding. [x] Patient/family have participated as able in working towards goals and plan of care. [x] Patient/family agree to work toward stated goals and plan of care.   [] Patient understands intent and goals of therapy, but is neutral about his/her participation. [] Patient is unable to participate in goal setting and plan of care.       Thank you for this referral.  Alan Sanchez  Time Calculation: 19 mins

## 2020-07-10 NOTE — PROGRESS NOTES
Received patient from [address]. Bedside shift change report given to Obed Camargo RN (oncoming nurse) by Finn Tubbs RN (offgoing nurse). Report included the following information SBAR and Kardex.

## 2020-07-10 NOTE — PROGRESS NOTES
Discharge/Transition Planning    Problem: Discharge Planning  Goal: *Discharge to safe environment  Outcome: Progressing Towards Goal      Care Management following and chart reviewed . Text page Dr Angeles Drummond for COVID test if discharging by Monday so pt wont be medically ready to discharge waiting on COVID. He stated he would put it in if thinks will discharge by Monday. Needs SNF rehab and only 3 choices given, may need to match out further  1100: Spoke with pt about discharge plan and he would like to still do SNF rehab. Notified would need to match out further due to Rolf. Pt stated to do whatever was necessary  1500: Noted pt went to SNF on 5/11/20. Pt may likely not have been out of SNF long enough to have days started over.  Called and left voicemail for pt son to see when discharged    John Camacho RN BSN  Outcomes Manager  Office # [phone]  Pager # [phone]

## 2020-07-10 NOTE — PROGRESS NOTES
Problem: Falls - Risk of  Goal: *Absence of Falls  Description: Document Alok Nobles Fall Risk and appropriate interventions in the flowsheet. Outcome: Progressing Towards Goal  Note: Fall Risk Interventions:  Mobility Interventions: Patient to call before getting OOB         Medication Interventions: Patient to call before getting OOB    Elimination Interventions: Call light in reach    History of Falls Interventions: Evaluate medications/consider consulting pharmacy         Problem: Patient Education: Go to Patient Education Activity  Goal: Patient/Family Education  Outcome: Progressing Towards Goal     Problem: Discharge Planning  Goal: *Discharge to safe environment  Outcome: Progressing Towards Goal     Problem: Pressure Injury - Risk of  Goal: *Prevention of pressure injury  Description: Document Alex Scale and appropriate interventions in the flowsheet. Outcome: Progressing Towards Goal  Note: Pressure Injury Interventions:  Sensory Interventions: Assess changes in LOC    Moisture Interventions: Absorbent underpads    Activity Interventions: Pressure redistribution bed/mattress(bed type), PT/OT evaluation    Mobility Interventions: HOB 30 degrees or less, Pressure redistribution bed/mattress (bed type)    Nutrition Interventions: Document food/fluid/supplement intake    Friction and Shear Interventions: HOB 30 degrees or less                Problem: Patient Education: Go to Patient Education Activity  Goal: Patient/Family Education  Outcome: Progressing Towards Goal     Problem: Pain  Goal: *Control of Pain  Outcome: Progressing Towards Goal  Goal: *PALLIATIVE CARE:  Alleviation of Pain  Outcome: Progressing Towards Goal     Problem:  Body Temperature -  Risk of, Imbalanced  Goal: *Absence of heat stress or hyperthermia signs and symptoms  Outcome: Progressing Towards Goal  Goal: *Absence of cold stress or hypothermia signs and symptoms  Outcome: Progressing Towards Goal     Problem: General Medical Care Plan  Goal: *Vital signs within specified parameters  Outcome: Progressing Towards Goal  Goal: *Labs within defined limits  Outcome: Progressing Towards Goal  Goal: *Absence of infection signs and symptoms  Outcome: Progressing Towards Goal  Goal: *Optimal pain control at patient's stated goal  Outcome: Progressing Towards Goal  Goal: *Skin integrity maintained  Outcome: Progressing Towards Goal  Goal: *Fluid volume balance  Outcome: Progressing Towards Goal  Goal: *Optimize nutritional status  Outcome: Progressing Towards Goal  Goal: *Anxiety reduced or absent  Outcome: Progressing Towards Goal  Goal: *Progressive mobility and function (eg: ADL's)  Outcome: Progressing Towards Goal     Problem: Patient Education: Go to Patient Education Activity  Goal: Patient/Family Education  Outcome: Progressing Towards Goal     Problem: Patient Education: Go to Patient Education Activity  Goal: Patient/Family Education  Outcome: Progressing Towards Goal     Problem: Nutrition Deficit  Goal: *Optimize nutritional status  Outcome: Progressing Towards Goal

## 2020-07-10 NOTE — CDMP QUERY
Pt admitted with Sepsis, UTI and has malnutrition documented. Please further specify type of malnutrition. ? Mild chronic malnutrition ? Moderate chronic malnutrition ? Severe chronic malnutrition ? Other (please specify) ? Unable to determine The medical record reflects the following: 
  Risk Factors: 79 yo admitted with sepsis, UTI, dehydration Clinical Indicators: 7/9  Per Dietician note Moderate chronic malnutrition 
 
=> Mild body fat loss  buccal region 
=> Mild Muscle loss   Clavicles Pectoralis, deltoids and temple 
=> Mild fluid accumulation  Extremities 
=> Poor dentation Treatment: Diet changed from texture to soft, Ensure Enliven BID, tray set up/assist, 
Suggest MVI Thank you for your time, 
Dottie Syed RN [facility] [phone]

## 2020-07-10 NOTE — PROGRESS NOTES
Bedside shift change report given to Xi RN (oncoming nurse) by Lupe Gilliland RN (offgoing nurse). Report included the following information SBAR, Kardex, Intake/Output, MAR and Recent Results.      0841 -- AM medications given, well tolerated, will continue to monitor. 1121 -- Reassessment completed, no change in patient condition, will continue to monitor. 1500 -- Reassessment completed, no change in patient condition, will continue to monitor.      Bedside shift change report given to Diallo Casetllano RN (oncoming nurse) by Akbar Garduno RN (offgoing nurse). Report included the following information SBAR, Kardex, Intake/Output, MAR and Recent Results.

## 2020-07-11 LAB
ANION GAP SERPL CALC-SCNC: 5 MMOL/L (ref 3–18)
BASOPHILS # BLD: 0 K/UL (ref 0–0.1)
BASOPHILS NFR BLD: 0 % (ref 0–2)
BUN SERPL-MCNC: 24 MG/DL (ref 7–18)
BUN/CREAT SERPL: 29 (ref 12–20)
CALCIUM SERPL-MCNC: 8.7 MG/DL (ref 8.5–10.1)
CHLORIDE SERPL-SCNC: 116 MMOL/L (ref 100–111)
CO2 SERPL-SCNC: 26 MMOL/L (ref 21–32)
CREAT SERPL-MCNC: 0.84 MG/DL (ref 0.6–1.3)
DIFFERENTIAL METHOD BLD: ABNORMAL
EOSINOPHIL # BLD: 0.6 K/UL (ref 0–0.4)
EOSINOPHIL NFR BLD: 8 % (ref 0–5)
ERYTHROCYTE [DISTWIDTH] IN BLOOD BY AUTOMATED COUNT: 16.9 % (ref 11.6–14.5)
GLUCOSE SERPL-MCNC: 71 MG/DL (ref 74–99)
HCT VFR BLD AUTO: 29.8 % (ref 36–48)
HCT VFR BLD AUTO: 31.7 % (ref 36–48)
HGB BLD-MCNC: 10.5 G/DL (ref 13–16)
HGB BLD-MCNC: 9.9 G/DL (ref 13–16)
LYMPHOCYTES # BLD: 0.8 K/UL (ref 0.9–3.6)
LYMPHOCYTES NFR BLD: 12 % (ref 21–52)
MCH RBC QN AUTO: 30.3 PG (ref 24–34)
MCHC RBC AUTO-ENTMCNC: 33.1 G/DL (ref 31–37)
MCV RBC AUTO: 91.4 FL (ref 74–97)
MONOCYTES # BLD: 0.3 K/UL (ref 0.05–1.2)
MONOCYTES NFR BLD: 4 % (ref 3–10)
NEUTS SEG # BLD: 5.4 K/UL (ref 1.8–8)
NEUTS SEG NFR BLD: 76 % (ref 40–73)
PLATELET # BLD AUTO: 213 K/UL (ref 135–420)
PMV BLD AUTO: 11.6 FL (ref 9.2–11.8)
POTASSIUM SERPL-SCNC: 4.1 MMOL/L (ref 3.5–5.5)
RBC # BLD AUTO: 3.47 M/UL (ref 4.7–5.5)
SODIUM SERPL-SCNC: 147 MMOL/L (ref 136–145)
WBC # BLD AUTO: 7.1 K/UL (ref 4.6–13.2)

## 2020-07-11 PROCEDURE — 85018 HEMOGLOBIN: CPT

## 2020-07-11 PROCEDURE — 74011000258 HC RX REV CODE- 258: Performed by: HOSPITALIST

## 2020-07-11 PROCEDURE — 97530 THERAPEUTIC ACTIVITIES: CPT

## 2020-07-11 PROCEDURE — 85025 COMPLETE CBC W/AUTO DIFF WBC: CPT

## 2020-07-11 PROCEDURE — 74011250637 HC RX REV CODE- 250/637: Performed by: HOSPITALIST

## 2020-07-11 PROCEDURE — 74011250636 HC RX REV CODE- 250/636: Performed by: HOSPITALIST

## 2020-07-11 PROCEDURE — 65660000000 HC RM CCU STEPDOWN

## 2020-07-11 PROCEDURE — 80048 BASIC METABOLIC PNL TOTAL CA: CPT

## 2020-07-11 PROCEDURE — 36415 COLL VENOUS BLD VENIPUNCTURE: CPT

## 2020-07-11 RX ORDER — DIPHENHYDRAMINE HCL 25 MG
25 CAPSULE ORAL
Status: DISCONTINUED | OUTPATIENT
Start: 2020-07-11 | End: 2020-07-13 | Stop reason: HOSPADM

## 2020-07-11 RX ADMIN — LEVOTHYROXINE SODIUM 50 MCG: 0.03 TABLET ORAL at 06:00

## 2020-07-11 RX ADMIN — SODIUM CHLORIDE 750 MG: 900 INJECTION, SOLUTION INTRAVENOUS at 08:46

## 2020-07-11 RX ADMIN — TAMSULOSIN HYDROCHLORIDE 0.4 MG: 0.4 CAPSULE ORAL at 17:41

## 2020-07-11 RX ADMIN — PIPERACILLIN AND TAZOBACTAM 3.38 G: 3; .375 INJECTION, POWDER, LYOPHILIZED, FOR SOLUTION INTRAVENOUS at 12:04

## 2020-07-11 RX ADMIN — ATORVASTATIN CALCIUM 20 MG: 20 TABLET, FILM COATED ORAL at 22:07

## 2020-07-11 RX ADMIN — ASPIRIN 81 MG: 81 TABLET, FILM COATED ORAL at 09:00

## 2020-07-11 RX ADMIN — ALLOPURINOL 300 MG: 100 TABLET ORAL at 08:46

## 2020-07-11 RX ADMIN — SODIUM CHLORIDE 50 ML/HR: 900 INJECTION, SOLUTION INTRAVENOUS at 08:43

## 2020-07-11 RX ADMIN — DIPHENHYDRAMINE HYDROCHLORIDE 25 MG: 25 CAPSULE ORAL at 14:42

## 2020-07-11 RX ADMIN — PIPERACILLIN AND TAZOBACTAM 3.38 G: 3; .375 INJECTION, POWDER, LYOPHILIZED, FOR SOLUTION INTRAVENOUS at 17:42

## 2020-07-11 NOTE — PROGRESS NOTES
Bedside report received from [address]     2047: pt resting, AOX4, no distress, on 2L O2 nc, shift assessment completed, bed locked and low position, call bell within reach    2207: Due medication given, tolerated well    0012: pt sleeping, Schedule Zosyn administered, infusing well    0212: sleeping, HR - 65, call bell within reach    0440: sleeping no sign of any distress    0553: medication given, bathed, linen, pad and gown change    Bedside and Verbal shift change report given to Vale Bass RN (oncoming nurse) by Marcy Joy RN (offgoing nurse). Report included the following information SBAR, Kardex, ED Summary, Intake/Output, MAR, Recent Results and Cardiac Rhythm SR on tele # [phone].

## 2020-07-11 NOTE — PROGRESS NOTES
Problem: Mobility Impaired (Adult and Pediatric)  Goal: *Acute Goals and Plan of Care (Insert Text)  Description: Physical Therapy Goals  Initiated 7/8/2020 and to be accomplished within 7 day(s)  1. Patient will move from supine to sit and sit to supine  in bed with supervision/set-up. 2.  Patient will transfer from bed to chair and chair to bed with supervision/set-up using the least restrictive device. 3.  Patient will perform sit to stand with supervision/set-up. 4.  Patient will ambulate with supervision/set-up for 50 feet with the least restrictive device. Outcome: Progressing Towards Goal       PHYSICAL THERAPY: DAILY TREATMENT NOTE   INPATIENT: Medicare: Hospital Day: 6     Patient: Margarette Stallworth (33 y.o. male)    Date: 7/11/2020  Primary Diagnosis: Sepsis ([facility] [A41.9]    ,  ,   Precautions: Fall  WBAT  Chart, physical therapy assessment, plan of care and goals were reviewed. PLOF:Falls  required assistance from others with ADL's and functional activities     ASSESSMENT:  Pt seen at bedside and initially agreed to getting OOB into bedside chair. Pt max assistance with scooting up in bed and rolling right/left. Pt declined getting OOB after the exertion required to perform bed mobility. PT educated pt on the importance of getting OOB daily. Pt verbalized his understanding. Pt left in bed with all needs within [facility]. Progression toward goals:      []  Improving appropriately and progressing toward goals      [x]  Improving slowly and progressing toward goals      [] Not making progress toward goals and plan of care will be adjusted     PLAN:  Patient continues to benefit from skilled intervention to address the above impairments. Continue treatment per established plan of care.     EDUCATION:   Education:  Patient was educated on the following topics: importance of getting OOB daily   Barriers to Learning/Limitations: None  Compensate with: visual, verbal, tactile, kinesthetic cues/model    Discharge Recommendations:  [facility]  Further Equipment Recommendations for Discharge:  rolling walker  Factors which may impact discharge planning: willingness to participte in rehab. SUBJECTIVE:   Patient stated I do not feel like getting out of this bed today.     OBJECTIVE DATA SUMMARY:   Critical Behavior:  Neurologic State: Alert  Orientation Level: Oriented X4  Cognition: Follows commands  Safety/Judgement: Fall prevention      Functional Mobility:      Functional Status      Indep   (I)   Mod I   Super-vision   Min A   Mod A   Max A   Total A   Assist x2 Verbal cues Additional time Not tested   Comments   Rolling []  []  [] []    []    [x]  []  [] [] [x] []    Supine to sit []  []  [] []  []  []  []  [] [] [] [x]    Sit to supine []  []  [] []  []  []  []  [] [] [] [x]    Sit to stand []  []  [] []  []  []  []  [] [] [] [x]    Stand to sit []  []  [] []  []  []  []  [] [] [] [x]    Bed to chair transfers []  []  [] []  []  []  []  [] [] [] [x]        Balance    Good   Fair   Poor   Unable   Not tested   Comments   Sitting static []  []  []  []  [x]    Sitting dynamic []  []  []  []  [x]    Standing static []  []  []  []  [x]    Standing dynamic []  []  []  []  [x]      Mobility/Gait: NT    Stairs: NT    Vital Signs  Temp: 97.5 °F (36.4 °C)     Pulse (Heart Rate): 90     BP: (!) 153/93     Resp Rate: 18     O2 Sat (%): 92 %  Pain:  Pre treatment pain level:0/10  Post treatment pain level:0/10  Pain Scale 1: Visual  Pain Intensity 1: 0              Activity Tolerance:   Fair, mild/moderate SOB noted with bed mobility activities.      After treatment:   []  Patient left in no apparent distress sitting up in chair  [x]  Patient left in no apparent distress in bed  [x]  Call bell left within reach  [x]  Nursing notified  []  Caregiver present  []  Bed alarm activated      Beata Barrera PT   Time Calculation: 16 mins

## 2020-07-11 NOTE — PROGRESS NOTES
Progress Note      Patient: Maurisio Landrum               Sex: male          DOA: 7/6/2020       YOB: 1934      Age:  80 y.o.        LOS:  LOS: 5 days             CHIEF COMPLAINT:  Severe sepsis with shock, improved    Subjective:     Hematuria overnight, has maculopapular rash, nurse and patient thinks 2/2 abx. Objective:      Visit Vitals  BP (!) 153/93 (BP 1 Location: Right arm, BP Patient Position: At rest)   Pulse 90   Temp 97.5 °F (36.4 °C)   Resp 18   Ht 5' 7\" (1.702 m)   Wt 72.2 kg (159 lb 3.2 oz)   SpO2 92%   BMI 24.93 kg/m²       Physical Exam:  Gen:  Patient is in no distress. No complaint  HEENT and Neck:  PERRL, No cervical tenderness or masses  Lungs:  Clear bilaterally. No rales, no wheeze. Normal effort. Heart:  Regular Rate and Rhythm. No murmur or gallop. No JVD. No edema. Abdomen:  Soft, non tender, no masses, no Hepatosplenomegally  Extremities:  Note maculopapular rash chest arms and back  Neuro:  Alert and oriented, Normal affect, Cranial nerves intact, No sensory deficits        Lab/Data Reviewed:  BMP:   Lab Results   Component Value Date/Time     (H) 07/11/2020 03:55 AM    K 4.1 07/11/2020 03:55 AM     (H) 07/11/2020 03:55 AM    CO2 26 07/11/2020 03:55 AM    AGAP 5 07/11/2020 03:55 AM    GLU 71 (L) 07/11/2020 03:55 AM    BUN 24 (H) 07/11/2020 03:55 AM    CREA 0.84 07/11/2020 03:55 AM    GFRAA >60 07/11/2020 03:55 AM    GFRNA >60 07/11/2020 03:55 AM     CBC:   Lab Results   Component Value Date/Time    WBC 7.1 07/11/2020 03:55 AM    HGB 10.5 (L) 07/11/2020 03:55 AM    HCT 31.7 (L) 07/11/2020 03:55 AM     07/11/2020 03:55 AM           Assessment/Plan     Active Problems:    Sepsis ([address] (7/6/2020)    UTI  Hyperlipidemia  Chronic kidney disease  Chronic systolic CHF with pacemaker  Rash  Possible RLL PNA  Plan:  Change abx zosyn, 2 more days treatment  Monitor Hematuria, hold heparin. Look for resolution, OP urology if continues.   SNF when able

## 2020-07-11 NOTE — PROGRESS NOTES
Received patient from [facility]. Pt awake and in bed. Denies pain. Bed locked in lowest position and call light within reach. Pt noted with mojica. Bloody drainage in bag per report from off-going RN. Will continue to monitor    Reassessment, No changes    0333; Reassessment, No changes. Updated Dr Emigdio Leija about pt's hematuria. 550 ml emptied in mojica. Orders for bladder scan and possible urology consult in AM.    0400: bladder scan complete. No retention noted. Plan to hold AM heparin. Updated Dr Emigdio Leija. He agrees. Also check H&H Q 12hrs. RBV    0650: Dr Emigdio Leija on unit. Assessed pt's hematuria. Bedside shift change report given to Leticia Scott (oncoming nurse) by Ngoc López RN (offgoing nurse). Report included the following information SBAR, Intake/Output, MAR and Quality Measures. Opportunity for questions and clarification provided.

## 2020-07-11 NOTE — PROGRESS NOTES
0740  Received pt on bed, rashes noted on his abdomen and  The whole back, no scratch noted and he said it I not itching him, mojica is dark red  nd no clots so far, monitor. 1000  Seen by Dr Gilbert Knox aware of the color of the mojica output, result of CBC given to him and seen pt  Skin, order for benadryl, no respiratory distress. 1100  Been sleeping, Benadryl was not given yet, not scratching noted. 1600  Resting well, no itching  This time. 1900  Urine  Remain bloody, no clots.

## 2020-07-11 NOTE — PROGRESS NOTES
Discharge/Transition Planning    Care Manager noted Case Management consult. CM asked MD if yesterdaydischarging on weekend and COVID r/o to be ordered if so. Do not have bed secured as of yet and no COVID test was ordered. Sent messages to all facilities asking if bed available this weekend.  Of note pt was having jason blood in urine early AM also and it was being asked for H&H q12 x2 and possible urology consult by night MD.     Need rapid covid if available or send out and CM will see if facilities looking at referrals in consults over weekend      Christina Negron RN BSN  Outcomes Manager  Office # [phone]  Pager # [phone]

## 2020-07-11 NOTE — PROGRESS NOTES
INTERIM UPDATE - 7650 EST on 7/11/2020    Nursing Staff reports that Patient is having \"jason hematuria\" and has had about 500 mL of Blood Urine. Nursing Staff was asked to perform a Bladder Scan and Patient's Bladder is empty, indicating that there is no urinary outlet obstruction. Plan:  HOLD Heparin. H&H q12 hrs x2.   Consider Urological consult in AM.

## 2020-07-12 LAB
BACTERIA SPEC CULT: NORMAL
BACTERIA SPEC CULT: NORMAL
COVID-19 RAPID TEST, COVR: NOT DETECTED
SERVICE CMNT-IMP: NORMAL
SERVICE CMNT-IMP: NORMAL
SOURCE, COVRS: NORMAL

## 2020-07-12 PROCEDURE — 74011250637 HC RX REV CODE- 250/637: Performed by: HOSPITALIST

## 2020-07-12 PROCEDURE — 74011250636 HC RX REV CODE- 250/636: Performed by: HOSPITALIST

## 2020-07-12 PROCEDURE — 97530 THERAPEUTIC ACTIVITIES: CPT

## 2020-07-12 PROCEDURE — 65660000000 HC RM CCU STEPDOWN

## 2020-07-12 PROCEDURE — 74011000258 HC RX REV CODE- 258: Performed by: HOSPITALIST

## 2020-07-12 PROCEDURE — 77010033678 HC OXYGEN DAILY

## 2020-07-12 PROCEDURE — 87635 SARS-COV-2 COVID-19 AMP PRB: CPT

## 2020-07-12 RX ADMIN — PIPERACILLIN AND TAZOBACTAM 3.38 G: 3; .375 INJECTION, POWDER, LYOPHILIZED, FOR SOLUTION INTRAVENOUS at 17:08

## 2020-07-12 RX ADMIN — PIPERACILLIN AND TAZOBACTAM 3.38 G: 3; .375 INJECTION, POWDER, LYOPHILIZED, FOR SOLUTION INTRAVENOUS at 00:12

## 2020-07-12 RX ADMIN — ASPIRIN 81 MG: 81 TABLET, FILM COATED ORAL at 09:00

## 2020-07-12 RX ADMIN — PIPERACILLIN AND TAZOBACTAM 3.38 G: 3; .375 INJECTION, POWDER, LYOPHILIZED, FOR SOLUTION INTRAVENOUS at 09:33

## 2020-07-12 RX ADMIN — ATORVASTATIN CALCIUM 20 MG: 20 TABLET, FILM COATED ORAL at 21:23

## 2020-07-12 RX ADMIN — TAMSULOSIN HYDROCHLORIDE 0.4 MG: 0.4 CAPSULE ORAL at 17:08

## 2020-07-12 RX ADMIN — ALLOPURINOL 300 MG: 100 TABLET ORAL at 09:32

## 2020-07-12 RX ADMIN — DIPHENHYDRAMINE HYDROCHLORIDE 25 MG: 25 CAPSULE ORAL at 13:09

## 2020-07-12 RX ADMIN — LEVOTHYROXINE SODIUM 50 MCG: 0.03 TABLET ORAL at 05:53

## 2020-07-12 NOTE — ROUTINE PROCESS
4074 Received report from [facility]. Patient alert and oriented. 1030 Specimen for Covid rapid test collected and sent to the lab. 
  
1200 Patient up sitting in the recliner for lunch. 1400 Patient back in the bed and positioned comfortably. Call bell and telephom=ne placed within reach. Covid test result negative. 1600 Patient resting comfortably in the bed. 
 
1920 Bedside and Verbal shift change report given to [address] [place] [address] (oncoming nurse) by me (offgoing nurse). Report included the following information SBAR, Kardex, Intake/Output, MAR, Recent Results and Cardiac Rhythm paced.

## 2020-07-12 NOTE — PROGRESS NOTES
Progress Note      Patient: Maurisio Landrum               Sex: male          DOA: 7/6/2020       YOB: 1934      Age:  80 y.o.        LOS:  LOS: 6 days             CHIEF COMPLAINT:  Severe sepsis with shock, improved    Subjective:     Patient seen and examined  No complaints , will need OP Urology    Objective:      Visit Vitals  /61 (BP 1 Location: Left arm, BP Patient Position: At rest)   Pulse 78   Temp 97.6 °F (36.4 °C)   Resp 18   Ht 5' 7\" (1.702 m)   Wt 74.2 kg (163 lb 9.6 oz)   SpO2 94%   BMI 25.62 kg/m²       Physical Exam:  Gen:  Patient is in no distress. No complaint  HEENT and Neck:  PERRL, No cervical tenderness or masses  Lungs:  Clear bilaterally. No rales, no wheeze. Normal effort. Heart:  Regular Rate and Rhythm. No murmur or gallop. No JVD. No edema. Abdomen:  Soft, non tender, no masses, no Hepatosplenomegally  Extremities:  Note maculopapular rash chest arms and back  Neuro:  Alert and oriented, Normal affect, Cranial nerves intact, No sensory deficits        Lab/Data Reviewed:  BMP:   No results found for: NA, K, CL, CO2, AGAP, GLU, BUN, CREA, GFRAA, GFRNA  CBC:   Lab Results   Component Value Date/Time    HGB 9.9 (L) 07/11/2020 03:50 PM    HCT 29.8 (L) 07/11/2020 03:50 PM           Assessment/Plan     Active Problems:    Sepsis ([address] (7/6/2020)    UTI  Hyperlipidemia  Chronic kidney disease  Chronic systolic CHF with pacemaker  Rash  Possible RLL PNA    Plan:  Change abx zosyn, ends tomorrow  Monitor Hematuria, hold heparin. Look for resolution, OP urology if continues.   Rapid covid today, plan snf tomorrow if negative  Stop IVF  Rash treat with benadryl

## 2020-07-12 NOTE — PROGRESS NOTES
Problem: Falls - Risk of  Goal: *Absence of Falls  Description: Document Tere Burrell Fall Risk and appropriate interventions in the flowsheet. Outcome: Progressing Towards Goal  Note: Fall Risk Interventions:  Mobility Interventions: Patient to call before getting OOB, OT consult for ADLs, PT Consult for mobility concerns, PT Consult for assist device competence         Medication Interventions: Patient to call before getting OOB, Teach patient to arise slowly    Elimination Interventions: Call light in reach, Patient to call for help with toileting needs, Urinal in reach    History of Falls Interventions: Door open when patient unattended         Problem: Pressure Injury - Risk of  Goal: *Prevention of pressure injury  Description: Document Alex Scale and appropriate interventions in the flowsheet. Outcome: Progressing Towards Goal  Note: Pressure Injury Interventions:  Sensory Interventions: Assess changes in LOC, Keep linens dry and wrinkle-free, Turn and reposition approx. every two hours (pillows and wedges if needed), Monitor skin under medical devices    Moisture Interventions: Maintain skin hydration (lotion/cream)    Activity Interventions: Pressure redistribution bed/mattress(bed type), Increase time out of bed    Mobility Interventions: HOB 30 degrees or less, Turn and reposition approx. every two hours(pillow and wedges)    Nutrition Interventions: Document food/fluid/supplement intake    Friction and Shear Interventions: HOB 30 degrees or less                Problem: Patient Education: Go to Patient Education Activity  Goal: Patient/Family Education  Outcome: Progressing Towards Goal     Problem: Pain  Goal: *Control of Pain  Outcome: Progressing Towards Goal     Problem:  Body Temperature -  Risk of, Imbalanced  Goal: *Absence of heat stress or hyperthermia signs and symptoms  Outcome: Progressing Towards Goal  Goal: *Absence of cold stress or hypothermia signs and symptoms  Outcome: Progressing Towards Goal     Problem: General Medical Care Plan  Goal: *Vital signs within specified parameters  Outcome: Progressing Towards Goal  Goal: *Labs within defined limits  Outcome: Progressing Towards Goal  Goal: *Absence of infection signs and symptoms  Outcome: Progressing Towards Goal  Goal: *Optimal pain control at patient's stated goal  Outcome: Progressing Towards Goal  Goal: *Skin integrity maintained  Outcome: Progressing Towards Goal  Goal: *Fluid volume balance  Outcome: Progressing Towards Goal

## 2020-07-12 NOTE — PROGRESS NOTES
Problem: Mobility Impaired (Adult and Pediatric)  Goal: *Acute Goals and Plan of Care (Insert Text)  Description: Physical Therapy Goals  Initiated 7/8/2020 and to be accomplished within 7 day(s)  1. Patient will move from supine to sit and sit to supine  in bed with supervision/set-up. 2.  Patient will transfer from bed to chair and chair to bed with supervision/set-up using the least restrictive device. 3.  Patient will perform sit to stand with supervision/set-up. 4.  Patient will ambulate with supervision/set-up for 50 feet with the least restrictive device. Outcome: Progressing Towards Goal       PHYSICAL THERAPY: DAILY TREATMENT NOTE   INPATIENT: Medicare: Hospital Day: 7     Patient: Meggan Leggett (4 y.o. male)    Date: 7/12/2020  Primary Diagnosis: Sepsis ([facility] [address] [A41.9]    ,  ,   Precautions: Fall  WBAT  Chart, physical therapy assessment, plan of care and goals were reviewed. PLOF:Mod I. Living with son and daughter in law    ASSESSMENT:  Pt eager to get into bedside chair d/t getting \"fussed\" at for note getting up with PT yesterday. Pt tolerated treatment well with noted fatigue and SON noted after ambulation and SPT. Pt mod assistance for bed mobility specifically LE and trunk management with sup<>sit. Pt mod assistance with sit<>stand and SPT's. Moderate vcs for sequencing and hand placement on FWW> Pt min assistance with ambulation of 4 ft with FWW. Pt left seated in chair eating lunch with all needs within reach. Progression toward goals:      [x]  Improving appropriately and progressing toward goals      []  Improving slowly and progressing toward goals      [] Not making progress toward goals and plan of care will be adjusted     PLAN:  Patient continues to benefit from skilled intervention to address the above impairments. Continue treatment per established plan of care.     EDUCATION:   Education:  Patient was educated on the following topics: hand placement on FWW and task sequencing during SPT's  +   Barriers to Learning/Limitations: None  Compensate with: visual, verbal, tactile, kinesthetic cues/model    Discharge Recommendations:  Khai Ramsey  Further Equipment Recommendations for Discharge:  N/A  Factors which may impact discharge planning: if pt is able to be admitted into a nursing home d/t insurance restrictions. SUBJECTIVE:   Patient stated I got fussed at today because I did not get out of the bed yesterday.     OBJECTIVE DATA SUMMARY:   Critical Behavior:  Neurologic State: Alert  Orientation Level: Oriented X4  Cognition: Follows commands  Safety/Judgement: Fall prevention      Functional Mobility:      Functional Status    Indep   (I)   Mod I   Super-vision   Min A   Mod A   Max A   Total A   Assist x2 Verbal cues Additional time Not tested   Comments   Rolling []  []  [] []    [x]    []  []  [] [] [] []    Supine to sit []  []  [] []  [x]  []  []  [] [] [] []    Sit to supine []  []  [] []  [x]  []  []  [] [] [] []    Sit to stand []  []  [] []  [x]  []  []  [] [] [] []    Stand to sit []  []  [] []  [x]  []  []  [] [] [] []    Bed to chair transfers []  []  [] []  [x]  []  []  [] [] [] []        Balance    Good   Fair   Poor   Unable   Not tested   Comments   Sitting static []  []  [x]  []  []    Sitting dynamic []  []  [x]  []  []    Standing static []  []  [x]  []  []    Standing dynamic []  []  [x]  []  []      Mobility/Gait:   Level of Assistance: Minimum assistance  Assistive Device: rolling walker  Distance Ambulated: 4 feet     Left Lower Extremity: WBAT  Right Lower Extremity: WBAT  Base of Support: [facility]  Speed/Rena: shuffled  Step Length: WFL  Swing Pattern: WFL  Stance: weight shift  Gait Abnormalities: shuffling gait    Stairs: NT    Vital Signs  Temp: 97.6 °F (36.4 °C)     Pulse (Heart Rate): 69     BP: 121/67     Resp Rate: 16     O2 Sat (%): 96 %  Pain:  Pre treatment pain level:0/10  Post treatment pain level:0/10  Pain Scale 1: Visual  Pain Intensity 1: 0              Activity Tolerance:   Fair     After treatment:   [x]  Patient left in no apparent distress sitting up in chair  []  Patient left in no apparent distress in bed  [x]  Call bell left within reach  [x]  Nursing notified  []  Caregiver present  []  Bed alarm activated      Lesia Gates, PT   Time Calculation: 18 mins

## 2020-07-12 NOTE — PROGRESS NOTES
Problem: Falls - Risk of  Goal: *Absence of Falls  Description: Document Luis Sanford Fall Risk and appropriate interventions in the flowsheet. Outcome: Progressing Towards Goal  Note: Fall Risk Interventions:  Mobility Interventions: Bed/chair exit alarm, Patient to call before getting OOB         Medication Interventions: Bed/chair exit alarm, Patient to call before getting OOB    Elimination Interventions: Toileting schedule/hourly rounds, Patient to call for help with toileting needs, Call light in reach    History of Falls Interventions: Door open when patient unattended, Room close to nurse's station         Problem: Patient Education: Go to Patient Education Activity  Goal: Patient/Family Education  Outcome: Progressing Towards Goal     Problem: Discharge Planning  Goal: *Discharge to safe environment  Outcome: Progressing Towards Goal     Problem: Pressure Injury - Risk of  Goal: *Prevention of pressure injury  Description: Document Alex Scale and appropriate interventions in the flowsheet.   Outcome: Progressing Towards Goal  Note: Pressure Injury Interventions:  Sensory Interventions: Avoid rigorous massage over bony prominences, Assess changes in LOC, Minimize linen layers    Moisture Interventions: Apply protective barrier, creams and emollients, Minimize layers    Activity Interventions: Pressure redistribution bed/mattress(bed type)    Mobility Interventions: HOB 30 degrees or less, Pressure redistribution bed/mattress (bed type)    Nutrition Interventions: Offer support with meals,snacks and hydration    Friction and Shear Interventions: HOB 30 degrees or less, Minimize layers                Problem: Patient Education: Go to Patient Education Activity  Goal: Patient/Family Education  Outcome: Progressing Towards Goal     Problem: Pain  Goal: *Control of Pain  Outcome: Progressing Towards Goal  Goal: *PALLIATIVE CARE:  Alleviation of Pain  Outcome: Progressing Towards Goal     Problem: General Medical Care Plan  Goal: *Vital signs within specified parameters  Outcome: Progressing Towards Goal  Goal: *Labs within defined limits  Outcome: Progressing Towards Goal  Goal: *Absence of infection signs and symptoms  Outcome: Progressing Towards Goal  Goal: *Optimal pain control at patient's stated goal  Outcome: Progressing Towards Goal  Goal: *Skin integrity maintained  Outcome: Progressing Towards Goal  Goal: *Fluid volume balance  Outcome: Progressing Towards Goal  Goal: *Optimize nutritional status  Outcome: Progressing Towards Goal  Goal: *Anxiety reduced or absent  Outcome: Progressing Towards Goal  Goal: *Progressive mobility and function (eg: ADL's)  Outcome: Progressing Towards Goal     Problem: Nutrition Deficit  Goal: *Optimize nutritional status  Outcome: Progressing Towards Goal

## 2020-07-12 NOTE — PROGRESS NOTES
Discharge/Transition Planning     Problem: Discharge Planning  Goal: *Discharge to safe environment  Outcome: Progressing Towards Goal    Dr Salguero [facility] [address] [facility] [address] [facility] [address] [facility] and CM asked if can take tomorrow, Monday and waiting for response . [address] had showed interest Friday but their SNF beds were running short. Only other concern is pt has not been out of SNF rehab for 60s days and may not have many SNF rehab days left if any which could indicate he will have to go Home with Home Health and yesterday was doing very little with therapy unlike Friday when he got out of bed to chair with therapy    1030: Spoke with pt in room and notified of 2 facilities that had put accept in but no confirmation that bed placement available yet. Explained that he had not been out of SNF over 60 days and unclear how many days he has available left and may have to go home with home health. Explained he needs to try and work with therapy and staff to mobilize and be out of bed especially if he does have to go home. Pt verbalized understanding. Daughter in law is care giver at home. Notified Dr Salguero and also notified him that pt has mojica and did not at home doesn't look like. Also notified Dr Salguero no signed DNR and unsure if one at home or new .  Updated CNA/RN/PT [facility] and needs to mobilize    Allison Toledo RN BSN  Outcomes Manager  Office # [phone]  Pager # [phone]

## 2020-07-12 NOTE — PROGRESS NOTES
UAI completed at [facility] 12/28/2019 and LTSS were authorized. UAI uploaded into [place].     Renetta Garner RN    Outcomes Manager  [phone]  [phone]

## 2020-07-13 VITALS
TEMPERATURE: 96.8 F | OXYGEN SATURATION: 96 % | BODY MASS INDEX: 26.21 KG/M2 | HEIGHT: 67 IN | SYSTOLIC BLOOD PRESSURE: 126 MMHG | DIASTOLIC BLOOD PRESSURE: 70 MMHG | WEIGHT: 167 LBS | HEART RATE: 68 BPM | RESPIRATION RATE: 18 BRPM

## 2020-07-13 LAB
ANION GAP SERPL CALC-SCNC: 5 MMOL/L (ref 3–18)
BASOPHILS # BLD: 0 K/UL (ref 0–0.1)
BASOPHILS NFR BLD: 0 % (ref 0–2)
BUN SERPL-MCNC: 25 MG/DL (ref 7–18)
BUN/CREAT SERPL: 29 (ref 12–20)
CALCIUM SERPL-MCNC: 8.3 MG/DL (ref 8.5–10.1)
CHLORIDE SERPL-SCNC: 115 MMOL/L (ref 100–111)
CO2 SERPL-SCNC: 25 MMOL/L (ref 21–32)
CREAT SERPL-MCNC: 0.85 MG/DL (ref 0.6–1.3)
DIFFERENTIAL METHOD BLD: ABNORMAL
EOSINOPHIL # BLD: 0.9 K/UL (ref 0–0.4)
EOSINOPHIL NFR BLD: 10 % (ref 0–5)
ERYTHROCYTE [DISTWIDTH] IN BLOOD BY AUTOMATED COUNT: 17.1 % (ref 11.6–14.5)
GLUCOSE SERPL-MCNC: 71 MG/DL (ref 74–99)
HCT VFR BLD AUTO: 32.5 % (ref 36–48)
HGB BLD-MCNC: 10.7 G/DL (ref 13–16)
LYMPHOCYTES # BLD: 0.7 K/UL (ref 0.9–3.6)
LYMPHOCYTES NFR BLD: 8 % (ref 21–52)
MCH RBC QN AUTO: 30.1 PG (ref 24–34)
MCHC RBC AUTO-ENTMCNC: 32.9 G/DL (ref 31–37)
MCV RBC AUTO: 91.5 FL (ref 74–97)
MONOCYTES # BLD: 0.3 K/UL (ref 0.05–1.2)
MONOCYTES NFR BLD: 3 % (ref 3–10)
NEUTS SEG # BLD: 7.2 K/UL (ref 1.8–8)
NEUTS SEG NFR BLD: 79 % (ref 40–73)
PLATELET # BLD AUTO: 218 K/UL (ref 135–420)
PMV BLD AUTO: 10.8 FL (ref 9.2–11.8)
POTASSIUM SERPL-SCNC: 4.3 MMOL/L (ref 3.5–5.5)
RBC # BLD AUTO: 3.55 M/UL (ref 4.7–5.5)
SODIUM SERPL-SCNC: 145 MMOL/L (ref 136–145)
WBC # BLD AUTO: 9.1 K/UL (ref 4.6–13.2)

## 2020-07-13 PROCEDURE — 97535 SELF CARE MNGMENT TRAINING: CPT

## 2020-07-13 PROCEDURE — 74011000258 HC RX REV CODE- 258: Performed by: HOSPITALIST

## 2020-07-13 PROCEDURE — 36415 COLL VENOUS BLD VENIPUNCTURE: CPT

## 2020-07-13 PROCEDURE — 80048 BASIC METABOLIC PNL TOTAL CA: CPT

## 2020-07-13 PROCEDURE — 74011250637 HC RX REV CODE- 250/637: Performed by: HOSPITALIST

## 2020-07-13 PROCEDURE — 85025 COMPLETE CBC W/AUTO DIFF WBC: CPT

## 2020-07-13 PROCEDURE — 74011250636 HC RX REV CODE- 250/636: Performed by: HOSPITALIST

## 2020-07-13 PROCEDURE — 97530 THERAPEUTIC ACTIVITIES: CPT

## 2020-07-13 RX ADMIN — LEVOTHYROXINE SODIUM 50 MCG: 0.03 TABLET ORAL at 06:30

## 2020-07-13 RX ADMIN — PIPERACILLIN AND TAZOBACTAM 3.38 G: 3; .375 INJECTION, POWDER, LYOPHILIZED, FOR SOLUTION INTRAVENOUS at 00:34

## 2020-07-13 RX ADMIN — PIPERACILLIN AND TAZOBACTAM 3.38 G: 3; .375 INJECTION, POWDER, LYOPHILIZED, FOR SOLUTION INTRAVENOUS at 08:01

## 2020-07-13 RX ADMIN — ALLOPURINOL 300 MG: 100 TABLET ORAL at 09:20

## 2020-07-13 RX ADMIN — ASPIRIN 81 MG: 81 TABLET, FILM COATED ORAL at 09:00

## 2020-07-13 NOTE — PROGRESS NOTES
Problem: Mobility Impaired (Adult and Pediatric)  Goal: *Acute Goals and Plan of Care (Insert Text)  Description: Physical Therapy Goals  Initiated 7/8/2020 and to be accomplished within 7 day(s)  1. Patient will move from supine to sit and sit to supine  in bed with supervision/set-up. 2.  Patient will transfer from bed to chair and chair to bed with supervision/set-up using the least restrictive device. 3.  Patient will perform sit to stand with supervision/set-up. 4.  Patient will ambulate with supervision/set-up for 50 feet with the least restrictive device. Outcome: Progressing Towards Goal   PHYSICAL THERAPY TREATMENT    Patient: Kwan Aguayo (16 y.o. male)  Date: 7/13/2020  Diagnosis: Sepsis ([facility] [address] [A41.9]   Sepsis ([facility] [address]       Precautions: Fall  PLOF:     ASSESSMENT:  Nurse cleared patient for participation in skilled physical therapy. Patient received reclined in bed, awake and alert, agreeable to physical therapy treatment. Patient with 1 LPM O2 per NC, +mojica catheter. Patient stated he may be going home today and declined to try to transfer to chair but agreeable to standing activity. Max assist x 1 person required for supine to sit with increased time and bed modified with HOB elevated. Fair- to poor sitting balance at edge of bed with min to moderate assist to maintain balance with posterior lean. Verbal and tactile cues given for anterior weight shift. Sit to stand transfer x 2 times with max assist x 1 person and elevated bed height for transfer. Moderate assist in standing with RW for balance due to posterior lean. Patient able to weight shift in standing and perform one side step to right toward head of bed with mod assist for balance and walker management. Sand to sit with max assist to control descent. Sit to supine max assist for trunk and LE management with cues for sequencing task.  Patient left reclined in bed, HOB elevated 30 deg, all needs in reach, call bell in reach, in NAD. Continue to recommend SNF. Progression toward goals:   []      Improving appropriately and progressing toward goals  [x]      Improving slowly and progressing toward goals  []      Not making progress toward goals and plan of care will be adjusted     PLAN:  Patient continues to benefit from skilled intervention to address the above impairments. Continue treatment per established plan of care. Discharge Recommendations:  Khai Ramsey  Further Equipment Recommendations for Discharge:  Hospital bed, wheelchair, [facility]     SUBJECTIVE:   Patient stated I just don't have the strength to do it on my own. I'm sorry.     OBJECTIVE DATA SUMMARY:   Critical Behavior:  Neurologic State: Alert  Orientation Level: Oriented X4  Cognition: Follows commands  Safety/Judgement: Fall prevention  Functional Mobility Training:  Bed Mobility:  Rolling: Maximum assistance  Supine to Sit: Maximum assistance  Sit to Supine: Maximum assistance  Scooting: Maximum assistance         Transfers:  Sit to Stand: Maximum assistance;Assist x1(bed elevated)  Stand to Sit: Maximum assistance     Balance:  Sitting: Impaired; With support  Sitting - Static: Fair (occasional)  Sitting - Dynamic: Poor (constant support)  Standing: Impaired; With support  Standing - Static: Poor  Standing - Dynamic : Poor   Range Of Motion:   AROM: Generally decreased, functional   Ambulation/Gait Training:  Gait Description (WDL): (side step to right x 1 step)   Moderate assistance with RW      Pain:  Pain level pre-treatment: 0/10  Pain level post-treatment: 0/10   Pain Intervention(s): Medication (see MAR); Rest,  Repositioning   Response to intervention: Nurse notified, See doc flow    Activity Tolerance:   Fair+  Please refer to the flowsheet for vital signs taken during this treatment.   After treatment:   [] Patient left in no apparent distress sitting up in chair  [x] Patient left in no apparent distress in bed  [x] Call bell left within reach  [x] Nursing notified  [] Caregiver present  [] Bed alarm activated  [] SCDs applied      COMMUNICATION/EDUCATION:   [x]         Role of Physical Therapy in the acute care setting. [x]         Fall prevention education was provided and the patient/caregiver indicated understanding. [x]         Patient/family have participated as able in working toward goals and plan of care. [x]         Patient/family agree to work toward stated goals and plan of care. []         Patient understands intent and goals of therapy, but is neutral about his/her participation.   []         Patient is unable to participate in stated goals/plan of care: ongoing with therapy staff.  []         Other:        Joceline Lam DPT   Time Calculation: 23 mins

## 2020-07-13 NOTE — PROGRESS NOTES
Problem: Self Care Deficits Care Plan (Adult)  Goal: *Acute Goals and Plan of Care (Insert Text)  Description: Occupational Therapy Goals  Initiated 7/8/2020 within 7 day(s). 1.  Patient will perform grooming with modified independence. 2.  Patient will perform bathing with modified independence. 3.  Patient will perform upper body dressing and lower body dressing with modified independence using adaptive equipment. 4.  Patient will perform bedside commode transfers with modified independence using RW. 5.  Patient will perform all aspects of toileting with modified independence. 6.  Patient will participate in upper extremity therapeutic exercise/activities with modified independence for 8 minutes. 7.  Patient will utilize energy conservation techniques during functional activities with min verbal cues. Prior Level of Function: Supv with bathing/showers, Mod I w/ dressing, Mod I using RW in home, uses w/c in community     Outcome: Progressing Towards Goal     Problem: Patient Education: Go to Patient Education Activity  Goal: Patient/Family Education  Outcome: Progressing Towards Goal   OCCUPATIONAL THERAPY TREATMENT    Patient: Yolanda Haney (57 y.o. male)  Date: 7/13/2020  Diagnosis: Sepsis ([facility] [address] [A41.9]   Sepsis ([facility] [address]       Precautions: Fall    Chart, occupational therapy assessment, plan of care, and goals were reviewed. ASSESSMENT:  Nursing/RN cleared pt to participate in OT tx. Patient presents sitting up in bed, reports he was too tired to get out of bed today to sit up in the chair. Patient provided initiation cue to utilize urinal, pt able to retrieve urinal from bed rail and set up w/ Mod I-nursing notified. Patient provided with Sani-hands wipe for hand hygiene following toilet tasks. Call bell within reach & pt verbalized understanding and provided return demonstration to utilize for assist e.g. functional transfers in order to prevent falls.    Progression toward goals:  [] Improving appropriately and progressing toward goals  [x]          Improving slowly and progressing toward goals  []          Not making progress toward goals and plan of care will be adjusted     PLAN:  Patient continues to benefit from skilled intervention to address the above impairments. Continue treatment per established plan of care. Discharge Recommendations:  Home Health  Further Equipment Recommendations for Discharge:  bedside commode, shower chair, rolling walker, and wheelchair      SUBJECTIVE:   Patient stated I am just weak.     OBJECTIVE DATA SUMMARY:   Cognitive/Behavioral Status:  Neurologic State: Alert  Orientation Level: Oriented X4  Cognition: Follows commands  Safety/Judgement: Fall prevention    Functional Mobility and Transfers for ADLs:   Bed Mobility:  Rolling: Maximum assistance  Supine to Sit: Maximum assistance  Sit to Supine: Maximum assistance  Scooting: Maximum assistance   Transfers:  Sit to Stand: Maximum assistance;Assist x1(bed elevated)  Stand to Sit: Maximum assistance    Balance:  Sitting: Impaired; With support  Sitting - Static: Fair (occasional)  Sitting - Dynamic: Poor (constant support)  Standing: Impaired; With support  Standing - Static: Poor  Standing - Dynamic : Poor    ADL Intervention:    Toileting  Bladder Hygiene: Modified independent(urinal)    Cognitive Retraining  Safety/Judgement: Fall prevention    Pain:  Pain level pre-treatment: 0/10   Pain level post-treatment: 0/10  Pain Intervention(s): Medication (see MAR); Rest, Ice, Repositioning   Response to intervention: Nurse notified, See doc flow    Activity Tolerance:    poor  Please refer to the flowsheet for vital signs taken during this treatment.   After treatment:   []  Patient left in no apparent distress sitting up in chair  [x]  Patient left in no apparent distress in bed  [x]  Call bell left within reach  [x]  Nursing notified  []  Caregiver present  []  Bed alarm activated    COMMUNICATION/EDUCATION: [x] Role of Occupational Therapy in the acute care setting  [x] Home safety education was provided and the patient/caregiver indicated understanding. [x] Patient/family have participated as able in working towards goals and plan of care. [x] Patient/family agree to work toward stated goals and plan of care. [] Patient understands intent and goals of therapy, but is neutral about his/her participation. [] Patient is unable to participate in goal setting and plan of care.       Thank you for this referral.  Liane Sanchez  Time Calculation: 12 mins

## 2020-07-13 NOTE — PROGRESS NOTES
0800  Received pt on bed, alert and oriented, urine still  Dark  blood noted, no  clots. 1000  Refused to get OOB, had BM today, no skin breakdown. 1400  Farrell removed, due to void. 1430   Void  Only small  Amount not even measured. Discharge instructions given, verbalized understanding, to be  at 1630 by ambulance, still has to void. 1611  Incontinent of urine, discharge with  Medical transport.

## 2020-07-13 NOTE — PROGRESS NOTES
Discharge/Transition Planning     Problem: Discharge Planning  Goal: *Discharge to safe environment  Outcome: Progressing Towards Goal    Sent messages via [employer] to all SNF facilities asking if bed available today. COVID negative test uploaded. Pt medicare days would need to be verified by facility as well    0915: Spoke with [facility] J[facility] Guzman and pt copay days in effect with insurance and is $178/day. Spoke with son and they are unable to afford it and pt will have to go home with home health    1015: Sent message through [facility] to Dr Beatrice Sosa for [facility] orders     [phone]: Referral sent to [facility] through [employer]. Provider list has been given to the patient and/or patient representative. Patient and/or patient representative has signed the [place] of Choice selecting ___[facility]______________________as their preference agency and a copy given. Both Home Health Provider list and Freedom of Choice have been placed on the chart.     1200: Called Marleen and notified Winter Enrique pt discharged  Care Management Interventions  PCP Verified by CM: Yes(Dr Acuña)  Last Visit to PCP: 07/03/20  Mode of Transport at Discharge: Self(family)  Transition of Care Consult (CM Consult): [address]: No  Reason Outside [place]: Patient already serviced by other home care/hospice agency  Physical Therapy Consult: Yes  Occupational Therapy Consult: Yes  Current Support Network: Relative's Home  Confirm Follow Up Transport: Family  The Plan for Transition of Care is Related to the Following Treatment Goals : resolution of acute needs  The Patient and/or Patient Representative was Provided with a Choice of Provider and Agrees with the Discharge Plan?: Yes  Name of the Patient Representative Who was Provided with a Choice of Provider and Agrees with the Discharge Plan: Ether Panda  Freedom of Choice List was Provided with Basic Dialogue that Supports the Patient's Individualized Plan of Care/Goals, Treatment Preferences and Shares the Quality Data Associated with the Providers?: Yes  [place] Resource Information Provided?: No  Discharge Location  Discharge Placement: Home with home health    Janene Jones RN BSN  Outcomes Manager  Office # [phone]  Pager # [phone]

## 2020-07-13 NOTE — PROGRESS NOTES
Problem: Falls - Risk of  Goal: *Absence of Falls  Description: Document Idalmis Go Fall Risk and appropriate interventions in the flowsheet. Outcome: Progressing Towards Goal  Note: Fall Risk Interventions:  Mobility Interventions: Communicate number of staff needed for ambulation/transfer, Patient to call before getting OOB, PT Consult for mobility concerns         Medication Interventions: Evaluate medications/consider consulting pharmacy, Patient to call before getting OOB    Elimination Interventions: Call light in reach, Stay With Me (per policy), Patient to call for help with toileting needs    History of Falls Interventions: Consult care management for discharge planning, Door open when patient unattended         Problem: Patient Education: Go to Patient Education Activity  Goal: Patient/Family Education  Outcome: Progressing Towards Goal     Problem: Discharge Planning  Goal: *Discharge to safe environment  Outcome: Progressing Towards Goal     Problem: Pressure Injury - Risk of  Goal: *Prevention of pressure injury  Description: Document Alex Scale and appropriate interventions in the flowsheet.   Outcome: Progressing Towards Goal  Note: Pressure Injury Interventions:  Sensory Interventions: Assess changes in LOC, Monitor skin under medical devices, Pressure redistribution bed/mattress (bed type)    Moisture Interventions: Absorbent underpads, Internal/External urinary devices    Activity Interventions: Pressure redistribution bed/mattress(bed type)    Mobility Interventions: Pressure redistribution bed/mattress (bed type), HOB 30 degrees or less    Nutrition Interventions: Document food/fluid/supplement intake    Friction and Shear Interventions: HOB 30 degrees or less, Minimize layers                Problem: Patient Education: Go to Patient Education Activity  Goal: Patient/Family Education  Outcome: Progressing Towards Goal     Problem: Pain  Goal: *Control of Pain  Outcome: Progressing Towards Goal  Goal: *PALLIATIVE CARE:  Alleviation of Pain  Outcome: Progressing Towards Goal     Problem:  Body Temperature -  Risk of, Imbalanced  Goal: *Absence of heat stress or hyperthermia signs and symptoms  Outcome: Progressing Towards Goal  Goal: *Absence of cold stress or hypothermia signs and symptoms  Outcome: Progressing Towards Goal     Problem: General Medical Care Plan  Goal: *Vital signs within specified parameters  Outcome: Progressing Towards Goal  Goal: *Labs within defined limits  Outcome: Progressing Towards Goal  Goal: *Absence of infection signs and symptoms  Outcome: Progressing Towards Goal  Goal: *Optimal pain control at patient's stated goal  Outcome: Progressing Towards Goal  Goal: *Skin integrity maintained  Outcome: Progressing Towards Goal  Goal: *Fluid volume balance  Outcome: Progressing Towards Goal  Goal: *Optimize nutritional status  Outcome: Progressing Towards Goal  Goal: *Anxiety reduced or absent  Outcome: Progressing Towards Goal  Goal: *Progressive mobility and function (eg: ADL's)  Outcome: Progressing Towards Goal     Problem: Patient Education: Go to Patient Education Activity  Goal: Patient/Family Education  Outcome: Progressing Towards Goal     Problem: Patient Education: Go to Patient Education Activity  Goal: Patient/Family Education  Outcome: Progressing Towards Goal     Problem: Nutrition Deficit  Goal: *Optimize nutritional status  Outcome: Progressing Towards Goal

## 2020-07-13 NOTE — PROGRESS NOTES
2nd Medicare letter given to pt and explained . Signed, copy given. Asked pt why not up in chair. He stated he just didn't have it in him today. Explained he cant go to SNF as he knew and this was opportunity in hospital to work on strength. Pt stated that is just how I am and don't like to do a lot  Pt asking how much ambulance home is. Notified he has copay but I dont know how much it is as done through [employer]. Pt stated to do it anyways    Noticed pt mojica still in and dark colored urine. Did not come in hospital with mojica. RN was already aware. Sent message to [facility] via [employer]    Notified pt son would be setting up medical transport for home at father request. Notified son he did little with therapy today. Completed PCS and faxed to [facility]. [facility] and scheduled for 1630. Called 2200 and notified.  Will deliver [place] RN BSN  Outcomes Manager  Office # [phone]  Pager # [phone]

## 2020-07-13 NOTE — DISCHARGE INSTRUCTIONS
DISCHARGE SUMMARY from Nurse    PATIENT INSTRUCTIONS:    After general anesthesia or intravenous sedation, for 24 hours or while taking prescription Narcotics:  · Limit your activities  · Do not drive and operate hazardous machinery  · Do not make important personal or business decisions  · Do  not drink alcoholic beverages  · If you have not urinated within 8 hours after discharge, please contact your surgeon on call. Report the following to your surgeon:  · Excessive pain, swelling, redness or odor of or around the surgical area  · Temperature over 100.5  · Nausea and vomiting lasting longer than 4 hours or if unable to take medications  · Any signs of decreased circulation or nerve impairment to extremity: change in color, persistent  numbness, tingling, coldness or increase pain  · Any questions    What to do at Home:  Recommended activity: Activity as tolerated and Activity as tolerated and no driving for today,     If you experience any of the following symptoms like increasing pain or too much blood in the urine  , please follow up with ***. *  Please give a list of your current medications to your Primary Care Provider. *  Please update this list whenever your medications are discontinued, doses are      changed, or new medications (including over-the-counter products) are added. *  Please carry medication information at all times in case of emergency situations. These are general instructions for a healthy lifestyle:    No smoking/ No tobacco products/ Avoid exposure to second hand smoke  Surgeon General's Warning:  Quitting smoking now greatly reduces serious risk to your health.     Obesity, smoking, and sedentary lifestyle greatly increases your risk for illness    A healthy diet, regular physical exercise & weight monitoring are important for maintaining a healthy lifestyle    You may be retaining fluid if you have a history of heart failure or if you experience any of the following symptoms:  Weight gain of 3 pounds or more overnight or 5 pounds in a week, increased swelling in our hands or feet or shortness of breath while lying flat in bed. Please call your doctor as soon as you notice any of these symptoms; do not wait until your next office visit. The discharge information has been reviewed with the {PATIENT PARENT GUARDIAN:70185}. The {PATIENT PARENT GUARDIAN:93470} verbalized understanding. Discharge medications reviewed with the {Dishcar meds reviewed CZJO:00286} and appropriate educational materials and side effects teaching were provided.   ___________________________________________________________________________________________________________________________________

## 2020-07-13 NOTE — PROGRESS NOTES
-- Bedside, Verbal and Written shift change report given to [facility] (oncoming nurse) by Jonah Tucker RN (offgoing nurse). Report included the following information SBAR, Kardex, Intake/Output, MAR and Recent Results. 2123-- PM medications administered, pt tolerated with ease, will continue to monitor. 0036-- Shift reassessment, pt condition unchanged, will continue to monitor. 4430--  Shift reassessment, pt sleeping between care, will continue to monitor. -- Bedside, Verbal and Written shift change report given to [facility] (oncoming nurse) by Zaynab Luque RN (offgoing nurse). Report included the following information SBAR, Kardex, Intake/Output, MAR and Recent Results. Skin assessment completed.

## 2020-07-13 NOTE — DISCHARGE SUMMARY
[facility]  Hospitalist Division    Discharge Summary    Patient: Cristina Cordero MRN: [id number]  CSN: [id number]    YOB: 1934  Age: 80 y.o. Sex: male    DOA: 7/6/2020 LOS:  LOS: 7 days   Discharge Date:      Admission Diagnoses: Sepsis [facility]) [A41.9]    Discharge Diagnoses:  Principal Problem:    Sepsis ([address] (7/6/2020)    Active Problems:    UTI (urinary tract infection) (2/19/2020)      Hypothermia (12/23/2019)      Chronic renal failure, stage 3 (moderate) (HCC) (12/23/2019)      Chronic combined systolic and diastolic congestive heart failure ([facility] [address] (4/7/2020)        Discharge Condition: Stable    Discharge To: Home    Consults: None    HPI: Cristina Cordero is a 80 y.o. male who presented to the emergency room secondary to generalized weakness. Patient is a poor historian and mentions that he lives with his son who is able to help him with his activities of daily living. Patient mentions he uses a walker at home but for the last 3 to 4 days he has been progressively getting weaker to the extent that he was unable to use the walker this morning. Please note that ER notes were also reviewed for further information and it appears that the patient was just discharged from rehab 3 weeks ago. 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 Past medical history hypothyroidism, CKD 2, BPH, recurrent UTI, recent GI bleed    Hospital Course: In the ED, patient was found to be hypotensive and hypothermic requiring a Елена hugger. He was started on pressors, IV fluids and IV abx. UA consistent with UTI and UCx +pansensitive klebsiella PNA. BCx negative x2. Patient improved clinically. Abx were de-escalated to rocephin, but patient developed a pruritic, macoulopapular rash. He was switched back to zosyn and completed 7 days abx. He had developed jason hematuria per nursing notes which resolved spontaneously.  Recommend outpatient urology f/u. PT/OT recommended SNF, but per CM, family is unable to afford the payments and have chosen patient to return home with home health. VS and labs stable for discharge. Physical Exam:  General appearance: alert, cooperative, no distress, appears stated age  Head: Normocephalic, without obvious abnormality, atraumatic  Lungs: clear to auscultation bilaterally  Heart: regular rate and rhythm, S1, S2 normal, no murmur, click, rub or gallop  Abdomen: soft, non-tender. Bowel sounds normal. No masses,  no organomegaly  Extremities: no cyanosis or edema  Skin: Skin color, texture normal. No rashes or lesions  Neurologic: no focal deficits, motor/sensory intact  PSY: Mood and affect normal, appropriately behaved    Significant Diagnostic Studies:     BMP:   Lab Results   Component Value Date/Time     07/13/2020 04:20 AM    K 4.3 07/13/2020 04:20 AM     (H) 07/13/2020 04:20 AM    CO2 25 07/13/2020 04:20 AM    AGAP 5 07/13/2020 04:20 AM    GLU 71 (L) 07/13/2020 04:20 AM    BUN 25 (H) 07/13/2020 04:20 AM    CREA 0.85 07/13/2020 04:20 AM    GFRAA >60 07/13/2020 04:20 AM    GFRNA >60 07/13/2020 04:20 AM     CBC:   Lab Results   Component Value Date/Time    WBC 9.1 07/13/2020 04:20 AM    HGB 10.7 (L) 07/13/2020 04:20 AM    HCT 32.5 (L) 07/13/2020 04:20 AM     07/13/2020 04:20 AM       Xr Chest Port    Result Date: 7/6/2020  EXAM:  PORTABLE CHEST INDICATION:  Fatigue and weakness. TECHNIQUE:  Portable, erect AP view. COMPARISON:  05/06/2020 ____________________ FINDINGS:  SUPPORT DEVICES: None. HEART AND MEDIASTINUM: Stable position of left subclavian single lead pacer. Cardiac silhouette is within normal limits for technique. Normal caliber thoracic aorta. LUNGS AND PLEURAL SPACES: Lungs are mildly hypoinflated. Persistent or recurrent opacity at the right lung base, progressed more laterally. No pulmonary edema or pneumothorax. There may be trace right pleural effusion.  Resolving left pleural effusion. BONY THORAX AND SOFT TISSUES: No acute osseous abnormality. ____________________     IMPRESSION:  Persistent or recurrent right basilar consolidation, atelectasis or pneumonia. There may be trace right pleural effusion. Procedures: None    Discharge Medications:     Current Discharge Medication List      CONTINUE these medications which have NOT CHANGED    Details   levothyroxine (SYNTHROID) 50 mcg tablet Take 1 Tab by mouth every morning. Qty: 30 Tab, Refills: 0      cholecalciferol (VITAMIN D3) (1000 Units /25 mcg) tablet Take 2 Tabs by mouth daily. Qty: 30 Tab, Refills: 0      simvastatin (ZOCOR) 40 mg tablet Take 1 Tab by mouth nightly. Qty: 30 Tab, Refills: 0      aspirin delayed-release 81 mg tablet Take 1 Tab by mouth daily. Qty: 30 Tab, Refills: 0      acetaminophen (TYLENOL EXTRA STRENGTH) 500 mg tablet Take 2 Tabs by mouth every six (6) hours as needed for Pain. Qty: 50 Tab, Refills: 0      allopurinol (ZYLOPRIM) 300 mg tablet Take 300 mg by mouth daily.       tamsulosin (FLOMAX) 0.4 mg capsule take 1 capsule by mouth once daily AFTER DINNER  Qty: 90 Cap, Refills: 3      lidocaine 4 % patch Apply to low back prn for back pains  Qty: 1 Package, Refills: 0         STOP taking these medications       clotrimazole-betamethasone (LOTRISONE) topical cream Comments:   Reason for Stopping:               Activity: PT/OT per Home Health    Diet: Soft solids, Ensure Enlive for lunch and dinner    Wound Care: None needed    Follow-up: 1 week with PCP and urology    Discharge time: >35 minutes    [facility]  Office:  [phone]  Pager: [phone]      7/13/2020, 11:41 AM

## 2020-07-14 ENCOUNTER — PATIENT OUTREACH (OUTPATIENT)
Dept: CASE MANAGEMENT | Age: 85
End: 2020-07-14

## 2020-07-14 NOTE — PROGRESS NOTES
Date/Time:  7/14/2020 9:31 AM  Attempted to reach family by telephone. Left HIPPA compliant message requesting a return call. Will attempt to reach patient again.

## 2020-07-15 ENCOUNTER — PATIENT OUTREACH (OUTPATIENT)
Dept: CASE MANAGEMENT | Age: 85
End: 2020-07-15

## 2020-07-15 NOTE — PROGRESS NOTES
Clarification:  Patient had Gram Negative Sepsis from UTI  Clarification:  Patient had moderate protein calorie malnutrition.

## 2020-07-15 NOTE — PROGRESS NOTES
Attempted to contact patient's family for Ambulatory Care Management and follow up in response to current Covid-19 pandemic. No answer, left message with contact information provided. Unable to contact, will close episode.

## 2020-07-16 ENCOUNTER — APPOINTMENT (OUTPATIENT)
Dept: GENERAL RADIOLOGY | Age: 85
DRG: 291 | End: 2020-07-16
Attending: EMERGENCY MEDICINE
Payer: MEDICARE

## 2020-07-16 ENCOUNTER — HOSPITAL ENCOUNTER (INPATIENT)
Age: 85
LOS: 7 days | Discharge: NURSING, REHAB OR LONG-TERM CARE | DRG: 291 | End: 2020-07-23
Attending: EMERGENCY MEDICINE | Admitting: HOSPITALIST
Payer: MEDICARE

## 2020-07-16 DIAGNOSIS — R06.02 SOB (SHORTNESS OF BREATH): Primary | ICD-10-CM

## 2020-07-16 DIAGNOSIS — T68.XXXA HYPOTHERMIA, INITIAL ENCOUNTER: ICD-10-CM

## 2020-07-16 DIAGNOSIS — R05.9 COUGH: ICD-10-CM

## 2020-07-16 DIAGNOSIS — I50.9 ACUTE CONGESTIVE HEART FAILURE, UNSPECIFIED HEART FAILURE TYPE (HCC): ICD-10-CM

## 2020-07-16 DIAGNOSIS — J18.9 PNEUMONIA OF BOTH LUNGS DUE TO INFECTIOUS ORGANISM, UNSPECIFIED PART OF LUNG: ICD-10-CM

## 2020-07-16 PROBLEM — J90 PLEURAL EFFUSION: Status: ACTIVE | Noted: 2020-07-16

## 2020-07-16 PROBLEM — I50.23 ACUTE ON CHRONIC SYSTOLIC (CONGESTIVE) HEART FAILURE (HCC): Status: ACTIVE | Noted: 2020-07-16

## 2020-07-16 LAB
ALBUMIN SERPL-MCNC: 2.1 G/DL (ref 3.4–5)
ALBUMIN/GLOB SERPL: 0.5 {RATIO} (ref 0.8–1.7)
ALP SERPL-CCNC: 190 U/L (ref 45–117)
ALT SERPL-CCNC: 23 U/L (ref 16–61)
ANION GAP SERPL CALC-SCNC: 2 MMOL/L (ref 3–18)
APPEARANCE UR: CLEAR
AST SERPL-CCNC: 26 U/L (ref 10–38)
BACTERIA URNS QL MICRO: ABNORMAL /HPF
BASOPHILS # BLD: 0 K/UL (ref 0–0.1)
BASOPHILS NFR BLD: 0 % (ref 0–2)
BILIRUB SERPL-MCNC: 0.3 MG/DL (ref 0.2–1)
BILIRUB UR QL: NEGATIVE
BNP SERPL-MCNC: 4342 PG/ML (ref 0–1800)
BUN SERPL-MCNC: 22 MG/DL (ref 7–18)
BUN/CREAT SERPL: 23 (ref 12–20)
CALCIUM SERPL-MCNC: 8.8 MG/DL (ref 8.5–10.1)
CHLORIDE SERPL-SCNC: 115 MMOL/L (ref 100–111)
CK MB CFR SERPL CALC: 23.1 % (ref 0–4)
CK MB SERPL-MCNC: 8.1 NG/ML (ref 5–25)
CK SERPL-CCNC: 35 U/L (ref 39–308)
CO2 SERPL-SCNC: 29 MMOL/L (ref 21–32)
COLOR UR: YELLOW
CREAT SERPL-MCNC: 0.95 MG/DL (ref 0.6–1.3)
DIFFERENTIAL METHOD BLD: ABNORMAL
EOSINOPHIL # BLD: 0.4 K/UL (ref 0–0.4)
EOSINOPHIL NFR BLD: 6 % (ref 0–5)
EPITH CASTS URNS QL MICRO: ABNORMAL /LPF (ref 0–5)
ERYTHROCYTE [DISTWIDTH] IN BLOOD BY AUTOMATED COUNT: 17.1 % (ref 11.6–14.5)
GLOBULIN SER CALC-MCNC: 4.1 G/DL (ref 2–4)
GLUCOSE SERPL-MCNC: 76 MG/DL (ref 74–99)
GLUCOSE UR STRIP.AUTO-MCNC: NEGATIVE MG/DL
HCT VFR BLD AUTO: 30.5 % (ref 36–48)
HGB BLD-MCNC: 10.1 G/DL (ref 13–16)
HGB UR QL STRIP: ABNORMAL
KETONES UR QL STRIP.AUTO: NEGATIVE MG/DL
LACTATE SERPL-SCNC: 1.1 MMOL/L (ref 0.4–2)
LEUKOCYTE ESTERASE UR QL STRIP.AUTO: NEGATIVE
LYMPHOCYTES # BLD: 0.9 K/UL (ref 0.9–3.6)
LYMPHOCYTES NFR BLD: 15 % (ref 21–52)
MCH RBC QN AUTO: 30.4 PG (ref 24–34)
MCHC RBC AUTO-ENTMCNC: 33.1 G/DL (ref 31–37)
MCV RBC AUTO: 91.9 FL (ref 74–97)
MONOCYTES # BLD: 0.2 K/UL (ref 0.05–1.2)
MONOCYTES NFR BLD: 4 % (ref 3–10)
NEUTS SEG # BLD: 4.5 K/UL (ref 1.8–8)
NEUTS SEG NFR BLD: 75 % (ref 40–73)
NITRITE UR QL STRIP.AUTO: NEGATIVE
PH UR STRIP: 5 [PH] (ref 5–8)
PLATELET # BLD AUTO: 263 K/UL (ref 135–420)
PMV BLD AUTO: 10.4 FL (ref 9.2–11.8)
POTASSIUM SERPL-SCNC: 3.9 MMOL/L (ref 3.5–5.5)
PROT SERPL-MCNC: 6.2 G/DL (ref 6.4–8.2)
PROT UR STRIP-MCNC: NEGATIVE MG/DL
RBC # BLD AUTO: 3.32 M/UL (ref 4.7–5.5)
RBC #/AREA URNS HPF: ABNORMAL /HPF (ref 0–5)
SODIUM SERPL-SCNC: 146 MMOL/L (ref 136–145)
SP GR UR REFRACTOMETRY: 1.02 (ref 1–1.03)
TROPONIN I SERPL-MCNC: 0.04 NG/ML (ref 0–0.04)
TROPONIN I SERPL-MCNC: 0.05 NG/ML (ref 0–0.04)
TROPONIN I SERPL-MCNC: 0.1 NG/ML (ref 0–0.04)
UROBILINOGEN UR QL STRIP.AUTO: 0.2 EU/DL (ref 0.2–1)
WBC # BLD AUTO: 6 K/UL (ref 4.6–13.2)
WBC URNS QL MICRO: ABNORMAL /HPF (ref 0–4)

## 2020-07-16 PROCEDURE — 99285 EMERGENCY DEPT VISIT HI MDM: CPT

## 2020-07-16 PROCEDURE — 96365 THER/PROPH/DIAG IV INF INIT: CPT

## 2020-07-16 PROCEDURE — 77010033678 HC OXYGEN DAILY

## 2020-07-16 PROCEDURE — 71045 X-RAY EXAM CHEST 1 VIEW: CPT

## 2020-07-16 PROCEDURE — 74011000258 HC RX REV CODE- 258: Performed by: EMERGENCY MEDICINE

## 2020-07-16 PROCEDURE — 85025 COMPLETE CBC W/AUTO DIFF WBC: CPT

## 2020-07-16 PROCEDURE — 81001 URINALYSIS AUTO W/SCOPE: CPT

## 2020-07-16 PROCEDURE — 65660000000 HC RM CCU STEPDOWN

## 2020-07-16 PROCEDURE — 74011250636 HC RX REV CODE- 250/636: Performed by: EMERGENCY MEDICINE

## 2020-07-16 PROCEDURE — 87635 SARS-COV-2 COVID-19 AMP PRB: CPT

## 2020-07-16 PROCEDURE — 93005 ELECTROCARDIOGRAM TRACING: CPT

## 2020-07-16 PROCEDURE — 74011250637 HC RX REV CODE- 250/637: Performed by: EMERGENCY MEDICINE

## 2020-07-16 PROCEDURE — 83880 ASSAY OF NATRIURETIC PEPTIDE: CPT

## 2020-07-16 PROCEDURE — 87040 BLOOD CULTURE FOR BACTERIA: CPT

## 2020-07-16 PROCEDURE — 96375 TX/PRO/DX INJ NEW DRUG ADDON: CPT

## 2020-07-16 PROCEDURE — 74011000258 HC RX REV CODE- 258: Performed by: HOSPITALIST

## 2020-07-16 PROCEDURE — 96361 HYDRATE IV INFUSION ADD-ON: CPT

## 2020-07-16 PROCEDURE — 74011250636 HC RX REV CODE- 250/636: Performed by: HOSPITALIST

## 2020-07-16 PROCEDURE — 80053 COMPREHEN METABOLIC PANEL: CPT

## 2020-07-16 PROCEDURE — 83605 ASSAY OF LACTIC ACID: CPT

## 2020-07-16 PROCEDURE — 82550 ASSAY OF CK (CPK): CPT

## 2020-07-16 PROCEDURE — 51702 INSERT TEMP BLADDER CATH: CPT

## 2020-07-16 RX ORDER — CEFTRIAXONE 1 G/1
1 INJECTION, POWDER, FOR SOLUTION INTRAMUSCULAR; INTRAVENOUS
Status: DISCONTINUED | OUTPATIENT
Start: 2020-07-16 | End: 2020-07-16 | Stop reason: CLARIF

## 2020-07-16 RX ORDER — ACETAMINOPHEN 325 MG/1
650 TABLET ORAL
Status: DISCONTINUED | OUTPATIENT
Start: 2020-07-16 | End: 2020-07-23 | Stop reason: HOSPADM

## 2020-07-16 RX ORDER — LEVOTHYROXINE SODIUM 50 UG/1
50 TABLET ORAL
Status: DISCONTINUED | OUTPATIENT
Start: 2020-07-17 | End: 2020-07-23 | Stop reason: HOSPADM

## 2020-07-16 RX ORDER — TAMSULOSIN HYDROCHLORIDE 0.4 MG/1
0.4 CAPSULE ORAL DAILY
Status: DISCONTINUED | OUTPATIENT
Start: 2020-07-17 | End: 2020-07-23 | Stop reason: HOSPADM

## 2020-07-16 RX ORDER — SODIUM CHLORIDE 0.9 % (FLUSH) 0.9 %
5-10 SYRINGE (ML) INJECTION AS NEEDED
Status: DISCONTINUED | OUTPATIENT
Start: 2020-07-16 | End: 2020-07-23 | Stop reason: HOSPADM

## 2020-07-16 RX ORDER — ONDANSETRON 2 MG/ML
4 INJECTION INTRAMUSCULAR; INTRAVENOUS
Status: DISCONTINUED | OUTPATIENT
Start: 2020-07-16 | End: 2020-07-23 | Stop reason: HOSPADM

## 2020-07-16 RX ORDER — ATORVASTATIN CALCIUM 20 MG/1
20 TABLET, FILM COATED ORAL DAILY
Status: DISCONTINUED | OUTPATIENT
Start: 2020-07-17 | End: 2020-07-23 | Stop reason: HOSPADM

## 2020-07-16 RX ORDER — ASPIRIN 325 MG
325 TABLET ORAL
Status: COMPLETED | OUTPATIENT
Start: 2020-07-16 | End: 2020-07-16

## 2020-07-16 RX ORDER — FUROSEMIDE 10 MG/ML
40 INJECTION INTRAMUSCULAR; INTRAVENOUS
Status: COMPLETED | OUTPATIENT
Start: 2020-07-16 | End: 2020-07-16

## 2020-07-16 RX ORDER — VANCOMYCIN/0.9 % SOD CHLORIDE 1.5G/250ML
1500 PLASTIC BAG, INJECTION (ML) INTRAVENOUS ONCE
Status: COMPLETED | OUTPATIENT
Start: 2020-07-16 | End: 2020-07-16

## 2020-07-16 RX ADMIN — ASPIRIN 325 MG ORAL TABLET 325 MG: 325 PILL ORAL at 10:44

## 2020-07-16 RX ADMIN — SODIUM CHLORIDE 13 ML: 900 INJECTION, SOLUTION INTRAVENOUS at 07:41

## 2020-07-16 RX ADMIN — CEFTRIAXONE 1 G: 1 INJECTION, POWDER, FOR SOLUTION INTRAMUSCULAR; INTRAVENOUS at 07:41

## 2020-07-16 RX ADMIN — PIPERACILLIN SODIUM AND TAZOBACTAM SODIUM 4.5 G: 4; .5 INJECTION, POWDER, LYOPHILIZED, FOR SOLUTION INTRAVENOUS at 10:40

## 2020-07-16 RX ADMIN — PIPERACILLIN SODIUM AND TAZOBACTAM SODIUM 4.5 G: 4; .5 INJECTION, POWDER, LYOPHILIZED, FOR SOLUTION INTRAVENOUS at 16:57

## 2020-07-16 RX ADMIN — VANCOMYCIN HYDROCHLORIDE 1500 MG: 10 INJECTION, POWDER, LYOPHILIZED, FOR SOLUTION INTRAVENOUS at 11:10

## 2020-07-16 RX ADMIN — FUROSEMIDE 40 MG: 10 INJECTION, SOLUTION INTRAMUSCULAR; INTRAVENOUS at 10:44

## 2020-07-16 RX ADMIN — SODIUM CHLORIDE 1000 ML: 900 INJECTION, SOLUTION INTRAVENOUS at 07:41

## 2020-07-16 NOTE — PROGRESS NOTES
Reason for Admission:   Acute on Chronic Systolic CHF; Pleural Effusion, Hypothermia; HCAP               RUR Score:     35    PCP: First and Last name: Inés Welsh   Name of Practice:    Are you a current patient: Yes/No: Yes   Approximate date of last visit: June 2020    Can you do a virtual visit with your PCP: Yes with son's assistance             Resources/supports as identified by patient/family:   Family support                Top Challenges facing patient (as identified by patient/family and CM): Finances/Medication cost?                    Transportation? Support system or lack thereof? Patient may need long term care                     Living arrangements? Self-care/ADLs/Cognition? Current Advanced Directive/Advance Care Plan:  Per son the pt is a DNR, but no ACP                          Plan for utilizing home health: Yes                 Transition of Care Plan:          SNF vs Home Health            Initial assessment completed with pt's son, Vy Delgado via phone as the pt is a Covid rule out and in isolation. Prior to admission pt lived with his son and dtr-in-law in a 2 story home with 2 steps to enter, and a ramp in the garage. Per son, the pt's bedroom and full bath are on the 1st level of the home. Son states that the pt ambulated with a walker and mobilized in a wheelchair for longer distances. Son endorses a wheelchair, walker, and bedside commode for DME. Son also endorses pt having home health services arranged by [facility] (nurse came out on 7/14/20). Son expressed being frustrated about [employer] denying SNF auth on pt's last admission. Spoke with son at great length about pt potentially needing long term care. Son informed me that pt did not qualify for Medicaid as he receives $1,650/ month and that the home his family is currently living in, is in his dad's name.      Son expressed wanting to use [facility] if home health is needed at discharge. Son also expressed that if [facility] would authorize SNF, his choices are #1- [address], #2- [facility], and #3- [facility]. I informed the son that d/t Covid, a lot of facilities are not accepting no admissions. Case management will continue to follow.      Justus Clay, MSN, RN, ACM-RN   ED Outcomes Manager  [phone] (phone)

## 2020-07-16 NOTE — ED PROVIDER NOTES
Date: 7/16/2020  Patient Name: Phuong Paredes    History of Presenting Illness     Chief Complaint   Patient presents with    Shortness of Breath    Lethargy       History Provided By: Patient      HPI/Chief Complaint: (Context):who presents with patient with generalized fatigue progressively worsening over the last 24 to 48 hours  Patient also has cough and shortness of breath for 1 day  Patient states that is new  Patient is just generalized fatigue is no focal deficits states he feels drained and cannot move. Patient denies any vomiting or diarrhea denies any headache or blurry vision he is very apologetic in the emergency department for his repeat visits. I assured him that we would be honored to care for him today. Patient has no altered mental status  No confusion today  Patient has normal bowel movement yesterday he states and has been urinating appropriately  No pain and no particular radiation of symptoms no acute alleviating or exacerbating factors    ---  Patient's triage note is reviewed  Patient EMS arrival, this note is reviewed at 6:40 AM  Shortness of breath and generalized weakness  Patient's vitals are stable in the emergency department  Per triage nursing note patient with shortness of breath 92% on room air placed on 4 L  Patient no distress  Patient prior visit 7/6/2024 hypothermia was appreciated  ---  Patient's last visit was for generalized weakness unable to ambulate was released for rehab at that point patient was diagnosed with hypothermia/UTI on that visit. Patient's urinalysis from last visit had UTI  Hemoglobin was 9.3  Patient's potassium was low as well  Patient's troponin was normal, patient's thyroid function was slightly abnormal.  Patient's urine culture grew Klebsiella pneumonia last time patient's blood culture did not grow anything last time.     PCP: Artemio Lamb MD    Current Facility-Administered Medications   Medication Dose Route Frequency Provider Last Rate Last Dose    sodium chloride (NS) flush 5-10 mL  5-10 mL IntraVENous PRN Dereje Holly MD        piperacillin-tazobactam (ZOSYN) 4.5 g in 0.9% sodium chloride (MBP/ADV) 100 mL MBP  4.5 g IntraVENous Q6H Dereje Holly MD   Stopped at 07/16/20 1110    vancomycin (VANCOCIN) 1500 mg in  ml infusion  1,500 mg IntraVENous Cheryl Shah  mL/hr at 07/16/20 1110 1,500 mg at 07/16/20 1110    [START ON 7/17/2020] levothyroxine (SYNTHROID) tablet 50 mcg  50 mcg Oral Didier Ruiz MD        [START ON 7/17/2020] atorvastatin (LIPITOR) tablet 20 mg  20 mg Oral DAILY Dereje Holly MD        [START ON 7/17/2020] tamsulosin (FLOMAX) capsule 0.4 mg  0.4 mg Oral DAILY Dereje Holly MD        acetaminophen (TYLENOL) tablet 650 mg  650 mg Oral Q6H PRN Dereje Holly MD        ondansetron [facility]) injection 4 mg  4 mg IntraVENous Q6H PRN Dereje Holly MD        [START ON 7/17/2020] vancomycin (VANCOCIN) 1,250 mg in 0.9% sodium chloride 250 mL IVPB  1,250 mg IntraVENous Q18H Prieto Maldonado MD        [START ON 7/18/2020] VANCOMYCIN INFORMATION NOTE   Other ONCE Prieto Maldonado MD        VANCOMYCIN INFORMATION NOTE 1 Each  1 Each Other Rx Dosing/Monitoring Prieto Maldonado MD         Current Outpatient Medications   Medication Sig Dispense Refill    lidocaine 4 % patch Apply to low back prn for back pains 1 Package 0    levothyroxine (SYNTHROID) 50 mcg tablet Take 1 Tab by mouth every morning. 30 Tab 0    cholecalciferol (VITAMIN D3) (1000 Units /25 mcg) tablet Take 2 Tabs by mouth daily. 30 Tab 0    simvastatin (ZOCOR) 40 mg tablet Take 1 Tab by mouth nightly. 30 Tab 0    aspirin delayed-release 81 mg tablet Take 1 Tab by mouth daily. 30 Tab 0    acetaminophen (TYLENOL EXTRA STRENGTH) 500 mg tablet Take 2 Tabs by mouth every six (6) hours as needed for Pain. 50 Tab 0    allopurinol (ZYLOPRIM) 300 mg tablet Take 300 mg by mouth daily.       tamsulosin (FLOMAX) 0.4 mg capsule take 1 capsule by mouth once daily AFTER DINNER 90 Cap 3       Past History     Past Medical History:  Past Medical History:   Diagnosis Date    Difficulty urinating     Elevated PSA     Hematuria, unspecified     Hypercholesterolemia     Hypertension     Incomplete bladder emptying     Urinary retention     UTI (urinary tract infection)        Past Surgical History:  Past Surgical History:   Procedure Laterality Date    APPENDECTOMY      EGD  2/7/2014         HX TURP  6/13/16    HX TURP      MA INS NEW/RPLC PRM PACEMAKER W/TRANSV ELTRD VENTR N/A 5/5/2020    Insert Ppm Single Ventricular performed by Tobi Mckenna MD at [address] [place] [address] LAB       Family History:  Family History   Problem Relation Age of Onset    Cancer Father     Alzheimer Mother     Heart defect Brother        Social History:  Social History     Tobacco Use    Smoking status: Never Smoker    Smokeless tobacco: Never Used   Substance Use Topics    Alcohol use: Yes     Alcohol/week: 2.0 standard drinks     Types: 2 Cans of beer per week     Comment: Occasional    Drug use: No       Allergies: Allergies   Allergen Reactions    Amlodipine Swelling and Itching    Bactrim [Sulfamethoprim Ds] Rash    Lisinopril Other (comments)     Acute renal failure    Rocephin [Ceftriaxone] Rash    Ciprofloxacin Rash and Itching         Review of Systems   Review of Systems   Constitutional: Positive for activity change, appetite change and fatigue. Negative for fever. HENT: Negative for congestion and rhinorrhea. Eyes: Negative for visual disturbance. Respiratory: Positive for cough and shortness of breath. Cardiovascular: Negative for chest pain and palpitations. Gastrointestinal: Negative for abdominal pain, diarrhea, nausea and vomiting. Genitourinary: Negative for dysuria and hematuria. Musculoskeletal: Negative for back pain. Skin: Negative for rash.    Neurological: Negative for dizziness, weakness and light-headedness. Psychiatric/Behavioral: Negative for agitation. All other systems reviewed and are negative. Physical Exam     Physical Exam  Constitutional:       General: He is not in acute distress. Appearance: He is well-developed. He is not ill-appearing or toxic-appearing. HENT:      Head: Normocephalic and atraumatic. Eyes:      Conjunctiva/sclera: Conjunctivae normal.      Pupils: Pupils are equal, round, and reactive to light. Neck:      Musculoskeletal: Normal range of motion and neck supple. Cardiovascular:      Rate and Rhythm: Normal rate and regular rhythm. Pulmonary:      Effort: Pulmonary effort is normal.      Breath sounds: Normal breath sounds. No decreased breath sounds, wheezing, rhonchi or rales. Chest:      Chest wall: No tenderness. Abdominal:      General: Bowel sounds are normal.      Palpations: Abdomen is soft. Musculoskeletal: Normal range of motion. Lymphadenopathy:      Cervical: No cervical adenopathy. Skin:     General: Skin is warm. Capillary Refill: Capillary refill takes less than 2 seconds. Neurological:      General: No focal deficit present. Mental Status: He is alert. Psychiatric:         Mood and Affect: Mood normal.         Medical Decision Making   I am the first provider for this patient. I reviewed the vital signs, available nursing notes, past medical history, past surgical history, family history and social history. Provider Notes (Medical Decision Making): Patient presents emergency department generalized weakness fatigue shortness of breath cough for greater than 24 hours  There is some remote recent testing of coronavirus I am unclear of the results  This was done last week.   Patient's last visit was approximately 10 days ago  Patient is today with hypothermia bear hugger started  Patient will be ruled out for urinary tract infection as he has had them in the past  Sepsis protocol has been initiated as I am unclear the cause for hypothermia  I will check a chest x-ray pneumonia  BNP/cardiac markers/EKG/chest x-ray for rule out any heart failure coronary artery disease symptoms or signs thereof  Patient's thyroid function was normal last time  I will check a cortisol level on this patient as patient feels drained and weak  There is no skin breakdown that I can appreciate on this patient  Patient is awake alert oriented x3  There is no sign of encephalopathy on the patient    =====  CRITICAL CARE NOTE :    7:03 AM    IMPENDING DETERIORATION -Airway, Metabolic and Renal    ASSOCIATED RISK FACTORS - Hypoxia, Dysrhythmia and Metabolic changes    MANAGEMENT- Bedside Assessment    INTERPRETATION -  Xrays    INTERVENTIONS - hemodynamic mngmt and Metobolic interventions    CASE REVIEW - Hospitalist    TREATMENT RESPONSE -Improved    PERFORMED BY - Self      NOTES   :    I have spent 40 minutes of critical care time involved in lab review, consultations with specialist, family decision- making, bedside attention and documentation. During this entire length of time I was immediately available to the patient . Kartik Lopez MD    7:03 AM - I suspect that this patient has an active infection. 7:03 AM - The patient met criteria for severe sepsis at this time. PROVIDER SEPSIS PHYSICAL EXAM EVAL  Vital signs reviewed (see nursing documentation for further details):  Vitals:    07/16/20 1030 07/16/20 1045 07/16/20 1115 07/16/20 1147   BP: 119/54 111/49 107/51 113/64   Pulse:  93 91 93   Resp: 20 18 21 20   Temp: 99.1 °F (37.3 °C) 99.1 °F (37.3 °C) 98.8 °F (37.1 °C) 98.4 °F (36.9 °C)   SpO2: 99% 99% 100% 100%       Cardiac exam:Tachycardic    Pulmonary exam:Increased work of breathing    Peripheral pulses:Normal    Capillary refill:Normal    Skin exam:pink    Exam performed True Lopez MD  -----  Patient with acute heart failure.   New onset  History of poor EF  Patient's fluids held as patient is significantly ill with respiratory distress concern for worsening of patient's symptoms  Patient is without any hypotension or elevated lactate. I do not want to further deteriorate patient's respiratory status by giving patient fluids. Vital Signs-Reviewed the patient's vital signs. Pulse Oximetry Analysis - 96%    Cardiac Monitor:  Rate/Rhythm:, 87, sinus rhythm    EKG: Interpreted by the EP. Patient's EKG time is 6:28 AM, 7700, 516 ms QTC, and first-degree AV block is appreciated as well  Left bundle branch block is appreciated  Poor R progression is appreciated  Today's EKG is compared with the prior EKG of July 6, 2020 morphology is similar. Vitals:    07/16/20 1030 07/16/20 1045 07/16/20 1115 07/16/20 1147   BP: 119/54 111/49 107/51 113/64   Pulse:  93 91 93   Resp: 20 18 21 20   Temp: 99.1 °F (37.3 °C) 99.1 °F (37.3 °C) 98.8 °F (37.1 °C) 98.4 °F (36.9 °C)   SpO2: 99% 99% 100% 100%   Weight:       Height:         Records Reviewed: Nursing Notes    ED Course:   ED Course as of Jul 16 1301   Thu Jul 16, 2020   1023 CPK and index elevated   CK-MB Index(!): 23.1 [AS]   1023 Few bacteria in the urineHemoglobin 10.1 no obvious sign of UTI positive blood in the urineBlood cultures pendingBNP is 4300Start treating patient for heart failure     Bacteria(!): FEW [AS]   1024 I will place a call to hospitalist, patient also chest x-ray with concern for infiltrateI initially started Rocephin but patient with hospital healthcare associated pneumonia will get Zosyn and vancomycin I cannot get Levaquin due to allergyPatient will remain COVID isolationI will discussed the information Dr. Vy Arboleda.    [AS]   8818 Information discussed with Dr. Vy Arboleda all of the labs x-ray finding discussed patient is to be admitted to his service will place admission orders. [AS]      ED Course User Index  [AS] Dalton Coy MD       For Hospitalized Patients:    1.  Hospitalization Decision Time:  The decision to hospitalize the patient was made by Dr. Iza Saldivar    ---        Diagnostic Study Results     Orders Placed This Encounter   [address] ED (REQUIRED)     Standing Status:   Standing     Number of Occurrences:   1    CULTURE, BLOOD     Standing Status:   Standing     Number of Occurrences:   1    CULTURE, BLOOD     Standing Status:   Standing     Number of Occurrences:   1    XR CHEST PORT     Standing Status:   Standing     Number of Occurrences:   1     Order Specific Question:   Reason for Exam     Answer:   meets SIRS criteria    URINALYSIS W/ RFLX MICROSCOPIC     Standing Status:   Standing     Number of Occurrences:   1    METABOLIC PANEL, COMPREHENSIVE     Standing Status:   Standing     Number of Occurrences:   1    CBC WITH AUTOMATED DIFF     Standing Status:   Standing     Number of Occurrences:   1    LACTIC ACID     2 hours after initial If initial Lactate is > 1.7     Standing Status:   Standing     Number of Occurrences:   1    CARDIAC PANEL,(CK, CKMB & TROPONIN)     Standing Status:   Standing     Number of Occurrences:   1    NT-PRO BNP     Standing Status:   Standing     Number of Occurrences:   1    NOVEL CORONAVIRUS (COVID-19)     Standing Status:   Standing     Number of Occurrences:   1     Order Specific Question:   Status     Answer:   Symptomatic/Infection Suspected    LACTIC ACID     Standing Status:   Standing     Number of Occurrences:   1    URINE MICROSCOPIC ONLY     Standing Status:   Standing     Number of Occurrences:   1    METABOLIC PANEL, BASIC     Standing Status:   Standing     Number of Occurrences:   3    TROPONIN I     Standing Status:   Standing     Number of Occurrences:   2    CBC W/O DIFF     Standing Status:   Standing     Number of Occurrences:   3    VANCOMYCIN, TROUGH     Standing Status:   Standing     Number of Occurrences:   1     Order Specific Question:   Dose amount: Answer:   1250     Order Specific Question:   Date given:      Answer: 7/17/2020    DIET DENTAL SOFT (SOFT SOLID)     Chopped meats     Standing Status:   Standing     Number of Occurrences:   1     Order Specific Question:   Likes/Dislikes/Preferences     Answer:   Chopped meats    VITAL SIGNS - PER UNIT ROUTINE     PER UNIT ROUTINE     Standing Status:   Standing     Number of Occurrences:   1    STRICT I & O     Standing Status:   Standing     Number of Occurrences:   1    NEUROLOGIC STATUS ASSESSMENT - PER UNIT ROUTINE     PER UNIT ROUTINE     Standing Status:   Standing     Number of Occurrences:   1    CARDIAC MONITORING     Standing Status:   Standing     Number of Occurrences:   1     Order Specific Question:   Type: Answer:   Bedside    VITAL SIGNS PER UNIT ROUTINE     More frequently if Indicated. Standing Status:   Standing     Number of Occurrences:   1    AMBULATE WITH ASSISTANCE     Standing Status:   Standing     Number of Occurrences:   1    CARDIAC MONITORING     Standing Status:   Standing     Number of Occurrences:   1     Order Specific Question:   Type: Answer:   Bedside    INTAKE AND OUTPUT     Measure and document total every 8 hours.      Standing Status:   Standing     Number of Occurrences:   1    STRICT I & O     Standing Status:   Standing     Number of Occurrences:   1    APPLY/MAINTAIN SEQUENTIAL COMPRESSION DEVICE     Standing Status:   Standing     Number of Occurrences:   1    DO NOT RESUSCITATE     Standing Status:   Standing     Number of Occurrences:   1    DROPLET PLUS     Standing Status:   Standing     Number of Occurrences:   1    OXIMETRY, SPOT CHECK     Standing Status:   Standing     Number of Occurrences:   73525    EKG, 12 LEAD, INITIAL     Standing Status:   Standing     Number of Occurrences:   1     Order Specific Question:   Reason for Exam:     Answer:   sob    SALINE LOCK IV ONE TIME STAT     Standing Status:   Standing     Number of Occurrences:   1    DEBBIE HUGGER     Standing Status:   Standing     Number of Occurrences:   1    sodium chloride (NS) flush 5-10 mL    FOLLOWED BY Linked Order Group     sodium chloride 0.9 % bolus infusion 1,000 mL      This panel was generated from single order of a weight-based dose of 30 mL/kg, Target volume of 2013 mL (30 mL/kg × 67.1 kg (Weight as of Thu Jul 16, 2020 0625) = 2,013 mL), Rate of 999 mL/hr or as specified by the physician.  sodium chloride 0.9 % bolus infusion 1,000 mL      This panel was generated from single order of a weight-based dose of 30 mL/kg, Target volume of 2013 mL (30 mL/kg × 67.1 kg (Weight as of Thu Jul 16, 2020 0625) = 2,013 mL), Rate of 999 mL/hr or as specified by the physician.  sodium chloride 0.9 % bolus infusion 13 mL      This panel was generated from single order of a weight-based dose of 30 mL/kg, Target volume of 2013 mL (30 mL/kg × 67.1 kg (Weight as of Thu Jul 16, 2020 0625) = 2,013 mL), Rate of 999 mL/hr or as specified by the physician.  DISCONTD: cefTRIAXone (ROCEPHIN) injection 1 g     Order Specific Question:   Antibiotic Indications     Answer:   Pneumonia (CAP)    cefTRIAXone (ROCEPHIN) 1 g in 0.9% sodium chloride (MBP/ADV) 50 mL MBP     Order Specific Question:   Antibiotic Indications     Answer:   Pneumonia (CAP)    aspirin tablet 325 mg    furosemide (LASIX) injection 40 mg    piperacillin-tazobactam (ZOSYN) 4.5 g in 0.9% sodium chloride (MBP/ADV) 100 mL MBP     Order Specific Question:   Antibiotic Indications     Answer:   Pneumonia (HCAP)    vancomycin (VANCOCIN) 1500 mg in  ml infusion     Pharmacy to dose after Once dose     Order Specific Question:   Antibiotic Indications     Answer:   Pneumonia (HCAP)    levothyroxine (SYNTHROID) tablet 50 mcg     OP SIG:Take 1 Tab by mouth every morning.       atorvastatin (LIPITOR) tablet 20 mg    tamsulosin (FLOMAX) capsule 0.4 mg     OP SIG:take 1 capsule by mouth once daily AFTER DINNER      acetaminophen (TYLENOL) tablet 650 mg    ondansetron (ZOFRAN) injection 4 mg    vancomycin (VANCOCIN) 1,250 mg in 0.9% sodium chloride 250 mL IVPB     Order Specific Question:   Antibiotic Indications     Answer:   Pneumonia (HAP)    VANCOMYCIN INFORMATION NOTE    VANCOMYCIN INFORMATION NOTE 1 Each    IP CONSULT TO HOSPITALIST     Standing Status:   Standing     Number of Occurrences:   1     Order Specific Question:   Reason for Consult: Answer:   SHORTNESS OF BREATH     Order Specific Question:   Did you call or speak to the consulting provider? Answer:   No     Order Specific Question:   Consult To     Answer:   manage     Order Specific Question:   Schedule When? Answer:   TODAY    INITIAL PHYSICIAN ORDER: INPATIENT Stepdown; 3. Patient receiving treatment that can only be provided in an inpatient setting (further clarification in H&P documentation)     Standing Status:   Standing     Number of Occurrences:   1     Order Specific Question:   Status: Answer:   INPATIENT [101]     Order Specific Question:   Type of Bed     Answer:   Stepdown [17]     Order Specific Question:   Inpatient Hospitalization Certified Necessary for the Following Reasons     Answer:   3.  Patient receiving treatment that can only be provided in an inpatient setting (further clarification in H&P documentation)     Order Specific Question:   Admitting Diagnosis     Answer:   Acute on chronic systolic (congestive) heart failure [facility] [[id number]]     Order Specific Question:   Admitting Diagnosis     Answer:   Pleural effusion [802990]     Order Specific Question:   Admitting Diagnosis     Answer:   Hypothermia [259564]     Order Specific Question:   Admitting Diagnosis     Answer:   HCAP (healthcare-associated pneumonia) [3478673]     Order Specific Question:   Admitting Physician     Answer:   Arpita Croft [[id number]]     Order Specific Question:   Attending Physician     Answer:   Arpita Croft [[id number]]     Order Specific Question:   Estimated Length of Stay     Answer:   3-4 Midnights     Order Specific Question:   Discharge Plan:     Answer:   Home with Office Follow-up    IP CONSULT TO Mounika Quiñonez to dose vancomycin after once dose. Standing Status:   Standing     Number of Occurrences:   1     Order Specific Question:   Reason for Consult: Answer:   Pharmacy to dose vancomycin     Order Specific Question:   Antibiotic Indications     Answer:   Pneumonia (HCAP)       Labs -     Recent Results (from the past 12 hour(s))   EKG, 12 LEAD, INITIAL    Collection Time: 07/16/20  6:28 AM   Result Value Ref Range    Ventricular Rate 77 BPM    Atrial Rate 77 BPM    P-R Interval 240 ms    QRS Duration 170 ms    Q-T Interval 456 ms    QTC Calculation (Bezet) 516 ms    Calculated R Axis 20 degrees    Calculated T Axis 149 degrees    Diagnosis       Sinus rhythm with 1st degree AV block with occasional ventricular-paced   complexes and with occasional premature ventricular complexes  Left bundle branch block  Abnormal ECG  When compared with ECG of 06-JUL-2020 16:55,  Vent. rate has increased BY   6 BPM     METABOLIC PANEL, COMPREHENSIVE    Collection Time: 07/16/20  6:50 AM   Result Value Ref Range    Sodium 146 (H) 136 - 145 mmol/L    Potassium 3.9 3.5 - 5.5 mmol/L    Chloride 115 (H) 100 - 111 mmol/L    CO2 29 21 - 32 mmol/L    Anion gap 2 (L) 3.0 - 18 mmol/L    Glucose 76 74 - 99 mg/dL    BUN 22 (H) 7.0 - 18 MG/DL    Creatinine 0.95 0.6 - 1.3 MG/DL    BUN/Creatinine ratio 23 (H) 12 - 20      GFR est AA >60 >60 ml/min/1.73m2    GFR est non-AA >60 >60 ml/min/1.73m2    Calcium 8.8 8.5 - 10.1 MG/DL    Bilirubin, total 0.3 0.2 - 1.0 MG/DL    ALT (SGPT) 23 16 - 61 U/L    AST (SGOT) 26 10 - 38 U/L    Alk.  phosphatase 190 (H) 45 - 117 U/L    Protein, total 6.2 (L) 6.4 - 8.2 g/dL    Albumin 2.1 (L) 3.4 - 5.0 g/dL    Globulin 4.1 (H) 2.0 - 4.0 g/dL    A-G Ratio 0.5 (L) 0.8 - 1.7     CBC WITH AUTOMATED DIFF    Collection Time: 07/16/20  6:50 AM   Result Value Ref Range    WBC 6.0 4.6 - 13.2 K/uL    RBC 3.32 (L) 4.70 - 5.50 M/uL    HGB 10.1 (L) 13.0 - 16.0 g/dL    HCT 30.5 (L) 36.0 - 48.0 %    MCV 91.9 74.0 - 97.0 FL    MCH 30.4 24.0 - 34.0 PG    MCHC 33.1 31.0 - 37.0 g/dL    RDW 17.1 (H) 11.6 - 14.5 %    PLATELET 999 975 - 320 K/uL    MPV 10.4 9.2 - 11.8 FL    NEUTROPHILS 75 (H) 40 - 73 %    LYMPHOCYTES 15 (L) 21 - 52 %    MONOCYTES 4 3 - 10 %    EOSINOPHILS 6 (H) 0 - 5 %    BASOPHILS 0 0 - 2 %    ABS. NEUTROPHILS 4.5 1.8 - 8.0 K/UL    ABS. LYMPHOCYTES 0.9 0.9 - 3.6 K/UL    ABS. MONOCYTES 0.2 0.05 - 1.2 K/UL    ABS. EOSINOPHILS 0.4 0.0 - 0.4 K/UL    ABS. BASOPHILS 0.0 0.0 - 0.1 K/UL    DF AUTOMATED     CARDIAC PANEL,(CK, CKMB & TROPONIN)    Collection Time: 07/16/20  6:50 AM   Result Value Ref Range    CK - MB 8.1 (H) <3.6 ng/ml    CK-MB Index 23.1 (H) 0.0 - 4.0 %    CK 35 (L) 39 - 308 U/L    Troponin-I, QT 0.04 0.0 - 0.045 NG/ML   NT-PRO BNP    Collection Time: 07/16/20  6:50 AM   Result Value Ref Range    NT pro-BNP 4,342 (H) 0 - 1,800 PG/ML   LACTIC ACID    Collection Time: 07/16/20  6:50 AM   Result Value Ref Range    Lactic acid 1.1 0.4 - 2.0 MMOL/L   URINALYSIS W/ RFLX MICROSCOPIC    Collection Time: 07/16/20  7:45 AM   Result Value Ref Range    Color YELLOW      Appearance CLEAR      Specific gravity 1.019 1.005 - 1.030      pH (UA) 5.0 5.0 - 8.0      Protein Negative NEG mg/dL    Glucose Negative NEG mg/dL    Ketone Negative NEG mg/dL    Bilirubin Negative NEG      Blood LARGE (A) NEG      Urobilinogen 0.2 0.2 - 1.0 EU/dL    Nitrites Negative NEG      Leukocyte Esterase Negative NEG     URINE MICROSCOPIC ONLY    Collection Time: 07/16/20  7:45 AM   Result Value Ref Range    WBC 1 to 3 0 - 4 /hpf    RBC 36 to 50 0 - 5 /hpf    Epithelial cells FEW 0 - 5 /lpf    Bacteria FEW (A) NEG /hpf       Radiologic Studies -   XR CHEST PORT   Final Result   IMPRESSION:      New bilateral pleural effusions (right greater than left).  Adjacent airspace   opacities may reflect atelectasis, pulmonary edema, and/or developing   infiltrate/pneumonia. CT Results  (Last 48 hours)    None        CXR Results  (Last 48 hours)               07/16/20 0716  XR CHEST PORT Final result    Impression:  IMPRESSION:       New bilateral pleural effusions (right greater than left). Adjacent airspace   opacities may reflect atelectasis, pulmonary edema, and/or developing   infiltrate/pneumonia. Narrative:  EXAM: PORTABLE FRONTAL CHEST RADIOGRAPH       CLINICAL INDICATION/HISTORY: History of hypertension. COMPARISON: Chest radiograph: 7/6/2020       TECHNIQUE: Portable frontal view of the chest       _______________       FINDINGS:       SUPPORT DEVICES:    -EKG leads overlie the patient.   -Single lead cardiac pacing device projects over the left upper chest, lead   terminates in the region of the right ventricle. HEART AND MEDIASTINUM: Largely obscured by lung opacities. Atherosclerotic   calcifications present. LUNGS: Opacities are seen throughout the right lung and in the left lung base. PLEURAL SPACES:No large pneumothorax. Moderate volume right pleural effusion,   small volume left pleural effusion. BONY THORAX AND SOFT TISSUES: No acute abnormality. _______________                     Discharge     Clinical Impression:   1. SOB (shortness of breath)    2. Cough    3. Acute congestive heart failure, unspecified heart failure type ([address]    4. Pneumonia of both lungs due to infectious organism, unspecified part of lung    5.  Hypothermia, initial encounter        Disposition:  Admit    It should be noted that I will be the provider of record for this patient  Soumya De Souza MD      Follow-up Information    None         Current Discharge Medication List

## 2020-07-16 NOTE — ED NOTES
Pt AAOX4. Skin with with poor turgor and anasarca to all ext. Scrotum with cover with smega and when washed skin began to bleed. Concepcion care given and area covered with barrier cream. Pt given full bed bath and placed in clean gown and socks the moved to an inpt bed. Resp regular unlabored. Breath sounds clear and decreased with fair inspiratory effort. Abd soft non tender BSX4 .  Farrell intact draining large quainty of clear yellow urine Pt postioned to left side supported by pillows

## 2020-07-16 NOTE — H&P
Medicine History and Physical    Patient: Akilah Alcaraz   Age:  80 y.o. Assessment   Principal Problem:    Acute on chronic systolic (congestive) heart failure ([address] (7/16/2020)    Active Problems:    Hypothermia (12/23/2019)      Pleural effusion (7/16/2020)      HCAP (healthcare-associated pneumonia) (7/16/2020)          Plan     1)  Acute on chronic systolic HF/ possible HCAP   - b/l effusion however cannot r/o pna on xray , will cover for HCAP- vanc and zosyn- avoid rocephin. Diurese, fluid restrict, IV bumex. Cardiac monitor    Step down 2/2 hypothermia,  Also need to COVID R/O    DISPO    Anticipated Date of Discharge: 2-3 days  Anticipated Disposition (home, SNF) : home    Chief Complaint:   Chief Complaint   Patient presents with    Shortness of Breath    Lethargy         HPI:   Akilah Alcaraz is a 80y.o. year old male who presents with  Weakness, chills and SOB x 2 days. He states he has been feeling cold. He does not elaborate on symptoms. He denies fever or chest pain. In ed found to have b/l effusions and xray was unable to r/o PNA. He is hypothermic and martell hugger was started. + dry cough    Review of Systems - positive responses in bold type   Constitutional: Negative for fever, chills, diaphoresis and unexpected weight change. HENT: Negative for ear pain, congestion, sore throat, rhinorrhea, drooling, trouble swallowing, neck pain and tinnitus. Eyes: Negative for photophobia, pain, redness and visual disturbance. Respiratory: negative for shortness of breath, cough, choking, chest tightness, wheezing or stridor. Cardiovascular: Negative for chest pain, palpitations and leg swelling. Gastrointestinal: Negative for nausea, vomiting, abdominal pain, diarrhea, constipation, blood in stool, abdominal distention and anal bleeding. Genitourinary: Negative for dysuria, urgency, frequency, hematuria, flank pain and difficulty urinating.    Musculoskeletal: Negative for back pain and arthralgias. Skin: Negative for color change, rash and wound. Neurological: Negative for dizziness, seizures, syncope, speech difficulty, light-headedness or headaches. Hematological: Does not bruise/bleed easily. Psychiatric/Behavioral: Negative for suicidal ideas, hallucinations, behavioral problems, self-injury or agitation       Past Medical History:  Past Medical History:   Diagnosis Date    Difficulty urinating     Elevated PSA     Hematuria, unspecified     Hypercholesterolemia     Hypertension     Incomplete bladder emptying     Urinary retention     UTI (urinary tract infection)        Past Surgical History:  Past Surgical History:   Procedure Laterality Date    APPENDECTOMY      EGD  2/7/2014         HX TURP  6/13/16    HX TURP      MT INS NEW/RPLC PRM PACEMAKER W/TRANSV ELTRD VENTR N/A 5/5/2020    Insert Ppm Single Ventricular performed by Tano Choi MD at [address] [place] [address]       Family History:  Family History   Problem Relation Age of Onset    Cancer Father     Alzheimer Mother     Heart defect Brother        Social History:  Social History     Socioeconomic History    Marital status:      Spouse name: Not on file    Number of children: Not on file    Years of education: Not on file    Highest education level: Not on file   Occupational History    Occupation:  thirty years   Tobacco Use    Smoking status: Never Smoker    Smokeless tobacco: Never Used   Substance and Sexual Activity    Alcohol use: Yes     Alcohol/week: 2.0 standard drinks     Types: 2 Cans of beer per week     Comment: Occasional    Drug use: No       Home Medications:  Prior to Admission medications    Medication Sig Start Date End Date Taking? Authorizing Provider   lidocaine 4 % patch Apply to low back prn for back pains 5/8/20   Priyank Mallory MD   levothyroxine (SYNTHROID) 50 mcg tablet Take 1 Tab by mouth every morning.  4/10/20   Savanna Gonzalez, PA   cholecalciferol (VITAMIN D3) (1000 Units /25 mcg) tablet Take 2 Tabs by mouth daily. 2/21/20   Jody Chopra, DO   simvastatin (ZOCOR) 40 mg tablet Take 1 Tab by mouth nightly. 2/21/20   Jody Horowitzs, DO   aspirin delayed-release 81 mg tablet Take 1 Tab by mouth daily. 2/21/20   Alkalyn Horowitzs, DO   acetaminophen (TYLENOL EXTRA STRENGTH) 500 mg tablet Take 2 Tabs by mouth every six (6) hours as needed for Pain. 12/16/19   Bryant Barton MD   allopurinol (ZYLOPRIM) 300 mg tablet Take 300 mg by mouth daily. Juan Carlos, MD Griselda   tamsulosin (FLOMAX) 0.4 mg capsule take 1 capsule by mouth once daily AFTER DINNER 3/12/15   Regan Coffey MD       Allergies: Allergies   Allergen Reactions    Amlodipine Swelling and Itching    Bactrim [Sulfamethoprim Ds] Rash    Lisinopril Other (comments)     Acute renal failure    Rocephin [Ceftriaxone] Rash    Ciprofloxacin Rash and Itching           Physical Exam:     Visit Vitals  /64   Pulse 93   Temp 98.4 °F (36.9 °C)   Resp 20   Ht 5' 7\" (1.702 m)   Wt 67.1 kg (148 lb)   SpO2 100%   BMI 23.18 kg/m²       Physical Exam:  General appearance: alert, cooperative, no distress, appears stated age  Head: Normocephalic, without obvious abnormality, atraumatic  Neck: supple, trachea midline  Lungs: clear to auscultation bilaterally  Heart: regular rate and rhythm, S1, S2 normal, no murmur, click, rub or gallop  Abdomen: soft, non-tender. Bowel sounds normal. No masses,  no organomegaly  Extremities: extremities normal, atraumatic, no cyanosis or edema  Skin: Skin color, texture, turgor normal. No rashes or lesions  Neurologic: Grossly normal    Intake and Output:  Current Shift:  07/16 0701 - 07/16 1900  In: 2013 [I.V.:2013]  Out: -   Last three shifts:  No intake/output data recorded.     Lab/Data Reviewed:  CMP:   Lab Results   Component Value Date/Time     (H) 07/16/2020 06:50 AM    K 3.9 07/16/2020 06:50 AM     (H) 07/16/2020 06:50 AM CO2 29 07/16/2020 06:50 AM    AGAP 2 (L) 07/16/2020 06:50 AM    GLU 76 07/16/2020 06:50 AM    BUN 22 (H) 07/16/2020 06:50 AM    CREA 0.95 07/16/2020 06:50 AM    GFRAA >60 07/16/2020 06:50 AM    GFRNA >60 07/16/2020 06:50 AM    CA 8.8 07/16/2020 06:50 AM    ALB 2.1 (L) 07/16/2020 06:50 AM    TP 6.2 (L) 07/16/2020 06:50 AM    GLOB 4.1 (H) 07/16/2020 06:50 AM    AGRAT 0.5 (L) 07/16/2020 06:50 AM    ALT 23 07/16/2020 06:50 AM     CBC:   Lab Results   Component Value Date/Time    WBC 6.0 07/16/2020 06:50 AM    HGB 10.1 (L) 07/16/2020 06:50 AM    HCT 30.5 (L) 07/16/2020 06:50 AM     07/16/2020 06:50 AM     All Cardiac Markers in the last 24 hours:   Lab Results   Component Value Date/Time    CPK 35 (L) 07/16/2020 06:50 AM    CKMB 8.1 (H) 07/16/2020 06:50 AM    CKND1 23.1 (H) 07/16/2020 06:50 AM    TROIQ 0.04 07/16/2020 06:50 AM       Nalini Campbell MD    July 16, 2020

## 2020-07-16 NOTE — ED TRIAGE NOTES
Pt arrives to the ED from home with a c/c of SOB. Son called EMS as pt had been SOB all night. Upon EMS arrival, pt 92% on RA. Pt placed on 4L NC and O2 up to 100%. Upon presentation to ER, pt in no apparent signs of distress. Pt moved down to RA at %. States he is still SOB. States that he has also been tired recently, decreased appetite. Per EMS temp 96.6 en route, unable to get reading orally here in ER. Will do rectal temp. MD aware.

## 2020-07-16 NOTE — ED NOTES
Report from [facility] SLP Plan of Care Note  Communication/Cognition Treatment    Recommendations:  Communication/Cognition Treatment    Assessment:  Patient seen sitting upright in chair, daughter present who reports that patient has been exhibiting rambling, non-stop talking this am, very different than yesterday when she could be directed, answer questions appropriately and participate in structured tasks.  She is disoriented except to herself, does not recall any events of the day and has significant difficulty with simple problem solving tasks.  Conversation is perseverative and limited to her desire to get dressed and go home, and who is coming to see her today.  Speech will continue to follow this patient to address cognitive goals for safest return to baseline living setting if possible.    Diagnosis: Acute confusion [R41.0]    See SLP Flowsheet for goals and objective information addressed in this session.    Plan for Next Session:  Plan for Next Session: basic problem solving for safety, recall and orientation goals.    Frequency:  Frequency: EITHER by 2/8/17 KD basic prob solving for safety, orientation, recall goals (2/3)    Education:  See SLP Flowsheet    Barriers to Discharge:   SLP Identified Barriers to Discharge: medical    Recommendations for Discharge:  Recommendations for Discharge: SLP: AL vs home with 24 hour assist (02/03/17 1020)    Communication/Cognition Data Overview Last 24 hours       Subjective  Subjective Comments: awake, alert, cooperative,  (02/03/17 1020)    Observation  Observation Comments: daughter present, states pt confused, rambling today as she was 2 days ago (02/03/17 1020)    Behavior/Social Interaction  Arousal and Alertness: Appropriate responses to stimuli (02/03/17 1020)  Cooperates with Tasks: Moderate impairment (02/03/17 1020)  Maintains Eye Contact: Mild impairment (macular degeneration) (02/03/17 1020)  Pragmatics: Mild impairment (02/03/17 1020)  Appropriate Behavior: Mild impairment  (02/03/17 1020)  Emotional Lability: Intact (02/03/17 1020)  Affect: Intact (02/03/17 1020)    Verbal Expression  Initiation: No impairment (02/03/17 1020)  Expressive Language: Intact (02/03/17 1020)  Automatic Social/Serial: Mild impairment (02/03/17 1020)  Repetition - Word/Phrase/Sentence: Mild impairment (02/02/17 1400)  Indicates Needs - Gesturally/Verbally: Mild impairment (02/03/17 1020)  Spontaneous Word/Phrase/Sentences: Intact (02/03/17 1020)  Conversational Discourse: Mild impairment (02/03/17 1020)  Effective Techniques: Repetition (02/03/17 1020)  Interfering Components: Attention to detail;Cognition (02/03/17 1020)  Verbal Expression Comments: minimal word finding difficulty noted, pt able to express basic needs (02/03/17 1020)    Written Expression  Written Expression Comments: given visual deficits, did not further test (02/03/17 1020)    Auditory Comprehension  Localizes to Sound: Yes (02/03/17 1020)  Word Level - Body Part: Intact (02/02/17 1400)  Yes/No Questions 2 Unit: Intact (02/02/17 1400)  Yes/No Questions Multi Unit: Moderate impairment (02/02/17 1400)  Commands 2 Unit: Intact (02/02/17 1400)  Commands 3 Unit: Mild impairment (02/02/17 1400)  Commands Multi Unit: Moderate impairment (02/02/17 1400)  Conversation - Simple: Mild impairment (02/02/17 1400)  Conversation - Complex: Moderate impairment (02/02/17 1400)  Auditory Comprehension Comments: simple 1-2 unit commands followed, longer directives, need to be broken down.  Pt responds to majority of questions with humorous responses (02/02/17 1400)    Visual Recognition  Visual Recognition Comments: visual deficits at baseline (02/03/17 1020)    Reading Comprehension  Scanning/Tracking: Moderate impairment (02/03/17 1020)    Attention  Alternating Attention: Moderate impairment (02/03/17 1020)  Divided Attention: Severe impairment (02/03/17 1020)  Sustained Attention: Severe impairment (02/03/17 1020)  Attention Comments: easily distracted  ext/int (02/03/17 1020)    Orientation Level  Oriented to Person: Yes (02/03/17 1020)  Oriented to Place: No (02/03/17 1020)  Oriented to Month: No (02/03/17 1020)  Oriented to Year: No (02/03/17 1020)  Oriented to Date: No (02/03/17 1020)  Oriented to Day of Week: No (02/03/17 1020)  Oriented to Season: No (02/03/17 1020)  Oriented to Reason for Hospitilization: No (02/03/17 1020)    Memory  New Learning: Severe impairment (02/03/17 1020)  Daily Routine: Moderate impairment (02/03/17 1020)  Short term memory: Severe impairment (02/03/17 1020)  Immediate Memory: Moderate impairment (02/03/17 1020)  Delayed Memory: Severe impairment (02/03/17 1020)    Problem Solving  Simple Problem Solving: Mild impairment (02/03/17 1020)  Safety/Judgement: Intact (02/02/17 1400)    All Speech Therapy Goals:    SLP Goals  Short Term Goals to be Reviewed on : 02/08/17 (02/01/17 1330)  STG are the Same as Discharge Goals: Yes (02/01/17 1330)  Goal Agreement: Patient agrees with goals and treatment plan;Family/significant other/caregiver agrees (02/01/17 1330)    Memory Short Term Goal 1  Memory STG 1: patient will recall family members/visitors names, meal items and 1-2 daily events with min to mod cues 75% of the time (02/01/17 1330)    Memory STG 1: patient will recall family members/visitors names, meal items and 1-2 daily events with min to mod cues 75% of the time (02/01/17 1330)    Problem Solving Short Term Goal 1  Problem Solving STG 1: patient will provide appropriate solution(s) for simple functional problems for safety with functional tasks with mod cues 80% of the time (02/01/17 1330)    SLP Time Spent: 35 min (02/03/17 1020)

## 2020-07-16 NOTE — ED NOTES
Pt placed on martell hugger, warm blankets placed on top of pt. Pt states he is not cold at this time. Pleasant and cooperative with staff. A&Ox4.

## 2020-07-16 NOTE — PROGRESS NOTES
attempted to complete the initial Spiritual Assessment of the patient in bed 12 of the emergency room and offer Pastoral Care support to the patient but patient is now in an isolation room due to possible droplet plus infection. Patient was here just a few days ago and seen by . Prayer offered outside patients door on patient's behalf. Patient does not have any Nondenominational/cultural needs that will affect patients preferences in health care. Chaplains will continue to follow and will provide pastoral care on an as needed/requested basis.     [facility] Care Department  [phone]

## 2020-07-17 LAB
ANION GAP SERPL CALC-SCNC: 5 MMOL/L (ref 3–18)
ATRIAL RATE: 77 BPM
BUN SERPL-MCNC: 20 MG/DL (ref 7–18)
BUN/CREAT SERPL: 19 (ref 12–20)
CALCIUM SERPL-MCNC: 8.2 MG/DL (ref 8.5–10.1)
CALCULATED R AXIS, ECG10: 20 DEGREES
CALCULATED T AXIS, ECG11: 149 DEGREES
CHLORIDE SERPL-SCNC: 115 MMOL/L (ref 100–111)
CO2 SERPL-SCNC: 28 MMOL/L (ref 21–32)
CREAT SERPL-MCNC: 1.08 MG/DL (ref 0.6–1.3)
DIAGNOSIS, 93000: NORMAL
ERYTHROCYTE [DISTWIDTH] IN BLOOD BY AUTOMATED COUNT: 17.5 % (ref 11.6–14.5)
GLUCOSE SERPL-MCNC: 69 MG/DL (ref 74–99)
HCT VFR BLD AUTO: 27.8 % (ref 36–48)
HGB BLD-MCNC: 9 G/DL (ref 13–16)
MCH RBC QN AUTO: 29.8 PG (ref 24–34)
MCHC RBC AUTO-ENTMCNC: 32.4 G/DL (ref 31–37)
MCV RBC AUTO: 92.1 FL (ref 74–97)
P-R INTERVAL, ECG05: 240 MS
PLATELET # BLD AUTO: 301 K/UL (ref 135–420)
PMV BLD AUTO: 10.6 FL (ref 9.2–11.8)
POTASSIUM SERPL-SCNC: 3.8 MMOL/L (ref 3.5–5.5)
Q-T INTERVAL, ECG07: 456 MS
QRS DURATION, ECG06: 170 MS
QTC CALCULATION (BEZET), ECG08: 516 MS
RBC # BLD AUTO: 3.02 M/UL (ref 4.7–5.5)
SODIUM SERPL-SCNC: 148 MMOL/L (ref 136–145)
VENTRICULAR RATE, ECG03: 77 BPM
WBC # BLD AUTO: 6.9 K/UL (ref 4.6–13.2)

## 2020-07-17 PROCEDURE — 74011000250 HC RX REV CODE- 250: Performed by: HOSPITALIST

## 2020-07-17 PROCEDURE — 74011000258 HC RX REV CODE- 258: Performed by: HOSPITALIST

## 2020-07-17 PROCEDURE — 65660000000 HC RM CCU STEPDOWN

## 2020-07-17 PROCEDURE — 74011250636 HC RX REV CODE- 250/636: Performed by: HOSPITALIST

## 2020-07-17 PROCEDURE — 74011250636 HC RX REV CODE- 250/636: Performed by: EMERGENCY MEDICINE

## 2020-07-17 PROCEDURE — 94762 N-INVAS EAR/PLS OXIMTRY CONT: CPT

## 2020-07-17 PROCEDURE — 77010033678 HC OXYGEN DAILY

## 2020-07-17 PROCEDURE — 80048 BASIC METABOLIC PNL TOTAL CA: CPT

## 2020-07-17 PROCEDURE — 85027 COMPLETE CBC AUTOMATED: CPT

## 2020-07-17 PROCEDURE — 74011250637 HC RX REV CODE- 250/637: Performed by: HOSPITALIST

## 2020-07-17 RX ORDER — BUMETANIDE 0.25 MG/ML
1 INJECTION INTRAMUSCULAR; INTRAVENOUS 2 TIMES DAILY
Status: DISCONTINUED | OUTPATIENT
Start: 2020-07-17 | End: 2020-07-23 | Stop reason: HOSPADM

## 2020-07-17 RX ADMIN — PIPERACILLIN SODIUM AND TAZOBACTAM SODIUM 4.5 G: 4; .5 INJECTION, POWDER, LYOPHILIZED, FOR SOLUTION INTRAVENOUS at 17:49

## 2020-07-17 RX ADMIN — TAMSULOSIN HYDROCHLORIDE 0.4 MG: 0.4 CAPSULE ORAL at 11:12

## 2020-07-17 RX ADMIN — BUMETANIDE 1 MG: 0.25 INJECTION INTRAMUSCULAR; INTRAVENOUS at 11:12

## 2020-07-17 RX ADMIN — LEVOTHYROXINE SODIUM 50 MCG: 0.05 TABLET ORAL at 05:53

## 2020-07-17 RX ADMIN — SODIUM CHLORIDE 1250 MG: 900 INJECTION, SOLUTION INTRAVENOUS at 11:23

## 2020-07-17 RX ADMIN — PIPERACILLIN SODIUM AND TAZOBACTAM SODIUM 4.5 G: 4; .5 INJECTION, POWDER, LYOPHILIZED, FOR SOLUTION INTRAVENOUS at 05:52

## 2020-07-17 RX ADMIN — ATORVASTATIN CALCIUM 20 MG: 20 TABLET, FILM COATED ORAL at 11:13

## 2020-07-17 RX ADMIN — BUMETANIDE 1 MG: 0.25 INJECTION INTRAMUSCULAR; INTRAVENOUS at 17:49

## 2020-07-17 NOTE — ED NOTES
Pt resting ,voices no c/o pain or discomfort. Assessment unchanged. Pt repositioned to left side and back care given.

## 2020-07-17 NOTE — ED NOTES
Pt report given to [facility].  She was made aware of turning schedule  All questions answered

## 2020-07-17 NOTE — ED NOTES
APS report filed 2nd to pt being unkempt and skin breaking down to perineum and scrotum.  Report placed with Savanachristine Gordon and APS hotline

## 2020-07-17 NOTE — PROGRESS NOTES
Rec'd report from [facility]. Patient awake and alert   Alert and oriented x3. Denies pain . Afebrile . Incont. of bowels this am. SOB with minimal exertion . Laid head down to turn and patient with audible wheezing   Bumex with good results voiding large amts of yellow urine and decreased audible wheezing   Bedside and Verbal shift change report given to Alphia Opitz (oncoming nurse) by Kal Miller (offgoing nurse). Report included the following information SBAR, Intake/Output, MAR, Cardiac Rhythm V Paced with PVC and Quality Measures.

## 2020-07-17 NOTE — ED NOTES
Pt Resting quietly. Voices no c/o pain or discomfort. Pt AAOX4  COLUNGA with slow guarded movement. Skin normal color warm and dry . Pt with anasarca to all ext , Poor skin turgor. Abd soft non tender BS x4. Resp regular unlabored. Breath sounds clear and decreased all fields with fair inspiratory effort. IV abx infused ,IV flushed with ease and site remains asymptomatic.  Pt turned to right side and supported by pillows

## 2020-07-17 NOTE — ROUTINE PROCESS
Pt to room 2706 in company of RNs, no complaints. Skin check performed, nahomy excoriation and moisture irritation in genital/nahomy region. Bruising/redness to L arm, skin tears. Telemetry leads placed, ventricular paced rhythm.

## 2020-07-17 NOTE — ED NOTES
Vancomycin due at this time, attempted to start but PIV to left arm not flushing, left arm swollen and warm, with redness noted, pt denies pain but states arm is itching \"real bad\". MD notified, PIV discontinued at this time. Cool compresses applied, arm elevated at this time. Will attempt to establish PIV in different area for repeat Troponin and to start IV ABT.

## 2020-07-17 NOTE — PROGRESS NOTES
Problem: Discharge Planning  Goal: *Discharge to safe environment  Outcome: Progressing Towards Goal  Plan is tbd    Chart reviewed. [facility] in [place] and [address] I understand that last admission- last week- he was denied for snf by [facility]. .   Please order PT/OT NOW so may be qualified for snf this admission. Patient makes too much money( $1650 month) (and owns a home), to qualify for medicaid. I notes Daisha Doe has been contacted due to pt being unkept and has raw skin areas on his body. If needs home health, [facility] will be [facility]  Case Management to follow. Maci Mantilla. LEO [facility]-B[facility]iste  [facility]  [phone], Pager [phone] per chart. 8  [email or web address]

## 2020-07-17 NOTE — PROGRESS NOTES
Medicine Progress Note    Patient: Emmanuel Mabry   Age:  80 y.o.  DOA: 7/16/2020   Admit Dx / CC: Acute on chronic systolic (congestive) heart failure (HCC) [I50.23]  Pleural effusion [J90]  Hypothermia [T68. XXXA]  HCAP (healthcare-associated pneumonia) [J18.9]  Pneumonia [J18.9]  LOS:  LOS: 1 day     Assessment/Plan   Principal Problem:    Acute on chronic systolic (congestive) heart failure ([address] (7/16/2020)    Active Problems:    Hypothermia (12/23/2019)      Pleural effusion (7/16/2020)      HCAP (healthcare-associated pneumonia) (7/16/2020)      Pneumonia (7/16/2020)        Additional Plan notes     1)  Acute on chronic systolic HF/ possible HCAP              - b/l effusion however cannot r/o pna on xray , will cover for HCAP- vanc and zosyn- avoid rocephin. Diurese, fluid restrict, IV bumex. Cardiac monitor     Wean 02 as tolerated  Monitor renal function  Expect dc monday      DISPO        Anticipated Date of Discharge: monday  Anticipated Disposition (home, SNF) : snf    Subjective:   Patient seen and examined. No complaints today, breathing some better    Objective:     Visit Vitals  BP (!) 167/92   Pulse 84   Temp 96.8 °F (36 °C)   Resp 18   Ht 5' 7\" (1.702 m)   Wt 67.1 kg (148 lb)   SpO2 100%   BMI 23.18 kg/m²       Physical Exam:  General appearance: alert, cooperative, no distress, appears stated age  Head: Normocephalic, without obvious abnormality, atraumatic  Neck: supple, trachea midline  Lungs: clear to auscultation bilaterally  Heart: regular rate and rhythm, S1, S2 normal, no murmur, click, rub or gallop  Abdomen: soft, non-tender. Bowel sounds normal. No masses,  no organomegaly  Extremities: extremities normal, atraumatic, no cyanosis or edema  Skin: Skin color, texture, turgor normal. No rashes or lesions  Neurologic: Grossly normal    Intake and Output:  Current Shift:  No intake/output data recorded.   Last three shifts:  07/15 1901 - 07/17 0700  In: 2513 [I.V.:2513]  Out: 1260 [Urine:2840]    Lab/Data Reviewed:  CMP:   Lab Results   Component Value Date/Time     (H) 07/17/2020 02:00 AM    K 3.8 07/17/2020 02:00 AM     (H) 07/17/2020 02:00 AM    CO2 28 07/17/2020 02:00 AM    AGAP 5 07/17/2020 02:00 AM    GLU 69 (L) 07/17/2020 02:00 AM    BUN 20 (H) 07/17/2020 02:00 AM    CREA 1.08 07/17/2020 02:00 AM    GFRAA >60 07/17/2020 02:00 AM    GFRNA >60 07/17/2020 02:00 AM    CA 8.2 (L) 07/17/2020 02:00 AM     CBC:   Lab Results   Component Value Date/Time    WBC 6.9 07/17/2020 06:00 AM    HGB 9.0 (L) 07/17/2020 06:00 AM    HCT 27.8 (L) 07/17/2020 06:00 AM     07/17/2020 06:00 AM     All Cardiac Markers in the last 24 hours:   Lab Results   Component Value Date/Time    TROIQ 0.10 (H) 07/16/2020 09:15 PM    TROIQ 0.05 (H) 07/16/2020 12:50 PM       Medications Reviewed:  Current Facility-Administered Medications   Medication Dose Route Frequency    piperacillin-tazobactam (ZOSYN) 4.5 g in 0.9% sodium chloride (MBP/ADV) 100 mL MBP \"EXTENDED 4 HOUR INFUSION ###  4.5 g IntraVENous Q8H    bumetanide (BUMEX) injection 1 mg  1 mg IntraVENous BID    sodium chloride (NS) flush 5-10 mL  5-10 mL IntraVENous PRN    levothyroxine (SYNTHROID) tablet 50 mcg  50 mcg Oral 6am    atorvastatin (LIPITOR) tablet 20 mg  20 mg Oral DAILY    tamsulosin (FLOMAX) capsule 0.4 mg  0.4 mg Oral DAILY    acetaminophen (TYLENOL) tablet 650 mg  650 mg Oral Q6H PRN    ondansetron (ZOFRAN) injection 4 mg  4 mg IntraVENous Q6H PRN    vancomycin (VANCOCIN) 1,250 mg in 0.9% sodium chloride 250 mL IVPB  1,250 mg IntraVENous Q18H    [START ON 7/18/2020] VANCOMYCIN INFORMATION NOTE   Other ONCE    VANCOMYCIN INFORMATION NOTE 1 Each  1 Each Other Rx Dosing/Monitoring       Radha Le MD    July 17, 2020

## 2020-07-17 NOTE — ED NOTES
TRANSFER - ED to INPATIENT REPORT:    Verbal report given to [facility] on Amado Party  being transferred to 2700 (unit) for routine progression of care       Report consisted of patients Situation, Background, Assessment and   Recommendations(SBAR). SBAR report made available to receiving floor on this patient being transferred to  [phone] /[id number])  for routine progression of care       Admitting diagnosis Acute on chronic systolic (congestive) heart failure (HCC) [I50.23]  Pleural effusion [J90]  Hypothermia [T68. XXXA]  HCAP (healthcare-associated pneumonia) [J18.9]  Pneumonia [J18.9]    Information from the following report(s) ED Summary, MAR, Recent Results and Cardiac Rhythm NSR was made available to receiving floor. Lines:   Peripheral IV 07/16/20 Left Antecubital (Active)        HCG Status for ALL Females under 55 y/o: NO     Medication list unable to confirm    Opportunity for questions and clarification was provided.       Patient is oriented to time, place, person and situation N/A  Patient is  continent and non-ambulatory     Valuables transported with patient     Patient transported with:   Monitor  O2 @ 2 liters  Registered Nurse    MAP (Monitor): 74 =Monitored (most recent)  Vitals w/ MEWS Score (last day)     Date/Time MEWS Score Pulse Resp Temp BP Level of Consciousness SpO2    07/16/20 2000  2  83  21  97.1 °F (36.2 °C)  126/74  Alert  97 %    07/16/20 16:25:04    83  21  97 °F (36.1 °C)  128/62    97 %    07/16/20 1345    87  17  97.5 °F (36.4 °C)  128/58    100 %    07/16/20 1330    87  17  97.6 °F (36.4 °C)  111/60    100 %    07/16/20 1315    92  18  97.6 °F (36.4 °C)      100 %    07/16/20 1300    84  20  97.7 °F (36.5 °C)      100 %    07/16/20 1245    89  19  97.8 °F (36.6 °C)      100 %    07/16/20 1230    84  15  97.9 °F (36.6 °C)      100 %    07/16/20 1215    90  18  98.1 °F (36.7 °C)      100 %    07/16/20 1200    92  19  98.2 °F (36.8 °C)      100 % 07/16/20 1147    93  20  98.4 °F (36.9 °C)  113/64    100 %    07/16/20 1145    93  20  98.4 °F (36.9 °C)      100 %    07/16/20 1130    96  20  98.6 °F (37 °C)      100 %    07/16/20 1115    91  21  98.8 °F (37.1 °C)  107/51    100 %    07/16/20 1045    93  18  99.1 °F (37.3 °C)  111/49    99 %    07/16/20 1030      20  99.1 °F (37.3 °C)  119/54    99 %    07/16/20 1015      19  98.9 °F (37.2 °C)  115/58    100 %    07/16/20 1000        98.5 °F (36.9 °C)  110/57        07/16/20 0945        98 °F (36.7 °C)  115/55        07/16/20 0930        97.5 °F (36.4 °C)  121/61    96 %    07/16/20 0915        97 °F (36.1 °C)  134/67        07/16/20 0900    87  18  (!) 96.4 °F (35.8 °C)  119/61    98 %    07/16/20 0845    77  13  (!) 95.9 °F (35.5 °C)  107/56    97 %    07/16/20 0830    80  13  (!) 95.5 °F (35.3 °C)  112/56    99 %    07/16/20 0815    83  16  (!) 95.2 °F (35.1 °C)  118/59    100 %    07/16/20 0800      13  (!) 94.8 °F (34.9 °C)  116/60    100 %    07/16/20 0745    (!) 55  16  (!) 93.2 °F (34 °C)  123/63    100 %    07/16/20 0730    73  15    126/60    100 %    07/16/20 0715    69  11    121/57    100 %    07/16/20 0705    72  12        100 %    07/16/20 0700    74  14    131/63    100 %    07/16/20 0655    82  16    127/70    100 %    07/16/20 0650    65  16    131/59    100 %    07/16/20 06:46:39        (!) 93.4 °F (34.1 °C)          07/16/20 0645    76  18    133/62    100 %    07/16/20 0640    72  18    129/66    100 %    07/16/20 0635    81  14    114/63    100 %    07/16/20 0630    84  18        100 %    07/16/20 0625    63  15    137/73  Alert  100 %    07/16/20 0620      14    133/71    100 %    07/16/20 0230  2  81  21  97 °F (36.1 °C)  128/78  Alert  94 %              Septic Patients:     Lactic Acid  Lab Results   Component Value Date    [facility]    LACPO 2.32 (HH) 12/23/2019    (Most recent on top)  Repeat drawn: YES Time: N/A     ALL LACTIC ACIDS GREATER THAN 2 MUST BE REPEATED POC WITHIN 4 HOURS OR PRIOR TO ADMISSION               Total Fluid Bolus initiated and documented on MAR: YES    All ordered antibiotics initiated within first 3 hours of TIME ZERO?   YES

## 2020-07-18 ENCOUNTER — APPOINTMENT (OUTPATIENT)
Dept: CT IMAGING | Age: 85
DRG: 291 | End: 2020-07-18
Attending: INTERNAL MEDICINE
Payer: MEDICARE

## 2020-07-18 LAB
ANION GAP SERPL CALC-SCNC: 8 MMOL/L (ref 3–18)
BUN SERPL-MCNC: 19 MG/DL (ref 7–18)
BUN/CREAT SERPL: 17 (ref 12–20)
CALCIUM SERPL-MCNC: 8.2 MG/DL (ref 8.5–10.1)
CHLORIDE SERPL-SCNC: 114 MMOL/L (ref 100–111)
CO2 SERPL-SCNC: 25 MMOL/L (ref 21–32)
CREAT SERPL-MCNC: 1.09 MG/DL (ref 0.6–1.3)
DATE LAST DOSE: ABNORMAL
ERYTHROCYTE [DISTWIDTH] IN BLOOD BY AUTOMATED COUNT: 17.8 % (ref 11.6–14.5)
GLUCOSE SERPL-MCNC: 67 MG/DL (ref 74–99)
HCT VFR BLD AUTO: 30.4 % (ref 36–48)
HGB BLD-MCNC: 9.7 G/DL (ref 13–16)
MCH RBC QN AUTO: 29.9 PG (ref 24–34)
MCHC RBC AUTO-ENTMCNC: 31.9 G/DL (ref 31–37)
MCV RBC AUTO: 93.8 FL (ref 74–97)
PLATELET # BLD AUTO: 276 K/UL (ref 135–420)
PMV BLD AUTO: 10 FL (ref 9.2–11.8)
POTASSIUM SERPL-SCNC: 3.7 MMOL/L (ref 3.5–5.5)
RBC # BLD AUTO: 3.24 M/UL (ref 4.7–5.5)
REPORTED DOSE,DOSE: 0 UNITS
REPORTED DOSE/TIME,TMG: 0
SARS-COV-2, COV2NT: NOT DETECTED
SODIUM SERPL-SCNC: 147 MMOL/L (ref 136–145)
VANCOMYCIN TROUGH SERPL-MCNC: 28.8 UG/ML (ref 10–20)
WBC # BLD AUTO: 6.7 K/UL (ref 4.6–13.2)

## 2020-07-18 PROCEDURE — 74011000258 HC RX REV CODE- 258: Performed by: HOSPITALIST

## 2020-07-18 PROCEDURE — 85027 COMPLETE CBC AUTOMATED: CPT

## 2020-07-18 PROCEDURE — 74011000250 HC RX REV CODE- 250: Performed by: HOSPITALIST

## 2020-07-18 PROCEDURE — 36415 COLL VENOUS BLD VENIPUNCTURE: CPT

## 2020-07-18 PROCEDURE — 77010033678 HC OXYGEN DAILY

## 2020-07-18 PROCEDURE — 74011250636 HC RX REV CODE- 250/636: Performed by: EMERGENCY MEDICINE

## 2020-07-18 PROCEDURE — 97162 PT EVAL MOD COMPLEX 30 MIN: CPT

## 2020-07-18 PROCEDURE — 74011250637 HC RX REV CODE- 250/637: Performed by: HOSPITALIST

## 2020-07-18 PROCEDURE — 71250 CT THORAX DX C-: CPT

## 2020-07-18 PROCEDURE — 97530 THERAPEUTIC ACTIVITIES: CPT

## 2020-07-18 PROCEDURE — 74011250636 HC RX REV CODE- 250/636: Performed by: HOSPITALIST

## 2020-07-18 PROCEDURE — 94762 N-INVAS EAR/PLS OXIMTRY CONT: CPT

## 2020-07-18 PROCEDURE — 80202 ASSAY OF VANCOMYCIN: CPT

## 2020-07-18 PROCEDURE — 80048 BASIC METABOLIC PNL TOTAL CA: CPT

## 2020-07-18 PROCEDURE — 65660000000 HC RM CCU STEPDOWN

## 2020-07-18 RX ADMIN — BUMETANIDE 1 MG: 0.25 INJECTION INTRAMUSCULAR; INTRAVENOUS at 10:03

## 2020-07-18 RX ADMIN — SODIUM CHLORIDE 1250 MG: 900 INJECTION, SOLUTION INTRAVENOUS at 02:18

## 2020-07-18 RX ADMIN — ATORVASTATIN CALCIUM 20 MG: 20 TABLET, FILM COATED ORAL at 10:03

## 2020-07-18 RX ADMIN — SODIUM CHLORIDE 1250 MG: 900 INJECTION, SOLUTION INTRAVENOUS at 20:25

## 2020-07-18 RX ADMIN — TAMSULOSIN HYDROCHLORIDE 0.4 MG: 0.4 CAPSULE ORAL at 10:03

## 2020-07-18 RX ADMIN — PIPERACILLIN SODIUM AND TAZOBACTAM SODIUM 4.5 G: 4; .5 INJECTION, POWDER, LYOPHILIZED, FOR SOLUTION INTRAVENOUS at 10:03

## 2020-07-18 RX ADMIN — PIPERACILLIN SODIUM AND TAZOBACTAM SODIUM 4.5 G: 4; .5 INJECTION, POWDER, LYOPHILIZED, FOR SOLUTION INTRAVENOUS at 02:18

## 2020-07-18 RX ADMIN — LEVOTHYROXINE SODIUM 50 MCG: 0.05 TABLET ORAL at 06:29

## 2020-07-18 RX ADMIN — BUMETANIDE 1 MG: 0.25 INJECTION INTRAMUSCULAR; INTRAVENOUS at 17:56

## 2020-07-18 RX ADMIN — PIPERACILLIN SODIUM AND TAZOBACTAM SODIUM 4.5 G: 4; .5 INJECTION, POWDER, LYOPHILIZED, FOR SOLUTION INTRAVENOUS at 17:56

## 2020-07-18 NOTE — PROGRESS NOTES
Medicine Progress Note    Patient: Meggan Leggett   Age:  80 y.o.  DOA: 7/16/2020   Admit Dx / CC: Acute on chronic systolic (congestive) heart failure (HCC) [I50.23]  Pleural effusion [J90]  Hypothermia [T68. XXXA]  HCAP (healthcare-associated pneumonia) [J18.9]  Pneumonia [J18.9]  LOS:  LOS: 2 days     Assessment/Plan   Principal Problem:    Acute on chronic systolic (congestive) heart failure ([address] (7/16/2020)    Active Problems:    Hypothermia (12/23/2019)      Pleural effusion (7/16/2020)      HCAP (healthcare-associated pneumonia) (7/16/2020)      Pneumonia (7/16/2020)        Additional Plan notes     1)  Acute on chronic systolic HF/ possible HCAP              - b/l effusion however cannot r/o pna on xray , will cover for HCAP- vanc and zosyn- avoid rocephin. Diurese, fluid restrict, IV bumex. Cardiac monitor     Wean 02 as tolerated  Monitor renal function  Expect dc monday      DISPO        Anticipated Date of Discharge: monday  Anticipated Disposition (home, SNF) : snf    Subjective:   Patient seen and examined. Continues to improve, wean 02, monitor BMP    Objective:     Visit Vitals  /75   Pulse 79   Temp (!) 95.7 °F (35.4 °C)   Resp 15   Ht 5' 7\" (1.702 m)   Wt 67.1 kg (148 lb)   SpO2 97%   BMI 23.18 kg/m²       Physical Exam:  General appearance: alert, cooperative, no distress, appears stated age  Head: Normocephalic, without obvious abnormality, atraumatic  Neck: supple, trachea midline  Lungs: clear to auscultation bilaterally  Heart: regular rate and rhythm, S1, S2 normal, no murmur, click, rub or gallop  Abdomen: soft, non-tender. Bowel sounds normal. No masses,  no organomegaly  Extremities: extremities normal, atraumatic, no cyanosis or edema  Skin: Skin color, texture, turgor normal. No rashes or lesions  Neurologic: Grossly normal    Intake and Output:  Current Shift:  No intake/output data recorded.   Last three shifts:  07/16 1901 - 07/18 0700  In: 700 [I.V.:700]  Out: 8015 [[id number]]    Lab/Data Reviewed:  CMP:   Lab Results   Component Value Date/Time     (H) 07/18/2020 06:55 AM    K 3.7 07/18/2020 06:55 AM     (H) 07/18/2020 06:55 AM    CO2 25 07/18/2020 06:55 AM    AGAP 8 07/18/2020 06:55 AM    GLU 67 (L) 07/18/2020 06:55 AM    BUN 19 (H) 07/18/2020 06:55 AM    CREA 1.09 07/18/2020 06:55 AM    GFRAA >60 07/18/2020 06:55 AM    GFRNA >60 07/18/2020 06:55 AM    CA 8.2 (L) 07/18/2020 06:55 AM     CBC:   Lab Results   Component Value Date/Time    WBC 6.7 07/18/2020 06:55 AM    HGB 9.7 (L) 07/18/2020 06:55 AM    HCT 30.4 (L) 07/18/2020 06:55 AM     07/18/2020 06:55 AM     All Cardiac Markers in the last 24 hours:   No results found for: CPK, CK, CKMMB, CKMB, RCK3, CKMBT, CKNDX, CKND1, TELLY, TROPT, TROIQ, JOSE, TROPT, TNIPOC, BNP, BNPP    Medications Reviewed:  Current Facility-Administered Medications   Medication Dose Route Frequency    piperacillin-tazobactam (ZOSYN) 4.5 g in 0.9% sodium chloride (MBP/ADV) 100 mL MBP \"EXTENDED 4 HOUR INFUSION ###  4.5 g IntraVENous Q8H    bumetanide (BUMEX) injection 1 mg  1 mg IntraVENous BID    sodium chloride (NS) flush 5-10 mL  5-10 mL IntraVENous PRN    levothyroxine (SYNTHROID) tablet 50 mcg  50 mcg Oral 6am    atorvastatin (LIPITOR) tablet 20 mg  20 mg Oral DAILY    tamsulosin (FLOMAX) capsule 0.4 mg  0.4 mg Oral DAILY    acetaminophen (TYLENOL) tablet 650 mg  650 mg Oral Q6H PRN    ondansetron (ZOFRAN) injection 4 mg  4 mg IntraVENous Q6H PRN    vancomycin (VANCOCIN) 1,250 mg in 0.9% sodium chloride 250 mL IVPB  1,250 mg IntraVENous Q18H    VANCOMYCIN INFORMATION NOTE   Other ONCE    VANCOMYCIN INFORMATION NOTE 1 Each  1 Each Other Rx Dosing/Monitoring       Raghu Nair MD    July 18, 2020

## 2020-07-18 NOTE — PROGRESS NOTES
Problem: Falls - Risk of  Goal: *Absence of Falls  Description: Document Lanie Kelley Fall Risk and appropriate interventions in the flowsheet. Outcome: Progressing Towards Goal  Note: Fall Risk Interventions:  Mobility Interventions: Bed/chair exit alarm              Elimination Interventions: Call light in reach, Toileting schedule/hourly rounds    History of Falls Interventions: Bed/chair exit alarm         Problem: Patient Education: Go to Patient Education Activity  Goal: Patient/Family Education  Outcome: Progressing Towards Goal     Problem: Pressure Injury - Risk of  Goal: *Prevention of pressure injury  Description: Document Alex Scale and appropriate interventions in the flowsheet. Outcome: Progressing Towards Goal  Note: Pressure Injury Interventions:  Sensory Interventions: Keep linens dry and wrinkle-free    Moisture Interventions: Absorbent underpads, Apply protective barrier, creams and emollients    Activity Interventions: PT/OT evaluation    Mobility Interventions: Turn and reposition approx.  every two hours(pillow and wedges)    Nutrition Interventions: Document food/fluid/supplement intake    Friction and Shear Interventions: Apply protective barrier, creams and emollients, HOB 30 degrees or less                Problem: Patient Education: Go to Patient Education Activity  Goal: Patient/Family Education  Outcome: Progressing Towards Goal     Problem: Discharge Planning  Goal: *Discharge to safe environment  Outcome: Progressing Towards Goal     Problem: Patient Education: Go to Patient Education Activity  Goal: Patient/Family Education  Outcome: Progressing Towards Goal

## 2020-07-18 NOTE — PROGRESS NOTES
Problem: Mobility Impaired (Adult and Pediatric)  Goal: *Acute Goals and Plan of Care (Insert Text)  Description: Physical Therapy Goals  Initiated 7/18/2020 and to be accomplished within 7 day(s)  1. Patient will roll side to side in bed with moderate assistance . 2.  Patient will transfer from supine to sit and sit to supine with moderate assistance. 3.  Patient will sit at edge of bed for 8 minutes with minimal assistance to prepare for OOB activity. 4.  Patient will perform sit to stand with moderate assistance . 5. Patient will ambulate with moderate assistance  for 5 feet with the least restrictive device. Prior Level of Function:   Patient lives with son in two-story home with patient's bedroom on first floor. Per chart, patient has rolling walker, rollator, wheelchair, BSC, shower chair, and grab bars at home. Patient reported that he is requiring assistance for all mobility and ADLs from son but was previously able to ambulate with walker in house. Outcome: Progressing Towards Goal   PHYSICAL THERAPY EVALUATION    Patient: Tommie Haider (27 y.o. male)  Date: 7/18/2020  Primary Diagnosis: Acute on chronic systolic (congestive) heart failure (HCC) [I50.23]  Pleural effusion [J90]  Hypothermia [T68. XXXA]  HCAP (healthcare-associated pneumonia) [J18.9]  Pneumonia [J18.9]        Precautions:   Fall, Skin  WBAT  PLOF: see above    ASSESSMENT :  Based on the objective data described below, the patient presents with impaired AROM, increased tone BLE, impaired bed mobility, impaired transfers, generalized weakness, deconditioning, and impaired activity tolerance. Nurse cleared patient for participation in skilled physical therapy. Patient received reclined in bed, awake and alert, agreeable to physical therapy treatment. Patient with 2 LPM O2 per NC, +mojica catheter, +IV. Vitals at rest: HR 79 bpm, SpO2 97%, BP stable. Patient stated that he needed to use bed pan upon PT entering room. Patient completed rolling with max assist to total assist x 1 for bed pan placement. Increased time needed for bowel movement. Max to total assistance for rolling for bed pan removal. Nurse came in at this time and assisted with pericare with two-person assist required for maintaining sidelying in bed due to patient weakness and overall stiffness of joints. Total assist x 2 for repositioning in bed. Patient completed AAROM BLE heel slides and hip abduction/adduction to promote BLE strength. Patient left reclined in bed, all needs in reach, call bell in reach, VSS on 2 LPM O2, in NAD. Nurse notified. Recommend SNF upon discharge for continued skilled therapy. Patient will benefit from skilled intervention to address the above impairments. Patient's rehabilitation potential is considered to be Good  Factors which may influence rehabilitation potential include:   []         None noted  []         Mental ability/status  [x]         Medical condition  [x]         Home/family situation and support systems  []         Safety awareness  []         Pain tolerance/management  []         Other:      PLAN :  Recommendations and Planned Interventions:   [x]           Bed Mobility Training             [x]    Neuromuscular Re-Education  [x]           Transfer Training                   []    Orthotic/Prosthetic Training  [x]           Gait Training                          []    Modalities  [x]           Therapeutic Exercises           []    Edema Management/Control  [x]           Therapeutic Activities            [x]    Family Training/Education  [x]           Patient Education  []           Other (comment):    Frequency/Duration: Patient will be followed by physical therapy 3-5 times a week to address goals. Discharge Recommendations: Khai Ramsey  Further Equipment Recommendations for Discharge: TBD next level of care     SUBJECTIVE:   Patient stated I've pretty good. I'm comfortable now.     OBJECTIVE DATA SUMMARY:     Past Medical History:   Diagnosis Date    Difficulty urinating     Elevated PSA     Hematuria, unspecified     Hypercholesterolemia     Hypertension     Incomplete bladder emptying     Urinary retention     UTI (urinary tract infection)      Past Surgical History:   Procedure Laterality Date    APPENDECTOMY      EGD  2/7/2014         HX TURP  6/13/16    HX TURP      TN INS NEW/RPLC PRM PACEMAKER W/TRANSV ELTRD VENTR N/A 5/5/2020    Insert Ppm Single Ventricular performed by Len Iraheta MD at [address] [facility]     Barriers to Learning/Limitations: None  Compensate with: N/A  Home Situation:  Home Situation  Home Environment: Private residence  # Steps to Enter: 2  Rails to Enter: Yes  One/Two Story Residence: Two story, live on 1st floor  Living Alone: No  Support Systems: Child(darryl)(son)  Current DME Used/Available at Home: Walker, rolling, Jenene , rollator, Shower chair, Grab bars, Commode, bedside, Wheelchair  Tub or Shower Type: Shower  Critical Behavior:  Neurologic State: Alert  Orientation Level: Oriented X4  Cognition: Follows commands  Safety/Judgement: Fall prevention  Psychosocial  Patient Behaviors: Calm; Cooperative                 Strength:    Strength: Generally decreased, functional  Grossly 2-/5 BLE      Tone & Sensation:   Tone: Abnormal(increased tone BLE)      Sensation: Intact   Range Of Motion:  AROM: Generally decreased, functional           PROM: Generally decreased, functional         Functional Mobility:  Bed Mobility:  Rolling: Maximum assistance;Assist x2        Scooting: Total assistance;Assist x2      Therapeutic Exercises:   AAROM heel slides, hip abduction/adduction x 10 each leg  Pain:  Pain level pre-treatment: 0/10   Pain level post-treatment: 0/10   Pain Intervention(s) : Medication (see MAR);  Rest, Repositioning  Response to intervention: Nurse notified, See doc flow    Activity Tolerance:   Fair-  Please refer to the flowsheet for vital signs taken during this treatment. After treatment:   []         Patient left in no apparent distress sitting up in chair  [x]         Patient left in no apparent distress in bed  [x]         Call bell left within reach  [x]         Nursing notified  []         Caregiver present  []         Bed alarm activated  []         SCDs applied    COMMUNICATION/EDUCATION:   [x]         Role of Physical Therapy in the acute care setting. [x]         Fall prevention education was provided and the patient/caregiver indicated understanding. [x]         Patient/family have participated as able in goal setting and plan of care. [x]         Patient/family agree to work toward stated goals and plan of care. []         Patient understands intent and goals of therapy, but is neutral about his/her participation. []         Patient is unable to participate in goal setting/plan of care: ongoing with therapy staff.  []         Other:     Thank you for this referral.  Bibiana Alva DPT   Time Calculation: 24 mins      Eval Complexity: History: MEDIUM  Complexity : 1-2 comorbidities / personal factors will impact the outcome/ POC Exam:MEDIUM Complexity : 3 Standardized tests and measures addressing body structure, function, activity limitation and / or participation in recreation  Presentation: MEDIUM Complexity : Evolving with changing characteristics  Clinical Decision Making:Medium Complexity strength, bed mobility, activity tolerance, AROM  Overall Complexity:MEDIUM

## 2020-07-18 NOTE — PROGRESS NOTES
Assumed care from [address]. Patient is awake and alert, denies any pain or discomfort. Pericare completed. Tolerated well.     0000> Patient remains stable. No distress noted. 0400> Patient's T = 35.7F, patient verbalized being comfortable and verbalized that he doesn't feel cold. Will continue to monitor. Bedside and Verbal shift change report given to Adrienne Barger (oncoming nurse) by Jana Lee RN (offgoing nurse). Report included the following information SBAR, Kardex, Intake/Output, MAR, Recent Results and Cardiac Rhythm Vpaced.

## 2020-07-18 NOTE — PROGRESS NOTES
Problem: Falls - Risk of  Goal: *Absence of Falls  Description: Document Idalmis Go Fall Risk and appropriate interventions in the flowsheet. Outcome: Progressing Towards Goal  Note: Fall Risk Interventions:  Mobility Interventions: Bed/chair exit alarm              Elimination Interventions: Call light in reach, Bed/chair exit alarm              Problem: Patient Education: Go to Patient Education Activity  Goal: Patient/Family Education  Outcome: Progressing Towards Goal     Problem: Pressure Injury - Risk of  Goal: *Prevention of pressure injury  Description: Document Alex Scale and appropriate interventions in the flowsheet.   Outcome: Progressing Towards Goal  Note: Pressure Injury Interventions:       Moisture Interventions: Apply protective barrier, creams and emollients, Minimize layers    Activity Interventions: Assess need for specialty bed, Increase time out of bed, Pressure redistribution bed/mattress(bed type), PT/OT evaluation    Mobility Interventions: Assess need for specialty bed, HOB 30 degrees or less, Pressure redistribution bed/mattress (bed type), PT/OT evaluation    Nutrition Interventions: Document food/fluid/supplement intake    Friction and Shear Interventions: Apply protective barrier, creams and emollients, Foam dressings/transparent film/skin sealants, HOB 30 degrees or less, Lift sheet, Minimize layers                Problem: Patient Education: Go to Patient Education Activity  Goal: Patient/Family Education  Outcome: Progressing Towards Goal     Problem: Discharge Planning  Goal: *Discharge to safe environment  Outcome: Progressing Towards Goal

## 2020-07-18 NOTE — PROGRESS NOTES
Problem: Falls - Risk of  Goal: *Absence of Falls  Description: Document Chris Smart Fall Risk and appropriate interventions in the flowsheet. Outcome: Progressing Towards Goal  Note: Fall Risk Interventions:  Mobility Interventions: Bed/chair exit alarm              Elimination Interventions: Call light in reach, Bed/chair exit alarm              Problem: Patient Education: Go to Patient Education Activity  Goal: Patient/Family Education  Outcome: Progressing Towards Goal     Problem: Pressure Injury - Risk of  Goal: *Prevention of pressure injury  Description: Document Alex Scale and appropriate interventions in the flowsheet. Outcome: Progressing Towards Goal  Note: Pressure Injury Interventions:       Moisture Interventions: Absorbent underpads, Apply protective barrier, creams and emollients    Activity Interventions: Assess need for specialty bed, Pressure redistribution bed/mattress(bed type), PT/OT evaluation    Mobility Interventions: Assess need for specialty bed, HOB 30 degrees or less, Pressure redistribution bed/mattress (bed type), Turn and reposition approx.  every two hours(pillow and wedges)    Nutrition Interventions: Document food/fluid/supplement intake    Friction and Shear Interventions: Apply protective barrier, creams and emollients, HOB 30 degrees or less                Problem: Patient Education: Go to Patient Education Activity  Goal: Patient/Family Education  Outcome: Progressing Towards Goal     Problem: Discharge Planning  Goal: *Discharge to safe environment  Outcome: Progressing Towards Goal

## 2020-07-18 NOTE — PROGRESS NOTES
Rec'd report from [facility] . Patient awake alert and oriented. Patient denies SOB has occasional NP Cough . Farrell patent clear yellow urine. Temp 96.9 early am . Temp in 97 orally . Incontient of bowels this pm.  Bedside and Verbal shift change report given to Sy  (oncoming nurse) by Dennard Schlatter ** (offgoing nurse). Report included the following information SBAR, Intake/Output, MAR, Recent Results, Cardiac Rhythm V paced with PVC and Quality Measures.

## 2020-07-19 LAB
ANION GAP SERPL CALC-SCNC: 3 MMOL/L (ref 3–18)
BUN SERPL-MCNC: 18 MG/DL (ref 7–18)
BUN/CREAT SERPL: 16 (ref 12–20)
CALCIUM SERPL-MCNC: 8.2 MG/DL (ref 8.5–10.1)
CHLORIDE SERPL-SCNC: 111 MMOL/L (ref 100–111)
CO2 SERPL-SCNC: 32 MMOL/L (ref 21–32)
CREAT SERPL-MCNC: 1.14 MG/DL (ref 0.6–1.3)
ERYTHROCYTE [DISTWIDTH] IN BLOOD BY AUTOMATED COUNT: 17.5 % (ref 11.6–14.5)
GLUCOSE SERPL-MCNC: 94 MG/DL (ref 74–99)
HCT VFR BLD AUTO: 30.8 % (ref 36–48)
HGB BLD-MCNC: 10.1 G/DL (ref 13–16)
MCH RBC QN AUTO: 30.3 PG (ref 24–34)
MCHC RBC AUTO-ENTMCNC: 32.8 G/DL (ref 31–37)
MCV RBC AUTO: 92.5 FL (ref 74–97)
PLATELET # BLD AUTO: 284 K/UL (ref 135–420)
PMV BLD AUTO: 9.7 FL (ref 9.2–11.8)
POTASSIUM SERPL-SCNC: 3.6 MMOL/L (ref 3.5–5.5)
RBC # BLD AUTO: 3.33 M/UL (ref 4.7–5.5)
SODIUM SERPL-SCNC: 146 MMOL/L (ref 136–145)
WBC # BLD AUTO: 6.2 K/UL (ref 4.6–13.2)

## 2020-07-19 PROCEDURE — 74011000258 HC RX REV CODE- 258: Performed by: HOSPITALIST

## 2020-07-19 PROCEDURE — 77010033678 HC OXYGEN DAILY

## 2020-07-19 PROCEDURE — 65270000029 HC RM PRIVATE

## 2020-07-19 PROCEDURE — 80048 BASIC METABOLIC PNL TOTAL CA: CPT

## 2020-07-19 PROCEDURE — 85027 COMPLETE CBC AUTOMATED: CPT

## 2020-07-19 PROCEDURE — 74011000250 HC RX REV CODE- 250: Performed by: HOSPITALIST

## 2020-07-19 PROCEDURE — 74011250636 HC RX REV CODE- 250/636: Performed by: HOSPITALIST

## 2020-07-19 PROCEDURE — 36415 COLL VENOUS BLD VENIPUNCTURE: CPT

## 2020-07-19 PROCEDURE — 74011250637 HC RX REV CODE- 250/637: Performed by: HOSPITALIST

## 2020-07-19 RX ADMIN — ATORVASTATIN CALCIUM 20 MG: 20 TABLET, FILM COATED ORAL at 09:07

## 2020-07-19 RX ADMIN — BUMETANIDE 1 MG: 0.25 INJECTION INTRAMUSCULAR; INTRAVENOUS at 17:23

## 2020-07-19 RX ADMIN — PIPERACILLIN SODIUM AND TAZOBACTAM SODIUM 4.5 G: 4; .5 INJECTION, POWDER, LYOPHILIZED, FOR SOLUTION INTRAVENOUS at 17:24

## 2020-07-19 RX ADMIN — LEVOTHYROXINE SODIUM 50 MCG: 0.05 TABLET ORAL at 06:27

## 2020-07-19 RX ADMIN — PIPERACILLIN SODIUM AND TAZOBACTAM SODIUM 4.5 G: 4; .5 INJECTION, POWDER, LYOPHILIZED, FOR SOLUTION INTRAVENOUS at 02:10

## 2020-07-19 RX ADMIN — TAMSULOSIN HYDROCHLORIDE 0.4 MG: 0.4 CAPSULE ORAL at 09:08

## 2020-07-19 RX ADMIN — BUMETANIDE 1 MG: 0.25 INJECTION INTRAMUSCULAR; INTRAVENOUS at 09:08

## 2020-07-19 RX ADMIN — PIPERACILLIN SODIUM AND TAZOBACTAM SODIUM 4.5 G: 4; .5 INJECTION, POWDER, LYOPHILIZED, FOR SOLUTION INTRAVENOUS at 09:20

## 2020-07-19 NOTE — ROUTINE PROCESS
Informed that patients rectal temp is 94 degrees by Dr. Emigdio Leija. To assess patient to determine if change in patient status needs to occur. Entered patients room to find patient alert and verbal and following commands. Patient covered in multiple blankets. Patient turned self on his side when directed. Rectal temp checked and noted it to be 94.6. Checked multiple times with same result. Patients skin warm to touch. temporal temp checked and noted a result of 96.1. Dr. Emigdio Leija notified and states that the patient may maintain telemetry status at this time and to assure that the patient is kept covered in multiple blankets to maintain temperature.

## 2020-07-19 NOTE — PROGRESS NOTES
[id number] Bedside and Verbal shift change report given to HEAVEN RN  (oncoming nurse) by Juan Miguel Ruiz RN  (offgoing nurse). Report included the following information SBAR, Kardex, Intake/Output and MAR.       [id number] Bedside and Verbal shift change report given to [facility] RN  (oncoming nurse) by HEAVEN RN  (offgoing nurse). Report included the following information SBAR, Kardex, Intake/Output and MAR.

## 2020-07-19 NOTE — ROUTINE PROCESS
Bedside and Verbal shift change report given to Jhoana Macias RN (oncoming nurse) by Rohan Marley RN, BSN (offgoing nurse). Report included the following information SBAR, Kardex, ED Summary, Procedure Summary, Intake/Output, MAR, Recent Results and Cardiac Rhythm Ventricular Paced.      Rohan Marley RN, BSN

## 2020-07-19 NOTE — ROUTINE PROCESS
In chart receiving report for patient to be transferred from 2700. Noted no diet or telemetry orders. New orders for dental soft diet with remote telemetry. Telemetry to  in 48 hours related to history of hypokalemia and possible discharge on Monday.     Chandrika Gay RN, BSN

## 2020-07-19 NOTE — ROUTINE PROCESS
TRANSFER - IN REPORT:    Verbal report received from JER Dan(name) on Christelle [place]  being received from 2700 (unit) for routine progression of care      Report consisted of patients Situation, Background, Assessment and   Recommendations(SBAR). Information from the following report(s) SBAR, Kardex, ED Summary, Intake/Output, MAR, Recent Results and Cardiac Rhythm Paced NSR was reviewed with the receiving nurse. Opportunity for questions and clarification was provided. Assessment completed upon patients arrival to unit and care assumed. Dual skin assessment completed with Cristina Varghese RN. Noted mepilex to sacrum, and covered skin tears to BUE. IV flushed and patent. Zosyn 4.5 mg extended started. No noted adverse reaction. Oxygen at 2 L via nasal cannula. In bed with call bell within reach, watching television, side rails times three, bed locked and in lowest position for safety. Belonging brought from ICU. Patient complaint of being hot. Removed three blankets from patient despite internal temp being approximately 96.6 degrees. Patient is only covered in a sheet per preference.     Georgi Castellano RN, BSN

## 2020-07-19 NOTE — CALL BACK NOTE
Large bilateral pleural effusions with adjacent atelectatic lung. Irregular patchy ground glass opacities within left apex, new.   -Indeterminate for COVID-19, wide differntial exists. Left thryoid nodule.     RADIOLOGY PRELIM

## 2020-07-19 NOTE — ROUTINE PROCESS
Report received from Janette Larose in to see patient  Assisted with meals, appetite good  1700 sleeping at ptrsent  Skin on back pink, no skin tears lotion applied  Bedside shift report given to Ceci Mann with sbar

## 2020-07-19 NOTE — PROGRESS NOTES
Problem: Falls - Risk of  Goal: *Absence of Falls  Description: Document Shalini Larson Fall Risk and appropriate interventions in the flowsheet. Outcome: Progressing Towards Goal  Note: Fall Risk Interventions:  Mobility Interventions: Bed/chair exit alarm, Communicate number of staff needed for ambulation/transfer, Patient to call before getting OOB         Medication Interventions: Bed/chair exit alarm    Elimination Interventions: Bed/chair exit alarm, Call light in reach, Patient to call for help with toileting needs    History of Falls Interventions: Bed/chair exit alarm, Room close to nurse's station         Problem: Patient Education: Go to Patient Education Activity  Goal: Patient/Family Education  Outcome: Progressing Towards Goal     Problem: Pressure Injury - Risk of  Goal: *Prevention of pressure injury  Description: Document Alex Scale and appropriate interventions in the flowsheet.   Outcome: Progressing Towards Goal  Note: Pressure Injury Interventions:  Sensory Interventions: Use 30-degree side-lying position    Moisture Interventions: Absorbent underpads    Activity Interventions: Pressure redistribution bed/mattress(bed type)    Mobility Interventions: HOB 30 degrees or less, Pressure redistribution bed/mattress (bed type)    Nutrition Interventions: Document food/fluid/supplement intake, Offer support with meals,snacks and hydration    Friction and Shear Interventions: Foam dressings/transparent film/skin sealants, HOB 30 degrees or less                Problem: Patient Education: Go to Patient Education Activity  Goal: Patient/Family Education  Outcome: Progressing Towards Goal     Problem: Discharge Planning  Goal: *Discharge to safe environment  Outcome: Progressing Towards Goal     Problem: Pain  Goal: *Control of Pain  Outcome: Progressing Towards Goal     Problem: Patient Education: Go to Patient Education Activity  Goal: Patient/Family Education  Outcome: Progressing Towards Goal     Problem: Heart Failure: Day 3  Goal: Activity/Safety  Outcome: Progressing Towards Goal  Goal: Diagnostic Test/Procedures  Outcome: Progressing Towards Goal  Goal: Nutrition/Diet  Outcome: Progressing Towards Goal  Goal: Discharge Planning  Outcome: Progressing Towards Goal  Goal: Medications  Outcome: Progressing Towards Goal  Goal: Respiratory  Outcome: Progressing Towards Goal  Goal: Treatments/Interventions/Procedures  Outcome: Progressing Towards Goal  Goal: Psychosocial  Outcome: Progressing Towards Goal  Goal: *Oxygen saturation within defined limits  Outcome: Progressing Towards Goal  Goal: *Hemodynamically stable  Outcome: Progressing Towards Goal  Goal: *Optimal pain control at patient's stated goal  Outcome: Progressing Towards Goal  Goal: *Anxiety reduced or absent  Outcome: Progressing Towards Goal  Goal: *Demonstrates progressive activity  Outcome: Progressing Towards Goal     Problem: Pneumonia: Day 3  Goal: Activity/Safety  Outcome: Progressing Towards Goal  Goal: Consults, if ordered  Outcome: Progressing Towards Goal  Goal: Diagnostic Test/Procedures  Outcome: Progressing Towards Goal  Goal: Nutrition/Diet  Outcome: Progressing Towards Goal  Goal: Discharge Planning  Outcome: Progressing Towards Goal  Goal: Medications  Outcome: Progressing Towards Goal  Goal: Respiratory  Outcome: Progressing Towards Goal  Goal: Treatments/Interventions/Procedures  Outcome: Progressing Towards Goal  Goal: Psychosocial  Outcome: Progressing Towards Goal  Goal: *Oxygen saturation within defined limits  Outcome: Progressing Towards Goal  Goal: *Hemodynamically stable  Outcome: Progressing Towards Goal  Goal: *Demonstrates progressive activity  Outcome: Progressing Towards Goal  Goal: *Tolerating diet  Outcome: Progressing Towards Goal  Goal: *Describes available resources and support systems  Outcome: Progressing Towards Goal  Goal: *Optimal pain control at patient's stated goal  Outcome: Progressing Towards Goal     Problem: Body Temperature -  Risk of, Imbalanced  Goal: *Absence of cold stress or hypothermia signs and symptoms  Outcome: Progressing Towards Goal     Problem: Patient Education: Go to Patient Education Activity  Goal: Patient/Family Education  Outcome: Progressing Towards Goal

## 2020-07-19 NOTE — PROGRESS NOTES
INTERIM UPDATE - 2020 EST on 7/18/2020    Nursing Supervisor calls to report that Patient's SARS-CoV-2 (Novel Coronavirus Covid-19) Test results have returned negative and requests review of Patient's Chart for possible discontinuation of Droplet Plus and subsequent Transfer from [address] [place] [address] for Rule Out. Patient reported non-productive cough, shortness of breath, weakness, and chills upon admission. Patient appears to have been afebrile during this time and has not had an increased FiO2 requirement. CXR shows right-sided greater than left-sided opacities (which this clinician appreciates with difficulty, given Acute CHF Exacerbation). No current CT Chest scan is available. Plan: Will obtain STAT CT Chest to look for typical presentation of Covid-19 in setting of Bilateral Pneumonia. Otherwise, will discontinue Patient's Droplet Plus Precautions and Patient will be Transferred to Non-Covid Cohort. INTERIM UPDATE - 5320 EST on 7/18/2020    CT Chest returns with \"indeterminate\" results for Covid-19 picture. Plan:  In the absence of a finding of typical appearance, will discontinue Droplet Plus Precautions and allow Patient to be Transferred out of [address] [place] [address] to [facility] Telemetry Unit. INTERIM UPDATE - 7580 EST on 7/19/2020    Nursing Staff reports that Patient's Temporal Temperature is 95.8°F and states that a Rectal Temperature cannot be obtained. Nursing Staff requests whether or not Patient should be Transferred to [facility] Telemetry Unit (Non-Covid Cohort) with this temperature. Nursing Staff is advised that Patient will require [facility] Stepdown Unit if this temperature is accurate. Nursing Staff is advised to HOLD Transfer until she receives orders in EHR. Plan:  Nursing Supervisor is informed of unusual temperature results and is asked to investigate accuracy of these measurements,.   Will Transfer Patient appropriately when accuracy of these Temperature measurements is determined.

## 2020-07-19 NOTE — ROUTINE PROCESS
1950: Assumed care. AAOX4. Behavior appropriate to situations. HOB elevated. No SOB on oxygen at 3 LPM. Denies any pain or discomfort at this time. Call light within reach. 2023: Blood drawn for Vanco trough. Due dose given. 2130: Patient off floor via bed for CT chest.     2300: Droplet Plus Precautions d/cd. Patient made aware. 2345: Called to give report. Nurse unavailable. 0007:TRANSFER - OUT REPORT:    Verbal report given to Cee Jennings RN(name) on Myriam Curtis  being transferred to 2400(unit) for routine progression of care       Report consisted of patients Situation, Background, Assessment and   Recommendations(SBAR). Information from the following report(s) SBAR, Kardex, Intake/Output, MAR, Recent Results and Cardiac Rhythm Paced was reviewed with the receiving nurse. Lines:   Peripheral IV 07/17/20 Anterior;Right Forearm (Active)   Site Assessment Clean, dry, & intact 07/18/20 1950   Phlebitis Assessment 0 07/18/20 1950   Infiltration Assessment 0 07/18/20 1950   Dressing Status Clean, dry, & intact 07/18/20 1950   Dressing Type Tape;Transparent 07/18/20 1950   Hub Color/Line Status Flushed;Patent; End cap changed 07/18/20 2025   Action Taken Open ports on tubing capped 07/18/20 1500   Alcohol Cap Used Yes 07/18/20 2025        Opportunity for questions and clarification was provided. Patient transported with:   Monitor  O2 @ 3 liters  Registered Nurse    0040: Paged & talked to Dr. Femi Nails. Made aware of the patient Temporal temperature of 95.8 . See notes. Additional blanket provided to the patient.     0104. Temporal temperature rechecked done by the supervisor is 96.1. See notes. 0145: Transferred out to  2409 via bed in stable condition & w/ all his belongings.

## 2020-07-19 NOTE — PROGRESS NOTES
Internal Medicine Progress Note    Patient's Name: Gonzalo Reardon Date: 7/16/2020  Length of Stay: 3      Assessment/Plan     Principal Problem:    Acute on chronic systolic (congestive) heart failure ([address] (7/16/2020)    Active Problems:    Hypothermia (12/23/2019)      Pleural effusion, bilateral (7/16/2020)      HCAP (healthcare-associated pneumonia) (7/16/2020)      Pneumonia (7/16/2020)      Acute on chronic CHF  - large R and mod L pleural effusions - dx/tx IR thora ordered  - cont diuretics and monitor BMP  - strict I&Os, FR  - wean O2 as tolerated - down to 2L    Hypothermia  - check TSH and adjust synthroid as needed    HCAP?  - can't r/o on imaging  - cont abx    - Cont acceptable home medications for chronic conditions   - DVT protocol    I have personally reviewed all pertinent labs and films that have officially resulted over the last 24 hours. I have personally checked for all pending labs that are awaiting final results. Anticipated discharge: >2 days, recommend SNF but family elected to return home during recent admission      Subjective     Pt s/e @ bedside. No major events overnight. Pt offers no complaints this AM. Denies CP or SOB. Denies abd pain, nvd. Objective     Visit Vitals  /89 (BP 1 Location: Left arm, BP Patient Position: At rest)   Pulse 65   Temp (!) 95.8 °F (35.4 °C)   Resp 18   Ht 5' 7\" (1.702 m)   Wt 67.1 kg (148 lb)   SpO2 99%   BMI 23.18 kg/m²       Physical Exam:  General Appearance: NAD, conversant  HENT: normocephalic/atraumatic, moist mucus membranes  Neck: No JVD, supple  Lungs: CTA with normal respiratory effort  CV: RRR, no m/r/g  Abdomen: soft, non-tender, normal bowel sounds  Extremities: no cyanosis, no peripheral edema  Neuro: No focal deficits, motor/sensory intact  Skin: Normal color, intact      Intake and Output:  Current Shift:  No intake/output data recorded.   Last three shifts:  07/17 1901 - 07/19 0700  In: 950 [I.V.:950]  Out: 3350 [LOHFB:2791]    Lab/Data Reviewed:  BMP:   Lab Results   Component Value Date/Time     (H) 07/19/2020 07:15 AM    K 3.6 07/19/2020 07:15 AM     07/19/2020 07:15 AM    CO2 32 07/19/2020 07:15 AM    AGAP 3 07/19/2020 07:15 AM    GLU 94 07/19/2020 07:15 AM    BUN 18 07/19/2020 07:15 AM    CREA 1.14 07/19/2020 07:15 AM    GFRAA >60 07/19/2020 07:15 AM    GFRNA >60 07/19/2020 07:15 AM     CBC:   Lab Results   Component Value Date/Time    WBC 6.2 07/19/2020 07:15 AM    HGB 10.1 (L) 07/19/2020 07:15 AM    HCT 30.8 (L) 07/19/2020 07:15 AM     07/19/2020 07:15 AM       Imaging Reviewed:  Ct Chest Wo Cont    Result Date: 7/19/2020  EXAM: CT chest INDICATION: Shortness of breath. Respiratory failure. COMPARISON: CT chest 4/6/2020 TECHNIQUE: Axial CT imaging from the thoracic inlet through the diaphragm without intravenous contrast. Multiplanar reformats were generated. One or more dose reduction techniques were used on this CT: automated exposure control, adjustment of the mAs and/or kVp according to patient size, and iterative reconstruction techniques. The specific techniques used on this CT exam have been documented in the patient's electronic medical record. Digital Imaging and Communications in Medicine (DICOM) format image data are available to nonaffiliated external healthcare facilities or entities on a secured, media free, reciprocally searchable basis with patient authorization for at least a 12-month period after this study. _______________ FINDINGS: BASE OF THE NECK: Hypodensity are present within the thyroid gland bilaterally. LUNGS: Atelectasis is present related to the bilateral pleural effusions. Mild groundglass opacities are present within the left upper lobe, either related to mild edema or developing pneumonia. This is more likely related to edema. AIRWAY: Normal. PLEURA: Large right pleural effusion is causing prominent atelectasis of the left upper lower lobes.  MEDIASTINUM: Left transvenous approach cardiac device is present. Coronary artery calcifications are present. Mild cardiomegaly. No pericardial effusion. Enlargement of the central pulmonary arteries is suggestive of developing pulmonary hypertension. LYMPH NODES: No enlarged lymph nodes. UPPER ABDOMEN: Unremarkable. BONES: No acute or aggressive osseous abnormalities identified. OTHER: None. _______________     IMPRESSION: 1. Bilateral pleural effusions are present, large on the right and moderate on the left with adjacent atelectasis. No focal area of consolidation to suggest a bacterial pneumonia. Mild edema is present in the left upper lobe. Enlargement of the central pulmonary arteries is suggestive of pulmonary hypertension. Mild cardiomegaly. Coronary artery calcifications are present.       Medications Reviewed:  Current Facility-Administered Medications   Medication Dose Route Frequency    piperacillin-tazobactam (ZOSYN) 4.5 g in 0.9% sodium chloride (MBP/ADV) 100 mL MBP \"EXTENDED 4 HOUR INFUSION ###  4.5 g IntraVENous Q8H    bumetanide (BUMEX) injection 1 mg  1 mg IntraVENous BID    sodium chloride (NS) flush 5-10 mL  5-10 mL IntraVENous PRN    levothyroxine (SYNTHROID) tablet 50 mcg  50 mcg Oral 6am    atorvastatin (LIPITOR) tablet 20 mg  20 mg Oral DAILY    tamsulosin (FLOMAX) capsule 0.4 mg  0.4 mg Oral DAILY    acetaminophen (TYLENOL) tablet 650 mg  650 mg Oral Q6H PRN    ondansetron (ZOFRAN) injection 4 mg  4 mg IntraVENous Q6H PRN    VANCOMYCIN INFORMATION NOTE 1 Each  1 Each Other Rx Dosing/Monitoring         Althea Reynolds PA-C  [address] [place] [address]  Hospitalist Division  Office:  [phone]  Pager: [phone]

## 2020-07-20 ENCOUNTER — APPOINTMENT (OUTPATIENT)
Dept: ULTRASOUND IMAGING | Age: 85
DRG: 291 | End: 2020-07-20
Attending: HOSPITALIST
Payer: MEDICARE

## 2020-07-20 ENCOUNTER — APPOINTMENT (OUTPATIENT)
Dept: GENERAL RADIOLOGY | Age: 85
DRG: 291 | End: 2020-07-20
Attending: RADIOLOGY
Payer: MEDICARE

## 2020-07-20 LAB
ANION GAP SERPL CALC-SCNC: 2 MMOL/L (ref 3–18)
APPEARANCE FLD: CLEAR
APTT PPP: 37 SEC (ref 23–36.4)
BUN SERPL-MCNC: 18 MG/DL (ref 7–18)
BUN/CREAT SERPL: 16 (ref 12–20)
CALCIUM SERPL-MCNC: 8.4 MG/DL (ref 8.5–10.1)
CHLORIDE SERPL-SCNC: 110 MMOL/L (ref 100–111)
CO2 SERPL-SCNC: 34 MMOL/L (ref 21–32)
COLOR FLD: ABNORMAL
CREAT SERPL-MCNC: 1.15 MG/DL (ref 0.6–1.3)
EOSINOPHIL NFR FLD MANUAL: 0 %
ERYTHROCYTE [DISTWIDTH] IN BLOOD BY AUTOMATED COUNT: 17.6 % (ref 11.6–14.5)
GLUCOSE FLD-MCNC: 86 MG/DL
GLUCOSE SERPL-MCNC: 75 MG/DL (ref 74–99)
HCT VFR BLD AUTO: 32.7 % (ref 36–48)
HGB BLD-MCNC: 10.6 G/DL (ref 13–16)
INR PPP: 1.4 (ref 0.8–1.2)
LDH FLD L TO P-CCNC: 60 U/L
LYMPHOCYTES NFR FLD: 55 %
MCH RBC QN AUTO: 30 PG (ref 24–34)
MCHC RBC AUTO-ENTMCNC: 32.4 G/DL (ref 31–37)
MCV RBC AUTO: 92.6 FL (ref 74–97)
MONOCYTES NFR FLD: 19 %
NEUTROPHILS NFR FLD: 26 %
NEUTS BAND # FLD: 0 %
NUC CELL # FLD: 14 /CU MM
PLATELET # BLD AUTO: 336 K/UL (ref 135–420)
PMV BLD AUTO: 10.5 FL (ref 9.2–11.8)
POTASSIUM SERPL-SCNC: 3.4 MMOL/L (ref 3.5–5.5)
PROT FLD-MCNC: <2 G/DL
PROTHROMBIN TIME: 16.5 SEC (ref 11.5–15.2)
RBC # BLD AUTO: 3.53 M/UL (ref 4.7–5.5)
RBC # FLD: 432 /CU MM
SODIUM SERPL-SCNC: 146 MMOL/L (ref 136–145)
SPECIMEN SOURCE FLD: ABNORMAL
SPECIMEN SOURCE FLD: NORMAL
TSH SERPL DL<=0.05 MIU/L-ACNC: 2.19 UIU/ML (ref 0.36–3.74)
WBC # BLD AUTO: 6.3 K/UL (ref 4.6–13.2)

## 2020-07-20 PROCEDURE — 65270000029 HC RM PRIVATE

## 2020-07-20 PROCEDURE — 85610 PROTHROMBIN TIME: CPT

## 2020-07-20 PROCEDURE — 74011000258 HC RX REV CODE- 258: Performed by: HOSPITALIST

## 2020-07-20 PROCEDURE — 87205 SMEAR GRAM STAIN: CPT

## 2020-07-20 PROCEDURE — 84157 ASSAY OF PROTEIN OTHER: CPT

## 2020-07-20 PROCEDURE — 74011000250 HC RX REV CODE- 250: Performed by: RADIOLOGY

## 2020-07-20 PROCEDURE — 84443 ASSAY THYROID STIM HORMONE: CPT

## 2020-07-20 PROCEDURE — 80048 BASIC METABOLIC PNL TOTAL CA: CPT

## 2020-07-20 PROCEDURE — 71045 X-RAY EXAM CHEST 1 VIEW: CPT

## 2020-07-20 PROCEDURE — 83615 LACTATE (LD) (LDH) ENZYME: CPT

## 2020-07-20 PROCEDURE — 85027 COMPLETE CBC AUTOMATED: CPT

## 2020-07-20 PROCEDURE — 82945 GLUCOSE OTHER FLUID: CPT

## 2020-07-20 PROCEDURE — 74011250637 HC RX REV CODE- 250/637: Performed by: HOSPITALIST

## 2020-07-20 PROCEDURE — C1729 CATH, DRAINAGE: HCPCS

## 2020-07-20 PROCEDURE — 36415 COLL VENOUS BLD VENIPUNCTURE: CPT

## 2020-07-20 PROCEDURE — 0W993ZZ DRAINAGE OF RIGHT PLEURAL CAVITY, PERCUTANEOUS APPROACH: ICD-10-PCS | Performed by: RADIOLOGY

## 2020-07-20 PROCEDURE — 87015 SPECIMEN INFECT AGNT CONCNTJ: CPT

## 2020-07-20 PROCEDURE — 74011000250 HC RX REV CODE- 250: Performed by: HOSPITALIST

## 2020-07-20 PROCEDURE — 89051 BODY FLUID CELL COUNT: CPT

## 2020-07-20 PROCEDURE — 83986 ASSAY PH BODY FLUID NOS: CPT

## 2020-07-20 PROCEDURE — 85730 THROMBOPLASTIN TIME PARTIAL: CPT

## 2020-07-20 PROCEDURE — 74011250636 HC RX REV CODE- 250/636: Performed by: HOSPITALIST

## 2020-07-20 PROCEDURE — 77010033678 HC OXYGEN DAILY

## 2020-07-20 RX ORDER — LIDOCAINE HYDROCHLORIDE 10 MG/ML
10 INJECTION INFILTRATION; PERINEURAL
Status: COMPLETED | OUTPATIENT
Start: 2020-07-20 | End: 2020-07-20

## 2020-07-20 RX ADMIN — BUMETANIDE 1 MG: 0.25 INJECTION INTRAMUSCULAR; INTRAVENOUS at 09:07

## 2020-07-20 RX ADMIN — ATORVASTATIN CALCIUM 20 MG: 20 TABLET, FILM COATED ORAL at 09:07

## 2020-07-20 RX ADMIN — PIPERACILLIN SODIUM AND TAZOBACTAM SODIUM 4.5 G: 4; .5 INJECTION, POWDER, LYOPHILIZED, FOR SOLUTION INTRAVENOUS at 10:07

## 2020-07-20 RX ADMIN — LEVOTHYROXINE SODIUM 50 MCG: 0.05 TABLET ORAL at 06:43

## 2020-07-20 RX ADMIN — TAMSULOSIN HYDROCHLORIDE 0.4 MG: 0.4 CAPSULE ORAL at 09:07

## 2020-07-20 RX ADMIN — PIPERACILLIN SODIUM AND TAZOBACTAM SODIUM 4.5 G: 4; .5 INJECTION, POWDER, LYOPHILIZED, FOR SOLUTION INTRAVENOUS at 18:06

## 2020-07-20 RX ADMIN — BUMETANIDE 1 MG: 0.25 INJECTION INTRAMUSCULAR; INTRAVENOUS at 18:06

## 2020-07-20 RX ADMIN — PIPERACILLIN SODIUM AND TAZOBACTAM SODIUM 4.5 G: 4; .5 INJECTION, POWDER, LYOPHILIZED, FOR SOLUTION INTRAVENOUS at 02:15

## 2020-07-20 RX ADMIN — LIDOCAINE HYDROCHLORIDE 1 ML: 10 INJECTION, SOLUTION INFILTRATION; PERINEURAL at 14:25

## 2020-07-20 NOTE — ROUTINE PROCESS
Noted leaking from mojica with one episode of defecation. Patient cleansed, zinc applied to reduce risk of skin breakdown, and linens & bed pads changed. Tolerated well. In low bed with call bell within reach.     Manfred Calderon RN, BSN

## 2020-07-20 NOTE — PROGRESS NOTES
Problem: Falls - Risk of  Goal: *Absence of Falls  Description: Document Chris Smart Fall Risk and appropriate interventions in the flowsheet. Outcome: Progressing Towards Goal  Note: Fall Risk Interventions:  Mobility Interventions: Communicate number of staff needed for ambulation/transfer, Strengthening exercises (ROM-active/passive)         Medication Interventions: Evaluate medications/consider consulting pharmacy    Elimination Interventions: Call light in reach    History of Falls Interventions: Door open when patient unattended, Room close to nurse's station         Problem: Patient Education: Go to Patient Education Activity  Goal: Patient/Family Education  Outcome: Progressing Towards Goal     Problem: Pressure Injury - Risk of  Goal: *Prevention of pressure injury  Description: Document Alex Scale and appropriate interventions in the flowsheet.   Outcome: Progressing Towards Goal  Note: Pressure Injury Interventions:  Sensory Interventions: Assess changes in LOC, Avoid rigorous massage over bony prominences, Keep linens dry and wrinkle-free, Maintain/enhance activity level, Minimize linen layers, Monitor skin under medical devices, Pad between skin to skin    Moisture Interventions: Absorbent underpads, Internal/External urinary devices, Check for incontinence Q2 hours and as needed, Apply protective barrier, creams and emollients    Activity Interventions: Pressure redistribution bed/mattress(bed type)    Mobility Interventions: HOB 30 degrees or less, Pressure redistribution bed/mattress (bed type)    Nutrition Interventions: Document food/fluid/supplement intake, Offer support with meals,snacks and hydration    Friction and Shear Interventions: Apply protective barrier, creams and emollients, Foam dressings/transparent film/skin sealants, HOB 30 degrees or less, Lift sheet, Lift team/patient mobility team, Transferring/repositioning devices                Problem: Patient Education: Go to Patient Education Activity  Goal: Patient/Family Education  Outcome: Progressing Towards Goal     Problem: Discharge Planning  Goal: *Discharge to safe environment  Outcome: Progressing Towards Goal     Problem: Patient Education: Go to Patient Education Activity  Goal: Patient/Family Education  Outcome: Progressing Towards Goal     Problem: Pain  Goal: *Control of Pain  Outcome: Progressing Towards Goal     Problem: Patient Education: Go to Patient Education Activity  Goal: Patient/Family Education  Outcome: Progressing Towards Goal     Problem: Heart Failure: Day 3  Goal: Activity/Safety  Outcome: Progressing Towards Goal  Goal: Diagnostic Test/Procedures  Outcome: Progressing Towards Goal  Goal: Nutrition/Diet  Outcome: Progressing Towards Goal  Goal: Discharge Planning  Outcome: Progressing Towards Goal  Goal: Medications  Outcome: Progressing Towards Goal  Goal: Respiratory  Outcome: Progressing Towards Goal  Goal: Treatments/Interventions/Procedures  Outcome: Progressing Towards Goal  Goal: Psychosocial  Outcome: Progressing Towards Goal  Goal: *Oxygen saturation within defined limits  Outcome: Progressing Towards Goal  Goal: *Hemodynamically stable  Outcome: Progressing Towards Goal  Goal: *Optimal pain control at patient's stated goal  Outcome: Progressing Towards Goal  Goal: *Anxiety reduced or absent  Outcome: Progressing Towards Goal  Goal: *Demonstrates progressive activity  Outcome: Progressing Towards Goal     Problem: Pneumonia: Day 3  Goal: Activity/Safety  Outcome: Progressing Towards Goal  Goal: Consults, if ordered  Outcome: Progressing Towards Goal  Goal: Diagnostic Test/Procedures  Outcome: Progressing Towards Goal  Goal: Nutrition/Diet  Outcome: Progressing Towards Goal  Goal: Discharge Planning  Outcome: Progressing Towards Goal  Goal: Medications  Outcome: Progressing Towards Goal  Goal: Respiratory  Outcome: Progressing Towards Goal  Goal: Treatments/Interventions/Procedures  Outcome: Progressing Towards Goal  Goal: Psychosocial  Outcome: Progressing Towards Goal  Goal: *Oxygen saturation within defined limits  Outcome: Progressing Towards Goal  Goal: *Hemodynamically stable  Outcome: Progressing Towards Goal  Goal: *Demonstrates progressive activity  Outcome: Progressing Towards Goal  Goal: *Tolerating diet  Outcome: Progressing Towards Goal  Goal: *Describes available resources and support systems  Outcome: Progressing Towards Goal  Goal: *Optimal pain control at patient's stated goal  Outcome: Progressing Towards Goal     Problem:  Body Temperature -  Risk of, Imbalanced  Goal: *Absence of heat stress or hyperthermia signs and symptoms  Outcome: Progressing Towards Goal  Goal: *Absence of cold stress or hypothermia signs and symptoms  Outcome: Progressing Towards Goal     Problem: Patient Education: Go to Patient Education Activity  Goal: Patient/Family Education  Outcome: Progressing Towards Goal

## 2020-07-20 NOTE — PROGRESS NOTES
1327: Attempted PT session. Declines at present time d/t NPO status. Educated on bed level BLE exercises; demonstrates understanding and reports compliant as able. Request PT follow up later.

## 2020-07-20 NOTE — PROGRESS NOTES
Progress Note      Patient: Emmanuel Mabry               Sex: male          DOA: 7/16/2020       YOB: 1934      Age:  80 y.o.        LOS:  LOS: 4 days             CHIEF COMPLAINT:  CHF, Pneumonia    Subjective:     Patient feels he is improving  Breathing better after thoracentesis    Objective:      Visit Vitals  /69 (BP 1 Location: Left arm, BP Patient Position: At rest)   Pulse 60   Temp 97.2 °F (36.2 °C)   Resp 16   Ht 5' 7\" (1.702 m)   Wt 49.8 kg (109 lb 12.8 oz)   SpO2 100%   BMI 17.20 kg/m²       Physical Exam:  Gen:  No distress, no complaint  Lungs:  Clear bilaterally, no wheeze or rhonchi  Heart:  Regular rate and rhythm, no murmurs or gallops  Abdomen:  Soft, non-tender, normal bowel sounds        Lab/Data Reviewed:  BMP:   Lab Results   Component Value Date/Time     (H) 07/20/2020 04:10 AM    K 3.4 (L) 07/20/2020 04:10 AM     07/20/2020 04:10 AM    CO2 34 (H) 07/20/2020 04:10 AM    AGAP 2 (L) 07/20/2020 04:10 AM    GLU 75 07/20/2020 04:10 AM    BUN 18 07/20/2020 04:10 AM    CREA 1.15 07/20/2020 04:10 AM    GFRAA >60 07/20/2020 04:10 AM    GFRNA >60 07/20/2020 04:10 AM     CBC:   Lab Results   Component Value Date/Time    WBC 6.3 07/20/2020 04:10 AM    HGB 10.6 (L) 07/20/2020 04:10 AM    HCT 32.7 (L) 07/20/2020 04:10 AM     07/20/2020 04:10 AM           Assessment/Plan     Principal Problem:    Acute on chronic systolic (congestive) heart failure (HCC) (7/16/2020)    Active Problems:    Hypothermia (12/23/2019)      Pleural effusion, bilateral (7/16/2020)      HCAP (healthcare-associated pneumonia) (7/16/2020)      Pneumonia (7/16/2020)        Plan:  Improved after thoracentesis  Follow respiratory status  Mobilize  IV antibiotics   Working toward disposition.

## 2020-07-20 NOTE — PROGRESS NOTES
Comprehensive Nutrition Assessment    Type and Reason for Visit: Initial    Nutrition Recommendations/Plan:     1. Resume Dental Soft Solid Diet  2. Initiate oral nutrition supplement: Ensure Enlive BID to supplement energy needs  3. Monitor labs, weight and document all PO intake  4. Tray set up/assist    Nutrition Assessment:  Patient admitted for acute on chronic CHF and HCAP and transferred to  yesterday. New weight reported today with large variability and triggered during screening for BMI of 17. 2. Pt seen in room this afternoon stating he is thirsty. Currently NPO as plan for a thoracentesis today    Malnutrition Assessment:  Malnutrition Status: Moderate malnutrition    Context:  Chronic illness     Findings of the 6 clinical characteristics of malnutrition:   Energy Intake:  Mild decrease in energy intake (specify)  Weight Loss:  Unable to assess     Body Fat Loss:  1 - Mild body fat loss, Buccal region   Muscle Mass Loss:  1 - Mild muscle mass loss, Clavicles (pectoralis &deltoids), Temples (temporalis)  Fluid Accumulation:  1 - Mild,     Strength:  Not performed     Nutrition History and Allergies: NKFA. Hx of weight loss and dysphagia.       Estimated Daily Nutrient Needs:  Energy (kcal):  9256-0653  Protein (g):  67-81       Fluid (ml/day):  1 mL/kcal    Nutrition Related Findings:  Poor dentition Last BM on (7/20)      Wounds:    Skin tears       Current Nutrition Therapies:   DIET DENTAL SOFT (SOFT SOLID)    Anthropometric Measures:  · Height:  5' 7\" (170.2 cm)  · Current Body Wt:  49.8 kg (109 lb 12.6 oz)(question accuracy)   · Admission Body Wt:  147 lb 14.9 oz    · Ideal Body Wt:  148:  74.2 %   · BMI Categories:  Underweight (BMI less than 22) age over 72       Nutrition Diagnosis:   · Moderate malnutrition, In context of chronic illness related to inadequate protein-energy intake, altered taste perception, cardiac dysfunction as evidenced by mild loss of subcutaneous fat, mild muscle loss, weight loss    Nutrition Interventions:   Food and/or Nutrient Delivery: Continue current diet, Start oral nutrition supplement(resume)  Nutrition Education and Counseling: No recommendations at this time  Coordination of Nutrition Care: Continued inpatient monitoring, Feeding assistance/environmentat change    Goals:  PO nutrition intake will meet >75% of patient estimated nutritional needs within the next 7 days. Nutrition Monitoring and Evaluation:   Food/Nutrient Intake Outcomes: Diet advancement/tolerance, Food and nutrient intake, Supplement intake  Physical Signs/Symptoms Outcomes: Biochemical data, Chewing or swallowing, GI status, Meal time behavior, Nutrition focused physical findings, Weight    Discharge Planning:     Too soon to determine     Electronically signed by Arnulfo Narayanan on 7/20/2020 at 5:37 PM    Pager: [phone]

## 2020-07-20 NOTE — H&P
The patient is an appropriate candidate to undergo R thora. Patient assessed immediately prior to induction. Anesthesia plan as follows: Local/No Anesthesia. History and Physical update:  H&P was reviewed and the patient was examined. No changes have occurred in the patient's condition since the H&P was completed.     Heidy Bahena MD  Vascular & Interventional Radiology  [facility]  7/20/2020

## 2020-07-20 NOTE — PROGRESS NOTES
0730  Received pt , keep NPO for procedure,mojica was been leaking per  Outgoing shift,  Instill mojica with 10 cc, monitor. Both hands skin fragile and old bruise,perineum excoriated, cream applied. 1000  Per CNA  Pt was wet in spite of the mojica    1430 to radiology. 1530  Back from  Radiology, order from Dr Haleigh Mei, to remove  old mojica since it is leaking and replace , mojica no 18 inserted with out  Problem. Dressing on his righr  Side of the back, post thoracentesis dry and intact. 1650  Post thoracentesis dressing dry and intact, dry cough no SOB. 1900  Ate dinner, tolerated, dressing post thoracentesis on the right side of the back, dry and intact, no SOB noted, ice pack to right  Hand, there was small bleeding around previous  IV site.

## 2020-07-20 NOTE — ROUTINE PROCESS
Bedside and Verbal shift change report given to Giselle Ardon RN, BSN (oncoming nurse) by Devin Solis RN, BSN (offgoing nurse). Report included the following information SBAR, Kardex, ED Summary, Procedure Summary, Intake/Output, MAR, Recent Results and Cardiac Rhythm Ventricular Paced.     Devin Solis RN, BSN

## 2020-07-20 NOTE — PROGRESS NOTES
OT orders received and medical chart review completed. 1427: Per nursing, pt off unit for procedure, OT to follow.     Mariama Maguire Guido 87 OTR/L  [phone]

## 2020-07-20 NOTE — PROGRESS NOTES
UAI completed at [facility] 12/28/2019 and LTSS were authorized. UAI uploaded into [facility].     Bartolome Harris RN    Outcomes Manager  [phone]  [phone]

## 2020-07-20 NOTE — PROCEDURES
Vascular & Interventional Radiology Brief Procedure Note    Interventional Radiologist: Katelin Ugarte MD    Pre-operative Diagnosis:  effusion    Post-operative Diagnosis: Same as pre-op dx    Procedure(s) Performed:  THoracentesis    Anesthesia:  Local and Moderate Sedation    Findings:  Uncomplicated R US guided thora, 1.6Ls removed clear yellow fluid.      Complications: None    Estimated Blood Loss:  minimal    Tubes and Drains: None    Specimens: sent    Condition: Good     Katelin Ugarte MD  [facility]  Vascular & Interventional Radiology  7/20/2020

## 2020-07-21 LAB
ANION GAP SERPL CALC-SCNC: 4 MMOL/L (ref 3–18)
BODY FLD TYPE: NORMAL
BUN SERPL-MCNC: 19 MG/DL (ref 7–18)
BUN/CREAT SERPL: 15 (ref 12–20)
CALCIUM SERPL-MCNC: 7.7 MG/DL (ref 8.5–10.1)
CHLORIDE SERPL-SCNC: 111 MMOL/L (ref 100–111)
CO2 SERPL-SCNC: 33 MMOL/L (ref 21–32)
CREAT SERPL-MCNC: 1.27 MG/DL (ref 0.6–1.3)
ERYTHROCYTE [DISTWIDTH] IN BLOOD BY AUTOMATED COUNT: 17.6 % (ref 11.6–14.5)
GLUCOSE SERPL-MCNC: 76 MG/DL (ref 74–99)
HCT VFR BLD AUTO: 33.4 % (ref 36–48)
HGB BLD-MCNC: 10.6 G/DL (ref 13–16)
MCH RBC QN AUTO: 29.6 PG (ref 24–34)
MCHC RBC AUTO-ENTMCNC: 31.7 G/DL (ref 31–37)
MCV RBC AUTO: 93.3 FL (ref 74–97)
PH FLD: 7.7 [PH]
PLATELET # BLD AUTO: 303 K/UL (ref 135–420)
PMV BLD AUTO: 10.1 FL (ref 9.2–11.8)
POTASSIUM SERPL-SCNC: 3.2 MMOL/L (ref 3.5–5.5)
RBC # BLD AUTO: 3.58 M/UL (ref 4.7–5.5)
SODIUM SERPL-SCNC: 148 MMOL/L (ref 136–145)
WBC # BLD AUTO: 5.8 K/UL (ref 4.6–13.2)

## 2020-07-21 PROCEDURE — 97530 THERAPEUTIC ACTIVITIES: CPT

## 2020-07-21 PROCEDURE — 80048 BASIC METABOLIC PNL TOTAL CA: CPT

## 2020-07-21 PROCEDURE — 74011250637 HC RX REV CODE- 250/637: Performed by: HOSPITALIST

## 2020-07-21 PROCEDURE — 65270000029 HC RM PRIVATE

## 2020-07-21 PROCEDURE — 36415 COLL VENOUS BLD VENIPUNCTURE: CPT

## 2020-07-21 PROCEDURE — 77010033678 HC OXYGEN DAILY

## 2020-07-21 PROCEDURE — 85027 COMPLETE CBC AUTOMATED: CPT

## 2020-07-21 PROCEDURE — 51798 US URINE CAPACITY MEASURE: CPT

## 2020-07-21 PROCEDURE — 97166 OT EVAL MOD COMPLEX 45 MIN: CPT

## 2020-07-21 PROCEDURE — 74011000258 HC RX REV CODE- 258: Performed by: HOSPITALIST

## 2020-07-21 PROCEDURE — 74011250636 HC RX REV CODE- 250/636: Performed by: HOSPITALIST

## 2020-07-21 PROCEDURE — 97535 SELF CARE MNGMENT TRAINING: CPT

## 2020-07-21 PROCEDURE — 74011000250 HC RX REV CODE- 250: Performed by: HOSPITALIST

## 2020-07-21 RX ADMIN — LEVOTHYROXINE SODIUM 50 MCG: 0.05 TABLET ORAL at 05:28

## 2020-07-21 RX ADMIN — ATORVASTATIN CALCIUM 20 MG: 20 TABLET, FILM COATED ORAL at 09:07

## 2020-07-21 RX ADMIN — PIPERACILLIN SODIUM AND TAZOBACTAM SODIUM 4.5 G: 4; .5 INJECTION, POWDER, LYOPHILIZED, FOR SOLUTION INTRAVENOUS at 02:23

## 2020-07-21 RX ADMIN — PIPERACILLIN SODIUM AND TAZOBACTAM SODIUM 4.5 G: 4; .5 INJECTION, POWDER, LYOPHILIZED, FOR SOLUTION INTRAVENOUS at 17:38

## 2020-07-21 RX ADMIN — TAMSULOSIN HYDROCHLORIDE 0.4 MG: 0.4 CAPSULE ORAL at 09:07

## 2020-07-21 RX ADMIN — PIPERACILLIN SODIUM AND TAZOBACTAM SODIUM 4.5 G: 4; .5 INJECTION, POWDER, LYOPHILIZED, FOR SOLUTION INTRAVENOUS at 09:38

## 2020-07-21 RX ADMIN — BUMETANIDE 1 MG: 0.25 INJECTION INTRAMUSCULAR; INTRAVENOUS at 17:41

## 2020-07-21 RX ADMIN — BUMETANIDE 1 MG: 0.25 INJECTION INTRAMUSCULAR; INTRAVENOUS at 09:07

## 2020-07-21 NOTE — ROUTINE PROCESS
oob to chair by physio  Appetite good  Dr. Vanessa Chau in no complaints of pain  Report given to [facility] with sbar

## 2020-07-21 NOTE — PROGRESS NOTES
Problem: Self Care Deficits Care Plan (Adult)  Goal: Interventions  Description: Occupational Therapy Goals  Initiated 7/21/2020 within 7 day(s). 1.  Patient will perform grooming with modified independence. 2.  Patient will perform bathing with modified independence. 3.  Patient will perform upper body dressing and lower body dressing with modified independence using adaptive equipment as needed. 4.  Patient will perform bedside commode transfers with modified independence using RW. 5.  Patient will perform all aspects of toileting with modified independence. 6.  Patient will participate in upper extremity therapeutic exercise/activities with modified independence for 8 minutes. 7.  Patient will utilize energy conservation techniques during functional activities with min verbal cues. Prior Level of Function: Recently requiring increased assist with ADLs and functional transfers using RW, prior to previous hospitalization this month, pt reports he was mod I - supv with ADLs and functional mobility using RW     Outcome: Progressing Towards Goal   OCCUPATIONAL THERAPY EVALUATION    Patient: Radha Quintana (13 y.o. male)  Date: 7/21/2020  Primary Diagnosis: Acute on chronic systolic (congestive) heart failure (HCC) [I50.23]  Pleural effusion [J90]  Hypothermia [T68. XXXA]  HCAP (healthcare-associated pneumonia) [J18.9]  Pneumonia [J18.9]        Precautions:   Fall  PLOF: see above    ASSESSMENT :  Nursing/RN cleared for pt to participate in OT evaluation and tx session. Based on the objective data described below, the patient presents with impaired strength, balance, functional activity tolerance and safety awareness, which is inhibiting independence for ADLs and functional mobility. Patient was seen with PT to maximize patient safety, participation, and functional mobility in preparation for self-care tasks. Patient agreeable to out of bed activity, reports he needs to toilet.  Mod A - Max A x 2 supine to sit edge of bed. Mod A x 2 sit <-> stand and stand step w/ RW & Min A x 2 to bedside commode, dep toilet hygiene following BM in stance w/ RW and Fair static standing balance, Min A x 2 using RW for stand step from bedside commode -> recliner, vc's to step back until feeling back of BLEs touch seat surface prior to reaching back for armrests for slow descent to sit. Patient sitting up in recliner, no c/o pain. Call bell within reach & pt verbalized understanding and provided return demonstration to utilize for assist e.g. functional transfers in order to prevent falls. Patient will benefit from skilled intervention to address the above impairments. Patient's rehabilitation potential is considered to be Good  Factors which may influence rehabilitation potential include:   []             None noted  []             Mental ability/status  []             Medical condition  [x]             Home/family situation and support systems  []             Safety awareness  []             Pain tolerance/management  []             Other:      PLAN :  Recommendations and Planned Interventions:   [x]               Self Care Training                  [x]      Therapeutic Activities  [x]               Functional Mobility Training   []      Cognitive Retraining  [x]               Therapeutic Exercises           [x]      Endurance Activities  [x]               Balance Training                    [x]      Neuromuscular Re-Education  []               Visual/Perceptual Training     [x]      Home Safety Training  [x]               Patient Education                   [x]      Family Training/Education  []               Other (comment):    Frequency/Duration: Patient will be followed by occupational therapy 3 - 5 times a week to address goals.   Discharge Recommendations: Khai Ramsey  Further Equipment Recommendations for Discharge:DME available: bedside commode, grab bars, shower chair,  rolling walker, and wheelchair      SUBJECTIVE:   Patient stated I'm feeling better.     OBJECTIVE DATA SUMMARY:     Past Medical History:   Diagnosis Date    Difficulty urinating     Elevated PSA     Hematuria, unspecified     Hypercholesterolemia     Hypertension     Incomplete bladder emptying     Urinary retention     UTI (urinary tract infection)      Past Surgical History:   Procedure Laterality Date    APPENDECTOMY      EGD  2/7/2014         HX TURP  6/13/16    HX TURP      MA INS NEW/RPLC PRM PACEMAKER W/TRANSV ELTRD VENTR N/A 5/5/2020    Insert Ppm Single Ventricular performed by Roz Medina MD at [address] LAB     Barriers to Learning/Limitations: None  Compensate with: visual, verbal, tactile, kinesthetic cues/model    Home Situation:   Home Situation  Home Environment: Private residence  # Steps to Enter: 2  Rails to Enter: Yes  One/Two Story Residence: Two story, live on 1st floor  Living Alone: No  Support Systems: Child(darryl)  Patient Expects to be Discharged to[de-identified] Unknown  Current DME Used/Available at Home: Commode, bedside, Grab bars, Shower chair, Walker, rolling, Walker, rollator, Wheelchair  Tub or Shower Type: Shower  [x]  Right hand dominant   []  Left hand dominant    Cognitive/Behavioral Status:  Neurologic State: Alert  Orientation Level: Oriented X4  Cognition: Follows commands  Safety/Judgement: Fall prevention    Skin: appears intact    Edema: none noted    Vision/Perceptual:  appears intact    Coordination: BUE  Coordination: Within functional limits(BUEs)  Fine Motor Skills-Upper: Left Intact; Right Intact    Gross Motor Skills-Upper: Left Intact; Right Intact    Balance:  Sitting: Impaired  Sitting - Static: Fair (occasional)  Sitting - Dynamic: Fair (occasional)  Standing: Impaired  Standing - Static: Fair  Standing - Dynamic : Fair    Strength: BUE  Strength: Generally decreased, functional(BUEs 3+/5)    Tone & Sensation: BUE  Tone: Normal(BUEs)  Sensation: Intact(BUEs)     Range of Motion: BUE  AROM: Generally decreased, functional(BUEs)    Functional Mobility and Transfers for ADLs:  Bed Mobility:     Supine to Sit: Moderate assistance;Maximum assistance;Assist x2     Scooting: Maximum assistance;Assist x2  Transfers:  Sit to Stand: Moderate assistance;Assist x2  Stand to Sit: Moderate assistance;Assist x2     Bed to Chair: Minimum assistance;Assist x2       Toilet Transfer : Minimum assistance;Assist x2(bedside commode)     ADL Assessment:   Feeding: Supervision;Setup    Oral Facial Hygiene/Grooming: Minimum assistance    Bathing: Maximum assistance    Upper Body Dressing: Minimum assistance    Lower Body Dressing: Maximum assistance    Toileting: Total assistance    Cognitive Retraining  Safety/Judgement: Fall prevention    Pain:  Pain level pre-treatment: 0/10   Pain level post-treatment: 0/10   Pain Intervention(s): Medication (see MAR); Rest, Ice, Repositioning   Response to intervention: Nurse notified, See doc flow    Activity Tolerance:   poor  Please refer to the flowsheet for vital signs taken during this treatment. After treatment:   [x] Patient left in no apparent distress sitting up in chair  [] Patient left in no apparent distress in bed  [x] Call bell left within reach  [x] Nursing notified  [] Caregiver present  [] Bed alarm activated    COMMUNICATION/EDUCATION:   [x] Role of Occupational Therapy in the acute care setting  [x] Home safety education was provided and the patient/caregiver indicated understanding. [x] Patient/family have participated as able in goal setting and plan of care. [x] Patient/family agree to work toward stated goals and plan of care. [] Patient understands intent and goals of therapy, but is neutral about his/her participation. [] Patient is unable to participate in goal setting and plan of care.     Thank you for this referral.  Willy Maldonado  Time Calculation: 29 mins    Eval Complexity: History: MEDIUM Complexity : Expanded review of history including physical, cognitive and psychosocial  history ; Examination: MEDIUM Complexity : 3-5 performance deficits relating to physical, cognitive , or psychosocial skils that result in activity limitations and / or participation restrictions; Decision Making:MEDIUM Complexity : Patient may present with comorbidities that affect occupational performnce.  Miniml to moderate modification of tasks or assistance (eg, physical or verbal ) with assesment(s) is necessary to enable patient to complete evaluation

## 2020-07-21 NOTE — PROGRESS NOTES
Updated therapy notes placed in edc. [facility] has accepting beds. Messaged Dr Zayas Points to see if they should start auth for tomorrow. Received call from ELEONORA, basil Woodson. C# [phone] f# [phone]  They have open case on pt. She wants me to fax h&p from both admits and dc summary from last admit. She will come in to see pt. She agrees for snf for pt. She thinks pt should stay long term. Told her he does not qualify for antony ,income 1650/mo. He would need to do spend down. Do not know assets or financial status of pt other than his monthly income. fax'd dc summary and both H&P's from this admit and last admit to her. received confirmation.

## 2020-07-21 NOTE — PROGRESS NOTES
Therapy working with pt, will speak with him when they are done. received message from zoila,pt's nurse, pt's son called and has gotten tested for covid sec to [employer] where he works. He told zoila pt will have to go to [facility] if son is positive. Called son left message for him to call me. Received call back from son, he states they are  Doing antony christina. Explained to son pt has copay if qualifies for snf, Eva Denny would need to [facility]. Pt has used days, may only have few left. Explained if he is positive for covid and pt could not go to snf he and pt will just have to wear masks and distance themselves. 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 Told him he would need to see if other family could take pt or find someone who might be able to help out for caregiving. He would have to pay 7000.00 approx for him to go into [facility] just to stay. Until he got antony.

## 2020-07-21 NOTE — PROGRESS NOTES
Progress Note      Patient: Louias Galvez               Sex: male          DOA: 7/16/2020       YOB: 1934      Age:  80 y.o.        LOS:  LOS: 5 days             CHIEF COMPLAINT:  CHF    Subjective:     Patient feels okay  No distress    Objective:      Visit Vitals  /68 (BP 1 Location: Left arm, BP Patient Position: At rest)   Pulse (!) 59   Temp 96.8 °F (36 °C)   Resp 20   Ht 5' 7\" (1.702 m)   Wt 49.8 kg (109 lb 12.8 oz)   SpO2 98%   BMI 17.20 kg/m²       Physical Exam:  Gen:  No distress, no complaint  Lungs:  Clear bilaterally, no wheeze or rhonchi  Heart:  Regular rate and rhythm, no murmurs or gallops  Abdomen:  Soft, non-tender, normal bowel sounds        Lab/Data Reviewed:  BMP:   Lab Results   Component Value Date/Time     (H) 07/21/2020 04:00 AM    K 3.2 (L) 07/21/2020 04:00 AM     07/21/2020 04:00 AM    CO2 33 (H) 07/21/2020 04:00 AM    AGAP 4 07/21/2020 04:00 AM    GLU 76 07/21/2020 04:00 AM    BUN 19 (H) 07/21/2020 04:00 AM    CREA 1.27 07/21/2020 04:00 AM    GFRAA >60 07/21/2020 04:00 AM    GFRNA 54 (L) 07/21/2020 04:00 AM     CBC:   Lab Results   Component Value Date/Time    WBC 5.8 07/21/2020 04:00 AM    HGB 10.6 (L) 07/21/2020 04:00 AM    HCT 33.4 (L) 07/21/2020 04:00 AM     07/21/2020 04:00 AM           Assessment/Plan     Principal Problem:    Acute on chronic systolic (congestive) heart failure (HCC) (7/16/2020)    Active Problems:    Hypothermia (12/23/2019)      Pleural effusion, bilateral (7/16/2020)      HCAP (healthcare-associated pneumonia) (7/16/2020)      Pneumonia (7/16/2020)        Plan:  Continue with medical management  Wean oxygen  Continue with antibiotics  Follow respiratory status  DVT prophylaxis.

## 2020-07-21 NOTE — ROUTINE PROCESS
Bedside and Verbal shift change report given to Preethi Brennan (oncoming nurse) by Elidia Quick, RN, BSN (offgoing nurse). Report included the following information SBAR, Kardex, ED Summary, Procedure Summary, Intake/Output, MAR, Recent Results and Cardiac Rhythm Ventricular Paced.

## 2020-07-21 NOTE — PROGRESS NOTES
Problem: Falls - Risk of  Goal: *Absence of Falls  Description: Document Travis Kathi Fall Risk and appropriate interventions in the flowsheet. Outcome: Progressing Towards Goal  Note: Fall Risk Interventions:  Mobility Interventions: Communicate number of staff needed for ambulation/transfer         Medication Interventions: Evaluate medications/consider consulting pharmacy    Elimination Interventions: Call light in reach, Patient to call for help with toileting needs, Toilet paper/wipes in reach, Toileting schedule/hourly rounds    History of Falls Interventions: Door open when patient unattended, Room close to nurse's station         Problem: Patient Education: Go to Patient Education Activity  Goal: Patient/Family Education  Outcome: Progressing Towards Goal     Problem: Pressure Injury - Risk of  Goal: *Prevention of pressure injury  Description: Document Alex Scale and appropriate interventions in the flowsheet.   Outcome: Progressing Towards Goal  Note: Pressure Injury Interventions:  Sensory Interventions: Assess changes in LOC, Keep linens dry and wrinkle-free, Maintain/enhance activity level, Minimize linen layers, Avoid rigorous massage over bony prominences    Moisture Interventions: Absorbent underpads, Apply protective barrier, creams and emollients, Internal/External urinary devices, Minimize layers, Maintain skin hydration (lotion/cream)    Activity Interventions: Pressure redistribution bed/mattress(bed type)    Mobility Interventions: HOB 30 degrees or less, Pressure redistribution bed/mattress (bed type)    Nutrition Interventions: Document food/fluid/supplement intake, Discuss nutritional consult with provider, Offer support with meals,snacks and hydration    Friction and Shear Interventions: Apply protective barrier, creams and emollients, HOB 30 degrees or less, Lift sheet, Lift team/patient mobility team                Problem: Patient Education: Go to Patient Education Activity  Goal: Patient/Family Education  Outcome: Progressing Towards Goal     Problem: Discharge Planning  Goal: *Discharge to safe environment  Outcome: Progressing Towards Goal     Problem: Patient Education: Go to Patient Education Activity  Goal: Patient/Family Education  Outcome: Progressing Towards Goal     Problem: Pain  Goal: *Control of Pain  Outcome: Progressing Towards Goal     Problem: Patient Education: Go to Patient Education Activity  Goal: Patient/Family Education  Outcome: Progressing Towards Goal     Problem: Heart Failure: Day 3  Goal: Activity/Safety  Outcome: Progressing Towards Goal  Goal: Diagnostic Test/Procedures  Outcome: Progressing Towards Goal  Goal: Nutrition/Diet  Outcome: Progressing Towards Goal  Goal: Discharge Planning  Outcome: Progressing Towards Goal  Goal: Medications  Outcome: Progressing Towards Goal  Goal: Respiratory  Outcome: Progressing Towards Goal  Goal: Treatments/Interventions/Procedures  Outcome: Progressing Towards Goal  Goal: Psychosocial  Outcome: Progressing Towards Goal  Goal: *Oxygen saturation within defined limits  Outcome: Progressing Towards Goal  Goal: *Hemodynamically stable  Outcome: Progressing Towards Goal  Goal: *Optimal pain control at patient's stated goal  Outcome: Progressing Towards Goal  Goal: *Anxiety reduced or absent  Outcome: Progressing Towards Goal  Goal: *Demonstrates progressive activity  Outcome: Progressing Towards Goal     Problem: Pneumonia: Day 3  Goal: Activity/Safety  Outcome: Progressing Towards Goal  Goal: Consults, if ordered  Outcome: Progressing Towards Goal  Goal: Diagnostic Test/Procedures  Outcome: Progressing Towards Goal  Goal: Nutrition/Diet  Outcome: Progressing Towards Goal  Goal: Discharge Planning  Outcome: Progressing Towards Goal  Goal: Medications  Outcome: Progressing Towards Goal  Goal: Respiratory  Outcome: Progressing Towards Goal  Goal: Treatments/Interventions/Procedures  Outcome: Progressing Towards Goal  Goal: Psychosocial  Outcome: Progressing Towards Goal  Goal: *Oxygen saturation within defined limits  Outcome: Progressing Towards Goal  Goal: *Hemodynamically stable  Outcome: Progressing Towards Goal  Goal: *Demonstrates progressive activity  Outcome: Progressing Towards Goal  Goal: *Tolerating diet  Outcome: Progressing Towards Goal  Goal: *Describes available resources and support systems  Outcome: Progressing Towards Goal  Goal: *Optimal pain control at patient's stated goal  Outcome: Progressing Towards Goal     Problem:  Body Temperature -  Risk of, Imbalanced  Goal: *Absence of heat stress or hyperthermia signs and symptoms  Outcome: Progressing Towards Goal  Goal: *Absence of cold stress or hypothermia signs and symptoms  Outcome: Progressing Towards Goal     Problem: Patient Education: Go to Patient Education Activity  Goal: Patient/Family Education  Outcome: Progressing Towards Goal     Problem: Nutrition Deficit  Goal: *Optimize nutritional status  Outcome: Progressing Towards Goal

## 2020-07-21 NOTE — PROGRESS NOTES
Problem: Mobility Impaired (Adult and Pediatric)  Goal: *Acute Goals and Plan of Care (Insert Text)  Description: Physical Therapy Goals  Updated 7/21/2020 based on progression  1. Patient will move from supine to sit and sit to supine in bed with supervision/set-up. 2.  Patient will transfer from bed to chair and chair to bed with supervision/set-up using the least restrictive device. 3.  Patient will perform sit to stand with supervision/set-up. 4.  Patient will ambulate with supervision/set-up for 25 feet with the least restrictive device. Initiated 7/18/2020 and to be accomplished within 7 day(s)  1. Patient will roll side to side in bed with moderate assistance . 2.  Patient will transfer from supine to sit and sit to supine with moderate assistance. 3.  Patient will sit at edge of bed for 8 minutes with minimal assistance to prepare for OOB activity. 4.  Patient will perform sit to stand with moderate assistance . 5. Patient will ambulate with moderate assistance  for 5 feet with the least restrictive device. Prior Level of Function:   Patient lives with son in two-story home with patient's bedroom on first floor. Per chart, patient has rolling walker, rollator, wheelchair, BSC, shower chair, and grab bars at home. Patient reported that he is requiring assistance for all mobility and ADLs from son but was previously able to ambulate with walker in house. Outcome: Progressing Towards Goal   PHYSICAL THERAPY TREATMENT    Patient: Zev Drummond (37 y.o. male)  Date: 7/21/2020  Diagnosis: Acute on chronic systolic (congestive) heart failure (HCC) [I50.23]  Pleural effusion [J90]  Hypothermia [T68. XXXA]  HCAP (healthcare-associated pneumonia) [J18.9]  Pneumonia [J18.9]   Acute on chronic systolic (congestive) heart failure ([facility] [address]  Precautions: Fall  PLOF: Amb with ww  ASSESSMENT:  Co-treated with OT to maximize patient participation and safety in functional mobility.  Max/mod A x 2 for supine to sit. Cues for midline with seated balance. Mod A x2 for sit to stand. Min A x2 for bed to bedside commode transfer. Mod A x2 for stand to sit/sit to stand to bedside commode. Min A x2 for bedside commode to chair transfer; amb 3ft with ww with each transfer. Seated in chair at end of session. Educated on need for RN assistance with mobility; verbalized understanding. Call bell in reach. Progression toward goals:   [x]      Improving appropriately and progressing toward goals  []      Improving slowly and progressing toward goals  []      Not making progress toward goals and plan of care will be adjusted     PLAN:  Patient continues to benefit from skilled intervention to address the above impairments. Continue treatment per established plan of care. Discharge Recommendations:  Skilled Nursing Facility  Further Equipment Recommendations for Discharge:  TBD next level of care     SUBJECTIVE:   Patient stated I guess it is.     OBJECTIVE DATA SUMMARY:   Critical Behavior:  Neurologic State: Alert  Orientation Level: Oriented to person  Cognition: Follows commands     Psychosocial  Patient Behaviors: Calm    Functional Mobility:  Bed Mobility:  Supine to Sit: Moderate assistance;Maximum assistance;Assist x2  Transfers:  Sit to Stand:  Moderate assistance;Assist x2  Stand to Sit: Moderate assistance;Assist x2  Bed to Chair: Minimum assistance;Assist x2  Balance:   Sitting: Impaired  Sitting - Static: Fair (occasional)  Sitting - Dynamic: Fair (occasional)  Standing: Impaired  Standing - Static: Fair  Standing - Dynamic : Fair  Therapeutic Exercises:   Sit to stand x2    Pain:  Pain level pre-treatment:0/10  Pain level post-treatment: 0/10     Activity Tolerance:   Fair    After treatment:   [x] Patient left in no apparent distress sitting up in chair  [] Patient left in no apparent distress in bed  [x] Call bell left within reach  [x] Nursing notified  [] Caregiver present  [] Bed alarm activated  [] SCDs applied      COMMUNICATION/EDUCATION:   [x]         Role of physical therapy in the acute care setting. [x]         Fall prevention education was provided and the patient/caregiver indicated understanding. [x]         Patient/family have participated as able in working toward goals and plan of care. [x]         Patient/family agree to work toward stated goals and plan of care. []         Patient understands intent and goals of therapy, but is neutral about his/her participation. []         Patient is unable to participate in stated goals/plan of care: ongoing with therapy staff.       Casey Hayden, PT   Time Calculation: 17 mins

## 2020-07-21 NOTE — PROGRESS NOTES
Problem: Falls - Risk of  Goal: *Absence of Falls  Description: Document Walter Arellano Fall Risk and appropriate interventions in the flowsheet.   Outcome: Progressing Towards Goal  Note: Fall Risk Interventions:  Mobility Interventions: Bed/chair exit alarm, Communicate number of staff needed for ambulation/transfer         Medication Interventions: Evaluate medications/consider consulting pharmacy    Elimination Interventions: Bed/chair exit alarm, Call light in reach    History of Falls Interventions: Bed/chair exit alarm, Door open when patient unattended

## 2020-07-21 NOTE — PROGRESS NOTES
conducted a Follow up consultation and Spiritual Assessment for Phuong Paredes, who is a 80 y.o.,male. The  provided the following Interventions:  Continued the relationship of care and support. Listened empathically. Offered prayer and assurance of continued prayer on patients behalf. Chart reviewed. The following outcomes were achieved:  Patient expressed gratitude for pastoral care visit. Assessment:  There are no further spiritual or Anabaptism issues which require Spiritual Care Services interventions at this time. Plan:  Chaplains will continue to follow and will provide pastoral care on an as needed/requested basis.  recommends bedside caregivers page  on duty if patient shows signs of acute spiritual or emotional distress.      [address] [facility]   Staff [address] [facility]   [phone]

## 2020-07-22 PROCEDURE — 74011250636 HC RX REV CODE- 250/636: Performed by: HOSPITALIST

## 2020-07-22 PROCEDURE — 74011000250 HC RX REV CODE- 250: Performed by: HOSPITALIST

## 2020-07-22 PROCEDURE — 74011000258 HC RX REV CODE- 258: Performed by: HOSPITALIST

## 2020-07-22 PROCEDURE — 97535 SELF CARE MNGMENT TRAINING: CPT

## 2020-07-22 PROCEDURE — 65270000029 HC RM PRIVATE

## 2020-07-22 PROCEDURE — 74011250637 HC RX REV CODE- 250/637: Performed by: HOSPITALIST

## 2020-07-22 PROCEDURE — 97530 THERAPEUTIC ACTIVITIES: CPT

## 2020-07-22 RX ADMIN — PIPERACILLIN SODIUM AND TAZOBACTAM SODIUM 4.5 G: 4; .5 INJECTION, POWDER, LYOPHILIZED, FOR SOLUTION INTRAVENOUS at 18:24

## 2020-07-22 RX ADMIN — TAMSULOSIN HYDROCHLORIDE 0.4 MG: 0.4 CAPSULE ORAL at 08:27

## 2020-07-22 RX ADMIN — LEVOTHYROXINE SODIUM 50 MCG: 0.05 TABLET ORAL at 07:00

## 2020-07-22 RX ADMIN — ATORVASTATIN CALCIUM 20 MG: 20 TABLET, FILM COATED ORAL at 08:27

## 2020-07-22 RX ADMIN — PIPERACILLIN SODIUM AND TAZOBACTAM SODIUM 4.5 G: 4; .5 INJECTION, POWDER, LYOPHILIZED, FOR SOLUTION INTRAVENOUS at 02:02

## 2020-07-22 RX ADMIN — BUMETANIDE 1 MG: 0.25 INJECTION INTRAMUSCULAR; INTRAVENOUS at 18:21

## 2020-07-22 RX ADMIN — BUMETANIDE 1 MG: 0.25 INJECTION INTRAMUSCULAR; INTRAVENOUS at 08:27

## 2020-07-22 RX ADMIN — PIPERACILLIN SODIUM AND TAZOBACTAM SODIUM 4.5 G: 4; .5 INJECTION, POWDER, LYOPHILIZED, FOR SOLUTION INTRAVENOUS at 08:31

## 2020-07-22 NOTE — PROGRESS NOTES
Updated therapy notes in edc. [place] started auth. Notified pt and son that we are waiting for ins auth to go to [place].

## 2020-07-22 NOTE — PROGRESS NOTES
Problem: Self Care Deficits Care Plan (Adult)  Goal: Interventions  Description: Occupational Therapy Goals  Initiated 7/21/2020 within 7 day(s). 1.  Patient will perform grooming with modified independence. 2.  Patient will perform bathing with modified independence. 3.  Patient will perform upper body dressing and lower body dressing with modified independence using adaptive equipment as needed. 4.  Patient will perform bedside commode transfers with modified independence using RW. 5.  Patient will perform all aspects of toileting with modified independence. 6.  Patient will participate in upper extremity therapeutic exercise/activities with modified independence for 8 minutes. 7.  Patient will utilize energy conservation techniques during functional activities with min verbal cues. Prior Level of Function: Recently requiring increased assist with ADLs and functional transfers using RW, prior to previous hospitalization this month, pt reports he was mod I - supv with ADLs and functional mobility using RW     Outcome: Progressing Towards Goal   OCCUPATIONAL THERAPY TREATMENT    Patient: Cristina Cordero (52 y.o. male)  Date: 7/22/2020  Diagnosis: Acute on chronic systolic (congestive) heart failure (HCC) [I50.23]  Pleural effusion [J90]  Hypothermia [T68. XXXA]  HCAP (healthcare-associated pneumonia) [J18.9]  Pneumonia [J18.9]   Acute on chronic systolic (congestive) heart failure ([facility] [address]       Precautions: Fall  Chart, occupational therapy assessment, plan of care, and goals were reviewed. ASSESSMENT:  Nursing/RN cleared pt to participate in OT tx. Patient presents sitting up in recliner, reports he is feeling better w/ no c/o pain. Patient performed LB dressing: Mod I using AE: sock aide and reacher for doff and don socks, pt reports he used same AE at PLOF. Patient combed hair w/ Mod I, no c/o fatigue.  Patient verbalized understanding to utilize call bell to request assist e.g. functional transfers in order to prevent falls. Progression toward goals:  []          Improving appropriately and progressing toward goals  [x]          Improving slowly and progressing toward goals  []          Not making progress toward goals and plan of care will be adjusted     PLAN:  Patient continues to benefit from skilled intervention to address the above impairments. Continue treatment per established plan of care. Discharge Recommendations:  [facility]  Further Equipment Recommendations for Discharge:  bedside commode, shower chair, rolling walker, and wheelchair      SUBJECTIVE:   Patient stated I'm feeling pretty good today.     OBJECTIVE DATA SUMMARY:   Cognitive/Behavioral Status:  Neurologic State: Alert  Orientation Level: Oriented to person  Cognition: Follows commands  Safety/Judgement: Fall prevention      ADL Intervention:   Grooming  Brushing/Combing Hair: Modified independent    Lower Body Dressing Assistance  Socks: Modified independent  Adaptive Equipment Used: Reacher;Sock aid    Pain:  Pain level pre-treatment: 0/10   Pain level post-treatment: 0/10  Pain Intervention(s): Medication (see MAR); Rest, Ice, Repositioning   Response to intervention: Nurse notified, See doc flow    Activity Tolerance:    poor  Please refer to the flowsheet for vital signs taken during this treatment. After treatment:   []  Patient left in no apparent distress sitting up in chair  [x]  Patient left in no apparent distress in bed  [x]  Call bell left within reach  [x]  Nursing notified  []  Caregiver present  []  Bed alarm activated    COMMUNICATION/EDUCATION:   [x] Role of Occupational Therapy in the acute care setting  [x] Home safety education was provided and the patient/caregiver indicated understanding. [x] Patient/family have participated as able in working towards goals and plan of care. [x] Patient/family agree to work toward stated goals and plan of care.   [] Patient understands intent and goals of therapy, but is neutral about his/her participation. [] Patient is unable to participate in goal setting and plan of care.       Thank you for this referral.  Kurtis Sanchez  Time Calculation: 12 mins

## 2020-07-22 NOTE — PROGRESS NOTES
1930  Bedside, Verbal, and Written shift change report given to [facility] (oncoming nurse) by Gerry Fam RN (offgoing nurse). Report included the following information SBAR, Kardex, and MAR. Patient is in bed sleeping but arouseable. Farrell draining. O2 at 2L. Tele box 60, SNR [place]. No complaints at the moment bed low, call light within reach. 2000  Patient is in bed, . Oxygen on, IV CDI, dressing CDI, no sign of distress. Bed low, call bell within reach. 2100  Patient is in bed, sleeping. No sign of distress. Bed low, call bell within reach. 2200  No sign of distress. Bed low, call bell within reach. 2300  Patient is in bed, sleeping. No sign of distress. Bed low, call bell within reach. 0000  Patient is in bed, sleeping. Oxygen on at 2L, IV CDI, dressing CDI, no sign of distress. Bed low, call bell within reach. 0400  Patient is in bed, alert and oriented. Oxygen on, IV CDI, dressing CDI, no sign of distress. Bed low, call bell within reach. 0600  Patient wanted to be on the bed pan. Had a medium sized BM.    0730  Bedside, Verbal, and Written shift change report given to [id number] [place] [address] (oncoming nurse) by [facility] (offgoing nurse). Report included the following information SBAR, Kardex, and MAR.

## 2020-07-22 NOTE — ROUTINE PROCESS
Bedside report received from [facility] with sbar  Appetite good   oob to chair  Dr. Tu Wei in to see patieent  No complaints of pain  Bedside shift report given to Yessenia Iraheta with sbar

## 2020-07-22 NOTE — PROGRESS NOTES
Problem: Mobility Impaired (Adult and Pediatric)  Goal: *Acute Goals and Plan of Care (Insert Text)  Description: Physical Therapy Goals  Updated 7/21/2020 based on progression  1. Patient will move from supine to sit and sit to supine in bed with supervision/set-up. 2.  Patient will transfer from bed to chair and chair to bed with supervision/set-up using the least restrictive device. 3.  Patient will perform sit to stand with supervision/set-up. 4.  Patient will ambulate with supervision/set-up for 25 feet with the least restrictive device. Initiated 7/18/2020 and to be accomplished within 7 day(s)  1. Patient will roll side to side in bed with moderate assistance . 2.  Patient will transfer from supine to sit and sit to supine with moderate assistance. 3.  Patient will sit at edge of bed for 8 minutes with minimal assistance to prepare for OOB activity. 4.  Patient will perform sit to stand with moderate assistance . 5. Patient will ambulate with moderate assistance  for 5 feet with the least restrictive device. Prior Level of Function:   Patient lives with son in two-story home with patient's bedroom on first floor. Per chart, patient has rolling walker, rollator, wheelchair, BSC, shower chair, and grab bars at home. Patient reported that he is requiring assistance for all mobility and ADLs from son but was previously able to ambulate with walker in house. Outcome: Progressing Towards Goal   PHYSICAL THERAPY TREATMENT    Patient: Maurisio Landrum (68 y.o. male)  Date: 7/22/2020  Diagnosis: Acute on chronic systolic (congestive) heart failure (HCC) [I50.23]  Pleural effusion [J90]  Hypothermia [T68. XXXA]  HCAP (healthcare-associated pneumonia) [J18.9]  Pneumonia [J18.9]   Acute on chronic systolic (congestive) heart failure (HCC)  Precautions: Fall  PLOF: Mod I  ASSESSMENT:  Max A for supine to sit. Max A for scooting to EOB. Mod A for sit to stand. Min A for bed to chair with ww. Increased time with mobility. Seated in recliner with min A for control of stand to sit. BLE elevated and tray table in front. Educated on need for RN assistance with mobility; verbalized understanding. Call bell in reach. Progression toward goals:   []      Improving appropriately and progressing toward goals  [x]      Improving slowly and progressing toward goals  []      Not making progress toward goals and plan of care will be adjusted     PLAN:  Patient continues to benefit from skilled intervention to address the above impairments. Continue treatment per established plan of care. Discharge Recommendations:  [facility]  Further Equipment Recommendations for Discharge:  TBD next level of care     SUBJECTIVE:   Patient stated You're so sweet.     OBJECTIVE DATA SUMMARY:   Critical Behavior:  Neurologic State: Alert  Orientation Level: Oriented to person  Cognition: Follows commands     Psychosocial  Patient Behaviors: Cooperative  Functional Mobility:  Bed Mobility:  Supine to Sit: Maximum assistance  Scooting: Maximum assistance  Transfers:  Sit to Stand: Moderate assistance  Stand to Sit: Minimum assistance  Bed to Chair: Minimum assistance  Balance:   Sitting: Impaired  Sitting - Static: Fair (occasional)  Sitting - Dynamic: Fair (occasional)  Standing: Impaired  Standing - Static: Fair  Standing - Dynamic : Fair  Neuro Re-Education:  Seated EOB 3 minutes    Pain:  Pain level pre-treatment: 0/10  Pain level post-treatment: 0/10     Activity Tolerance:   Fair    After treatment:   [x] Patient left in no apparent distress sitting up in chair  [] Patient left in no apparent distress in bed  [x] Call bell left within reach  [x] Nursing notified  [] Caregiver present  [] Bed alarm activated  [] SCDs applied      COMMUNICATION/EDUCATION:   [x]         Role of physical therapy in the acute care setting.   [x]         Fall prevention education was provided and the patient/caregiver indicated understanding. [x]         Patient/family have participated as able in working toward goals and plan of care. [x]         Patient/family agree to work toward stated goals and plan of care. []         Patient understands intent and goals of therapy, but is neutral about his/her participation. []         Patient is unable to participate in stated goals/plan of care: ongoing with therapy staff.       Adrian Saleem, PT   Time Calculation: 12 mins

## 2020-07-22 NOTE — CDMP QUERY
Pt admitted with CHF exacerbation. Noted documentation of moderate malnutrition on dietitian progress noted dated 7/20. After further study, do you concur with the findings of moderate malnutrition? If possible, please document in progress notes and discharge summary:     Moderate malnutrition confirmed   Moderate malnutrition ruled out (please provide corresponding diagnosis for patient's clinical picture and treatment)   Other, please specify    Clinically unable to determine    The medical record reflects the following:      Risk Factors: 80years of age, CHF      Clinical Indicators: dietitian note (7/20)  Malnutrition Assessment:  Malnutrition Status: Moderate malnutrition    Context:  Chronic illness     Findings of the 6 clinical characteristics of malnutrition:   Energy Intake:  Mild decrease in energy intake (specify)  Weight Loss:  Unable to assess     Body Fat Loss:  1 - Mild body fat loss, Buccal region   Muscle Mass Loss:  1 - Mild muscle mass loss, Clavicles (pectoralis &deltoids), Temples (temporalis)  Fluid Accumulation:  1 - Mild,     Strength:  Not performed       Treatment: dietitian following:  Nutrition Recommendations/Plan:    1. Resume Dental Soft Solid Diet  2. Initiate oral nutrition supplement: Ensure Enlive BID to supplement energy needs  3. Monitor labs, weight and document all PO intake  4.  Tray set up/assist    Please call me for any questions  Thank you,  Octavio Tyson [facility], [facility]  [phone]

## 2020-07-22 NOTE — PROGRESS NOTES
Problem: Mobility Impaired (Adult and Pediatric)  Goal: *Acute Goals and Plan of Care (Insert Text)  Description: Physical Therapy Goals  Updated 7/21/2020 based on progression  1. Patient will move from supine to sit and sit to supine in bed with supervision/set-up. 2.  Patient will transfer from bed to chair and chair to bed with supervision/set-up using the least restrictive device. 3.  Patient will perform sit to stand with supervision/set-up. 4.  Patient will ambulate with supervision/set-up for 25 feet with the least restrictive device. Initiated 7/18/2020 and to be accomplished within 7 day(s)  1. Patient will roll side to side in bed with moderate assistance . 2.  Patient will transfer from supine to sit and sit to supine with moderate assistance. 3.  Patient will sit at edge of bed for 8 minutes with minimal assistance to prepare for OOB activity. 4.  Patient will perform sit to stand with moderate assistance . 5. Patient will ambulate with moderate assistance  for 5 feet with the least restrictive device. Prior Level of Function:   Patient lives with son in two-story home with patient's bedroom on first floor. Per chart, patient has rolling walker, rollator, wheelchair, BSC, shower chair, and grab bars at home. Patient reported that he is requiring assistance for all mobility and ADLs from son but was previously able to ambulate with walker in house. 7/22/2020 1423 by Madyson , PT  Outcome: Progressing Towards Goal     PHYSICAL THERAPY TREATMENT    Patient: Deyanira Lee (21 y.o. male)  Date: 7/22/2020  Diagnosis: Acute on chronic systolic (congestive) heart failure (HCC) [I50.23]  Pleural effusion [J90]  Hypothermia [T68. XXXA]  HCAP (healthcare-associated pneumonia) [J18.9]  Pneumonia [J18.9]   Acute on chronic systolic (congestive) heart failure ([facility] [address]  Precautions: Fall  PLOF: Amb with ww  ASSESSMENT:  2nd PT session to assist in transfer from chair back to bed.  Max A for sit to stand. Cues for anterior weight shift with standing at ww. Min A for side steps to bed. Returned to seated EOB with min A. Max A for sit to supine. Seated in bed with HOB elevated. Educated on need for RN assistance with mobility; verbalized understanding. Call bell in reach. Progression toward goals:   []      Improving appropriately and progressing toward goals  [x]      Improving slowly and progressing toward goals  []      Not making progress toward goals and plan of care will be adjusted     PLAN:  Patient continues to benefit from skilled intervention to address the above impairments. Continue treatment per established plan of care. Discharge Recommendations:  [facility]  Further Equipment Recommendations for Discharge:  rolling walker     SUBJECTIVE:   Patient stated Your just in time.     OBJECTIVE DATA SUMMARY:   Critical Behavior:  Neurologic State: Alert  Orientation Level: Oriented to person  Cognition: Follows commands     Psychosocial  Patient Behaviors: Cooperative    Functional Mobility:  Bed Mobility:  Supine to Sit: Maximum assistance  Sit to Supine: Maximum assistance  Scooting: Maximum assistance  Transfers:  Sit to Stand: Maximum assistance  Stand to Sit: Minimum assistance  Bed to Chair: Minimum assistance  Balance:   Sitting: Impaired  Sitting - Static: Fair (occasional)  Sitting - Dynamic: Fair (occasional)  Standing: Impaired  Standing - Static: Fair  Standing - Dynamic : Fair    Pain:  Pain level pre-treatment: 0/10  Pain level post-treatment: 0/10     Activity Tolerance:   Fair    After treatment:   [] Patient left in no apparent distress sitting up in chair  [x] Patient left in no apparent distress in bed  [x] Call bell left within reach  [x] Nursing notified  [] Caregiver present  [] Bed alarm activated  [] SCDs applied      COMMUNICATION/EDUCATION:   [x]         Role of physical therapy in the acute care setting.   [x]         Fall prevention education was provided and the patient/caregiver indicated understanding. [x]         Patient/family have participated as able in working toward goals and plan of care. [x]         Patient/family agree to work toward stated goals and plan of care. []         Patient understands intent and goals of therapy, but is neutral about his/her participation. []         Patient is unable to participate in stated goals/plan of care: ongoing with therapy staff.       Pk Mcdaniels, PT   Time Calculation: 8 mins

## 2020-07-22 NOTE — PROGRESS NOTES
Problem: Falls - Risk of  Goal: *Absence of Falls  Description: Document Moriah Conte Fall Risk and appropriate interventions in the flowsheet.   Outcome: Progressing Towards Goal  Note: Fall Risk Interventions:  Mobility Interventions: Bed/chair exit alarm         Medication Interventions: Bed/chair exit alarm    Elimination Interventions: Call light in reach    History of Falls Interventions: Bed/chair exit alarm

## 2020-07-23 VITALS
DIASTOLIC BLOOD PRESSURE: 57 MMHG | WEIGHT: 110 LBS | OXYGEN SATURATION: 95 % | SYSTOLIC BLOOD PRESSURE: 104 MMHG | HEART RATE: 60 BPM | RESPIRATION RATE: 18 BRPM | HEIGHT: 67 IN | TEMPERATURE: 97.3 F | BODY MASS INDEX: 17.27 KG/M2

## 2020-07-23 PROBLEM — E44.0 MODERATE PROTEIN MALNUTRITION (HCC): Status: ACTIVE | Noted: 2020-07-23

## 2020-07-23 PROCEDURE — 74011000258 HC RX REV CODE- 258: Performed by: HOSPITALIST

## 2020-07-23 PROCEDURE — 74011250637 HC RX REV CODE- 250/637: Performed by: HOSPITALIST

## 2020-07-23 PROCEDURE — 97535 SELF CARE MNGMENT TRAINING: CPT

## 2020-07-23 PROCEDURE — 74011250636 HC RX REV CODE- 250/636: Performed by: HOSPITALIST

## 2020-07-23 PROCEDURE — 74011000250 HC RX REV CODE- 250: Performed by: HOSPITALIST

## 2020-07-23 RX ADMIN — LEVOTHYROXINE SODIUM 50 MCG: 0.05 TABLET ORAL at 06:52

## 2020-07-23 RX ADMIN — PIPERACILLIN SODIUM AND TAZOBACTAM SODIUM 4.5 G: 4; .5 INJECTION, POWDER, LYOPHILIZED, FOR SOLUTION INTRAVENOUS at 02:48

## 2020-07-23 RX ADMIN — BUMETANIDE 1 MG: 0.25 INJECTION INTRAMUSCULAR; INTRAVENOUS at 09:35

## 2020-07-23 RX ADMIN — ATORVASTATIN CALCIUM 20 MG: 20 TABLET, FILM COATED ORAL at 09:15

## 2020-07-23 RX ADMIN — TAMSULOSIN HYDROCHLORIDE 0.4 MG: 0.4 CAPSULE ORAL at 09:15

## 2020-07-23 RX ADMIN — PIPERACILLIN SODIUM AND TAZOBACTAM SODIUM 4.5 G: 4; .5 INJECTION, POWDER, LYOPHILIZED, FOR SOLUTION INTRAVENOUS at 09:36

## 2020-07-23 NOTE — PROGRESS NOTES
Problem: Self Care Deficits Care Plan (Adult)  Goal: Interventions  Description: Occupational Therapy Goals  Initiated 7/21/2020 within 7 day(s). 1.  Patient will perform grooming with modified independence. 2.  Patient will perform bathing with modified independence. 3.  Patient will perform upper body dressing and lower body dressing with modified independence using adaptive equipment as needed. 4.  Patient will perform bedside commode transfers with modified independence using RW. 5.  Patient will perform all aspects of toileting with modified independence. 6.  Patient will participate in upper extremity therapeutic exercise/activities with modified independence for 8 minutes. 7.  Patient will utilize energy conservation techniques during functional activities with min verbal cues. Prior Level of Function: Recently requiring increased assist with ADLs and functional transfers using RW, prior to previous hospitalization this month, pt reports he was mod I - supv with ADLs and functional mobility using RW     Outcome: Progressing Towards Goal   OCCUPATIONAL THERAPY TREATMENT    Patient: Amado Kee (18 y.o. male)  Date: 7/23/2020  Diagnosis: Acute on chronic systolic (congestive) heart failure (HCC) [I50.23]  Pleural effusion [J90]  Hypothermia [T68. XXXA]  HCAP (healthcare-associated pneumonia) [J18.9]  Pneumonia [J18.9]   Acute on chronic systolic (congestive) heart failure ([facility]       Precautions: Fall  Chart, occupational therapy assessment, plan of care, and goals were reviewed. ASSESSMENT:  Nursing/RN cleared pt to participate in OT tx. Patient presents on bed pan, dep with toilet posterior perihygiene following BM lying supine, patient performed anterior perihygiene sitting upright in bed with set up.  Bed mobility: Max A supine <-> sit edge of bed with head of bed elevated and use of bed rails to assist. Scooting w/ dep to edge of bed to square of w/ b/l feet on floor for increased balance. UB bathe: CGA, dep w/ back w/ Fair- static sitting balance. UB dress: Mod A doff and don hospital gown. Patient performed grooming tasks sitting upright in bed w/ mod I following set up for washing face and combing hair. Patient w/ no c/o pain. Call bell within reach & pt verbalized understanding and provided return demonstration to utilize for assist e.g. functional transfers in order to prevent falls. Progression toward goals:  []          Improving appropriately and progressing toward goals  [x]          Improving slowly and progressing toward goals  []          Not making progress toward goals and plan of care will be adjusted     PLAN:  Patient continues to benefit from skilled intervention to address the above impairments. Continue treatment per established plan of care. Discharge Recommendations:  Khai Ramsey  Further Equipment Recommendations for Discharge:  bedside commode, shower chair, rolling walker, and wheelchair      SUBJECTIVE:   Patient stated I'm feeling pretty good today.     OBJECTIVE DATA SUMMARY:   Cognitive/Behavioral Status:  Neurologic State: Appropriate for age, Alert, Eyes open spontaneously  Orientation Level: Appropriate for age, Oriented X4  Cognition: Appropriate for age attention/concentration, Follows commands  Safety/Judgement: Fall prevention    Functional Mobility and Transfers for ADLs:   Bed Mobility:  Rolling: Moderate assistance  Supine to Sit: Maximum assistance  Sit to Supine: Maximum assistance  Scooting: Total assistance   Transfers:    Balance:  Sitting: Impaired  Sitting - Static: Fair (occasional)  Sitting - Dynamic: Fair (occasional)(-)    ADL Intervention:       Grooming  Position Performed: (lying semireclined in bed)  Washing Face: Set-up; Modified independent  Brushing/Combing Hair: Set-up; Modified independent    Upper Body Bathing  Bathing Assistance: Contact guard assistance(dep with back)  Position Performed: Seated edge of bed    Lower Body Bathing  Bathing Assistance: Moderate assistance  Perineal  : Moderate assistance  Position Performed: (lying in bed semireclined and supine rolling R <-> L )    Upper Body [address]: Moderate assistance    Pain:  Pain level pre-treatment: 0/10   Pain level post-treatment: 0/10  Pain Intervention(s): Medication (see MAR); Rest, Ice, Repositioning   Response to intervention: Nurse notified, See doc flow    Activity Tolerance:    poor  Please refer to the flowsheet for vital signs taken during this treatment. After treatment:   []  Patient left in no apparent distress sitting up in chair  [x]  Patient left in no apparent distress in bed  [x]  Call bell left within reach  [x]  Nursing notified  []  Caregiver present  []  Bed alarm activated    COMMUNICATION/EDUCATION:   [x] Role of Occupational Therapy in the acute care setting  [x] Home safety education was provided and the patient/caregiver indicated understanding. [x] Patient/family have participated as able in working towards goals and plan of care. [x] Patient/family agree to work toward stated goals and plan of care. [] Patient understands intent and goals of therapy, but is neutral about his/her participation. [] Patient is unable to participate in goal setting and plan of care.       Thank you for this referral.  Sudarshan Sanchez  Time Calculation: 35 mins

## 2020-07-23 NOTE — DISCHARGE SUMMARY
Discharge Summary    Patient: Kwan Aguayo               Sex: male          DOA: 7/16/2020         YOB: 1934      Age:  80 y.o.        LOS:  LOS: 7 days                Admit Date: 7/16/2020    Discharge Date: 7/23/2020    Admission Diagnoses: Acute on chronic systolic (congestive) heart failure (HCC) [I50.23]  Pleural effusion [J90]  Hypothermia [T68. XXXA]  HCAP (healthcare-associated pneumonia) [J18.9]  Pneumonia [J18.9]    Discharge Diagnoses:    Problem List as of 7/23/2020 Date Reviewed: 4/6/2020          Codes Class Noted - Resolved    * (Principal) Acute on chronic systolic (congestive) heart failure ([facility] ICD-10-CM: I50.23  ICD-9-CM: 428.23, 428.0  7/16/2020 - Present        Right lower lobe pneumonia ICD-10-CM: J18.9  ICD-9-CM: 486  4/6/2020 - Present        Moderate protein malnutrition ([facility] ICD-10-CM: E44.0  ICD-9-CM: 263.0  7/23/2020 - Present        Bradycardia ICD-10-CM: R00.1  ICD-9-CM: 427.89  2/19/2020 - Present        Hypothyroidism ICD-10-CM: E03.9  ICD-9-CM: 244.9  2/20/2020 - Present        Pleural effusion, bilateral ICD-10-CM: J90  ICD-9-CM: 511.9  7/16/2020 - Present        HCAP (healthcare-associated pneumonia) ICD-10-CM: J18.9  ICD-9-CM: 461  7/16/2020 - Present        Pneumonia ICD-10-CM: J18.9  ICD-9-CM: 773  7/16/2020 - Present        Sepsis ([facility] ICD-10-CM: A41.9  ICD-9-CM: 038.9, 995.91  7/6/2020 - Present        Pacemaker ICD-10-CM: Z95.0  ICD-9-CM: V45.01  5/5/2020 - Present    Overview Signed 5/5/2020  9:51 AM by Len Iraheta MD     [employer] pacemaker 5/5/20             Gastrointestinal bleeding ICD-10-CM: G75.0  ICD-9-CM: 578.9  4/23/2020 - Present        Hypoglycemia ICD-10-CM: E16.2  ICD-9-CM: 251.2  4/21/2020 - Present        HTN (hypertension) ICD-10-CM: I10  ICD-9-CM: 401.9  4/21/2020 - Present        Left bundle branch block ICD-10-CM: I44.7  ICD-9-CM: 426.3  4/21/2020 - Present        Sinoatrial node dysfunction ([facility] ICD-10-CM: I49.5  ICD-9-CM: 427.81  4/21/2020 - Present        Dysphagia ICD-10-CM: R13.10  ICD-9-CM: 787.20  4/8/2020 - Present        Chronic combined systolic and diastolic congestive heart failure (HCC) ICD-10-CM: I50.42  ICD-9-CM: 428.42, 428.0  4/7/2020 - Present        Other chest pain ICD-10-CM: R07.89  ICD-9-CM: 786.59  4/7/2020 - Present        Cardiomyopathy ([facility] ICD-10-CM: I42.9  ICD-9-CM: 425.4  2/20/2020 - Present    Overview Signed 2/20/2020 12:40 AM by Finn KRAUSE DO     LVEF 35-40% by previous TTE. Grade II diastolic dysfunction [id number]: I51.9  ICD-9-CM: 429.9  2/20/2020 - Present    Overview Signed 2/20/2020 12:41 AM by Carmelita Rodriguez DO     Per previous TTE.             UTI (urinary tract infection) ICD-10-CM: N39.0  ICD-9-CM: 599.0  2/19/2020 - Present        Acute renal failure (ARF) ([facility] ICD-10-CM: N17.9  ICD-9-CM: 584.9  2/19/2020 - Present        Hypotension ICD-10-CM: I95.9  ICD-9-CM: 458.9  2/19/2020 - Present        Sustained ventricular tachycardia ([facility] ICD-10-CM: I47.2  ICD-9-CM: 427.1  12/28/2019 - Present        Duodenitis ICD-10-CM: K29.80  ICD-9-CM: 535.60  12/24/2019 - Present        Abdominal pain ICD-10-CM: R10.9  ICD-9-CM: 789.00  12/23/2019 - Present        Hypothermia ICD-10-CM: T68. Yulissa Syed  ICD-9-CM: 991.6  12/23/2019 - Present        Transaminitis ICD-10-CM: R74.0  ICD-9-CM: 790.4  12/23/2019 - Present        Chronic renal failure, stage 3 (moderate) (HCC) ICD-10-CM: N18.3  ICD-9-CM: 585.3  12/23/2019 - Present        SIRS (systemic inflammatory response syndrome) (HCC) ICD-10-CM: R65.10  ICD-9-CM: 995.90  12/23/2019 - Present        Gastritis ICD-10-CM: K29.70  ICD-9-CM: 535.50  12/23/2019 - Present        Syncope and collapse ICD-10-CM: R55  ICD-9-CM: 780.2  1/30/2018 - Present        Head injury ICD-10-CM: S09. 90XA  ICD-9-CM: 959.01  1/30/2018 - Present        Forehead contusion ICD-10-CM: N29.43VL  ICD-9-CM: 920  1/30/2018 - Present        Wrist abrasion, infected, left, initial encounter ICD-10-CM: L97.043Z, L08.9  ICD-9-CM: 913.1  1/30/2018 - Present        Wrist sprain, left, initial encounter ICD-10-CM: S63.502A  ICD-9-CM: 842.00  1/30/2018 - Present        Acute renal failure (HCC) ICD-10-CM: N17.9  ICD-9-CM: 584.9  2/6/2014 - Present        Elevated PSA ICD-10-CM: R97.20  ICD-9-CM: 790.93  2/6/2014 - Present        Urinary retention ICD-10-CM: R33.9  ICD-9-CM: 788.20  2/6/2014 - Present        Guaiac + stool ICD-10-CM: R19.5  ICD-9-CM: 792.1  2/6/2014 - Present        Neoplastic disease ICD-10-CM: D49.9  ICD-9-CM: 239.9  1/2/2013 - Present              Discharge Condition:  Improved    Hospital Course:   Mr Kacey Pearson is an 80year old male who presented to the ER with complaints of cough and SOB. In the ER he was diagnosed with pneumonia as well as acute on chronic systolic CHF. He was admitted for ongoing management. Mr Kacey Pearson responded very well to antibiotics and to diuresis. He was also noted to have pleural effusion which responded well to therapeutic thoracentesis and was thought to be secondary to CHF. Mr Kacey Pearson received a full course of antibiotics in the hospital and he is stable on room air at discharge. He also responded well to diuresis and is at his cardiac baseline. He has a LVEF of 40%. He is not given an Ace Inhibitor at discharge because of stated allergy. He is appropriate to leave the hospital later today. Consults: None    Significant Diagnostic Studies:     Discharge Medications:     Current Discharge Medication List      CONTINUE these medications which have NOT CHANGED    Details   lidocaine 4 % patch Apply to low back prn for back pains  Qty: 1 Package, Refills: 0      levothyroxine (SYNTHROID) 50 mcg tablet Take 1 Tab by mouth every morning. Qty: 30 Tab, Refills: 0      cholecalciferol (VITAMIN D3) (1000 Units /25 mcg) tablet Take 2 Tabs by mouth daily.   Qty: 30 Tab, Refills: 0      simvastatin (ZOCOR) 40 mg tablet Take 1 Tab by mouth nightly. Qty: 30 Tab, Refills: 0      aspirin delayed-release 81 mg tablet Take 1 Tab by mouth daily. Qty: 30 Tab, Refills: 0      acetaminophen (TYLENOL EXTRA STRENGTH) 500 mg tablet Take 2 Tabs by mouth every six (6) hours as needed for Pain. Qty: 50 Tab, Refills: 0      allopurinol (ZYLOPRIM) 300 mg tablet Take 300 mg by mouth daily.       tamsulosin (FLOMAX) 0.4 mg capsule take 1 capsule by mouth once daily AFTER DINNER  Qty: 90 Cap, Refills: 3             Activity: Activity as tolerated    Diet: Cardiac Diet    Wound Care: None needed    Follow-up: Follow up with staff MD on arrival.

## 2020-07-23 NOTE — PROGRESS NOTES
Problem: Discharge Planning  Goal: *Discharge to safe environment  Outcome: resolved/met    Plan snf at [facility] and rehab    Received call from admit at [facility], they received auth. Notified pt and his son Linus . Notified dr Gabriele Heath. [facility]pm [facility], spoke with [facility]. Pcs completed, envelope in nurses station. Notified pt's nurse,Estelita. Pcs fax'd, confirmation received. CHELA verduzco from [place] [address] visited pt. After her visit she states pt is A&O, does not want LTc and states he is well taken care of at home. She does not find any issues. Pt concerned about her visit. Told him she had stated she found no issues. There would be nothing further for her to do. He stated his family takes good care of him. He is in agreement to go to [facility] today. Care Management Interventions  PCP Verified by CM:  Yes  Palliative Care Criteria Met (RRAT>21 & CHF Dx)?: No  Mode of Transport at Discharge: [facility] Transport Time of Discharge: 1400  Transition of Care Consult (CM Consult): SNF  Partner SNF: No  Reason Why Partner SNF Not Chosen: Positive previous encounter  Discharge Durable Medical Equipment: No  Physical Therapy Consult: Yes  Occupational Therapy Consult: Yes  Speech Therapy Consult: No  Current Support Network: Relative's Home  Confirm Follow Up Transport: Other (see comment)  The Plan for Transition of Care is Related to the Following Treatment Goals : sn/pt/ot  The Patient and/or Patient Representative was Provided with a Choice of Provider and Agrees with the Discharge Plan?: Yes  Name of the Patient Representative Who was Provided with a Choice of Provider and Agrees with the Discharge Plan: fransisco Valdez  [place] of Choice List was Provided with Basic Dialogue that Supports the Patient's Individualized Plan of Care/Goals, Treatment Preferences and Shares the Quality Data Associated with the Providers?: Yes  The [employer] Information Provided?: No  Discharge Location  Discharge Placement: Skilled nursing facility    Communication to Patient/Family:  Met with patient and family and they are agreeable to the transition plan. The Plan for Transition of Care is related to the following treatment goals: sn/pt/ot    The Patient and/or patient representative jazz arredondo was provided with a choice of provider and agrees   with the discharge plan. Yes [x] No []    Freedom of choice list was provided with basic dialogue that supports the patient's individualized plan of care/goals and shares the quality data associated with the providers. Yes [x] No []    SNF/Rehab Transition:  Patient has been accepted to [facility] at [address] [facility], [address] [place] [address] SNF/Rehab and meets criteria for admission. Patient will transported by [address] [facility]  and expected to leave at 2pm    Communication to SNF/Rehab:  Bedside RNHEAVEN has been notified to update the transition plan to the facility and call report [phone]  Discharge information has been updated on the AVS. And communicated to facility via [employer], or CC link. Discharge instructions to be fax'd to facility at [phone]    Nursing Please include all hard scripts for controlled substances, med rec and dc summary, and AVS in packet. Reviewed and confirmed with facility, they  can manage the patient care needs for the following:     Heather Nails with (X) only those applicable:  Medication:  [x]Medications are available at the facility  []IV Antibiotics    []Controlled Substance  hard copies available sent.   []Weekly Labs    Equipment:  []CPAP/BiPAP  []Wound Vacuum  []Farrell or Urinary Device  []PICC/Central Line  []Nebulizer  []Ventilator    Treatment:  []Isolation (for MRSA, VRE, etc.)  []Surgical Drain Management  []Tracheostomy Care  []Dressing Changes  []Dialysis with transportation  []PEG Care  [x]Oxygen  []Daily Weights for Heart Failure    Dietary:  []Any diet limitations  []Tube Feedings []Total Parenteral Management (TPN)    Financial Resources:  []Medicaid Application Completed    [x]UAI Completed and copy given to pt/family    []A screening has previously been completed. []Level II Completed    [] Private pay individual who will not become financially eligible for Medicaid within 6 months from admission to a [address] [place] [address] facility. [] Individual refused to have screening conducted. []Medicaid Application Completed    []The screening denied because it was determined individual did not need/did not qualify for nursing facility level of care. [] Out of state residents seeking direct admission to a [address] facility. [] Individuals who are inpatients of an out of state hospital, or in state or out of state veterans/ hospital and seek direct admission to a [address] facility  [] Individuals who are pateints or residents of a state owned/operated facility that is licensed by Department of [employer] ([employer]) and seek direct admission to [address] [place] [address]  [] A screening not required for enrollment in [facility] services as set out in [address] [place] [address] 86-  [] [facility]) staff shall perform screenings of the [facility] clients. Advanced Care Plan:  []Surrogate Decision Maker of Care  []POA  []Communicated Code Status and copy sent.     Other:       B

## 2020-07-23 NOTE — PROGRESS NOTES
Problem: Falls - Risk of  Goal: *Absence of Falls  Description: Document Vickii Bridges Fall Risk and appropriate interventions in the flowsheet. Outcome: Resolved/Met  Note: Fall Risk Interventions:  Mobility Interventions: Patient to call before getting OOB         Medication Interventions: Patient to call before getting OOB    Elimination Interventions: Call light in reach    History of Falls Interventions: Room close to nurse's station         Problem: Patient Education: Go to Patient Education Activity  Goal: Patient/Family Education  Outcome: Resolved/Met     Problem: Pressure Injury - Risk of  Goal: *Prevention of pressure injury  Description: Document Alex Scale and appropriate interventions in the flowsheet.   Outcome: Resolved/Met  Note: Pressure Injury Interventions:  Sensory Interventions: Keep linens dry and wrinkle-free    Moisture Interventions: Absorbent underpads    Activity Interventions: Increase time out of bed    Mobility Interventions: HOB 30 degrees or less    Nutrition Interventions: Document food/fluid/supplement intake    Friction and Shear Interventions: HOB 30 degrees or less                Problem: Patient Education: Go to Patient Education Activity  Goal: Patient/Family Education  Outcome: Resolved/Met     Problem: Discharge Planning  Goal: *Discharge to safe environment  Outcome: Resolved/Met     Problem: Patient Education: Go to Patient Education Activity  Goal: Patient/Family Education  Outcome: Resolved/Met     Problem: Pain  Goal: *Control of Pain  Outcome: Resolved/Met     Problem: Patient Education: Go to Patient Education Activity  Goal: Patient/Family Education  Outcome: Resolved/Met     Problem: Heart Failure: Day 3  Goal: Activity/Safety  Outcome: Resolved/Met  Goal: Diagnostic Test/Procedures  Outcome: Resolved/Met  Goal: Nutrition/Diet  Outcome: Resolved/Met  Goal: Discharge Planning  Outcome: Resolved/Met  Goal: Medications  Outcome: Resolved/Met  Goal: Respiratory  Outcome: Resolved/Met  Goal: Treatments/Interventions/Procedures  Outcome: Resolved/Met  Goal: Psychosocial  Outcome: Resolved/Met  Goal: *Oxygen saturation within defined limits  Outcome: Resolved/Met  Goal: *Hemodynamically stable  Outcome: Resolved/Met  Goal: *Optimal pain control at patient's stated goal  Outcome: Resolved/Met  Goal: *Anxiety reduced or absent  Outcome: Resolved/Met  Goal: *Demonstrates progressive activity  Outcome: Resolved/Met     Problem: Pneumonia: Day 3  Goal: Activity/Safety  Outcome: Resolved/Met  Goal: Consults, if ordered  Outcome: Resolved/Met  Goal: Diagnostic Test/Procedures  Outcome: Resolved/Met  Goal: Nutrition/Diet  Outcome: Resolved/Met  Goal: Discharge Planning  Outcome: Resolved/Met  Goal: Medications  Outcome: Resolved/Met  Goal: Respiratory  Outcome: Resolved/Met  Goal: Treatments/Interventions/Procedures  Outcome: Resolved/Met  Goal: Psychosocial  Outcome: Resolved/Met  Goal: *Oxygen saturation within defined limits  Outcome: Resolved/Met  Goal: *Hemodynamically stable  Outcome: Resolved/Met  Goal: *Demonstrates progressive activity  Outcome: Resolved/Met  Goal: *Tolerating diet  Outcome: Resolved/Met  Goal: *Describes available resources and support systems  Outcome: Resolved/Met  Goal: *Optimal pain control at patient's stated goal  Outcome: Resolved/Met     Problem:  Body Temperature -  Risk of, Imbalanced  Goal: *Absence of heat stress or hyperthermia signs and symptoms  Outcome: Resolved/Met  Goal: *Absence of cold stress or hypothermia signs and symptoms  Outcome: Resolved/Met     Problem: Patient Education: Go to Patient Education Activity  Goal: Patient/Family Education  Outcome: Resolved/Met     Problem: Nutrition Deficit  Goal: *Optimize nutritional status  Outcome: Resolved/Met     Problem: Patient Education: Go to Patient Education Activity  Goal: Patient/Family Education  Outcome: Resolved/Met

## 2020-07-23 NOTE — PROGRESS NOTES
Progress Note    Late Entry:  Patient seen and managed 7/22/20. Note entered as late entry    Patient: Margarette Stallworth               Sex: male          DOA: 7/16/2020       YOB: 1934      Age:  80 y.o.        LOS:  LOS: 7 days             CHIEF COMPLAINT:  CHF, improved    Subjective:     Patient is awake and interactive  No distress    Objective:      Visit Vitals  /71 (BP 1 Location: Left arm, BP Patient Position: At rest)   Pulse 65   Temp 97.2 °F (36.2 °C)   Resp 17   Ht 5' 7\" (1.702 m)   Wt 49.9 kg (110 lb)   SpO2 95%   BMI 17.23 kg/m²       Physical Exam:  Gen:  No distress, no complaint  Lungs:  Clear bilaterally, no wheeze or rhonchi  Heart:  Regular rate and rhythm, no murmurs or gallops  Abdomen:  Soft, non-tender, normal bowel sounds        Lab/Data Reviewed: All lab results for the last 24 hours reviewed. Assessment/Plan     Principal Problem:    Acute on chronic systolic (congestive) heart failure ([address] (7/16/2020)    Active Problems: Moderate protein malnutrition ([address] (7/23/2020)      Hypothermia (12/23/2019)      Pleural effusion, bilateral (7/16/2020)      HCAP (healthcare-associated pneumonia) (7/16/2020)      Pneumonia (7/16/2020)        Plan:  BP control  Follow respiratory status  Working toward disposition.

## 2020-07-23 NOTE — ROUTINE PROCESS
Bedside and Verbal shift change report given to Lus Dancer, RN, BSN (oncoming nurse) by Agustin Ireland RN, BSN (offgoing nurse). Report included the following information SBAR, Kardex, ED Summary, Procedure Summary, Intake/Output, MAR, Recent Results and Cardiac Rhythm Venticular Paced.     Agustin Ireland RN, BSN

## 2020-07-23 NOTE — DISCHARGE INSTRUCTIONS
DISCHARGE SUMMARY from Nurse    PATIENT INSTRUCTIONS:    After general anesthesia or intravenous sedation, for 24 hours or while taking prescription Narcotics:  · Limit your activities  · Do not drive and operate hazardous machinery  · Do not make important personal or business decisions  · Do  not drink alcoholic beverages  · If you have not urinated within 8 hours after discharge, please contact your surgeon on call. Report the following to your surgeon:  · Excessive pain, swelling, redness or odor of or around the surgical area  · Temperature over 100.5  · Nausea and vomiting lasting longer than 4 hours or if unable to take medications  · Any signs of decreased circulation or nerve impairment to extremity: change in color, persistent  numbness, tingling, coldness or increase pain  · Any questions    What to do at Home:  Recommended activity: Activity as tolerated,     If you experience any of the following symptoms LISTED BELOW, please follow up with  YOUR PCP. *  Please give a list of your current medications to your Primary Care Provider. *  Please update this list whenever your medications are discontinued, doses are      changed, or new medications (including over-the-counter products) are added. *  Please carry medication information at all times in case of emergency situations. These are general instructions for a healthy lifestyle:    No smoking/ No tobacco products/ Avoid exposure to second hand smoke  Surgeon General's Warning:  Quitting smoking now greatly reduces serious risk to your health.     Obesity, smoking, and sedentary lifestyle greatly increases your risk for illness    A healthy diet, regular physical exercise & weight monitoring are important for maintaining a healthy lifestyle    You may be retaining fluid if you have a history of heart failure or if you experience any of the following symptoms:  Weight gain of 3 pounds or more overnight or 5 pounds in a week, increased swelling in our hands or feet or shortness of breath while lying flat in bed. Please call your doctor as soon as you notice any of these symptoms; do not wait until your next office visit. The discharge information has been reviewed with the patient. The patient verbalized understanding. Discharge medications reviewed with the patient and appropriate educational materials and side effects teaching were provided.   _________________________________________________________________________________________________________________________      Patient armband removed and shredded  __________

## 2020-07-23 NOTE — PROGRESS NOTES
Problem: Falls - Risk of  Goal: *Absence of Falls  Description: Document Serina Knox Fall Risk and appropriate interventions in the flowsheet. Outcome: Progressing Towards Goal  Note: Fall Risk Interventions:  Mobility Interventions: Patient to call before getting OOB, PT Consult for mobility concerns, PT Consult for assist device competence, Strengthening exercises (ROM-active/passive), Utilize walker, cane, or other assistive device         Medication Interventions: Patient to call before getting OOB, Teach patient to arise slowly    Elimination Interventions: Call light in reach, Patient to call for help with toileting needs    History of Falls Interventions: Room close to nurse's station         Problem: Patient Education: Go to Patient Education Activity  Goal: Patient/Family Education  Outcome: Progressing Towards Goal     Problem: Pressure Injury - Risk of  Goal: *Prevention of pressure injury  Description: Document Alex Scale and appropriate interventions in the flowsheet.   Outcome: Progressing Towards Goal  Note: Pressure Injury Interventions:  Sensory Interventions: Keep linens dry and wrinkle-free    Moisture Interventions: Absorbent underpads, Internal/External urinary devices    Activity Interventions: Increase time out of bed, Pressure redistribution bed/mattress(bed type)    Mobility Interventions: HOB 30 degrees or less, Pressure redistribution bed/mattress (bed type)    Nutrition Interventions: Document food/fluid/supplement intake, Offer support with meals,snacks and hydration    Friction and Shear Interventions: HOB 30 degrees or less, Lift sheet, Lift team/patient mobility team, Foam dressings/transparent film/skin sealants, Apply protective barrier, creams and emollients                Problem: Patient Education: Go to Patient Education Activity  Goal: Patient/Family Education  Outcome: Progressing Towards Goal     Problem: Discharge Planning  Goal: *Discharge to safe environment  Outcome: Progressing Towards Goal     Problem: Patient Education: Go to Patient Education Activity  Goal: Patient/Family Education  Outcome: Progressing Towards Goal     Problem: Pain  Goal: *Control of Pain  Outcome: Progressing Towards Goal     Problem: Patient Education: Go to Patient Education Activity  Goal: Patient/Family Education  Outcome: Progressing Towards Goal     Problem: Heart Failure: Day 3  Goal: Activity/Safety  Outcome: Progressing Towards Goal  Goal: Diagnostic Test/Procedures  Outcome: Progressing Towards Goal  Goal: Nutrition/Diet  Outcome: Progressing Towards Goal  Goal: Discharge Planning  Outcome: Progressing Towards Goal  Goal: Medications  Outcome: Progressing Towards Goal  Goal: Respiratory  Outcome: Progressing Towards Goal  Goal: Treatments/Interventions/Procedures  Outcome: Progressing Towards Goal  Goal: Psychosocial  Outcome: Progressing Towards Goal  Goal: *Oxygen saturation within defined limits  Outcome: Progressing Towards Goal  Goal: *Hemodynamically stable  Outcome: Progressing Towards Goal  Goal: *Optimal pain control at patient's stated goal  Outcome: Progressing Towards Goal  Goal: *Anxiety reduced or absent  Outcome: Progressing Towards Goal  Goal: *Demonstrates progressive activity  Outcome: Progressing Towards Goal     Problem: Pneumonia: Day 3  Goal: Activity/Safety  Outcome: Progressing Towards Goal  Goal: Consults, if ordered  Outcome: Progressing Towards Goal  Goal: Diagnostic Test/Procedures  Outcome: Progressing Towards Goal  Goal: Nutrition/Diet  Outcome: Progressing Towards Goal  Goal: Discharge Planning  Outcome: Progressing Towards Goal  Goal: Medications  Outcome: Progressing Towards Goal  Goal: Respiratory  Outcome: Progressing Towards Goal  Goal: Treatments/Interventions/Procedures  Outcome: Progressing Towards Goal  Goal: Psychosocial  Outcome: Progressing Towards Goal  Goal: *Oxygen saturation within defined limits  Outcome: Progressing Towards Goal  Goal: *Hemodynamically stable  Outcome: Progressing Towards Goal  Goal: *Demonstrates progressive activity  Outcome: Progressing Towards Goal  Goal: *Tolerating diet  Outcome: Progressing Towards Goal  Goal: *Describes available resources and support systems  Outcome: Progressing Towards Goal  Goal: *Optimal pain control at patient's stated goal  Outcome: Progressing Towards Goal     Problem:  Body Temperature -  Risk of, Imbalanced  Goal: *Absence of heat stress or hyperthermia signs and symptoms  Outcome: Progressing Towards Goal  Goal: *Absence of cold stress or hypothermia signs and symptoms  Outcome: Progressing Towards Goal     Problem: Patient Education: Go to Patient Education Activity  Goal: Patient/Family Education  Outcome: Progressing Towards Goal     Problem: Nutrition Deficit  Goal: *Optimize nutritional status  Outcome: Progressing Towards Goal     Problem: Patient Education: Go to Patient Education Activity  Goal: Patient/Family Education  Outcome: Progressing Towards Goal

## 2020-07-25 LAB
BACTERIA SPEC CULT: NORMAL
GRAM STN SPEC: NORMAL
GRAM STN SPEC: NORMAL
SERVICE CMNT-IMP: NORMAL

## 2020-09-08 ENCOUNTER — APPOINTMENT (OUTPATIENT)
Dept: GENERAL RADIOLOGY | Age: 85
End: 2020-09-08
Attending: EMERGENCY MEDICINE
Payer: MEDICARE

## 2020-09-08 ENCOUNTER — HOSPITAL ENCOUNTER (EMERGENCY)
Age: 85
Discharge: HOME | End: 2020-09-09
Attending: EMERGENCY MEDICINE
Payer: MEDICARE

## 2020-09-08 DIAGNOSIS — J90 RECURRENT RIGHT PLEURAL EFFUSION: ICD-10-CM

## 2020-09-08 DIAGNOSIS — L03.116 LEFT LEG CELLULITIS: ICD-10-CM

## 2020-09-08 DIAGNOSIS — R06.02 SOB (SHORTNESS OF BREATH): Primary | ICD-10-CM

## 2020-09-08 PROCEDURE — 96365 THER/PROPH/DIAG IV INF INIT: CPT

## 2020-09-08 PROCEDURE — 87040 BLOOD CULTURE FOR BACTERIA: CPT

## 2020-09-08 PROCEDURE — 96366 THER/PROPH/DIAG IV INF ADDON: CPT

## 2020-09-08 PROCEDURE — 71045 X-RAY EXAM CHEST 1 VIEW: CPT

## 2020-09-08 PROCEDURE — 80053 COMPREHEN METABOLIC PANEL: CPT

## 2020-09-08 PROCEDURE — 93005 ELECTROCARDIOGRAM TRACING: CPT

## 2020-09-08 PROCEDURE — 83880 ASSAY OF NATRIURETIC PEPTIDE: CPT

## 2020-09-08 PROCEDURE — 84484 ASSAY OF TROPONIN QUANT: CPT

## 2020-09-08 PROCEDURE — 99285 EMERGENCY DEPT VISIT HI MDM: CPT

## 2020-09-08 PROCEDURE — 85610 PROTHROMBIN TIME: CPT

## 2020-09-08 PROCEDURE — 85025 COMPLETE CBC W/AUTO DIFF WBC: CPT

## 2020-09-09 ENCOUNTER — APPOINTMENT (OUTPATIENT)
Dept: CT IMAGING | Age: 85
End: 2020-09-09
Attending: EMERGENCY MEDICINE
Payer: MEDICARE

## 2020-09-09 VITALS
BODY MASS INDEX: 17.27 KG/M2 | WEIGHT: 110 LBS | OXYGEN SATURATION: 96 % | HEART RATE: 61 BPM | HEIGHT: 67 IN | SYSTOLIC BLOOD PRESSURE: 127 MMHG | DIASTOLIC BLOOD PRESSURE: 68 MMHG | TEMPERATURE: 98.3 F | RESPIRATION RATE: 13 BRPM

## 2020-09-09 LAB
ALBUMIN SERPL-MCNC: 2.4 G/DL (ref 3.4–5)
ALBUMIN/GLOB SERPL: 0.6 {RATIO} (ref 0.8–1.7)
ALP SERPL-CCNC: 174 U/L (ref 45–117)
ALT SERPL-CCNC: 26 U/L (ref 16–61)
ANION GAP SERPL CALC-SCNC: 2 MMOL/L (ref 3–18)
AST SERPL-CCNC: 44 U/L (ref 10–38)
ATRIAL RATE: 74 BPM
BASOPHILS # BLD: 0 K/UL (ref 0–0.1)
BASOPHILS NFR BLD: 0 % (ref 0–2)
BILIRUB SERPL-MCNC: 0.3 MG/DL (ref 0.2–1)
BNP SERPL-MCNC: 1822 PG/ML (ref 0–1800)
BUN SERPL-MCNC: 28 MG/DL (ref 7–18)
BUN/CREAT SERPL: 23 (ref 12–20)
CALCIUM SERPL-MCNC: 8.6 MG/DL (ref 8.5–10.1)
CALCULATED P AXIS, ECG09: 28 DEGREES
CALCULATED R AXIS, ECG10: -23 DEGREES
CALCULATED T AXIS, ECG11: 136 DEGREES
CHLORIDE SERPL-SCNC: 114 MMOL/L (ref 100–111)
CO2 SERPL-SCNC: 29 MMOL/L (ref 21–32)
CREAT SERPL-MCNC: 1.23 MG/DL (ref 0.6–1.3)
DIAGNOSIS, 93000: NORMAL
DIFFERENTIAL METHOD BLD: ABNORMAL
EOSINOPHIL # BLD: 0.1 K/UL (ref 0–0.4)
EOSINOPHIL NFR BLD: 2 % (ref 0–5)
ERYTHROCYTE [DISTWIDTH] IN BLOOD BY AUTOMATED COUNT: 18.6 % (ref 11.6–14.5)
GLOBULIN SER CALC-MCNC: 4.3 G/DL (ref 2–4)
GLUCOSE SERPL-MCNC: 126 MG/DL (ref 74–99)
HCT VFR BLD AUTO: 29.5 % (ref 36–48)
HGB BLD-MCNC: 9.8 G/DL (ref 13–16)
INR PPP: 1.3 (ref 0.8–1.2)
LACTATE BLD-SCNC: 1.22 MMOL/L (ref 0.4–2)
LYMPHOCYTES # BLD: 0.9 K/UL (ref 0.9–3.6)
LYMPHOCYTES NFR BLD: 17 % (ref 21–52)
MCH RBC QN AUTO: 28.8 PG (ref 24–34)
MCHC RBC AUTO-ENTMCNC: 33.2 G/DL (ref 31–37)
MCV RBC AUTO: 86.8 FL (ref 74–97)
MONOCYTES # BLD: 0.2 K/UL (ref 0.05–1.2)
MONOCYTES NFR BLD: 3 % (ref 3–10)
NEUTS SEG # BLD: 4.3 K/UL (ref 1.8–8)
NEUTS SEG NFR BLD: 78 % (ref 40–73)
P-R INTERVAL, ECG05: 300 MS
PLATELET # BLD AUTO: 176 K/UL (ref 135–420)
PMV BLD AUTO: 11.3 FL (ref 9.2–11.8)
POTASSIUM SERPL-SCNC: 4.2 MMOL/L (ref 3.5–5.5)
PROT SERPL-MCNC: 6.7 G/DL (ref 6.4–8.2)
PROTHROMBIN TIME: 15.8 SEC (ref 11.5–15.2)
Q-T INTERVAL, ECG07: 458 MS
QRS DURATION, ECG06: 178 MS
QTC CALCULATION (BEZET), ECG08: 508 MS
RBC # BLD AUTO: 3.4 M/UL (ref 4.7–5.5)
SODIUM SERPL-SCNC: 145 MMOL/L (ref 136–145)
TROPONIN I SERPL-MCNC: 0.02 NG/ML (ref 0–0.04)
TROPONIN I SERPL-MCNC: 0.02 NG/ML (ref 0–0.04)
VENTRICULAR RATE, ECG03: 74 BPM
WBC # BLD AUTO: 5.4 K/UL (ref 4.6–13.2)

## 2020-09-09 PROCEDURE — 74011000258 HC RX REV CODE- 258: Performed by: EMERGENCY MEDICINE

## 2020-09-09 PROCEDURE — 96366 THER/PROPH/DIAG IV INF ADDON: CPT

## 2020-09-09 PROCEDURE — 96365 THER/PROPH/DIAG IV INF INIT: CPT

## 2020-09-09 PROCEDURE — 74011000250 HC RX REV CODE- 250

## 2020-09-09 PROCEDURE — 83605 ASSAY OF LACTIC ACID: CPT

## 2020-09-09 PROCEDURE — 71275 CT ANGIOGRAPHY CHEST: CPT

## 2020-09-09 PROCEDURE — 74011000636 HC RX REV CODE- 636: Performed by: EMERGENCY MEDICINE

## 2020-09-09 PROCEDURE — 74011250636 HC RX REV CODE- 250/636: Performed by: EMERGENCY MEDICINE

## 2020-09-09 RX ORDER — SODIUM CHLORIDE 9 MG/ML
100 INJECTION, SOLUTION INTRAVENOUS ONCE
Status: COMPLETED | OUTPATIENT
Start: 2020-09-09 | End: 2020-09-09

## 2020-09-09 RX ORDER — BACITRACIN ZINC 500 [USP'U]/G
CREAM TOPICAL
Status: COMPLETED
Start: 2020-09-09 | End: 2020-09-09

## 2020-09-09 RX ORDER — BACITRACIN 500 UNIT/G
1 PACKET (EA) TOPICAL
Status: DISCONTINUED | OUTPATIENT
Start: 2020-09-09 | End: 2020-09-09 | Stop reason: HOSPADM

## 2020-09-09 RX ORDER — FUROSEMIDE 20 MG/1
20 TABLET ORAL DAILY
Qty: 5 TAB | Refills: 0 | Status: SHIPPED | OUTPATIENT
Start: 2020-09-09

## 2020-09-09 RX ORDER — DOXYCYCLINE HYCLATE 100 MG
100 TABLET ORAL 2 TIMES DAILY
Qty: 20 TAB | Refills: 0 | Status: SHIPPED | OUTPATIENT
Start: 2020-09-09 | End: 2020-09-19

## 2020-09-09 RX ORDER — AMOXICILLIN 500 MG/1
500 TABLET, FILM COATED ORAL 3 TIMES DAILY
Qty: 21 TAB | Refills: 0 | Status: SHIPPED | OUTPATIENT
Start: 2020-09-09 | End: 2020-09-16

## 2020-09-09 RX ORDER — FUROSEMIDE 10 MG/ML
20 INJECTION INTRAMUSCULAR; INTRAVENOUS
Status: DISCONTINUED | OUTPATIENT
Start: 2020-09-09 | End: 2020-09-09 | Stop reason: HOSPADM

## 2020-09-09 RX ADMIN — PIPERACILLIN SODIUM AND TAZOBACTAM SODIUM 4.5 G: 4; .5 INJECTION, POWDER, LYOPHILIZED, FOR SOLUTION INTRAVENOUS at 00:13

## 2020-09-09 RX ADMIN — SODIUM CHLORIDE 1000 MG: 900 INJECTION, SOLUTION INTRAVENOUS at 00:18

## 2020-09-09 RX ADMIN — SODIUM CHLORIDE 100 ML: 900 INJECTION, SOLUTION INTRAVENOUS at 01:08

## 2020-09-09 RX ADMIN — IOPAMIDOL 75 ML: 755 INJECTION, SOLUTION INTRAVENOUS at 01:08

## 2020-09-09 RX ADMIN — Medication 1 PACKET: at 02:04

## 2020-09-09 NOTE — ED PROVIDER NOTES
Anibal Blank is a 80 y.o. male who lives at home with complaints of onset of shortness of breath that started earlier this afternoon with no productive cough or chest pain. Symptoms are worse with any exertion or laying flat. Patient is also noted increased swelling in his legs more so on the left side with increased redness over the last 2 to 3 days. Patient has any fever, chills, sweats, nausea, vomiting, diarrhea, urinary symptoms. Patient did hurt his arm on the left side with a small skin tear when he hit it against something recently. Patient was noted to be 89% on room air per EMS was placed on oxygen prior to arrival    The history is provided by the patient, medical records and the EMS personnel.         Past Medical History:   Diagnosis Date    Difficulty urinating     Elevated PSA     Hematuria, unspecified     Hypercholesterolemia     Hypertension     Incomplete bladder emptying     Urinary retention     UTI (urinary tract infection)        Past Surgical History:   Procedure Laterality Date    APPENDECTOMY      EGD  2/7/2014         HX TURP  6/13/16    HX TURP      RI INS NEW/RPLC PRM PACEMAKER W/TRANSV ELTRD VENTR N/A 5/5/2020    Insert Ppm Single Ventricular performed by Hannah Guzman MD at [address] [place] [address] LAB         Family History:   Problem Relation Age of Onset    Cancer Father     Alzheimer Mother     Heart defect Brother        Social History     Socioeconomic History    Marital status:      Spouse name: Not on file    Number of children: Not on file    Years of education: Not on file    Highest education level: Not on file   Occupational History    Occupation:  thirty years   Social Needs    Financial resource strain: Not on file    Food insecurity     Worry: Not on file     Inability: Not on file   [employer] needs     Medical: Not on file     Non-medical: Not on file   Tobacco Use    Smoking status: Never Smoker    Smokeless tobacco: Never Used   Substance and Sexual Activity    Alcohol use: Yes     Alcohol/week: 2.0 standard drinks     Types: 2 Cans of beer per week     Comment: Occasional    Drug use: No    Sexual activity: Not on file   Lifestyle    Physical activity     Days per week: Not on file     Minutes per session: Not on file    Stress: Not on file   Relationships    Social connections     Talks on phone: Not on file     Gets together: Not on file     Attends [employer] service: Not on file     Active member of club or organization: Not on file     Attends meetings of clubs or organizations: Not on file     Relationship status: Not on file    Intimate partner violence     Fear of current or ex partner: Not on file     Emotionally abused: Not on file     Physically abused: Not on file     Forced sexual activity: Not on file   Other Topics Concern    Not on file   Social History Narrative    Not on file         ALLERGIES: Amlodipine; Bactrim [sulfamethoprim ds]; Lisinopril; Rocephin [ceftriaxone]; and Ciprofloxacin    Review of Systems   Constitutional: Negative for fever. HENT: Negative for sore throat and trouble swallowing. Eyes: Negative for visual disturbance. Respiratory: Positive for shortness of breath. Negative for cough, wheezing and stridor. Cardiovascular: Positive for leg swelling. Negative for chest pain. Gastrointestinal: Negative for abdominal pain. Genitourinary: Negative for difficulty urinating. Musculoskeletal: Positive for gait problem. Skin: Positive for wound. Allergic/Immunologic: Negative for immunocompromised state. Neurological: Negative for syncope. Hematological: Bruises/bleeds easily. Psychiatric/Behavioral: Positive for sleep disturbance.        Vitals:    09/09/20 0025 09/09/20 0030 09/09/20 0126   BP: 119/74 123/69    Pulse: 72 68 72   Resp: 18 11 12   Temp: 98.3 °F (36.8 °C)     SpO2: 97% 96% 97%   Weight: 49.9 kg (110 lb)     Height: 5' 7\" (1.702 m) Physical Exam  Vitals signs and nursing note reviewed. Constitutional:       General: He is in acute distress. Appearance: He is ill-appearing. He is not toxic-appearing or diaphoretic. HENT:      Head: Normocephalic and atraumatic. Right Ear: External ear normal.      Left Ear: External ear normal.      Nose: Nose normal.      Mouth/Throat:      Pharynx: No oropharyngeal exudate. Eyes:      Conjunctiva/sclera: Conjunctivae normal.   Neck:      Musculoskeletal: Normal range of motion. Cardiovascular:      Rate and Rhythm: Normal rate and regular rhythm. Heart sounds: Murmur present. Pulmonary:      Effort: Pulmonary effort is normal. No respiratory distress. Breath sounds: Examination of the right-middle field reveals rhonchi. Examination of the right-lower field reveals rales. Rhonchi and rales present. Abdominal:      Palpations: Abdomen is soft. Tenderness: There is no abdominal tenderness. Musculoskeletal: Normal range of motion. Right lower leg: Edema present. Left lower leg: Edema present. Comments: Both legs with 3+ pitting edema the left leg has significant erythema and warmth extending up from the foot to the knee. There is no other skin changes or bullae. Not significantly tender. Pulses are intact. Skin:     General: Skin is warm and dry. Capillary Refill: Capillary refill takes less than 2 seconds. Neurological:      General: No focal deficit present. Mental Status: He is alert and oriented to person, place, and time.    Psychiatric:         Behavior: Behavior normal.          MDM       Procedures    Vitals:  Patient Vitals for the past 12 hrs:   Temp Pulse Resp BP SpO2   09/09/20 0126  72 12  97 %   09/09/20 0030  68 11 123/69 96 %   09/09/20 0025 98.3 °F (36.8 °C) 72 18 119/74 97 %         Medications ordered:   Medications   piperacillin-tazobactam (ZOSYN) 4.5 g in 0.9% sodium chloride (MBP/ADV) 100 mL MBP (4.5 g IntraVENous New Bag 9/9/20 0013)   bacitracin 500 unit/gram packet 1 Packet ( Topical Canceled Entry 9/9/20 0006)   VANCOMYCIN INFORMATION NOTE (has no administration in time range)   furosemide (LASIX) injection 20 mg (has no administration in time range)   vancomycin (VANCOCIN) 1,000 mg in 0.9% sodium chloride (MBP/ADV) 250 mL adv (1,000 mg IntraVENous New Bag 9/9/20 0018)   iopamidoL (ISOVUE-370) 76 % injection 75 mL (75 mL IntraVENous Given 9/9/20 0108)   0.9% sodium chloride infusion 100 mL (0 mL IntraVENous IV Completed 9/9/20 0109)   bacitracin zinc 500 unit/gram packet (1 Packet  Given 9/9/20 0204)         Lab findings:  Recent Results (from the past 12 hour(s))   EKG, 12 LEAD, INITIAL    Collection Time: 09/08/20 11:35 PM   Result Value Ref Range    Ventricular Rate 74 BPM    Atrial Rate 74 BPM    P-R Interval 300 ms    QRS Duration 178 ms    Q-T Interval 458 ms    QTC Calculation (Bezet) 508 ms    Calculated P Axis 28 degrees    Calculated R Axis -23 degrees    Calculated T Axis 136 degrees    Diagnosis       Sinus rhythm with 1st degree AV block with occasional ventricular-paced   complexes and with occasional premature ventricular complexes  Possible Left atrial enlargement  Left bundle branch block  Abnormal ECG  When compared with ECG of 16-JUL-2020 06:28,  Vent. rate has decreased BY   3 BPM     CBC WITH AUTOMATED DIFF    Collection Time: 09/08/20 11:54 PM   Result Value Ref Range    WBC 5.4 4.6 - 13.2 K/uL    RBC 3.40 (L) 4.70 - 5.50 M/uL    HGB 9.8 (L) 13.0 - 16.0 g/dL    HCT 29.5 (L) 36.0 - 48.0 %    MCV 86.8 74.0 - 97.0 FL    MCH 28.8 24.0 - 34.0 PG    MCHC 33.2 31.0 - 37.0 g/dL    RDW 18.6 (H) 11.6 - 14.5 %    PLATELET 109 033 - 459 K/uL    MPV 11.3 9.2 - 11.8 FL    NEUTROPHILS 78 (H) 40 - 73 %    LYMPHOCYTES 17 (L) 21 - 52 %    MONOCYTES 3 3 - 10 %    EOSINOPHILS 2 0 - 5 %    BASOPHILS 0 0 - 2 %    ABS. NEUTROPHILS 4.3 1.8 - 8.0 K/UL    ABS. LYMPHOCYTES 0.9 0.9 - 3.6 K/UL    ABS.  MONOCYTES 0.2 0.05 - 1.2 K/UL    ABS. EOSINOPHILS 0.1 0.0 - 0.4 K/UL    ABS. BASOPHILS 0.0 0.0 - 0.1 K/UL    DF AUTOMATED     PROTHROMBIN TIME + INR    Collection Time: 09/08/20 11:54 PM   Result Value Ref Range    Prothrombin time 15.8 (H) 11.5 - 15.2 sec    INR 1.3 (H) 0.8 - 1.2     METABOLIC PANEL, COMPREHENSIVE    Collection Time: 09/08/20 11:54 PM   Result Value Ref Range    Sodium 145 136 - 145 mmol/L    Potassium 4.2 3.5 - 5.5 mmol/L    Chloride 114 (H) 100 - 111 mmol/L    CO2 29 21 - 32 mmol/L    Anion gap 2 (L) 3.0 - 18 mmol/L    Glucose 126 (H) 74 - 99 mg/dL    BUN 28 (H) 7.0 - 18 MG/DL    Creatinine 1.23 0.6 - 1.3 MG/DL    BUN/Creatinine ratio 23 (H) 12 - 20      GFR est AA >60 >60 ml/min/1.73m2    GFR est non-AA 56 (L) >60 ml/min/1.73m2    Calcium 8.6 8.5 - 10.1 MG/DL    Bilirubin, total 0.3 0.2 - 1.0 MG/DL    ALT (SGPT) 26 16 - 61 U/L    AST (SGOT) 44 (H) 10 - 38 U/L    Alk. phosphatase 174 (H) 45 - 117 U/L    Protein, total 6.7 6.4 - 8.2 g/dL    Albumin 2.4 (L) 3.4 - 5.0 g/dL    Globulin 4.3 (H) 2.0 - 4.0 g/dL    A-G Ratio 0.6 (L) 0.8 - 1.7     NT-PRO BNP    Collection Time: 09/08/20 11:54 PM   Result Value Ref Range    NT pro-BNP 1,822 (H) 0 - 1,800 PG/ML   TROPONIN I    Collection Time: 09/08/20 11:54 PM   Result Value Ref Range    Troponin-I, QT 0.02 0.0 - 0.045 NG/ML   POC LACTIC ACID    Collection Time: 09/09/20 12:05 AM   Result Value Ref Range    Lactic Acid (POC) 1.22 0.40 - 2.00 mmol/L   TROPONIN I    Collection Time: 09/09/20  2:53 AM   Result Value Ref Range    Troponin-I, QT 0.02 0.0 - 0.045 NG/ML       EKG interpretation by ED Physician:  Sinus rhythm with normal rate first-degree AV block. There is occasional PVC. Left bundle branch block.   No acute ST or T wave changes indicative of acute ischemia  Rate 74    No significant changes from previous    Cardia monitor: Normal rate with regular rhythm and occasional ectopy  Pulse ox interpretation: 97% on room air, normal    X-Ray, CT or other radiology findings or impressions:  XR CHEST PORT    (Results Pending)   CTA CHEST W OR W WO CONT    (Results Pending)   Chest x-ray: Right-sided consolidation possible effusion with cardiomegaly  CTA chest:Large right and moderate left pleural effusions with adjacent atelectasis. Fluid tracking along the fissures and diffuse groundglass attenuation. Findings most consistent with pulmonary edema although difficult to exclude superimposed infiltrate. Similar cardiomegaly and enlarged main pulmonary artery. Other chronic or incidental findings will be described in final report. Progress notes, Consult notes or additional Procedure notes:   Patient with no hypoxia here. No significant respiratory distress. No signs of PE. Labs are otherwise unremarkable. No lactic acidosis. Negative serial troponin. No real indication for admission at this point. Will place on antibiotics for cellulitis and possible lung infection. I have discussed with patient and/or family/sig other the results, interpretation of any imaging if performed, suspected diagnosis and treatment plan to include instructions regarding the diagnoses listed to which understanding was expressed with all questions answered      Reevaluation of patient:   stable    Disposition:  Diagnosis:   1. SOB (shortness of breath)    2. Recurrent right pleural effusion    3. Left leg cellulitis        Disposition: home    Follow-up Information     Follow up With Specialties Details Why Contact Info    Salvador Mascorro MD Nephrology Schedule an appointment as soon as possible for a visit this week for recheck [facility] EMERGENCY DEPT Emergency Medicine  If symptoms worsen [id number]  [phone]            Patient's Medications   Start Taking    AMOXICILLIN 500 MG TAB    Take 500 mg by mouth three (3) times daily for 7 days.     DOXYCYCLINE (VIBRA-TABS) 100 MG TABLET    Take 1 Tab by mouth two (2) times a day for 10 days. FUROSEMIDE (LASIX) 20 MG TABLET    Take 1 Tab by mouth daily. Continue Taking    ACETAMINOPHEN (TYLENOL EXTRA STRENGTH) 500 MG TABLET    Take 2 Tabs by mouth every six (6) hours as needed for Pain. ALLOPURINOL (ZYLOPRIM) 300 MG TABLET    Take 300 mg by mouth daily. ASPIRIN DELAYED-RELEASE 81 MG TABLET    Take 1 Tab by mouth daily. CHOLECALCIFEROL (VITAMIN D3) (1000 UNITS /25 MCG) TABLET    Take 2 Tabs by mouth daily. LEVOTHYROXINE (SYNTHROID) 50 MCG TABLET    Take 1 Tab by mouth every morning. LIDOCAINE 4 % PATCH    Apply to low back prn for back pains    SIMVASTATIN (ZOCOR) 40 MG TABLET    Take 1 Tab by mouth nightly.     TAMSULOSIN (FLOMAX) 0.4 MG CAPSULE    take 1 capsule by mouth once daily AFTER DINNER   These Medications have changed    No medications on file   Stop Taking    No medications on file

## 2020-09-09 NOTE — ED TRIAGE NOTES
Pt arrives via ems from home with c/c of SOB. Patient previous hospitalized for the same. Pt felt symptoms were getting worse. No respiratory distress on arrival 97 SPO2 on room air.

## 2020-09-09 NOTE — ED NOTES
Spoke to son around [phone]  ([phone]) about transportation services coming to  his father. I advised him that I would call him when transport is on the way to their home. Will notify day shift nurse about current situation.

## 2020-09-09 NOTE — PROGRESS NOTES
Pharmacy Dosing Services: Vancomycin Indication: Pneumonia (HAP) Day of therapy: 0 / x Other Antimicrobials (Include dose, start day & day of therapy):  Piperacillin/Tazobactam (Zosyn) Current Antimicrobial Therapy (168h ago, onward) Ordered     Start Stop  
 20 0001  piperacillin-tazobactam (ZOSYN) 4.5 g in 0.9% sodium chloride (MBP/ADV) 100 mL MBP  4.5 g,   IntraVENous,   EVERY 6 HOURS    
 20 0100 --  
 20 0023  VANCOMYCIN INFORMATION NOTE  Other,   RX DOSING/MONITORING    
 20 0019 --  
 20 0344  amoxicillin 500 mg tab  500 mg,   Oral,   3 TIMES DAILY    
 20 0000 20  doxycycline (VIBRA-TABS) 100 mg tablet  100 mg,   Oral,   2 TIMES DAILY    
 20 0000 20 Loading dose (date given): 1000 mg 
Current Maintenance dose:   
 
Goal Vancomycin Level: Vancomycin Trough: 15 - 20 mcg/mL (most infections) Vancomycin Level (if drawn): No results for input(s): Stuart Snyder in the last 72 hours. Pt Weight Weight: 49.9 kg (110 lb) Temperature Temp: 98.3 °F (36.8 °C) Temp (72hrs), Av.3 °F (36.8 °C), Min:98.3 °F (36.8 °C), Max:98.3 °F (36.8 °C) HR Pulse (Heart Rate): 61  
BP BP: 127/68 Recent Labs  
  20 CREA 1.23  
BUN 28* WBC 5.4 Estimated Creatinine Clearance Estimated Creatinine Clearance: 30.4 mL/min (based on SCr of 1.23 mg/dL). Estimated Creatinine Clearance (using IBW): 40.3 mL/min CAPD, Hemodialysis or Renal Replacement Therapy: N/A Renal function stable? (unstable defined as SCr increase of 0.5 mg/dL or > 50% increase from baseline, whichever is greater) (Y/N): n Significant Cultures:  
Results Procedure Component Value Units Date/Time CULTURE, BLOOD [[id number]] Collected:  20 Order Status:  Completed Specimen:  Blood Updated:  20 4386 CULTURE, BLOOD [[id number]] Collected:  20 5743 Order Status:  Completed Specimen:  Blood Updated:  09/09/20 0419 Regimen assessment: new start Maintenance dose: 750 mg IV every 24 hours Next scheduled level: 9/11 at 1730 Pharmacy will follow daily and adjust medications as appropriate for renal function and/or serum levels. Thank you, 
Yaneth Friday, [address] Pharmacist 
[phone]

## 2020-09-09 NOTE — DISCHARGE INSTRUCTIONS
Patient Education        Cellulitis: Care Instructions  Your Care Instructions     Cellulitis is a skin infection caused by bacteria, most often strep or staph. It often occurs after a break in the skin from a scrape, cut, bite, or puncture, or after a rash. Cellulitis may be treated without doing tests to find out what caused it. But your doctor may do tests, if needed, to look for a specific bacteria, like methicillin-resistant Staphylococcus aureus (MRSA). The doctor has checked you carefully, but problems can develop later. If you notice any problems or new symptoms, get medical treatment right away. Follow-up care is a key part of your treatment and safety. Be sure to make and go to all appointments, and call your doctor if you are having problems. It's also a good idea to know your test results and keep a list of the medicines you take. How can you care for yourself at home? · Take your antibiotics as directed. Do not stop taking them just because you feel better. You need to take the full course of antibiotics. · Prop up the infected area on pillows to reduce pain and swelling. Try to keep the area above the level of your heart as often as you can. · If your doctor told you how to care for your wound, follow your doctor's instructions. If you did not get instructions, follow this general advice:  ? Wash the wound with clean water 2 times a day. Don't use hydrogen peroxide or alcohol, which can slow healing. ? You may cover the wound with a thin layer of petroleum jelly, such as Vaseline, and a nonstick bandage. ? Apply more petroleum jelly and replace the bandage as needed. · Be safe with medicines. Take pain medicines exactly as directed. ? If the doctor gave you a prescription medicine for pain, take it as prescribed. ? If you are not taking a prescription pain medicine, ask your doctor if you can take an over-the-counter medicine.   To prevent cellulitis in the future  · Try to prevent cuts, scrapes, or other injuries to your skin. Cellulitis most often occurs where there is a break in the skin. · If you get a scrape, cut, mild burn, or bite, wash the wound with clean water as soon as you can to help avoid infection. Don't use hydrogen peroxide or alcohol, which can slow healing. · If you have swelling in your legs (edema), support stockings and good skin care may help prevent leg sores and cellulitis. · Take care of your feet, especially if you have diabetes or other conditions that increase the risk of infection. Wear shoes and socks. Do not go barefoot. If you have athlete's foot or other skin problems on your feet, talk to your doctor about how to treat them. When should you call for help? Call your doctor now or seek immediate medical care if:    · You have signs that your infection is getting worse, such as:  ? Increased pain, swelling, warmth, or redness. ? Red streaks leading from the area. ? Pus draining from the area. ? A fever.     · You get a rash. Watch closely for changes in your health, and be sure to contact your doctor if:    · You do not get better as expected. Where can you learn more? Go to http://demetrius-bernard.info/  Enter X309 in the search box to learn more about \"Cellulitis: Care Instructions. \"  Current as of: July 2, 2020               Content Version: 12.6  © 7887-6141 [employer]. Care instructions adapted under license by [employer] (which disclaims liability or warranty for this information). If you have questions about a medical condition or this instruction, always ask your healthcare professional. [place] [address] any warranty or liability for your use of this information. Patient Education        Learning About a Pleural Effusion  What is it? A pleural effusion (say \"PLER-cynthia jw-SETU-spxn\") is the buildup of fluid in the space between tissues lining the lungs and the chest wall. Because of the fluid buildup, the lungs may not be able to expand completely. This can make it hard to breathe. A pleural empyema (say \"si-ho-TW-muh\") is a problem that can happen with pleural effusion. Bacteria or other infections cause pus to form in the pleural fluid. But most pleural effusions don't become infected. What causes it? A pleural effusion has many causes. They include pneumonia, cancer, inflammation of the tissues around the lungs, and heart failure. What are the symptoms? Symptoms of a pleural effusion may include:  · Trouble breathing. · Shortness of breath. · Chest pain. · Fever. · A cough. A minor pleural effusion may not cause any symptoms. How is it diagnosed? A pleural effusion is usually diagnosed with an X-ray and a physical exam. The doctor listens to the airflow in your lungs. How is it treated? A pleural effusion can be treated by removing fluid from the space between the tissues around the lungs. This is done with a needle that's put into the chest (thoracentesis). A small amount of the fluid may be sent to a lab to find out what is causing the buildup of fluid. Removing the fluid may help to relieve symptoms, such as shortness of breath and chest pain. It can help the lungs to expand more fully. If the pleural effusion doesn't get better, a catheter may be placed in the chest. This is a flexible tube that allows fluid to drain from the lungs. The catheter stays in the chest until the doctor removes it. Some people may get a treatment that removes the fluid and then puts a medicine into the chest cavity. This helps to prevent too much fluid from building up again. A minor pleural effusion often goes away on its own. Doctors may need to treat the condition that is causing the pleural effusion. For example, you may get medicines to treat pneumonia or congestive heart failure. When the condition is treated, the effusion usually goes away.   For a pleural empyema, the pus needs to be drained. It may drain from a flexible tube placed in the chest. Or you may have surgery to drain it. You also will get antibiotics. Follow-up care is a key part of your treatment and safety. Be sure to make and go to all appointments, and call your doctor if you are having problems. It's also a good idea to know your test results and keep a list of the medicines you take. Where can you learn more? Go to http://www.gray.com/  Enter A920 in the search box to learn more about \"Learning About a Pleural Effusion. \"  Current as of: February 24, 2020               Content Version: 12.6  © 6211-1547 [employer]. Care instructions adapted under license by [employer] (which disclaims liability or warranty for this information). If you have questions about a medical condition or this instruction, always ask your healthcare professional. [place] [address] any warranty or liability for your use of this information. Patient Education        Shortness of Breath: Care Instructions  Your Care Instructions     Shortness of breath has many causes. Sometimes conditions such as anxiety can lead to shortness of breath. Some people get mild shortness of breath when they exercise. Trouble breathing also can be a symptom of a serious problem, such as asthma, lung disease, emphysema, heart problems, and pneumonia. If your shortness of breath continues, you may need tests and treatment. Watch for any changes in your breathing and other symptoms. Follow-up care is a key part of your treatment and safety. Be sure to make and go to all appointments, and call your doctor if you are having problems. It's also a good idea to know your test results and keep a list of the medicines you take. How can you care for yourself at home? · Do not smoke or allow others to smoke around you.  If you need help quitting, talk to your doctor about stop-smoking programs and medicines. These can increase your chances of quitting for good. · Get plenty of rest and sleep. · Take your medicines exactly as prescribed. Call your doctor if you think you are having a problem with your medicine. · Find healthy ways to deal with stress. ? Exercise daily. ? Get plenty of sleep. ? Eat regularly and well. When should you call for help? Call 911 anytime you think you may need emergency care. For example, call if:    · You have severe shortness of breath.     · You have symptoms of a heart attack. These may include:  ? Chest pain or pressure, or a strange feeling in the chest.  ? Sweating. ? Shortness of breath. ? Nausea or vomiting. ? Pain, pressure, or a strange feeling in the back, neck, jaw, or upper belly or in one or both shoulders or arms. ? Lightheadedness or sudden weakness. ? A fast or irregular heartbeat. After you call 911, the  may tell you to chew 1 adult-strength or 2 to 4 low-dose aspirin. Wait for an ambulance. Do not try to drive yourself. Call your doctor now or seek immediate medical care if:    · Your shortness of breath gets worse or you start to wheeze. Wheezing is a high-pitched sound when you breathe.     · You wake up at night out of breath or have to prop your head up on several pillows to breathe.     · You are short of breath after only light activity or while at rest.   Watch closely for changes in your health, and be sure to contact your doctor if:    · You do not get better over the next 1 to 2 days. Where can you learn more? Go to http://www.gray.com/  Enter S780 in the search box to learn more about \"Shortness of Breath: Care Instructions. \"  Current as of: February 24, 2020               Content Version: 12.6  © 6715-1911 [employer]. Care instructions adapted under license by [employer] (which disclaims liability or warranty for this information).  If you have questions about a medical condition or this instruction, always ask your healthcare professional. [place] [address] any warranty or liability for your use of this information.

## 2020-09-10 ENCOUNTER — PATIENT OUTREACH (OUTPATIENT)
Dept: CASE MANAGEMENT | Age: 85
End: 2020-09-10

## 2020-09-10 NOTE — PROGRESS NOTES
Date/Time:  9/10/2020 11:19 AM  Attempted to reach patient by telephone. Unable to leave HIPPA compliant message requesting a return call. Will attempt to reach patient again.

## 2020-09-12 ENCOUNTER — HOSPITAL ENCOUNTER (EMERGENCY)
Age: 85
Discharge: HOME | End: 2020-09-12
Attending: EMERGENCY MEDICINE
Payer: MEDICARE

## 2020-09-12 VITALS
RESPIRATION RATE: 14 BRPM | TEMPERATURE: 97.6 F | SYSTOLIC BLOOD PRESSURE: 121 MMHG | WEIGHT: 110.01 LBS | OXYGEN SATURATION: 99 % | DIASTOLIC BLOOD PRESSURE: 66 MMHG | HEART RATE: 81 BPM | BODY MASS INDEX: 15.75 KG/M2 | HEIGHT: 70 IN

## 2020-09-12 DIAGNOSIS — R07.9 CHEST PAIN, UNSPECIFIED TYPE: Primary | ICD-10-CM

## 2020-09-12 LAB
ANION GAP SERPL CALC-SCNC: 7 MMOL/L (ref 3–18)
BASOPHILS # BLD: 0 K/UL (ref 0–0.1)
BASOPHILS NFR BLD: 0 % (ref 0–2)
BUN SERPL-MCNC: 32 MG/DL (ref 7–18)
BUN/CREAT SERPL: 20 (ref 12–20)
CALCIUM SERPL-MCNC: 8.8 MG/DL (ref 8.5–10.1)
CHLORIDE SERPL-SCNC: 113 MMOL/L (ref 100–111)
CK MB CFR SERPL CALC: 15.8 % (ref 0–4)
CK MB SERPL-MCNC: 4.9 NG/ML (ref 5–25)
CK SERPL-CCNC: 31 U/L (ref 39–308)
CO2 SERPL-SCNC: 26 MMOL/L (ref 21–32)
CREAT SERPL-MCNC: 1.62 MG/DL (ref 0.6–1.3)
DIFFERENTIAL METHOD BLD: ABNORMAL
EOSINOPHIL # BLD: 0 K/UL (ref 0–0.4)
EOSINOPHIL NFR BLD: 0 % (ref 0–5)
ERYTHROCYTE [DISTWIDTH] IN BLOOD BY AUTOMATED COUNT: 18.5 % (ref 11.6–14.5)
GLUCOSE SERPL-MCNC: 117 MG/DL (ref 74–99)
HCT VFR BLD AUTO: 30.1 % (ref 36–48)
HGB BLD-MCNC: 10.2 G/DL (ref 13–16)
LYMPHOCYTES # BLD: 0.4 K/UL (ref 0.9–3.6)
LYMPHOCYTES NFR BLD: 5 % (ref 21–52)
MCH RBC QN AUTO: 28.9 PG (ref 24–34)
MCHC RBC AUTO-ENTMCNC: 33.9 G/DL (ref 31–37)
MCV RBC AUTO: 85.3 FL (ref 74–97)
MONOCYTES # BLD: 0 K/UL (ref 0.05–1.2)
MONOCYTES NFR BLD: 0 % (ref 3–10)
NEUTS SEG # BLD: 7 K/UL (ref 1.8–8)
NEUTS SEG NFR BLD: 95 % (ref 40–73)
PLATELET # BLD AUTO: 194 K/UL (ref 135–420)
PMV BLD AUTO: 11.5 FL (ref 9.2–11.8)
POTASSIUM SERPL-SCNC: 4.5 MMOL/L (ref 3.5–5.5)
RBC # BLD AUTO: 3.53 M/UL (ref 4.7–5.5)
SODIUM SERPL-SCNC: 146 MMOL/L (ref 136–145)
TROPONIN I BLD-MCNC: <0.04 NG/ML (ref 0–0.08)
TROPONIN I SERPL-MCNC: 0.02 NG/ML (ref 0–0.04)
WBC # BLD AUTO: 7.4 K/UL (ref 4.6–13.2)

## 2020-09-12 PROCEDURE — 74011250636 HC RX REV CODE- 250/636: Performed by: EMERGENCY MEDICINE

## 2020-09-12 PROCEDURE — 93005 ELECTROCARDIOGRAM TRACING: CPT

## 2020-09-12 PROCEDURE — 82550 ASSAY OF CK (CPK): CPT

## 2020-09-12 PROCEDURE — 80048 BASIC METABOLIC PNL TOTAL CA: CPT

## 2020-09-12 PROCEDURE — 99285 EMERGENCY DEPT VISIT HI MDM: CPT

## 2020-09-12 PROCEDURE — 85025 COMPLETE CBC W/AUTO DIFF WBC: CPT

## 2020-09-12 PROCEDURE — 84484 ASSAY OF TROPONIN QUANT: CPT

## 2020-09-12 RX ADMIN — SODIUM CHLORIDE 500 ML: 900 INJECTION, SOLUTION INTRAVENOUS at 08:22

## 2020-09-12 NOTE — ED TRIAGE NOTES
Patient arrives via EMS c/o chest tightness and generalized rash. Per EMS patient started abx and lasix 9/9/2020, then developed rash. Patient went to urgent care 9/10/2020 and meds switched to Clindamycin and prednisone.

## 2020-09-12 NOTE — ED NOTES
I have reviewed discharge instructions with the patient. The patient verbalized understanding. Per pt, the POC's contact number on file is not valid. Other POC Emili kenney is constastantly busy.

## 2020-09-12 NOTE — DISCHARGE INSTRUCTIONS

## 2020-09-12 NOTE — ED PROVIDER NOTES
EMERGENCY DEPARTMENT HISTORY AND PHYSICAL EXAM    7:00 AM      Date: 9/12/2020  Patient Name: Adalberto Pascal    History of Presenting Illness     Chief Complaint   Patient presents with    Chest Pain    Rash         History Provided By: patient    Additional History (Context): Adalberto Pascal is a 80 y.o. male presents with chest pressure and upset stomach that started this morning woke him up from sleep. No noted exacerbating or alleviating factors, no fever or diarrhea or vomiting. Notably he is on antibiotics for left leg cellulitis, doxycycline and amoxicillin were stopped yesterday and switched to clindamycin with some prednisone, due to a petechial nonblanching rash over his abdomen. Discomfort is present at the time of my initial examination. Kiko Oliva PCP: Anita Plummer MD    Chief Complaint:   Duration:    Timing:    Location:   Quality:   Severity:   Modifying Factors:   Associated Symptoms:       Current Outpatient Medications   Medication Sig Dispense Refill    amoxicillin 500 mg tab Take 500 mg by mouth three (3) times daily for 7 days. 21 Tab 0    doxycycline (VIBRA-TABS) 100 mg tablet Take 1 Tab by mouth two (2) times a day for 10 days. 20 Tab 0    furosemide (LASIX) 20 mg tablet Take 1 Tab by mouth daily. 5 Tab 0    lidocaine 4 % patch Apply to low back prn for back pains 1 Package 0    levothyroxine (SYNTHROID) 50 mcg tablet Take 1 Tab by mouth every morning. 30 Tab 0    cholecalciferol (VITAMIN D3) (1000 Units /25 mcg) tablet Take 2 Tabs by mouth daily. 30 Tab 0    simvastatin (ZOCOR) 40 mg tablet Take 1 Tab by mouth nightly. 30 Tab 0    aspirin delayed-release 81 mg tablet Take 1 Tab by mouth daily. 30 Tab 0    acetaminophen (TYLENOL EXTRA STRENGTH) 500 mg tablet Take 2 Tabs by mouth every six (6) hours as needed for Pain. 50 Tab 0    allopurinol (ZYLOPRIM) 300 mg tablet Take 300 mg by mouth daily.       tamsulosin (FLOMAX) 0.4 mg capsule take 1 capsule by mouth once daily AFTER DINNER 90 Cap 3       Past History     Past Medical History:  Past Medical History:   Diagnosis Date    Difficulty urinating     Elevated PSA     Hematuria, unspecified     Hypercholesterolemia     Hypertension     Incomplete bladder emptying     Urinary retention     UTI (urinary tract infection)        Past Surgical History:  Past Surgical History:   Procedure Laterality Date    APPENDECTOMY      EGD  2/7/2014         HX TURP  6/13/16    HX TURP      ND INS NEW/RPLC PRM PACEMAKER W/TRANSV ELTRD VENTR N/A 5/5/2020    Insert Ppm Single Ventricular performed by Len Iraheta MD at [address] [place] [address] CATH LAB       Family History:  Family History   Problem Relation Age of Onset    Cancer Father     Alzheimer Mother     Heart defect Brother        Social History:  Social History     Tobacco Use    Smoking status: Never Smoker    Smokeless tobacco: Never Used   Substance Use Topics    Alcohol use: Yes     Alcohol/week: 2.0 standard drinks     Types: 2 Cans of beer per week     Comment: Occasional    Drug use: No       Allergies: Allergies   Allergen Reactions    Amlodipine Swelling and Itching    Bactrim [Sulfamethoprim Ds] Rash    Lisinopril Other (comments)     Acute renal failure    Rocephin [Ceftriaxone] Rash    Ciprofloxacin Rash and Itching         Review of Systems     Review of Systems   Constitutional: Negative for diaphoresis and fever. HENT: Negative for congestion and sore throat. Eyes: Negative for pain and itching. Respiratory: Negative for cough and shortness of breath. Cardiovascular: Positive for chest pain. Negative for palpitations. Gastrointestinal: Positive for abdominal pain. Negative for diarrhea. Endocrine: Negative for polydipsia and polyuria. Genitourinary: Negative for dysuria and hematuria. Musculoskeletal: Negative for arthralgias and myalgias. Skin: Positive for rash. Negative for wound. Neurological: Negative for seizures and syncope. Hematological: Does not bruise/bleed easily. Psychiatric/Behavioral: Negative for agitation and hallucinations. Physical Exam       Patient Vitals for the past 12 hrs:   Temp Pulse Resp BP SpO2   09/12/20 1015  84 15 125/71 100 %   09/12/20 1000  84 15 119/68 100 %   09/12/20 0915  76 11 118/67 100 %   09/12/20 0845  81 13 121/71 100 %   09/12/20 0830  83 13 117/74 100 %   09/12/20 0815  84 16 121/66 100 %   09/12/20 0745  87 14 120/66 99 %   09/12/20 0701 97.6 °F (36.4 °C) 89 15 124/70 97 %       Physical Exam  Vitals signs and nursing note reviewed. Constitutional:       Appearance: He is well-developed. HENT:      Head: Normocephalic and atraumatic. Eyes:      General: No scleral icterus. Conjunctiva/sclera: Conjunctivae normal.   Neck:      Musculoskeletal: Normal range of motion and neck supple. Vascular: No JVD. Cardiovascular:      Rate and Rhythm: Normal rate and regular rhythm. Heart sounds: Normal heart sounds. Comments: 4 intact extremity pulses  Pulmonary:      Effort: Pulmonary effort is normal.      Breath sounds: Normal breath sounds. Abdominal:      Palpations: Abdomen is soft. There is no mass. Tenderness: There is no abdominal tenderness. Musculoskeletal: Normal range of motion. Lymphadenopathy:      Cervical: No cervical adenopathy. Skin:     General: Skin is warm and dry. Comments: Petechial nonblanching rash over the abdomen and bilateral legs. Moderate erythema over the left leg with some warmth. Neurological:      Mental Status: He is alert.            Diagnostic Study Results   Labs -  Recent Results (from the past 12 hour(s))   EKG, 12 LEAD, INITIAL    Collection Time: 09/12/20  7:08 AM   Result Value Ref Range    Ventricular Rate 89 BPM    Atrial Rate 89 BPM    P-R Interval 276 ms    QRS Duration 174 ms    Q-T Interval 418 ms    QTC Calculation (Bezet) 508 ms    Calculated P Axis -20 degrees    Calculated R Axis -43 degrees Calculated T Axis 131 degrees    Diagnosis       Sinus rhythm with 1st degree AV block  Left axis deviation  Left bundle branch block  Abnormal ECG  When compared with ECG of 08-SEP-2020 23:35,  Sinus rhythm has replaced Electronic ventricular pacemaker     CBC WITH AUTOMATED DIFF    Collection Time: 09/12/20  7:33 AM   Result Value Ref Range    WBC 7.4 4.6 - 13.2 K/uL    RBC 3.53 (L) 4.70 - 5.50 M/uL    HGB 10.2 (L) 13.0 - 16.0 g/dL    HCT 30.1 (L) 36.0 - 48.0 %    MCV 85.3 74.0 - 97.0 FL    MCH 28.9 24.0 - 34.0 PG    MCHC 33.9 31.0 - 37.0 g/dL    RDW 18.5 (H) 11.6 - 14.5 %    PLATELET 256 519 - 238 K/uL    MPV 11.5 9.2 - 11.8 FL    NEUTROPHILS 95 (H) 40 - 73 %    LYMPHOCYTES 5 (L) 21 - 52 %    MONOCYTES 0 (L) 3 - 10 %    EOSINOPHILS 0 0 - 5 %    BASOPHILS 0 0 - 2 %    ABS. NEUTROPHILS 7.0 1.8 - 8.0 K/UL    ABS. LYMPHOCYTES 0.4 (L) 0.9 - 3.6 K/UL    ABS. MONOCYTES 0.0 (L) 0.05 - 1.2 K/UL    ABS. EOSINOPHILS 0.0 0.0 - 0.4 K/UL    ABS. BASOPHILS 0.0 0.0 - 0.1 K/UL    DF AUTOMATED     METABOLIC PANEL, BASIC    Collection Time: 09/12/20  7:33 AM   Result Value Ref Range    Sodium 146 (H) 136 - 145 mmol/L    Potassium 4.5 3.5 - 5.5 mmol/L    Chloride 113 (H) 100 - 111 mmol/L    CO2 26 21 - 32 mmol/L    Anion gap 7 3.0 - 18 mmol/L    Glucose 117 (H) 74 - 99 mg/dL    BUN 32 (H) 7.0 - 18 MG/DL    Creatinine 1.62 (H) 0.6 - 1.3 MG/DL    BUN/Creatinine ratio 20 12 - 20      GFR est AA 49 (L) >60 ml/min/1.73m2    GFR est non-AA 41 (L) >60 ml/min/1.73m2    Calcium 8.8 8.5 - 10.1 MG/DL   CARDIAC PANEL,(CK, CKMB & TROPONIN)    Collection Time: 09/12/20  7:33 AM   Result Value Ref Range    CK - MB 4.9 (H) <3.6 ng/ml    CK-MB Index 15.8 (H) 0.0 - 4.0 %    CK 31 (L) 39 - 308 U/L    Troponin-I, QT 0.02 0.0 - 0.045 NG/ML   POC TROPONIN-I    Collection Time: 09/12/20 10:16 AM   Result Value Ref Range    Troponin-I (POC) <0.04 0.00 - 0.08 ng/mL       Radiologic Studies -   No orders to display     No results found.     Medications ordered:   Medications   sodium chloride 0.9 % bolus infusion 500 mL (500 mL IntraVENous New Bag 9/12/20 0822)         Medical Decision Making   Initial Medical Decision Making and DDx:  Has some cellulitis of the left leg that is being treated. Petechial rash, antibiotics was switched and prednisone started less than 24 hours ago. Some chest discomfort and epigastric discomfort which prompted him to come to the ER, consider ACS. ED Course: Progress Notes, Reevaluation, and Consults:  ED Course as of Sep 12 1033   Sat Sep 12, 2020   0711 Twelve-lead EKG, wide-complex rhythm at a rate of 89, no change compared to prior. Left bundle branch block is persistent. [CB]      ED Course User Index  [CB] Kelley Chi MD     10:33 AM Pt reevaluated at this time. Discussed results and findings, as well as, diagnosis and plan for discharge. Follow up with doctors/services listed. Return to the emergency department for alarming symptoms. Pt verbalizes understanding and agreement with plan. All questions addressed. No alarming findings delta troponins negative on reassessment patient's vitals are stable and he is pain-free, nontoxic. Questions answered he is ready for discharge. Continue antibiotics and steroid. I am the first provider for this patient. I reviewed the vital signs, available nursing notes, past medical history, past surgical history, family history and social history. Patient Vitals for the past 12 hrs:   Temp Pulse Resp BP SpO2   09/12/20 1015  84 15 125/71 100 %   09/12/20 1000  84 15 119/68 100 %   09/12/20 0915  76 11 118/67 100 %   09/12/20 0845  81 13 121/71 100 %   09/12/20 0830  83 13 117/74 100 %   09/12/20 0815  84 16 121/66 100 %   09/12/20 0745  87 14 120/66 99 %   09/12/20 0701 97.6 °F (36.4 °C) 89 15 124/70 97 %       Vital Signs-Reviewed the patient's vital signs.     Pulse Oximetry Analysis, Cardiac Monitor, 12 lead ekg:  Oxygenation on room air is normal  Interpreted by the EP. Records Reviewed: Nursing notes reviewed (Time of Review: 7:00 AM)    Procedures:   Critical Care Time:   Aspirin: (was aspirin given for stroke?)    Diagnosis     Clinical Impression:   1. Chest pain, unspecified type        Disposition: Discharged      Follow-up Information     Follow up With Specialties Details Why Ricardo Whitney MD Nephrology In 2 days  [phone]             Patient's Medications   Start Taking    No medications on file   Continue Taking    ACETAMINOPHEN (TYLENOL EXTRA STRENGTH) 500 MG TABLET    Take 2 Tabs by mouth every six (6) hours as needed for Pain. ALLOPURINOL (ZYLOPRIM) 300 MG TABLET    Take 300 mg by mouth daily. AMOXICILLIN 500 MG TAB    Take 500 mg by mouth three (3) times daily for 7 days. ASPIRIN DELAYED-RELEASE 81 MG TABLET    Take 1 Tab by mouth daily. CHOLECALCIFEROL (VITAMIN D3) (1000 UNITS /25 MCG) TABLET    Take 2 Tabs by mouth daily. DOXYCYCLINE (VIBRA-TABS) 100 MG TABLET    Take 1 Tab by mouth two (2) times a day for 10 days. FUROSEMIDE (LASIX) 20 MG TABLET    Take 1 Tab by mouth daily. LEVOTHYROXINE (SYNTHROID) 50 MCG TABLET    Take 1 Tab by mouth every morning. LIDOCAINE 4 % PATCH    Apply to low back prn for back pains    SIMVASTATIN (ZOCOR) 40 MG TABLET    Take 1 Tab by mouth nightly.     TAMSULOSIN (FLOMAX) 0.4 MG CAPSULE    take 1 capsule by mouth once daily AFTER DINNER   These Medications have changed    No medications on file   Stop Taking    No medications on file     _______________________________    Notes:    Italia Ballard MD using Dragon dictation      _______________________________

## 2020-09-13 LAB
ATRIAL RATE: 89 BPM
CALCULATED P AXIS, ECG09: -20 DEGREES
CALCULATED R AXIS, ECG10: -43 DEGREES
CALCULATED T AXIS, ECG11: 131 DEGREES
DIAGNOSIS, 93000: NORMAL
P-R INTERVAL, ECG05: 276 MS
Q-T INTERVAL, ECG07: 418 MS
QRS DURATION, ECG06: 174 MS
QTC CALCULATION (BEZET), ECG08: 508 MS
VENTRICULAR RATE, ECG03: 89 BPM

## 2020-09-15 ENCOUNTER — PATIENT OUTREACH (OUTPATIENT)
Dept: CASE MANAGEMENT | Age: 85
End: 2020-09-15

## 2020-09-15 NOTE — PROGRESS NOTES
Date/Time:  9/15/2020 4:37 PM  Attempted to reach patient by telephone. Left HIPPA compliant message requesting a return call. Will attempt to reach patient again.

## 2020-09-16 ENCOUNTER — PATIENT OUTREACH (OUTPATIENT)
Dept: CASE MANAGEMENT | Age: 85
End: 2020-09-16

## 2020-09-16 NOTE — PROGRESS NOTES
Date/Time:  9/16/2020 2:37 PM  Attempted to reach patient by telephone. Left HIPPA compliant message requesting a return call with patient's daughter in law (unable to discuss with her as no PHI in chart). .  This is second attempt to contact patient. episode resolved.

## 2020-11-10 ENCOUNTER — APPOINTMENT (OUTPATIENT)
Dept: GENERAL RADIOLOGY | Age: 85
DRG: 871 | End: 2020-11-10
Attending: EMERGENCY MEDICINE
Payer: MEDICARE

## 2020-11-10 ENCOUNTER — HOSPITAL ENCOUNTER (INPATIENT)
Age: 85
LOS: 9 days | Discharge: NURSING, REHAB OR LONG-TERM CARE | DRG: 871 | End: 2020-11-19
Attending: EMERGENCY MEDICINE | Admitting: INTERNAL MEDICINE
Payer: MEDICARE

## 2020-11-10 DIAGNOSIS — T68.XXXA HYPOTHERMIA, INITIAL ENCOUNTER: ICD-10-CM

## 2020-11-10 DIAGNOSIS — A41.9 SEPSIS, DUE TO UNSPECIFIED ORGANISM, UNSPECIFIED WHETHER ACUTE ORGAN DYSFUNCTION PRESENT (HCC): Primary | ICD-10-CM

## 2020-11-10 DIAGNOSIS — E44.0 MODERATE PROTEIN MALNUTRITION (HCC): ICD-10-CM

## 2020-11-10 DIAGNOSIS — J90 PLEURAL EFFUSION, BILATERAL: ICD-10-CM

## 2020-11-10 DIAGNOSIS — I42.8 OTHER CARDIOMYOPATHY (HCC): ICD-10-CM

## 2020-11-10 DIAGNOSIS — N39.0 URINARY TRACT INFECTION WITHOUT HEMATURIA, SITE UNSPECIFIED: ICD-10-CM

## 2020-11-10 DIAGNOSIS — Z95.0 PACEMAKER: ICD-10-CM

## 2020-11-10 PROBLEM — G93.41 METABOLIC ENCEPHALOPATHY: Status: ACTIVE | Noted: 2020-11-10

## 2020-11-10 PROBLEM — R57.9 SHOCK (HCC): Status: ACTIVE | Noted: 2020-11-10

## 2020-11-10 LAB
ABO + RH BLD: NORMAL
ALBUMIN SERPL-MCNC: 2.7 G/DL (ref 3.4–5)
ALBUMIN/GLOB SERPL: 0.6 {RATIO} (ref 0.8–1.7)
ALP SERPL-CCNC: 163 U/L (ref 45–117)
ALT SERPL-CCNC: 41 U/L (ref 16–61)
ANION GAP SERPL CALC-SCNC: 7 MMOL/L (ref 3–18)
APPEARANCE UR: ABNORMAL
AST SERPL-CCNC: 77 U/L (ref 10–38)
BACTERIA URNS QL MICRO: ABNORMAL /HPF
BASOPHILS # BLD: 0 K/UL (ref 0–0.1)
BASOPHILS NFR BLD: 0 % (ref 0–2)
BILIRUB SERPL-MCNC: 0.8 MG/DL (ref 0.2–1)
BILIRUB UR QL: NEGATIVE
BLOOD GROUP ANTIBODIES SERPL: NORMAL
BNP SERPL-MCNC: 3238 PG/ML (ref 0–1800)
BUN SERPL-MCNC: 83 MG/DL (ref 7–18)
BUN/CREAT SERPL: 40 (ref 12–20)
CALCIUM SERPL-MCNC: 9.9 MG/DL (ref 8.5–10.1)
CHLORIDE SERPL-SCNC: 108 MMOL/L (ref 100–111)
CK MB CFR SERPL CALC: 7.7 % (ref 0–4)
CK MB SERPL-MCNC: 34.2 NG/ML (ref 5–25)
CK SERPL-CCNC: 444 U/L (ref 39–308)
CO2 SERPL-SCNC: 29 MMOL/L (ref 21–32)
COLOR UR: YELLOW
CREAT SERPL-MCNC: 2.09 MG/DL (ref 0.6–1.3)
DIFFERENTIAL METHOD BLD: ABNORMAL
EOSINOPHIL # BLD: 0 K/UL (ref 0–0.4)
EOSINOPHIL NFR BLD: 0 % (ref 0–5)
EPITH CASTS URNS QL MICRO: ABNORMAL /LPF (ref 0–5)
ERYTHROCYTE [DISTWIDTH] IN BLOOD BY AUTOMATED COUNT: 21.5 % (ref 11.6–14.5)
GLOBULIN SER CALC-MCNC: 4.4 G/DL (ref 2–4)
GLUCOSE BLD STRIP.AUTO-MCNC: 111 MG/DL (ref 70–110)
GLUCOSE BLD STRIP.AUTO-MCNC: 60 MG/DL (ref 70–110)
GLUCOSE BLD STRIP.AUTO-MCNC: 61 MG/DL (ref 70–110)
GLUCOSE BLD STRIP.AUTO-MCNC: 68 MG/DL (ref 70–110)
GLUCOSE BLD STRIP.AUTO-MCNC: 69 MG/DL (ref 70–110)
GLUCOSE BLD STRIP.AUTO-MCNC: 78 MG/DL (ref 70–110)
GLUCOSE BLD STRIP.AUTO-MCNC: 91 MG/DL (ref 70–110)
GLUCOSE SERPL-MCNC: 63 MG/DL (ref 74–99)
GLUCOSE UR STRIP.AUTO-MCNC: NEGATIVE MG/DL
HCT VFR BLD AUTO: 36.9 % (ref 36–48)
HGB BLD-MCNC: 12.6 G/DL (ref 13–16)
HGB UR QL STRIP: ABNORMAL
INR PPP: 1.2 (ref 0.8–1.2)
KETONES UR QL STRIP.AUTO: NEGATIVE MG/DL
LACTATE BLD-SCNC: 1.55 MMOL/L (ref 0.4–2)
LACTATE SERPL-SCNC: 0.9 MMOL/L (ref 0.4–2)
LEUKOCYTE ESTERASE UR QL STRIP.AUTO: ABNORMAL
LIPASE SERPL-CCNC: 406 U/L (ref 73–393)
LYMPHOCYTES # BLD: 0.7 K/UL (ref 0.9–3.6)
LYMPHOCYTES NFR BLD: 11 % (ref 21–52)
MAGNESIUM SERPL-MCNC: 2.2 MG/DL (ref 1.6–2.6)
MCH RBC QN AUTO: 29.2 PG (ref 24–34)
MCHC RBC AUTO-ENTMCNC: 34.1 G/DL (ref 31–37)
MCV RBC AUTO: 85.6 FL (ref 74–97)
MONOCYTES # BLD: 0.2 K/UL (ref 0.05–1.2)
MONOCYTES NFR BLD: 3 % (ref 3–10)
NEUTS SEG # BLD: 5.8 K/UL (ref 1.8–8)
NEUTS SEG NFR BLD: 86 % (ref 40–73)
NITRITE UR QL STRIP.AUTO: NEGATIVE
PH UR STRIP: 5 [PH] (ref 5–8)
PHOSPHATE SERPL-MCNC: 4.4 MG/DL (ref 2.5–4.9)
PLATELET # BLD AUTO: 119 K/UL (ref 135–420)
PLATELET COMMENTS,PCOM: ABNORMAL
POTASSIUM SERPL-SCNC: 4 MMOL/L (ref 3.5–5.5)
PROT SERPL-MCNC: 7.1 G/DL (ref 6.4–8.2)
PROT UR STRIP-MCNC: 30 MG/DL
PROTHROMBIN TIME: 15 SEC (ref 11.5–15.2)
RBC # BLD AUTO: 4.31 M/UL (ref 4.7–5.5)
RBC #/AREA URNS HPF: NEGATIVE /HPF (ref 0–5)
RBC MORPH BLD: ABNORMAL
SODIUM SERPL-SCNC: 144 MMOL/L (ref 136–145)
SP GR UR REFRACTOMETRY: 1.01 (ref 1–1.03)
SPECIMEN EXP DATE BLD: NORMAL
TROPONIN I SERPL-MCNC: 0.04 NG/ML (ref 0–0.04)
TSH SERPL DL<=0.05 MIU/L-ACNC: 5.48 UIU/ML (ref 0.36–3.74)
UROBILINOGEN UR QL STRIP.AUTO: 0.2 EU/DL (ref 0.2–1)
WBC # BLD AUTO: 6.7 K/UL (ref 4.6–13.2)
WBC URNS QL MICRO: ABNORMAL /HPF (ref 0–4)

## 2020-11-10 PROCEDURE — 83605 ASSAY OF LACTIC ACID: CPT

## 2020-11-10 PROCEDURE — 65610000006 HC RM INTENSIVE CARE

## 2020-11-10 PROCEDURE — 82550 ASSAY OF CK (CPK): CPT

## 2020-11-10 PROCEDURE — 74011000258 HC RX REV CODE- 258: Performed by: INTERNAL MEDICINE

## 2020-11-10 PROCEDURE — 83880 ASSAY OF NATRIURETIC PEPTIDE: CPT

## 2020-11-10 PROCEDURE — 80053 COMPREHEN METABOLIC PANEL: CPT

## 2020-11-10 PROCEDURE — 87086 URINE CULTURE/COLONY COUNT: CPT

## 2020-11-10 PROCEDURE — 87040 BLOOD CULTURE FOR BACTERIA: CPT

## 2020-11-10 PROCEDURE — 81001 URINALYSIS AUTO W/SCOPE: CPT

## 2020-11-10 PROCEDURE — 93005 ELECTROCARDIOGRAM TRACING: CPT

## 2020-11-10 PROCEDURE — 82962 GLUCOSE BLOOD TEST: CPT

## 2020-11-10 PROCEDURE — 85025 COMPLETE CBC W/AUTO DIFF WBC: CPT

## 2020-11-10 PROCEDURE — 86900 BLOOD TYPING SEROLOGIC ABO: CPT

## 2020-11-10 PROCEDURE — 74011000258 HC RX REV CODE- 258: Performed by: EMERGENCY MEDICINE

## 2020-11-10 PROCEDURE — 74011000250 HC RX REV CODE- 250: Performed by: INTERNAL MEDICINE

## 2020-11-10 PROCEDURE — 83735 ASSAY OF MAGNESIUM: CPT

## 2020-11-10 PROCEDURE — 71045 X-RAY EXAM CHEST 1 VIEW: CPT

## 2020-11-10 PROCEDURE — 85610 PROTHROMBIN TIME: CPT

## 2020-11-10 PROCEDURE — 83690 ASSAY OF LIPASE: CPT

## 2020-11-10 PROCEDURE — 87106 FUNGI IDENTIFICATION YEAST: CPT

## 2020-11-10 PROCEDURE — 84443 ASSAY THYROID STIM HORMONE: CPT

## 2020-11-10 PROCEDURE — 74011250636 HC RX REV CODE- 250/636: Performed by: EMERGENCY MEDICINE

## 2020-11-10 PROCEDURE — 96365 THER/PROPH/DIAG IV INF INIT: CPT

## 2020-11-10 PROCEDURE — 99285 EMERGENCY DEPT VISIT HI MDM: CPT

## 2020-11-10 PROCEDURE — 84100 ASSAY OF PHOSPHORUS: CPT

## 2020-11-10 PROCEDURE — 74011250636 HC RX REV CODE- 250/636: Performed by: INTERNAL MEDICINE

## 2020-11-10 PROCEDURE — 51702 INSERT TEMP BLADDER CATH: CPT

## 2020-11-10 RX ORDER — ACETAMINOPHEN 650 MG/1
650 SUPPOSITORY RECTAL
Status: DISCONTINUED | OUTPATIENT
Start: 2020-11-10 | End: 2020-11-20 | Stop reason: HOSPADM

## 2020-11-10 RX ORDER — PROMETHAZINE HYDROCHLORIDE 25 MG/1
12.5 TABLET ORAL
Status: DISCONTINUED | OUTPATIENT
Start: 2020-11-10 | End: 2020-11-20 | Stop reason: HOSPADM

## 2020-11-10 RX ORDER — DEXTROSE 50 % IN WATER (D50W) INTRAVENOUS SYRINGE
25
Status: DISCONTINUED | OUTPATIENT
Start: 2020-11-10 | End: 2020-11-10

## 2020-11-10 RX ORDER — PANTOPRAZOLE SODIUM 40 MG/10ML
40 INJECTION, POWDER, LYOPHILIZED, FOR SOLUTION INTRAVENOUS DAILY PRN
Status: DISCONTINUED | OUTPATIENT
Start: 2020-11-10 | End: 2020-11-20 | Stop reason: HOSPADM

## 2020-11-10 RX ORDER — SODIUM CHLORIDE 0.9 % (FLUSH) 0.9 %
5-40 SYRINGE (ML) INJECTION EVERY 8 HOURS
Status: DISCONTINUED | OUTPATIENT
Start: 2020-11-10 | End: 2020-11-20 | Stop reason: HOSPADM

## 2020-11-10 RX ORDER — DEXTROSE MONOHYDRATE 25 G/50ML
INJECTION, SOLUTION INTRAVENOUS
Status: COMPLETED
Start: 2020-11-10 | End: 2020-11-11

## 2020-11-10 RX ORDER — POLYETHYLENE GLYCOL 3350 17 G/17G
17 POWDER, FOR SOLUTION ORAL DAILY PRN
Status: DISCONTINUED | OUTPATIENT
Start: 2020-11-10 | End: 2020-11-20 | Stop reason: HOSPADM

## 2020-11-10 RX ORDER — SODIUM CHLORIDE 0.9 % (FLUSH) 0.9 %
5-40 SYRINGE (ML) INJECTION AS NEEDED
Status: DISCONTINUED | OUTPATIENT
Start: 2020-11-10 | End: 2020-11-20 | Stop reason: HOSPADM

## 2020-11-10 RX ORDER — SODIUM CHLORIDE 0.9 % (FLUSH) 0.9 %
5-10 SYRINGE (ML) INJECTION AS NEEDED
Status: DISCONTINUED | OUTPATIENT
Start: 2020-11-10 | End: 2020-11-20 | Stop reason: HOSPADM

## 2020-11-10 RX ORDER — NOREPINEPHRINE BIT/0.9 % NACL 8 MG/250ML
.5-3 INFUSION BOTTLE (ML) INTRAVENOUS
Status: DISCONTINUED | OUTPATIENT
Start: 2020-11-10 | End: 2020-11-11

## 2020-11-10 RX ORDER — MAGNESIUM SULFATE 100 %
4 CRYSTALS MISCELLANEOUS AS NEEDED
Status: DISCONTINUED | OUTPATIENT
Start: 2020-11-10 | End: 2020-11-20 | Stop reason: HOSPADM

## 2020-11-10 RX ORDER — ENOXAPARIN SODIUM 100 MG/ML
30 INJECTION SUBCUTANEOUS DAILY
Status: DISCONTINUED | OUTPATIENT
Start: 2020-11-11 | End: 2020-11-17 | Stop reason: DRUGHIGH

## 2020-11-10 RX ORDER — ACETAMINOPHEN 325 MG/1
650 TABLET ORAL
Status: DISCONTINUED | OUTPATIENT
Start: 2020-11-10 | End: 2020-11-20 | Stop reason: HOSPADM

## 2020-11-10 RX ORDER — LANOLIN ALCOHOL/MO/W.PET/CERES
12 CREAM (GRAM) TOPICAL
Status: DISCONTINUED | OUTPATIENT
Start: 2020-11-10 | End: 2020-11-20 | Stop reason: HOSPADM

## 2020-11-10 RX ORDER — DEXTROSE MONOHYDRATE 25 G/50ML
25-50 INJECTION, SOLUTION INTRAVENOUS AS NEEDED
Status: DISCONTINUED | OUTPATIENT
Start: 2020-11-10 | End: 2020-11-20 | Stop reason: HOSPADM

## 2020-11-10 RX ORDER — ONDANSETRON 2 MG/ML
4 INJECTION INTRAMUSCULAR; INTRAVENOUS
Status: DISCONTINUED | OUTPATIENT
Start: 2020-11-10 | End: 2020-11-20 | Stop reason: HOSPADM

## 2020-11-10 RX ORDER — IPRATROPIUM BROMIDE AND ALBUTEROL SULFATE 2.5; .5 MG/3ML; MG/3ML
3 SOLUTION RESPIRATORY (INHALATION)
Status: DISCONTINUED | OUTPATIENT
Start: 2020-11-10 | End: 2020-11-20 | Stop reason: HOSPADM

## 2020-11-10 RX ADMIN — Medication 10 ML: at 14:00

## 2020-11-10 RX ADMIN — Medication 10 ML: at 23:31

## 2020-11-10 RX ADMIN — MEROPENEM 500 MG: 500 INJECTION, POWDER, FOR SOLUTION INTRAVENOUS at 16:29

## 2020-11-10 RX ADMIN — SODIUM CHLORIDE 1000 ML: 900 INJECTION, SOLUTION INTRAVENOUS at 16:34

## 2020-11-10 RX ADMIN — SODIUM CHLORIDE 1000 ML: 900 INJECTION, SOLUTION INTRAVENOUS at 16:25

## 2020-11-10 RX ADMIN — DEXTROSE MONOHYDRATE 12.5 G: 25 INJECTION, SOLUTION INTRAVENOUS at 22:17

## 2020-11-10 RX ADMIN — SODIUM CHLORIDE 0.5 MCG/MIN: 900 INJECTION, SOLUTION INTRAVENOUS at 20:23

## 2020-11-10 NOTE — ED NOTES
Dr. Cristina Alcaraz as come by to see patient. He has spoken with Dr. Jennifer Bello about this shabana blood pressures. Will keep a watch on them. so that it can be decided which unit he will be placed on depending on his blood pressures and a need for a central line. Patient is alert and oriented still. Lungs clear after 3 liters. Took a couple more sips of juice.   Urine output since mojica inserted  is at 200 mls

## 2020-11-10 NOTE — ED NOTES
MD aware of new BS of 91. Told him BP and temp asked if he wanted warming fluids he said no. He did order another liter of fluids he will have had a total of 3 liters.

## 2020-11-10 NOTE — ED NOTES
Patient came in via EMS. He came from home for weakness in his legs and dizziness getting worse over the past couple of days. When assessed he was hypoglycemic and hypothermic. Will draw blood cultures and provide fluid,  Temp sensing mojica will be placed and more than one iv.

## 2020-11-10 NOTE — ED PROVIDER NOTES
HPI   20-year-old  male brought in by EMS for evaluation of dizziness and fatigue. Patient has past medical history of congestive heart failure, pleural effusion, hypertension, hyperlipidemia, urinary retention, and urinary tract infection. He denies any headache, chest pain, shortness of breath, fever, chills, abdominal pain, or diarrhea. Past Medical History:   Diagnosis Date    Difficulty urinating     Elevated PSA     Hematuria, unspecified     Hypercholesterolemia     Hypertension     Incomplete bladder emptying     Urinary retention     UTI (urinary tract infection)        Past Surgical History:   Procedure Laterality Date    APPENDECTOMY      EGD  2/7/2014         HX TURP  6/13/16    HX TURP      [place] [facility] NEW/RPLC PRM PACEMAKER W/TRANSV ELTRD VENTR N/A 5/5/2020    Insert Ppm Single Ventricular performed by Raymond Oconnor MD at [address] [facility]         Family History:   Problem Relation Age of Onset    Cancer Father     Alzheimer Mother     Heart defect Brother        Social History     Socioeconomic History    Marital status:      Spouse name: Not on file    Number of children: Not on file    Years of education: Not on file    Highest education level: Not on file   Occupational History    Occupation:  thirty years   Social Needs    Financial resource strain: Not on file    Food insecurity     Worry: Not on file     Inability: Not on file   [employer] needs     Medical: Not on file     Non-medical: Not on file   Tobacco Use    Smoking status: Never Smoker    Smokeless tobacco: Never Used   Substance and Sexual Activity    Alcohol use:  Yes     Alcohol/week: 2.0 standard drinks     Types: 2 Cans of beer per week     Comment: Occasional    Drug use: No    Sexual activity: Not on file   Lifestyle    Physical activity     Days per week: Not on file     Minutes per session: Not on file    Stress: Not on file   Relationships    Social connections     Talks on phone: Not on file     Gets together: Not on file     Attends [employer] service: Not on file     Active member of club or organization: Not on file     Attends meetings of clubs or organizations: Not on file     Relationship status: Not on file    Intimate partner violence     Fear of current or ex partner: Not on file     Emotionally abused: Not on file     Physically abused: Not on file     Forced sexual activity: Not on file   Other Topics Concern    Not on file   Social History Narrative    Not on file         ALLERGIES: Amlodipine; Bactrim [sulfamethoprim ds]; Lisinopril; Rocephin [ceftriaxone]; and Ciprofloxacin    Review of Systems   Constitutional: Positive for fatigue. Negative for appetite change, chills, diaphoresis, fever and unexpected weight change. HENT: Negative for congestion, dental problem, ear discharge, ear pain, hearing loss, nosebleeds, rhinorrhea, sinus pressure, sore throat, tinnitus, trouble swallowing and voice change. Eyes: Negative for photophobia, pain, discharge, redness and visual disturbance. Respiratory: Negative for cough, choking, chest tightness, shortness of breath, wheezing and stridor. Cardiovascular: Negative for chest pain, palpitations and leg swelling. Gastrointestinal: Negative for abdominal distention, abdominal pain, anal bleeding, blood in stool, constipation, diarrhea, nausea and vomiting. Endocrine: Negative for polydipsia, polyphagia and polyuria. Genitourinary: Negative for decreased urine volume, difficulty urinating, dysuria, flank pain, frequency, genital sores, hematuria, penile pain, penile swelling, scrotal swelling, testicular pain and urgency. Musculoskeletal: Positive for arthralgias. Negative for neck pain and neck stiffness. Skin: Positive for wound (Skin tear right forearm which occurred with EMS prior to arrival). Neurological: Positive for dizziness.  Negative for tremors, seizures, syncope, speech difficulty, weakness, light-headedness and headaches. Hematological: Negative for adenopathy. Does not bruise/bleed easily. Psychiatric/Behavioral: Negative for agitation, behavioral problems, confusion, hallucinations, self-injury, sleep disturbance and suicidal ideas. The patient is not nervous/anxious and is not hyperactive. Vitals:    11/10/20 1630 11/10/20 1645 11/10/20 1700 11/10/20 1735   BP: (!) 88/62 102/61 96/62 (!) 86/61   Pulse: 61 60 60 60   Resp:       Temp: (!) 91.4 °F (33 °C) (!) 91.8 °F (33.2 °C) (!) 91.9 °F (33.3 °C) (!) 92.5 °F (33.6 °C)   SpO2: 100% 100% 100% 100%   Weight:       Height:                Physical Exam  Vitals signs and nursing note reviewed. Constitutional:       General: He is in acute distress. Appearance: He is well-developed. He is not diaphoretic. Comments: Thin cachectic  male   HENT:      Head: Normocephalic and atraumatic. Right Ear: External ear normal.      Left Ear: External ear normal.      Nose: Nose normal.      Mouth/Throat:      Mouth: Mucous membranes are dry. Pharynx: No oropharyngeal exudate. Eyes:      General: No scleral icterus. Right eye: No discharge. Left eye: No discharge. Conjunctiva/sclera: Conjunctivae normal.      Pupils: Pupils are equal, round, and reactive to light. Neck:      Musculoskeletal: Normal range of motion and neck supple. Thyroid: No thyromegaly. Vascular: No JVD. Trachea: No tracheal deviation. Cardiovascular:      Rate and Rhythm: Normal rate and regular rhythm. Heart sounds: Murmur (Gr I/VI systolic murmur) present. No friction rub. No gallop. Pulmonary:      Effort: Pulmonary effort is normal. No respiratory distress. Breath sounds: No stridor. No wheezing or rales. Comments: Diminished breath sounds at bases  Chest:      Chest wall: No tenderness. Abdominal:      General: Bowel sounds are normal. There is no distension. Palpations: Abdomen is soft. There is no mass. Tenderness: There is no abdominal tenderness. There is no guarding or rebound. Genitourinary:     Penis: Normal.    Musculoskeletal: Normal range of motion. General: No tenderness. Lymphadenopathy:      Cervical: No cervical adenopathy. Skin:     General: Skin is warm and dry. Capillary Refill: Capillary refill takes less than 2 seconds. Coloration: Skin is not pale. Findings: No erythema or rash. Comments: Skin tear noted on right medial forearm   Neurological:      General: No focal deficit present. Mental Status: He is alert and oriented to person, place, and time. Cranial Nerves: No cranial nerve deficit. Motor: No abnormal muscle tone. Coordination: Coordination normal.      Deep Tendon Reflexes: Reflexes are normal and symmetric. Psychiatric:         Behavior: Behavior normal.         Thought Content: Thought content normal.         Judgment: Judgment normal.          MDM  Number of Diagnoses or Management Options  Hypothermia, initial encounter:    Moderate protein malnutrition ([address]):   Pleural effusion, bilateral:   Sepsis, due to unspecified organism, unspecified whether acute organ dysfunction present [facility]):   Urinary tract infection without hematuria, site unspecified:   Diagnosis management comments: Differential diagnosis includes: Sepsis, UTI, metabolic derangement, pneumonia       Amount and/or Complexity of Data Reviewed  Clinical lab tests: ordered and reviewed  Tests in the radiology section of CPT®: ordered and reviewed  Tests in the medicine section of CPT®: ordered and reviewed  Discussion of test results with the performing providers: yes  Obtain history from someone other than the patient: yes  Review and summarize past medical records: yes  Discuss the patient with other providers: yes  Independent visualization of images, tracings, or specimens: yes    Risk of Complications, Morbidity, and/or Mortality  Presenting problems: high  Diagnostic procedures: high    Patient Progress  Patient progress: stable         Critical Care  Performed by: Jessie Pérez DO  Authorized by: Jessie Pérez DO     Critical care provider statement:     Critical care time (minutes):  30    Critical care time was exclusive of:  Separately billable procedures and treating other patients    Critical care was necessary to treat or prevent imminent or life-threatening deterioration of the following conditions:  Cardiac failure, dehydration, metabolic crisis, sepsis and shock    Critical care was time spent personally by me on the following activities:  Development of treatment plan with patient or surrogate, ordering and performing treatments and interventions, ordering and review of laboratory studies, ordering and review of radiographic studies, re-evaluation of patient's condition, evaluation of patient's response to treatment, discussions with consultants, interpretation of cardiac output measurements, review of old charts and obtaining history from patient or surrogate        Orders Placed This Encounter    SEVERE SEPSIS AND SEPTIC SHOCK BUNDLE INITIATED     Standing Status:   Standing     Number of Occurrences:   1    CULTURE, BLOOD     Standing Status:   Standing     Number of Occurrences:   1    CULTURE, BLOOD     Standing Status:   Standing     Number of Occurrences:   1    CULTURE, URINE     Standing Status:   Standing     Number of Occurrences:   1     Order Specific Question:   Reason for Culture     Answer:   Eval of sepsis without a clear source    XR CHEST PORT     Standing Status:   Standing     Number of Occurrences:   1     Order Specific Question:   Reason for Exam     Answer:   Sepsis    CBC WITH AUTOMATED DIFF     Standing Status:   Standing     Number of Occurrences:   1    METABOLIC PANEL, COMPREHENSIVE     Standing Status:   Standing     Number of Occurrences:   1    CARDIAC PANEL,(CK, CKMB & TROPONIN)     Standing Status:   Standing     Number of Occurrences:   1    Pro BNP     Standing Status:   Standing     Number of Occurrences:   1    LACTIC ACID, PLASMA     If initial lactate level is greater than or equal to 2, draw second lactate in 4 hours. Standing Status:   Standing     Number of Occurrences:   2    URINALYSIS W/ RFLX MICROSCOPIC     Standing Status:   Standing     Number of Occurrences:   1    PROTHROMBIN TIME + INR     Standing Status:   Standing     Number of Occurrences:   1    TSH 3RD GENERATION     Standing Status:   Standing     Number of Occurrences:   1    LIPASE     Standing Status:   Standing     Number of Occurrences:   1    MAGNESIUM     Standing Status:   Standing     Number of Occurrences:   1    PHOSPHORUS     Standing Status:   Standing     Number of Occurrences:   1    URINE MICROSCOPIC ONLY     Standing Status:   Standing     Number of Occurrences:   1    VITAL SIGNS - PER UNIT ROUTINE     PER UNIT ROUTINE     Standing Status:   Standing     Number of Occurrences:   1    STRICT I & O     Standing Status:   Standing     Number of Occurrences:   1    NEUROLOGIC STATUS ASSESSMENT - PER UNIT ROUTINE     PER UNIT ROUTINE     Standing Status:   Standing     Number of Occurrences:   1    NOTIFY PROVIDER: SPECIFY Notify provider within one hour to start vasopressors if patient is unable to maintain a MAP of greater than or equal to 65 mmHg despite fluid resuscitation CONTINUOUS STAT     Standing Status:   Standing     Number of Occurrences:   1     Order Specific Question:   Please describe the test or procedure you would like to order.      Answer:   Notify provider within one hour to start vasopressors if patient is unable to maintain a MAP of greater than or equal to 65 mmHg despite fluid resuscitation    HEMODYNAMIC MONITORING - MEASURE CVP AND ScvO2     In the event of persistent arterial hypotension after two hours of fluid resuscitation efforts, or initial lactate greater than or equal to 4 mmol/L (36 mg/dl) measure and record CVP and ScvO2     Standing Status:   Standing     Number of Occurrences:   1     Order Specific Question:   Monitoring     Answer:   Central Venous Pressure    CRITICAL CARE (ASAP ONLY)     This order was created via procedure documentation     Standing Status:   Standing     Number of Occurrences:   1    GLUCOSE, POC     Standing Status:   Standing     Number of Occurrences:   1    POC LACTIC ACID     Standing Status:   Standing     Number of Occurrences:   1    GLUCOSE, POC     Standing Status:   Standing     Number of Occurrences:   1    GLUCOSE, POC     Standing Status:   Standing     Number of Occurrences:   1    EKG, 12 LEAD, INITIAL     Standing Status:   Standing     Number of Occurrences:   1     Order Specific Question:   Reason for Exam:     Answer:   dizzy    TYPE & SCREEN     ENTER SURGERY DATE IF FOR PRE-OP TESTING. Standing Status:   Standing     Number of Occurrences:   1     Order Specific Question:   Has patient been transfused or pregnant in the last 3 mos. ? Answer:   Unknown    SALINE LOCK IV ONE TIME STAT     Standing Status:   Standing     Number of Occurrences:   1    DEBBIE HUGGER     Standing Status:   Standing     Number of Occurrences:   1    sodium chloride (NS) flush 5-10 mL    DISCONTD: dextrose (D50W) injection syrg 25 g    DISCONTD: meropenem (MERREM) 500 mg in 0.9% sodium chloride (MBP/ADV) 50 mL MBP     Order Specific Question:   Antibiotic Indications     Answer:   Urinary Tract Infection    FOLLOWED BY Linked Order Group     sodium chloride 0.9 % bolus infusion 1,000 mL     sodium chloride 0.9 % bolus infusion 1,000 mL    meropenem (MERREM) 500 mg in 0.9% sodium chloride (MBP/ADV) 50 mL MBP     Order Specific Question:   Antibiotic Indications     Answer:   Urinary Tract Infection       ED EKG interpretation:  Rhythm: Ventricular paced rhythm at 60 bpm.  Abnormal EKG. Ventricular rate has decreased by 29 bpm when compared to prior EKG on September 12, 2020. this EKG was interpreted by Carlos Suarez DO,ED Provider. Recent Results (from the past 12 hour(s))   GLUCOSE, POC    Collection Time: 11/10/20  2:08 PM   Result Value Ref Range    Glucose (POC) 68 (L) 70 - 110 mg/dL   EKG, 12 LEAD, INITIAL    Collection Time: 11/10/20  2:18 PM   Result Value Ref Range    Ventricular Rate 60 BPM    Atrial Rate 60 BPM    QRS Duration 180 ms    Q-T Interval 524 ms    QTC Calculation (Bezet) 524 ms    Calculated P Axis 81 degrees    Calculated R Axis -34 degrees    Calculated T Axis 113 degrees    Diagnosis       Ventricular-paced rhythm  Abnormal ECG  When compared with ECG of 12-SEP-2020 07:08,  Electronic ventricular pacemaker has replaced Sinus rhythm  Vent. rate has decreased BY  29 BPM     POC LACTIC ACID    Collection Time: 11/10/20  2:32 PM   Result Value Ref Range    Lactic Acid (POC) 1.55 0.40 - 2.00 mmol/L   CBC WITH AUTOMATED DIFF    Collection Time: 11/10/20  2:38 PM   Result Value Ref Range    WBC 6.7 4.6 - 13.2 K/uL    RBC 4.31 (L) 4.70 - 5.50 M/uL    HGB 12.6 (L) 13.0 - 16.0 g/dL    HCT 36.9 36.0 - 48.0 %    MCV 85.6 74.0 - 97.0 FL    MCH 29.2 24.0 - 34.0 PG    MCHC 34.1 31.0 - 37.0 g/dL    RDW 21.5 (H) 11.6 - 14.5 %    PLATELET 792 (L) 624 - 420 K/uL    NEUTROPHILS PENDING %    LYMPHOCYTES PENDING %    MONOCYTES PENDING %    EOSINOPHILS PENDING %    BASOPHILS PENDING %    ABS. NEUTROPHILS PENDING K/UL    ABS. LYMPHOCYTES PENDING K/UL    ABS. MONOCYTES PENDING K/UL    ABS. EOSINOPHILS PENDING K/UL    ABS.  BASOPHILS PENDING K/UL    DF PENDING    METABOLIC PANEL, COMPREHENSIVE    Collection Time: 11/10/20  2:38 PM   Result Value Ref Range    Sodium 144 136 - 145 mmol/L    Potassium 4.0 3.5 - 5.5 mmol/L    Chloride 108 100 - 111 mmol/L    CO2 29 21 - 32 mmol/L    Anion gap 7 3.0 - 18 mmol/L    Glucose 63 (L) 74 - 99 mg/dL    BUN 83 (H) 7.0 - 18 MG/DL    Creatinine 2.09 (H) 0.6 - 1.3 MG/DL    BUN/Creatinine ratio 40 (H) 12 - 20      GFR est AA 37 (L) >60 ml/min/1.73m2    GFR est non-AA 30 (L) >60 ml/min/1.73m2    Calcium 9.9 8.5 - 10.1 MG/DL    Bilirubin, total 0.8 0.2 - 1.0 MG/DL    ALT (SGPT) 41 16 - 61 U/L    AST (SGOT) 77 (H) 10 - 38 U/L    Alk.  phosphatase 163 (H) 45 - 117 U/L    Protein, total 7.1 6.4 - 8.2 g/dL    Albumin 2.7 (L) 3.4 - 5.0 g/dL    Globulin 4.4 (H) 2.0 - 4.0 g/dL    A-G Ratio 0.6 (L) 0.8 - 1.7     CARDIAC PANEL,(CK, CKMB & TROPONIN)    Collection Time: 11/10/20  2:38 PM   Result Value Ref Range    CK - MB 34.2 (H) <3.6 ng/ml    CK-MB Index 7.7 (H) 0.0 - 4.0 %     (H) 39 - 308 U/L    Troponin-I, QT 0.04 0.0 - 0.045 NG/ML   NT-PRO BNP    Collection Time: 11/10/20  2:38 PM   Result Value Ref Range    NT pro-BNP 3,238 (H) 0 - 1,800 PG/ML   PROTHROMBIN TIME + INR    Collection Time: 11/10/20  2:38 PM   Result Value Ref Range    Prothrombin time 15.0 11.5 - 15.2 sec    INR 1.2 0.8 - 1.2     TSH 3RD GENERATION    Collection Time: 11/10/20  2:38 PM   Result Value Ref Range    TSH 5.48 (H) 0.36 - 3.74 uIU/mL   LIPASE    Collection Time: 11/10/20  2:38 PM   Result Value Ref Range    Lipase 406 (H) 73 - 393 U/L   MAGNESIUM    Collection Time: 11/10/20  2:38 PM   Result Value Ref Range    Magnesium 2.2 1.6 - 2.6 mg/dL   PHOSPHORUS    Collection Time: 11/10/20  2:38 PM   Result Value Ref Range    Phosphorus 4.4 2.5 - 4.9 MG/DL   TYPE & SCREEN    Collection Time: 11/10/20  2:48 PM   Result Value Ref Range    Crossmatch Expiration 11/13/2020,2359     ABO/Rh(D) A POSITIVE     Antibody screen NEG    URINALYSIS W/ RFLX MICROSCOPIC    Collection Time: 11/10/20  3:15 PM   Result Value Ref Range    Color YELLOW      Appearance TURBID      Specific gravity 1.011 1.005 - 1.030      pH (UA) 5.0 5.0 - 8.0      Protein 30 (A) NEG mg/dL    Glucose Negative NEG mg/dL    Ketone Negative NEG mg/dL    Bilirubin Negative NEG      Blood MODERATE (A) NEG      Urobilinogen 0.2 0.2 - 1.0 EU/dL    Nitrites Negative NEG      Leukocyte Esterase LARGE (A) NEG     URINE MICROSCOPIC ONLY    Collection Time: 11/10/20  3:15 PM   Result Value Ref Range    WBC TOO NUMEROUS TO COUNT 0 - 4 /hpf    RBC Negative 0 - 5 /hpf    Epithelial cells 1+ 0 - 5 /lpf    Bacteria 1+ (A) NEG /hpf   GLUCOSE, POC    Collection Time: 11/10/20  3:54 PM   Result Value Ref Range    Glucose (POC) 61 (L) 70 - 110 mg/dL   GLUCOSE, POC    Collection Time: 11/10/20  4:32 PM   Result Value Ref Range    Glucose (POC) 91 70 - 110 mg/dL     XR CHEST PORT   Final Result   IMPRESSION:         1. Faint hazy right mid to lower lung airspace opacity favored to reflect   atelectasis/airspace disease and accompanying pleural effusion.      > Overall appearance is improved from recent prior CT chest in radiographic   exams. 4:03 PM  Viewed the patient's prior medical records including urine culture from July 6, 2020. Patient is allergic to Bactrim ciprofloxacin and ceftriaxone. Merrem 1 g IV ordered, for urinary tract infection. 5:19 PM   Jeremías Riggs DO discussed care with Dr. Iveth Trujillo hospitalist.  Standard discussion; including history of patients chief complaint, available diagnostic results, and treatment course. He agrees with plans for admission and will see the patient in consultation. Diagnosis:   1. Sepsis, due to unspecified organism, unspecified whether acute organ dysfunction present ([facility] [address]    2. Hypothermia, initial encounter    3. Urinary tract infection without hematuria, site unspecified    4. Pleural effusion, bilateral    5. Moderate protein malnutrition (HCC)          Disposition: Admitted    Follow-up Information    None         Patient's Medications   Start Taking    No medications on file   Continue Taking    ACETAMINOPHEN (TYLENOL EXTRA STRENGTH) 500 MG TABLET    Take 2 Tabs by mouth every six (6) hours as needed for Pain. ALLOPURINOL (ZYLOPRIM) 300 MG TABLET    Take 300 mg by mouth daily. ASPIRIN DELAYED-RELEASE 81 MG TABLET    Take 1 Tab by mouth daily. CHOLECALCIFEROL (VITAMIN D3) (1000 UNITS /25 MCG) TABLET    Take 2 Tabs by mouth daily. FUROSEMIDE (LASIX) 20 MG TABLET    Take 1 Tab by mouth daily. LEVOTHYROXINE (SYNTHROID) 50 MCG TABLET    Take 1 Tab by mouth every morning. LIDOCAINE 4 % PATCH    Apply to low back prn for back pains    SIMVASTATIN (ZOCOR) 40 MG TABLET    Take 1 Tab by mouth nightly.     TAMSULOSIN (FLOMAX) 0.4 MG CAPSULE    take 1 capsule by mouth once daily AFTER DINNER   These Medications have changed    No medications on file   Stop Taking    No medications on file

## 2020-11-10 NOTE — ED NOTES
1554 blood sugar was 61 down from 68. Fluids are infusing. Dr. Yuliya Talbot made aware, asked to be fed crackers and provide juice, which I will do. Then recheck BS. Patient made aware of BS told him I needed himm to eat and drink and he said he would.

## 2020-11-10 NOTE — ED NOTES
A few bites of crackers were given, patient was awake and wanted them. States he did not eat today, last time was yesterday. Provided him with juice.

## 2020-11-11 ENCOUNTER — APPOINTMENT (OUTPATIENT)
Dept: NON INVASIVE DIAGNOSTICS | Age: 85
DRG: 871 | End: 2020-11-11
Attending: PHYSICIAN ASSISTANT
Payer: MEDICARE

## 2020-11-11 LAB
ANION GAP SERPL CALC-SCNC: 7 MMOL/L (ref 3–18)
ATRIAL RATE: 110 BPM
ATRIAL RATE: 60 BPM
BASOPHILS # BLD: 0 K/UL (ref 0–0.1)
BASOPHILS NFR BLD: 0 % (ref 0–2)
BUN SERPL-MCNC: 73 MG/DL (ref 7–18)
BUN/CREAT SERPL: 39 (ref 12–20)
CALCIUM SERPL-MCNC: 8.6 MG/DL (ref 8.5–10.1)
CALCULATED P AXIS, ECG09: 81 DEGREES
CALCULATED R AXIS, ECG10: -17 DEGREES
CALCULATED R AXIS, ECG10: -34 DEGREES
CALCULATED T AXIS, ECG11: 113 DEGREES
CALCULATED T AXIS, ECG11: 114 DEGREES
CHLORIDE SERPL-SCNC: 116 MMOL/L (ref 100–111)
CO2 SERPL-SCNC: 26 MMOL/L (ref 21–32)
CREAT SERPL-MCNC: 1.88 MG/DL (ref 0.6–1.3)
DIAGNOSIS, 93000: NORMAL
DIAGNOSIS, 93000: NORMAL
DIFFERENTIAL METHOD BLD: ABNORMAL
EOSINOPHIL # BLD: 0 K/UL (ref 0–0.4)
EOSINOPHIL NFR BLD: 0 % (ref 0–5)
ERYTHROCYTE [DISTWIDTH] IN BLOOD BY AUTOMATED COUNT: 21.8 % (ref 11.6–14.5)
GLUCOSE BLD STRIP.AUTO-MCNC: 127 MG/DL (ref 70–110)
GLUCOSE BLD STRIP.AUTO-MCNC: 138 MG/DL (ref 70–110)
GLUCOSE BLD STRIP.AUTO-MCNC: 64 MG/DL (ref 70–110)
GLUCOSE BLD STRIP.AUTO-MCNC: 68 MG/DL (ref 70–110)
GLUCOSE BLD STRIP.AUTO-MCNC: 75 MG/DL (ref 70–110)
GLUCOSE SERPL-MCNC: 68 MG/DL (ref 74–99)
HCT VFR BLD AUTO: 31.1 % (ref 36–48)
HGB BLD-MCNC: 10.7 G/DL (ref 13–16)
LYMPHOCYTES # BLD: 0.7 K/UL (ref 0.9–3.6)
LYMPHOCYTES NFR BLD: 10 % (ref 21–52)
MAGNESIUM SERPL-MCNC: 1.8 MG/DL (ref 1.6–2.6)
MAGNESIUM SERPL-MCNC: 2.2 MG/DL (ref 1.6–2.6)
MCH RBC QN AUTO: 29.1 PG (ref 24–34)
MCHC RBC AUTO-ENTMCNC: 34.4 G/DL (ref 31–37)
MCV RBC AUTO: 84.5 FL (ref 74–97)
MONOCYTES # BLD: 0.4 K/UL (ref 0.05–1.2)
MONOCYTES NFR BLD: 5 % (ref 3–10)
NEUTS SEG # BLD: 6.3 K/UL (ref 1.8–8)
NEUTS SEG NFR BLD: 85 % (ref 40–73)
P-R INTERVAL, ECG05: 288 MS
PLATELET # BLD AUTO: 105 K/UL (ref 135–420)
PLATELET COMMENTS,PCOM: ABNORMAL
POTASSIUM SERPL-SCNC: 3.7 MMOL/L (ref 3.5–5.5)
POTASSIUM SERPL-SCNC: 3.8 MMOL/L (ref 3.5–5.5)
Q-T INTERVAL, ECG07: 386 MS
Q-T INTERVAL, ECG07: 524 MS
QRS DURATION, ECG06: 174 MS
QRS DURATION, ECG06: 180 MS
QTC CALCULATION (BEZET), ECG08: 522 MS
QTC CALCULATION (BEZET), ECG08: 524 MS
RBC # BLD AUTO: 3.68 M/UL (ref 4.7–5.5)
RBC MORPH BLD: ABNORMAL
SODIUM SERPL-SCNC: 149 MMOL/L (ref 136–145)
VENTRICULAR RATE, ECG03: 110 BPM
VENTRICULAR RATE, ECG03: 60 BPM
WBC # BLD AUTO: 7.4 K/UL (ref 4.6–13.2)

## 2020-11-11 PROCEDURE — 93308 TTE F-UP OR LMTD: CPT

## 2020-11-11 PROCEDURE — 74011000258 HC RX REV CODE- 258: Performed by: INTERNAL MEDICINE

## 2020-11-11 PROCEDURE — 83735 ASSAY OF MAGNESIUM: CPT

## 2020-11-11 PROCEDURE — 74011250636 HC RX REV CODE- 250/636: Performed by: HOSPITALIST

## 2020-11-11 PROCEDURE — 99223 1ST HOSP IP/OBS HIGH 75: CPT | Performed by: INTERNAL MEDICINE

## 2020-11-11 PROCEDURE — 74011000258 HC RX REV CODE- 258: Performed by: HOSPITALIST

## 2020-11-11 PROCEDURE — 74011250636 HC RX REV CODE- 250/636: Performed by: INTERNAL MEDICINE

## 2020-11-11 PROCEDURE — 65610000006 HC RM INTENSIVE CARE

## 2020-11-11 PROCEDURE — 85025 COMPLETE CBC W/AUTO DIFF WBC: CPT

## 2020-11-11 PROCEDURE — 82962 GLUCOSE BLOOD TEST: CPT

## 2020-11-11 PROCEDURE — 93005 ELECTROCARDIOGRAM TRACING: CPT

## 2020-11-11 PROCEDURE — 2709999900 HC NON-CHARGEABLE SUPPLY

## 2020-11-11 PROCEDURE — 74011000258 HC RX REV CODE- 258

## 2020-11-11 PROCEDURE — 36415 COLL VENOUS BLD VENIPUNCTURE: CPT

## 2020-11-11 PROCEDURE — 84132 ASSAY OF SERUM POTASSIUM: CPT

## 2020-11-11 RX ORDER — MAGNESIUM SULFATE 1 G/100ML
1 INJECTION INTRAVENOUS ONCE
Status: COMPLETED | OUTPATIENT
Start: 2020-11-11 | End: 2020-11-11

## 2020-11-11 RX ORDER — POTASSIUM CHLORIDE 7.45 MG/ML
10 INJECTION INTRAVENOUS
Status: COMPLETED | OUTPATIENT
Start: 2020-11-11 | End: 2020-11-12

## 2020-11-11 RX ORDER — NOREPINEPHRINE BIT/0.9 % NACL 8 MG/250ML
.5-3 INFUSION BOTTLE (ML) INTRAVENOUS
Status: DISCONTINUED | OUTPATIENT
Start: 2020-11-11 | End: 2020-11-12

## 2020-11-11 RX ORDER — POTASSIUM CHLORIDE 7.45 MG/ML
10 INJECTION INTRAVENOUS
Status: COMPLETED | OUTPATIENT
Start: 2020-11-11 | End: 2020-11-11

## 2020-11-11 RX ORDER — DEXTROSE MONOHYDRATE AND SODIUM CHLORIDE 5; .45 G/100ML; G/100ML
75 INJECTION, SOLUTION INTRAVENOUS CONTINUOUS
Status: DISCONTINUED | OUTPATIENT
Start: 2020-11-11 | End: 2020-11-12

## 2020-11-11 RX ADMIN — DEXTROSE MONOHYDRATE AND SODIUM CHLORIDE 50 ML/HR: 5; .45 INJECTION, SOLUTION INTRAVENOUS at 00:07

## 2020-11-11 RX ADMIN — Medication 10 ML: at 06:07

## 2020-11-11 RX ADMIN — MAGNESIUM SULFATE HEPTAHYDRATE 1 G: 1 INJECTION, SOLUTION INTRAVENOUS at 09:57

## 2020-11-11 RX ADMIN — MEROPENEM 500 MG: 500 INJECTION, POWDER, FOR SOLUTION INTRAVENOUS at 16:59

## 2020-11-11 RX ADMIN — POTASSIUM CHLORIDE 10 MEQ: 7.46 INJECTION, SOLUTION INTRAVENOUS at 09:04

## 2020-11-11 RX ADMIN — POTASSIUM CHLORIDE 10 MEQ: 7.46 INJECTION, SOLUTION INTRAVENOUS at 18:03

## 2020-11-11 RX ADMIN — MEROPENEM 500 MG: 500 INJECTION, POWDER, FOR SOLUTION INTRAVENOUS at 04:36

## 2020-11-11 RX ADMIN — Medication 20 ML: at 21:47

## 2020-11-11 RX ADMIN — DEXTROSE MONOHYDRATE 12.5 G: 25 INJECTION, SOLUTION INTRAVENOUS at 05:16

## 2020-11-11 RX ADMIN — POTASSIUM CHLORIDE 10 MEQ: 7.46 INJECTION, SOLUTION INTRAVENOUS at 07:42

## 2020-11-11 RX ADMIN — ENOXAPARIN SODIUM 30 MG: 30 INJECTION SUBCUTANEOUS at 09:04

## 2020-11-11 RX ADMIN — POTASSIUM CHLORIDE 10 MEQ: 7.46 INJECTION, SOLUTION INTRAVENOUS at 16:00

## 2020-11-11 RX ADMIN — Medication 10 ML: at 15:05

## 2020-11-11 RX ADMIN — DEXTROSE MONOHYDRATE 12.5 G: 25 INJECTION, SOLUTION INTRAVENOUS at 17:37

## 2020-11-11 NOTE — PROGRESS NOTES
Problem: Falls - Risk of  Goal: *Absence of Falls  Description: Document Aba Chauhan Fall Risk and appropriate interventions in the flowsheet. Outcome: Progressing Towards Goal  Note: Fall Risk Interventions:  Mobility Interventions: Assess mobility with egress test, Bed/chair exit alarm    Mentation Interventions: Adequate sleep, hydration, pain control, Bed/chair exit alarm    Medication Interventions: Assess postural VS orthostatic hypotension, Bed/chair exit alarm    Elimination Interventions: Bed/chair exit alarm, Call light in reach              Problem: Patient Education: Go to Patient Education Activity  Goal: Patient/Family Education  Outcome: Progressing Towards Goal     Problem: Pressure Injury - Risk of  Goal: *Prevention of pressure injury  Description: Document Alex Scale and appropriate interventions in the flowsheet.   Outcome: Progressing Towards Goal  Note: Pressure Injury Interventions:  Sensory Interventions: Assess changes in LOC, Assess need for specialty bed, Avoid rigorous massage over bony prominences    Moisture Interventions: Absorbent underpads, Apply protective barrier, creams and emollients    Activity Interventions: Assess need for specialty bed, Chair cushion    Mobility Interventions: Assess need for specialty bed, Chair cushion    Nutrition Interventions: Document food/fluid/supplement intake, Discuss nutritional consult with provider    Friction and Shear Interventions: Apply protective barrier, creams and emollients, Feet elevated on foot rest                Problem: Patient Education: Go to Patient Education Activity  Goal: Patient/Family Education  Outcome: Progressing Towards Goal     Problem: Pain  Goal: *Control of Pain  Outcome: Progressing Towards Goal  Goal: *PALLIATIVE CARE:  Alleviation of Pain  Outcome: Progressing Towards Goal     Problem: Patient Education: Go to Patient Education Activity  Goal: Patient/Family Education  Outcome: Progressing Towards Goal     Problem: Urinary Tract Infection - Adult  Goal: *Absence of infection signs and symptoms  Outcome: Progressing Towards Goal     Problem: Patient Education: Go to Patient Education Activity  Goal: Patient/Family Education  Outcome: Progressing Towards Goal

## 2020-11-11 NOTE — PROGRESS NOTES
Problem: Falls - Risk of  Goal: *Absence of Falls  Description: Document Paulie Going Fall Risk and appropriate interventions in the flowsheet. Outcome: Progressing Towards Goal  Note: Fall Risk Interventions:  Mobility Interventions: Bed/chair exit alarm    Mentation Interventions: Bed/chair exit alarm, Adequate sleep, hydration, pain control, Reorient patient, Room close to nurse's station, Toileting rounds, Update white board    Medication Interventions: Bed/chair exit alarm    Elimination Interventions: Bed/chair exit alarm, Call light in reach              Problem: Patient Education: Go to Patient Education Activity  Goal: Patient/Family Education  Outcome: Progressing Towards Goal     Problem: Pressure Injury - Risk of  Goal: *Prevention of pressure injury  Description: Document Alex Scale and appropriate interventions in the flowsheet. Outcome: Progressing Towards Goal  Note: Pressure Injury Interventions:  Sensory Interventions: Assess changes in LOC, Keep linens dry and wrinkle-free, Pressure redistribution bed/mattress (bed type), Turn and reposition approx. every two hours (pillows and wedges if needed)    Moisture Interventions: Absorbent underpads, Apply protective barrier, creams and emollients, Internal/External urinary devices    Activity Interventions: Pressure redistribution bed/mattress(bed type)    Mobility Interventions: Pressure redistribution bed/mattress (bed type), Turn and reposition approx.  every two hours(pillow and wedges)    Nutrition Interventions: Document food/fluid/supplement intake, Discuss nutritional consult with provider    Friction and Shear Interventions: Apply protective barrier, creams and emollients, Foam dressings/transparent film/skin sealants                Problem: Patient Education: Go to Patient Education Activity  Goal: Patient/Family Education  Outcome: Progressing Towards Goal     Problem: Pain  Goal: *Control of Pain  Outcome: Progressing Towards Goal  Goal: *PALLIATIVE CARE:  Alleviation of Pain  Outcome: Progressing Towards Goal     Problem: Patient Education: Go to Patient Education Activity  Goal: Patient/Family Education  Outcome: Progressing Towards Goal     Problem: Urinary Tract Infection - Adult  Goal: *Absence of infection signs and symptoms  Outcome: Progressing Towards Goal     Problem: Patient Education: Go to Patient Education Activity  Goal: Patient/Family Education  Outcome: Progressing Towards Goal

## 2020-11-11 NOTE — PROGRESS NOTES
conducted an initial consultation and Spiritual Assessment for Kinsey Billingsley, who is a 80 y.o.,male. Patients Primary Language is: [place].    According to the patients EMR Mosque Affiliation is: [facility].     The reason the Patient came to the hospital is:   Patient Active Problem List    Diagnosis Date Noted    Metabolic encephalopathy 35/44/5249    Shock ([address] 83/35/0776    Complicated UTI (urinary tract infection) 11/10/2020    Moderate protein malnutrition ([address] 07/23/2020    Pleural effusion, bilateral 07/16/2020    HCAP (healthcare-associated pneumonia) 07/16/2020    Acute on chronic systolic (congestive) heart failure ([address] 07/16/2020    Pneumonia 07/16/2020    Sepsis ([address] 07/06/2020    Pacemaker 05/05/2020    Gastrointestinal bleeding 04/23/2020    Hypoglycemia 04/21/2020    HTN (hypertension) 04/21/2020    Left bundle branch block 04/21/2020    Sinoatrial node dysfunction ([address] 04/21/2020    Dysphagia 04/08/2020    Chronic combined systolic and diastolic congestive heart failure ([address] 04/07/2020    Other chest pain 04/07/2020    Right lower lobe pneumonia 04/06/2020    Cardiomyopathy ([address] 02/20/2020    Grade II diastolic dysfunction 98/97/0936    Hypothyroidism 02/20/2020    UTI (urinary tract infection) 02/19/2020    Acute renal failure (ARF) ([address] 02/19/2020    Bradycardia 02/19/2020    Hypotension 02/19/2020    Sustained ventricular tachycardia ([address] 12/28/2019    Duodenitis 12/24/2019    Abdominal pain 12/23/2019    Hypothermia 12/23/2019    Transaminitis 12/23/2019    Chronic renal failure, stage 3 (moderate) 12/23/2019    SIRS (systemic inflammatory response syndrome) ([address] 12/23/2019    Gastritis 12/23/2019    Syncope and collapse 01/30/2018    Head injury 01/30/2018    Forehead contusion 01/30/2018    Wrist abrasion, infected, left, initial encounter 01/30/2018    Wrist sprain, left, initial encounter 01/30/2018    Acute renal failure ([address] 02/06/2014    Elevated PSA 02/06/2014    Urinary retention 02/06/2014    Guaiac + stool 02/06/2014    Neoplastic disease 01/02/2013        The  provided the following Interventions:   attempted to Initiate a relationship of care and support with patient in room 2607 but found the patient resting peacefully and unable to communicate at this time. Maci Muñoz prayer  on patients behalf. .    Assessment:  Patient does not have any Adventist/cultural needs that will affect patients preferences in health care. There are no further spiritual or Adventist issues which require Spiritual Care Services interventions at this time. Plan:  Chaplains will continue to follow and will provide pastoral care on an as needed/requested basis    . Meche Wilson   Spiritual Care   [phone]

## 2020-11-11 NOTE — CONSULTS
[facility] - Consult Note    Consultation request by Oliverio Morales DO for advice/opinion related to evaluating Complicated UTI (urinary tract infection) [N39.0]  Shock ([address] [R57.9]    Date of  Admission: 11/10/2020  2:00 PM   Primary Care Physician:  Ronald Sewell MD     Concerns for [facility] capturing/pacing, Interrogation         I saw, evaluated, interviewed and examined the patient personally. I agree with the findings and plan of care as documented below with PA-C note  Patient admitted to the hospital with fatigue, dizziness in the setting of UTI and pneumonia. Sepsis  Concern with possible pacemaker noncapture on telemetry monitor. EKG reviewed which showed possible sinus tachycardia with underlying bundle branch block. I reviewed this EKG with my electrophysiology colleague Dr. Gian Skinner and we both agree with that plan. Device has been interrogated and it has been reviewed with electrophysiology colleague as well and device is working within normal limits and there was no evidence of prolonged wide-complex tachycardia  Supportive care for now    Diaz Maya MD          Assessment:     -Admitted with fatigue, dizziness in the setting of UTI & pneumonia, patient hypoglycemic, hypothermic and hypotensive in ED requiring pressors, elevated BNP, negative troponin x 1, CXR showed right mid-lower lung airspace opacity, possible atelectasis/airspace disease and accompanying pleural effusion.  ECG on admission showed ventricular paced rhythm  -s/p single chamber PPM, MRI compatible ([employer], 05/2020)   -LBBB, chronic  -Nonischemic cardiomyopathy with EF 40% (09/2020), Moderately dilated LA, Mod-Severe MR, mild AR and TR   -Pharm nuc 4/22/20, no ischemia  -HLD  -Hypertension  -CKD  -GERD on PPI  -Hypothyroid on replacement  -h/o GIB, stable  -Severe deconditioning     Primary cardiologist Dr. Carley Ireland:     -PPM Interrogation this morning, pacer is appropriately pacing/capturing, SR no signs of VT  -Concerns of VT on tele monitor last night, STAT ECG showed possible ST with 1st degree AVB  -[employer], results pending   -Currently on IV 2mcg Levophed and Елена Hugger      History of Present Illness: This is a 80 y.o. male admitted for Complicated UTI (urinary tract infection) [N39.0]  Shock ([facility] [R57.9]. Patient complains of:  Weakness, confusion       Cardiac risk factors: dyslipidemia, male gender, hypertension    Kelly Anderson is a 80 y.o. male, PMHx  has a past medical history of Difficulty urinating, Elevated PSA, Hematuria, unspecified, Hypercholesterolemia, Hypertension, Incomplete bladder emptying, LBBB (left bundle branch block), NICM (nonischemic cardiomyopathy) ([facility] Pacemaker, Thyroid disorder, Urinary retention, and UTI (urinary tract infection). , who was admitted to the ER with weakness and fatigue. Patient alert but somewhat confused, unable to provide history surround recent events/symptoms. According to chart notes, he was experiencing 2-3 days of worsening fatigue and weakness. We were asked to consult for concerns of his PPM not pacing or capturing. Review of systems not obtained due to patient factors.      Past Medical History:     Past Medical History:   Diagnosis Date    Difficulty urinating     Elevated PSA     Hematuria, unspecified     Hypercholesterolemia     Hypertension     Incomplete bladder emptying     Pacemaker     single chamber Medtronic MRI compatible pacemaker 05/2020    Urinary retention     UTI (urinary tract infection)          Social History:     Social History     Socioeconomic History    Marital status:      Spouse name: Not on file    Number of children: Not on file    Years of education: Not on file    Highest education level: Not on file   Occupational History    Occupation:  thirty years   Tobacco Use    Smoking status: Never Smoker    Smokeless tobacco: Never Used Substance and Sexual Activity    Alcohol use: Yes     Alcohol/week: 2.0 standard drinks     Types: 2 Cans of beer per week     Comment: Occasional    Drug use: No        Family History:     Family History   Problem Relation Age of Onset    Cancer Father     Alzheimer Mother     Heart defect Brother         Medications:      Allergies   Allergen Reactions    Amlodipine Swelling and Itching    Bactrim [Sulfamethoprim Ds] Rash    Lisinopril Other (comments)     Acute renal failure    Rocephin [Ceftriaxone] Rash    Ciprofloxacin Rash and Itching        Current Facility-Administered Medications   Medication Dose Route Frequency    dextrose 5 % - 0.45% NaCl infusion  50 mL/hr IntraVENous CONTINUOUS    sodium chloride (NS) flush 5-10 mL  5-10 mL IntraVENous PRN    meropenem (MERREM) 500 mg in 0.9% sodium chloride (MBP/ADV) 50 mL MBP  500 mg IntraVENous Q12H    sodium chloride (NS) flush 5-40 mL  5-40 mL IntraVENous Q8H    sodium chloride (NS) flush 5-40 mL  5-40 mL IntraVENous PRN    acetaminophen (TYLENOL) tablet 650 mg  650 mg Oral Q6H PRN    Or    acetaminophen (TYLENOL) suppository 650 mg  650 mg Rectal Q6H PRN    polyethylene glycol (MIRALAX) packet 17 g  17 g Oral DAILY PRN    promethazine (PHENERGAN) tablet 12.5 mg  12.5 mg Oral Q6H PRN    Or    ondansetron (ZOFRAN) injection 4 mg  4 mg IntraVENous Q6H PRN    enoxaparin (LOVENOX) injection 30 mg  30 mg SubCUTAneous DAILY    NOREPINephrine (LEVOPHED) 8 mg in 0.9% NS 250ml infusion  0.5-30 mcg/min IntraVENous TITRATE    pantoprazole (PROTONIX) injection 40 mg  40 mg IntraVENous DAILY PRN    melatonin tablet 12 mg  12 mg Oral QHS PRN    albuterol-ipratropium (DUO-NEB) 2.5 MG-0.5 MG/3 ML  3 mL Nebulization Q6H PRN    glucose chewable tablet 16 g  4 Tab Oral PRN    glucagon (GLUCAGEN) injection 1 mg  1 mg IntraMUSCular PRN    dextrose (D50) infusion 12.5-25 g  25-50 mL IntraVENous PRN         Physical Exam:     Visit Vitals  BP (!) 110/55 Pulse 76   Temp 97.7 °F (36.5 °C)   Resp 16   Ht 5' 6\" (1.676 m)   Wt 121 lb 8 oz (55.1 kg)   SpO2 97%   BMI 19.61 kg/m²     BP Readings from Last 3 Encounters:   11/11/20 (!) 110/55   09/12/20 121/66   09/09/20 127/68     Pulse Readings from Last 3 Encounters:   11/11/20 76   09/12/20 81   09/09/20 61     Wt Readings from Last 3 Encounters:   11/10/20 121 lb 8 oz (55.1 kg)   09/12/20 110 lb 0.2 oz (49.9 kg)   09/09/20 110 lb (49.9 kg)       General:  fatigued, no distress, slowed mentation, appears stated age, confused but oriented  Neck:  nontender, no JVD  Lungs:  clear to auscultation bilaterally, diminished breath sounds R base, L base  Heart:  regular rate and rhythm, systolic murmur heard best at LSB +  Abdomen:  abdomen is soft without significant tenderness, masses, organomegaly or guarding  Extremities:  extremities normal, atraumatic, no cyanosis or edema  Skin: Warm and dry. no hyperpigmentation, vitiligo, or suspicious lesions  Neuro: alert, confused, oriented to person & place   Psych: non focal     Data Review:     Recent Labs     11/10/20  1438   WBC 6.7   HGB 12.6*   HCT 36.9   *     Recent Labs     11/11/20  0256 11/10/20  1438   * 144   K 3.7 4.0   * 108   CO2 26 29   GLU 68* 63*   BUN 73* 83*   CREA 1.88* 2.09*   CA 8.6 9.9   MG  --  2.2   PHOS  --  4.4   ALB  --  2.7*   ALT  --  41   INR  --  1.2       Results for orders placed or performed during the hospital encounter of 11/10/20   EKG, 12 LEAD, INITIAL   Result Value Ref Range    Ventricular Rate 60 BPM    Atrial Rate 60 BPM    QRS Duration 180 ms    Q-T Interval 524 ms    QTC Calculation (Bezet) 524 ms    Calculated P Axis 81 degrees    Calculated R Axis -34 degrees    Calculated T Axis 113 degrees    Diagnosis       Ventricular-paced rhythm  Abnormal ECG  When compared with ECG of 12-SEP-2020 07:08,  Electronic ventricular pacemaker has replaced Sinus rhythm  Vent.  rate has decreased BY  29 BPM         All Cardiac Markers in the last 24 hours:    Lab Results   Component Value Date/Time     (H) 11/10/2020 02:38 PM    CKMB 34.2 (H) 11/10/2020 02:38 PM    CKND1 7.7 (H) 11/10/2020 02:38 PM    TROIQ 0.04 11/10/2020 02:38 PM       Last Lipid:    Lab Results   Component Value Date/Time    Cholesterol, total 89 07/07/2020 04:00 AM    HDL Cholesterol 39 (L) 07/07/2020 04:00 AM    LDL, calculated 31.6 07/07/2020 04:00 AM    Triglyceride 92 07/07/2020 04:00 AM    CHOL/HDL Ratio 2.3 07/07/2020 04:00 AM       Signed By: Sam Hughes PA-C     November 11, 2020

## 2020-11-11 NOTE — PROGRESS NOTES
INTERIM UPDATE - 2007 EST on 11/10/2020    Blood Pressures on Patient are MAP <60 on repeat measurements; however, Nursing Staff and [facility] ER Physician report that IV Access was not difficult to achieve. Patient has received 3 L NS (1.5 L was required for adequate fluid resuscitation). Patient is currently on [facility], which likely explains hypotension during rewarming. Plan: Will admit Patient to [facility] ICU with Peripheral IV Pressors, as Patient may not require them for long (expected to not require them when body temperature normalizes). In the event that Patient requests IV Pressors for more than 12 hours, will consult Intensivist for placement of CVC in AM.        INTERIM UPDATE - 0133 EST on 11/11/2020    Nursing Staff reports Patient's Pacemaker appears to be failing to capture and pace Patient's Heart Rate relative to records from previous stay. Plan:  Consulted Cardiological services in AM for evaluation and possible interrogation of Implanted Cardiac Device. A message was left with their answering service and IP consultation was ordered in EHR.

## 2020-11-11 NOTE — PROGRESS NOTES
Progress Note      Patient: Angelina Baez               Sex: male          DOA: 11/10/2020       YOB: 1934      Age:  80 y.o.        LOS:  LOS: 1 day             CHIEF COMPLAINT:   Sepsis with septic shock, hypothermia is improved    Subjective:     Patient is more awake today  Talking  No complaint    Objective:      Visit Vitals  BP (!) 110/55   Pulse 76   Temp 98.6 °F (37 °C)   Resp 17   Ht 5' 6\" (1.676 m)   Wt 54.9 kg (121 lb)   SpO2 96%   BMI 19.53 kg/m²       Physical Exam:  Gen:  Patient is in no distress. No complaint  HEENT and Neck:  PERRL, No cervical tenderness or masses  Lungs:  Clear bilaterally. No rales, no wheeze. Normal effort. Heart:  Regular Rate and Rhythm. No murmur or gallop. No JVD. No edema. Abdomen:  Soft, non tender, no masses, no Hepatosplenomegally  Extremities:  No digital clubbing, no cyanosis  Neuro:  Alert and oriented, Normal affect, Cranial nerves intact, No sensory deficits        Lab/Data Reviewed:  BMP:   Lab Results   Component Value Date/Time     (H) 11/11/2020 02:56 AM    K 3.8 11/11/2020 03:00 PM     (H) 11/11/2020 02:56 AM    CO2 26 11/11/2020 02:56 AM    AGAP 7 11/11/2020 02:56 AM    GLU 68 (L) 11/11/2020 02:56 AM    BUN 73 (H) 11/11/2020 02:56 AM    CREA 1.88 (H) 11/11/2020 02:56 AM    GFRAA 41 (L) 11/11/2020 02:56 AM    GFRNA 34 (L) 11/11/2020 02:56 AM     CBC:   Lab Results   Component Value Date/Time    WBC 7.4 11/11/2020 02:56 AM    HGB 10.7 (L) 11/11/2020 02:56 AM    HCT 31.1 (L) 11/11/2020 02:56 AM     (L) 11/11/2020 02:56 AM           Assessment/Plan     Principal Problem:    Sepsis ([address] (7/6/2020)    Active Problems:    UTI (urinary tract infection) (7/78/8933)      Metabolic encephalopathy (54/98/4672)      Hypotension (2/19/2020)      Cardiomyopathy ([facility] (2/20/2020)      Overview: LVEF 35-40% by previous TTE.       Hypothermia (12/23/2019)      Shock ([address] (08/03/2403)      Complicated UTI (urinary tract infection) (11/10/2020)        Plan:  Continue with IV antibiotics  Cultures all NGSF  Vasopressor support of BP  Following temperature which is improved off warming blanket  Monitor mental status  Follow renal function  DVT prophylaxis.

## 2020-11-11 NOTE — PROGRESS NOTES
Physician Progress Note      PATIENT:               Alistiar Jimenez  CSN #:                  [id number]  :                       1934  ADMIT DATE:       11/10/2020 2:00 PM  [address] [place] DATE:  RESPONDING  PROVIDER #:        Vinicio Doty MD          QUERY TEXT:    Pt admitted with Sepsis, Uti, Bobby and Encephalopathy,  Pt noted to have hypotension and started on Levophed, . If possible, please document in the progress notes and discharge summary if you are evaluating and/or treating any of the following: The medical record reflects the following:    Risk factors :  Uti, bobby, sepsis, hypothermia,  Clinical indicators:  Temp 91.7,  bp  84/62,  Urine  wbc tntc,  Treatment:  Levophed, blood cultures, warmer, abx    Thank you,  Ora Welch RN/[facility]  [phone]  Options provided:  -- Cardiogenic Shock  -- Hemorrhagic Shock  -- Neurogenic Shock  -- Traumatic Shock  -- Septic Shock  -- Hypovolemic Shock  -- Hypovolemia without Shock  -- Hypotension without Shock  -- Other - I will add my own diagnosis  -- Disagree - Not applicable / Not valid  -- Disagree - Clinically unable to determine / Unknown  -- Refer to Clinical Documentation Reviewer    PROVIDER RESPONSE TEXT:    This patient has Septic Shock.     Query created by: Madelyn Awad on 2020 8:59 AM      Electronically signed by:  Vinicio Doty MD 2020 2:30 PM

## 2020-11-11 NOTE — DIABETES MGMT
Nutrition Assessment /Glycemic Control    Type and Reason for Visit: Initial  Nutrition Recommendations/Plan:   Encouraged increased intake at meals to meet calorie and protein requirements when diet resumes. Pt with 4 BG<70 , rec increasing D5 IV fluids to 75 ml/hr. Nutrition Assessment:    Pt with Respiratory Failure,Hypotension ,  Pneumonia,UTI ,Cardiomyopathy ,ARF  Pt is underweight related to inadequate caloric intake as evidenced by 89% ideal weight and BMI= 19.6kg/m2. Malnutrition Assessment:  Malnutrition Status: At risk for malnutrition r/t wt and BMI  Estimated Daily Nutrient Needs:  Energy (kcal):  1925  Protein (g):  70       Fluid (ml/day):  1800  Current Nutrition Therapies:  DIET NPO  Anthropometric Measures:  · Height:  5' 6\" (167.6 cm)  · Current Body Wt:  55.1 kg (121 lb 7.6 oz)  · BMI: 19.6  Nutrition Diagnosis:   · Inadequate protein-energy intake  as evidenced by NPO  status due to medical condition  Nutrition Intervention:  Food and/or Nutrient Delivery: Continue NPO  Goals:      Intake will meet >75% of energy and protein requirements. Glucose will be within target range. Weight gain 1 lb. per week.   Nutrition Monitoring and Evaluation:   Food/Nutrient Intake Outcomes: Diet advancement/tolerance  Physical Signs/Symptoms Outcomes: Skin, Weight  ischarge Planning:    No discharge needs at this time     Electronically signed by Gela Chou RD on 11/11/2020 at 1:14PM

## 2020-11-11 NOTE — CONSULTS
[facility]  Pulmonary, Critical Care, and Sleep Medicine    Name: Twila Mccord MRN: [id number]   : 1934 Hospital: [address] [place] [address]   Date: 2020       Critical Care Initial Patient Consult    Requesting MD:                                                  Reason for CC Consult:  IMPRESSION:   · Respiratory Failure  · Hypotension   · Pneumonia  · UTI  · Cardiomyopathy EF 35 to 40%  · ARF      RECOMMENDATIONS:   · Neuro-  patient awake and alert not oriented no resp distress full code denies pain  · Pulmonary- hazy RLL density cw pneumonia otherwise clear hyperinflated on room air  No resp distress  · Cardiac-   On 2 mcg levophed excellent pressure MAP of 69 patient with systolic dysfunction had pacemaker interrogated this am checked out OK No ischemia and NSR with LBBB look versus paced  · Renal-  MARICRUZ worsening  replace electrolyte K replacement on D5 half normal saline  Follow urine output  · Heme-  H/H stable chronically anemic  no active bleeding    · ID-  pneumonia infection and UTI on abx concern send lactate and procalcitonin  No longer hypothermic BG normal.  · DVT prophylaxis  GI- start gi prophylaxis and NPO until encephalopathy improves     Subjective/History:     Kaley Bartlett is a 80 y.o. male who presented to the ER with decrease in his mental status and weakness. This has been going on for 2-3 days prior to admission. He does not have cough. He does not have fever. He does not have nausea or vomiting. In the ER he is found to be hypothermic and has borderline BP. He will be admitted for ongoing management. The patient is critically ill and can not provide additional history due to encephalopathy weak for 2-3 daysunconscious on ventilator.      PMH elevated PSA  hyperlipidemia  HTN  Urinary retention  UTIs    PSHx:  None reported    FMHx lives with son nondrinker nonsmoker     HMEDS     CMEDSCH    ALLERGY nkda  Review of Systems:  Unable to obtain metabolic encephalopathy  Objective:   Vital Signs:    Visit Vitals  /62   Pulse (!) 107   Temp 98.2 °F (36.8 °C)   Resp 18   Ht 5' 6\" (1.676 m)   Wt 55.1 kg (121 lb 8 oz)   SpO2 97%   BMI 19.61 kg/m²       O2 Device: Nasal cannula   O2 Flow Rate (L/min): 2 l/min   Temp (24hrs), Av.9 °F (34.9 °C), Min:90.6 °F (32.6 °C), Max:98.2 °F (36.8 °C)       Intake/Output:     Intake/Output Summary (Last 24 hours) at 2020 0841  Last data filed at 2020 0700  Gross per 24 hour   Intake 5789.2 ml   Output 875 ml   Net 4914.2 ml     Physical Exam:    General:  Awake and alert answers simple questions eyes open follows commands. Head:  Normocephalic, without obvious abnormality, atraumatic. Eyes:  Conjunctivae/corneas clear. PERRL,    Nose: Nares normal. Septum midline. Mucosa normal. No drainage or sinus tenderness. Throat: Lips, mucosa, and tongue normal. Teeth and gums normal.    Neck: Supple, symmetrical, trachea midline, no adenopathy, thyroid: no enlargment/tenderness/nodules, no carotid bruit and no JVD. Back:   Symmetric, no curvature. ROM normal.   Lungs:   Clear to auscultation bilaterally. Pacemaker left chest   Chest wall:  No tenderness or deformity. Heart:  regular regular normal, no murmur, click, rub or gallop. Abdomen:   Soft, non-tender. decreased bowel sounds normal. No masses,    Extremities: Extremities normal, atraumatic, no cyanosis or  cachetic. Pulses: 2+ and symmetric all extremities. Skin: Skin color, texture, turgor normal. No rashes or lesions pale       Neurologic: Awake and alert follows commandsi       Data:   Labs reviewed  yes    Telemetry:nsr paced    Imaging:  I have personally reviewed the patients radiographs and have reviewed the reports:  cxr hazt Rll infiltrate hyperinflated   Best practice :  Core measures; Antibiotic choice:  Severe Sepsis bundles;SIRS screen met?    Yes  Fluids  Lactic acid ordered- initial and repeat Q6hrs if elevated till normalized. Cultures drawn. Antibiotic administered within 1hr-ICU  And 3hrs ED  Large bore IV- 2 sites         Pressors aim MAP >60mmHg  Glycemic control  Sress ulcer prophylaxis  DVT prophylaxis            Total critical care time  exclusive of procedures: 32 minutes      Candido Hill.  MD Henry, FCCP

## 2020-11-11 NOTE — PROGRESS NOTES
2100- Received report from \[facility]\"" in the ER.     2130- TRANSFER - IN REPORT:    Verbal report received from \[facility]\"" (name) on Caffie Phi  being received from ED (unit) for routine progression of care      Report consisted of patients Situation, Background, Assessment and   Recommendations(SBAR). Information from the following report(s) SBAR, Kardex, ED Summary, MAR, Accordion and Cardiac Rhythm Paced was reviewed with the receiving nurse. Opportunity for questions and clarification was provided. Assessment completed upon patients arrival to unit and care assumed. Received patient on stretcher with RN. Patient initial MAP in 70's. Patient alert although unable to verbalize much besides name and year. Patient inconsistent with answers. Patient denies pain at this time. Dual skin check with Elie Pond RN revealed two stage 2 pressure injuries; sacrum and scrotum. All other vitals stable. RN attempt to obtain admission questions from patient, however, patient unable to answer appropriately at this time as patient is confused. RN will attempt to gather proper information via patients son. 2200- Page out to Dr. Coleen [place]. 2207- RN spoke to Dr. Coleen [place] via telephone, informing of patients hypoglycemia (POC glucose 60). Patient currently NPO and does not have orders for hypoglycemic protocol. New orders to be received. 2217- Patient given 12.5g (25mL) of Dextrose 50% as per hypoglycemic protocol for blood glucose 60. Patient currently NPO. RN to recheck POC glucose in 15min. 2232- Recheck POC resulted 111. Patient resting, with eyes closed, appears to be comfortable. Patient able to be awoken with minimal stimulus. 18- Bairhugger turned off at this time. Patient current temp 97.6.    0000- Reassessment completed at this time. Patient cardiac rhythm inconsistent (AFIB/ST depression/BBB/[place]/Peaked T). Patient sleeping in bed, appears to be comfortable. VSS.  Will continue to monitor. 46- Dr. Lady Sanchez at bedside discussing plan of care with patient. Patient currently on Levophed 2.5mcg/min. As per Dr. Lady Sanchez, if patient continues to require Levophed to maintain BP, patient will need CVL tomorrow. RN to update MD on patient status in the morning. Vanessa 1960- RN spoke to Dr. Lady Sanchez via telephone expressing concerns of patients pacemaker. As per media documents from 2020, patients initial setting of pacing @60bpm is failing to pace. Dr. Lady Sanchez made aware; Cardiology to consult. Will continue to monitor. 0400- Reassessment completed. Changes noted; patient now unable to properly identify where he is and his . Currently, patient is A+Oxself. Patient continues to have sporadic irregular and inconsistent cardiac rhythms. Patient remains off martell-hugger and on levophed. VSS will continue to monitor. 0505- POC glucose reads 64 on glucometer. RN spoke to Dr Lday Sanchez via telephone to confirm provider wants to continue replacing patient with half amp D50 (hypoglycemic protocol) or change fluids. As per provider, RN to push 12.5g D50% as per hypoglycemic protocol. 0- RN push 12.5g D50% as per protocol. RN to recheck POC in 15min. 0530- POC recheck resulted 127.    0615- RN spoke to patients son, Smooth Collier, updated on patient status and plan of care. Smooth Collier states that the patient had a recent admission to [facility], where he was given instructions to give patient 1gallon of water per day, and to cut out sugar from his diet completely. Smooth Collier also states that since being discharged from [facility], it was suggested for the patient to go to rehab. However, patient got denied by insurance. Smooth Nando explained how home health PT/OT took a month to start post discharge, which led to the patients decreased strength. Smooth Collier also informed RN that the home health PT is the one who witnessed the patient at home altered, with hypotension to which she called 911.  Smooth Nando given opportunity to ask questions at this time, to which this RN answered appropriately. Desi Liyahportia given patient code to be able to receive information about patient from this point forward. 0700- Dr. Hector Chris paged to bedside for a , appears to look like VTACH. EKG obtained; which reveals ST w/ 1st degree. Dr. Hector Chris to place new orders. 4183- Bedside shift change report given to Jaja La RN (oncoming nurse) by Nicole Gates RN (offgoing nurse). Report included the following information SBAR, Kardex, ED Summary, Intake/Output, MAR, Accordion, Recent Results and Cardiac Rhythm AFIB/Paced/ST depression/BBB/Arrythmia/Peaked T waves/Bradycardia.

## 2020-11-11 NOTE — PROGRESS NOTES
INTERIM UPDATE - 8950 EST on 11/11/2020    Nursing Staff reports that Patient developed an arrhythmia that looked convincingly like Ventricular Tachycardia on monitor and lasted for 7 minutes. STAT EKG captured the rhythm and appears to show Ventricular Tachycardia; however, there is a prolongation between QRS complexes and there is a hint of a P-wave. QTc appears prolonged at 522 ms. Serum Magnesium was 2.2. K+ was 3.7 this AM.    Plan: Will give Potassium chloride 10 mEq q1hr x2 this AM.  Cardiological service already consulted to interrogate cardiac device. Notably, Patient was not able to be weaned from IV Pressors. Will consult Intensivist for possible placement of CVC for possible continued need of IV Pressors.

## 2020-11-11 NOTE — ROUTINE PROCESS
0700: assumed care of pt from Li Mariano RN 
 
0800: pacemaker was interrogated. No issues. Strip on the chart. 1300: Gave son, Cristina Hernandez, an update on the patient 1500: Echo at bedside. 1900: Bedside shift change report given to Home Horne RN (oncoming nurse) by Keith Suarez RN (offgoing nurse). Report included the following information SBAR, MAR and Recent Results.

## 2020-11-11 NOTE — PROGRESS NOTES
Problem: Discharge Planning  Goal: *Discharge to safe environment  Outcome: Progressing Towards Goal    Plan:  home health Vs SNF (if has any days left & qualifies)    Reason for Admission:   Metabolic encephalopathy / Hypotension / Sepsis / UTI               RUR Score:  31       PCP:First and Last name:  Dr. Anita Ramos   Name of Practice:   Are you a current patient: Yes/No: Yes   Approximate date of last visit: Unsure   Can you do a virtual visit with your PCP:              Resources/supports as identified by patient/family:                   Top Challenges facing patient (as identified by patient/family and CM): Finances/Medication cost?   [place] ins                 Transportation? Family              Support system or lack thereof? Son , PCP, Ins, home health                     Living arrangements? Son lives with patient        Self-care/ADLs/Cognition? Requires assistance          Current Advanced Directive/Advance Care Plan:  None on file                          Plan for utilizing home health:  Is active with [facility] home health. Transition of Care Plan:   Home with family & resumption of home health vs SNF, if needed & has days left. Chart reviewed. Met with pt, alert but sleepy, states ok to speak with his son. Called pt's son, Cele Licona, no answer & no VM set up. Assessment completed by chart review. Has walker, wheel chair & BSC. Per home health he was able to ambulate with walker. Will cont to follow. Brooke CoreasRN,ext [phone]. Patient has designated his son to participate in his/her discharge plan and to receive any needed information.      Name:  Cele Licona  Address:  Phone number:  [phone]    Care Management Interventions  PCP Verified by CM: Yes(Dr. Fior Burciaga, unsure when last seen)  Palliative Care Criteria Met (RRAT>21 & CHF Dx)?: No  Mode of Transport at Discharge: BLS  Transition of Care Consult (CM Consult): Discharge Planning  Discharge Durable Medical Equipment: No  Physical Therapy Consult: No  Occupational Therapy Consult: No  Speech Therapy Consult: No  Current Support Network:  Other(Son lives with patient)  Confirm Follow Up Transport: Family  Discharge Location  Discharge Placement: Home with home health(vs SNF)

## 2020-11-12 LAB
ALBUMIN SERPL-MCNC: 2.1 G/DL (ref 3.4–5)
ALBUMIN/GLOB SERPL: 0.6 {RATIO} (ref 0.8–1.7)
ALP SERPL-CCNC: 136 U/L (ref 45–117)
ALT SERPL-CCNC: 28 U/L (ref 16–61)
ANION GAP SERPL CALC-SCNC: 4 MMOL/L (ref 3–18)
AST SERPL-CCNC: 54 U/L (ref 10–38)
BACTERIA SPEC CULT: ABNORMAL
BASOPHILS # BLD: 0 K/UL (ref 0–0.1)
BASOPHILS NFR BLD: 0 % (ref 0–2)
BILIRUB SERPL-MCNC: 0.6 MG/DL (ref 0.2–1)
BUN SERPL-MCNC: 55 MG/DL (ref 7–18)
BUN/CREAT SERPL: 32 (ref 12–20)
CALCIUM SERPL-MCNC: 9 MG/DL (ref 8.5–10.1)
CC UR VC: ABNORMAL
CHLORIDE SERPL-SCNC: 119 MMOL/L (ref 100–111)
CO2 SERPL-SCNC: 27 MMOL/L (ref 21–32)
CREAT SERPL-MCNC: 1.7 MG/DL (ref 0.6–1.3)
DIFFERENTIAL METHOD BLD: ABNORMAL
ECHO IVC PROX: 1.94 CM
ECHO PV REGURGITANT MAX VELOCITY: 282.41 CM/S
ECHO TV REGURGITANT MAX VELOCITY: 306 CM/S
ECHO TV REGURGITANT PEAK GRADIENT: 37.5 MMHG
EOSINOPHIL # BLD: 0 K/UL (ref 0–0.4)
EOSINOPHIL NFR BLD: 0 % (ref 0–5)
ERYTHROCYTE [DISTWIDTH] IN BLOOD BY AUTOMATED COUNT: 22.1 % (ref 11.6–14.5)
GLOBULIN SER CALC-MCNC: 3.7 G/DL (ref 2–4)
GLUCOSE BLD STRIP.AUTO-MCNC: 109 MG/DL (ref 70–110)
GLUCOSE BLD STRIP.AUTO-MCNC: 177 MG/DL (ref 70–110)
GLUCOSE BLD STRIP.AUTO-MCNC: 72 MG/DL (ref 70–110)
GLUCOSE BLD STRIP.AUTO-MCNC: 84 MG/DL (ref 70–110)
GLUCOSE SERPL-MCNC: 70 MG/DL (ref 74–99)
HCT VFR BLD AUTO: 31.6 % (ref 36–48)
HGB BLD-MCNC: 10.1 G/DL (ref 13–16)
LYMPHOCYTES # BLD: 0.8 K/UL (ref 0.9–3.6)
LYMPHOCYTES NFR BLD: 13 % (ref 21–52)
MCH RBC QN AUTO: 28.6 PG (ref 24–34)
MCHC RBC AUTO-ENTMCNC: 32 G/DL (ref 31–37)
MCV RBC AUTO: 89.5 FL (ref 74–97)
MONOCYTES # BLD: 0.4 K/UL (ref 0.05–1.2)
MONOCYTES NFR BLD: 6 % (ref 3–10)
NEUTS SEG # BLD: 5.1 K/UL (ref 1.8–8)
NEUTS SEG NFR BLD: 81 % (ref 40–73)
PLATELET # BLD AUTO: 103 K/UL (ref 135–420)
PMV BLD AUTO: 10.6 FL (ref 9.2–11.8)
POTASSIUM SERPL-SCNC: 3.9 MMOL/L (ref 3.5–5.5)
PROT SERPL-MCNC: 5.8 G/DL (ref 6.4–8.2)
RBC # BLD AUTO: 3.53 M/UL (ref 4.7–5.5)
SERVICE CMNT-IMP: ABNORMAL
SODIUM SERPL-SCNC: 150 MMOL/L (ref 136–145)
WBC # BLD AUTO: 6.3 K/UL (ref 4.6–13.2)

## 2020-11-12 PROCEDURE — 80053 COMPREHEN METABOLIC PANEL: CPT

## 2020-11-12 PROCEDURE — 65660000001 HC RM ICU INTERMED STEPDOWN

## 2020-11-12 PROCEDURE — 74011250636 HC RX REV CODE- 250/636: Performed by: INTERNAL MEDICINE

## 2020-11-12 PROCEDURE — 74011000258 HC RX REV CODE- 258: Performed by: INTERNAL MEDICINE

## 2020-11-12 PROCEDURE — 99232 SBSQ HOSP IP/OBS MODERATE 35: CPT | Performed by: INTERNAL MEDICINE

## 2020-11-12 PROCEDURE — 74011000258 HC RX REV CODE- 258: Performed by: HOSPITALIST

## 2020-11-12 PROCEDURE — 74011250636 HC RX REV CODE- 250/636: Performed by: HOSPITALIST

## 2020-11-12 PROCEDURE — 82962 GLUCOSE BLOOD TEST: CPT

## 2020-11-12 PROCEDURE — 97112 NEUROMUSCULAR REEDUCATION: CPT

## 2020-11-12 PROCEDURE — 74011250637 HC RX REV CODE- 250/637: Performed by: INTERNAL MEDICINE

## 2020-11-12 PROCEDURE — 97162 PT EVAL MOD COMPLEX 30 MIN: CPT

## 2020-11-12 PROCEDURE — 85025 COMPLETE CBC W/AUTO DIFF WBC: CPT

## 2020-11-12 PROCEDURE — 2709999900 HC NON-CHARGEABLE SUPPLY

## 2020-11-12 PROCEDURE — 97166 OT EVAL MOD COMPLEX 45 MIN: CPT

## 2020-11-12 RX ORDER — NYSTATIN 100000 [USP'U]/G
POWDER TOPICAL 2 TIMES DAILY
Status: DISCONTINUED | OUTPATIENT
Start: 2020-11-12 | End: 2020-11-20 | Stop reason: HOSPADM

## 2020-11-12 RX ORDER — ERYTHROMYCIN 5 MG/G
OINTMENT OPHTHALMIC EVERY 8 HOURS
Status: DISCONTINUED | OUTPATIENT
Start: 2020-11-12 | End: 2020-11-20 | Stop reason: HOSPADM

## 2020-11-12 RX ORDER — DEXTROSE AND POTASSIUM CHLORIDE 5; .15 G/100ML; G/100ML
SOLUTION INTRAVENOUS CONTINUOUS
Status: DISCONTINUED | OUTPATIENT
Start: 2020-11-12 | End: 2020-11-15

## 2020-11-12 RX ADMIN — Medication 10 ML: at 06:00

## 2020-11-12 RX ADMIN — NYSTATIN: 100000 POWDER TOPICAL at 21:55

## 2020-11-12 RX ADMIN — DEXTROSE MONOHYDRATE AND POTASSIUM CHLORIDE: 5; .149 INJECTION, SOLUTION INTRAVENOUS at 10:47

## 2020-11-12 RX ADMIN — Medication 10 ML: at 15:07

## 2020-11-12 RX ADMIN — ERYTHROMYCIN: 5 OINTMENT OPHTHALMIC at 15:07

## 2020-11-12 RX ADMIN — MEROPENEM 500 MG: 500 INJECTION, POWDER, FOR SOLUTION INTRAVENOUS at 17:44

## 2020-11-12 RX ADMIN — ENOXAPARIN SODIUM 30 MG: 30 INJECTION SUBCUTANEOUS at 09:00

## 2020-11-12 RX ADMIN — NYSTATIN: 100000 POWDER TOPICAL at 13:00

## 2020-11-12 RX ADMIN — ERYTHROMYCIN: 5 OINTMENT OPHTHALMIC at 22:05

## 2020-11-12 RX ADMIN — Medication 10 ML: at 22:05

## 2020-11-12 RX ADMIN — DEXTROSE MONOHYDRATE AND SODIUM CHLORIDE 75 ML/HR: 5; .45 INJECTION, SOLUTION INTRAVENOUS at 00:10

## 2020-11-12 RX ADMIN — MEROPENEM 500 MG: 500 INJECTION, POWDER, FOR SOLUTION INTRAVENOUS at 04:50

## 2020-11-12 NOTE — PROGRESS NOTES
Progress Note      Patient: Nataly Martin               Sex: male          DOA: 11/10/2020       YOB: 1934      Age:  80 y.o.        LOS:  LOS: 2 days             CHIEF COMPLAINT:   Sepsis    Subjective:     Patient is sitting up and talking  No distress    Objective:      Visit Vitals  /87   Pulse 63   Temp 98 °F (36.7 °C)   Resp 18   Ht 5' 6\" (1.676 m)   Wt 55.6 kg (122 lb 9.6 oz)   SpO2 100%   BMI 19.79 kg/m²       Physical Exam:  Gen:  Patient is in no distress. No complaint  HEENT and Neck:  PERRL, No cervical tenderness or masses  Lungs:  Clear bilaterally. No rales, no wheeze. Normal effort. Heart:  Regular Rate and Rhythm. No murmur or gallop. No JVD. No edema. Abdomen:  Soft, non tender, no masses, no Hepatosplenomegally  Extremities:  No digital clubbing, no cyanosis  Neuro:  Alert and oriented, Normal affect, Cranial nerves intact, No sensory deficits        Lab/Data Reviewed:    Labs reviewed        Assessment/Plan     Principal Problem:    Sepsis ([address] (7/6/2020)    Active Problems:    UTI (urinary tract infection) (2/13/2953)      Metabolic encephalopathy (90/39/9388)      Hypotension (2/19/2020)      Cardiomyopathy ([address] (2/20/2020)      Overview: LVEF 35-40% by previous TTE. Hypothermia (12/23/2019)      Shock ([address] (29/01/1562)      Complicated UTI (urinary tract infection) (11/10/2020)        Plan:  Continue with IV antibiotics  BP control  Monitor temperature  Follow renal function  Mobilize as able  DVT prophylaxis.

## 2020-11-12 NOTE — PROGRESS NOTES
Called pt's son, Holly Turner, to discuss discharge plan. States if [facility] will authorize would him to go to SNF again to get some strength back. States ok to send out to [facility], posted in [email or web address]. Has been to [facility], however he owes money & states they will not accept him back. Made him aware that if he is out of SNF days and/or [employer] does not auth, he will need to come home with home health, verbalized understanding. States is active with [facility]. Will cont to follow. Brooke Coreas RN,ext [phone].

## 2020-11-12 NOTE — PROGRESS NOTES
[facility]. Pulmonary, Critical Care, and Sleep Medicine    Name: Judy Matt MRN: [id number]   : 1934 Hospital: [address] [place] [address]   Date: 2020  Admission Date: 11/10/2020     Chart and notes reviewed. Data reviewed. I have evaluated all findings. [x]I have reviewed the flowsheet and previous days notes. []The patient is unable to give any meaningful history or review of systems because the patient is:  []Intubated []Sedated   []Unresponsive      [x]The patient is critically ill on      []Mechanical ventilation []Pressors   []BiPAP []                 ROS:A comprehensive review of systems was negative. awake and alert off pressors doing well will SIGN OFF Can move to floor    Events and notes from last 24 hours reviewed. Care plan discussed on multidisciplinary rounds. 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    Other chest pain R07.89    Dysphagia R13.10    Hypoglycemia E16.2    HTN (hypertension) I10    Left bundle branch block I44.7    Sinoatrial node dysfunction (HCC) I49.5    Gastrointestinal bleeding K92.2    Pacemaker Z95.0    Sepsis (HCC) A41.9    Pleural effusion, bilateral J90    HCAP (healthcare-associated pneumonia) J18.9    Acute on chronic systolic (congestive) heart failure (HCC) I50.23    Pneumonia J18.9    Moderate protein malnutrition (HCC) A74.0    Metabolic encephalopathy P09.32    Shock (HCC) C13.3    Complicated UTI (urinary tract infection) N39.0       Vital Signs:  Visit Vitals  BP (!) 106/59   Pulse 68   Temp (!) 96.3 °F (35.7 °C)   Resp 12   Ht 5' 6\" (1.676 m)   Wt 55.6 kg (122 lb 9.6 oz)   SpO2 100%   BMI 19.79 kg/m²       O2 Device: Room air   O2 Flow Rate (L/min): 2 l/min   Temp (24hrs), Av °F (36.7 °C), Min:96.3 °F (35.7 °C), Max:98.7 °F (37.1 °C)       Intake/Output:   Last shift:       07 - 1900  In: 75 [I.V.:75]  Out: 100 [Urine:100]  Last 3 shifts: 11/10 1901 -  07  In: 2991.6 [I.V.:2991.6]  Out: 2119 [Urine:2119]    Intake/Output Summary (Last 24 hours) at 2020 0901  Last data filed at 2020 0800  Gross per 24 hour   Intake 2072. 95 ml   Output 1478 ml   Net 594.95 ml      Ventilator Settings:                Current Facility-Administered Medications   Medication Dose Route Frequency    dextrose 5 % - 0.45% NaCl infusion  75 mL/hr IntraVENous CONTINUOUS    NOREPINephrine (LEVOPHED) 8 mg in 0.9% NS 250ml infusion  0.5-30 mcg/min IntraVENous TITRATE    meropenem (MERREM) 500 mg in 0.9% sodium chloride (MBP/ADV) 50 mL MBP  500 mg IntraVENous Q12H    sodium chloride (NS) flush 5-40 mL  5-40 mL IntraVENous Q8H    enoxaparin (LOVENOX) injection 30 mg  30 mg SubCUTAneous DAILY       Telemetry:     Physical Exam:   General: in no apparent distress, alert, cooperative, cachectic and disoriented   HEENT: Normal, PERRLA, EOMI, fundi benign, Not examined   Neck: No abnormally enlarged lymph nodes.    Chest: normal pacemaker   Lungs: normal air entry, distant lung sounds and clear, no dullness/ tenderness/ rash   Heart: Regular rate and rhythm, S1S2 present, without murmur or extra heart sounds or paced   Abdomen: non distended, bowel sounds normoactive, tympanic, abdomen is soft without significant tenderness, masses, organomegaly or guarding   Extremity: negative, cachetic   Neuro: alert follows commands moves all fours OOB transfer to floor   Skin: Skin color, texture, turgor normal. No rashes or lesions   DATA:  MAR reviewed and pertinent medications noted or modified as needed    Labs:  Recent Labs     11/12/20  0340 11/11/20  0256 11/10/20  1438   WBC 6.3 7.4 6.7   HGB 10.1* 10.7* 12.6*   HCT 31.6* 31.1* 36.9   * 105* 119*     Recent Labs     11/12/20 0340 11/11/20  1500 11/11/20  0256 11/10/20  1438   *  --  149* 144   K 3.9 3.8 3.7 4.0   *  --  116* 108   CO2 27  --  26 29   GLU 70*  --  68* 63*   BUN 55*  --  73* 83*   CREA 1.70*  --  1.88* 2.09*   CA 9.0  --  8.6 9.9   MG  --  2.2 1.8 2.2   PHOS  --   --   --  4.4   ALB 2.1*  --   --  2.7*   ALT 28  --   --  41   INR  --   --   --  1.2         Imaging:  [x]                           I have personally reviewed the patients radiographs and reports  cxr  High complexity decision making was performed during this consultation and evaluation. [x]       Pt is at high risk for further organ failure and dysfunction. Critical care time spent 34 minutes with patient exclusive of procedures.     IMPRESSION:   · Respiratory Failure  · Hypotension   · Pneumonia  · UTI  · Cardiomyopathy EF 35 to 40%  · ARF      PLAN:   · Neuro-  patient awake and alert not oriented no resp distress full code denies pain  · Pulmonary- hazy RLL density cw pneumonia otherwise clear hyperinflated on room air  No resp distress  · Cardiac-   Off pressors excellent pressure MAP of 70 patient with systolic dysfunction had pacemaker interrogated this am checked out OK No ischemia and NSR with LBBB look versus paced  · Renal-  MARICRUZ improving  replace electrolyte K replacement on D5 half normal saline  Follow urine output  · Heme-  H/H stable chronically anemic  no active bleeding    · ID-  pneumonia infection and UTI on abx concern send lactate and procalcitonin  No longer hypothermic BG normal.  · DVT prophylaxis   ·  GI- start gi prophylaxis and NPO until encephalopathy improves swallowing eval for starting diet today at bedside  · SIGN OFF no further critical car issues         Yesenia Navneet., MD

## 2020-11-12 NOTE — PROGRESS NOTES
Problem: Mobility Impaired (Adult and Pediatric)  Goal: *Acute Goals and Plan of Care (Insert Text)  Description: Physical Therapy Goals  Initiated 11/12/2020 and to be accomplished within 7 day(s)  1. Patient will move from supine to sit and sit to supine  in bed with modified independence. 2.  Patient will transfer from bed to chair and chair to bed with modified independence using the least restrictive device. 3.  Patient will perform sit to stand with modified independence. 4.  Patient will ambulate with modified independence for 50 feet with the least restrictive device. Outcome: Progressing Towards Goal   PHYSICAL THERAPY EVALUATION    Patient: Rita Menchaca (89 y.o. male)  Date: 11/12/2020  Primary Diagnosis: Complicated UTI (urinary tract infection) [N39.0]  Shock ([address] [R57.9]  Precautions: Fall  PLOF: Mod I  ASSESSMENT :  Oriented to person and place; disoriented to time. BP prior to mobility 83/45. Min A for supine for supine to sit with HOB elevated and additional time. Seated EOB 5 minutes with supervision and BUE on bedrail. /62 in seated. Returned to supine with HOB elevated and mod A. Mod A for positioning in bed. Seated with HOB elevated. Educated on need for RN assistance with mobility; verbalized understanding. Call bell in reach. Patient will benefit from skilled intervention to address the above impairments.   Patient's rehabilitation potential is considered to be Fair  Factors which may influence rehabilitation potential include:   []         None noted  []         Mental ability/status  [x]         Medical condition  []         Home/family situation and support systems  []         Safety awareness  []         Pain tolerance/management  []         Other:      PLAN :  Recommendations and Planned Interventions:   [x]           Bed Mobility Training             [x]    Neuromuscular Re-Education  [x]           Transfer Training                   []    Orthotic/Prosthetic Training  [x]           Gait Training                          []    Modalities  [x]           Therapeutic Exercises           []    Edema Management/Control  [x]           Therapeutic Activities            [x]    Family Training/Education  [x]           Patient Education  []           Other (comment):    Frequency/Duration: Patient will be followed by physical therapy 3-5 times a week to address goals. Discharge Recommendations: Khai Ramsey  Further Equipment Recommendations for Discharge: rolling walker     SUBJECTIVE:   Patient stated Bradley Rosas do you want to know about November 12th.     OBJECTIVE DATA SUMMARY:     Past Medical History:   Diagnosis Date    Difficulty urinating     Elevated PSA     Hematuria, unspecified     Hypercholesterolemia     Hypertension     Incomplete bladder emptying     LBBB (left bundle branch block)     [facility]mic [facility]) [facility] [address]     Pacemaker     single chamber Medtronic MRI compatible pacemaker 05/2020    Thyroid disorder     Urinary retention     UTI (urinary tract infection)      Past Surgical History:   Procedure Laterality Date    APPENDECTOMY      EGD  2/7/2014         HX TURP  6/13/16    HX TURP      SD INS NEW/RPLC PRM PACEMAKER W/TRANSV ELTRD VENTR N/A 5/5/2020    Insert Ppm Single Ventricular performed by Yousuf Early MD at [address] LAB     Barriers to Learning/Limitations: yes;  cognitive and altered mental status (i.e.Sedation, Confusion)  Compensate with: Visual Cues, Verbal Cues, Tactile Cues and Kinesthetic Cues    Home Situation:  Home Situation  Home Environment: Private residence  One/Two Story Residence: One story  Living Alone: No  Support Systems: Family member(s)  Patient Expects to be Discharged to[de-identified] Apartment  Current DME Used/Available at Home: Walker, rolling    Critical Behavior:  Neurologic State: Alert  Orientation Level: Oriented to person;Oriented to place; Disoriented to time  Cognition: Follows commands     Psychosocial  Patient Behaviors: Cooperative    Strength:    Manual Muscle Testing (LE)         R     L    Hip Flexion:   4/5 4/5  Knee EXT:   4/5 4/5  Knee FLEX:   4/5 4/5  Ankle DF:   4/5 4/5  _________________________________________________   Range Of Motion:  BLE AROM WFL  Functional Mobility:  Bed Mobility:  Supine to Sit: Minimum assistance  Sit to Supine: Moderate assistance  Balance:   Sitting: Impaired  Sitting - Static: Good (unsupported)  Sitting - Dynamic: Fair (occasional)  Neuro Re-education:  Seated EOB 5 minutes  Pain:  Pain level pre-treatment: 0/10   Pain level post-treatment: 0/10     Activity Tolerance:   Fair    After treatment:   []         Patient left in no apparent distress sitting up in chair  [x]         Patient left in no apparent distress in bed  [x]         Call bell left within reach  [x]         Nursing notified  []         Caregiver present  []         Bed alarm activated  []         SCDs applied    COMMUNICATION/EDUCATION:   [x]         Role of physical therapy and plan of care in the acute care setting. [x]         Fall prevention education was provided and the patient/caregiver indicated understanding. [x]         Patient/family have participated as able in goal setting and plan of care. []         Patient/family agree to work toward stated goals and plan of care. []         Patient understands intent and goals of therapy, but is neutral about his/her participation. []         Patient is unable to participate in goal setting/plan of care: ongoing with therapy staff.     Thank you for this referral.  Yossi Silva, PT   Time Calculation: 14 mins    Eval Complexity: History: MEDIUM  Complexity : 1-2 comorbidities / personal factors will impact the outcome/ POC Exam:MEDIUM Complexity : 3 Standardized tests and measures addressing body structure, function, activity limitation and / or participation in recreation  Presentation: MEDIUM Complexity : Evolving with changing characteristics  Clinical Decision Making:Medium Complexity   Clinical judgement; ROM, MMT, functional mobility Overall Complexity:MEDIUM

## 2020-11-12 NOTE — PROGRESS NOTES
(9302) Received report from Leslie Velásquez [place]. Assumed care of patient. Whiteboard updated. VSS. Will continue to monitor. Patient did not need pressors or martell hugger overnight. Patient is normothermic and maintained normal glucose levels. (2390) Bedside and Verbal shift change report given to [facility] (oncoming nurse) by Shayna Zuniga RN (offgoing nurse). Report included the following information SBAR, Intake/Output, MAR, Recent Results, Cardiac Rhythm 1st degree HB with inverted T wave and Alarm Parameters .

## 2020-11-12 NOTE — PROGRESS NOTES
11/12/20 0903   Dysphagia Screening   Vocal Quality/Secretions 0   History of Dysphagia 0   O2 Saturation 0   Alertness 0   Pre-Swallow Assessment Score 0   Purees 0   Water by Cup 0   Water by Straw 0

## 2020-11-12 NOTE — PROGRESS NOTES
Problem: Self Care Deficits Care Plan (Adult)  Goal: *Acute Goals and Plan of Care (Insert Text)  Description: Occupational Therapy Goals  Initiated 11/12/2020 within 7 day(s). 1.  Patient will perform self-feeding and grooming with modified independence. 2.  Patient will perform bathing with minimal assistance/contact guard assist.  3.  Patient will perform upper body dressing and lower body dressing with minimal assistance/contact guard assist.  4.  Patient will perform toilet transfers with supervision/set-up. 5.  Patient will perform all aspects of toileting with supervision/set-up. 6.  Patient will participate in upper extremity therapeutic exercise/activities with supervision/set-up for 8 minutes. 7.  Patient will utilize energy conservation techniques during functional activities with min verbal cues. Prior Level of Function: pt reports his bijal assists with bathing & dressing tasks at bed level, assist part of the time for bedside commode transfers using RW     OCCUPATIONAL THERAPY EVALUATION    Patient: Sherren Spinner (48 y.o. male)  Date: 11/12/2020  Primary Diagnosis: Complicated UTI (urinary tract infection) [N39.0]  Shock ([address] [R57.9]        Precautions:  Fall, Other (comment)(pacemaker)  PLOF: see above    ASSESSMENT :Nursing/RN cleared for pt to participate in OT evaluation and tx session. Patient  admitted to hospital with sepsis, UTI and tachycardia. Patient presents lying supine in bed, A & O x 4, no c/o pain. Bed mobility: Mod A supine <-> sit edge of bed. BUE AROM: shoulder flexion 120 degrees, elbows and wrists WFL, MMT 3+/5, patient tolerated sitting on edge of bed w/ Fair balance, requested lotion applied to back d/t feeling itchy, reports increased comfort following-nursing notified. Dep x 2 scooting up towards head of bed for optimal bed positioning. Patient lying semi-reclined in bed at end of tx session, Mod I beverage retrieval and bringing to mouth to drink from straw. Patient verbalized understanding to utilize call bell to request assist e.g. functional transfers in order to prevent falls. Patient will benefit from skilled intervention to address the above impairments. Patient's rehabilitation potential is considered to be Good  Factors which may influence rehabilitation potential include:   [x]             None noted  []             Mental ability/status  []             Medical condition  []             Home/family situation and support systems  []             Safety awareness  []             Pain tolerance/management  []             Other:      PLAN :  Recommendations and Planned Interventions:   [x]               Self Care Training                  [x]      Therapeutic Activities  [x]               Functional Mobility Training   []      Cognitive Retraining  [x]               Therapeutic Exercises           [x]      Endurance Activities  [x]               Balance Training                    [x]      Neuromuscular Re-Education  []               Visual/Perceptual Training     [x]      Home Safety Training  [x]               Patient Education                   [x]      Family Training/Education  []               Other (comment):    Frequency/Duration: Patient will be followed by occupational therapy 3 -5 times a week to address goals. Discharge Recommendations: Khai Ramsey  Further Equipment Recommendations for Discharge:hospital bed     SUBJECTIVE:   Patient stated My back feels itchy, will you rub it?     OBJECTIVE DATA SUMMARY:     Past Medical History:   Diagnosis Date    Difficulty urinating     Elevated PSA     Hematuria, unspecified     Hypercholesterolemia     Hypertension     Incomplete bladder emptying     LBBB (left bundle branch block)     NICM (nonischemic cardiomyopathy) ([facility] [address]     Pacemaker     single chamber Medtronic MRI compatible pacemaker 05/2020    Thyroid disorder     Urinary retention     UTI (urinary tract infection)      Past Surgical History:   Procedure Laterality Date    APPENDECTOMY      EGD  2/7/2014         HX TURP  6/13/16    HX TURP      CT INS NEW/RPLC PRM PACEMAKER W/TRANSV ELTRD VENTR N/A 5/5/2020    Insert Ppm Single Ventricular performed by Skylar Nair MD at [address] LAB     Barriers to Learning/Limitations: None  Compensate with: visual, verbal, tactile, kinesthetic cues/model    Home Situation:   Home Situation  Home Environment: Private residence  One/Two Story Residence: One story  Living Alone: No  Support Systems: Child(darryl), Family member(s)  Patient Expects to be Discharged to[de-identified] Private residence  Current DME Used/Available at Home: Walker, rolling, Wheelchair, Commode, bedside  Tub or Shower Type: Other (comment)(does not use, spongebathes at bedlevel)  [x]  Right hand dominant   []  Left hand dominant    Cognitive/Behavioral Status:  Neurologic State: Alert; Appropriate for age  Orientation Level: Oriented X4  Cognition: Follows commands  Safety/Judgement: Fall prevention    Skin: appears intact    Edema: none noted    Vision/Perceptual:  appears intact    Coordination: BUE  Coordination: Generally decreased, functional(BUEs)  Fine Motor Skills-Upper: Left Intact; Right Intact    Gross Motor Skills-Upper: Left Intact; Right Intact    Balance:  Sitting: Impaired  Sitting - Static: Fair (occasional)  Sitting - Dynamic: Not tested    Strength: BUE  Strength: Generally decreased, functional(BUEs)     Tone & Sensation: BUE  Tone: Normal(BUEs)  Sensation: Impaired(BUEs)     Range of Motion: BUE  AROM: Generally decreased, functional(BUEs)      Functional Mobility and Transfers for ADLs:  Bed Mobility:     Supine to Sit: Moderate assistance  Sit to Supine: Moderate assistance  Scooting: Moderate assistance     ADL Assessment:   Feeding: Supervision;Setup    Oral Facial Hygiene/Grooming: Stand-by assistance;Setup    Bathing: Maximum assistance    Upper Body Dressing: Maximum assistance    Lower Body Dressing:  Total assistance    Toileting: Total assistance     ADL Intervention:   patient tolerated sitting on edge of bed w/ Fair balance, requested lotion applied to back d/t feeling itchy, reports increased comfort following-nursing notified. Dep x 2 scooting up towards head of bed for optimal bed positioning. Patient lying semi-reclined in bed at end of tx session, Mod I beverage retrieval and bringing to mouth to drink from straw. Cognitive Retraining  Safety/Judgement: Fall prevention    Pain:  Pain level pre-treatment: 0/10   Pain level post-treatment: 0/10   Pain Intervention(s): Medication (see MAR); Rest, Ice, Repositioning   Response to intervention: Nurse notified, See doc flow    Activity Tolerance:   poor  Please refer to the flowsheet for vital signs taken during this treatment. After treatment:   [] Patient left in no apparent distress sitting up in chair  [x] Patient left in no apparent distress in bed  [x] Call bell left within reach  [x] Nursing notified  [] Caregiver present  [] Bed alarm activated    COMMUNICATION/EDUCATION:   [x] Role of Occupational Therapy in the acute care setting  [x] Home safety education was provided and the patient/caregiver indicated understanding. [x] Patient/family have participated as able in goal setting and plan of care. [x] Patient/family agree to work toward stated goals and plan of care. [] Patient understands intent and goals of therapy, but is neutral about his/her participation. [] Patient is unable to participate in goal setting and plan of care. Thank you for this referral.  Aleah Toney  Time Calculation: 15 mins    Eval Complexity: History: MEDIUM Complexity : Expanded review of history including physical, cognitive and psychosocial  history ; Examination: MEDIUM Complexity : 3-5 performance deficits relating to physical, cognitive , or psychosocial skils that result in activity limitations and / or participation restrictions;    Decision Making:MEDIUM Complexity : Patient may present with comorbidities that affect occupational performnce.  Miniml to moderate modification of tasks or assistance (eg, physical or verbal ) with assesment(s) is necessary to enable patient to complete evaluation

## 2020-11-12 NOTE — PROGRESS NOTES
Physical Exam  Skin:     General: Skin is warm and dry. Capillary Refill: Capillary refill takes less than 2 seconds. Primary Nurse Katie Anthony RN and Key Staton RN performed a dual skin assessment on this patient Impairment noted- see wound doc flow sheet Alex score is 13.

## 2020-11-12 NOTE — PROGRESS NOTES
Problem: Falls - Risk of  Goal: *Absence of Falls  Description: Document Krystyna Garcia Fall Risk and appropriate interventions in the flowsheet. Outcome: Progressing Towards Goal  Note: Fall Risk Interventions:  Mobility Interventions: Bed/chair exit alarm    Mentation Interventions: Adequate sleep, hydration, pain control, Bed/chair exit alarm, Door open when patient unattended    Medication Interventions: Bed/chair exit alarm, Patient to call before getting OOB, Teach patient to arise slowly    Elimination Interventions: Bed/chair exit alarm, Call light in reach, Patient to call for help with toileting needs, Toileting schedule/hourly rounds              Problem: Patient Education: Go to Patient Education Activity  Goal: Patient/Family Education  Outcome: Progressing Towards Goal     Problem: Pressure Injury - Risk of  Goal: *Prevention of pressure injury  Description: Document Alex Scale and appropriate interventions in the flowsheet. Outcome: Progressing Towards Goal  Note: Pressure Injury Interventions:  Sensory Interventions: Assess changes in LOC    Moisture Interventions: Absorbent underpads, Apply protective barrier, creams and emollients, Check for incontinence Q2 hours and as needed, Internal/External urinary devices, Maintain skin hydration (lotion/cream), Offer toileting Q_hr    Activity Interventions: Assess need for specialty bed, Increase time out of bed, Pressure redistribution bed/mattress(bed type), PT/OT evaluation    Mobility Interventions: HOB 30 degrees or less, Pressure redistribution bed/mattress (bed type), PT/OT evaluation, Turn and reposition approx.  every two hours(pillow and wedges)    Nutrition Interventions: Document food/fluid/supplement intake    Friction and Shear Interventions: Apply protective barrier, creams and emollients, Feet elevated on foot rest, Foam dressings/transparent film/skin sealants, HOB 30 degrees or less, Lift sheet, Lift team/patient mobility team Problem: Patient Education: Go to Patient Education Activity  Goal: Patient/Family Education  Outcome: Progressing Towards Goal     Problem: Pain  Goal: *Control of Pain  Outcome: Progressing Towards Goal  Goal: *PALLIATIVE CARE:  Alleviation of Pain  Outcome: Progressing Towards Goal     Problem: Patient Education: Go to Patient Education Activity  Goal: Patient/Family Education  Outcome: Progressing Towards Goal     Problem: Urinary Tract Infection - Adult  Goal: *Absence of infection signs and symptoms  Outcome: Progressing Towards Goal     Problem: Patient Education: Go to Patient Education Activity  Goal: Patient/Family Education  Outcome: Progressing Towards Goal     Problem: Discharge Planning  Goal: *Discharge to safe environment  Outcome: Progressing Towards Goal

## 2020-11-12 NOTE — ROUTINE PROCESS
1930- assumed care of pt awake and alert, moving around in bed. 2000- assessment done. Pt wanting to sit on side of bed, assist to bedside, feet on floor. Pt able to sit up about 5 min then was repositioned back in bed. Mojica care done. Perineal area excoriated with little yeast like rash. Eye care done. Pt eyes were encrusted, guey. Sclera and lower lids are red. 2200- IVs flushed, good blood return noted. 0001- reassessment done, mouth care and mojica care done. 0400- complete bath given and labs drawn. MD made aware of yeastlike rash in perineal area and red sclera and eye lids. No orders received. 0730- Bedside and Verbal shift change report given to [address] (oncoming nurse) by Rosangela Hatfield RN 
 (offgoing nurse). Report included the following information SBAR, Kardex, Intake/Output, MAR, Recent Results and Cardiac Rhythm NSR, BBB, 1stdegree AVB, inverted T wave.

## 2020-11-12 NOTE — PROGRESS NOTES
[facility] - Progress Note  Admit Date: 11/10/2020  Patient seen and examined independently. Reportedly more alert today. Pacer checked yesterday and functioning properly. No evidence for wide-complex tachycardia present. Echo done yesterday with ejection fraction of 30%. There was moderate MR and TR. Blood pressure remains borderline. We will continue present cardiac Rx. Agree with assessment and plan as noted below. Orin Smith MD  Assessment:     Hospital Problems  Date Reviewed: 11/10/2020          Codes Class Noted POA    Metabolic encephalopathy GXP-35-KE: G93.41  ICD-9-CM: 348.31  11/10/2020 Yes        Shock ([facility] ICD-10-CM: R57.9  ICD-9-CM: 785.50  97/23/3694 Yes        Complicated UTI (urinary tract infection) ICD-10-CM: N39.0  ICD-9-CM: 599.0  11/10/2020 Yes        * (Principal) Sepsis ([facility] [address] ICD-10-CM: A41.9  ICD-9-CM: 038.9, 995.91  7/6/2020 Yes        Cardiomyopathy ([facility] [address] ICD-10-CM: I42.9  ICD-9-CM: 425.4  2/20/2020 Yes    Overview Signed 2/20/2020 12:40 AM by Jenna KRAUSE, DO     LVEF 35-40% by previous TTE.             UTI (urinary tract infection) ICD-10-CM: N39.0  ICD-9-CM: 599.0  2/19/2020 Yes        Hypotension ICD-10-CM: I95.9  ICD-9-CM: 458.9  2/19/2020 Yes        Hypothermia ICD-10-CM: T68. Ora Asa  ICD-9-CM: 991.6  12/23/2019 Yes            -Admitted with fatigue, dizziness in the setting of UTI & pneumonia, patient hypoglycemic, hypothermic and hypotensive in ED requiring pressors, elevated BNP, negative troponin x 1, CXR showed right mid-lower lung airspace opacity, possible atelectasis/airspace disease and accompanying pleural effusion.  ECG on admission showed ventricular paced rhythm  -s/p single chamber PPM, MRI compatible ([employer], 05/2020)   -LBBB, chronic  -Nonischemic cardiomyopathy with EF 40% (09/2020), Moderately dilated LA, Mod-Severe MR, mild AR and TR   -Pharm nuc 4/22/20, no ischemia  -HLD  -Hypertension  -CKD  -GERD on PPI  -Hypothyroid on replacement  -h/o GIB, stable  -Severe deconditioning     Primary cardiologist Dr. Taylor Has:     -on IV abx, defer to primary team   -pacer interrogated yesterday, pacing appropriately   -Off pressors, pt hemodynamically stable  - Hypothermia resolved, off warming blanket   -Overnight tele reviewed, SR, HR trending in 60s   -BP borderline     Subjective:     Patient more alert and talkative this morning. No CP. Objective:      Patient Vitals for the past 8 hrs:   Temp Pulse Resp BP SpO2   11/12/20 0800 (!) 96.3 °F (35.7 °C) 68 12 (!) 106/59 100 %   11/12/20 0700  71 16 114/60 100 %   11/12/20 0601 98.2 °F (36.8 °C) 74 14 112/64 100 %   11/12/20 0506  63 23 (!) 87/61 100 %   11/12/20 0435  61 17 (!) 83/63 100 %   11/12/20 0424  67 11 (!) 101/43 100 %   11/12/20 0400 (!) 96.4 °F (35.8 °C) 68 14 (!) 95/46 100 %   11/12/20 0300 97 °F (36.1 °C) 74 16 (!) 105/56 100 %         Patient Vitals for the past 96 hrs:   Weight   11/12/20 0506 122 lb 9.6 oz (55.6 kg)   11/11/20 1538 121 lb (54.9 kg)   11/11/20 1209 121 lb 7.6 oz (55.1 kg)   11/10/20 2130 121 lb 8 oz (55.1 kg)   11/10/20 1415 110 lb (49.9 kg)                    Intake/Output Summary (Last 24 hours) at 11/12/2020 1021  Last data filed at 11/12/2020 0800  Gross per 24 hour   Intake 2072. 95 ml   Output 1445 ml   Net 627.95 ml       Physical Exam:  General:  alert, cooperative, no distress, appears stated age  Neck:  nontender, no JVD  Lungs:  clear to auscultation bilaterally  Heart:  regular rate and rhythm, S1, S2 normal, no murmur, click, rub or gallop  Abdomen:  abdomen is soft without significant tenderness, masses, organomegaly or guarding  Extremities:  extremities normal, atraumatic, no cyanosis or edema    Data Review:     Labs: Results:       Chemistry Recent Labs     11/12/20  0340 11/11/20  1500 11/11/20  0256 11/10/20  1438   GLU 70*  --  68* 63*   *  --  149* 144   K 3.9 3.8 3.7 4.0   *  --  116* 108   CO2 27  --  26 29 BUN 55*  --  73* 83*   CREA 1.70*  --  1.88* 2.09*   CA 9.0  --  8.6 9.9   MG  --  2.2 1.8 2.2   PHOS  --   --   --  4.4   AGAP 4  --  7 7   BUCR 32*  --  39* 40*   *  --   --  163*   TP 5.8*  --   --  7.1   ALB 2.1*  --   --  2.7*   GLOB 3.7  --   --  4.4*   AGRAT 0.6*  --   --  0.6*      CBC w/Diff Recent Labs     11/12/20  0340 11/11/20  0256 11/10/20  1438   WBC 6.3 7.4 6.7   RBC 3.53* 3.68* 4.31*   HGB 10.1* 10.7* 12.6*   HCT 31.6* 31.1* 36.9   * 105* 119*   GRANS 81* 85* 86*   LYMPH 13* 10* 11*   EOS 0 0 0      Cardiac Enzymes No results found for: CPK, CK, CKMMB, CKMB, RCK3, CKMBT, CKNDX, CKND1, TELLY, TROPT, TROIQ, JOSE, TROPT, TNIPOC, BNP, BNPP   Coagulation Recent Labs     11/10/20  1438   PTP 15.0   INR 1.2       Lipid Panel Lab Results   Component Value Date/Time    Cholesterol, total 89 07/07/2020 04:00 AM    HDL Cholesterol 39 (L) 07/07/2020 04:00 AM    LDL, calculated 31.6 07/07/2020 04:00 AM    VLDL, calculated 18.4 07/07/2020 04:00 AM    Triglyceride 92 07/07/2020 04:00 AM    CHOL/HDL Ratio 2.3 07/07/2020 04:00 AM      BNP No results found for: BNP, BNPP, XBNPT   Liver Enzymes Recent Labs     11/12/20  0340   TP 5.8*   ALB 2.1*   *      Digoxin    Thyroid Studies Lab Results   Component Value Date/Time    T4, Total 11.5 02/19/2020 09:28 AM    TSH 5.48 (H) 11/10/2020 02:38 PM          Signed By: Poncho Fuller PA-C     November 12, 2020

## 2020-11-12 NOTE — PROGRESS NOTES
Problem: Falls - Risk of  Goal: *Absence of Falls  Description: Document Onel Lam Fall Risk and appropriate interventions in the flowsheet. Outcome: Progressing Towards Goal  Note: Fall Risk Interventions:  Mobility Interventions: Bed/chair exit alarm    Mentation Interventions: Bed/chair exit alarm, Door open when patient unattended    Medication Interventions: Bed/chair exit alarm    Elimination Interventions: Bed/chair exit alarm, Call light in reach    Problem: Patient Education: Go to Patient Education Activity  Goal: Patient/Family Education  Outcome: Progressing Towards Goal     Problem: Pressure Injury - Risk of  Goal: *Prevention of pressure injury  Description: Document Alex Scale and appropriate interventions in the flowsheet.   Outcome: Progressing Towards Goal  Note: Pressure Injury Interventions:  Sensory Interventions: Avoid rigorous massage over bony prominences    Moisture Interventions: Absorbent underpads, Apply protective barrier, creams and emollients    Activity Interventions: Pressure redistribution bed/mattress(bed type)    Mobility Interventions: HOB 30 degrees or less, Pressure redistribution bed/mattress (bed type)    Nutrition Interventions: Document food/fluid/supplement intake    Friction and Shear Interventions: Apply protective barrier, creams and emollients, HOB 30 degrees or less    Problem: Patient Education: Go to Patient Education Activity  Goal: Patient/Family Education  Outcome: Progressing Towards Goal     Problem: Pain  Goal: *Control of Pain  Outcome: Progressing Towards Goal  Goal: *PALLIATIVE CARE:  Alleviation of Pain  Outcome: Progressing Towards Goal     Problem: Patient Education: Go to Patient Education Activity  Goal: Patient/Family Education  Outcome: Progressing Towards Goal     Problem: Urinary Tract Infection - Adult  Goal: *Absence of infection signs and symptoms  Outcome: Progressing Towards Goal     Problem: Patient Education: Go to Patient Education Activity  Goal: Patient/Family Education  Outcome: Progressing Towards Goal

## 2020-11-13 PROBLEM — B37.49 CANDIDURIA: Status: ACTIVE | Noted: 2020-11-13

## 2020-11-13 PROBLEM — J96.00 ACUTE RESPIRATORY FAILURE (HCC): Status: ACTIVE | Noted: 2020-11-13

## 2020-11-13 LAB
ANION GAP SERPL CALC-SCNC: 5 MMOL/L (ref 3–18)
BASOPHILS # BLD: 0 K/UL (ref 0–0.1)
BASOPHILS NFR BLD: 0 % (ref 0–2)
BUN SERPL-MCNC: 41 MG/DL (ref 7–18)
BUN/CREAT SERPL: 29 (ref 12–20)
CALCIUM SERPL-MCNC: 8.8 MG/DL (ref 8.5–10.1)
CHLORIDE SERPL-SCNC: 115 MMOL/L (ref 100–111)
CO2 SERPL-SCNC: 26 MMOL/L (ref 21–32)
CREAT SERPL-MCNC: 1.4 MG/DL (ref 0.6–1.3)
DIFFERENTIAL METHOD BLD: ABNORMAL
EOSINOPHIL # BLD: 0 K/UL (ref 0–0.4)
EOSINOPHIL NFR BLD: 0 % (ref 0–5)
ERYTHROCYTE [DISTWIDTH] IN BLOOD BY AUTOMATED COUNT: 22 % (ref 11.6–14.5)
GLUCOSE BLD STRIP.AUTO-MCNC: 109 MG/DL (ref 70–110)
GLUCOSE BLD STRIP.AUTO-MCNC: 111 MG/DL (ref 70–110)
GLUCOSE BLD STRIP.AUTO-MCNC: 114 MG/DL (ref 70–110)
GLUCOSE BLD STRIP.AUTO-MCNC: 143 MG/DL (ref 70–110)
GLUCOSE BLD STRIP.AUTO-MCNC: 89 MG/DL (ref 70–110)
GLUCOSE SERPL-MCNC: 121 MG/DL (ref 74–99)
HCT VFR BLD AUTO: 31.2 % (ref 36–48)
HGB BLD-MCNC: 10 G/DL (ref 13–16)
LYMPHOCYTES # BLD: 0.9 K/UL (ref 0.9–3.6)
LYMPHOCYTES NFR BLD: 7 % (ref 21–52)
MCH RBC QN AUTO: 28.7 PG (ref 24–34)
MCHC RBC AUTO-ENTMCNC: 32.1 G/DL (ref 31–37)
MCV RBC AUTO: 89.7 FL (ref 74–97)
MONOCYTES # BLD: 0.3 K/UL (ref 0.05–1.2)
MONOCYTES NFR BLD: 2 % (ref 3–10)
NEUTS SEG # BLD: 12.5 K/UL (ref 1.8–8)
NEUTS SEG NFR BLD: 91 % (ref 40–73)
PLATELET # BLD AUTO: 94 K/UL (ref 135–420)
PMV BLD AUTO: 10.4 FL (ref 9.2–11.8)
POTASSIUM SERPL-SCNC: 3.9 MMOL/L (ref 3.5–5.5)
RBC # BLD AUTO: 3.48 M/UL (ref 4.7–5.5)
SODIUM SERPL-SCNC: 146 MMOL/L (ref 136–145)
WBC # BLD AUTO: 13.8 K/UL (ref 4.6–13.2)

## 2020-11-13 PROCEDURE — 80048 BASIC METABOLIC PNL TOTAL CA: CPT

## 2020-11-13 PROCEDURE — 74011250636 HC RX REV CODE- 250/636: Performed by: HOSPITALIST

## 2020-11-13 PROCEDURE — 85025 COMPLETE CBC W/AUTO DIFF WBC: CPT

## 2020-11-13 PROCEDURE — 65660000000 HC RM CCU STEPDOWN

## 2020-11-13 PROCEDURE — 74011000258 HC RX REV CODE- 258: Performed by: HOSPITALIST

## 2020-11-13 PROCEDURE — 82962 GLUCOSE BLOOD TEST: CPT

## 2020-11-13 PROCEDURE — 99232 SBSQ HOSP IP/OBS MODERATE 35: CPT | Performed by: INTERNAL MEDICINE

## 2020-11-13 PROCEDURE — 74011250636 HC RX REV CODE- 250/636: Performed by: INTERNAL MEDICINE

## 2020-11-13 PROCEDURE — 2709999900 HC NON-CHARGEABLE SUPPLY

## 2020-11-13 RX ORDER — FLUCONAZOLE 2 MG/ML
INJECTION, SOLUTION INTRAVENOUS
Status: DISPENSED
Start: 2020-11-13 | End: 2020-11-14

## 2020-11-13 RX ORDER — FLUCONAZOLE 2 MG/ML
200 INJECTION, SOLUTION INTRAVENOUS EVERY 24 HOURS
Status: DISCONTINUED | OUTPATIENT
Start: 2020-11-13 | End: 2020-11-17

## 2020-11-13 RX ADMIN — MEROPENEM 500 MG: 500 INJECTION, POWDER, FOR SOLUTION INTRAVENOUS at 17:35

## 2020-11-13 RX ADMIN — Medication 10 ML: at 21:04

## 2020-11-13 RX ADMIN — ENOXAPARIN SODIUM 30 MG: 30 INJECTION SUBCUTANEOUS at 08:28

## 2020-11-13 RX ADMIN — ERYTHROMYCIN: 5 OINTMENT OPHTHALMIC at 21:03

## 2020-11-13 RX ADMIN — DEXTROSE MONOHYDRATE AND POTASSIUM CHLORIDE: 5; .149 INJECTION, SOLUTION INTRAVENOUS at 20:46

## 2020-11-13 RX ADMIN — NYSTATIN: 100000 POWDER TOPICAL at 21:03

## 2020-11-13 RX ADMIN — FLUCONAZOLE IN SODIUM CHLORIDE 200 MG: 2 INJECTION, SOLUTION INTRAVENOUS at 12:17

## 2020-11-13 RX ADMIN — DEXTROSE MONOHYDRATE AND POTASSIUM CHLORIDE: 5; .149 INJECTION, SOLUTION INTRAVENOUS at 04:24

## 2020-11-13 RX ADMIN — ERYTHROMYCIN: 5 OINTMENT OPHTHALMIC at 06:26

## 2020-11-13 RX ADMIN — Medication 10 ML: at 06:26

## 2020-11-13 RX ADMIN — MEROPENEM 500 MG: 500 INJECTION, POWDER, FOR SOLUTION INTRAVENOUS at 04:23

## 2020-11-13 RX ADMIN — ERYTHROMYCIN: 5 OINTMENT OPHTHALMIC at 13:20

## 2020-11-13 RX ADMIN — NYSTATIN: 100000 POWDER TOPICAL at 08:28

## 2020-11-13 RX ADMIN — Medication 10 ML: at 13:20

## 2020-11-13 NOTE — PROGRESS NOTES
Problem: Falls - Risk of  Goal: *Absence of Falls  Description: Document Rickie Joe Fall Risk and appropriate interventions in the flowsheet. Outcome: Progressing Towards Goal  Note: Fall Risk Interventions:  Mobility Interventions: Bed/chair exit alarm    Mentation Interventions: Bed/chair exit alarm, Toileting rounds, Room close to nurse's station, Reorient patient    Medication Interventions: Bed/chair exit alarm    Elimination Interventions: Call light in reach, Patient to call for help with toileting needs, Toileting schedule/hourly rounds              Problem: Patient Education: Go to Patient Education Activity  Goal: Patient/Family Education  Outcome: Progressing Towards Goal     Problem: Pressure Injury - Risk of  Goal: *Prevention of pressure injury  Description: Document Alex Scale and appropriate interventions in the flowsheet.   Outcome: Progressing Towards Goal  Note: Pressure Injury Interventions:  Sensory Interventions: Avoid rigorous massage over bony prominences, Monitor skin under medical devices, Minimize linen layers    Moisture Interventions: Absorbent underpads, Minimize layers    Activity Interventions: Pressure redistribution bed/mattress(bed type)    Mobility Interventions: HOB 30 degrees or less, Pressure redistribution bed/mattress (bed type)    Nutrition Interventions: Document food/fluid/supplement intake    Friction and Shear Interventions: Apply protective barrier, creams and emollients, HOB 30 degrees or less                Problem: Patient Education: Go to Patient Education Activity  Goal: Patient/Family Education  Outcome: Progressing Towards Goal     Problem: Pain  Goal: *Control of Pain  Outcome: Progressing Towards Goal  Goal: *PALLIATIVE CARE:  Alleviation of Pain  Outcome: Progressing Towards Goal     Problem: Patient Education: Go to Patient Education Activity  Goal: Patient/Family Education  Outcome: Progressing Towards Goal     Problem: Urinary Tract Infection - Adult  Goal: *Absence of infection signs and symptoms  Outcome: Progressing Towards Goal     Problem: Patient Education: Go to Patient Education Activity  Goal: Patient/Family Education  Outcome: Progressing Towards Goal     Problem: Discharge Planning  Goal: *Discharge to safe environment  Outcome: Progressing Towards Goal     Problem: Patient Education: Go to Patient Education Activity  Goal: Patient/Family Education  Outcome: Progressing Towards Goal     Problem: Patient Education: Go to Patient Education Activity  Goal: Patient/Family Education  Outcome: Progressing Towards Goal

## 2020-11-13 NOTE — DIABETES MGMT
Nutrition Re-assessment /Glycemic Control    Type and Reason for Visit: Reassess  Nutrition Recommendations/Plan:   1. Ensure Enlive BID (chocolate flavor)  2. Chopped meats or soft entree in addition to dental soft diet (2 gram Na, 60 g protein)  3. Encouraged increased intake at meals to meet calorie and protein requirements    Nutrition Assessment:    Pt with Respiratory Failure,Hypotension , Pneumonia,UTI, Cardiomyopathy ,ARF. Pt is underweight related to inadequate caloric intake as evidenced by 89% ideal weight and BMI= 19.6kg/m2. Pt states he is tolerating po. Malnutrition Assessment:  Malnutrition Status: At risk for malnutrition r/t wt and BMI    Estimated Daily Nutrient Needs:  Energy (kcal):  1925  Protein (g):  70       Fluid (ml/day):  1800  Current Nutrition Therapies:  DIET DENTAL SOFT (SOFT SOLID) 2 GM NA (House Low NA); 60GM Protein (House)  Anthropometric Measures:  · Height:  5' 6\" (167.6 cm)  · Current Body Wt:  54.4 kg (119 lb 14.9 oz)(11/13/20)  · BMI: 19.4  Nutrition Diagnosis:   · Underweight due to inadequate energy and protein intake as evidenced by BMI -  19.4 kg/m2  Nutrition Intervention:  Food and/or Nutrient Delivery: add Ensure Enlive BID  Goals:    Intake will meet >75% of energy and protein requirements. Glucose will be within target range. Weight gain 1 lb. per week.   Nutrition Monitoring and Evaluation:   Food/Nutrient Intake Outcomes: Diet advancement/tolerance  Physical Signs/Symptoms Outcomes: Skin, Weight  Discharge Planning:    No discharge needs at this time   Electronically signed by Nicole Giles [address] [place] [address] on 11/13/2020 at 1:14PM  Nicole Giles [address] [place] [address], CDE   Office:  [address] [place] [address] Pager:  [phone]

## 2020-11-13 NOTE — PROGRESS NOTES
Physical Exam  Skin:     General: Skin is warm and dry. Capillary Refill: Capillary refill takes less than 2 seconds. Primary Nurse Gilmer Hussein RN and Kemi Jackson RN performed a dual skin assessment on this patient Impairment noted- see wound doc flow sheet Alex score 13.

## 2020-11-13 NOTE — PROGRESS NOTES
Problem: Falls - Risk of  Goal: *Absence of Falls  Description: Document Rickie Joe Fall Risk and appropriate interventions in the flowsheet. Outcome: Progressing Towards Goal  Note: Fall Risk Interventions:  Mobility Interventions: Assess mobility with egress test, Bed/chair exit alarm    Mentation Interventions: Adequate sleep, hydration, pain control, Bed/chair exit alarm, Door open when patient unattended    Medication Interventions: Assess postural VS orthostatic hypotension, Bed/chair exit alarm, Evaluate medications/consider consulting pharmacy    Elimination Interventions: Bed/chair exit alarm, Call light in reach, Toileting schedule/hourly rounds              Problem: Patient Education: Go to Patient Education Activity  Goal: Patient/Family Education  Outcome: Progressing Towards Goal     Problem: Pressure Injury - Risk of  Goal: *Prevention of pressure injury  Description: Document Alex Scale and appropriate interventions in the flowsheet. Outcome: Progressing Towards Goal  Note: Pressure Injury Interventions:  Sensory Interventions: Assess changes in LOC, Float heels, Turn and reposition approx. every two hours (pillows and wedges if needed)    Moisture Interventions: Absorbent underpads, Apply protective barrier, creams and emollients, Internal/External urinary devices    Activity Interventions: Assess need for specialty bed, Pressure redistribution bed/mattress(bed type)    Mobility Interventions: Assess need for specialty bed, HOB 30 degrees or less, Turn and reposition approx.  every two hours(pillow and wedges)    Nutrition Interventions: Discuss nutritional consult with provider, Document food/fluid/supplement intake    Friction and Shear Interventions: Apply protective barrier, creams and emollients, Feet elevated on foot rest, Minimize layers                Problem: Patient Education: Go to Patient Education Activity  Goal: Patient/Family Education  Outcome: Progressing Towards Goal     Problem: Pain  Goal: *Control of Pain  Outcome: Progressing Towards Goal  Goal: *PALLIATIVE CARE:  Alleviation of Pain  Outcome: Progressing Towards Goal     Problem: Patient Education: Go to Patient Education Activity  Goal: Patient/Family Education  Outcome: Progressing Towards Goal     Problem: Urinary Tract Infection - Adult  Goal: *Absence of infection signs and symptoms  Outcome: Progressing Towards Goal     Problem: Patient Education: Go to Patient Education Activity  Goal: Patient/Family Education  Outcome: Progressing Towards Goal     Problem: Discharge Planning  Goal: *Discharge to safe environment  Outcome: Progressing Towards Goal     Problem: Patient Education: Go to Patient Education Activity  Goal: Patient/Family Education  Outcome: Progressing Towards Goal     Problem: Patient Education: Go to Patient Education Activity  Goal: Patient/Family Education  Outcome: Progressing Towards Goal

## 2020-11-13 NOTE — PROGRESS NOTES
Physician Progress Note      PATIENT:               Baldwin Bumpers  CSN #:                  [id number]  :                       1934  ADMIT DATE:       11/10/2020 2:00 PM  [address] [place] DATE:  RESPONDING  PROVIDER #:        Nataly Winchester MD          QUERY TEXT:    Pt admitted on 11-10-20 with sepsis. Noted documentation of  pneumonia on 20 by  Pulmonary  consultant. If possible, please document in progress notes and discharge summary:[[Defer to Pulmonary consultant documentation regarding pneumonia::I defer to Pulmonary consultant regarding documentation of pneumonia.]    The medical record reflects the following:  Risk Factors: 80 yr old presented with decrease in mental status and weakness  Clinical Indicators: - Per 11-10-20 CXR:  1. Faint hazy right mid to lower lung airspace opacity favored to reflect atelectasis / airspace disease and accompanying pleural effusion.    > Overall appearance is improved from recent prior CT chest in radiographic exams.  - Per 20 Pulmonary consult: Pulmonary- hazy RLL density cw pneumonia otherwise clear hyperinflated on room air  No resp distress. Treatment: imaging; IV abx; Pulmonary consult    Thank you for your time,  [facility], [address] [place] [address]  Options provided:  -- Pneumonia confirmed,  present on admission  -- Pneumonia confirmed,  not present on admission  -- Pneumonia  ruled out after study. -- Other - I will add my own diagnosis  -- Disagree - Not applicable / Not valid  -- Disagree - Clinically unable to determine / Unknown  -- Refer to Clinical Documentation Reviewer    PROVIDER RESPONSE TEXT:    The diagnosis of pneumonia was confirmed, present on admission. Query created by:  Benedict Ceballos on 2020 7:02 PM      Electronically signed by:  Nataly Winchester MD 2020 11:33 AM

## 2020-11-13 NOTE — PROGRESS NOTES
TRANSFER - IN REPORT:    Verbal report received from [address], RN(name) on Lynn Guevara  being received from ICU(unit) for routine progression of care      Report consisted of patients Situation, Background, Assessment and   Recommendations(SBAR). Information from the following report(s) SBAR, Kardex and Cardiac Rhythm 1st degree AV Block, Paced was reviewed with the receiving nurse. Opportunity for questions and clarification was provided. Assessment completed upon patients arrival to unit and care assumed.

## 2020-11-13 NOTE — PROGRESS NOTES
Progress Note      Patient: Kassandra Monterroso               Sex: male          DOA: 11/10/2020       YOB: 1934      Age:  80 y.o.        LOS:  LOS: 3 days             CHIEF COMPLAINT:  Sepsis, Candiduria, Hypothermia, Shock, acute respiratory failure;  Gradually improving    Subjective:     Patient is awake and talking  He is in no distress  He thinks he's in [place], but seems oriented otherwise    Objective:      Visit Vitals  /67   Pulse 68   Temp 97.5 °F (36.4 °C)   Resp 15   Ht 5' 6\" (1.676 m)   Wt 54.4 kg (120 lb)   SpO2 100%   BMI 19.37 kg/m²       Physical Exam:  Gen:  Patient is in no distress. No complaint  HEENT and Neck:  Conjunctival injection, neck supple  Lungs:  Clear bilaterally. No rales, no wheeze. Normal effort. Heart:  Regular Rate and Rhythm. No murmur or gallop. No JVD. No edema.   Abdomen:  Soft, non tender, no masses, no Hepatosplenomegally  Extremities:  No digital clubbing, no cyanosis  Neuro:  Awake and talking, normal tone without seizure        Lab/Data Reviewed:  BMP:   Lab Results   Component Value Date/Time     (H) 11/13/2020 09:30 AM    K 3.9 11/13/2020 09:30 AM     (H) 11/13/2020 09:30 AM    CO2 26 11/13/2020 09:30 AM    AGAP 5 11/13/2020 09:30 AM     (H) 11/13/2020 09:30 AM    BUN 41 (H) 11/13/2020 09:30 AM    CREA 1.40 (H) 11/13/2020 09:30 AM    GFRAA 58 (L) 11/13/2020 09:30 AM    GFRNA 48 (L) 11/13/2020 09:30 AM     CBC:   Lab Results   Component Value Date/Time    WBC 13.8 (H) 11/13/2020 09:30 AM    HGB 10.0 (L) 11/13/2020 09:30 AM    HCT 31.2 (L) 11/13/2020 09:30 AM    PLT 94 (L) 11/13/2020 09:30 AM           Assessment/Plan     Principal Problem:    Sepsis ([address] (7/6/2020)    Active Problems:    UTI (urinary tract infection) (2/19/2020)      Pneumonia (7/16/2020)      Shock ([facility] (11/10/2020)      Hypothermia (95/00/8050)      Metabolic encephalopathy (52/39/4301)      Complicated UTI (urinary tract infection) (11/10/2020) Candiduria (11/13/2020)      Acute respiratory failure ([facility] [address] (11/13/2020)      Cardiomyopathy ([facility] [address] (2/20/2020)      Overview: LVEF 35-40% by previous TTE. Hypotension (2/19/2020)        Plan:  Added Diflucan for Candiduria  Monitor BP off vasopressor  Follow temperature. Monitor mental status  Mobilize soon  Follow renal function  DVT prophylaxis.

## 2020-11-13 NOTE — PROGRESS NOTES
Chart reviewed. Plan remains SNF when medically stable & [employer] authorization has been obtained. Have couple accepting SNF's, but will require auth. Will cont to follow for further needs. Brooke Coreas RN,ext [phone].

## 2020-11-13 NOTE — PROGRESS NOTES
Physical Exam  Skin:     General: Skin is warm and dry. Capillary Refill: Capillary refill takes less than 2 seconds. Primary Nurse Matilde Payne RN and Nba Lozano RN performed a dual skin assessment on this patient Impairment noted- see wound doc flow sheet Alex score 13.

## 2020-11-13 NOTE — PROGRESS NOTES
1900: Bedside and Verbal shift change report given to Day Pires (oncoming nurse) by Manjinder Preston (offgoing nurse). Report included the following information SBAR, Kardex, Intake/Output, MAR, Recent Results and Pre Procedure Checklist.     2000: Patient is alert to self and situation. On room air with no respiratory distress. Verbalize needs to staff with garbled speech. PM care provided. Call bell is within reach. Safety precautions are maintained. 2200: All due medications administered as ordered. Patient has episodes of confusion, redirection provided. Safety precautions are maintained. Will continue to monitor. 0000: Patient remains in stable condition with redirection provided as needed. Safety precautions maintained. Will continue to monitor. 0200: Patient remains in bed with no distress. Redirection provided for episodes of confusion. Safety precautions are maintained. 0400: AM care provided. Patient remains in stable condition with no distress. 0600: All needs attended. Safety precautions are maintained. 0700: Bedside and Verbal shift change report given to Adrienne Campos (oncoming nurse) by Day Pires (offgoing nurse). Report included the following information SBAR, Kardex, Intake/Output, MAR, Recent Results and Cardiac Rhythm 1st Degree AV block.

## 2020-11-13 NOTE — PROGRESS NOTES
Problem: Falls - Risk of  Goal: *Absence of Falls  Description: Document Annamaria Cowan Fall Risk and appropriate interventions in the flowsheet. Outcome: Progressing Towards Goal  Note: Fall Risk Interventions:  Mobility Interventions: Bed/chair exit alarm    Mentation Interventions: Bed/chair exit alarm, Door open when patient unattended    Medication Interventions: Bed/chair exit alarm    Elimination Interventions: Bed/chair exit alarm, Call light in reach              Problem: Patient Education: Go to Patient Education Activity  Goal: Patient/Family Education  Outcome: Progressing Towards Goal     Problem: Pressure Injury - Risk of  Goal: *Prevention of pressure injury  Description: Document Alex Scale and appropriate interventions in the flowsheet.   Outcome: Progressing Towards Goal  Note: Pressure Injury Interventions:  Sensory Interventions: Avoid rigorous massage over bony prominences    Moisture Interventions: Absorbent underpads, Apply protective barrier, creams and emollients    Activity Interventions: Pressure redistribution bed/mattress(bed type)    Mobility Interventions: HOB 30 degrees or less, Pressure redistribution bed/mattress (bed type)    Nutrition Interventions: Document food/fluid/supplement intake    Friction and Shear Interventions: Apply protective barrier, creams and emollients, HOB 30 degrees or less                Problem: Pain  Goal: *Control of Pain  Outcome: Progressing Towards Goal     Problem: Urinary Tract Infection - Adult  Goal: *Absence of infection signs and symptoms  Outcome: Progressing Towards Goal

## 2020-11-13 NOTE — PROGRESS NOTES
0700: Bedside and Verbal shift change report given to Richi Arias  (oncoming nurse) by Judge Gonzales (offgoing nurse). Report included the following information SBAR, Kardex, Intake/Output, MAR, Accordion, Recent Results and Quality Measures. 0800: Assessment completed, Morning meds given. No complaints or s/s of pain. 0830: Pt eating breakfast, no s/s of distress     0950: Cardiology at bedside, no new orders     1200: Reassessment completed    1230: Lizette at bedside, see new orders    1300: report called to [facility] [employer] [facility] rn    1320: TRANSFER - OUT REPORT:    Verbal report given to [facility] RN(name) on Sundeep Locke  being transferred to (unit) for routine progression of care       Report consisted of patients Situation, Background, Assessment and   Recommendations(SBAR). Information from the following report(s) SBAR, Kardex, ED Summary, Intake/Output, MAR, Accordion and Quality Measures was reviewed with the receiving nurse. Lines:   Peripheral IV 11/10/20 Left Forearm (Active)   Site Assessment Clean, dry, & intact 11/13/20 1200   Phlebitis Assessment 0 11/13/20 1200   Infiltration Assessment 0 11/13/20 1200   Dressing Status Clean, dry, & intact 11/13/20 1200   Dressing Type Transparent;Tape 11/13/20 0800   Hub Color/Line Status Green; Infusing 11/13/20 0800   Action Taken Open ports on tubing capped 11/13/20 0800   Alcohol Cap Used Yes 11/13/20 0800       Peripheral IV 11/10/20 Left Antecubital (Active)   Site Assessment Clean, dry, & intact 11/13/20 1200   Phlebitis Assessment 0 11/13/20 1200   Infiltration Assessment 0 11/13/20 1200   Dressing Status Clean, dry, & intact 11/13/20 1200   Dressing Type Transparent;Tape 11/13/20 0800   Hub Color/Line Status Pink;Flushed;Capped 11/13/20 0800   Action Taken Open ports on tubing capped 11/13/20 0800   Alcohol Cap Used Yes 11/13/20 0800        Opportunity for questions and clarification was provided.       Patient transported with:   Patient-specific medications from Pharmacy  Registered Nurse    1500: Pts son called and updated on status and new room

## 2020-11-13 NOTE — PROGRESS NOTES
[facility] - Progress Note  Admit Date: 11/10/2020     Patient seen and examined independently. Pacing intermittently with a resting heart rate in the mid 60s. He is off the pressors as noted below. Renal indices slightly improved. Dr. Ed Olmedo will be available over the weekend. Agree with assessment and plan as noted below. Nara Mac MD  Assessment:     Hospital Problems  Date Reviewed: 11/10/2020          Codes Class Noted POA    Metabolic encephalopathy JGN-[id number]: G93.41  ICD-9-CM: 348.31  11/10/2020 Yes        Shock ([facility] [address] ICD-10-CM: R57.9  ICD-9-CM: 785.50  44/14/0228 Yes        Complicated UTI (urinary tract infection) ICD-10-CM: N39.0  ICD-9-CM: 599.0  11/10/2020 Yes        * (Principal) Sepsis ([facility] [address] ICD-10-CM: A41.9  ICD-9-CM: 038.9, 995.91  7/6/2020 Yes        Cardiomyopathy ([facility] [address] ICD-10-CM: I42.9  ICD-9-CM: 425.4  2/20/2020 Yes    Overview Signed 2/20/2020 12:40 AM by Elizabeth KRAUSE, DO     LVEF 35-40% by previous TTE.             UTI (urinary tract infection) ICD-10-CM: N39.0  ICD-9-CM: 599.0  2/19/2020 Yes        Hypotension ICD-10-CM: I95.9  ICD-9-CM: 458.9  2/19/2020 Yes        Hypothermia ICD-10-CM: T68. [place] Lento  ICD-9-CM: 991.6  12/23/2019 Yes            -Admitted with fatigue, dizziness in the setting of UTI & pneumonia, patient hypoglycemic, hypothermic and hypotensive in ED requiring pressors, elevated BNP, negative troponin x 1, CXR showed right mid-lower lung airspace opacity, possible atelectasis/airspace disease and accompanying pleural effusion.  ECG on admission showed ventricular paced rhythm  -s/p single chamber PPM, MRI compatible ([employer], 05/2020)   -LBBB, chronic  -Nonischemic cardiomyopathy with EF 40% (09/2020), Moderately dilated LA, Mod-Severe MR, mild AR and TR   -Pharm nuc 4/22/20, no ischemia  -HLD  -Hypertension  -CKD  -GERD on PPI  -Hypothyroid on replacement  -h/o GIB, stable  -Severe deconditioning     Primary cardiologist Dr. Tita Denise: -Patient hemodynamically stable off pressors   -overnight tele reviewed, HR stable with occasional PVCs    Subjective:     Patient resting comfortably in bed eating breakfast. States he is feeling much better. Denies CP or palpitations.      Objective:      Patient Vitals for the past 8 hrs:   Temp Pulse Resp BP SpO2   11/13/20 0828 97.5 °F (36.4 °C) 66 12 97/62 100 %   11/13/20 0800 97.5 °F (36.4 °C) 60 11  100 %   11/13/20 0700  84 14 117/61 99 %   11/13/20 0645  82 24     11/13/20 0630  74 13  100 %   11/13/20 0615  86 16  98 %   11/13/20 0600  71 24 (!) 139/122 100 %   11/13/20 0545  61 11  100 %   11/13/20 0530  72 20  100 %   11/13/20 0515  85 14  100 %   11/13/20 0500  74 13 118/63 100 %   11/13/20 0445  80 23  100 %   11/13/20 0430  82 15  99 %   11/13/20 0415  91 16  100 %   11/13/20 0400 98.1 °F (36.7 °C) 88 15 (!) 111/56 100 %   11/13/20 0345  90 25     11/13/20 0330  81 20  100 %   11/13/20 0315  78 13  100 %   11/13/20 0300  79 18 (!) 103/56 100 %   11/13/20 0245  80 13  98 %   11/13/20 0230  81 14  100 %   11/13/20 0215  77 15  99 %   11/13/20 0200  68 23 (!) 104/48 100 %   11/13/20 0145  75 18  100 %   11/13/20 0130  62 23  97 %   11/13/20 0115  63 19  99 %         Patient Vitals for the past 96 hrs:   Weight   11/13/20 0400 120 lb (54.4 kg)   11/12/20 0506 122 lb 9.6 oz (55.6 kg)   11/11/20 1538 121 lb (54.9 kg)   11/11/20 1209 121 lb 7.6 oz (55.1 kg)   11/10/20 2130 121 lb 8 oz (55.1 kg)   11/10/20 1415 110 lb (49.9 kg)                    Intake/Output Summary (Last 24 hours) at 11/13/2020 0910  Last data filed at 11/13/2020 0800  Gross per 24 hour   Intake 1775 ml   Output 1285 ml   Net 490 ml       Physical Exam:  General:  alert, cooperative, no distress, appears stated age  Neck:  nontender, no JVD  Lungs:  clear to auscultation bilaterally  Heart:  regular rate and rhythm, S1, S2 normal, no murmur, +Systolic murmur   Abdomen:  abdomen is soft without significant tenderness, masses, organomegaly or guarding  Extremities:  extremities normal, atraumatic, no cyanosis or edema    Data Review:     Labs: Results:       Chemistry Recent Labs     11/12/20  0340 11/11/20  1500 11/11/20  0256 11/10/20  1438   GLU 70*  --  68* 63*   *  --  149* 144   K 3.9 3.8 3.7 4.0   *  --  116* 108   CO2 27  --  26 29   BUN 55*  --  73* 83*   CREA 1.70*  --  1.88* 2.09*   CA 9.0  --  8.6 9.9   MG  --  2.2 1.8 2.2   PHOS  --   --   --  4.4   AGAP 4  --  7 7   BUCR 32*  --  39* 40*   *  --   --  163*   TP 5.8*  --   --  7.1   ALB 2.1*  --   --  2.7*   GLOB 3.7  --   --  4.4*   AGRAT 0.6*  --   --  0.6*      CBC w/Diff Recent Labs     11/12/20  0340 11/11/20  0256 11/10/20  1438   WBC 6.3 7.4 6.7   RBC 3.53* 3.68* 4.31*   HGB 10.1* 10.7* 12.6*   HCT 31.6* 31.1* 36.9   * 105* 119*   GRANS 81* 85* 86*   LYMPH 13* 10* 11*   EOS 0 0 0      Cardiac Enzymes No results found for: CPK, CK, CKMMB, CKMB, RCK3, CKMBT, CKNDX, CKND1, TELLY, TROPT, TROIQ, JOSE, TROPT, TNIPOC, BNP, BNPP   Coagulation Recent Labs     11/10/20  1438   PTP 15.0   INR 1.2       Lipid Panel Lab Results   Component Value Date/Time    Cholesterol, total 89 07/07/2020 04:00 AM    HDL Cholesterol 39 (L) 07/07/2020 04:00 AM    LDL, calculated 31.6 07/07/2020 04:00 AM    VLDL, calculated 18.4 07/07/2020 04:00 AM    Triglyceride 92 07/07/2020 04:00 AM    CHOL/HDL Ratio 2.3 07/07/2020 04:00 AM      BNP No results found for: BNP, BNPP, XBNPT   Liver Enzymes Recent Labs     11/12/20  0340   TP 5.8*   ALB 2.1*   *      Digoxin    Thyroid Studies Lab Results   Component Value Date/Time    T4, Total 11.5 02/19/2020 09:28 AM    TSH 5.48 (H) 11/10/2020 02:38 PM          Signed By: Maryan Edouard PA-C     November 13, 2020

## 2020-11-14 ENCOUNTER — APPOINTMENT (OUTPATIENT)
Dept: CT IMAGING | Age: 85
DRG: 871 | End: 2020-11-14
Attending: INTERNAL MEDICINE
Payer: MEDICARE

## 2020-11-14 LAB
ANION GAP SERPL CALC-SCNC: 6 MMOL/L (ref 3–18)
BASOPHILS # BLD: 0 K/UL (ref 0–0.1)
BASOPHILS NFR BLD: 0 % (ref 0–2)
BUN SERPL-MCNC: 38 MG/DL (ref 7–18)
BUN/CREAT SERPL: 31 (ref 12–20)
CALCIUM SERPL-MCNC: 8.4 MG/DL (ref 8.5–10.1)
CHLORIDE SERPL-SCNC: 112 MMOL/L (ref 100–111)
CO2 SERPL-SCNC: 24 MMOL/L (ref 21–32)
CREAT SERPL-MCNC: 1.23 MG/DL (ref 0.6–1.3)
DIFFERENTIAL METHOD BLD: ABNORMAL
EOSINOPHIL # BLD: 0.1 K/UL (ref 0–0.4)
EOSINOPHIL NFR BLD: 2 % (ref 0–5)
ERYTHROCYTE [DISTWIDTH] IN BLOOD BY AUTOMATED COUNT: 21.7 % (ref 11.6–14.5)
GLUCOSE BLD STRIP.AUTO-MCNC: 101 MG/DL (ref 70–110)
GLUCOSE BLD STRIP.AUTO-MCNC: 84 MG/DL (ref 70–110)
GLUCOSE BLD STRIP.AUTO-MCNC: 99 MG/DL (ref 70–110)
GLUCOSE SERPL-MCNC: 80 MG/DL (ref 74–99)
HCT VFR BLD AUTO: 31.4 % (ref 36–48)
HGB BLD-MCNC: 10.2 G/DL (ref 13–16)
LYMPHOCYTES # BLD: 0.7 K/UL (ref 0.9–3.6)
LYMPHOCYTES NFR BLD: 11 % (ref 21–52)
MCH RBC QN AUTO: 28.9 PG (ref 24–34)
MCHC RBC AUTO-ENTMCNC: 32.5 G/DL (ref 31–37)
MCV RBC AUTO: 89 FL (ref 74–97)
MONOCYTES # BLD: 0.2 K/UL (ref 0.05–1.2)
MONOCYTES NFR BLD: 3 % (ref 3–10)
NEUTS SEG # BLD: 5.3 K/UL (ref 1.8–8)
NEUTS SEG NFR BLD: 84 % (ref 40–73)
PLATELET # BLD AUTO: 101 K/UL (ref 135–420)
PMV BLD AUTO: 10.5 FL (ref 9.2–11.8)
POTASSIUM SERPL-SCNC: 4.2 MMOL/L (ref 3.5–5.5)
RBC # BLD AUTO: 3.53 M/UL (ref 4.7–5.5)
SODIUM SERPL-SCNC: 142 MMOL/L (ref 136–145)
WBC # BLD AUTO: 6.3 K/UL (ref 4.6–13.2)

## 2020-11-14 PROCEDURE — 2709999900 HC NON-CHARGEABLE SUPPLY

## 2020-11-14 PROCEDURE — 97110 THERAPEUTIC EXERCISES: CPT

## 2020-11-14 PROCEDURE — 65660000000 HC RM CCU STEPDOWN

## 2020-11-14 PROCEDURE — 85025 COMPLETE CBC W/AUTO DIFF WBC: CPT

## 2020-11-14 PROCEDURE — 80048 BASIC METABOLIC PNL TOTAL CA: CPT

## 2020-11-14 PROCEDURE — 97530 THERAPEUTIC ACTIVITIES: CPT

## 2020-11-14 PROCEDURE — 81001 URINALYSIS AUTO W/SCOPE: CPT

## 2020-11-14 PROCEDURE — 74011000258 HC RX REV CODE- 258: Performed by: HOSPITALIST

## 2020-11-14 PROCEDURE — 82962 GLUCOSE BLOOD TEST: CPT

## 2020-11-14 PROCEDURE — 74011250636 HC RX REV CODE- 250/636: Performed by: INTERNAL MEDICINE

## 2020-11-14 PROCEDURE — 74011250636 HC RX REV CODE- 250/636: Performed by: HOSPITALIST

## 2020-11-14 PROCEDURE — 36415 COLL VENOUS BLD VENIPUNCTURE: CPT

## 2020-11-14 PROCEDURE — 97535 SELF CARE MNGMENT TRAINING: CPT

## 2020-11-14 PROCEDURE — 70450 CT HEAD/BRAIN W/O DYE: CPT

## 2020-11-14 RX ADMIN — NYSTATIN: 100000 POWDER TOPICAL at 08:57

## 2020-11-14 RX ADMIN — FLUCONAZOLE IN SODIUM CHLORIDE 200 MG: 2 INJECTION, SOLUTION INTRAVENOUS at 11:53

## 2020-11-14 RX ADMIN — Medication 10 ML: at 14:00

## 2020-11-14 RX ADMIN — ERYTHROMYCIN: 5 OINTMENT OPHTHALMIC at 14:00

## 2020-11-14 RX ADMIN — Medication 10 ML: at 05:00

## 2020-11-14 RX ADMIN — DEXTROSE MONOHYDRATE AND POTASSIUM CHLORIDE: 5; .149 INJECTION, SOLUTION INTRAVENOUS at 14:00

## 2020-11-14 RX ADMIN — ERYTHROMYCIN: 5 OINTMENT OPHTHALMIC at 05:00

## 2020-11-14 RX ADMIN — MEROPENEM 500 MG: 500 INJECTION, POWDER, FOR SOLUTION INTRAVENOUS at 17:32

## 2020-11-14 RX ADMIN — MEROPENEM 500 MG: 500 INJECTION, POWDER, FOR SOLUTION INTRAVENOUS at 04:45

## 2020-11-14 RX ADMIN — ENOXAPARIN SODIUM 30 MG: 30 INJECTION SUBCUTANEOUS at 08:56

## 2020-11-14 RX ADMIN — NYSTATIN: 100000 POWDER TOPICAL at 22:35

## 2020-11-14 RX ADMIN — ERYTHROMYCIN: 5 OINTMENT OPHTHALMIC at 22:35

## 2020-11-14 RX ADMIN — Medication 10 ML: at 22:35

## 2020-11-14 NOTE — PROGRESS NOTES
Internal Medicine Progress Note        NAME: Denver Lockhart   :  1934  MRM:  [id number]    Date/Time: 2020        ASSESSMENT/PLAN:    Principal Problem:    Sepsis ([facility] [address] (2020)    Active Problems:    Hypothermia (2019)      UTI (urinary tract infection) (2020)      Hypotension (2020)      Cardiomyopathy ([address] (2020)      Overview: LVEF 35-40% by previous TTE. Pneumonia ()      Metabolic encephalopathy ()      Shock ([address] ()      Complicated UTI (urinary tract infection) (11/10/2020)      Candiduria (2020)      Acute respiratory failure ([address] (2020)        # Sepsis with hypotension and hypothermia . Likely  due to UTI , Pneumonia. Wbc improved , a febrile. Currently on merrem and Diflucan   Urine culture candida 20 K . Repeat UA and reflex   Blood CS NTD. UA pyuria  CXR ? Atelectasis vs ASD , will repeat . # Metabolic encephalopathy. Seem Improving. Report confusion at times. No CT head done so far , CT head ordered. # Acute respiratory failure per previous notes. Currently on RA. - mojica in . IVF    # Debility.  PT. SNF         IP CONSULT TO CARDIOLOGY  IP CONSULT TO INTENSIVIST    Lab Review:     Recent Labs     20  0247 20  0930 20  0340   WBC 6.3 13.8* 6.3   HGB 10.2* 10.0* 10.1*   HCT 31.4* 31.2* 31.6*   * 94* 103*     Recent Labs     20  0247 20  0930 20  0340 20  1500    146* 150*  --    K 4.2 3.9 3.9 3.8   * 115* 119*  --    CO2 24 26 27  --    GLU 80 121* 70*  --    BUN 38* 41* 55*  --    CREA 1.23 1.40* 1.70*  --    CA 8.4* 8.8 9.0  --    MG  --   --   --  2.2   ALB  --   --  2.1*  --    TBILI  --   --  0.6  --    ALT  --   --  28  --      Lab Results   Component Value Date/Time    Glucose (POC) 84 2020 06:20 AM    Glucose (POC) 111 (H) 2020 11:30 PM    Glucose (POC) 109 2020 06:53 PM    Glucose (POC) 143 (H) 2020 11:26 AM    Glucose (POC) 89 11/13/2020 06:19 AM    Glucose (POC) 96 10/10/2014 09:28 PM    Glucose (POC) 186 (H) 10/10/2014 03:48 PM     No results for input(s): PH, PCO2, PO2, HCO3, FIO2 in the last 72 hours. No results for input(s): INR, INREXT in the last 72 hours. Lab Results   Component Value Date/Time    Specimen Description: [facility] 02/06/2014 05:45 PM     Lab Results   Component Value Date/Time    Culture result: CANDIDA ALBICANS (A) 11/10/2020 03:15 PM    Culture result: NO GROWTH 4 DAYS 11/10/2020 02:49 PM    Culture result: NO GROWTH 4 DAYS 11/10/2020 02:38 PM       All Cardiac Markers in the last 24 hours: No results found for: CPK, CK, CKMMB, CKMB, RCK3, CKMBT, CKNDX, CKND1, TELLY, TROPT, TROIQ, JOSE, TROPT, TNIPOC, BNP, BNPP        Intervals noted   Pt s/e @ bedside     Subjective:     Chief Complaint:      Offer no complain  Feel better than when he came   He report been confused at times. ROS:  No CP/SOB/N/V/D/Pains/Bleeding/ acute joint swelling/rash/itching/fever/chills. Tolerating PT , Tolerating Diet                Objective:     Vitals:  Last 24hrs VS reviewed since prior progress note.  Most recent are:    Visit Vitals  BP 94/61 (BP 1 Location: Right arm)   Pulse (!) 59   Temp 97.3 °F (36.3 °C)   Resp 16   Ht 5' 6\" (1.676 m)   Wt 58.8 kg (129 lb 11.2 oz)   SpO2 98%   BMI 20.93 kg/m²     SpO2 Readings from Last 6 Encounters:   11/14/20 98%   09/12/20 99%   09/09/20 96%   07/23/20 95%   07/13/20 96%   05/11/20 97%    O2 Flow Rate (L/min): 2 l/min       Intake/Output Summary (Last 24 hours) at 11/14/2020 0956  Last data filed at 11/14/2020 0659  Gross per 24 hour   Intake 1368.75 ml   Output 1020 ml   Net 348.75 ml          Physical Exam:   Gen: Well-developed,   in no acute distress  HEENT: Head atraumatic, normocephalic ,  hearing intact to voice, moist mucous membranes  Neck:  Trachea midline , No apparent JVD, Supple   Resp: No accessory muscle use, Bilateral BS present,clear breath sounds without wheezes rales or rhonchi  Card:    normal S1, S2 without Gallop . No Significant lower leg peripheral edema. Abd:  Soft, non-tender, non-distended, bowel sounds are present . Musc: No cyanosis or clubbing  Skin: Warm and dry . No rashes     Neuro:  No facial asymmetry.   no clear area of focal motor weakness, follows commands appropriately    alert      Medications Reviewed: (see below)    Lab Data Reviewed: (see below)    ______________________________________________________________________    Medications:     Current Facility-Administered Medications   Medication Dose Route Frequency    fluconazole (DIFLUCAN) 200mg/100 mL IVPB (premix)  200 mg IntraVENous Q24H    dextrose 5% with KCl 20 mEq/L infusion   IntraVENous CONTINUOUS    nystatin (MYCOSTATIN) 100,000 unit/gram powder   Topical BID    erythromycin (ILOTYCIN) 5 mg/gram (0.5 %) ophthalmic ointment   Both Eyes Q8H    sodium chloride (NS) flush 5-10 mL  5-10 mL IntraVENous PRN    meropenem (MERREM) 500 mg in 0.9% sodium chloride (MBP/ADV) 50 mL MBP  500 mg IntraVENous Q12H    sodium chloride (NS) flush 5-40 mL  5-40 mL IntraVENous Q8H    sodium chloride (NS) flush 5-40 mL  5-40 mL IntraVENous PRN    acetaminophen (TYLENOL) tablet 650 mg  650 mg Oral Q6H PRN    Or    acetaminophen (TYLENOL) suppository 650 mg  650 mg Rectal Q6H PRN    polyethylene glycol (MIRALAX) packet 17 g  17 g Oral DAILY PRN    promethazine (PHENERGAN) tablet 12.5 mg  12.5 mg Oral Q6H PRN    Or    ondansetron (ZOFRAN) injection 4 mg  4 mg IntraVENous Q6H PRN    enoxaparin (LOVENOX) injection 30 mg  30 mg SubCUTAneous DAILY    pantoprazole (PROTONIX) injection 40 mg  40 mg IntraVENous DAILY PRN    melatonin tablet 12 mg  12 mg Oral QHS PRN    albuterol-ipratropium (DUO-NEB) 2.5 MG-0.5 MG/3 ML  3 mL Nebulization Q6H PRN    glucose chewable tablet 16 g  4 Tab Oral PRN    glucagon (GLUCAGEN) injection 1 mg  1 mg IntraMUSCular PRN    dextrose (D50) infusion 12.5-25 g  25-50 mL IntraVENous PRN          Total time spent with patient: 35 minutes                  Care Plan discussed with: Patient, Care Manager, Nursing Staff and >50% of time spent in counseling and coordination of care    Discussed:  Care Plan    Prophylaxis:  Lovenox    Disposition:  SNF/LTC           This document in whole or part of it has been produced using voice recognition software. Unrecognized errors in transcription may be present.     Attending Physician: Katya Head MD

## 2020-11-14 NOTE — PROGRESS NOTES
Problem: Falls - Risk of  Goal: *Absence of Falls  Description: Document Albino Messing Fall Risk and appropriate interventions in the flowsheet. Outcome: Progressing Towards Goal  Note: Fall Risk Interventions:  Mobility Interventions: Bed/chair exit alarm, Communicate number of staff needed for ambulation/transfer, Patient to call before getting OOB    Mentation Interventions: Adequate sleep, hydration, pain control, Bed/chair exit alarm, Door open when patient unattended, Reorient patient, Room close to nurse's station    Medication Interventions: Bed/chair exit alarm, Patient to call before getting OOB    Elimination Interventions: Bed/chair exit alarm, Call light in reach, Patient to call for help with toileting needs              Problem: Patient Education: Go to Patient Education Activity  Goal: Patient/Family Education  Outcome: Progressing Towards Goal     Problem: Pressure Injury - Risk of  Goal: *Prevention of pressure injury  Description: Document Alex Scale and appropriate interventions in the flowsheet. Outcome: Progressing Towards Goal  Note: Pressure Injury Interventions:  Sensory Interventions: Assess changes in LOC, Assess need for specialty bed, Avoid rigorous massage over bony prominences, Float heels, Keep linens dry and wrinkle-free, Maintain/enhance activity level, Pressure redistribution bed/mattress (bed type), Use 30-degree side-lying position, Turn and reposition approx.  every two hours (pillows and wedges if needed)    Moisture Interventions: Absorbent underpads, Internal/External urinary devices, Maintain skin hydration (lotion/cream)    Activity Interventions: Pressure redistribution bed/mattress(bed type), Assess need for specialty bed    Mobility Interventions: Float heels, HOB 30 degrees or less, Pressure redistribution bed/mattress (bed type), PT/OT evaluation, Assess need for specialty bed    Nutrition Interventions: Document food/fluid/supplement intake, Offer support with meals,snacks and hydration    Friction and Shear Interventions: Apply protective barrier, creams and emollients, HOB 30 degrees or less, Foam dressings/transparent film/skin sealants                Problem: Patient Education: Go to Patient Education Activity  Goal: Patient/Family Education  Outcome: Progressing Towards Goal     Problem: Pain  Goal: *Control of Pain  Outcome: Progressing Towards Goal     Problem: Patient Education: Go to Patient Education Activity  Goal: Patient/Family Education  Outcome: Progressing Towards Goal     Problem: Urinary Tract Infection - Adult  Goal: *Absence of infection signs and symptoms  Outcome: Progressing Towards Goal     Problem: Patient Education: Go to Patient Education Activity  Goal: Patient/Family Education  Outcome: Progressing Towards Goal     Problem: Discharge Planning  Goal: *Discharge to safe environment  Outcome: Progressing Towards Goal     Problem: Patient Education: Go to Patient Education Activity  Goal: Patient/Family Education  Outcome: Progressing Towards Goal     Problem: Patient Education: Go to Patient Education Activity  Goal: Patient/Family Education  Outcome: Progressing Towards Goal     Problem: Breathing Pattern - Ineffective  Goal: *Absence of hypoxia  Outcome: Progressing Towards Goal  Goal: *Use of effective breathing techniques  Outcome: Progressing Towards Goal     Problem: Patient Education: Go to Patient Education Activity  Goal: Patient/Family Education  Outcome: Progressing Towards Goal     Problem:  Body Temperature -  Risk of, Imbalanced  Goal: *Absence of cold stress or hypothermia signs and symptoms  Outcome: Progressing Towards Goal     Problem: Patient Education: Go to Patient Education Activity  Goal: Patient/Family Education  Outcome: Progressing Towards Goal

## 2020-11-14 NOTE — ROUTINE PROCESS
1945: Assumed care. Quietly resting in bed. HOB elevated. No SOB on RA. On IVF of D5 + 20 mEq KCl at 75 ml/hr. Infusing well to Lt FA. Denies any pain or discomfort at this time. Call light within reach. 2057: Temperature checked rectally. Reading low orally & axillary. 2103: Due meds given. 2323: No change from previous assessment. . Offered bedpan. No BM since 11/11.  
 
0200: Sleeping.  
 
0401: No change from previous assessment. 18: Due med given. 4873: Slept good thru night. Needs attended. BS 84. Bathed. Turned & repositioned at intervals. 0742:Bedside and Verbal shift change report given to Doctor Kendrick Be (oncoming nurse) by me (offgoing nurse). Report included the following information SBAR, Kardex, Intake/Output, MAR, Recent Results and Cardiac Rhythm Vpacing/SR1AVB BBB.

## 2020-11-14 NOTE — PROGRESS NOTES
Bedside shift change report given to Vita Perera RN (oncoming nurse) by Brody Fernández RN (offgoing nurse). Report included the following information SBAR, Kardex and Cardiac Rhythm V-Pacing, 1st degree AV Block, Left BBB.

## 2020-11-14 NOTE — PROGRESS NOTES
Problem: Self Care Deficits Care Plan (Adult)  Goal: *Acute Goals and Plan of Care (Insert Text)  Description: Occupational Therapy Goals  Initiated 11/12/2020 within 7 day(s). 1.  Patient will perform self-feeding and grooming with modified independence. 2.  Patient will perform bathing with minimal assistance/contact guard assist.  3.  Patient will perform upper body dressing and lower body dressing with minimal assistance/contact guard assist.  4.  Patient will perform toilet transfers with supervision/set-up. 5.  Patient will perform all aspects of toileting with supervision/set-up. 6.  Patient will participate in upper extremity therapeutic exercise/activities with supervision/set-up for 8 minutes. 7.  Patient will utilize energy conservation techniques during functional activities with min verbal cues. Prior Level of Function: pt reports his bijal assists with bathing & dressing tasks at bed level, assist part of the time for bedside commode transfers using RW     OCCUPATIONAL THERAPY TREATMENT    Patient: Ghada Ramirez (51 y.o. male)  Date: 11/14/2020  Diagnosis: Complicated UTI (urinary tract infection) [N39.0]  Shock ([address] [R57.9]   Sepsis ([address]       Precautions: Fall, Other (comment)(pacemaker)    Chart, occupational therapy assessment, plan of care, and goals were reviewed. ASSESSMENT:  Nursing/RN cleared pt to participate in OT tx. Patient presents lying with poor bed positioning, dep for assist for midline positioning in bed and scooting up towards head of bed. Patient requested bed hassan for BM, Max A rolling R <-> L with vc's for hand placement onto bed rails, pt reports he is unable to void and bed pan removed-nursing notified. Patient able to wash face with SBA and hands with SBA using [employer] wipes.  Nursing notified of pt appearing confused e.g. thought item OTR holding in left hand was some type of desert, pt requesting something chocolate to eat and juice, nursing stated request to be made with dietary for chocolate dessert for lunch and OK to give juice. Patient able to retrieve cup of juice from bedside table, with head of bed elevated for safe swallow, with Mod I and return cup to bedside table when finished drinking cup of juice. Patient reporting fatigue and stated he is going to try to sleep. Call bell within reach & pt verbalized understanding and provided return demonstration to utilize for assist e.g. functional transfers in order to prevent falls. Progression toward goals:  []          Improving appropriately and progressing toward goals  [x]          Improving slowly and progressing toward goals  []          Not making progress toward goals and plan of care will be adjusted     PLAN:  Patient continues to benefit from skilled intervention to address the above impairments. Continue treatment per established plan of care. Discharge Recommendations:  Skilled Nursing Facility  Further Equipment Recommendations for Discharge:  to be determined as progress is made with therapy       SUBJECTIVE:   Patient stated Trevon Logan I have something chocolate to eat and some juice please>    OBJECTIVE DATA SUMMARY:   Cognitive/Behavioral Status:  Neurologic State: Drowsy  Orientation Level: Disoriented to situation, Oriented to person, Oriented to place, Oriented to time  Cognition: Follows commands  Safety/Judgement: Fall prevention      ADL Intervention:  Grooming  Washing Face: Stand-by assistance  Washing Hands: Stand-by assistance([employer] wipes)    Pain:  Pain level pre-treatment: 0/10   Pain level post-treatment: 0/10  Pain Intervention(s): Medication (see MAR); Rest, Ice, Repositioning   Response to intervention: Nurse notified, See doc flow    Activity Tolerance:    poor  Please refer to the flowsheet for vital signs taken during this treatment.   After treatment:   []  Patient left in no apparent distress sitting up in chair  [x]  Patient left in no apparent distress in bed  [x]  Call bell left within reach  [x]  Nursing notified  []  Caregiver present  []  Bed alarm activated    COMMUNICATION/EDUCATION:   [x] Role of Occupational Therapy in the acute care setting  [x] Home safety education was provided and the patient/caregiver indicated understanding. [x] Patient/family have participated as able in working towards goals and plan of care. [x] Patient/family agree to work toward stated goals and plan of care. [] Patient understands intent and goals of therapy, but is neutral about his/her participation. [] Patient is unable to participate in goal setting and plan of care.       Thank you for this referral.  Farida Sanchez  Time Calculation: 23 mins

## 2020-11-14 NOTE — PROGRESS NOTES
Problem: Falls - Risk of  Goal: *Absence of Falls  Description: Document Anish Click Fall Risk and appropriate interventions in the flowsheet. Outcome: Progressing Towards Goal  Note: Fall Risk Interventions:  Mobility Interventions: Bed/chair exit alarm, Communicate number of staff needed for ambulation/transfer, Patient to call before getting OOB    Mentation Interventions: Adequate sleep, hydration, pain control, Bed/chair exit alarm, Door open when patient unattended, Reorient patient, Room close to nurse's station    Medication Interventions: Bed/chair exit alarm, Patient to call before getting OOB    Elimination Interventions: Bed/chair exit alarm, Call light in reach, Patient to call for help with toileting needs              Problem: Patient Education: Go to Patient Education Activity  Goal: Patient/Family Education  Outcome: Progressing Towards Goal     Problem: Pressure Injury - Risk of  Goal: *Prevention of pressure injury  Description: Document Alex Scale and appropriate interventions in the flowsheet. Outcome: Progressing Towards Goal  Note: Pressure Injury Interventions:  Sensory Interventions: Assess changes in LOC, Assess need for specialty bed, Avoid rigorous massage over bony prominences, Float heels, Keep linens dry and wrinkle-free, Maintain/enhance activity level, Pressure redistribution bed/mattress (bed type), Use 30-degree side-lying position, Turn and reposition approx.  every two hours (pillows and wedges if needed)    Moisture Interventions: Absorbent underpads, Internal/External urinary devices, Maintain skin hydration (lotion/cream)    Activity Interventions: Pressure redistribution bed/mattress(bed type), Assess need for specialty bed    Mobility Interventions: Float heels, HOB 30 degrees or less, Pressure redistribution bed/mattress (bed type), PT/OT evaluation, Assess need for specialty bed    Nutrition Interventions: Document food/fluid/supplement intake, Offer support with meals,snacks and hydration    Friction and Shear Interventions: Apply protective barrier, creams and emollients, HOB 30 degrees or less, Foam dressings/transparent film/skin sealants                Problem: Patient Education: Go to Patient Education Activity  Goal: Patient/Family Education  Outcome: Progressing Towards Goal     Problem: Pain  Goal: *Control of Pain  Outcome: Progressing Towards Goal     Problem: Patient Education: Go to Patient Education Activity  Goal: Patient/Family Education  Outcome: Progressing Towards Goal     Problem: Urinary Tract Infection - Adult  Goal: *Absence of infection signs and symptoms  Outcome: Progressing Towards Goal     Problem: Patient Education: Go to Patient Education Activity  Goal: Patient/Family Education  Outcome: Progressing Towards Goal     Problem: Discharge Planning  Goal: *Discharge to safe environment  Outcome: Progressing Towards Goal     Problem: Patient Education: Go to Patient Education Activity  Goal: Patient/Family Education  Outcome: Progressing Towards Goal     Problem: Patient Education: Go to Patient Education Activity  Goal: Patient/Family Education  Outcome: Progressing Towards Goal     Problem: Breathing Pattern - Ineffective  Goal: *Absence of hypoxia  Outcome: Progressing Towards Goal  Goal: *Use of effective breathing techniques  Outcome: Progressing Towards Goal     Problem: Patient Education: Go to Patient Education Activity  Goal: Patient/Family Education  Outcome: Progressing Towards Goal     Problem:  Body Temperature -  Risk of, Imbalanced  Goal: *Absence of cold stress or hypothermia signs and symptoms  Outcome: Progressing Towards Goal     Problem: Patient Education: Go to Patient Education Activity  Goal: Patient/Family Education  Outcome: Progressing Towards Goal

## 2020-11-14 NOTE — PROGRESS NOTES
Problem: Mobility Impaired (Adult and Pediatric)  Goal: *Acute Goals and Plan of Care (Insert Text)  Description: Physical Therapy Goals  Initiated 11/12/2020 and to be accomplished within 7 day(s)  1. Patient will move from supine to sit and sit to supine  in bed with modified independence. 2.  Patient will transfer from bed to chair and chair to bed with modified independence using the least restrictive device. 3.  Patient will perform sit to stand with modified independence. 4.  Patient will ambulate with modified independence for 50 feet with the least restrictive device. Outcome: Progressing Towards Goal   PHYSICAL THERAPY TREATMENT    Patient: Lynn Vyas (78 y.o. male)  Date: 11/14/2020  Diagnosis: Complicated UTI (urinary tract infection) [N39.0]  Shock ([address] [R57.9]   Sepsis ([facility] [address]       Precautions: Fall, Other (comment)(pacemaker)  PLOF: Mod I    ASSESSMENT:  Pt is A&Ox3, BP supine in bed 94/61. Pt reuired moderate assistance with moving from supine to sitting at EOB with verbal cues and tactile cues for hand placement and sequencing with transfers. Therapist provided assistance with line management. After 5 min rest break in sitting /68 HR 70 bpm. Pt required UE assist on bed rail to maintain upright sitting posture, without bed rail or UE assist pt exhibited Mod LOB in posterior direction. Pt was instructed in LE exercises at EOB requiring therapist assist to track reps and demo for the exercises. Pt and PT discussed standing with pt reporting he was willing to try but he did not have the energy at the moment. Pt elected to return to reclined in bed position for breakfast. Pt required mod assist to move from sitting to supine/ reclined in bed with therapist assist with LE, vc for hand and foot placement to scoot up in bed. Pt was left reclined in bed with breakfast tray.   Progression toward goals:   []      Improving appropriately and progressing toward goals  [x] Improving slowly and progressing toward goals  []      Not making progress toward goals and plan of care will be adjusted     PLAN:  Patient continues to benefit from skilled intervention to address the above impairments. Continue treatment per established plan of care. Discharge Recommendations:  [facility]  Further Equipment Recommendations for Discharge:  rolling walker     SUBJECTIVE:   Patient stated Maryl Rolling try.     OBJECTIVE DATA SUMMARY:   Critical Behavior:  Neurologic State: Drowsy  Orientation Level: Disoriented to situation, Oriented to person, Oriented to place, Oriented to time  Cognition: Follows commands  Safety/Judgement: Fall prevention  Functional Mobility Training:  Bed Mobility:     Supine to Sit: Moderate assistance  Sit to Supine: Moderate assistance  Scooting: Moderate assistance    Balance:  Sitting: Impaired  Sitting - Static: Fair (occasional)  Sitting - Dynamic: Poor (constant support)     Therapeutic Exercises:       EXERCISE   Sets   Reps   Active Active Assist   Passive Self ROM   Comments   Ankle Pumps    [x] [] [] []    Quad Sets/Glut Sets    [] [] [] [] Hold for 5 secs   Hamstring Sets   [] [] [] []    Short Arc Quads   [] [] [] []    Heel Slides   [] [] [] []    Straight Leg Raises   [] [] [] []    Hip Add   [] [] [] [] Hold for 5 secs, w/ pillow squeeze   Long Arc Quads   [x] [] [] []    Seated Marching   [x] [] [] []    Standing Marching   [] [] [] []       [] [] [] []        Pain:  Pain level pre-treatment: 0/10  Pain level post-treatment: 0/10   Pain Intervention(s): NA pt is not in pain this morning   Response to intervention: Nurse notified, See doc flow    Activity Tolerance:   Pt quickly fatigued  Please refer to the flowsheet for vital signs taken during this treatment.   After treatment:   [] Patient left in no apparent distress sitting up in chair  [x] Patient left in no apparent distress in bed  [x] Call bell left within reach  [x] Nursing notified  [] Caregiver present  [] Bed alarm activated  [] SCDs applied      COMMUNICATION/EDUCATION:   [x]         Role of Physical Therapy in the acute care setting. [x]         Fall prevention education was provided and the patient/caregiver indicated understanding. [x]         Patient/family have participated as able in working toward goals and plan of care. []         Patient/family agree to work toward stated goals and plan of care. []         Patient understands intent and goals of therapy, but is neutral about his/her participation.   []         Patient is unable to participate in stated goals/plan of care: ongoing with therapy staff.  []         Other:        Keira Palmer   Time Calculation: 30 mins

## 2020-11-15 ENCOUNTER — APPOINTMENT (OUTPATIENT)
Dept: GENERAL RADIOLOGY | Age: 85
DRG: 871 | End: 2020-11-15
Attending: INTERNAL MEDICINE
Payer: MEDICARE

## 2020-11-15 ENCOUNTER — APPOINTMENT (OUTPATIENT)
Dept: CT IMAGING | Age: 85
DRG: 871 | End: 2020-11-15
Attending: INTERNAL MEDICINE
Payer: MEDICARE

## 2020-11-15 LAB
ANION GAP SERPL CALC-SCNC: 6 MMOL/L (ref 3–18)
APPEARANCE UR: CLEAR
BACTERIA URNS QL MICRO: ABNORMAL /HPF
BASOPHILS # BLD: 0 K/UL (ref 0–0.1)
BASOPHILS NFR BLD: 0 % (ref 0–2)
BILIRUB UR QL: NEGATIVE
BUN SERPL-MCNC: 39 MG/DL (ref 7–18)
BUN/CREAT SERPL: 32 (ref 12–20)
CALCIUM SERPL-MCNC: 8.5 MG/DL (ref 8.5–10.1)
CHLORIDE SERPL-SCNC: 111 MMOL/L (ref 100–111)
CO2 SERPL-SCNC: 24 MMOL/L (ref 21–32)
COLOR UR: YELLOW
CREAT SERPL-MCNC: 1.22 MG/DL (ref 0.6–1.3)
DIFFERENTIAL METHOD BLD: ABNORMAL
EOSINOPHIL # BLD: 0.2 K/UL (ref 0–0.4)
EOSINOPHIL NFR BLD: 2 % (ref 0–5)
ERYTHROCYTE [DISTWIDTH] IN BLOOD BY AUTOMATED COUNT: 21.9 % (ref 11.6–14.5)
GLUCOSE BLD STRIP.AUTO-MCNC: 113 MG/DL (ref 70–110)
GLUCOSE BLD STRIP.AUTO-MCNC: 74 MG/DL (ref 70–110)
GLUCOSE BLD STRIP.AUTO-MCNC: 83 MG/DL (ref 70–110)
GLUCOSE BLD STRIP.AUTO-MCNC: 86 MG/DL (ref 70–110)
GLUCOSE BLD STRIP.AUTO-MCNC: 95 MG/DL (ref 70–110)
GLUCOSE SERPL-MCNC: 98 MG/DL (ref 74–99)
GLUCOSE UR STRIP.AUTO-MCNC: NEGATIVE MG/DL
HCT VFR BLD AUTO: 32.8 % (ref 36–48)
HGB BLD-MCNC: 10.8 G/DL (ref 13–16)
HGB UR QL STRIP: ABNORMAL
HYALINE CASTS URNS QL MICRO: ABNORMAL /LPF (ref 0–2)
INR PPP: 1.2 (ref 0.8–1.2)
KETONES UR QL STRIP.AUTO: NEGATIVE MG/DL
LEUKOCYTE ESTERASE UR QL STRIP.AUTO: ABNORMAL
LYMPHOCYTES # BLD: 1.2 K/UL (ref 0.9–3.6)
LYMPHOCYTES NFR BLD: 12 % (ref 21–52)
MAGNESIUM SERPL-MCNC: 1.9 MG/DL (ref 1.6–2.6)
MCH RBC QN AUTO: 29.2 PG (ref 24–34)
MCHC RBC AUTO-ENTMCNC: 32.9 G/DL (ref 31–37)
MCV RBC AUTO: 88.6 FL (ref 74–97)
MONOCYTES # BLD: 0.3 K/UL (ref 0.05–1.2)
MONOCYTES NFR BLD: 3 % (ref 3–10)
MUCOUS THREADS URNS QL MICRO: ABNORMAL /LPF
NEUTS SEG # BLD: 8.2 K/UL (ref 1.8–8)
NEUTS SEG NFR BLD: 83 % (ref 40–73)
NITRITE UR QL STRIP.AUTO: NEGATIVE
PH UR STRIP: 5 [PH] (ref 5–8)
PHOSPHATE SERPL-MCNC: 2.7 MG/DL (ref 2.5–4.9)
PLATELET # BLD AUTO: 122 K/UL (ref 135–420)
PMV BLD AUTO: 11.4 FL (ref 9.2–11.8)
POTASSIUM SERPL-SCNC: 5 MMOL/L (ref 3.5–5.5)
PROT UR STRIP-MCNC: NEGATIVE MG/DL
PROTHROMBIN TIME: 14.8 SEC (ref 11.5–15.2)
RBC # BLD AUTO: 3.7 M/UL (ref 4.7–5.5)
RBC #/AREA URNS HPF: ABNORMAL /HPF (ref 0–5)
RBC MORPH BLD: ABNORMAL
SODIUM SERPL-SCNC: 141 MMOL/L (ref 136–145)
SP GR UR REFRACTOMETRY: 1.01 (ref 1–1.03)
UROBILINOGEN UR QL STRIP.AUTO: 0.2 EU/DL (ref 0.2–1)
WBC # BLD AUTO: 9.9 K/UL (ref 4.6–13.2)
WBC URNS QL MICRO: ABNORMAL /HPF (ref 0–4)

## 2020-11-15 PROCEDURE — 71045 X-RAY EXAM CHEST 1 VIEW: CPT

## 2020-11-15 PROCEDURE — 85610 PROTHROMBIN TIME: CPT

## 2020-11-15 PROCEDURE — 84100 ASSAY OF PHOSPHORUS: CPT

## 2020-11-15 PROCEDURE — 82962 GLUCOSE BLOOD TEST: CPT

## 2020-11-15 PROCEDURE — 80048 BASIC METABOLIC PNL TOTAL CA: CPT

## 2020-11-15 PROCEDURE — 74011250636 HC RX REV CODE- 250/636: Performed by: HOSPITALIST

## 2020-11-15 PROCEDURE — 65660000000 HC RM CCU STEPDOWN

## 2020-11-15 PROCEDURE — 2709999900 HC NON-CHARGEABLE SUPPLY

## 2020-11-15 PROCEDURE — 85025 COMPLETE CBC W/AUTO DIFF WBC: CPT

## 2020-11-15 PROCEDURE — 83735 ASSAY OF MAGNESIUM: CPT

## 2020-11-15 PROCEDURE — 71250 CT THORAX DX C-: CPT

## 2020-11-15 PROCEDURE — 97535 SELF CARE MNGMENT TRAINING: CPT

## 2020-11-15 PROCEDURE — 36415 COLL VENOUS BLD VENIPUNCTURE: CPT

## 2020-11-15 PROCEDURE — 74011000258 HC RX REV CODE- 258: Performed by: HOSPITALIST

## 2020-11-15 PROCEDURE — 97110 THERAPEUTIC EXERCISES: CPT

## 2020-11-15 PROCEDURE — 74011250636 HC RX REV CODE- 250/636: Performed by: INTERNAL MEDICINE

## 2020-11-15 RX ADMIN — ERYTHROMYCIN: 5 OINTMENT OPHTHALMIC at 05:50

## 2020-11-15 RX ADMIN — Medication 10 ML: at 22:01

## 2020-11-15 RX ADMIN — ERYTHROMYCIN: 5 OINTMENT OPHTHALMIC at 14:00

## 2020-11-15 RX ADMIN — ERYTHROMYCIN: 5 OINTMENT OPHTHALMIC at 21:59

## 2020-11-15 RX ADMIN — DEXTROSE MONOHYDRATE AND POTASSIUM CHLORIDE: 5; .149 INJECTION, SOLUTION INTRAVENOUS at 00:33

## 2020-11-15 RX ADMIN — Medication 10 ML: at 14:00

## 2020-11-15 RX ADMIN — MEROPENEM 500 MG: 500 INJECTION, POWDER, FOR SOLUTION INTRAVENOUS at 17:14

## 2020-11-15 RX ADMIN — FLUCONAZOLE IN SODIUM CHLORIDE 200 MG: 2 INJECTION, SOLUTION INTRAVENOUS at 12:25

## 2020-11-15 RX ADMIN — NYSTATIN: 100000 POWDER TOPICAL at 22:00

## 2020-11-15 RX ADMIN — NYSTATIN: 100000 POWDER TOPICAL at 08:52

## 2020-11-15 RX ADMIN — Medication 10 ML: at 05:50

## 2020-11-15 RX ADMIN — MEROPENEM 500 MG: 500 INJECTION, POWDER, FOR SOLUTION INTRAVENOUS at 05:50

## 2020-11-15 RX ADMIN — ENOXAPARIN SODIUM 30 MG: 30 INJECTION SUBCUTANEOUS at 08:51

## 2020-11-15 NOTE — PROGRESS NOTES
Bedside shift change report given to Brian Allison RN (oncoming nurse) by Eula Lackey RN (offgoing nurse). Report included the following information SBAR, Kardex and Cardiac Rhythm 1st degree AV Block, LBBB,Pacemaker.

## 2020-11-15 NOTE — ROUTINE PROCESS
1940: Assumed care. Awake. Quietly resting in bed. HOB elevated. No SOB on RA. On IVF of D5 + 20 mEq KCl at 75 ml/hr. Infusing well to Lt FA. Denies any pain or discomfort at this time. Call light within reach. 2235: Due meds given. 0031: No change from previous assessment. 0200: Sleeping. 
 
0403: No change from previous assessment. 0550: Slept good thru night. Needs attended. Due meds given. BS 95. 
 
7843: Bedside and Verbal shift change report given to Doctor Kendrick Be (oncoming nurse) by me (offgoing nurse). Report included the following information SBAR, Kardex, Intake/Output, MAR, Recent Results and Cardiac Rhythm V pacing w/ Atrial tracing.

## 2020-11-15 NOTE — PROGRESS NOTES
Problem: Falls - Risk of  Goal: *Absence of Falls  Description: Document Ilan Zapata Fall Risk and appropriate interventions in the flowsheet.   11/15/2020 0144 by Elie Rosas RN  Outcome: Progressing Towards Goal  Note: Fall Risk Interventions:  Mobility Interventions: Bed/chair exit alarm, Communicate number of staff needed for ambulation/transfer, Patient to call before getting OOB, PT Consult for mobility concerns    Mentation Interventions: Adequate sleep, hydration, pain control, Bed/chair exit alarm, Door open when patient unattended, Reorient patient, Room close to nurse's station    Medication Interventions: Bed/chair exit alarm    Elimination Interventions: Bed/chair exit alarm, Call light in reach, Patient to call for help with toileting needs, Stay With Me (per policy)           [id number] by Elie Rosas RN  Note: Fall Risk Interventions:  Mobility Interventions: Assess mobility with egress test, Bed/chair exit alarm, Communicate number of staff needed for ambulation/transfer, OT consult for ADLs, Patient to call before getting OOB, PT Consult for mobility concerns, PT Consult for assist device competence    Mentation Interventions: Adequate sleep, hydration, pain control, Bed/chair exit alarm, Door open when patient unattended, Evaluate medications/consider consulting pharmacy, More frequent rounding, Reorient patient, Room close to nurse's station, Toileting rounds    Medication Interventions: Assess postural VS orthostatic hypotension, Bed/chair exit alarm, Evaluate medications/consider consulting pharmacy, Patient to call before getting OOB, Teach patient to arise slowly    Elimination Interventions: Bed/chair exit alarm, Call light in reach, Patient to call for help with toileting needs, Toilet paper/wipes in reach, Toileting schedule/hourly rounds              Problem: Patient Education: Go to Patient Education Activity  Goal: Patient/Family Education  Outcome: Progressing Towards Goal Problem: Pressure Injury - Risk of  Goal: *Prevention of pressure injury  Description: Document Alex Scale and appropriate interventions in the flowsheet. Outcome: Progressing Towards Goal  Note: Pressure Injury Interventions:  Sensory Interventions: Assess changes in LOC, Assess need for specialty bed, Keep linens dry and wrinkle-free, Maintain/enhance activity level, Pressure redistribution bed/mattress (bed type), Turn and reposition approx.  every two hours (pillows and wedges if needed), Use 30-degree side-lying position    Moisture Interventions: Absorbent underpads, Internal/External urinary devices, Minimize layers    Activity Interventions: Pressure redistribution bed/mattress(bed type), PT/OT evaluation    Mobility Interventions: HOB 30 degrees or less, Pressure redistribution bed/mattress (bed type), PT/OT evaluation, Assess need for specialty bed    Nutrition Interventions: Document food/fluid/supplement intake, Offer support with meals,snacks and hydration    Friction and Shear Interventions: Apply protective barrier, creams and emollients, HOB 30 degrees or less, Minimize layers, Foam dressings/transparent film/skin sealants                Problem: Patient Education: Go to Patient Education Activity  Goal: Patient/Family Education  Outcome: Progressing Towards Goal     Problem: Pain  Goal: *Control of Pain  Outcome: Progressing Towards Goal  Goal: *PALLIATIVE CARE:  Alleviation of Pain  Outcome: Progressing Towards Goal     Problem: Patient Education: Go to Patient Education Activity  Goal: Patient/Family Education  Outcome: Progressing Towards Goal     Problem: Urinary Tract Infection - Adult  Goal: *Absence of infection signs and symptoms  Outcome: Progressing Towards Goal     Problem: Patient Education: Go to Patient Education Activity  Goal: Patient/Family Education  Outcome: Progressing Towards Goal     Problem: Discharge Planning  Goal: *Discharge to safe environment  Outcome: Progressing Towards Goal     Problem: Patient Education: Go to Patient Education Activity  Goal: Patient/Family Education  Outcome: Progressing Towards Goal     Problem: Patient Education: Go to Patient Education Activity  Goal: Patient/Family Education  Outcome: Progressing Towards Goal     Problem: Breathing Pattern - Ineffective  Goal: *Absence of hypoxia  Outcome: Progressing Towards Goal  Goal: *Use of effective breathing techniques  Outcome: Progressing Towards Goal     Problem: Patient Education: Go to Patient Education Activity  Goal: Patient/Family Education  Outcome: Progressing Towards Goal     Problem:  Body Temperature -  Risk of, Imbalanced  Goal: *Absence of cold stress or hypothermia signs and symptoms  Outcome: Progressing Towards Goal     Problem: Patient Education: Go to Patient Education Activity  Goal: Patient/Family Education  Outcome: Progressing Towards Goal

## 2020-11-15 NOTE — PROGRESS NOTES
Problem: Self Care Deficits Care Plan (Adult)  Goal: *Acute Goals and Plan of Care (Insert Text)  Description: Occupational Therapy Goals  Initiated 11/12/2020 within 7 day(s). 1.  Patient will perform self-feeding and grooming with modified independence. 2.  Patient will perform bathing with minimal assistance/contact guard assist.  3.  Patient will perform upper body dressing and lower body dressing with minimal assistance/contact guard assist.  4.  Patient will perform toilet transfers with supervision/set-up. 5.  Patient will perform all aspects of toileting with supervision/set-up. 6.  Patient will participate in upper extremity therapeutic exercise/activities with supervision/set-up for 8 minutes. 7.  Patient will utilize energy conservation techniques during functional activities with min verbal cues. Prior Level of Function: pt reports his bijal assists with bathing & dressing tasks at bed level, assist part of the time for bedside commode transfers using RW     OCCUPATIONAL THERAPY TREATMENT    Patient: Carlotta Wilson (25 y.o. male)  Date: 11/15/2020  Diagnosis: Complicated UTI (urinary tract infection) [N39.0]  Shock ([address] [R57.9]   Sepsis ([address]       Precautions: Fall, Other (comment)(pacemaker)  Chart, occupational therapy assessment, plan of care, and goals were reviewed. ASSESSMENT:  Nursing/RN cleared pt to participate in OT tx. Patient reports incontinence of BM 2x, Max A rolling R <-> L with vc's provided for correct hand placement onto bed rails to assist, dep with toilet hygiene. Grooming tasks: washing face with Min A for thoroughness, combing hair w/ Min A for thoroughness, SBA oral hygiene utilizing spittoon, dep doff and don slipper socks long sitting in bed. Dep x 2 for scooting up towards head of bed for optimal bed positioning.  Call bell within reach & pt verbalized understanding and provided return demonstration to utilize for assist e.g. functional transfers in order to prevent falls. Progression toward goals:  []          Improving appropriately and progressing toward goals  [x]          Improving slowly and progressing toward goals  []          Not making progress toward goals and plan of care will be adjusted     PLAN:  Patient continues to benefit from skilled intervention to address the above impairments. Continue treatment per established plan of care. Discharge Recommendations:  Skilled Nursing Facility  Further Equipment Recommendations for Discharge:  hospital bed     SUBJECTIVE:   Patient stated I am really tired today.     OBJECTIVE DATA SUMMARY:   Cognitive/Behavioral Status:  Neurologic State: Alert  Orientation Level: Disoriented to situation, Oriented to person, Oriented to place, Oriented to time  Cognition: Follows commands  Safety/Judgement: Fall prevention    Functional Mobility and Transfers for ADLs:   Bed Mobility:  Rolling: Maximum assistance  ADL Intervention:       Grooming  Position Performed: Long sitting on bed  Washing Face: Minimum assistance  Brushing Teeth: Stand-by assistance  Brushing/Combing Hair: Minimum assistance    Toileting  Bladder Hygiene: Total assistance (dependent)  Bowel Hygiene: Total assistance (dependent)    Pain:  Pain level pre-treatment: 0/10   Pain level post-treatment: 0/10  Pain Intervention(s): Medication (see MAR); Rest, Ice, Repositioning   Response to intervention: Nurse notified, See doc flow    Activity Tolerance:    poor  Please refer to the flowsheet for vital signs taken during this treatment. After treatment:   []  Patient left in no apparent distress sitting up in chair  [x]  Patient left in no apparent distress in bed  [x]  Call bell left within reach  [x]  Nursing notified  []  Caregiver present  []  Bed alarm activated    COMMUNICATION/EDUCATION:   [x] Role of Occupational Therapy in the acute care setting  [x] Home safety education was provided and the patient/caregiver indicated understanding.   [x] Patient/family have participated as able in working towards goals and plan of care. [x] Patient/family agree to work toward stated goals and plan of care. [] Patient understands intent and goals of therapy, but is neutral about his/her participation. [] Patient is unable to participate in goal setting and plan of care.       Thank you for this referral.  Singh Sanchez  Time Calculation: 38 mins

## 2020-11-15 NOTE — PROGRESS NOTES
Problem: Mobility Impaired (Adult and Pediatric)  Goal: *Acute Goals and Plan of Care (Insert Text)  Description: Physical Therapy Goals  Initiated 11/12/2020 and to be accomplished within 7 day(s)  1. Patient will move from supine to sit and sit to supine  in bed with modified independence. 2.  Patient will transfer from bed to chair and chair to bed with modified independence using the least restrictive device. 3.  Patient will perform sit to stand with modified independence. 4.  Patient will ambulate with modified independence for 50 feet with the least restrictive device. Outcome: Progressing Towards Goal   PHYSICAL THERAPY TREATMENT    Patient: Kaley Bartlett (32 y.o. male)  Date: 11/15/2020  Diagnosis: Complicated UTI (urinary tract infection) [N39.0]  Shock ([address] [R57.9]   Sepsis ([facility] [address]       Precautions: Fall, Other (comment)(pacemaker)  PLOF: Mod I    ASSESSMENT:  Pt reclined in bed on arrival. Pt agreeable to tx today. Pt required vc and tc for hand placement and sequencing with rolling to R side then transferring to sitting at EOB with PT max A to complete transfer. Pt reported slight dizziness in sitting which resolved with short seated rest break. Pt required vc and demo with all exercises to complete through full available ROM. Pt was also instructed in seated static balance training to improve safety with sitting. Pt was instructed to cross UE across chest for 30s count and remain upright with PT providing close SBA to maintain safety with activity. Pt required tc to maintain upright posture with the activity, however demonstrated no significant LOB with the activity today. Pt reported fatigue following tx and was returned to supine in bed with Pt max A, vc for sequencing and UE utilization on bed rail to complete transfer. Call bell left in reach with pt in no apparent distress in bed.    Progression toward goals:   []      Improving appropriately and progressing toward goals  [x] Improving slowly and progressing toward goals  []      Not making progress toward goals and plan of care will be adjusted     PLAN:  Patient continues to benefit from skilled intervention to address the above impairments. Continue treatment per established plan of care. Discharge Recommendations:  [facility]  Further Equipment Recommendations for Discharge:  rolling walker     SUBJECTIVE:   Patient stated Julieta Moon will do my best.    OBJECTIVE DATA SUMMARY:   Critical Behavior:  Neurologic State: Alert  Orientation Level: Disoriented to situation, Oriented to person, Oriented to place, Oriented to time  Cognition: Follows commands  Safety/Judgement: Fall prevention  Functional Mobility Training:  Bed Mobility:  Rolling: Maximum assistance  Supine to Sit: Maximum assistance  Sit to Supine: Maximum assistance    Balance:  Sitting: Impaired  Sitting - Static: Fair (occasional)  Sitting - Dynamic: Poor (constant support)    Neuro Re-Education:  1x30 seated static balance- pt was instructed to sit at EOB, cross arms across chest, and maintain sitting balance, PT provided close SBA to maintain safety with exercise. Therapeutic Exercises:   30 reps each      EXERCISE   Sets   Reps   Active Active Assist   Passive Self ROM   Comments   Ankle Pumps    [x] [] [] []    Quad Sets/Glut Sets    [x] [] [] [] Hold for 5 secs   Hamstring Sets   [] [] [] []    Short Arc Quads   [] [] [] []    Heel Slides   [] [] [] []    Straight Leg Raises   [] [] [] []    Hip Add   [] [] [] [] Hold for 5 secs, w/ pillow squeeze   Long Arc Quads   [x] [] [] []    Seated Marching   [x] [] [] []    Standing Marching   [] [] [] []       [] [] [] []        Pain:  Pain level pre-treatment: 0/10  Pain level post-treatment: 0/10   Pain Intervention(s): Na pt reported no pain   Response to intervention: Nurse notified, See doc flow    Activity Tolerance:   Please refer to the flowsheet for vital signs taken during this treatment.   After treatment:   [] Patient left in no apparent distress sitting up in chair  [x] Patient left in no apparent distress in bed  [x] Call bell left within reach  [x] Nursing notified  [] Caregiver present  [x] Bed alarm activated  [] SCDs applied      COMMUNICATION/EDUCATION:   [x]         Role of Physical Therapy in the acute care setting. [x]         Fall prevention education was provided and the patient/caregiver indicated understanding. [x]         Patient/family have participated as able in working toward goals and plan of care. []         Patient/family agree to work toward stated goals and plan of care. []         Patient understands intent and goals of therapy, but is neutral about his/her participation.   []         Patient is unable to participate in stated goals/plan of care: ongoing with therapy staff.  []         Other:        Srikanth Ocampo   Time Calculation: 18 mins

## 2020-11-15 NOTE — PROGRESS NOTES
Messaged Dr. Jc Giron via [employer] to inquire about cardiac monitoring order. Was made aware that I cannot accept orders via text. States that patient should keep cardiac monitoring on.

## 2020-11-15 NOTE — PROGRESS NOTES
Internal Medicine Progress Note        NAME: Terrance Roberts   :  1934  MRM:  [id number]    Date/Time: 11/15/2020        ASSESSMENT/PLAN:    Principal Problem:    Sepsis ([address] (2020)    Active Problems:    Hypothermia (2019)      UTI (urinary tract infection) (2020)      Hypotension (2020)      Cardiomyopathy ([address] (2020)      Overview: LVEF 35-40% by previous TTE. Pneumonia ()      Metabolic encephalopathy ()      Shock ([address] ()      Complicated UTI (urinary tract infection) (11/10/2020)      Candiduria (2020)      Acute respiratory failure ([address] (2020)        # Sepsis with hypotension and hypothermia . Likely  due to UTI , Pneumonia. Wbc, platlet  improved , a febrile. Treated with  merrem and added Diflucan. Urine culture candida 20 K . Repeat UA and reflex. Blood CS NTD. UA pyuria  CXR ? Atelectasis vs ASD , repeat with worsening lower lungs opacities. CT chest ordered     # Metabolic encephalopathy. Seem Improving. Report confusion at times. CT head No acute intracranial abnormality, chronic changes. # Acute respiratory failure per previous notes. Currently on RA.     - DC  [place]       # Debility.  PT. SNF         IP CONSULT TO CARDIOLOGY  IP CONSULT TO INTENSIVIST    Lab Review:     Recent Labs     11/15/20  0300 20  0247 20  0930   WBC 9.9 6.3 13.8*   HGB 10.8* 10.2* 10.0*   HCT 32.8* 31.4* 31.2*   * 101* 94*     Recent Labs     11/15/20  0300 20  0247 20  0930    142 146*   K 5.0 4.2 3.9    112* 115*   CO2 24 24 26   GLU 98 80 121*   BUN 39* 38* 41*   CREA 1.22 1.23 1.40*   CA 8.5 8.4* 8.8   MG 1.9  --   --    PHOS 2.7  --   --    INR 1.2  --   --      Lab Results   Component Value Date/Time    Glucose (POC) 95 11/15/2020 05:53 AM    Glucose (POC) 113 (H) 11/15/2020 12:11 AM    Glucose (POC) 99 2020 04:42 PM    Glucose (POC) 101 2020 11:31 AM    Glucose (POC) 84 11/14/2020 06:20 AM    Glucose (POC) 96 10/10/2014 09:28 PM    Glucose (POC) 186 (H) 10/10/2014 03:48 PM     No results for input(s): PH, PCO2, PO2, HCO3, FIO2 in the last 72 hours. Recent Labs     11/15/20  0300   INR 1.2       Lab Results   Component Value Date/Time    Specimen Description: [facility] 02/06/2014 05:45 PM     Lab Results   Component Value Date/Time    Culture result: CANDIDA ALBICANS (A) 11/10/2020 03:15 PM    Culture result: NO GROWTH 5 DAYS 11/10/2020 02:49 PM    Culture result: NO GROWTH 5 DAYS 11/10/2020 02:38 PM       All Cardiac Markers in the last 24 hours: No results found for: CPK, CK, CKMMB, CKMB, RCK3, CKMBT, CKNDX, CKND1, TELLY, TROPT, TROIQ, JOSE, TROPT, TNIPOC, BNP, BNPP        Intervals noted   Pt s/e @ bedside     Subjective:     Chief Complaint:      Offer no complain  Eat well and continue to Feel better. He report been confused at times. ROS:  No CP/SOB/N/V/D/Pains/Bleeding/ acute joint swelling/rash/itching/fever/chills. Tolerating PT , Tolerating Diet                Objective:     Vitals:  Last 24hrs VS reviewed since prior progress note.  Most recent are:    Visit Vitals  /64 (BP 1 Location: Right arm, BP Patient Position: At rest)   Pulse 65   Temp 97.9 °F (36.6 °C)   Resp 16   Ht 5' 6\" (1.676 m)   Wt 58.8 kg (129 lb 11.2 oz)   SpO2 99%   BMI 20.93 kg/m²     SpO2 Readings from Last 6 Encounters:   11/15/20 99%   09/12/20 99%   09/09/20 96%   07/23/20 95%   07/13/20 96%   05/11/20 97%    O2 Flow Rate (L/min): 2 l/min       Intake/Output Summary (Last 24 hours) at 11/15/2020 1026  Last data filed at 11/15/2020 0659  Gross per 24 hour   Intake 1960 ml   Output 675 ml   Net 1285 ml          Physical Exam:   Gen: Well-developed,   in no acute distress  HEENT: Head atraumatic, normocephalic ,  hearing intact to voice, moist mucous membranes  Neck:  Trachea midline , No apparent JVD, Supple   Resp: No accessory muscle use, Bilateral BS present,clear breath sounds without wheezes rales or rhonchi  Card:    normal S1, S2 without Gallop . No Significant lower leg peripheral edema. Abd:  Soft, non-tender, non-distended, bowel sounds are present . Musc: No cyanosis or clubbing  Skin: Warm and dry . No rashes     Neuro:  No facial asymmetry.   no clear area of focal motor weakness, follows commands appropriately    alert      Medications Reviewed: (see below)    Lab Data Reviewed: (see below)    ______________________________________________________________________    Medications:     Current Facility-Administered Medications   Medication Dose Route Frequency    fluconazole (DIFLUCAN) 200mg/100 mL IVPB (premix)  200 mg IntraVENous Q24H    dextrose 5% with KCl 20 mEq/L infusion   IntraVENous CONTINUOUS    nystatin (MYCOSTATIN) 100,000 unit/gram powder   Topical BID    erythromycin (ILOTYCIN) 5 mg/gram (0.5 %) ophthalmic ointment   Both Eyes Q8H    sodium chloride (NS) flush 5-10 mL  5-10 mL IntraVENous PRN    meropenem (MERREM) 500 mg in 0.9% sodium chloride (MBP/ADV) 50 mL MBP  500 mg IntraVENous Q12H    sodium chloride (NS) flush 5-40 mL  5-40 mL IntraVENous Q8H    sodium chloride (NS) flush 5-40 mL  5-40 mL IntraVENous PRN    acetaminophen (TYLENOL) tablet 650 mg  650 mg Oral Q6H PRN    Or    acetaminophen (TYLENOL) suppository 650 mg  650 mg Rectal Q6H PRN    polyethylene glycol (MIRALAX) packet 17 g  17 g Oral DAILY PRN    promethazine (PHENERGAN) tablet 12.5 mg  12.5 mg Oral Q6H PRN    Or    ondansetron (ZOFRAN) injection 4 mg  4 mg IntraVENous Q6H PRN    enoxaparin (LOVENOX) injection 30 mg  30 mg SubCUTAneous DAILY    pantoprazole (PROTONIX) injection 40 mg  40 mg IntraVENous DAILY PRN    melatonin tablet 12 mg  12 mg Oral QHS PRN    albuterol-ipratropium (DUO-NEB) 2.5 MG-0.5 MG/3 ML  3 mL Nebulization Q6H PRN    glucose chewable tablet 16 g  4 Tab Oral PRN    glucagon (GLUCAGEN) injection 1 mg  1 mg IntraMUSCular PRN    dextrose (D50) infusion 12.5-25 g  25-50 mL IntraVENous PRN          Total time spent with patient: 35 minutes                  Care Plan discussed with: Patient, Care Manager, Nursing Staff and >50% of time spent in counseling and coordination of care    Discussed:  Care Plan    Prophylaxis:  Lovenox    Disposition:  SNF/LTC           This document in whole or part of it has been produced using voice recognition software. Unrecognized errors in transcription may be present.     Attending Physician: Johanne Marmolejo MD

## 2020-11-16 ENCOUNTER — APPOINTMENT (OUTPATIENT)
Dept: ULTRASOUND IMAGING | Age: 85
DRG: 871 | End: 2020-11-16
Attending: INTERNAL MEDICINE
Payer: MEDICARE

## 2020-11-16 ENCOUNTER — APPOINTMENT (OUTPATIENT)
Dept: GENERAL RADIOLOGY | Age: 85
DRG: 871 | End: 2020-11-16
Attending: RADIOLOGY
Payer: MEDICARE

## 2020-11-16 LAB
ANION GAP SERPL CALC-SCNC: 4 MMOL/L (ref 3–18)
APPEARANCE FLD: ABNORMAL
BACTERIA SPEC CULT: NORMAL
BACTERIA SPEC CULT: NORMAL
BASOPHILS # BLD: 0 K/UL (ref 0–0.1)
BASOPHILS NFR BLD: 0 % (ref 0–2)
BUN SERPL-MCNC: 41 MG/DL (ref 7–18)
BUN/CREAT SERPL: 36 (ref 12–20)
CALCIUM SERPL-MCNC: 8.8 MG/DL (ref 8.5–10.1)
CHLORIDE SERPL-SCNC: 113 MMOL/L (ref 100–111)
CO2 SERPL-SCNC: 25 MMOL/L (ref 21–32)
COLOR FLD: YELLOW
CREAT SERPL-MCNC: 1.15 MG/DL (ref 0.6–1.3)
DIFFERENTIAL METHOD BLD: ABNORMAL
EOSINOPHIL # BLD: 0.2 K/UL (ref 0–0.4)
EOSINOPHIL NFR BLD: 3 % (ref 0–5)
EOSINOPHIL NFR FLD MANUAL: 0 %
ERYTHROCYTE [DISTWIDTH] IN BLOOD BY AUTOMATED COUNT: 21.7 % (ref 11.6–14.5)
GLUCOSE BLD STRIP.AUTO-MCNC: 73 MG/DL (ref 70–110)
GLUCOSE BLD STRIP.AUTO-MCNC: 79 MG/DL (ref 70–110)
GLUCOSE BLD STRIP.AUTO-MCNC: 84 MG/DL (ref 70–110)
GLUCOSE BLD STRIP.AUTO-MCNC: 90 MG/DL (ref 70–110)
GLUCOSE SERPL-MCNC: 66 MG/DL (ref 74–99)
HCT VFR BLD AUTO: 31.8 % (ref 36–48)
HGB BLD-MCNC: 10.4 G/DL (ref 13–16)
LDH SERPL L TO P-CCNC: 199 U/L (ref 81–234)
LYMPHOCYTES # BLD: 0.9 K/UL (ref 0.9–3.6)
LYMPHOCYTES NFR BLD: 18 % (ref 21–52)
LYMPHOCYTES NFR FLD: 23 %
MAGNESIUM SERPL-MCNC: 1.9 MG/DL (ref 1.6–2.6)
MCH RBC QN AUTO: 28.7 PG (ref 24–34)
MCHC RBC AUTO-ENTMCNC: 32.7 G/DL (ref 31–37)
MCV RBC AUTO: 87.8 FL (ref 74–97)
MONOCYTES # BLD: 0.2 K/UL (ref 0.05–1.2)
MONOCYTES NFR BLD: 4 % (ref 3–10)
MONOCYTES NFR FLD: 9 %
NEUTROPHILS NFR FLD: 68 %
NEUTS BAND # FLD: 0 %
NEUTS SEG # BLD: 3.7 K/UL (ref 1.8–8)
NEUTS SEG NFR BLD: 75 % (ref 40–73)
NUC CELL # FLD: 240 /CU MM
PHOSPHATE SERPL-MCNC: 3.2 MG/DL (ref 2.5–4.9)
PLATELET # BLD AUTO: 152 K/UL (ref 135–420)
PMV BLD AUTO: 10.5 FL (ref 9.2–11.8)
POTASSIUM SERPL-SCNC: 4.6 MMOL/L (ref 3.5–5.5)
RBC # BLD AUTO: 3.62 M/UL (ref 4.7–5.5)
RBC # FLD: 1500 /CU MM
SERVICE CMNT-IMP: NORMAL
SERVICE CMNT-IMP: NORMAL
SODIUM SERPL-SCNC: 142 MMOL/L (ref 136–145)
SPECIMEN SOURCE FLD: ABNORMAL
WBC # BLD AUTO: 5 K/UL (ref 4.6–13.2)

## 2020-11-16 PROCEDURE — 83615 LACTATE (LD) (LDH) ENZYME: CPT

## 2020-11-16 PROCEDURE — 87015 SPECIMEN INFECT AGNT CONCNTJ: CPT

## 2020-11-16 PROCEDURE — 65660000000 HC RM CCU STEPDOWN

## 2020-11-16 PROCEDURE — 83735 ASSAY OF MAGNESIUM: CPT

## 2020-11-16 PROCEDURE — 74011000250 HC RX REV CODE- 250: Performed by: RADIOLOGY

## 2020-11-16 PROCEDURE — 0W993ZZ DRAINAGE OF RIGHT PLEURAL CAVITY, PERCUTANEOUS APPROACH: ICD-10-PCS | Performed by: RADIOLOGY

## 2020-11-16 PROCEDURE — 77030032034 US THORACENTESIS RT NDL W IMAGE

## 2020-11-16 PROCEDURE — 89051 BODY FLUID CELL COUNT: CPT

## 2020-11-16 PROCEDURE — 85025 COMPLETE CBC W/AUTO DIFF WBC: CPT

## 2020-11-16 PROCEDURE — 84100 ASSAY OF PHOSPHORUS: CPT

## 2020-11-16 PROCEDURE — 2709999900 HC NON-CHARGEABLE SUPPLY

## 2020-11-16 PROCEDURE — 71045 X-RAY EXAM CHEST 1 VIEW: CPT

## 2020-11-16 PROCEDURE — 74011000258 HC RX REV CODE- 258: Performed by: HOSPITALIST

## 2020-11-16 PROCEDURE — 82945 GLUCOSE OTHER FLUID: CPT

## 2020-11-16 PROCEDURE — 82962 GLUCOSE BLOOD TEST: CPT

## 2020-11-16 PROCEDURE — 74011250636 HC RX REV CODE- 250/636: Performed by: HOSPITALIST

## 2020-11-16 PROCEDURE — 87205 SMEAR GRAM STAIN: CPT

## 2020-11-16 PROCEDURE — 36415 COLL VENOUS BLD VENIPUNCTURE: CPT

## 2020-11-16 PROCEDURE — 83986 ASSAY PH BODY FLUID NOS: CPT

## 2020-11-16 PROCEDURE — 80048 BASIC METABOLIC PNL TOTAL CA: CPT

## 2020-11-16 PROCEDURE — 97112 NEUROMUSCULAR REEDUCATION: CPT

## 2020-11-16 RX ORDER — LIDOCAINE HYDROCHLORIDE 10 MG/ML
10 INJECTION INFILTRATION; PERINEURAL
Status: COMPLETED | OUTPATIENT
Start: 2020-11-16 | End: 2020-11-16

## 2020-11-16 RX ADMIN — Medication 10 ML: at 21:18

## 2020-11-16 RX ADMIN — ERYTHROMYCIN: 5 OINTMENT OPHTHALMIC at 21:18

## 2020-11-16 RX ADMIN — NYSTATIN: 100000 POWDER TOPICAL at 09:18

## 2020-11-16 RX ADMIN — Medication 10 ML: at 17:06

## 2020-11-16 RX ADMIN — MEROPENEM 500 MG: 500 INJECTION, POWDER, FOR SOLUTION INTRAVENOUS at 04:19

## 2020-11-16 RX ADMIN — ERYTHROMYCIN: 5 OINTMENT OPHTHALMIC at 05:03

## 2020-11-16 RX ADMIN — ERYTHROMYCIN: 5 OINTMENT OPHTHALMIC at 17:05

## 2020-11-16 RX ADMIN — Medication 10 ML: at 05:03

## 2020-11-16 RX ADMIN — MEROPENEM 500 MG: 500 INJECTION, POWDER, FOR SOLUTION INTRAVENOUS at 17:05

## 2020-11-16 RX ADMIN — ENOXAPARIN SODIUM 30 MG: 30 INJECTION SUBCUTANEOUS at 09:12

## 2020-11-16 RX ADMIN — LIDOCAINE HYDROCHLORIDE 3 ML: 10 INJECTION, SOLUTION INFILTRATION; PERINEURAL at 15:30

## 2020-11-16 RX ADMIN — FLUCONAZOLE IN SODIUM CHLORIDE 200 MG: 2 INJECTION, SOLUTION INTRAVENOUS at 12:42

## 2020-11-16 RX ADMIN — NYSTATIN: 100000 POWDER TOPICAL at 21:18

## 2020-11-16 NOTE — PROGRESS NOTES
Problem: Mobility Impaired (Adult and Pediatric)  Goal: *Acute Goals and Plan of Care (Insert Text)  Description: Physical Therapy Goals  Initiated 11/12/2020 and to be accomplished within 7 day(s)  1. Patient will move from supine to sit and sit to supine  in bed with modified independence. 2.  Patient will transfer from bed to chair and chair to bed with modified independence using the least restrictive device. 3.  Patient will perform sit to stand with modified independence. 4.  Patient will ambulate with modified independence for 50 feet with the least restrictive device. Outcome: Progressing Towards Goal   PHYSICAL THERAPY TREATMENT    Patient: Carlotta Wilson (71 y.o. male)  Date: 11/16/2020  Diagnosis: Complicated UTI (urinary tract infection) [N39.0]  Shock ([address] [R57.9]   Sepsis ([facility]  Precautions: Fall  PLOF: Mod I with ww  ASSESSMENT:  Motivated to participate in PT. Encountered in left side lying. Able to advance BLE entirely off bed in prep for supine to sit. Max A for transfer of trunk/shoulders to seated position with HOB elevated. Unable to maintain midline in sitting; strong lean to left; requires max A to keep trunk off HOB. Max A for seated balance with BUE support and assistance with placement. Returned to left sideling with max A d/t poor seated control. Reports \"I guess I'm not doing this now\". Performed AAROM BLE in bed. Remains in left sidelying with wedges for positioning at end of session. Educated on need for RN assistance with mobility; verbalized understanding. Call bell in reach. Progression toward goals:   []      Improving appropriately and progressing toward goals  [x]      Improving slowly and progressing toward goals  []      Not making progress toward goals and plan of care will be adjusted     PLAN:  Patient continues to benefit from skilled intervention to address the above impairments. Continue treatment per established plan of care.   Discharge Recommendations:  Skilled Nursing Facility  Further Equipment Recommendations for Discharge:  rolling walker     SUBJECTIVE:   Patient stated Maybe I'll do better this afternoon.     OBJECTIVE DATA SUMMARY:   Critical Behavior:  Neurologic State: Alert  Orientation Level: Oriented to person;Oriented to place;Oriented to time  Cognition: Follows commands     Psychosocial  Patient Behaviors: Cooperative  Functional Mobility:  Bed Mobility:  Rolling: Moderate assistance  Supine to Sit: Maximum assistance  Sit to Supine: Maximum assistance  Balance:   Sitting: Impaired  Sitting - Static: Poor (constant support)  Neuro Re-Education:  Seated EOB 2 minutes  Therapeutic Exercises:   BLE AAROM x10; hip/knee flexion, knee extension    Pain:  Pain level pre-treatment: 0/10  Pain level post-treatment: 0/10     Activity Tolerance:   Fair    After treatment:   [] Patient left in no apparent distress sitting up in chair  [x] Patient left in no apparent distress in bed  [x] Call bell left within reach  [x] Nursing notified  [] Caregiver present  [] Bed alarm activated  [] SCDs applied      COMMUNICATION/EDUCATION:   [x]         Role of physical therapy in the acute care setting. [x]         Fall prevention education was provided and the patient/caregiver indicated understanding. [x]         Patient/family have participated as able in working toward goals and plan of care. [x]         Patient/family agree to work toward stated goals and plan of care. []         Patient understands intent and goals of therapy, but is neutral about his/her participation. []         Patient is unable to participate in stated goals/plan of care: ongoing with therapy staff.       Tomasz Lassiter, PT   Time Calculation: 8 mins

## 2020-11-16 NOTE — PROGRESS NOTES
[phone] spoke brigette Cevallos and gave her the  two phone numbers listed in demographics for telephone consent for thoracentesis.

## 2020-11-16 NOTE — PROGRESS NOTES
conducted a Follow up consultation and Spiritual Assessment for India Cantor, who is a 80 y.o.,male. The  provided the following Interventions:  Continued the relationship of care and support. Listened empathically. Offered prayer and assurance of continued prayer on patients behalf. Chart reviewed. The following outcomes were achieved:  Patient expressed gratitude for pastoral care visit. Assessment:  There are no further spiritual or Bahai issues which require Spiritual Care Services interventions at this time. Plan:  Chaplains will continue to follow and will provide pastoral care on an as needed/requested basis.  recommends bedside caregivers page  on duty if patient shows signs of acute spiritual or emotional distress.    [facility]   Staff [address] [place] [address]   [phone]

## 2020-11-16 NOTE — PROGRESS NOTES
Problem: Falls - Risk of  Goal: *Absence of Falls  Description: Document Lemon Daniel Fall Risk and appropriate interventions in the flowsheet. Outcome: Progressing Towards Goal  Note: Fall Risk Interventions:  Mobility Interventions: Bed/chair exit alarm    Mentation Interventions: Adequate sleep, hydration, pain control, Bed/chair exit alarm, Door open when patient unattended, Increase mobility, More frequent rounding, Reorient patient, Room close to nurse's station    Medication Interventions: Bed/chair exit alarm, Assess postural VS orthostatic hypotension, Evaluate medications/consider consulting pharmacy, Patient to call before getting OOB, Teach patient to arise slowly    Elimination Interventions: Bed/chair exit alarm, Call light in reach, Patient to call for help with toileting needs, Toilet paper/wipes in reach, Toileting schedule/hourly rounds              Problem: Patient Education: Go to Patient Education Activity  Goal: Patient/Family Education  Outcome: Progressing Towards Goal     Problem: Pressure Injury - Risk of  Goal: *Prevention of pressure injury  Description: Document Alex Scale and appropriate interventions in the flowsheet.   Outcome: Progressing Towards Goal  Note: Pressure Injury Interventions:  Sensory Interventions: Assess changes in LOC, Assess need for specialty bed, Avoid rigorous massage over bony prominences, Check visual cues for pain, Discuss PT/OT consult with provider, Float heels, Keep linens dry and wrinkle-free, Maintain/enhance activity level, Minimize linen layers    Moisture Interventions: Absorbent underpads, Apply protective barrier, creams and emollients, Assess need for specialty bed, Internal/External urinary devices, Check for incontinence Q2 hours and as needed, Maintain skin hydration (lotion/cream), Minimize layers, Moisture barrier    Activity Interventions: Increase time out of bed, Pressure redistribution bed/mattress(bed type), PT/OT evaluation    Mobility Interventions: HOB 30 degrees or less, Pressure redistribution bed/mattress (bed type), PT/OT evaluation    Nutrition Interventions: Document food/fluid/supplement intake, Discuss nutritional consult with provider, Offer support with meals,snacks and hydration    Friction and Shear Interventions: Apply protective barrier, creams and emollients, Foam dressings/transparent film/skin sealants, HOB 30 degrees or less, Lift team/patient mobility team, Minimize layers                Problem: Patient Education: Go to Patient Education Activity  Goal: Patient/Family Education  Outcome: Progressing Towards Goal     Problem: Pain  Goal: *Control of Pain  Outcome: Progressing Towards Goal  Goal: *PALLIATIVE CARE:  Alleviation of Pain  Outcome: Progressing Towards Goal     Problem: Patient Education: Go to Patient Education Activity  Goal: Patient/Family Education  Outcome: Progressing Towards Goal     Problem: Urinary Tract Infection - Adult  Goal: *Absence of infection signs and symptoms  Outcome: Progressing Towards Goal     Problem: Patient Education: Go to Patient Education Activity  Goal: Patient/Family Education  Outcome: Progressing Towards Goal     Problem: Discharge Planning  Goal: *Discharge to safe environment  Outcome: Progressing Towards Goal     Problem: Patient Education: Go to Patient Education Activity  Goal: Patient/Family Education  Outcome: Progressing Towards Goal     Problem: Patient Education: Go to Patient Education Activity  Goal: Patient/Family Education  Outcome: Progressing Towards Goal     Problem: Breathing Pattern - Ineffective  Goal: *Absence of hypoxia  Outcome: Progressing Towards Goal  Goal: *Use of effective breathing techniques  Outcome: Progressing Towards Goal     Problem: Patient Education: Go to Patient Education Activity  Goal: Patient/Family Education  Outcome: Progressing Towards Goal     Problem:  Body Temperature -  Risk of, Imbalanced  Goal: *Absence of cold stress or hypothermia signs and symptoms  Outcome: Progressing Towards Goal     Problem: Patient Education: Go to Patient Education Activity  Goal: Patient/Family Education  Outcome: Progressing Towards Goal

## 2020-11-16 NOTE — PROGRESS NOTES
Internal Medicine Progress Note        NAME: Gayatri Gray   :  1934  MRM:  [id number]    Date/Time: 2020        ASSESSMENT/PLAN:    Principal Problem:    Sepsis ([address] (2020)    Active Problems:    Hypothermia (2019)      UTI (urinary tract infection) (2020)      Hypotension (2020)      Cardiomyopathy ([address] (2020)      Overview: LVEF 35-40% by previous TTE. Pneumonia ()      Metabolic encephalopathy ()      Shock ([address] ()      Complicated UTI (urinary tract infection) (11/10/2020)      Candiduria (2020)      Acute respiratory failure ([address] (2020)        # Sepsis with hypotension and hypothermia . Likely  due to UTI , Pneumonia. Wbc, platlet  improved , a febrile. Treated with  merrem and added Diflucan. Urine culture candida 20 K . Repeat UA and reflex. Blood CS NTD. UA pyuria  CXR ? Atelectasis vs ASD , repeat with worsening lower lungs opacities. CT chest ordered   CT chest report reviewed. Pleural fluids, atelectaisis , areas of possible ASD. # Bilateral pleural effusion. Tapping. Fluids for labs and C/S. # Metabolic encephalopathy. Seem Improving. Report confusion at times. CT head No acute intracranial abnormality, chronic changes. # Acute respiratory failure per previous notes. Currently on RA. # Debility. Deconditioning ..   PT. SNF         IP CONSULT TO CARDIOLOGY  IP CONSULT TO INTENSIVIST    Lab Review:     Recent Labs     11/16/20  0315 11/15/20  0300 11/14/20  024   WBC 5.0 9.9 6.3   HGB 10.4* 10.8* 10.2*   HCT 31.8* 32.8* 31.4*    122* 101*     Recent Labs     11/16/20  0315 11/15/20  0300 20  0247    141 142   K 4.6 5.0 4.2   * 111 112*   CO2 25 24 24   GLU 66* 98 80   BUN 41* 39* 38*   CREA 1.15 1.22 1.23   CA 8.8 8.5 8.4*   MG 1.9 1.9  --    PHOS 3.2 2.7  --    INR  --  1.2  --      Lab Results   Component Value Date/Time    Glucose (POC) 79 2020 06:22 AM    Glucose (POC) 90 11/16/2020 12:17 AM    Glucose (POC) 74 11/15/2020 09:29 PM    Glucose (POC) 86 11/15/2020 05:49 PM    Glucose (POC) 83 11/15/2020 11:47 AM    Glucose (POC) 96 10/10/2014 09:28 PM    Glucose (POC) 186 (H) 10/10/2014 03:48 PM     No results for input(s): PH, PCO2, PO2, HCO3, FIO2 in the last 72 hours. Recent Labs     11/15/20  0300   INR 1.2       Lab Results   Component Value Date/Time    Specimen Description: [facility] 02/06/2014 05:45 PM     Lab Results   Component Value Date/Time    Culture result: CANDIDA ALBICANS (A) 11/10/2020 03:15 PM    Culture result: NO GROWTH 6 DAYS 11/10/2020 02:49 PM    Culture result: NO GROWTH 6 DAYS 11/10/2020 02:38 PM       All Cardiac Markers in the last 24 hours: No results found for: CPK, CK, CKMMB, CKMB, RCK3, CKMBT, CKNDX, CKND1, TELLY, TROPT, TROIQ, JOSE, TROPT, TNIPOC, BNP, BNPP        Intervals noted   Pt s/e @ bedside     Subjective:     Chief Complaint:      Still coughing, no phlemg . Deny SOB. Not doing much activities. Eating better. Feel better. He report been confused at times. ROS:  No CP/SOB/N/V/D/Pains/Bleeding/ acute joint swelling/rash/itching/fever/chills. Tolerating PT , Tolerating Diet                Objective:     Vitals:  Last 24hrs VS reviewed since prior progress note.  Most recent are:    Visit Vitals  /74   Pulse 63   Temp 98.6 °F (37 °C)   Resp 20   Ht 5' 6\" (1.676 m)   Wt 60.3 kg (133 lb)   SpO2 96%   BMI 21.47 kg/m²     SpO2 Readings from Last 6 Encounters:   11/16/20 96%   09/12/20 99%   09/09/20 96%   07/23/20 95%   07/13/20 96%   05/11/20 97%    O2 Flow Rate (L/min): 2 l/min       Intake/Output Summary (Last 24 hours) at 11/16/2020 1008  Last data filed at 11/16/2020 0432  Gross per 24 hour   Intake 320 ml   Output 450 ml   Net -130 ml          Physical Exam:   Gen: Well-developed,   in no acute distress  HEENT: Head atraumatic, normocephalic ,  hearing intact to voice, moist mucous membranes  Neck: Trachea midline , No apparent JVD, Supple   Resp: diminished AE both bases,  No accessory muscle use, Bilateral BS present   Card:    normal S1, S2 without Gallop . No Significant lower leg peripheral edema. Abd:  Soft, non-tender, non-distended, bowel sounds are present . Musc: No cyanosis or clubbing  Skin: Warm and dry . No rashes     Neuro:  No facial asymmetry.   no clear area of focal motor weakness, follows commands appropriately    alert      Medications Reviewed: (see below)    Lab Data Reviewed: (see below)    ______________________________________________________________________    Medications:     Current Facility-Administered Medications   Medication Dose Route Frequency    fluconazole (DIFLUCAN) 200mg/100 mL IVPB (premix)  200 mg IntraVENous Q24H    nystatin (MYCOSTATIN) 100,000 unit/gram powder   Topical BID    erythromycin (ILOTYCIN) 5 mg/gram (0.5 %) ophthalmic ointment   Both Eyes Q8H    sodium chloride (NS) flush 5-10 mL  5-10 mL IntraVENous PRN    meropenem (MERREM) 500 mg in 0.9% sodium chloride (MBP/ADV) 50 mL MBP  500 mg IntraVENous Q12H    sodium chloride (NS) flush 5-40 mL  5-40 mL IntraVENous Q8H    sodium chloride (NS) flush 5-40 mL  5-40 mL IntraVENous PRN    acetaminophen (TYLENOL) tablet 650 mg  650 mg Oral Q6H PRN    Or    acetaminophen (TYLENOL) suppository 650 mg  650 mg Rectal Q6H PRN    polyethylene glycol (MIRALAX) packet 17 g  17 g Oral DAILY PRN    promethazine (PHENERGAN) tablet 12.5 mg  12.5 mg Oral Q6H PRN    Or    ondansetron (ZOFRAN) injection 4 mg  4 mg IntraVENous Q6H PRN    enoxaparin (LOVENOX) injection 30 mg  30 mg SubCUTAneous DAILY    pantoprazole (PROTONIX) injection 40 mg  40 mg IntraVENous DAILY PRN    melatonin tablet 12 mg  12 mg Oral QHS PRN    albuterol-ipratropium (DUO-NEB) 2.5 MG-0.5 MG/3 ML  3 mL Nebulization Q6H PRN    glucose chewable tablet 16 g  4 Tab Oral PRN    glucagon (GLUCAGEN) injection 1 mg  1 mg IntraMUSCular PRN    dextrose (D50) infusion 12.5-25 g  25-50 mL IntraVENous PRN          Total time spent with patient: 35 minutes                  Care Plan discussed with: Patient, Care Manager, Nursing Staff and >50% of time spent in counseling and coordination of care    Discussed:  Care Plan    Prophylaxis:  Lovenox    Disposition:  SNF/LTC           This document in whole or part of it has been produced using voice recognition software. Unrecognized errors in transcription may be present.     Attending Physician: Parrish Mott MD

## 2020-11-16 NOTE — PROGRESS NOTES
-- Bedside, Verbal and Written shift change report given to [facility] (oncoming nurse) by - RN (offgoing nurse). Report included the following information SBAR, Kardex, Intake/Output, MAR and Recent Results. 2159 -- PM medications administered, pt tolerated with ease, will continue to monitor. 2315-- Shift reassessment, pt condition unchanged, will continue to monitor. 6687--  Shift reassessment, pt sleeping between care, will continue to monitor. -- Bedside, Verbal and Written shift change report given to -Anival Salinas RN (oncoming nurse) by Lidia Sheehan RN (offgoing nurse). Report included the following information SBAR, Kardex, Intake/Output, MAR and Recent Results. Skin assessment completed.

## 2020-11-16 NOTE — PROGRESS NOTES
0715-Received bedside report from [address] (offgoing nurse). Report was given as SBAR, recent results, Karadex    0920- AM meds given, pt resting in bed, shift assessment, will continue to monitor    0955-Pt had a bowel movement, cleaned him with Breana Naik and changed his sacral dressing and pads. Assessed wound. Repositioned in bed with wedge pillows and pillow between his legs. Resting in bed watching tv. Will continue to monitor. 1500-Pt to go down for a thoracentesis. US called to verify order, verified order with PCP through [facility] for Rt side thora    1600-Pt returned from thoracentesis, gave PM meds while resting in bed, no significant changes in pt status, will continue to monitor. 1910- Bedside and Verbal shift change report given to [address] (oncoming nurse) by Palomo Recinos RN (offgoing nurse). Report included the following information Kardex, MAR and Recent Results.

## 2020-11-16 NOTE — PROCEDURES
VASCULAR & INTERVENTIONAL RADIOLOGY NOTE:    Procedure:  Ultrasound guided right thoracentesis    Pre-operative Diagnosis: Pleural effusion    Post-operative Diagnosis: same    Indications:  Recurrent bilateral pleural effusions, right > left. Procedure Details: Informed consent obtained from son by phone. Standard aseptic technique. Ultrasound guidance. Posterior intercostal approach. 1% lidocaine for local anesthesia. 5 [place] [address] sheathed needle connected to vacuum bottle. 1800 mL of yellow fluid removed. Sample sent to lab. Complications:  None. Condition: Stable. Impression:  Successful right thoracentesis. Plan: Post procedure chest xray pending.       Rj Jennings MD  Vascular & Interventional Radiology    [facility]     11/16/2020

## 2020-11-16 NOTE — PROGRESS NOTES
Chart reviewed. Pt has been accepted to [facility] TRANSITIONAL [facility]ARE & REHABILITATION). Called pt's son & made him aware of above, he is agreeable. Spoke with Itzel in admissions @ [place], states she will start [place]. Faxed updated therapy notes. Will cont to follow. Brooke Coreas RN,ext [phone].

## 2020-11-17 LAB
ANION GAP SERPL CALC-SCNC: 6 MMOL/L (ref 3–18)
BODY FLD TYPE: NORMAL
BUN SERPL-MCNC: 41 MG/DL (ref 7–18)
BUN/CREAT SERPL: 35 (ref 12–20)
CALCIUM SERPL-MCNC: 8.6 MG/DL (ref 8.5–10.1)
CHLORIDE SERPL-SCNC: 114 MMOL/L (ref 100–111)
CO2 SERPL-SCNC: 25 MMOL/L (ref 21–32)
CREAT SERPL-MCNC: 1.17 MG/DL (ref 0.6–1.3)
GLUCOSE BLD STRIP.AUTO-MCNC: 104 MG/DL (ref 70–110)
GLUCOSE BLD STRIP.AUTO-MCNC: 108 MG/DL (ref 70–110)
GLUCOSE BLD STRIP.AUTO-MCNC: 66 MG/DL (ref 70–110)
GLUCOSE BLD STRIP.AUTO-MCNC: 81 MG/DL (ref 70–110)
GLUCOSE BLD STRIP.AUTO-MCNC: 94 MG/DL (ref 70–110)
GLUCOSE FLD-MCNC: 82 MG/DL
GLUCOSE SERPL-MCNC: 69 MG/DL (ref 74–99)
LDH FLD L TO P-CCNC: 64 U/L
PH FLD: 7.9 [PH]
POTASSIUM SERPL-SCNC: 4.3 MMOL/L (ref 3.5–5.5)
SODIUM SERPL-SCNC: 145 MMOL/L (ref 136–145)
SPECIMEN SOURCE FLD: NORMAL
SPECIMEN SOURCE FLD: NORMAL

## 2020-11-17 PROCEDURE — 74011250636 HC RX REV CODE- 250/636: Performed by: HOSPITALIST

## 2020-11-17 PROCEDURE — 2709999900 HC NON-CHARGEABLE SUPPLY

## 2020-11-17 PROCEDURE — 80048 BASIC METABOLIC PNL TOTAL CA: CPT

## 2020-11-17 PROCEDURE — 36415 COLL VENOUS BLD VENIPUNCTURE: CPT

## 2020-11-17 PROCEDURE — 65660000000 HC RM CCU STEPDOWN

## 2020-11-17 PROCEDURE — 74011000258 HC RX REV CODE- 258: Performed by: HOSPITALIST

## 2020-11-17 PROCEDURE — 82962 GLUCOSE BLOOD TEST: CPT

## 2020-11-17 RX ORDER — ENOXAPARIN SODIUM 100 MG/ML
40 INJECTION SUBCUTANEOUS EVERY 24 HOURS
Status: DISCONTINUED | OUTPATIENT
Start: 2020-11-18 | End: 2020-11-20 | Stop reason: HOSPADM

## 2020-11-17 RX ADMIN — Medication 10 ML: at 05:53

## 2020-11-17 RX ADMIN — ERYTHROMYCIN: 5 OINTMENT OPHTHALMIC at 05:52

## 2020-11-17 RX ADMIN — NYSTATIN: 100000 POWDER TOPICAL at 23:46

## 2020-11-17 RX ADMIN — NYSTATIN: 100000 POWDER TOPICAL at 09:40

## 2020-11-17 RX ADMIN — ERYTHROMYCIN: 5 OINTMENT OPHTHALMIC at 18:05

## 2020-11-17 RX ADMIN — FLUCONAZOLE IN SODIUM CHLORIDE 200 MG: 2 INJECTION, SOLUTION INTRAVENOUS at 12:32

## 2020-11-17 RX ADMIN — MEROPENEM 500 MG: 500 INJECTION, POWDER, FOR SOLUTION INTRAVENOUS at 04:02

## 2020-11-17 RX ADMIN — ENOXAPARIN SODIUM 30 MG: 30 INJECTION SUBCUTANEOUS at 09:36

## 2020-11-17 RX ADMIN — Medication 10 ML: at 23:50

## 2020-11-17 NOTE — PROGRESS NOTES
-- Bedside, Verbal and Written shift change report given to [address] [place] [address] (oncoming nurse) by Perlita Lockhart RN (offgoing nurse). Report included the following information SBAR, Kardex, Intake/Output, MAR and Recent Results. 2118 -- PM medications administered, pt tolerated with ease, will continue to monitor. 2359-- Shift reassessment, pt condition unchanged, will continue to monitor. 0300 --  Shift reassessment, pt sleeping between care, will continue to monitor. -- Bedside, Verbal and Written shift change report given to -Bijan RN (oncoming nurse) by Dalila Chacon RN (offgoing nurse). Report included the following information SBAR, Kardex, Intake/Output, MAR and Recent Results. Skin assessment completed.

## 2020-11-17 NOTE — PROGRESS NOTES
0715- Received change of shift report from JER Booker with the following: Yobani Mckee, [facility] and recent results. 0930- Pt assessment completed. No complaints of pain and/or distress. Pt laying in bed sitting upright finishing breakfast watching TV. AM meds given. Cleaned Pt up changed linen and linen pads, repositioned in bed. Bed in lowest position call bell in reach. Will continue to monitor    1730-Pt reassessment completed. No complaints of pain and/or distress. Pt stated not hungry but wanted sips of Ensure. Sitting upright in bed, bed in lowest position. Call bell within reach. Will continue to monitor. 1945-Bedside and Verbal shift change report given to [facility] (oncoming nurse) by Andrey Olmedo RN (offgoing nurse). Report included the following information SBAR, Kardex, Procedure Summary and Recent Results.

## 2020-11-17 NOTE — PROGRESS NOTES
Nursing/RN cleared pt to participate in OT tx with recommendation for bed level activity d/t recent thoracentesis pulling off 2 L fluid. Patient laying semi-reclined in bed, declined participation in OT tx intervention d/t feeling very fatigue and \"not up to it today. \" OT to follow.     Mariama Peoples Guido 87 OTR/L  [phone]

## 2020-11-17 NOTE — PROGRESS NOTES
Received message from [place], admissions for [facility] TR[facility]ITION[facility]ARE [facility] RE[facility]ILITATION), states they have insurance authorization. Informed her that MD is not discharging today & will update her tomorrow. Brooke Coreas RN,ext [phone].

## 2020-11-17 NOTE — PROGRESS NOTES
Problem: Falls - Risk of  Goal: *Absence of Falls  Description: Document Natasha Pool Fall Risk and appropriate interventions in the flowsheet. Outcome: Progressing Towards Goal  Note: Fall Risk Interventions:  Mobility Interventions: Bed/chair exit alarm, Patient to call before getting OOB    Mentation Interventions: Bed/chair exit alarm, Evaluate medications/consider consulting pharmacy, Reorient patient    Medication Interventions: Bed/chair exit alarm, Patient to call before getting OOB    Elimination Interventions: Bed/chair exit alarm, Call light in reach, Urinal in reach              Problem: Patient Education: Go to Patient Education Activity  Goal: Patient/Family Education  Outcome: Progressing Towards Goal     Problem: Pressure Injury - Risk of  Goal: *Prevention of pressure injury  Description: Document Alex Scale and appropriate interventions in the flowsheet. Outcome: Progressing Towards Goal  Note: Pressure Injury Interventions:  Sensory Interventions: Assess changes in LOC, Pressure redistribution bed/mattress (bed type), Turn and reposition approx.  every two hours (pillows and wedges if needed)    Moisture Interventions: Absorbent underpads    Activity Interventions: Pressure redistribution bed/mattress(bed type)    Mobility Interventions: Pressure redistribution bed/mattress (bed type), HOB 30 degrees or less    Nutrition Interventions: Document food/fluid/supplement intake, Offer support with meals,snacks and hydration    Friction and Shear Interventions: HOB 30 degrees or less                Problem: Patient Education: Go to Patient Education Activity  Goal: Patient/Family Education  Outcome: Progressing Towards Goal     Problem: Pain  Goal: *Control of Pain  Outcome: Progressing Towards Goal  Goal: *PALLIATIVE CARE:  Alleviation of Pain  Outcome: Progressing Towards Goal     Problem: Patient Education: Go to Patient Education Activity  Goal: Patient/Family Education  Outcome: Progressing Towards Goal     Problem: Urinary Tract Infection - Adult  Goal: *Absence of infection signs and symptoms  Outcome: Progressing Towards Goal     Problem: Patient Education: Go to Patient Education Activity  Goal: Patient/Family Education  Outcome: Progressing Towards Goal     Problem: Discharge Planning  Goal: *Discharge to safe environment  Outcome: Progressing Towards Goal     Problem: Patient Education: Go to Patient Education Activity  Goal: Patient/Family Education  Outcome: Progressing Towards Goal     Problem: Patient Education: Go to Patient Education Activity  Goal: Patient/Family Education  Outcome: Progressing Towards Goal     Problem: Breathing Pattern - Ineffective  Goal: *Absence of hypoxia  Outcome: Progressing Towards Goal  Goal: *Use of effective breathing techniques  Outcome: Progressing Towards Goal     Problem: Patient Education: Go to Patient Education Activity  Goal: Patient/Family Education  Outcome: Progressing Towards Goal     Problem:  Body Temperature -  Risk of, Imbalanced  Goal: *Absence of cold stress or hypothermia signs and symptoms  Outcome: Progressing Towards Goal     Problem: Patient Education: Go to Patient Education Activity  Goal: Patient/Family Education  Outcome: Progressing Towards Goal

## 2020-11-17 NOTE — PROGRESS NOTES
Internal Medicine Progress Note        NAME: Iván Siegel   :  1934  MRM:  [id number]    Date/Time: 2020        ASSESSMENT/PLAN:    Principal Problem:    Sepsis ([address] (2020)    Active Problems:    Hypothermia (2019)      UTI (urinary tract infection) (2020)      Hypotension (2020)      Cardiomyopathy ([address] (2020)      Overview: LVEF 35-40% by previous TTE. Pneumonia ()      Metabolic encephalopathy ()      Shock ([address] ()      Complicated UTI (urinary tract infection) (11/10/2020)      Candiduria (2020)      Acute respiratory failure ([address] (2020)        # Sepsis with hypotension and hypothermia . Likely  due to UTI , Pneumonia. Wbc, platlet  improved , a febrile. Treated with  merrem and added Diflucan for candida in urine, Urine culture candida 20 K . Blood CS NTD. CXR ? Atelectasis vs ASD , repeat with worsening lower lungs opacities. CT chest ordered   CT chest report reviewed. Pleural fluids, atelectaisis , areas of possible ASD. # Bilateral pleural effusion. Tapped under US guidance about 1800 cc . Fluids for labs and C/S. Fluid does not look infected. # Metabolic encephalopathy. improved    He report been confused at times. , CT head No acute intracranial abnormality, chronic changes. # Acute respiratory failure per previous notes. Currently on RA. # Inguinal intertrigo. Topical nystatin used. # Debility. Deconditioning ..   PT. SNF          IP CONSULT TO CARDIOLOGY  IP CONSULT TO INTENSIVIST    Lab Review:     Recent Labs     11/16/20  0315 11/15/20  0300   WBC 5.0 9.9   HGB 10.4* 10.8*   HCT 31.8* 32.8*    122*     Recent Labs     20  0400 20  0315 11/15/20  0300    142 141   K 4.3 4.6 5.0   * 113* 111   CO2 25 25 24   GLU 69* 66* 98   BUN 41* 41* 39*   CREA 1.17 1.15 1.22   CA 8.6 8.8 8.5   MG  --  1.9 1.9   PHOS  --  3.2 2.7   INR  --   --  1.2     Lab Results   Component Value Date/Time    Glucose (POC) 104 11/17/2020 11:42 AM    Glucose (POC) 108 11/17/2020 07:14 AM    Glucose (POC) 73 11/16/2020 09:11 PM    Glucose (POC) 84 11/16/2020 11:41 AM    Glucose (POC) 79 11/16/2020 06:22 AM    Glucose (POC) 96 10/10/2014 09:28 PM    Glucose (POC) 186 (H) 10/10/2014 03:48 PM     No results for input(s): PH, PCO2, PO2, HCO3, FIO2 in the last 72 hours. Recent Labs     11/15/20  0300   INR 1.2       Lab Results   Component Value Date/Time    Specimen Description: [facility] 02/06/2014 05:45 PM     Lab Results   Component Value Date/Time    Culture result: CANDIDA ALBICANS (A) 11/10/2020 03:15 PM    Culture result: NO GROWTH 6 DAYS 11/10/2020 02:49 PM    Culture result: NO GROWTH 6 DAYS 11/10/2020 02:38 PM     All Cardiac Markers in the last 24 hours: No results found for: CPK, CK, CKMMB, CKMB, RCK3, CKMBT, CKNDX, CKND1, TELLY, TROPT, TROIQ, JOSE, TROPT, TNIPOC, BNP, BNPP      Intervals noted   Pt s/e @ bedside     Subjective:     Chief Complaint:      No complain, Deny SOB. Eating better. Feel better. ROS:  No CP / SOB / N / V / D / Pains / Bleeding / acute joint swelling/rash/itching/fever/chills. Tolerating PT ,  Tolerating Diet          Objective:     Vitals:  Last 24hrs VS reviewed since prior progress note.  Most recent are:    Visit Vitals  /70   Pulse 69   Temp 98.6 °F (37 °C)   Resp 18   Ht 5' 6\" (1.676 m)   Wt 57.1 kg (125 lb 12.8 oz)   SpO2 98%   BMI 20.30 kg/m²     SpO2 Readings from Last 6 Encounters:   11/17/20 98%   09/12/20 99%   09/09/20 96%   07/23/20 95%   07/13/20 96%   05/11/20 97%    O2 Flow Rate (L/min): 2 l/min       Intake/Output Summary (Last 24 hours) at 11/17/2020 1402  Last data filed at 11/17/2020 0402  Gross per 24 hour   Intake 50 ml   Output    Net 50 ml          Physical Exam:   Gen: Well-developed,   in no acute distress  HEENT: Head atraumatic, normocephalic ,  hearing intact to voice, moist mucous membranes  Neck: Trachea midline , No apparent JVD, Supple   Resp:  Improved AE both  Bases R > L ,  No accessory muscle use, Bilateral BS present   Card:    normal S1, S2 without Gallop . No Significant lower leg peripheral edema. Abd:  Soft, non-tender, non-distended, bowel sounds are present . Musc: No cyanosis or clubbing  Skin: Warm and dry . No rashes     Neuro:  No facial asymmetry.   no clear area of focal motor weakness, follows commands appropriately    alert    Medications Reviewed: (see below)    Lab Data Reviewed: (see below)    ______________________________________________________________________    Medications:     Current Facility-Administered Medications   Medication Dose Route Frequency    [START ON 11/18/2020] enoxaparin (LOVENOX) injection 40 mg  40 mg SubCUTAneous Q24H    fluconazole (DIFLUCAN) 200mg/100 mL IVPB (premix)  200 mg IntraVENous Q24H    nystatin (MYCOSTATIN) 100,000 unit/gram powder   Topical BID    erythromycin (ILOTYCIN) 5 mg/gram (0.5 %) ophthalmic ointment   Both Eyes Q8H    sodium chloride (NS) flush 5-10 mL  5-10 mL IntraVENous PRN    meropenem (MERREM) 500 mg in 0.9% sodium chloride (MBP/ADV) 50 mL MBP  500 mg IntraVENous Q12H    sodium chloride (NS) flush 5-40 mL  5-40 mL IntraVENous Q8H    sodium chloride (NS) flush 5-40 mL  5-40 mL IntraVENous PRN    acetaminophen (TYLENOL) tablet 650 mg  650 mg Oral Q6H PRN    Or    acetaminophen (TYLENOL) suppository 650 mg  650 mg Rectal Q6H PRN    polyethylene glycol (MIRALAX) packet 17 g  17 g Oral DAILY PRN    promethazine (PHENERGAN) tablet 12.5 mg  12.5 mg Oral Q6H PRN    Or    ondansetron (ZOFRAN) injection 4 mg  4 mg IntraVENous Q6H PRN    pantoprazole (PROTONIX) injection 40 mg  40 mg IntraVENous DAILY PRN    melatonin tablet 12 mg  12 mg Oral QHS PRN    albuterol-ipratropium (DUO-NEB) 2.5 MG-0.5 MG/3 ML  3 mL Nebulization Q6H PRN    glucose chewable tablet 16 g  4 Tab Oral PRN    glucagon (GLUCAGEN) injection 1 mg  1 mg IntraMUSCular PRN    dextrose (D50) infusion 12.5-25 g  25-50 mL IntraVENous PRN          Total time spent with patient: 35 minutes                  Care Plan discussed with: Patient, Care Manager, Nursing Staff and >50% of time spent in counseling and coordination of care    Discussed:  Care Plan    Prophylaxis:  Lovenox    Disposition:  SNF/LTC           This document in whole or part of it has been produced using voice recognition software. Unrecognized errors in transcription may be present.     Attending Physician: Ashli Dailey MD

## 2020-11-18 LAB
ANION GAP SERPL CALC-SCNC: 3 MMOL/L (ref 3–18)
BUN SERPL-MCNC: 38 MG/DL (ref 7–18)
BUN/CREAT SERPL: 37 (ref 12–20)
CALCIUM SERPL-MCNC: 8.6 MG/DL (ref 8.5–10.1)
CHLORIDE SERPL-SCNC: 113 MMOL/L (ref 100–111)
CO2 SERPL-SCNC: 28 MMOL/L (ref 21–32)
CREAT SERPL-MCNC: 1.02 MG/DL (ref 0.6–1.3)
GLUCOSE BLD STRIP.AUTO-MCNC: 109 MG/DL (ref 70–110)
GLUCOSE BLD STRIP.AUTO-MCNC: 111 MG/DL (ref 70–110)
GLUCOSE BLD STRIP.AUTO-MCNC: 120 MG/DL (ref 70–110)
GLUCOSE BLD STRIP.AUTO-MCNC: 65 MG/DL (ref 70–110)
GLUCOSE BLD STRIP.AUTO-MCNC: 83 MG/DL (ref 70–110)
GLUCOSE SERPL-MCNC: 73 MG/DL (ref 74–99)
POTASSIUM SERPL-SCNC: 4.4 MMOL/L (ref 3.5–5.5)
SODIUM SERPL-SCNC: 144 MMOL/L (ref 136–145)

## 2020-11-18 PROCEDURE — 80048 BASIC METABOLIC PNL TOTAL CA: CPT

## 2020-11-18 PROCEDURE — 97110 THERAPEUTIC EXERCISES: CPT

## 2020-11-18 PROCEDURE — 74011250637 HC RX REV CODE- 250/637: Performed by: INTERNAL MEDICINE

## 2020-11-18 PROCEDURE — 77030040829 HC CATH EXT URINE MDII -B

## 2020-11-18 PROCEDURE — 74011250636 HC RX REV CODE- 250/636: Performed by: INTERNAL MEDICINE

## 2020-11-18 PROCEDURE — 2709999900 HC NON-CHARGEABLE SUPPLY

## 2020-11-18 PROCEDURE — 36415 COLL VENOUS BLD VENIPUNCTURE: CPT

## 2020-11-18 PROCEDURE — 82962 GLUCOSE BLOOD TEST: CPT

## 2020-11-18 PROCEDURE — 97530 THERAPEUTIC ACTIVITIES: CPT

## 2020-11-18 PROCEDURE — 65660000000 HC RM CCU STEPDOWN

## 2020-11-18 RX ADMIN — NYSTATIN: 100000 POWDER TOPICAL at 20:59

## 2020-11-18 RX ADMIN — ERYTHROMYCIN: 5 OINTMENT OPHTHALMIC at 15:26

## 2020-11-18 RX ADMIN — ENOXAPARIN SODIUM 40 MG: 40 INJECTION SUBCUTANEOUS at 08:50

## 2020-11-18 RX ADMIN — Medication 10 ML: at 15:27

## 2020-11-18 RX ADMIN — Medication 10 ML: at 21:00

## 2020-11-18 RX ADMIN — ERYTHROMYCIN: 5 OINTMENT OPHTHALMIC at 21:00

## 2020-11-18 RX ADMIN — NYSTATIN: 100000 POWDER TOPICAL at 08:51

## 2020-11-18 NOTE — PROGRESS NOTES
0800-Received pt resting in bed with eyes open, vss, no sign of distress on room air, alert, calm and cooperative, BG low, pt drinking juice and eating breakfast, will recheck BG when finished  0830-BG greater than 80, no sign of distress  1215-Sitting up in bed, eating lunch, tolerating well, BG stable  1600-Pt incontinent of urine, condom cath replaced, nahomy care performed and pad changed, tolerated well  1920-Bedside and Verbal shift change report given to Antonia Myers (oncoming nurse) by Nicolas Marina RN (offgoing nurse). Report included the following information SBAR, Kardex, Intake/Output, MAR and Recent Results.

## 2020-11-18 NOTE — PROGRESS NOTES
2000 [facility] -- Called patient's son Aly Hsieh to see if the patient had flu vaccine, voice mail not set-up will try again later.

## 2020-11-18 NOTE — PROGRESS NOTES
Chart reviewed. Pt has been accepted to [facility]ITATION) & authorization has been obtained. Spoke with MD, states pt not quite ready, hopefully ready tomorrow. Called [facility], spoke with his wife, made them aware that pt will likely be ready for transfer tomorrow & that [facility] TRANSITIONAL CARE [facility] REHABILITATION accepted, agreeable to transfer. Will cont to follow. Brooke Coreas RN,ext [phone].

## 2020-11-18 NOTE — PROGRESS NOTES
Problem: Falls - Risk of  Goal: *Absence of Falls  Description: Document Aba Chauhan Fall Risk and appropriate interventions in the flowsheet. Outcome: Progressing Towards Goal  Note: Fall Risk Interventions:  Mobility Interventions: Bed/chair exit alarm    Mentation Interventions: Bed/chair exit alarm, Door open when patient unattended, More frequent rounding, Reorient patient    Medication Interventions: Bed/chair exit alarm    Elimination Interventions: Bed/chair exit alarm, Call light in reach              Problem: Patient Education: Go to Patient Education Activity  Goal: Patient/Family Education  Outcome: Progressing Towards Goal     Problem: Pressure Injury - Risk of  Goal: *Prevention of pressure injury  Description: Document Alex Scale and appropriate interventions in the flowsheet. Outcome: Progressing Towards Goal  Note: Pressure Injury Interventions:  Sensory Interventions: Assess changes in LOC, Float heels, Keep linens dry and wrinkle-free, Pressure redistribution bed/mattress (bed type), Turn and reposition approx. every two hours (pillows and wedges if needed)    Moisture Interventions: Absorbent underpads, Apply protective barrier, creams and emollients    Activity Interventions: Pressure redistribution bed/mattress(bed type)    Mobility Interventions: HOB 30 degrees or less, Pressure redistribution bed/mattress (bed type), Turn and reposition approx.  every two hours(pillow and wedges)    Nutrition Interventions: Document food/fluid/supplement intake    Friction and Shear Interventions: HOB 30 degrees or less                Problem: Patient Education: Go to Patient Education Activity  Goal: Patient/Family Education  Outcome: Progressing Towards Goal     Problem: Pain  Goal: *Control of Pain  Outcome: Progressing Towards Goal     Problem: Urinary Tract Infection - Adult  Goal: *Absence of infection signs and symptoms  Outcome: Progressing Towards Goal     Problem: Discharge Planning  Goal: *Discharge to safe environment  Outcome: Progressing Towards Goal     Problem: Breathing Pattern - Ineffective  Goal: *Absence of hypoxia  Outcome: Progressing Towards Goal  Goal: *Use of effective breathing techniques  Outcome: Progressing Towards Goal     Problem:  Body Temperature -  Risk of, Imbalanced  Goal: *Absence of cold stress or hypothermia signs and symptoms  Outcome: Progressing Towards Goal

## 2020-11-18 NOTE — PROGRESS NOTES
Internal Medicine Progress Note        NAME: Marisabel Sherwood   :  1934  MRM:  [id number]    Date/Time: 2020        ASSESSMENT/PLAN:    Principal Problem:    Sepsis ([address] (2020)    Active Problems:    Hypothermia (2019)      UTI (urinary tract infection) (2020)      Hypotension (2020)      Cardiomyopathy ([address] (2020)      Overview: LVEF 35-40% by previous TTE. Pneumonia (2249)      Metabolic encephalopathy ()      Shock ([address] ()      Complicated UTI (urinary tract infection) (11/10/2020)      Candiduria (2020)      Acute respiratory failure ([address] (2020)        # Sepsis with hypotension and hypothermia . Likely  due to UTI , Pneumonia. Wbc, platlet  improved , a febrile. Treated with  merrem and added Diflucan for candida in urine, Urine culture candida 20 K . Blood CS NTD. CXR ? Atelectasis vs ASD , repeat with worsening lower lungs opacities. CT chest ordered   CT chest report reviewed. Pleural fluids, atelectaisis , areas of possible ASD. # Bilateral pleural effusion. Tapped under US guidance about 1800 cc . Fluids for labs and C/S. Fluid does not look infected. # Metabolic encephalopathy. improved    He report been confused at times. , CT head No acute intracranial abnormality, chronic changes. # Acute respiratory failure per previous notes. Currently on RA. # Hypoglycemic episodes. I think due to poor eating. Liberate food. He requested dietary seasoning     # Inguinal intertrigo. Topical nystatin used. # Debility. Deconditioning ..   PT. SNF          IP CONSULT TO CARDIOLOGY  IP CONSULT TO INTENSIVIST    Lab Review:     Recent Labs     20  0315   WBC 5.0   HGB 10.4*   HCT 31.8*        Recent Labs     20  0340 20  0400 20  0315    145 142   K 4.4 4.3 4.6   * 114* 113*   CO2 28 25 25   GLU 73* 69* 66*   BUN 38* 41* 41*   CREA 1.02 1.17 1.15   CA 8.6 8.6 8.8 MG  --   --  1.9   PHOS  --   --  3.2     Lab Results   Component Value Date/Time    Glucose (POC) 109 11/18/2020 11:54 AM    Glucose (POC) 83 11/18/2020 08:22 AM    Glucose (POC) 65 (L) 11/18/2020 07:30 AM    Glucose (POC) 81 11/17/2020 09:44 PM    Glucose (POC) 66 (L) 11/17/2020 09:06 PM    Glucose (POC) 96 10/10/2014 09:28 PM    Glucose (POC) 186 (H) 10/10/2014 03:48 PM     No results for input(s): PH, PCO2, PO2, HCO3, FIO2 in the last 72 hours. No results for input(s): INR, INREXT, INREXT in the last 72 hours. Lab Results   Component Value Date/Time    Specimen Description: [facility] 02/06/2014 05:45 PM     Lab Results   Component Value Date/Time    Culture result: NO GROWTH AFTER 19 HOURS 11/16/2020 10:15 AM    Culture result: CANDIDA ALBICANS (A) 11/10/2020 03:15 PM    Culture result: NO GROWTH 6 DAYS 11/10/2020 02:49 PM     All Cardiac Markers in the last 24 hours: No results found for: CPK, CK, CKMMB, CKMB, RCK3, CKMBT, CKNDX, CKND1, TELLY, TROPT, TROIQ, JOSE, TROPT, TNIPOC, BNP, BNPP      Intervals noted   Pt s/e @ bedside     Subjective:     Chief Complaint:      Requested better seasoned food to eat better. Deny CP/SOB. Feel better. ROS:  No CP / SOB / N / V / D / Pains / Bleeding / acute joint swelling/rash/itching/fever/chills. Tolerating PT ,  Tolerating Diet          Objective:     Vitals:  Last 24hrs VS reviewed since prior progress note.  Most recent are:    Visit Vitals  BP (!) 104/56 (BP 1 Location: Right arm, BP Patient Position: At rest)   Pulse (!) 57   Temp 97.8 °F (36.6 °C)   Resp 20   Ht 5' 6\" (1.676 m)   Wt 57.2 kg (126 lb 1.7 oz)   SpO2 95%   BMI 20.35 kg/m²     SpO2 Readings from Last 6 Encounters:   11/18/20 95%   09/12/20 99%   09/09/20 96%   07/23/20 95%   07/13/20 96%   05/11/20 97%    O2 Flow Rate (L/min): 2 l/min       Intake/Output Summary (Last 24 hours) at 11/18/2020 1334  Last data filed at 11/18/2020 0858  Gross per 24 hour   Intake 240 ml   Output    Net 240 ml Physical Exam:   Gen: Well-developed,   in no acute distress  HEENT: Head atraumatic, normocephalic ,  hearing intact to voice, moist mucous membranes  Neck:  Trachea midline , No apparent JVD, Supple   Resp:  Improved AE both  ,  No accessory muscle use, Bilateral BS present   Card:    normal S1, S2 without Gallop . No Significant lower leg peripheral edema. Abd:  Soft, non-tender, non-distended, bowel sounds are present . Musc: No cyanosis or clubbing  Skin: Warm and dry . No rashes     Neuro:  No facial asymmetry.   no clear area of focal motor weakness, follows commands appropriately    alert    Medications Reviewed: (see below)    Lab Data Reviewed: (see below)    ______________________________________________________________________    Medications:     Current Facility-Administered Medications   Medication Dose Route Frequency    enoxaparin (LOVENOX) injection 40 mg  40 mg SubCUTAneous Q24H    nystatin (MYCOSTATIN) 100,000 unit/gram powder   Topical BID    erythromycin (ILOTYCIN) 5 mg/gram (0.5 %) ophthalmic ointment   Both Eyes Q8H    sodium chloride (NS) flush 5-10 mL  5-10 mL IntraVENous PRN    sodium chloride (NS) flush 5-40 mL  5-40 mL IntraVENous Q8H    sodium chloride (NS) flush 5-40 mL  5-40 mL IntraVENous PRN    acetaminophen (TYLENOL) tablet 650 mg  650 mg Oral Q6H PRN    Or    acetaminophen (TYLENOL) suppository 650 mg  650 mg Rectal Q6H PRN    polyethylene glycol (MIRALAX) packet 17 g  17 g Oral DAILY PRN    promethazine (PHENERGAN) tablet 12.5 mg  12.5 mg Oral Q6H PRN    Or    ondansetron (ZOFRAN) injection 4 mg  4 mg IntraVENous Q6H PRN    pantoprazole (PROTONIX) injection 40 mg  40 mg IntraVENous DAILY PRN    melatonin tablet 12 mg  12 mg Oral QHS PRN    albuterol-ipratropium (DUO-NEB) 2.5 MG-0.5 MG/3 ML  3 mL Nebulization Q6H PRN    glucose chewable tablet 16 g  4 Tab Oral PRN    glucagon (GLUCAGEN) injection 1 mg  1 mg IntraMUSCular PRN    dextrose (D50) infusion 12.5-25 g  25-50 mL IntraVENous PRN          Total time spent with patient: 25 minutes                  Care Plan discussed with: Patient, Care Manager, Nursing Staff and >50% of time spent in counseling and coordination of care    Discussed:  Care Plan    Prophylaxis:  Lovenox    Disposition:  SNF/LTC           This document in whole or part of it has been produced using voice recognition software. Unrecognized errors in transcription may be present.     Attending Physician: Veronica Ocampo MD

## 2020-11-18 NOTE — ROUTINE PROCESS
Bedside and Verbal shift change report given to [facility] RN (oncoming nurse) by Everette Wagner RN (offgoing nurse). Report included the following information SBAR, Intake/Output and Cardiac Rhythm ventricular pacing with occasional failure to capture. Pt rested quietly overnight. Eye ointment doses held pending replenishment by pharmacy. Bedside and Verbal shift change report given to Jenniffer White (oncoming nurse) by [facility] RN (offgoing nurse). Report included the following information SBAR, Intake/Output, MAR and Cardiac Rhythm ventricular pacing with occasional failure to capture.

## 2020-11-18 NOTE — PROGRESS NOTES
Problem: Mobility Impaired (Adult and Pediatric)  Goal: *Acute Goals and Plan of Care (Insert Text)  Description: Physical Therapy Goals  Initiated 11/12/2020 and to be accomplished within 7 day(s)  1. Patient will move from supine to sit and sit to supine  in bed with modified independence. 2.  Patient will transfer from bed to chair and chair to bed with modified independence using the least restrictive device. 3.  Patient will perform sit to stand with modified independence. 4.  Patient will ambulate with modified independence for 50 feet with the least restrictive device. Outcome: Progressing Towards Goal  PHYSICAL THERAPY TREATMENT    Patient: Gayatri Gray (83 y.o. male)  Date: 11/18/2020  Diagnosis: Complicated UTI (urinary tract infection) [N39.0]  Shock ([address] [R57.9]   Sepsis ([facility] [address]  Precautions: Fall  PLOF: Mod I  ASSESSMENT:  Agreeable to PT. Max A for supine to sit with HOB elevated. Able to maintain seated EOB 12 minutes with supervision/min A and BUE support. Completed seated BLE AROM as noted below. Sit to stand completed; 2 trials. Max A for both sit to stand trials. Able to maintain standing with max A to prevent posterior lean at ww; 1st trial 30 seconds, 2nd trial 15 seconds. Max A for stand to sit; patient unable to remove BUE to assist in eccentric control. Returned to supine with max A and HOB elevated. Educated on need for RN assistance with mobility; verbalized understanding. Call bell in reach. Progression toward goals:   []      Improving appropriately and progressing toward goals  [x]      Improving slowly and progressing toward goals  []      Not making progress toward goals and plan of care will be adjusted     PLAN:  Patient continues to benefit from skilled intervention to address the above impairments. Continue treatment per established plan of care.   Discharge Recommendations:  Khai Ramsey  Further Equipment Recommendations for Discharge:  rolling walker     SUBJECTIVE:   Patient stated Marvine Masker is try.     OBJECTIVE DATA SUMMARY:   Critical Behavior:  Neurologic State: Alert  Orientation Level: Oriented to person;Oriented to place  Cognition: Follows commands     Psychosocial  Patient Behaviors: Calm; Cooperative  Functional Mobility:  Bed Mobility:  Supine to Sit: Maximum assistance  Sit to Supine: Maximum assistance  Transfers:  Sit to Stand: Maximum assistance  Stand to Sit: Maximum assistance  Balance:   Sitting: Impaired  Sitting - Static: Fair (occasional)  Sitting - Dynamic: Fair (occasional)  Standing: Impaired  Standing - Static: Poor  Neuro Re-Education:  Seated EOB 12 minutes  Standing 30 seconds, 15 seconds  Therapeutic Exercises:   BLE AROM seated; knee extension, hip flexion 15x2  Sit to stand x2    Pain:  Pain level pre-treatment: 0/10  Pain level post-treatment: 0/10     Activity Tolerance:   Fair    After treatment:   [] Patient left in no apparent distress sitting up in chair  [x] Patient left in no apparent distress in bed  [x] Call bell left within reach  [x] Nursing notified  [] Caregiver present  [] Bed alarm activated  [] SCDs applied      COMMUNICATION/EDUCATION:   [x]         Role of physical therapy in the acute care setting. [x]         Fall prevention education was provided and the patient/caregiver indicated understanding. [x]         Patient/family have participated as able in working toward goals and plan of care. [x]         Patient/family agree to work toward stated goals and plan of care. []         Patient understands intent and goals of therapy, but is neutral about his/her participation. []         Patient is unable to participate in stated goals/plan of care: ongoing with therapy staff.       Ann Ross, PT   Time Calculation: 23 mins

## 2020-11-18 NOTE — DIABETES MGMT
Nutrition Re-Assessment and Glycemic Control Plan of Care    Type and Reason for Visit: Reassess, Consult  Nutrition Recommendations/Plan:   1. Liberalize diet to regular (dental soft, soft entree/chopped meats) to optimize po intake. 2. Ensure Enlive BID  3. Implement preferences  4. Monitor po intake, labs, weights. Nutrition Assessment:  Pt with Respiratory Failure,Hypotension , Pneumonia,UTI, Cardiomyopathy, ARF. Pt is underweight related to inadequate caloric intake as evidenced by 89% ideal weight and BMI= 19.6kg/m2. Chart review indicates variable weights over the past 11 months with net weight loss of 25 lbs  (17% weight loss). Pt states he is tolerating po. I/O's indicate variable po intake of % past 3 days. Pt's only complaint is that he desires more seasoning (specifically, wants a packet of salt with each meal). He relates that he is consuming his po supplements and likes them. Noted mild hypoglycemia last night and today (otherwise, last hypoglycemia episode was 11/16/10 when lab glucose was 66 mg/dL). Malnutrition Assessment:  Malnutrition Status: At risk for malnutrition (specify)  Low BMI for age and unintentional weight loss  Estimated Daily Nutrient Needs:  Energy (kcal): 1925; Weight Used for Energy Requirements: Current  Protein (g): 70; Weight Used for Protein Requirements: Current  Fluid (ml/day): 1800; Method Used for Fluid Requirements: Standard renal  Nutrition Related Findings:  Pt seen at bedside sitting up and eating lunch with interest stating preferences. He states his appetite is good but he desires \"extra seasoning\" with his meals. States he is tolerating dental soft texture (chopped meats, soft entrees) and he likes his Ensure supplements. Mild hypoglycemia noted early yesterday and last night (69, 66 respectively) and this morning (65 mg/dL). Pt took 100% breakfast per I/O's and is currently consuming lunch with ~ 50% meal consumed thus far.    Wounds: Pressure injury     Current Nutrition Therapies:  DIET DENTAL SOFT (SOFT SOLID) 2 gram Na 60 gram protein  DIET NUTRITIONAL SUPPLEMENTS [facility]) All Meals, Dinner, [employer]; Ensure Enlive   Additional Caloric Sources: none   Meal intake:   Patient Vitals for the past 168 hrs:   % Diet Eaten   11/18/20 0858 100 %   11/17/20 1232 25 %   11/17/20 0930 50 %   11/16/20 1258 10 %   11/15/20 1036 50 %     Lab Results   Component Value Date/Time    Sodium 144 11/18/2020 03:40 AM    Potassium 4.4 11/18/2020 03:40 AM    Chloride 113 (H) 11/18/2020 03:40 AM    CO2 28 11/18/2020 03:40 AM    Anion gap 3 11/18/2020 03:40 AM    Glucose 73 (L) 11/18/2020 03:40 AM    BUN 38 (H) 11/18/2020 03:40 AM    Creatinine 1.02 11/18/2020 03:40 AM    BUN/Creatinine ratio 37 (H) 11/18/2020 03:40 AM    GFR est AA >60 11/18/2020 03:40 AM    GFR est non-AA >60 11/18/2020 03:40 AM    Calcium 8.6 11/18/2020 03:40 AM     Anthropometric Measures:  · Height:  5' 6\" (167.6 cm)  · Current Body Wt:  57.1 kg (125 lb 14.1 oz)(11/17/20)   · Admission Body Wt:    55.6 kg (fist bed weight seen - 11/12/20)   Ideal Body Wt:  142 lbs:  85.5 %   · BMI Category:  Underweight (BMI less than 22) age over 72    Last Weight Metrics:  Weight Loss Metrics 11/18/2020 9/12/2020 9/9/2020 7/22/2020 7/13/2020 5/11/2020 4/8/2020   Today's Wt 126 lb 1.7 oz 110 lb 0.2 oz 110 lb 110 lb 167 lb 176 lb 151 lb 6.4 oz   BMI 20.35 kg/m2 15.78 kg/m2 17.23 kg/m2 17.23 kg/m2 26.16 kg/m2 27.57 kg/m2 23.71 kg/m2      12/30/19 weight - pulled from EMR) - 151 lbs  Nutrition Diagnosis:   · Underweight related to inadequate protein-energy intake as evidenced by BMI (20.4 kg/m2)  · Unintentional weight loss due to inadequate energy intake as evidenced by net  weight loss of 25 lbs in past 11 months (17% weight loss).   Nutrition Interventions:   Food and/or Nutrient Delivery: Modify current diet, Modify oral nutrition supplement  Nutrition Education and Counseling: Education completed  Coordination of Nutrition Care: Continue to monitor while inpatient  Goals:  Intake will meet >75% of energy and protein requirements. Glucose will be within target range. Weight gain 1 lb. per week.      Nutrition Monitoring and Evaluation:     Food/Nutrient Intake Outcomes: Food and nutrient intake, Supplement intake  Physical Signs/Symptoms Outcomes: Weight, Meal time behavior, Biochemical data  Discharge Planning:    No discharge needs at this time   Electronically signed by Anca Bonilla RD on 11/18/2020 at 1:18 PM  Contact:   nAca Bonilla, [address] [place] [address], E   Office:  [address] [place] [address] Pager:  [phone]

## 2020-11-19 VITALS
WEIGHT: 127.3 LBS | HEIGHT: 66 IN | HEART RATE: 60 BPM | SYSTOLIC BLOOD PRESSURE: 105 MMHG | TEMPERATURE: 97.8 F | BODY MASS INDEX: 20.46 KG/M2 | RESPIRATION RATE: 22 BRPM | DIASTOLIC BLOOD PRESSURE: 73 MMHG | OXYGEN SATURATION: 98 %

## 2020-11-19 LAB
COVID-19 RAPID TEST, COVR: NOT DETECTED
GLUCOSE BLD STRIP.AUTO-MCNC: 104 MG/DL (ref 70–110)
GLUCOSE BLD STRIP.AUTO-MCNC: 88 MG/DL (ref 70–110)
GLUCOSE BLD STRIP.AUTO-MCNC: 98 MG/DL (ref 70–110)
SOURCE, COVRS: NORMAL
SPECIMEN TYPE, XMCV1T: NORMAL

## 2020-11-19 PROCEDURE — 74011250636 HC RX REV CODE- 250/636: Performed by: INTERNAL MEDICINE

## 2020-11-19 PROCEDURE — 87635 SARS-COV-2 COVID-19 AMP PRB: CPT

## 2020-11-19 PROCEDURE — 82962 GLUCOSE BLOOD TEST: CPT

## 2020-11-19 RX ORDER — NYSTATIN 100000 [USP'U]/G
POWDER TOPICAL 2 TIMES DAILY
Qty: 1 BOTTLE | Refills: 0 | Status: SHIPPED | OUTPATIENT
Start: 2020-11-19 | End: 2020-11-29

## 2020-11-19 RX ORDER — LEVOTHYROXINE SODIUM 50 UG/1
25 TABLET ORAL
Qty: 30 TAB | Refills: 0 | Status: SHIPPED | OUTPATIENT
Start: 2020-11-19

## 2020-11-19 RX ORDER — IPRATROPIUM BROMIDE AND ALBUTEROL SULFATE 2.5; .5 MG/3ML; MG/3ML
3 SOLUTION RESPIRATORY (INHALATION)
Qty: 30 NEBULE | Refills: 0 | Status: SHIPPED | OUTPATIENT
Start: 2020-11-19

## 2020-11-19 RX ADMIN — ERYTHROMYCIN: 5 OINTMENT OPHTHALMIC at 07:02

## 2020-11-19 RX ADMIN — ERYTHROMYCIN: 5 OINTMENT OPHTHALMIC at 17:17

## 2020-11-19 RX ADMIN — Medication 10 ML: at 07:01

## 2020-11-19 RX ADMIN — NYSTATIN: 100000 POWDER TOPICAL at 08:31

## 2020-11-19 RX ADMIN — ENOXAPARIN SODIUM 40 MG: 40 INJECTION SUBCUTANEOUS at 08:24

## 2020-11-19 NOTE — DISCHARGE SUMMARY
Discharge Summary      [facility] - \A Chronology of [place] [facility]\"" service    Patient ID:  Beulah Brannon, 80 y.o., male  : 1934    Admit Date: 11/10/2020  Discharge Date:  2020  Length of stay: 9 day(s)    PCP:  Timo Bush MD    Chief Complaint   Patient presents with    Dizziness     Discharge Diagnosis:     Hospital Problems  Date Reviewed: 11/10/2020          Codes Class Noted POA    Candiduria ICD-10-CM: B37.49  ICD-9-CM: 112.2  2020 Yes        Acute respiratory failure ([facility] [address] ICD-10-CM: J96.00  ICD-9-CM: 518.81  2020 Yes        Metabolic encephalopathy QZG-28-HY: G93.41  ICD-9-CM: 348.31  11/10/2020 Yes        Shock ([facility] ICD-10-CM: R57.9  ICD-9-CM: 785.50   Yes        Complicated UTI (urinary tract infection) ICD-10-CM: N39.0  ICD-9-CM: 599.0  11/10/2020 Yes        Pneumonia ICD-10-CM: J18.9  ICD-9-CM: 550  2020 Yes        * (Principal) Sepsis ([facility] [address] ICD-10-CM: A41.9  ICD-9-CM: 038.9, 995.91  2020 Yes        Cardiomyopathy ([facility] ICD-10-CM: I42.9  ICD-9-CM: 425.4  2020 Yes    Overview Signed 2020 12:40 AM by Akbar KRAUSE, DO     LVEF 35-40% by previous TTE.             UTI (urinary tract infection) ICD-10-CM: N39.0  ICD-9-CM: 599.0  2020 Yes        Hypotension ICD-10-CM: I95.9  ICD-9-CM: 458.9  2020 Yes        Hypothermia ICD-10-CM: T68. Finn Dorsey  ICD-9-CM: 991.6  2019 Yes            Discharge instructions:    #  Discharge Diet:            Diet: Resume previous diet  # Discharge activity and restrictions: Activity as tolerated and PT/OT Eval and Treat    # Follow-up appointments: Follow-up Information     Follow up With Specialties Details Why Freda James MD Nephrology In 3 days  [address] [address] 51 [phone] 85              HPI on Admission (per admitting physician):  Beulah Brannon is a 80 y.o. male who presented to the ER with decrease in his mental status and weakness.   This has been going on for 2-3 days prior to admission. He does not have cough. He does not have fever. He does not have nausea or vomiting. In the ER he is found to be hypothermic and has borderline BP. He will be admitted for ongoing management. For further details and initial management please refer to H/P. Hospital Course:      Patient feel god and ready to go to rehab. With more activities. Deny complain , pains or SOB. By Problems :     # Admitted with Sepsis with hypotension and hypothermia . Likely  due to UTI , Pneumonia. Wbc, platlet  improved , currently  a febrile and off Abx. Treated with  merrem and added Diflucan for candida in urine, Urine culture candida 20 K . Blood CS NTD. CXR ? Atelectasis vs ASD , repeat with worsening lower lungs opacities. CT chest ordered ,  CT chest report reviewed. Pleural fluids, atelectaisis , areas of possible ASD. Continue observe with no Abx , currently no fever , no SOB , no cough and no leukocytosis.       # Bilateral pleural effusion. Tapped under US guidance about 1800 cc from R side . Fluids sent for labs and C/S. Culture negative to date. Fluids consistent with transudate . Fluid does not look infected. I re started him on his home lasix (  cardiologist Dr Catracho Rascon , he agree) . This to be monitored regularly.       # Metabolic encephalopathy. Improved and seems back to normal.  He report been confused at times. Fully AXO today and been like that over the last few days. CT head No acute intracranial abnormality, chronic changes.      # treated for  Acute respiratory failure  . Currently on RA.  will do 6 min walk if he can do it.      # Hypoglycemic episodes. I think due to poor eating. Liberate food. He requested dietary seasoning . # Hypothyroidism . Last TSH was high in 5 range . Last FT4 normal. His hormone replacement at home was 50 mcg. He didn't get his theroid hormone replacement this admission.  I will restart him on 25 mcg and this to be reviewed by PCP in 2 weeks.      # Inguinal intertrigo. Topical nystatin used.       # Debility. Deconditioning .. PT. SNF       Discharge condition:  improved and stable    Disposition:  SNF    Procedures:   * No surgery found *      Consultants:   IP CONSULT TO CARDIOLOGY  IP CONSULT TO INTENSIVIST    Physical Exam on Discharge:  Visit Vitals  /66 (BP 1 Location: Right arm, BP Patient Position: At rest)   Pulse 60   Temp 97.8 °F (36.6 °C)   Resp 22   Ht 5' 6\" (1.676 m)   Wt 57.7 kg (127 lb 4.8 oz)   SpO2 100%   BMI 20.55 kg/m²   Gen:    Well-developed,   in no acute distress  HEENT: Head atraumatic, normocephalic ,  hearing intact to voice   Neck:   Trachea midline , No apparent JVD, Supple   Resp:  Improved AE both side clearer on r base  ,  No accessory muscle use, Bilateral BS present   Card:    normal S1, S2 without Gallop . No Significant lower leg peripheral edema. Abd:  Soft, non-tender, non-distended, bowel sounds are present . Musc:  No cyanosis or clubbing  Skin:   Warm and dry . No rashes     Neuro:  No facial asymmetry. no clear area of focal motor weakness, follows commands appropriately    alert        Discharge medications :  Current Discharge Medication List      START taking these medications    Details   albuterol-ipratropium (DUO-NEB) 2.5 mg-0.5 mg/3 ml nebu 3 mL by Nebulization route every six (6) hours as needed for Wheezing, Shortness of Breath or Respiratory Distress. Qty: 30 Nebule, Refills: 0      nystatin (MYCOSTATIN) powder Apply  to affected area two (2) times a day for 10 days. Qty: 1 Bottle, Refills: 0         CONTINUE these medications which have CHANGED    Details   levothyroxine (SYNTHROID) 50 mcg tablet Take 0.5 Tabs by mouth every morning. Qty: 30 Tab, Refills: 0         CONTINUE these medications which have NOT CHANGED    Details   furosemide (LASIX) 20 mg tablet Take 1 Tab by mouth daily.   Qty: 5 Tab, Refills: 0      lidocaine 4 % patch Apply to low back prn for back pains  Qty: 1 Package, Refills: 0      cholecalciferol (VITAMIN D3) (1000 Units /25 mcg) tablet Take 2 Tabs by mouth daily. Qty: 30 Tab, Refills: 0      simvastatin (ZOCOR) 40 mg tablet Take 1 Tab by mouth nightly. Qty: 30 Tab, Refills: 0      aspirin delayed-release 81 mg tablet Take 1 Tab by mouth daily. Qty: 30 Tab, Refills: 0      acetaminophen (TYLENOL EXTRA STRENGTH) 500 mg tablet Take 2 Tabs by mouth every six (6) hours as needed for Pain. Qty: 50 Tab, Refills: 0      allopurinol (ZYLOPRIM) 300 mg tablet Take 300 mg by mouth daily. tamsulosin (FLOMAX) 0.4 mg capsule take 1 capsule by mouth once daily AFTER DINNER  Qty: 90 Cap, Refills: 3                 Most Recent Labs:       No results for input(s): WBC, HGB, HCT, PLT, HGBEXT, HCTEXT, PLTEXT in the last 72 hours. Recent Labs     11/18/20  0340 11/17/20  0400    145   K 4.4 4.3   * 114*   CO2 28 25   GLU 73* 69*   BUN 38* 41*   CREA 1.02 1.17   CA 8.6 8.6     Lab Results   Component Value Date/Time    Glucose (POC) 88 11/19/2020 07:35 AM    Glucose (POC) 111 (H) 11/18/2020 09:00 PM    Glucose (POC) 120 (H) 11/18/2020 04:46 PM    Glucose (POC) 109 11/18/2020 11:54 AM    Glucose (POC) 83 11/18/2020 08:22 AM    Glucose (POC) 96 10/10/2014 09:28 PM    Glucose (POC) 186 (H) 10/10/2014 03:48 PM     No results for input(s): PH, PCO2, PO2, HCO3, FIO2 in the last 72 hours. No results for input(s): INR, INREXT in the last 72 hours.     Lab Results   Component Value Date/Time    Specimen Description: [facility] 02/06/2014 05:45 PM     Lab Results   Component Value Date/Time    Culture result: NO GROWTH 2 DAYS 11/16/2020 10:15 AM    Culture result: CANDIDA ALBICANS (A) 11/10/2020 03:15 PM    Culture result: NO GROWTH 6 DAYS 11/10/2020 02:49 PM       All Cardiac Markers in the last 24 hours: No results found for: CPK, CK, CKMMB, CKMB, RCK3, CKMBT, CKNDX, CKND1, TELLY, TROPT, TROIQ, JOSE, TROPT, TNIPOC, BNP, BNPP    XR Results:  Results from Hospital Encounter encounter on 11/10/20   XR CHEST SNGL V    Narrative EXAM: AP CHEST    INDICATION:  Status post right thoracentesis. TECHNIQUE: Upright, AP view. COMPARISON:  11/15/2020    ____________________    FINDINGS:      SUPPORT DEVICES: None. HEART AND MEDIASTINUM: Stable. Left subclavian pacer. LUNGS AND PLEURAL SPACES: No evidence for pneumothorax status post right  thoracentesis. Significant decrease to near resolution of right pleural  effusion. Persistent left basilar opacity, combination of effusion and  consolidation. BONY THORAX AND SOFT TISSUES: No acute osseous abnormality. ____________________      Impression IMPRESSION:      1. No evidence for pneumothorax status post right thoracentesis. 2. Significant decrease to near resolution of right pleural effusion. CT Results:  Results from [place] encounter on 11/10/20   CT CHEST WO CONT    Narrative EXAM: CT Chest     INDICATION: 80year-old patient with sepsis and shortness of breath    COMPARISON: CT scan of the chest performed 9/9/2020; several prior radiographs,  most recently 11/5/2020. Tommas Baptise TECHNIQUE: Axial CT imaging from the thoracic inlet through the diaphragm  Without intravenous contrast. Multiplanar reformations were generated. One or more dose reduction techniques were used on this CT: automated exposure  control, adjustment of the mAs and/or kVp according to patient size, and  iterative reconstruction techniques. The specific techniques used on this CT  exam have been documented in the patient's electronic medical record. Digital  Imaging and Communications in Medicine (DICOM) format image data are available  to nonaffiliated external healthcare facilities or entities on a secure, media  free, reciprocally searchable basis with patient authorization for at least a  12-month period after this study.     _______________    FINDINGS:    LUNGS: There is subtotal atelectasis of bilateral lower lobes with additional  significant atelectatic appearing opacities present involving the dependent  portions of the right upper and right middle lobes as well as inferior portion  lingula. Faint areas of groundglass opacity are noted to these atelectatic  regions of pulmonary parenchyma. Overall appearance of atelectasis appears  slightly improved. Previously noted interlobular septal line thickening and more  conspicuous perihilar groundglass densities are improved on today's examination. Biapical calcified pulmonary nodules compatible with healed granulomatous  disease. PLEURA: Moderately large right and small-to-moderate left-sided pleural  effusions, appear slightly improved from prior study of 9/9/2020. No evidence of  pneumothorax. AIRWAY: Central airways appear patent. MEDIASTINUM: Included thyroid gland demonstrates a roughly 1 cm lower pole left  thyroid nodule; too small to require further dedicated imaging follow-up. Left  subclavian approach pacer/defibrillator with lead tip in expected position. Cardiomegaly similar to prior. Multivessel coronary arterial atherosclerotic  vascular calcification is present. Small quantity of fluid present within the  superior pericardial recess. No pericardial effusion. Pulmonary arterial size is  estimated at approximately 3.9 cm in size. LYMPH NODES: No supraclavicular or axillary adenopathy is present. There are an  increased number of subcentimeter short axis mediastinal lymph nodes. UPPER ABDOMEN: Extensive atherosclerotic vascular calcifications noted  throughout the upper abdomen. No acute upper abdominal abnormality present. OSSEOUS STRUCTURES: Diffuse spondylosis throughout the thoracic spine is present  with accentuation of usual thoracic kyphosis. No acute osseous abnormality. No  aggressive appearing osseous abnormality. OTHER:    _______________      Impression IMPRESSION:      1.  Moderately large right and moderate size left pleural effusions, appearing  slightly improved from prior chest CT of 9/9/2020.    2. Cardiomegaly and main pulmonary arterial enlargement, similar to prior. 3. Improved appearing interstitial and perihilar alveolar edema noted on prior  exam. Persistent areas of subtotal atelectasis involving the lower lobes  bilaterally. Ground glass densities adjacent to these atelectatic portions of  lung may reflect small areas of airspace disease/edema and/or additional  manifestation of atelectasis. Note: Preliminary report sent to the patient care division by the radiology  resident at the time of the study. MRI Results:  No results found for this or any previous visit. Nuclear Medicine Results:  No results found for this or any previous visit. US Results:  Results from [place] encounter on 11/10/20   US THORACENTESIS RT NDL W IMAGE    Narrative PROCEDURE:  ULTRASOUND GUIDED RIGHT THORACENTESIS:    INDICATION: Recurrent bilateral pleural effusions, right greater than left.    ___________________________________________    TECHNIQUE/FINDINGS: I performed the procedure. Telephone informed consent was  obtained from the second and witnessed. The right pleural effusion was localized under ultrasound guidance. The skin  was marked, prepped and draped in sterile fashion and lidocaine was used for  local anesthesia. A 5 [employer] [place] Yueh needle was advanced into the fluid. A total  of 1800 mL of yellow fluid was drained using a Vacutainer system. Sample of  fluid sent for laboratory evaluation. The patient tolerated the procedure well  and there were no immediate complications. Postprocedural chest x-ray pending at  time of dictation. Pre-procedure time out:  1527 hours. GUIDANCE: Ultrasound guidance was used to position (and confirm the position of)  the Yueh sheathed needle.     Image(s) saved in PACS: Ultrasound    ___________________________________________      Impression IMPRESSION:    Successful ultrasound guided right thoracentesis of approximately 1800 mL of  yellow fluid. Sample of fluid sent for laboratory evaluation. IR Results:  No results found for this or any previous visit. VAS/US Results:  Results from [facility] on 01/30/18   DUPLEX CAROTID BILATERAL       Total discharge time 35 minutes of which more than 50 % spent on counseling and coordination of care. This document in whole or part of it has been produced using voice recognition software. Unrecognized errors in transcription may be present. Trino Coronel MD  Hospitalist  [facility]. medical group. Hospitalist Division.   November 19, 2020  10:07 AM

## 2020-11-19 NOTE — ROUTINE PROCESS
Walk test 
O2 sats on room air and at rest was 99% O2 sats on room air with activity was 95%, tolerated well.

## 2020-11-19 NOTE — PROGRESS NOTES
Transition of Care Plan to SNF/Rehab    SNF/Rehab Transition:  Patient has been accepted to [facility] and meets criteria for admission. Patient will transported by [facility] and expected to leave at     Communication to Patient/Family:  Met with patient  and they are agreeable to the transition plan. Communication to SNF/Rehab:  Bedside RN, Pola Beltran, has been notified to update the transition plan to the facility and call report (phone number [phone]). Discharge information has been updated on the AVS.     Discharge instructions to be fax'd to facility at [facility] # [phone]). Nursing Please include all hard scripts for controlled substances, med rec and dc summary, and AVS in packet. Reviewed and confirmed with facility, [facility], can manage the patient care needs for the following:     Eber García with (X) only those applicable:    Medication:  [x]  Medications will be available at the facility  []  IV Antibiotics   []  Controlled Substance - hard copy to be sent with patient   []  Weekly Labs   Documents:  [] Hard RX  [] MAR  [] Kardex  [] AVS  [x]Transfer Summary  [x]Discharge   Equipment:  []  CPAP/BiPAP  []  Wound Vacuum  []  Farrell or Urinary Device  []  PICC/Central Line  []  Nebulizer  []  Ventilator   Treatment:  []Isolation (for MRSA, VRE, etc.)  []Surgical Drain Management  []Tracheostomy Care  []Dressing Changes  []Dialysis with transportation and chair time   []PEG Care  []Oxygen  []Daily Weights for Heart Failure   Dietary:  []Any diet limitations  []Tube Feedings   []Total Parenteral Management (TPN)   Eligible for Medicaid Long Term Services and Supports  Yes:  [] Eligible for medical assistance or will become eligible within 180 days and UAI completed. [] Provider/Patient and/or support system has requested screening. [] UAI copy provided to patient or responsible party,  [] UAI unavailable at discharge will send once processed to SNF provider.   [] UAI unavailable at discharged mailed to patient  No:   [x] Private pay and is not financially eligible for Medicaid within the next 180 days. [] Reside out-of-state.   [] A residents of a state owned/operated facility that is licensed  by [address] [employer] [facility] or [facility]  [] Enrollment in [facility] services  [] [address] [place] [address]  [] Patient /Family declines to have screening completed or provide financial information for screening     Financial Resources:  Medicaid    [] Initiated and application pending   [] Full coverage     Advanced Care Plan:  []Surrogate Decision Maker of Care  []POA  []Communicated Code Status(DDNR\", \"Full\")Full   Other

## 2020-11-19 NOTE — PROGRESS NOTES
Discharge/Transition Planning    0850:Notified Dr Concepcion Herbert pt needs [facility]. Accepted to [address] ([facility])  0945: Completed PCS and faxed to [facility]. Calling multiple times to [facility] an unable to get anyone on phone  1100: Still unable to [facility] [employer] transport on phone  1110: able to get [facility] on different number [phone]. Transport available at 330 pm. Jaycob Fernandez at [facility] and notified  PCS and discharge envelope at nurses station.  Report to [address] [phone]  Care Management Interventions  PCP Verified by CM: Yes(Dr. Fior Burciaga, unsure when last seen)  Palliative Care Criteria Met (RRAT>21 & CHF Dx)?: No  Mode of Transport at Discharge: BLS  Transition of Care Consult (CM Consult): SNF  Partner SNF: Yes  Discharge Durable Medical Equipment: No  Physical Therapy Consult: Yes  Occupational Therapy Consult: Yes  Speech Therapy Consult: No  Current Support Network: Nursing Facility  Confirm Follow Up Transport: Family  The Patient and/or Patient Representative was Provided with a Choice of Provider and Agrees with the Discharge Plan?: Yes  Freedom of Choice List was Provided with Basic Dialogue that Supports the Patient's Individualized Plan of Care/Goals, Treatment Preferences and Shares the Quality Data Associated with the Providers?: Yes  Discharge Location  Discharge Placement: Skilled nursing facility

## 2020-11-19 NOTE — ROUTINE PROCESS
Bedside and Verbal shift change report given to [facility] RN (oncoming nurse) by Vipin Ugarte RN (offgoing nurse). Report included the following information SBAR, Intake/Output, MAR and Cardiac Rhythm paced with occasional failure to capture. Pt rested quietly throughout shift. Bedside and Verbal shift change report given to RN (oncoming nurse) by [facility] RN (offgoing nurse). Report included the following information SBAR, Intake/Output, MAR and Recent Results.

## 2020-11-19 NOTE — PROGRESS NOTES
Faxed D/C Summary and UAI to [facility]. Called number for pt son and daughter in law answered.  Passed along transport time      Jacobo Cee # [phone]  Pager # [phone]

## 2020-11-19 NOTE — ROUTINE PROCESS
0720  -- Bedside, Verbal and Written shift change report received by [facility] [employer] (oncoming nurse) by Swheta(offgoing nurse). Allergy band placed on pt's wrist. Report included the following information SBAR, Kardex, Intake/Output, MAR and Recent Results. 0925--Assessment completed, call bell within reach, no distress noted. AM  medications administered, pt tolerated with ease, will continue to monitor. 1200 -- Shift reassessment, pt condition unchanged, will continue to monitor. 1530-report called to [facility] 
1550-was call by [employer] service and stated  time is now 1645. 
1600 --  Shift reassessment, pt condition unchanged, will continue to monitor. 1950 -- Bedside, Verbal and Written shift change report given to [address]) by [facility] [employer] (offgoing nurse). Report included the following information SBAR, Kardex, Intake/Output, MAR and Recent Results. Skin assessment completed.

## 2020-11-19 NOTE — PROGRESS NOTES
Problem: Falls - Risk of  Goal: *Absence of Falls  Description: Document Manjit Madrid Fall Risk and appropriate interventions in the flowsheet. Outcome: Progressing Towards Goal  Note: Fall Risk Interventions:  Mobility Interventions: Bed/chair exit alarm, Communicate number of staff needed for ambulation/transfer, OT consult for ADLs, Patient to call before getting OOB, PT Consult for mobility concerns, PT Consult for assist device competence, Strengthening exercises (ROM-active/passive)    Mentation Interventions: Adequate sleep, hydration, pain control, Bed/chair exit alarm, Door open when patient unattended, Evaluate medications/consider consulting pharmacy, Increase mobility, More frequent rounding, Reorient patient, Room close to nurse's station, Update white board, Toileting rounds    Medication Interventions: Bed/chair exit alarm, Evaluate medications/consider consulting pharmacy, Patient to call before getting OOB, Teach patient to arise slowly    Elimination Interventions: Bed/chair exit alarm, Call light in reach, Patient to call for help with toileting needs, Stay With Me (per policy), Toilet paper/wipes in reach, Toileting schedule/hourly rounds              Problem: Pressure Injury - Risk of  Goal: *Prevention of pressure injury  Description: Document Alex Scale and appropriate interventions in the flowsheet. Outcome: Progressing Towards Goal  Note: Pressure Injury Interventions:  Sensory Interventions: Assess changes in LOC, Assess need for specialty bed, Avoid rigorous massage over bony prominences, Check visual cues for pain, Discuss PT/OT consult with provider, Keep linens dry and wrinkle-free, Maintain/enhance activity level, Minimize linen layers, Monitor skin under medical devices, Pressure redistribution bed/mattress (bed type), Turn and reposition approx.  every two hours (pillows and wedges if needed)    Moisture Interventions: Absorbent underpads, Apply protective barrier, creams and emollients, Assess need for specialty bed, Check for incontinence Q2 hours and as needed, Limit adult briefs, Maintain skin hydration (lotion/cream), Minimize layers    Activity Interventions: Assess need for specialty bed, Increase time out of bed, Pressure redistribution bed/mattress(bed type), PT/OT evaluation    Mobility Interventions: Assess need for specialty bed, HOB 30 degrees or less, Pressure redistribution bed/mattress (bed type), PT/OT evaluation, Turn and reposition approx.  every two hours(pillow and wedges)    Nutrition Interventions: Document food/fluid/supplement intake, Discuss nutritional consult with provider, Offer support with meals,snacks and hydration    Friction and Shear Interventions: Apply protective barrier, creams and emollients, Foam dressings/transparent film/skin sealants, HOB 30 degrees or less, Lift sheet, Minimize layers                Problem: Pain  Goal: *Control of Pain  Outcome: Progressing Towards Goal     Problem: Urinary Tract Infection - Adult  Goal: *Absence of infection signs and symptoms  Outcome: Progressing Towards Goal     Problem: Patient Education: Go to Patient Education Activity  Goal: Patient/Family Education  Outcome: Progressing Towards Goal     Problem: Breathing Pattern - Ineffective  Goal: *Absence of hypoxia  Outcome: Progressing Towards Goal

## 2020-11-20 NOTE — PROGRESS NOTES
[address] [facility] transport [phone], [facility] stated truck had issue needed to switch trucks will be here in 20 minutes. Supervisor Maria Del Carmen abernathy.

## 2020-11-21 LAB
BACTERIA SPEC CULT: NORMAL
GRAM STN SPEC: NORMAL
GRAM STN SPEC: NORMAL
SERVICE CMNT-IMP: NORMAL

## 2021-08-03 PROBLEM — J18.9 PNEUMONIA: Status: RESOLVED | Noted: 2020-07-16 | Resolved: 2021-08-03

## 2021-09-27 NOTE — H&P
History and Physical    Patient: Kt Jackman               Sex: male          DOA: 11/10/2020       YOB: 1934      Age:  80 y.o.        LOS:  LOS: 0 days        HPI:     Kt Jackman is a 80 y.o. male who presented to the ER with decrease in his mental status and weakness. This has been going on for 2-3 days prior to admission. He does not have cough. He does not have fever. He does not have nausea or vomiting. In the ER he is found to be hypothermic and has borderline BP. He will be admitted for ongoing management. Past Medical History:   Diagnosis Date    Difficulty urinating     Elevated PSA     Hematuria, unspecified     Hypercholesterolemia     Hypertension     Incomplete bladder emptying     Urinary retention     UTI (urinary tract infection)        Social History:   Tobacco use:  Patient does not smoke   Alcohol use:  Patient does not use alcohol   Patient lives with Son    Family History:      Review of Systems    Constitutional:  No fever or weight loss  HEENT:  No headache or visual changes  Cardiovascular:  No chest pain or diaphoresis  Respiratory:  No coughing, wheezing, or shortness of breath. GI:  No nausea or vomitting. No diarrhea  :  No hematuria or dysuria  Skin:  No rashes or moles  Neuro:  No seizures or syncope  Hematological:  No bruising or bleeding  Endocrine:  No diabetes or thyroid disease    Physical Exam:      Visit Vitals  /63   Pulse 61   Temp (!) 92.8 °F (33.8 °C)   Resp 12   Ht 5' 6\" (1.676 m)   Wt 49.9 kg (110 lb)   SpO2 99%   BMI 17.75 kg/m²       Physical Exam:    Gen:  Quiet, but he will interact. No distress  HEENT:  Normal cephalic atraumatic, extra-occular movements are intact. Neck:  Supple, No JVD  Lungs:  Clear bilaterally, no wheeze, no rales, normal effort  Heart:  Regular Rate and Rhythm, normal S1 and S2, no edema  Abdomen:  Soft, non tender, normal bowel sounds, no guarding.   Extremities:  Well perfused, no cyanosis or edema  Neurological:  Awake and alert, CN's are intact, normal strength throughout extremities  Skin:  No rashes or moles  Mental Status:  Normal thought process, does not appear anxious  Laboratory Studies:    BMP:   Lab Results   Component Value Date/Time     11/10/2020 02:38 PM    K 4.0 11/10/2020 02:38 PM     11/10/2020 02:38 PM    CO2 29 11/10/2020 02:38 PM    AGAP 7 11/10/2020 02:38 PM    GLU 63 (L) 11/10/2020 02:38 PM    BUN 83 (H) 11/10/2020 02:38 PM    CREA 2.09 (H) 11/10/2020 02:38 PM    GFRAA 37 (L) 11/10/2020 02:38 PM    GFRNA 30 (L) 11/10/2020 02:38 PM     CBC:   Lab Results   Component Value Date/Time    WBC 6.7 11/10/2020 02:38 PM    HGB 12.6 (L) 11/10/2020 02:38 PM    HCT 36.9 11/10/2020 02:38 PM     (L) 11/10/2020 02:38 PM       Assessment/Plan     Principal Problem:    Sepsis ([address] (7/6/2020)    Active Problems:    UTI (urinary tract infection) (2/44/3890)      Metabolic encephalopathy (51/15/1034)      Hypotension (2/19/2020)      Cardiomyopathy ([address] (2/20/2020)      Overview: LVEF 35-40% by previous TTE. Acute renal failure    PLAN:    Continue with IV antibiotics  We are following his BP. Monitor mental status. He may require vasopressor, though he seems to have a positive trend with his most recent BP  Follow renal function  DVT prophylaxis. [Normal] : no rash [No Focal Deficits] : no focal deficits [43805 - Moderate Complexity requires multiple possible diagnoses and/or the management options, moderate complexity of the medical data (tests, etc.) to be reviewed, and moderate risk of significant complications, morbidity, and/or mortality as well as co] : Moderate Complexity [de-identified] : Ambulates with assisting device - walker -

## 2024-11-22 NOTE — DIABETES MGMT
Nutrition:  Diet:  mechanical soft, ground meats, nectar thick liquids and Ensure Enlive once/day  D/w SLP  Poor po intake noted. Pt is not fond of thickened liquids but thinks he may like thickened po supplements  Will adjust current po supplement to [place] thick [place] [employer] Breakfast TID (noted pt likes chocolate) and check acceptance.     Patient Vitals for the past 168 hrs:   % Diet Eaten   12/28/19 0846 75 %   12/27/19 1012 25 %   12/26/19 1008 0 %     Brian Pratt RD, CDE   Office:  [address] [place] [address] Pager:  [phone] 2 (mild pain)

## (undated) DEVICE — SLING ARM 2-37INX8-17IN PCH -- MED

## (undated) DEVICE — FLEX ADVANTAGE 1500CC: Brand: FLEX ADVANTAGE

## (undated) DEVICE — PLASMABLADE PS200-040 4.0: Brand: PLASMABLADE™

## (undated) DEVICE — SUTURE ABSORBABLE BRAIDED 2-0 CT-1 27 IN UD VICRYL J259H

## (undated) DEVICE — BASIN EMESIS 500CC ROSE 250/CS 60/PLT: Brand: MEDEGEN MEDICAL PRODUCTS, LLC

## (undated) DEVICE — INTRO SHTH 7FR 13X20CM -- TEARAWAY

## (undated) DEVICE — BLOCK BITE BLOX W DENTAL RIM --

## (undated) DEVICE — ESOPHAGEAL/COLONIC/BILIARY WIREGUIDED BALLOON DILATATION CATHETER: Brand: CRE™ PRO

## (undated) DEVICE — MEDI-VAC NON-CONDUCTIVE SUCTION TUBING: Brand: CARDINAL HEALTH

## (undated) DEVICE — KIT ENDO CUSTOM

## (undated) DEVICE — 3M™ IOBAN™ 2 ANTIMICROBIAL INCISE DRAPE 6640EZ: Brand: IOBAN™ 2

## (undated) DEVICE — KENDALL 500 SERIES DIAPHORETIC FOAM MONITORING ELECTRODE - TEAR DROP SHAPE ( 30/PK): Brand: KENDALL

## (undated) DEVICE — SYR 50ML SLIP TIP NSAF LF STRL --

## (undated) DEVICE — SUTURE MCRYL SZ 4-0 L18IN ABSRB UD L19MM PS-2 3/8 CIR PRIM Y496G

## (undated) DEVICE — KIT COLON W/ 1.1OZ LUB AND 2 END

## (undated) DEVICE — CABLE PACE ALGTR CLP SAF 12FT --

## (undated) DEVICE — BITE BLK ENDOSCP AD 54FR GRN POLYETH ENDOSCP W STRP SLD

## (undated) DEVICE — DRSG AQUACEL SURG 3.5X6IN -- CONVERT TO ITEM 369227

## (undated) DEVICE — SUTURE PERMA HND SZ 0 L18IN NONABSORBABLE BLK L30MM PSL REV 580H